# Patient Record
Sex: MALE | Race: BLACK OR AFRICAN AMERICAN | NOT HISPANIC OR LATINO | Employment: OTHER | URBAN - METROPOLITAN AREA
[De-identification: names, ages, dates, MRNs, and addresses within clinical notes are randomized per-mention and may not be internally consistent; named-entity substitution may affect disease eponyms.]

---

## 2017-01-09 RX ORDER — CLOPIDOGREL BISULFATE 75 MG/1
TABLET ORAL
Qty: 30 TABLET | Refills: 0 | Status: SHIPPED | OUTPATIENT
Start: 2017-01-09 | End: 2017-01-10 | Stop reason: SDUPTHER

## 2017-01-10 RX ORDER — BENAZEPRIL HYDROCHLORIDE AND HYDROCHLOROTHIAZIDE 10; 12.5 MG/1; MG/1
1 TABLET ORAL DAILY
Qty: 90 TABLET | Refills: 3 | Status: SHIPPED | OUTPATIENT
Start: 2017-01-10 | End: 2018-02-16 | Stop reason: SDUPTHER

## 2017-01-10 RX ORDER — CLOPIDOGREL BISULFATE 75 MG/1
75 TABLET ORAL DAILY
Qty: 30 TABLET | Refills: 3 | Status: SHIPPED | OUTPATIENT
Start: 2017-01-10 | End: 2017-02-07 | Stop reason: SDUPTHER

## 2017-01-11 NOTE — TELEPHONE ENCOUNTER
Approved Medications  clopidogrel (PLAVIX) 75 mg tablet  Take 1 tablet (75 mg total) by mouth once daily.       Disp: 30 tablet Refills: 3    Class: Normal Start: 1/10/2017   Approved by: MD janet Hwangazepril-hydrochlorthiazide (LOTENSIN HCT) 10-12.5 mg Tab  Take 1 tablet by mouth once daily.       Disp: 90 tablet Refills: 3    Class: Normal Start: 1/10/2017 -

## 2017-01-11 NOTE — TELEPHONE ENCOUNTER
----- Message from Lin Costa sent at 1/10/2017  3:07 PM CST -----  blood thinners & b/p refill needed..[phone] (home)   ..  [facility] [address]  [address] [facility] [address]  Phone: [phone] Fax: [phone]

## 2017-02-07 RX ORDER — CLOPIDOGREL BISULFATE 75 MG/1
75 TABLET ORAL DAILY
Qty: 30 TABLET | Refills: 6 | Status: SHIPPED | OUTPATIENT
Start: 2017-02-07 | End: 2017-07-18 | Stop reason: SDUPTHER

## 2017-02-07 NOTE — TELEPHONE ENCOUNTER
----- Message from Radha Marx sent at 2/7/2017 11:44 AM CST -----  Contact: Pt  Pt is requesting a refill on his clopidogrel 75 mg. Pt can be reached at [phone] (home)     [facility] [address]  [address] [facility] [address]  Phone: [phone] Fax: [phone]

## 2017-02-07 NOTE — TELEPHONE ENCOUNTER
Approved Medications  clopidogrel (PLAVIX) 75 mg tablet  Take 1 tablet (75 mg total) by mouth once daily.       Disp: 30 tablet Refills: 6    Class: Normal Start: 2/7/2017   Approved by: MAGI Vázquez  Patient notified

## 2017-03-15 ENCOUNTER — OFFICE VISIT (OUTPATIENT)
Dept: INTERNAL MEDICINE | Facility: CLINIC | Age: 82
End: 2017-03-15
Payer: MEDICARE

## 2017-03-15 ENCOUNTER — HOSPITAL ENCOUNTER (OUTPATIENT)
Dept: RADIOLOGY | Facility: HOSPITAL | Age: 82
Discharge: HOME | End: 2017-03-15
Attending: FAMILY MEDICINE
Payer: MEDICARE

## 2017-03-15 VITALS
DIASTOLIC BLOOD PRESSURE: 80 MMHG | WEIGHT: 217.81 LBS | HEIGHT: 72 IN | TEMPERATURE: 97 F | HEART RATE: 111 BPM | BODY MASS INDEX: 29.5 KG/M2 | SYSTOLIC BLOOD PRESSURE: 114 MMHG

## 2017-03-15 DIAGNOSIS — M51.36 DDD (DEGENERATIVE DISC DISEASE), LUMBAR: ICD-10-CM

## 2017-03-15 DIAGNOSIS — I10 ESSENTIAL HYPERTENSION: Chronic | ICD-10-CM

## 2017-03-15 DIAGNOSIS — D50.9 MICROCYTIC ANEMIA: ICD-10-CM

## 2017-03-15 DIAGNOSIS — K59.01 SLOW TRANSIT CONSTIPATION: ICD-10-CM

## 2017-03-15 DIAGNOSIS — K59.01 SLOW TRANSIT CONSTIPATION: Primary | ICD-10-CM

## 2017-03-15 PROBLEM — M51.369 DDD (DEGENERATIVE DISC DISEASE), LUMBAR: Status: ACTIVE | Noted: 2017-03-15

## 2017-03-15 PROCEDURE — 74020 XR ABDOMEN FLAT AND ERECT: CPT | Mod: 26,,, | Performed by: RADIOLOGY

## 2017-03-15 PROCEDURE — 72114 X-RAY EXAM L-S SPINE BENDING: CPT | Mod: TC,PO

## 2017-03-15 PROCEDURE — 72114 X-RAY EXAM L-S SPINE BENDING: CPT | Mod: 26,,, | Performed by: RADIOLOGY

## 2017-03-15 PROCEDURE — 74020 XR ABDOMEN FLAT AND ERECT: CPT | Mod: TC,PO

## 2017-03-15 PROCEDURE — 99214 OFFICE O/P EST MOD 30 MIN: CPT | Mod: S$PBB,,, | Performed by: FAMILY MEDICINE

## 2017-03-15 PROCEDURE — 99999 PR PBB SHADOW E&M-EST. PATIENT-LVL III: CPT | Mod: PBBFAC,,, | Performed by: FAMILY MEDICINE

## 2017-03-15 RX ORDER — METHOCARBAMOL 750 MG/1
750 TABLET, FILM COATED ORAL 3 TIMES DAILY
Qty: 90 TABLET | Refills: 0 | Status: SHIPPED | OUTPATIENT
Start: 2017-03-15 | End: 2017-03-25

## 2017-03-15 NOTE — MR AVS SNAPSHOT
[facility] - Internal Medicine  [address] [facility] [place] [address]  Phone: [phone]  Fax: [phone]                  Jovanny Olmedo   3/15/2017 11:40 AM   Office Visit    Description:  Male : 1935   Provider:  Sam Blake MD   Department[facility]  [facility] - Internal Medicine           Reason for Visit     Constipation     Medication Refill           Diagnoses this Visit        Comments    Slow transit constipation    -  Primary 1) Miralax 1 a day\  2) Increase your fiber  3) will get CMP to see if his potassium is low.     Essential hypertension     BP is well controlled.     DDD (degenerative disc disease), lumbar     Xray in  showed narrowing    Microcytic anemia     Will check his CBC, Iron and TIBC, ferritin.           To Do List           Future Appointments        Provider Department Dept Phone    3/15/2017 12:30 PM [facility] XR2 [facility] [phone]    3/15/2017 1:40 PM LABORATORY, [facility] [phone]    3/20/2017 9:00 AM FIELDS, VISUAL-ONE [facility] [phone]    3/20/2017 9:30 AM Alfred Urrutia MD [facility] [phone]    3/29/2017 11:00 AM Sam Blake MD [facility] [phone]      Goals (5 Years of Data)     None      Follow-Up and Disposition     Return in about 2 weeks (around 3/29/2017).    Follow-up and Disposition History       These Medications        Disp Refills Start End    methocarbamol (ROBAXIN) 750 MG Tab 90 tablet 0 3/15/2017 3/25/2017    Take 1 tablet (750 mg total) by mouth 3 (three) times daily. - Oral    Pharmacy: [facility] [address] [place] [address] Ph #: [phone]         [facility] On Call     [facility] Nurse Care Line -  Assistance  Registered nurses in the [facility] Center provide clinical advisement, health education, appointment booking, and other advisory services.  Call for this free service at [phone].              Medications           Message regarding Medications     Verify the changes and/or additions to your medication regime listed below are the same as discussed with your clinician today.  If any of these changes or additions are incorrect, please notify your healthcare provider.        START taking these NEW medications        Refills    methocarbamol (ROBAXIN) 750 MG Tab 0    Sig: Take 1 tablet (750 mg total) by mouth 3 (three) times daily.    Class: Normal    Route: Oral           Verify that the below list of medications is an accurate representation of the medications you are currently taking.  If none reported, the list may be blank. If incorrect, please contact your healthcare provider. Carry this list with you in case of emergency.           Current Medications     aspirin (ECOTRIN) 81 MG EC tablet Take 81 mg by mouth once daily.    atorvastatin (LIPITOR) 40 MG tablet Take 1 tablet (40 mg total) by mouth once daily.    azelastine (ASTELIN) 137 mcg (0.1 %) nasal spray 2 sprays (274 mcg total) by Nasal route 2 (two) times daily.    benazepril-hydrochlorthiazide (LOTENSIN HCT) 10-12.5 mg Tab Take 1 tablet by mouth once daily.    clopidogrel (PLAVIX) 75 mg tablet Take 1 tablet (75 mg total) by mouth once daily.    fluticasone (FLONASE) 50 mcg/actuation nasal spray 2 sprays by Each Nare route once daily.    levocetirizine (XYZAL) 5 MG tablet Take 1 tablet (5 mg total) by mouth every evening.    methocarbamol (ROBAXIN) 750 MG Tab Take 1 tablet (750 mg total) by mouth 3 (three) times daily.    nitroGLYCERIN (NITROSTAT) 0.4 MG SL tablet Place 1 tablet (0.4 mg total) under the tongue every 5 (five) minutes as needed for Chest pain.    ranitidine (ZANTAC) 300 MG tablet Take 1 tablet (300 mg total) by mouth once daily.    solifenacin (VESICARE) 10 MG tablet Take 1 tablet (10 mg total) by mouth once daily.    tadalafil (CIALIS) 10 MG tablet Take 1 tablet (10 mg total) by mouth daily as needed for Erectile  Dysfunction.           Clinical Reference Information           Your Vitals Were     BP Pulse Temp    114/80 (BP Location: Right arm, Patient Position: Sitting, BP Method: Manual) 111 97.2 °F (36.2 °C) (Tympanic)    Height Weight BMI    6' (1.829 m) 98.8 kg (217 lb 13 oz) 29.54 kg/m2      Blood Pressure          Most Recent Value    BP  114/80      Allergies as of 3/15/2017     Codeine      Immunizations Administered on Date of Encounter - 3/15/2017     None      Orders Placed During Today's Visit     Future Labs/Procedures Expected by Expires    CBC auto differential  3/15/2017 5/14/2018    Comprehensive metabolic panel  3/15/2017 5/14/2018    Ferritin  3/15/2017 5/14/2018    Iron and TIBC  3/15/2017 5/14/2018    X-Ray Abdomen Flat And Erect  3/15/2017 3/15/2018    X-Ray Lumbar Complete With Flex And Ext  3/15/2017 3/15/2018      [facility] Sign-Up     Activating your [facility] account is as easy as 1-2-3!     1) Visit [email or web address], select Sign Up Now, enter this activation code and your date of birth, then select Next.  [email or web address]  Expires: 4/29/2017 11:41 AM      2) Create a username and password to use when you visit [facility] in the future and select a security question in case you lose your password and select Next.    3) Enter your e-mail address and click Sign Up!    Additional Information  If you have questions, please e-mail [email or web address] or call [phone] to talk to our [facility] staff. Remember, [facility] is NOT to be used for urgent needs. For medical emergencies, dial 911.         Language Assistance Services     ATTENTION: Language assistance services are available, free of charge. Please call [phone].      ATENCIÓN: Si habla español, tiene a edwards disposición servicios gratuitos de asistencia lingüística. Llame al [phone].     CHÚ Ý: N?u b?n nói Ti?ng Vi?t, có các d?ch v? h? tr? ilir ng? mi?n phí dành cho b?n. G?i s? [phone].         [facility]  complies with applicable Federal civil rights laws and does not discriminate on the basis of race, color, national origin, age, disability, or sex.

## 2017-03-15 NOTE — PROGRESS NOTES
Subjective:       Patient ID: Jovanny Olmedo is a 81 y.o. male.    Chief Complaint: Constipation and Medication Refill     Constipation   This is a new problem. The current episode started in the past 7 days. The problem is unchanged. The stool is described as loose. The patient is not on a high fiber diet. He does not exercise regularly. There has not been adequate water intake. Associated symptoms include diarrhea and flatus. Pertinent negatives include no abdominal pain, bloating, fecal incontinence, hematochezia, hemorrhoids, melena, nausea or vomiting. He has tried nothing for the symptoms. There is no history of inflammatory bowel disease, irritable bowel syndrome, neuromuscular disease, psychiatric history or radiation treatment.   Medication Refill   Pertinent negatives include no abdominal pain, nausea or vomiting.     Review of Systems   Constitutional: Negative.    Respiratory: Negative.    Gastrointestinal: Positive for constipation, diarrhea and flatus. Negative for abdominal pain, bloating, hematochezia, hemorrhoids, melena, nausea and vomiting.   Genitourinary: Negative.    Neurological: Negative.    Hematological: Negative.    Psychiatric/Behavioral: Negative.        Objective:      Physical Exam   Constitutional: He appears well-developed and well-nourished.   Cardiovascular: Normal rate and regular rhythm.    Pulmonary/Chest: Effort normal and breath sounds normal.   Abdominal: Soft. Bowel sounds are normal.   Skin: Skin is warm and dry.       Assessment:       1. Slow transit constipation    2. Essential hypertension    3. DDD (degenerative disc disease), lumbar    4. Microcytic anemia        Plan:       Slow transit constipation  Comments:  1) Miralax 1 a day\par 2) Increase your fiber  3) will get CMP to see if his potassium is low.   Orders:  -     X-Ray Abdomen Flat And Erect; Future; Expected date: 3/15/17    Essential hypertension  Comments:  BP is well controlled.   Orders:  -      Comprehensive metabolic panel; Future; Expected date: 3/15/17  -     CBC auto differential; Future; Expected date: 3/15/17    DDD (degenerative disc disease), lumbar  Comments:  Xray in 2011 showed narrowing  Orders:  -     X-Ray Lumbar Complete With Flex And Ext; Future; Expected date: 3/15/17    Microcytic anemia  Comments:  Will check his CBC, Iron and TIBC, ferritin.  Orders:  -     Iron and TIBC; Future; Expected date: 3/15/17  -     Ferritin; Future; Expected date: 3/15/17    Other orders  -     methocarbamol (ROBAXIN) 750 MG Tab; Take 1 tablet (750 mg total) by mouth 3 (three) times daily.  Dispense: 90 tablet; Refill: 0

## 2017-03-16 RX ORDER — FERROUS SULFATE 325(65) MG
325 TABLET ORAL 2 TIMES DAILY
Qty: 60 TABLET | Refills: 6 | COMMUNITY
Start: 2017-03-16 | End: 2017-07-31 | Stop reason: ALTCHOICE

## 2017-03-17 DIAGNOSIS — M51.36 DDD (DEGENERATIVE DISC DISEASE), LUMBAR: Primary | ICD-10-CM

## 2017-03-20 ENCOUNTER — APPOINTMENT (OUTPATIENT)
Dept: OPHTHALMOLOGY | Facility: CLINIC | Age: 82
End: 2017-03-20
Payer: MEDICARE

## 2017-03-20 ENCOUNTER — OFFICE VISIT (OUTPATIENT)
Dept: OPHTHALMOLOGY | Facility: CLINIC | Age: 82
End: 2017-03-20
Payer: MEDICARE

## 2017-03-20 ENCOUNTER — TELEPHONE (OUTPATIENT)
Dept: INTERNAL MEDICINE | Facility: CLINIC | Age: 82
End: 2017-03-20

## 2017-03-20 DIAGNOSIS — H25.013 CORTICAL SENILE CATARACT OF BOTH EYES: ICD-10-CM

## 2017-03-20 DIAGNOSIS — H35.3130 AGE-RELATED MACULAR DEGENERATION, DRY, BOTH EYES: ICD-10-CM

## 2017-03-20 DIAGNOSIS — H25.13 NUCLEAR SCLEROSIS, BILATERAL: Primary | ICD-10-CM

## 2017-03-20 DIAGNOSIS — H40.013 OPEN ANGLE WITH BORDERLINE FINDINGS, LOW RISK, BILATERAL: ICD-10-CM

## 2017-03-20 PROCEDURE — 92134 CPTRZ OPH DX IMG PST SGM RTA: CPT | Mod: PBBFAC,PO | Performed by: OPHTHALMOLOGY

## 2017-03-20 PROCEDURE — 92014 COMPRE OPH EXAM EST PT 1/>: CPT | Mod: S$PBB,,, | Performed by: OPHTHALMOLOGY

## 2017-03-20 PROCEDURE — 92083 EXTENDED VISUAL FIELD XM: CPT | Mod: PBBFAC,PO | Performed by: OPHTHALMOLOGY

## 2017-03-20 PROCEDURE — 99212 OFFICE O/P EST SF 10 MIN: CPT | Mod: PBBFAC,PO | Performed by: OPHTHALMOLOGY

## 2017-03-20 PROCEDURE — 92133 CPTRZD OPH DX IMG PST SGM ON: CPT | Mod: PBBFAC,PO | Performed by: OPHTHALMOLOGY

## 2017-03-20 PROCEDURE — 92136 OPHTHALMIC BIOMETRY: CPT | Mod: PBBFAC,PO,RT | Performed by: OPHTHALMOLOGY

## 2017-03-20 PROCEDURE — 99999 PR PBB SHADOW E&M-EST. PATIENT-LVL II: CPT | Mod: PBBFAC,,, | Performed by: OPHTHALMOLOGY

## 2017-03-20 RX ORDER — KETOROLAC TROMETHAMINE 5 MG/ML
1 SOLUTION OPHTHALMIC 4 TIMES DAILY
Qty: 1 BOTTLE | Refills: 2 | Status: SHIPPED | OUTPATIENT
Start: 2017-03-20 | End: 2017-05-22

## 2017-03-20 RX ORDER — GATIFLOXACIN 5 MG/ML
1 SOLUTION/ DROPS OPHTHALMIC 2 TIMES DAILY
Qty: 1 BOTTLE | Refills: 2 | Status: SHIPPED | OUTPATIENT
Start: 2017-03-20 | End: 2017-05-22

## 2017-03-20 RX ORDER — PREDNISOLONE ACETATE 10 MG/ML
1 SUSPENSION/ DROPS OPHTHALMIC 4 TIMES DAILY
Qty: 1 BOTTLE | Refills: 2 | Status: SHIPPED | OUTPATIENT
Start: 2017-03-20 | End: 2017-05-22

## 2017-03-20 NOTE — MR AVS SNAPSHOT
[facility] - Ophthalmology  [address] [facility] [place] [address]  Phone: [phone]  Fax: [phone]                  Jovanny Olmedo   3/20/2017 9:30 AM   Office Visit    Description:  Male : 1935   Provider:  Alfred Urrutia MD   Department:  [facility] - Ophthalmology           Reason for Visit     Blurred Vision     Glaucoma Suspect           Diagnoses this Visit        Comments    Nuclear sclerosis, bilateral    -  Primary     Cortical senile cataract of both eyes         Age-related macular degeneration, dry, both eyes         Open angle with borderline findings, low risk, bilateral                To Do List           Future Appointments        Provider Department Dept Phone    3/29/2017 11:00 AM Sam Blake MD [facility] Internal Medicine [phone]    4/3/2017 11:00 AM Nura Zacarias MD [facility] [phone]    2017 9:50 AM LABORATORY, [address] [facility] [phone]    2017 8:00 AM Elizabeth Lejeune, NP [facility] [phone]    2017 9:30 AM LABORATORY, [address] [facility] [phone]      Goals (5 Years of Data)     None       These Medications        Disp Refills Start End    ketorolac 0.5% (ACULAR) 0.5 % Drop 1 Bottle 2 3/20/2017     Place 1 drop into the right eye 4 (four) times daily. Eyedrops to start one day before surgery - Right Eye    Pharmacy: [facility] [address] [place] [address] RD Ph #: [phone]       gatifloxacin (ZYMAR) 0.5 % Drop drops 1 Bottle 2 3/20/2017     Apply 1 drop to eye 2 (two) times daily. Start one day before surgery (right eye) - Ophthalmic    Pharmacy: [facility] [address] [place] [address] RD Ph #: [phone]       prednisoLONE acetate (PRED FORTE) 1 % DrpS 1 Bottle 2 3/20/2017     Place 1 drop into the right eye 4 (four) times daily. Start one day before surgery - Right Eye    Pharmacy: [facility] [address] - NEW  [address] #: [phone]         [facility] On Call     [facility] On Call Nurse Care Line - 24/7 Assistance  Registered nurses in the [facility] On Call Center provide clinical advisement, health education, appointment booking, and other advisory services.  Call for this free service at [phone].             Medications           Message regarding Medications     Verify the changes and/or additions to your medication regime listed below are the same as discussed with your clinician today.  If any of these changes or additions are incorrect, please notify your healthcare provider.        START taking these NEW medications        Refills    ketorolac 0.5% (ACULAR) 0.5 % Drop 2    Sig: Place 1 drop into the right eye 4 (four) times daily. Eyedrops to start one day before surgery    Class: Normal    Route: Right Eye    gatifloxacin (ZYMAR) 0.5 % Drop drops 2    Sig: Apply 1 drop to eye 2 (two) times daily. Start one day before surgery (right eye)    Class: Normal    Route: Ophthalmic    prednisoLONE acetate (PRED FORTE) 1 % DrpS 2    Sig: Place 1 drop into the right eye 4 (four) times daily. Start one day before surgery    Class: Normal    Route: Right Eye           Verify that the below list of medications is an accurate representation of the medications you are currently taking.  If none reported, the list may be blank. If incorrect, please contact your healthcare provider. Carry this list with you in case of emergency.           Current Medications     aspirin (ECOTRIN) 81 MG EC tablet Take 81 mg by mouth once daily.    atorvastatin (LIPITOR) 40 MG tablet Take 1 tablet (40 mg total) by mouth once daily.    azelastine (ASTELIN) 137 mcg (0.1 %) nasal spray 2 sprays (274 mcg total) by Nasal route 2 (two) times daily.    benazepril-hydrochlorthiazide (LOTENSIN HCT) 10-12.5 mg Tab Take 1 tablet by mouth once daily.    clopidogrel (PLAVIX) 75 mg tablet Take 1 tablet (75 mg total) by mouth once daily.     ferrous sulfate 325 mg (65 mg iron) Tab tablet Take 1 tablet (325 mg total) by mouth 2 (two) times daily.    fluticasone (FLONASE) 50 mcg/actuation nasal spray 2 sprays by Each Nare route once daily.    gatifloxacin (ZYMAR) 0.5 % Drop drops Apply 1 drop to eye 2 (two) times daily. Start one day before surgery (right eye)    ketorolac 0.5% (ACULAR) 0.5 % Drop Place 1 drop into the right eye 4 (four) times daily. Eyedrops to start one day before surgery    levocetirizine (XYZAL) 5 MG tablet Take 1 tablet (5 mg total) by mouth every evening.    methocarbamol (ROBAXIN) 750 MG Tab Take 1 tablet (750 mg total) by mouth 3 (three) times daily.    nitroGLYCERIN (NITROSTAT) 0.4 MG SL tablet Place 1 tablet (0.4 mg total) under the tongue every 5 (five) minutes as needed for Chest pain.    prednisoLONE acetate (PRED FORTE) 1 % DrpS Place 1 drop into the right eye 4 (four) times daily. Start one day before surgery    ranitidine (ZANTAC) 300 MG tablet Take 1 tablet (300 mg total) by mouth once daily.    solifenacin (VESICARE) 10 MG tablet Take 1 tablet (10 mg total) by mouth once daily.    tadalafil (CIALIS) 10 MG tablet Take 1 tablet (10 mg total) by mouth daily as needed for Erectile Dysfunction.           Clinical Reference Information           Allergies as of 3/20/2017     Codeine      Immunizations Administered on Date of Encounter - 3/20/2017     None      Orders Placed During Today's Visit      Normal Orders This Visit    Ambulatory Referral to External Surgery     [place] Visual Field - OU - Extended - Both Eyes     IOL Master - OD - Right Eye     Posterior Segment OCT Optic Nerve- Both eyes       [email or web address] Sign-Up     Activating your [facility] account is as easy as 1-2-3!     1) Visit [email or web address], select Sign Up Now, enter this activation code and your date of birth, then select Next.  [email or web address]  Expires: 4/29/2017 11:41 AM      2) Create a username and password to use when you visit [facility] in the future  and select a security question in case you lose your password and select Next.    3) Enter your e-mail address and click Sign Up!    Additional Information  If you have questions, please e-mail [email or web address] or call [phone] to talk to our [facility] staff. Remember, [facility] is NOT to be used for urgent needs. For medical emergencies, dial 911.         Language Assistance Services     ATTENTION: Language assistance services are available, free of charge. Please call [phone].      ATENCIÓN: Si habla esprenata, tiene a edwards disposición servicios gratuitos de asistencia lingüística. Llame al [phone].     CHÚ Ý: N?u b?n nói Ti?ng Vi?t, có các d?ch v? h? tr? ngôn ng? mi?n phí dành cho b?n. G?i s? [phone].         [facility] - [facility] complies with applicable Federal civil rights laws and does not discriminate on the basis of race, color, national origin, age, disability, or sex.

## 2017-03-20 NOTE — PROGRESS NOTES
SUBJECTIVE:   Jovanny Olmedo is a 81 y.o. male   Corrected distance visual acuity was 20/50 in the right eye and 20/50 in the left eye.   Chief Complaint   Patient presents with    Blurred Vision    Glaucoma Suspect        HPI:  HPI     Eye exam  Blurred [facility] distance and near, problem driving day and night with glare  No pain or discomfort    PCP: Dr. Mcdonough    1. COAG Suspect +Fhx (use HRT>GOCT)  2. ARMD   3. NSC OU    No drops       Last edited by Jeannette Blood on 3/20/2017  9:01 AM.     Assessment /Plan :  1. Nuclear sclerosis, bilateral  Visually Significant Cataract OU:   Patient reports decreased vision consistent with the clinical amount of lenticular opacity,  which reaches the level of visual significance and affects activities of daily living such as  Glare  . Risks, benefits, and alternatives to cataract surgery were  discussed.  IOL options were discussed, including Premium IOL'S and the associated  side effects and additional patient cost associated with them as well as patient's visual  goals. The pt expressed a desire to proceed with surgery with the potential for some  reasonable degree of visual improvement and was consented.  Risks of loss of vision and eye reviewed as well as possibility of need for spectacle correction after surgery even with premium implants. Verbal and written preop  instructions were provided to the pt.            Pt is interested in traditional monofocal IOL aiming for:       Distance OU and understands that  glasses will be generally needed at all times for near vision and often for finer distance correction especially for higher degrees of astigmatism.            Final visual acuity may be limited by AMD    Pt wishes to have OD eye done.    Requests a Monofocal IOL.    Will aim for distance    Other considerations: NONE       2. Cortical senile cataract of both eyes    3. Age-related macular degeneration, dry, both eyes stable   4. Open angle with borderline findings, low  risk, bilateral No evidence of glaucoma at this time but based on risk factors recommend to continue monitoring.

## 2017-03-27 ENCOUNTER — OFFICE VISIT (OUTPATIENT)
Dept: INTERNAL MEDICINE | Facility: CLINIC | Age: 82
End: 2017-03-27
Payer: MEDICARE

## 2017-03-27 VITALS
BODY MASS INDEX: 30.46 KG/M2 | HEIGHT: 72 IN | OXYGEN SATURATION: 96 % | DIASTOLIC BLOOD PRESSURE: 66 MMHG | WEIGHT: 224.88 LBS | SYSTOLIC BLOOD PRESSURE: 116 MMHG | HEART RATE: 90 BPM | TEMPERATURE: 97 F

## 2017-03-27 DIAGNOSIS — D50.9 MICROCYTIC ANEMIA: ICD-10-CM

## 2017-03-27 DIAGNOSIS — M51.36 DDD (DEGENERATIVE DISC DISEASE), LUMBAR: Primary | ICD-10-CM

## 2017-03-27 DIAGNOSIS — K59.01 SLOW TRANSIT CONSTIPATION: ICD-10-CM

## 2017-03-27 PROCEDURE — 99999 PR PBB SHADOW E&M-EST. PATIENT-LVL III: CPT | Mod: PBBFAC,,, | Performed by: FAMILY MEDICINE

## 2017-03-27 PROCEDURE — 99213 OFFICE O/P EST LOW 20 MIN: CPT | Mod: S$PBB,,, | Performed by: FAMILY MEDICINE

## 2017-03-27 PROCEDURE — 99213 OFFICE O/P EST LOW 20 MIN: CPT | Mod: PBBFAC,PO | Performed by: FAMILY MEDICINE

## 2017-03-27 NOTE — MR AVS SNAPSHOT
[facility] Internal Medicine  [address] [place] [place] [address]  Phone: [phone]  Fax: [phone]                  Jovanny Olmedo   3/27/2017 10:40 AM   Office Visit    Description:  Male : 1935   Provider:  Sam Blake MD   Department:  [facility] - Internal Medicine           Reason for Visit     Follow-up           Diagnoses this Visit        Comments    DDD (degenerative disc disease), lumbar    -  Primary Will start pt on arthritic cream and has appt with Pain Management    Slow transit constipation     Will continue with Miralax    Microcytic anemia     Will have pt continue on the ferrous sulfate 325 mg bid           To Do List           Future Appointments        Provider Department Dept Phone    3/30/2017 1:00 PM Alfred Urrutia MD [facility] Ophthalmology [phone]    4/3/2017 11:00 AM Nura Zacarias MD [facility] [phone]    2017 9:50 AM LABORATORY, [address] [facility] [phone]    2017 8:00 AM Elizabeth Lejeune, NP [facility] [phone]    2017 11:00 AM Sam Blake MD [facility] Internal [facility] [phone]      Goals (5 Years of Data)     None      Follow-Up and Disposition     Return in about 4 months (around 2017).    Follow-up and Disposition History      [facility]     [facility] Nurse Care Line -  Assistance  Registered nurses in the [facility] provide clinical advisement, health education, appointment booking, and other advisory services.  Call for this free service at [phone].             Medications           Message regarding Medications     Verify the changes and/or additions to your medication regime listed below are the same as discussed with your clinician today.  If any of these changes or additions are incorrect, please notify your healthcare provider.             Verify that the below list of medications is an accurate representation of the  medications you are currently taking.  If none reported, the list may be blank. If incorrect, please contact your healthcare provider. Carry this list with you in case of emergency.           Current Medications     aspirin (ECOTRIN) 81 MG EC tablet Take 81 mg by mouth once daily.    atorvastatin (LIPITOR) 40 MG tablet Take 1 tablet (40 mg total) by mouth once daily.    azelastine (ASTELIN) 137 mcg (0.1 %) nasal spray 2 sprays (274 mcg total) by Nasal route 2 (two) times daily.    benazepril-hydrochlorthiazide (LOTENSIN HCT) 10-12.5 mg Tab Take 1 tablet by mouth once daily.    clopidogrel (PLAVIX) 75 mg tablet Take 1 tablet (75 mg total) by mouth once daily.    ferrous sulfate 325 mg (65 mg iron) Tab tablet Take 1 tablet (325 mg total) by mouth 2 (two) times daily.    fluticasone (FLONASE) 50 mcg/actuation nasal spray 2 sprays by Each Nare route once daily.    gatifloxacin (ZYMAR) 0.5 % Drop drops Apply 1 drop to eye 2 (two) times daily. Start one day before surgery (right eye)    ketorolac 0.5% (ACULAR) 0.5 % Drop Place 1 drop into the right eye 4 (four) times daily. Eyedrops to start one day before surgery    levocetirizine (XYZAL) 5 MG tablet Take 1 tablet (5 mg total) by mouth every evening.    nitroGLYCERIN (NITROSTAT) 0.4 MG SL tablet Place 1 tablet (0.4 mg total) under the tongue every 5 (five) minutes as needed for Chest pain.    prednisoLONE acetate (PRED FORTE) 1 % DrpS Place 1 drop into the right eye 4 (four) times daily. Start one day before surgery    ranitidine (ZANTAC) 300 MG tablet Take 1 tablet (300 mg total) by mouth once daily.    solifenacin (VESICARE) 10 MG tablet Take 1 tablet (10 mg total) by mouth once daily.    tadalafil (CIALIS) 10 MG tablet Take 1 tablet (10 mg total) by mouth daily as needed for Erectile Dysfunction.           Clinical Reference Information           Your Vitals Were     BP Pulse Temp Height    116/66 (BP Location: Right arm, Patient Position: Sitting, BP Method: Manual)  90 97.2 °F (36.2 °C) (Tympanic) 6' (1.829 m)    Weight SpO2 BMI    102 kg (224 lb 13.9 oz) 96% 30.5 kg/m2      Blood Pressure          Most Recent Value    BP  116/66      Allergies as of 3/27/2017     Codeine      Immunizations Administered on Date of Encounter - 3/27/2017     None      [facility] Sign-Up     Activating your [facility] account is as easy as 1-2-3!     1) Visit [email or web address], select Sign Up Now, enter this activation code and your date of birth, then select Next.  [email or web address]  Expires: 4/29/2017 11:41 AM      2) Create a username and password to use when you visit [facility] in the future and select a security question in case you lose your password and select Next.    3) Enter your e-mail address and click Sign Up!    Additional Information  If you have questions, please e-mail [email or web address] or call [phone] to talk to our [facility] staff. Remember, [facility] is NOT to be used for urgent needs. For medical emergencies, dial 911.         Instructions    Iron tabs (ferrous sulfate) 325 mg by mouth twice a day       Language Assistance Services     ATTENTION: Language assistance services are available, free of charge. Please call [phone].      ATENCIÓN: Si habla español, tiene a edwards disposición servicios gratuitos de asistencia lingüística. Llame al [phone].     CHÚ Ý: N?u b?n nói Ti?ng Vi?t, có các d?ch v? h? tr? ngôn ng? mi?n phí dành cho b?n. G?i s? [phone].         [facility] - Internal Medicine complies with applicable Federal civil rights laws and does not discriminate on the basis of race, color, national origin, age, disability, or sex.

## 2017-03-27 NOTE — PROGRESS NOTES
Subjective:       Patient ID: Jovanny Olmedo is a 81 y.o. male.    Chief Complaint: Follow-up    HPI Comments: F/U:       Pt ia an 81 year old who has had some constipation and lower back pain and xray showed some DDD of the L3-L4 and L4-L5.     Review of Systems   Constitutional: Negative.    Respiratory: Negative.    Cardiovascular: Negative.    Gastrointestinal: Negative.    Genitourinary: Negative.    Musculoskeletal: Positive for back pain.   Psychiatric/Behavioral: Negative.        Objective:      Physical Exam   Constitutional: He is oriented to person, place, and time. He appears well-developed and well-nourished.   Cardiovascular: Normal rate and regular rhythm.    Pulmonary/Chest: Effort normal and breath sounds normal.   Abdominal: Soft. Bowel sounds are normal.   Neurological: He is alert and oriented to person, place, and time.   Skin: Skin is warm and dry.       Assessment:       1. DDD (degenerative disc disease), lumbar    2. Slow transit constipation    3. Microcytic anemia        Plan:       DDD (degenerative disc disease), lumbar  Comments:  Will start pt on arthritic cream and has appt with Pain Management    Slow transit constipation  Comments:  Will continue with Miralax    Microcytic anemia  Comments:  Will have pt continue on the ferrous sulfate 325 mg bid

## 2017-03-30 ENCOUNTER — OFFICE VISIT (OUTPATIENT)
Dept: OPHTHALMOLOGY | Facility: CLINIC | Age: 82
End: 2017-03-30
Payer: MEDICARE

## 2017-03-30 DIAGNOSIS — Z98.890 POST-OPERATIVE STATE: Primary | ICD-10-CM

## 2017-03-30 PROCEDURE — 99212 OFFICE O/P EST SF 10 MIN: CPT | Mod: PBBFAC,PO | Performed by: OPHTHALMOLOGY

## 2017-03-30 PROCEDURE — 99999 PR PBB SHADOW E&M-EST. PATIENT-LVL II: CPT | Mod: PBBFAC,,, | Performed by: OPHTHALMOLOGY

## 2017-03-30 PROCEDURE — 99024 POSTOP FOLLOW-UP VISIT: CPT | Mod: ,,, | Performed by: OPHTHALMOLOGY

## 2017-03-30 NOTE — PROGRESS NOTES
SUBJECTIVE:   Jovanny Olmedo is a 81 y.o. male   Uncorrected distance visual acuity was 20/50 in the right eye and not recorded in the left eye.   Chief Complaint   Patient presents with    Post-op Evaluation        HPI:  HPI     Patient states o pain on pain scale.        PCP: Dr. Mcdonough    1. COAG Suspect +Fhx (use HRT>GOCT)  2. ARMD   3. PCIOL OD 03/29/17      NS OS    NO GLAUCOMA DROPS    OD PRED QID, KET QID, GAT BID       Last edited by NASRIN Ramirez on 3/30/2017  8:04 AM.     Assessment /Plan :  1. Post-operative state Exam:  SLE:  S/C: normal  K : trace k fold  AC: trace cell and flare  Iris: normal  Lens: PCIOL    IMP:  PO Day 1 S/P Phaco/IOL OD : Doing well.    Plan:  Continue gtts to operative eye:  Pred 1% QID  Ketorolac QID  Zymaxid BID  Reinstructed in importance of absolute compliance with Post-OP instructions including medications, shield at bedtime, and limitation of activities. Follow up appointments in approximately one and six weeks or call immediately for increased pain, redness or vision loss.

## 2017-03-30 NOTE — MR AVS SNAPSHOT
[facility] - Ophthalmology  [address] [facility] [address] [place] [address]  Phone: [phone]  Fax: [phone]                  Jovanny Olmedo   3/30/2017 1:00 PM   Office Visit    Description:  Male : 1935   Provider:  Alfred Urrutia MD   Department:  [facility] - Ophthalmology           Reason for Visit     Post-op Evaluation           Diagnoses this Visit        Comments    Post-operative state    -  Primary            To Do List           Future Appointments        Provider Department Dept Phone    3/30/2017 1:00 PM Alfred Urrutia MD [facility] Ophthalmology [phone]    4/3/2017 11:00 AM Nura Zacarias MD [facility] [phone]    2017 9:50 AM [address], [address] [facility] [phone]    2017 8:00 AM Elizabeth Lejeune, NP [facility] [phone]    2017 11:00 AM Sam Blake MD [facility] Internal Medicine [phone]      Goals (5 Years of Data)     None      [facility]     [facility] Care [facility] -  Assistance  Unless otherwise directed by your provider, please contact [facility], our nurse care line that is available for  assistance.     Registered nurses in the [facility] provide: appointment scheduling, clinical advisement, health education, and other advisory services.  Call: [phone] (toll free)               Medications           Message regarding Medications     Verify the changes and/or additions to your medication regime listed below are the same as discussed with your clinician today.  If any of these changes or additions are incorrect, please notify your healthcare provider.             Verify that the below list of medications is an accurate representation of the medications you are currently taking.  If none reported, the list may be blank. If incorrect, please contact your healthcare provider. Carry this list with you in case of emergency.           Current  Medications     aspirin (ECOTRIN) 81 MG EC tablet Take 81 mg by mouth once daily.    atorvastatin (LIPITOR) 40 MG tablet Take 1 tablet (40 mg total) by mouth once daily.    azelastine (ASTELIN) 137 mcg (0.1 %) nasal spray 2 sprays (274 mcg total) by Nasal route 2 (two) times daily.    benazepril-hydrochlorthiazide (LOTENSIN HCT) 10-12.5 mg Tab Take 1 tablet by mouth once daily.    clopidogrel (PLAVIX) 75 mg tablet Take 1 tablet (75 mg total) by mouth once daily.    ferrous sulfate 325 mg (65 mg iron) Tab tablet Take 1 tablet (325 mg total) by mouth 2 (two) times daily.    gatifloxacin (ZYMAR) 0.5 % Drop drops Apply 1 drop to eye 2 (two) times daily. Start one day before surgery (right eye)    ketorolac 0.5% (ACULAR) 0.5 % Drop Place 1 drop into the right eye 4 (four) times daily. Eyedrops to start one day before surgery    prednisoLONE acetate (PRED FORTE) 1 % DrpS Place 1 drop into the right eye 4 (four) times daily. Start one day before surgery    ranitidine (ZANTAC) 300 MG tablet Take 1 tablet (300 mg total) by mouth once daily.    solifenacin (VESICARE) 10 MG tablet Take 1 tablet (10 mg total) by mouth once daily.    fluticasone (FLONASE) 50 mcg/actuation nasal spray 2 sprays by Each Nare route once daily.    levocetirizine (XYZAL) 5 MG tablet Take 1 tablet (5 mg total) by mouth every evening.    nitroGLYCERIN (NITROSTAT) 0.4 MG SL tablet Place 1 tablet (0.4 mg total) under the tongue every 5 (five) minutes as needed for Chest pain.    tadalafil (CIALIS) 10 MG tablet Take 1 tablet (10 mg total) by mouth daily as needed for Erectile Dysfunction.           Clinical Reference Information           Allergies as of 3/30/2017     Codeine      Immunizations Administered on Date of Encounter - 3/30/2017     None      [facility] Sign-Up     Activating your [facility] account is as easy as 1-2-3!     1) Visit [email or web address], select Sign Up Now, enter this activation code and your date of birth, then select  Next.  [email or web address]  Expires: 4/29/2017 11:41 AM      2) Create a username and password to use when you visit [facility] in the future and select a security question in case you lose your password and select Next.    3) Enter your e-mail address and click Sign Up!    Additional Information  If you have questions, please e-mail [email or web address] or call [phone] to talk to our [facility] staff. Remember, [facility] is NOT to be used for urgent needs. For medical emergencies, dial 911.         Language Assistance Services     ATTENTION: Language assistance services are available, free of charge. Please call [phone].      ATENCIÓN: Si elena mills, tiene a edwards disposición servicios gratuitos de asistencia lingüística. Llame al [phone].     CHÚ Ý: N?u b?n nói Ti?ng Vi?t, có các d?ch v? h? tr? ngôn ng? mi?n phí dành cho b?n. G?i s? [phone].         [facility] complies with applicable Federal civil rights laws and does not discriminate on the basis of race, color, national origin, age, disability, or sex.

## 2017-04-03 ENCOUNTER — OFFICE VISIT (OUTPATIENT)
Dept: PAIN MEDICINE | Facility: CLINIC | Age: 82
End: 2017-04-03
Payer: MEDICARE

## 2017-04-03 VITALS
SYSTOLIC BLOOD PRESSURE: 125 MMHG | RESPIRATION RATE: 16 BRPM | WEIGHT: 225 LBS | BODY MASS INDEX: 30.48 KG/M2 | HEIGHT: 72 IN | HEART RATE: 82 BPM | DIASTOLIC BLOOD PRESSURE: 76 MMHG

## 2017-04-03 DIAGNOSIS — M47.817 SPONDYLOSIS OF LUMBOSACRAL REGION WITHOUT MYELOPATHY OR RADICULOPATHY: Primary | ICD-10-CM

## 2017-04-03 DIAGNOSIS — M46.1 SACROILIITIS, NOT ELSEWHERE CLASSIFIED: Primary | ICD-10-CM

## 2017-04-03 DIAGNOSIS — M46.1 SACROILIITIS: ICD-10-CM

## 2017-04-03 DIAGNOSIS — M51.36 DDD (DEGENERATIVE DISC DISEASE), LUMBAR: ICD-10-CM

## 2017-04-03 DIAGNOSIS — M54.16 LUMBAR RADICULOPATHY: ICD-10-CM

## 2017-04-03 PROCEDURE — 99213 OFFICE O/P EST LOW 20 MIN: CPT | Mod: PBBFAC,PO | Performed by: ANESTHESIOLOGY

## 2017-04-03 PROCEDURE — 99999 PR PBB SHADOW E&M-EST. PATIENT-LVL III: CPT | Mod: PBBFAC,,, | Performed by: ANESTHESIOLOGY

## 2017-04-03 PROCEDURE — 99204 OFFICE O/P NEW MOD 45 MIN: CPT | Mod: S$PBB,,, | Performed by: ANESTHESIOLOGY

## 2017-04-03 NOTE — PROGRESS NOTES
Chief Pain Complaint:  Low Back Pain, Left Leg Pain    History of Present Illness:   This patient is a 81 y.o. male who presents today complaining of the above noted pain/s. The patient describes the pain as follows.    - duration of pain: > 20 years   - timing: intermittent   - character: aching, sharp  - radiating, dermatomal: extends into left leg  - antecedent trauma, prior spinal surgery: no prior trauma, no prior spinal surgery   - pertinent negatives: No fever, No chills, No weight loss, No bladder dysfunction, No bowel dysfunction, No extremity weakness, No saddle anesthesia  - pertinent positives: none    - medications, other therapies tried (physical therapy, injections):     >> Tylenol    >> Has previously undergone Physical Therapy    >> Has NOT previously undergone spinal injection/s      Imaging / Labs / Studies (reviewed on 4/3/2017):      Results for orders placed during the hospital encounter of 03/15/17   X-Ray Lumbar Complete With Flex And Ext    Narrative Lumbar spine five views plus flexion and extension.  Findings: There is multilevel degenerative vertebral endplate spurring and facet arthropathy.  Moderate narrowing of the L3-L4 and L4-L5 disc spaces.  Pedicles appear intact.  No translational instability with flexion or extension.  No compression fracture.         Results for orders placed in visit on 07/26/11   X-Ray Lumbar Spine Ap And Lateral    Narrative DATE OF EXAM: Jul 26 2011   RESULTS: VERTEBRAL ALIGNMENT IS NORMAL .  THERE IS MARGINAL ANTERIOR   OSTEOPHYTE FORMATION THROUGHOUT.  THERE IS AN AORTIC STENT GRAFT.  THERE   IS LOSS OF DISC HEIGHT AT L3-4, L4-5, AND L5-S1 BUT NO COMPRESSION FRACTURES OR ACUTE OSSEOUS ABNORMALITIES ARE IDENTIFIED.         Review of Systems:  CONSTITUTIONAL: patient denies any fever, chills, or weight loss  SKIN: patient denies any rash or itching  RESPIRATORY: patient denies having any shortness of breath  GASTROINTESTINAL: patient reports  constipation  GENITOURINARY: patient denies having any abnormal bladder function    MUSCULOSKELETAL:  - patient complains of the above noted pain/s (see chief pain complaint)    NEUROLOGICAL:   - pain as above  - strength in Lower extremities is intact, BILATERALLY  - sensation in Lower extremities is intact, BILATERALLY  - patient denies any loss of bowel or bladder control      PSYCHIATRIC: patient denies any change in mood    Other:  All other systems reviewed and are negative      Physical Exam:  /76 (BP Location: Right arm, Patient Position: Sitting, BP Method: Automatic)  Pulse 82  Resp 16  Ht 6' (1.829 m)  Wt 102.1 kg (225 lb)  BMI 30.52 kg/m2 (reviewed on 4/3/2017)  General: alert and oriented, in no apparent distress  Gait: normal gait  Skin: No rashes, No discoloration, No obvious lesions  HEENT: EOMI  Cardiovascular: no significant peripheral edema present  Respiratory: respirations nonlabored    Musculoskeletal:  - Any pain on flexion, extension, rotation:    >> pain on extension and rotation  - Straight Leg Raise:     >> LEFT :: negative    >> RIGHT :: negative    - Any tenderness to palpation across paraspinal muscles, joints, bursae:     >> across lumbar paraspinals and si joints    Neuro:  - Extremity Strength:     >> LEFT :: 5/5    >> RIGHT :: 5/5  - Extremity Reflexes:    >> LEFT  :: 2+    >> RIGHT :: 2+  - Sensory (sensation to light touch):    >> LEFT :: intact    >> RIGHT :: intact    Psych:  Mood and affect is appropriate      Assessment:  Lumbar Spondylosis  Sacroiliitis  Lumbar ddd    Plan:  Patient has joint pain, I will order facet + si block.  If pain persists then check lumbar mri.  Imaging / studies reviewed, detailed above.  I discussed in detail the risks, benefits, and alternatives to any and all potential treatment options.  All questions and concerns were fully addressed today in clinic.      >>Pain Disability Index:  4/3/2017 :: 57    >>Opioid Risk Tool:  4/3/2017 ::  0

## 2017-04-03 NOTE — LETTER
April 3, 2017      Sam Blake MD  [address] [place] [address]           [facility]  [address] [place] [address]  Phone: [phone]  Fax: [phone]          Patient: Jovanny Olmedo   MR Number: [id number]   YOB: 1935   Date of Visit: 4/3/2017       Dear Dr. Sam Blake:    Thank you for referring Jovanny Olmedo to me for evaluation. Attached you will find relevant portions of my assessment and plan of care.    If you have questions, please do not hesitate to call me. I look forward to following Jovanny Olmedo along with you.    Sincerely,    Nura Zacarias MD    Enclosure  CC:  No Recipients    If you would like to receive this communication electronically, please contact [email or web address] or [phone] to request more information on [employer] Link access.    For providers and/or their staff who would like to refer a patient to [facility], please contact us through our one-stop-shop provider referral line, [facility] [place], at [phone].    If you feel you have received this communication in error or would no longer like to receive these types of communications, please e-mail [email or web address]

## 2017-04-06 ENCOUNTER — OFFICE VISIT (OUTPATIENT)
Dept: OPHTHALMOLOGY | Facility: CLINIC | Age: 82
End: 2017-04-06
Payer: MEDICARE

## 2017-04-06 DIAGNOSIS — Z98.890 POST-OPERATIVE STATE: Primary | ICD-10-CM

## 2017-04-06 DIAGNOSIS — H25.012 CORTICAL SENILE CATARACT OF LEFT EYE: ICD-10-CM

## 2017-04-06 DIAGNOSIS — H25.12 NUCLEAR SCLEROSIS, LEFT: ICD-10-CM

## 2017-04-06 PROCEDURE — 99024 POSTOP FOLLOW-UP VISIT: CPT | Mod: ,,, | Performed by: OPHTHALMOLOGY

## 2017-04-06 PROCEDURE — 99212 OFFICE O/P EST SF 10 MIN: CPT | Mod: PBBFAC,PO | Performed by: OPHTHALMOLOGY

## 2017-04-06 PROCEDURE — 99999 PR PBB SHADOW E&M-EST. PATIENT-LVL II: CPT | Mod: PBBFAC,,, | Performed by: OPHTHALMOLOGY

## 2017-04-06 PROCEDURE — 92136 OPHTHALMIC BIOMETRY: CPT | Mod: PBBFAC,PO,RT | Performed by: OPHTHALMOLOGY

## 2017-04-06 RX ORDER — GATIFLOXACIN 5 MG/ML
1 SOLUTION/ DROPS OPHTHALMIC 2 TIMES DAILY
Qty: 1 BOTTLE | Refills: 2 | Status: SHIPPED | OUTPATIENT
Start: 2017-04-06 | End: 2017-05-22

## 2017-04-06 RX ORDER — KETOROLAC TROMETHAMINE 5 MG/ML
1 SOLUTION OPHTHALMIC 4 TIMES DAILY
Qty: 1 BOTTLE | Refills: 2 | Status: SHIPPED | OUTPATIENT
Start: 2017-04-06 | End: 2017-05-22

## 2017-04-06 RX ORDER — PREDNISOLONE ACETATE 10 MG/ML
1 SUSPENSION/ DROPS OPHTHALMIC 4 TIMES DAILY
Qty: 1 BOTTLE | Refills: 2 | Status: SHIPPED | OUTPATIENT
Start: 2017-04-06 | End: 2017-05-22

## 2017-04-06 NOTE — PROGRESS NOTES
SUBJECTIVE:   Jovanny Olmedo is a 81 y.o. male   Uncorrected distance visual acuity was 20/20 -1 in the right eye and 20/80 -2 in the left eye.   Chief Complaint   Patient presents with    Post-op Evaluation     1 wk PO PCIOL OD        HPI:  HPI     Post-op Evaluation    Additional comments: 1 wk PO PCIOL OD           Comments   1 wk PO PCIOL OD  Patient request surgery OS  OS vision blurred, OD doing well  No pain or discomfort  PCP: Dr. Mcdonough    1. COAG Suspect +Fhx (use HRT>GOCT)  2. ARMD   3. PCIOL OD +17.5 SN60WF (DISTANCE) 03/29/17  NS OS    NO GLAUCOMA DROPS    OD PRED QID, KET QID, GAT BID       Last edited by Jeannette Blood on 4/6/2017  1:54 PM. (History)        Assessment /Plan :  1. Post-operative state Slit lamp exam:  L/L: nl  K: clear, wound sealed  AC: 0 cell and flare  Iris/Lens: IOL centered and stable      IMP:  PO Week 1 S/P Phaco/ IOL OD : Doing well with no evidence of infection or abnormal inflammation.     Plan:  Pt given and instructed in one week PO instructions. D/C zymaxid and start to taper off Ketorlac and Pred Forte 1% weekly. Can resume normal activitites and d/c eye shield. OTC reading glasses can be used until evaluated for final MR. Follow up in one month or PRN pain, redness, vision loss, or other concerns.     2. Nuclear sclerosis, left   Patient reports decreased vision in the fellow eye consistent with the clinical amount of lenticular opacity, which reaches the level of visual significance and affects activities of daily living including reading and glare. Risks, benefits, and alternatives to cataract surgery were discussed and pt desired to schedule cataract surgery. Pt was consented and the biometry and lens options were reviewed.     Schedule Cataract Surgery OS        Final visual acuity may be limited by AMD     Requests a Monofocal  IOL.     Will aim for distance     Other considerations: NONE         3. Cortical senile cataract of left eye

## 2017-04-06 NOTE — MR AVS SNAPSHOT
[facility]  [address] [facility] [address]  Phone: [phone]  Fax: [phone]                  Jovanny Olmedo   2017 2:00 PM   Office Visit    Description:  Male : 1935   Provider:  Alfred Urrutia MD   Department:  [facility]           Reason for Visit     Post-op Evaluation           Diagnoses this Visit        Comments    Post-operative state    -  Primary     Nuclear sclerosis, left         Cortical senile cataract of left eye                To Do List           Future Appointments        Provider Department Dept Phone    2017 11:00 AM [facility] PAIN1 [facility] -  [phone]    5/15/2017 11:20 AM Jaylene Izquierdo PA-C [facility] [phone]    2017 9:50 AM LABORATORY, [address] [facility] [phone]    2017 8:00 AM Elizabeth Lejeune, NP [facility] [phone]    2017 11:00 AM Sam Blake MD [facility] [phone]      Your Future Surgeries/Procedures     2017   Surgery with Nura Zacarias MD   [facility] -  ([facility])    [address] [address] [facility] [address] [phone]              Goals (5 Years of Data)     None       These Medications        Disp Refills Start End    prednisoLONE acetate (PRED FORTE) 1 % DrpS 1 Bottle 2 2017     Place 1 drop into the left eye 4 (four) times daily. Eyedrops to start one day before surgery - Left Eye    Pharmacy: [facility] [address] [place] [address] RD Ph #: [phone]       Notes to Pharmacy: Branded Pred Forte 1% only. NO generic Substitution.    ketorolac 0.5% (ACULAR) 0.5 % Drop 1 Bottle 2 2017     Place 1 drop into the left eye 4 (four) times daily. Eyedrops to start one day before surgery - Left Eye    Pharmacy: [facility] [address] [place] [address] RD Ph #: [phone]       gatifloxacin  (ZYMAR) 0.5 % Drop drops 1 Bottle 2 4/6/2017     Apply 1 drop to eye 2 (two) times daily. Eyedrops to start one day before surgery (left eye) - Ophthalmic    Pharmacy: [facility] [address] [place] [address] #: [phone]         [facility]     [facility] On Call Nurse Care Line - 24/7 Assistance  Unless otherwise directed by your provider, please contact [facility], our nurse care line that is available for 24/7 assistance.     Registered nurses in the [facility] provide: appointment scheduling, clinical advisement, health education, and other advisory services.  Call: [phone] (toll free)               Medications           Message regarding Medications     Verify the changes and/or additions to your medication regime listed below are the same as discussed with your clinician today.  If any of these changes or additions are incorrect, please notify your healthcare provider.        START taking these NEW medications        Refills    prednisoLONE acetate (PRED FORTE) 1 % DrpS 2    Sig: Place 1 drop into the left eye 4 (four) times daily. Eyedrops to start one day before surgery    Class: Normal    Route: Left Eye    ketorolac 0.5% (ACULAR) 0.5 % Drop 2    Sig: Place 1 drop into the left eye 4 (four) times daily. Eyedrops to start one day before surgery    Class: Normal    Route: Left Eye    gatifloxacin (ZYMAR) 0.5 % Drop drops 2    Sig: Apply 1 drop to eye 2 (two) times daily. Eyedrops to start one day before surgery (left eye)    Class: Normal    Route: Ophthalmic           Verify that the below list of medications is an accurate representation of the medications you are currently taking.  If none reported, the list may be blank. If incorrect, please contact your healthcare provider. Carry this list with you in case of emergency.           Current Medications     aspirin (ECOTRIN) 81 MG EC tablet Take 81 mg by mouth once daily.    atorvastatin (LIPITOR) 40 MG tablet  Take 1 tablet (40 mg total) by mouth once daily.    benazepril-hydrochlorthiazide (LOTENSIN HCT) 10-12.5 mg Tab Take 1 tablet by mouth once daily.    clopidogrel (PLAVIX) 75 mg tablet Take 1 tablet (75 mg total) by mouth once daily.    ferrous sulfate 325 mg (65 mg iron) Tab tablet Take 1 tablet (325 mg total) by mouth 2 (two) times daily.    fluticasone (FLONASE) 50 mcg/actuation nasal spray 2 sprays by Each Nare route once daily.    gatifloxacin (ZYMAR) 0.5 % Drop drops Apply 1 drop to eye 2 (two) times daily. Start one day before surgery (right eye)    ketorolac 0.5% (ACULAR) 0.5 % Drop Place 1 drop into the right eye 4 (four) times daily. Eyedrops to start one day before surgery    prednisoLONE acetate (PRED FORTE) 1 % DrpS Place 1 drop into the right eye 4 (four) times daily. Start one day before surgery    ranitidine (ZANTAC) 300 MG tablet Take 1 tablet (300 mg total) by mouth once daily.    solifenacin (VESICARE) 10 MG tablet Take 1 tablet (10 mg total) by mouth once daily.    tadalafil (CIALIS) 10 MG tablet Take 1 tablet (10 mg total) by mouth daily as needed for Erectile Dysfunction.    azelastine (ASTELIN) 137 mcg (0.1 %) nasal spray 2 sprays (274 mcg total) by Nasal route 2 (two) times daily.    gatifloxacin (ZYMAR) 0.5 % Drop drops Apply 1 drop to eye 2 (two) times daily. Eyedrops to start one day before surgery (left eye)    ketorolac 0.5% (ACULAR) 0.5 % Drop Place 1 drop into the left eye 4 (four) times daily. Eyedrops to start one day before surgery    levocetirizine (XYZAL) 5 MG tablet Take 1 tablet (5 mg total) by mouth every evening.    nitroGLYCERIN (NITROSTAT) 0.4 MG SL tablet Place 1 tablet (0.4 mg total) under the tongue every 5 (five) minutes as needed for Chest pain.    prednisoLONE acetate (PRED FORTE) 1 % DrpS Place 1 drop into the left eye 4 (four) times daily. Eyedrops to start one day before surgery           Clinical Reference Information           Allergies as of 4/6/2017     Codeine       Immunizations Administered on Date of Encounter - 4/6/2017     None      Orders Placed During Today's Visit      Normal Orders This Visit    Ambulatory Referral to External Surgery     [facility] -[facility]- Second Eye       [facility] Sign-Up     Activating your [facility] account is as easy as 1-2-3!     1) Visit [email or web address], select Sign Up Now, enter this activation code and your date of birth, then select Next.  [email or web address]  Expires: 4/29/2017 11:41 AM      2) Create a username and password to use when you visit [facility] in the future and select a security question in case you lose your password and select Next.    3) Enter your e-mail address and click Sign Up!    Additional Information  If you have questions, please e-mail [email or web address] or call [phone] to talk to our [facility] staff. Remember, [facility] is NOT to be used for urgent needs. For medical emergencies, dial 911.         Language Assistance Services     ATTENTION: Language assistance services are available, free of charge. Please call [phone].      ATENCIÓN: Si habla español, tiene a edwards disposición servicios gratuitos de asistencia lingüística. Llame al [phone].     KEVEN Ý: N?u b?n nói Ti?ng Vi?t, có các d?ch v? h? tr? ngôn ng? mi?n phí dành cho b?n. G?i s? [phone].         [facility] complies with applicable Federal civil rights laws and does not discriminate on the basis of race, color, national origin, age, disability, or sex.

## 2017-04-11 ENCOUNTER — TELEPHONE (OUTPATIENT)
Dept: PAIN MEDICINE | Facility: CLINIC | Age: 82
End: 2017-04-11

## 2017-04-11 RX ORDER — ATORVASTATIN CALCIUM 40 MG/1
40 TABLET, FILM COATED ORAL DAILY
Qty: 30 TABLET | Refills: 11 | Status: SHIPPED | OUTPATIENT
Start: 2017-04-11 | End: 2018-10-26 | Stop reason: SDUPTHER

## 2017-04-11 RX ORDER — ATORVASTATIN CALCIUM 40 MG/1
40 TABLET, FILM COATED ORAL DAILY
Qty: 30 TABLET | Refills: 6 | Status: SHIPPED | OUTPATIENT
Start: 2017-04-11 | End: 2017-05-22 | Stop reason: SDUPTHER

## 2017-04-11 NOTE — TELEPHONE ENCOUNTER
Spoke with patient who states his pain has resolved and wanted to cancel his procedure.  I assured him I would cancel his procedure for him.

## 2017-04-11 NOTE — TELEPHONE ENCOUNTER
----- Message from Riya Long sent at 4/11/2017  7:23 AM CDT -----  Patient has an appointment on April 19 that he would like to cancel.   Call him at [phone].                                             [email or web address]

## 2017-04-11 NOTE — TELEPHONE ENCOUNTER
----- Message from Riya Long sent at 4/11/2017  7:34 AM CDT -----  Patient needs a refill on Lipitor called in to [facility] in [place].  Call him at [phone].                                                matamoros

## 2017-04-11 NOTE — TELEPHONE ENCOUNTER
Returned call to patient who states that he would like all his labs scheduled for the same day and he states that he need a refill. Informed patient that the refill will be sent over to the provider for approval. He expressed understanding.

## 2017-04-11 NOTE — TELEPHONE ENCOUNTER
----- Message from Riya Long sent at 4/11/2017  7:29 AM CDT -----  Patient has his appointment with Dr Siegel on May 22 and his labs on May 19.  He wants to know if you would add creatian and a PSA to it.    Call him at [phone].                                   matamoros

## 2017-05-19 ENCOUNTER — LAB VISIT (OUTPATIENT)
Dept: LAB | Facility: HOSPITAL | Age: 82
End: 2017-05-19
Attending: UROLOGY
Payer: MEDICARE

## 2017-05-19 DIAGNOSIS — N52.9 ERECTILE DYSFUNCTION, UNSPECIFIED ERECTILE DYSFUNCTION TYPE: ICD-10-CM

## 2017-05-19 DIAGNOSIS — N18.30 CHRONIC KIDNEY DISEASE, STAGE III (MODERATE): ICD-10-CM

## 2017-05-19 DIAGNOSIS — C61 PROSTATE CANCER: ICD-10-CM

## 2017-05-19 LAB
ANION GAP SERPL CALC-SCNC: 11 MMOL/L
ANISOCYTOSIS BLD QL SMEAR: SLIGHT
BASOPHILS # BLD AUTO: 0.01 K/UL
BASOPHILS NFR BLD: 0.2 %
BUN SERPL-MCNC: 22 MG/DL
CALCIUM SERPL-MCNC: 9.3 MG/DL
CHLORIDE SERPL-SCNC: 105 MMOL/L
CO2 SERPL-SCNC: 23 MMOL/L
COMPLEXED PSA SERPL-MCNC: 0.38 NG/ML
CREAT SERPL-MCNC: 1.6 MG/DL
DIFFERENTIAL METHOD: ABNORMAL
EOSINOPHIL # BLD AUTO: 0.2 K/UL
EOSINOPHIL NFR BLD: 3.9 %
ERYTHROCYTE [DISTWIDTH] IN BLOOD BY AUTOMATED COUNT: 19.8 %
EST. GFR  (AFRICAN AMERICAN): 46.3 ML/MIN/1.73 M^2
EST. GFR  (NON AFRICAN AMERICAN): 40.1 ML/MIN/1.73 M^2
GLUCOSE SERPL-MCNC: 127 MG/DL
HCT VFR BLD AUTO: 33.2 %
HGB BLD-MCNC: 9.8 G/DL
LYMPHOCYTES # BLD AUTO: 1.5 K/UL
LYMPHOCYTES NFR BLD: 35.2 %
MCH RBC QN AUTO: 22.1 PG
MCHC RBC AUTO-ENTMCNC: 29.5 %
MCV RBC AUTO: 75 FL
MONOCYTES # BLD AUTO: 0.4 K/UL
MONOCYTES NFR BLD: 8.9 %
NEUTROPHILS # BLD AUTO: 2.2 K/UL
NEUTROPHILS NFR BLD: 51.8 %
OVALOCYTES BLD QL SMEAR: ABNORMAL
PLATELET # BLD AUTO: 199 K/UL
PLATELET BLD QL SMEAR: ABNORMAL
PMV BLD AUTO: 10.8 FL
POIKILOCYTOSIS BLD QL SMEAR: SLIGHT
POTASSIUM SERPL-SCNC: 3.4 MMOL/L
RBC # BLD AUTO: 4.44 M/UL
SODIUM SERPL-SCNC: 139 MMOL/L
WBC # BLD AUTO: 4.15 K/UL

## 2017-05-19 PROCEDURE — 84153 ASSAY OF PSA TOTAL: CPT

## 2017-05-19 PROCEDURE — 85025 COMPLETE CBC W/AUTO DIFF WBC: CPT

## 2017-05-19 PROCEDURE — 80048 BASIC METABOLIC PNL TOTAL CA: CPT

## 2017-05-19 PROCEDURE — 36415 COLL VENOUS BLD VENIPUNCTURE: CPT | Mod: PO

## 2017-05-22 ENCOUNTER — OFFICE VISIT (OUTPATIENT)
Dept: NEPHROLOGY | Facility: CLINIC | Age: 82
End: 2017-05-22
Payer: MEDICARE

## 2017-05-22 VITALS
BODY MASS INDEX: 31.08 KG/M2 | SYSTOLIC BLOOD PRESSURE: 120 MMHG | HEART RATE: 80 BPM | DIASTOLIC BLOOD PRESSURE: 84 MMHG | WEIGHT: 229.5 LBS | HEIGHT: 72 IN

## 2017-05-22 DIAGNOSIS — N18.30 CHRONIC KIDNEY DISEASE, STAGE III (MODERATE): ICD-10-CM

## 2017-05-22 DIAGNOSIS — N25.81 SECONDARY HYPERPARATHYROIDISM: ICD-10-CM

## 2017-05-22 DIAGNOSIS — Q61.3 POLYCYSTIC KIDNEY DISEASE: Primary | ICD-10-CM

## 2017-05-22 DIAGNOSIS — I10 ESSENTIAL HYPERTENSION: ICD-10-CM

## 2017-05-22 PROCEDURE — 99999 PR PBB SHADOW E&M-EST. PATIENT-LVL III: CPT | Mod: PBBFAC,,, | Performed by: INTERNAL MEDICINE

## 2017-05-22 PROCEDURE — 99214 OFFICE O/P EST MOD 30 MIN: CPT | Mod: S$PBB,,, | Performed by: INTERNAL MEDICINE

## 2017-05-22 PROCEDURE — 99213 OFFICE O/P EST LOW 20 MIN: CPT | Mod: PBBFAC,PO | Performed by: INTERNAL MEDICINE

## 2017-05-22 NOTE — PROGRESS NOTES
NEPHROLOGY Clinic f/u note:     Date of clinic visit: 17     Reason for f/u and chief c/o: CKD stage 3     PRIMARY CARE PHYSICIAN: Gabby Mcdonough M.D., [facility].     HISTORY OF PRESENT ILLNESS: Mr. Olmedo is a 81-year-old  male with a h/o of PCKD who presents for f/u. Pt was last seen by me about 6 months ago. He says he has been doing well with no new issues to report today. He has no questions today, no new problems, no abd or back pain, no kidney stone,s no pain, no infections. No new events since last visit. Pt inherited PCKD from the mothers side of the family.     PAST MEDICAL HISTORY:  1. Chronic kidney disease stage 3  2. Adenocarcinoma of the prostate diagnosed in , status post radiation treatment.  3. Highly suspected polycystic kidney disease based on the information that were reviewed today/autosomal dominant.  4. Coronary artery disease with history of stents.  5. History of abdominal aortic aneurysm with history of stents.  6. Renal artery stents.  PAST SURGICAL HISTORY: Reviewed, as above for stents. Please see above.  FAMILY HISTORY: reviewed again today: Quite remarkable for the patient's mother who had one kidney    removed. She  in her 70s. The patient's older son started dialysis at the    age of 18, and he now has a kidney transplant and lives in [place]. The patient's    other son, a younger son also, has chronic kidney disease and lives in [place].    He is not on hemodialysis. The patient's father's side of the family did not    have any kidney problems to the patient's best knowledge. His father  at    the age of 82 from colon cancer.     ALLERGIES: Reviewed, to codeine.     SOCIAL HISTORY: Negative for smoking. No alcohol use.     MEDICATIONS: Reviewed. The patient is on benazepril HCTZ 20/12.5 one p.o. daily, Lipitor 40 mg daily, aspirin 81 mg daily, Zantac as needed, Flonase as Needed.     REVIEW OF SYSTEMS: No recent  hospitalizations.  GENERAL: Negative.  HEAD, EYES, EARS, NOSE AND THROAT: Negative.  CARDIAC: Negative.  PULMONARY: Negative.  GASTROINTESTINAL: Negative.  GENITOURINARY: As above, otherwise negative.  PSYCHOLOGICAL: Negative.  NEUROLOGICAL: Negative.  ENDOCRINE: Negative.  HEMATOLOGIC AND ONCOLOGIC: Negative, other than the above for cancer of the  prostate.  The rest of the review of systems negative.     PHYSICAL EXAMINATION:  VITAL SIGNS: Blood pressure is 120/84, pulse 80, weight is 229 lbs, last visit 222 lbs  GENERAL: Ambulatory, in no acute distress.  Speech normal  Neck no JVD  MM moist, normal hearing  HEART: Regular rate and rhythm. s1 and s2 audible  CHEST: Clear to auscultation. No rales no wheezes, symmetric  EXTREMITIES: No edema.     LABS: Reviewed  BMP  Lab Results   Component Value Date     05/19/2017    K 3.4 (L) 05/19/2017     05/19/2017    CO2 23 05/19/2017    BUN 22 05/19/2017    CREATININE 1.6 (H) 05/19/2017    CALCIUM 9.3 05/19/2017    ANIONGAP 11 05/19/2017    ESTGFRAFRICA 46.3 (A) 05/19/2017    EGFRNONAA 40.1 (A) 05/19/2017     Lab Results   Component Value Date    WBC 4.15 05/19/2017    HGB 9.8 (L) 05/19/2017    HCT 33.2 (L) 05/19/2017    MCV 75 (L) 05/19/2017     05/19/2017     Lab Results   Component Value Date    .0 (H) 01/06/2015    CALCIUM 9.3 05/19/2017    PHOS 3.5 03/21/2012           ASSESSMENT AND PLAN: This is a 81-year-old man with chronic kidney disease likely caused by polycystic kidney disease    presents for f/u:     1. Renal: No change in Cr, stable renal function. CKD stage 3  Explained to pt above in layman's language  K normal  Acid-base normal  Secondary hyperparathyroidism: mild. No need for pharmacologic therapy at this point, will monitor  PCKD h/o. Inherited from mother's side  Do not overhydrate and avoid caffeine. Pt was advised again today, reviewed h/o with him.  Both of these will speed up enlargement of the cysts and further cause  progression of CKD.  HTN: well controlled     2. Prostate: h/o of prostate CA.  S/p XRT treatment  PSA not high  Following with urology     PLANS AND RECOMMENDATIONS: As fully discussed for each individual problem above.  Return to see us in about 6 months. Nov 2017  Avoid caffeinated drinks.    Avoid excess fluid intake.   Continue the ACE inhibitors and Lipitor.  Has refills.  Total time spent 25 min. More than 50% spent on counseling and coordination of care.     Alvaro Siegel MD

## 2017-06-28 DIAGNOSIS — Z01.810 PREOP CARDIOVASCULAR EXAM: Primary | ICD-10-CM

## 2017-06-28 DIAGNOSIS — I25.10 CORONARY ARTERY DISEASE DUE TO LIPID RICH PLAQUE: ICD-10-CM

## 2017-06-28 DIAGNOSIS — I25.83 CORONARY ARTERY DISEASE DUE TO LIPID RICH PLAQUE: ICD-10-CM

## 2017-06-29 ENCOUNTER — CLINICAL SUPPORT (OUTPATIENT)
Dept: CARDIOLOGY | Facility: CLINIC | Age: 82
End: 2017-06-29
Payer: MEDICARE

## 2017-06-29 ENCOUNTER — OFFICE VISIT (OUTPATIENT)
Dept: CARDIOLOGY | Facility: CLINIC | Age: 82
End: 2017-06-29
Payer: MEDICARE

## 2017-06-29 VITALS
HEART RATE: 75 BPM | SYSTOLIC BLOOD PRESSURE: 120 MMHG | HEIGHT: 72 IN | WEIGHT: 231 LBS | BODY MASS INDEX: 31.29 KG/M2 | DIASTOLIC BLOOD PRESSURE: 60 MMHG

## 2017-06-29 DIAGNOSIS — I25.83 CORONARY ARTERY DISEASE DUE TO LIPID RICH PLAQUE: ICD-10-CM

## 2017-06-29 DIAGNOSIS — Z98.61 HISTORY OF CORONARY ANGIOPLASTY: Chronic | ICD-10-CM

## 2017-06-29 DIAGNOSIS — I25.10 CORONARY ARTERY DISEASE DUE TO LIPID RICH PLAQUE: ICD-10-CM

## 2017-06-29 DIAGNOSIS — I10 ESSENTIAL HYPERTENSION: Chronic | ICD-10-CM

## 2017-06-29 DIAGNOSIS — Z01.810 PREOP CARDIOVASCULAR EXAM: ICD-10-CM

## 2017-06-29 DIAGNOSIS — I25.10 CORONARY ARTERY DISEASE, OCCLUSIVE: Primary | Chronic | ICD-10-CM

## 2017-06-29 PROCEDURE — 1159F MED LIST DOCD IN RCRD: CPT | Mod: ,,, | Performed by: NUCLEAR MEDICINE

## 2017-06-29 PROCEDURE — 93005 ELECTROCARDIOGRAM TRACING: CPT | Mod: PBBFAC,PO | Performed by: INTERNAL MEDICINE

## 2017-06-29 PROCEDURE — 93010 ELECTROCARDIOGRAM REPORT: CPT | Mod: S$PBB,,, | Performed by: INTERNAL MEDICINE

## 2017-06-29 PROCEDURE — 99999 PR PBB SHADOW E&M-EST. PATIENT-LVL III: CPT | Mod: PBBFAC,,, | Performed by: NUCLEAR MEDICINE

## 2017-06-29 PROCEDURE — 99213 OFFICE O/P EST LOW 20 MIN: CPT | Mod: PBBFAC,PO,25 | Performed by: NUCLEAR MEDICINE

## 2017-06-29 PROCEDURE — 1126F AMNT PAIN NOTED NONE PRSNT: CPT | Mod: ,,, | Performed by: NUCLEAR MEDICINE

## 2017-06-29 PROCEDURE — 99214 OFFICE O/P EST MOD 30 MIN: CPT | Mod: S$PBB,,, | Performed by: NUCLEAR MEDICINE

## 2017-06-29 NOTE — PROGRESS NOTES
Subjective:   Patient ID:  Jovanny Olmedo is a 81 y.o. male who presents for follow-up of Coronary Artery Disease (6 month followup); Pre-op Exam (Dental Procedure); and Hypertension      HPI 1-  CHRONIC CAD,  ANGINA FC 2- POST PCI  2- ESSENTIAL HTN  NO HX OF RECURRENT ANGINA. OR EQUIVALENT  NO UNUSUAL WELLS WITH ORDINARY DAILY ACTIVITIES. NO ORTHOPNEA  OR PND  NO PALPITATIONS. NO NEAR SYNCOPE OR SYNCOPE  NO FOCAL CNS SYMPTOMS OR SIGNS TO SUGGEST TIA OR STROKE  NO EDEMA. NO INTERMITTENT CLAUDICATION  NO ABNORMAL BLEEDING- ON DAPT.    Review of Systems   Constitution: Negative for chills, fever, weakness, night sweats, weight gain and weight loss.   HENT: Negative for headaches and nosebleeds.    Eyes: Negative for blurred vision, double vision and visual disturbance.   Cardiovascular: Negative for chest pain, dyspnea on exertion, irregular heartbeat, leg swelling, orthopnea, palpitations, paroxysmal nocturnal dyspnea and syncope.   Respiratory: Negative for cough, hemoptysis and wheezing.    Endocrine: Negative for polydipsia and polyuria.   Hematologic/Lymphatic: Does not bruise/bleed easily.   Skin: Negative for rash.   Musculoskeletal: Negative for joint pain, joint swelling, muscle weakness and myalgias.   Gastrointestinal: Negative for abdominal pain, hematemesis, jaundice and melena.   Genitourinary: Negative for dysuria, hematuria and nocturia.   Neurological: Negative for dizziness, focal weakness and sensory change.   Psychiatric/Behavioral: Negative for depression. The patient does not have insomnia and is not nervous/anxious.      Family History   Problem Relation Age of Onset    Glaucoma Mother     Colon cancer Father     Diabetes Sister     Blindness Neg Hx      Past Medical History:   Diagnosis Date    Arthritis     CAD (coronary artery disease)     Cataract     CKD (chronic kidney disease)     GERD (gastroesophageal reflux disease)     Glaucoma     suspect    HTN (hypertension)     Prostate  cancer     tx'd with radiation    PVD (peripheral vascular disease)     stent in right renal artery and aorta     Current Outpatient Prescriptions on File Prior to Visit   Medication Sig Dispense Refill    aspirin (ECOTRIN) 81 MG EC tablet Take 81 mg by mouth once daily.      atorvastatin (LIPITOR) 40 MG tablet Take 1 tablet (40 mg total) by mouth once daily. 30 tablet 11    benazepril-hydrochlorthiazide (LOTENSIN HCT) 10-12.5 mg Tab Take 1 tablet by mouth once daily. 90 tablet 3    clopidogrel (PLAVIX) 75 mg tablet Take 1 tablet (75 mg total) by mouth once daily. 30 tablet 6    ferrous sulfate 325 mg (65 mg iron) Tab tablet Take 1 tablet (325 mg total) by mouth 2 (two) times daily. 60 tablet 6    fluticasone (FLONASE) 50 mcg/actuation nasal spray 2 sprays by Each Nare route once daily. 1 Bottle 11    levocetirizine (XYZAL) 5 MG tablet Take 1 tablet (5 mg total) by mouth every evening. 30 tablet 11    ranitidine (ZANTAC) 300 MG tablet Take 1 tablet (300 mg total) by mouth once daily. 90 tablet 3    solifenacin (VESICARE) 10 MG tablet Take 1 tablet (10 mg total) by mouth once daily. 30 tablet 11    azelastine (ASTELIN) 137 mcg (0.1 %) nasal spray 2 sprays (274 mcg total) by Nasal route 2 (two) times daily. 30 mL 6    nitroGLYCERIN (NITROSTAT) 0.4 MG SL tablet Place 1 tablet (0.4 mg total) under the tongue every 5 (five) minutes as needed for Chest pain. 25 tablet 3    tadalafil (CIALIS) 10 MG tablet Take 1 tablet (10 mg total) by mouth daily as needed for Erectile Dysfunction. 5 tablet 11     No current facility-administered medications on file prior to visit.      Review of patient's allergies indicates:   Allergen Reactions    Codeine      When taking codeine, pt becomes very anxious       Objective:     Physical Exam   Constitutional: He is oriented to person, place, and time. He appears well-developed. No distress.   HENT:   Head: Normocephalic.   Eyes: Conjunctivae are normal. Pupils are equal,  round, and reactive to light.   Neck: Neck supple. No JVD present. No thyromegaly present.   Cardiovascular: Normal rate, regular rhythm, normal heart sounds and intact distal pulses.  Exam reveals no gallop and no friction rub.    No murmur heard.  Pulses:       Carotid pulses are 2+ on the right side, and 2+ on the left side.       Radial pulses are 2+ on the right side, and 2+ on the left side.        Femoral pulses are 2+ on the right side, and 2+ on the left side.       Popliteal pulses are 2+ on the right side, and 2+ on the left side.        Dorsalis pedis pulses are 2+ on the right side, and 2+ on the left side.        Posterior tibial pulses are 2+ on the right side, and 2+ on the left side.   Pulmonary/Chest: Breath sounds normal. He has no wheezes. He has no rales. He exhibits no tenderness.   Abdominal: Soft. Bowel sounds are normal. He exhibits no mass. There is no hepatosplenomegaly. There is no tenderness.   Musculoskeletal: He exhibits no edema or tenderness.        Cervical back: Normal.        Thoracic back: Normal.        Lumbar back: Normal.   Lymphadenopathy:     He has no cervical adenopathy.     He has no axillary adenopathy.        Right: No supraclavicular adenopathy present.        Left: No supraclavicular adenopathy present.   Neurological: He is alert and oriented to person, place, and time. He has normal strength. No sensory deficit. Gait normal.   Skin: Skin is warm. No cyanosis. No pallor. Nails show no clubbing.   Psychiatric: He has a normal mood and affect. His speech is normal and behavior is normal. Cognition and memory are normal.       Assessment:     1. Coronary artery disease, occlusive    2. History of coronary angioplasty    3. Essential hypertension      STABLE CAD- ANGINA PATTERN STABLE  NO CLINICAL EVIDENCE  OF ADHF. . NO ARRHYTHMIAS  BP WELL CONTROLLED  CNS STATUS STABLE  CARD MED WELL TOLERATED  ECG TODAY- SR, LAD, NO ACUTE ST T CHANGES    Plan:     Coronary artery  disease, occlusive    History of coronary angioplasty    Essential hypertension      1- NO CV CONTRAINDICATIONS FOR DENTAL PROCEDURE. AND ANESTHESIA    2- CAN HOLD PLAVIX AND ASA 5 DAYS PRIOR TO PROCEDURE AND RESTART AS SOON AS HEMOSTASIS PERMIT    3- CONTINUE PRESENT CARD MANAGEMENT    4- RETURN IN ONE YEAR

## 2017-07-05 ENCOUNTER — OFFICE VISIT (OUTPATIENT)
Dept: SLEEP MEDICINE | Facility: CLINIC | Age: 82
End: 2017-07-05
Payer: MEDICARE

## 2017-07-05 VITALS
WEIGHT: 234.38 LBS | RESPIRATION RATE: 18 BRPM | HEIGHT: 72 IN | OXYGEN SATURATION: 96 % | HEART RATE: 85 BPM | SYSTOLIC BLOOD PRESSURE: 124 MMHG | DIASTOLIC BLOOD PRESSURE: 70 MMHG | BODY MASS INDEX: 31.74 KG/M2

## 2017-07-05 DIAGNOSIS — J30.9 ALLERGIC RHINITIS, UNSPECIFIED ALLERGIC RHINITIS TRIGGER, UNSPECIFIED RHINITIS SEASONALITY: ICD-10-CM

## 2017-07-05 DIAGNOSIS — G47.33 OSA (OBSTRUCTIVE SLEEP APNEA): Primary | ICD-10-CM

## 2017-07-05 PROCEDURE — 99214 OFFICE O/P EST MOD 30 MIN: CPT | Mod: S$PBB,,, | Performed by: NURSE PRACTITIONER

## 2017-07-05 PROCEDURE — 1159F MED LIST DOCD IN RCRD: CPT | Mod: ,,, | Performed by: NURSE PRACTITIONER

## 2017-07-05 PROCEDURE — 99999 PR PBB SHADOW E&M-EST. PATIENT-LVL IV: CPT | Mod: PBBFAC,,, | Performed by: NURSE PRACTITIONER

## 2017-07-05 PROCEDURE — 99214 OFFICE O/P EST MOD 30 MIN: CPT | Mod: PBBFAC,PO | Performed by: NURSE PRACTITIONER

## 2017-07-05 RX ORDER — AZELASTINE 1 MG/ML
2 SPRAY, METERED NASAL 2 TIMES DAILY
Qty: 30 ML | Refills: 11 | Status: SHIPPED | OUTPATIENT
Start: 2017-07-05 | End: 2018-05-28 | Stop reason: SDUPTHER

## 2017-07-05 RX ORDER — FLUTICASONE PROPIONATE 50 MCG
2 SPRAY, SUSPENSION (ML) NASAL DAILY
Qty: 1 BOTTLE | Refills: 11 | Status: SHIPPED | OUTPATIENT
Start: 2017-07-05 | End: 2018-05-28 | Stop reason: ALTCHOICE
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
## 2017-07-18 RX ORDER — CLOPIDOGREL BISULFATE 75 MG/1
TABLET ORAL
Qty: 30 TABLET | Refills: 0 | Status: SHIPPED | OUTPATIENT
Start: 2017-07-18 | End: 2017-07-31 | Stop reason: SDUPTHER

## 2017-07-31 ENCOUNTER — LAB VISIT (OUTPATIENT)
Dept: LAB | Facility: HOSPITAL | Age: 82
End: 2017-07-31
Attending: FAMILY MEDICINE
Payer: MEDICARE

## 2017-07-31 ENCOUNTER — OFFICE VISIT (OUTPATIENT)
Dept: INTERNAL MEDICINE | Facility: CLINIC | Age: 82
End: 2017-07-31
Payer: MEDICARE

## 2017-07-31 VITALS
WEIGHT: 233.69 LBS | SYSTOLIC BLOOD PRESSURE: 136 MMHG | TEMPERATURE: 97 F | BODY MASS INDEX: 31.65 KG/M2 | DIASTOLIC BLOOD PRESSURE: 78 MMHG | HEART RATE: 90 BPM | HEIGHT: 72 IN

## 2017-07-31 DIAGNOSIS — N17.9 ACUTE RENAL FAILURE, UNSPECIFIED ACUTE RENAL FAILURE TYPE: Primary | ICD-10-CM

## 2017-07-31 DIAGNOSIS — I10 ESSENTIAL HYPERTENSION: Chronic | ICD-10-CM

## 2017-07-31 DIAGNOSIS — K59.01 SLOW TRANSIT CONSTIPATION: ICD-10-CM

## 2017-07-31 LAB — POTASSIUM SERPL-SCNC: 3.8 MMOL/L

## 2017-07-31 PROCEDURE — 1126F AMNT PAIN NOTED NONE PRSNT: CPT | Mod: ,,, | Performed by: FAMILY MEDICINE

## 2017-07-31 PROCEDURE — 36415 COLL VENOUS BLD VENIPUNCTURE: CPT | Mod: PO

## 2017-07-31 PROCEDURE — 1159F MED LIST DOCD IN RCRD: CPT | Mod: ,,, | Performed by: FAMILY MEDICINE

## 2017-07-31 PROCEDURE — 84132 ASSAY OF SERUM POTASSIUM: CPT

## 2017-07-31 PROCEDURE — 99999 PR PBB SHADOW E&M-EST. PATIENT-LVL III: CPT | Mod: PBBFAC,,, | Performed by: FAMILY MEDICINE

## 2017-07-31 PROCEDURE — 99213 OFFICE O/P EST LOW 20 MIN: CPT | Mod: S$PBB,,, | Performed by: FAMILY MEDICINE

## 2017-07-31 RX ORDER — CLOPIDOGREL BISULFATE 75 MG/1
75 TABLET ORAL DAILY
Qty: 90 TABLET | Refills: 3 | Status: SHIPPED | OUTPATIENT
Start: 2017-07-31 | End: 2018-08-15 | Stop reason: SDUPTHER

## 2017-07-31 NOTE — PROGRESS NOTES
Subjective:       Patient ID: Jovanny Olmedo is a 81 y.o. male.    Chief Complaint: Follow-up    F/U:       Pt is an 81 year old who is having chronic constipation. Pt has been on mirlax and reportedly increase fiber and      Review of Systems   Constitutional: Negative.    Respiratory: Negative.    Cardiovascular: Negative.    Gastrointestinal: Positive for constipation. Negative for abdominal pain, anal bleeding and nausea.   Genitourinary: Negative.    Hematological: Negative.    Psychiatric/Behavioral: Negative.        Objective:      Physical Exam   Constitutional: He is oriented to person, place, and time. He appears well-developed and well-nourished.   Cardiovascular: Normal rate and regular rhythm.    Pulmonary/Chest: Effort normal and breath sounds normal.   Abdominal: Soft. Bowel sounds are normal.   Neurological: He is alert and oriented to person, place, and time.   Skin: Skin is warm and dry.       Assessment:       1. Acute renal failure, unspecified acute renal failure type    2. Slow transit constipation    3. Essential hypertension        Plan:       Acute renal failure, unspecified acute renal failure type  Comments:  Pt labs are stable at this point    Slow transit constipation  Comments:  Will send pt to GI but think this is more diet issues.   Orders:  -     Ambulatory referral to Gastroenterology    Essential hypertension  Comments:  Will recheck her potassium.   Orders:  -     Potassium; Future; Expected date: 07/31/2017    Other orders  -     clopidogrel (PLAVIX) 75 mg tablet; Take 1 tablet (75 mg total) by mouth once daily.  Dispense: 90 tablet; Refill: 3

## 2017-08-04 ENCOUNTER — INITIAL CONSULT (OUTPATIENT)
Dept: GASTROENTEROLOGY | Facility: CLINIC | Age: 82
End: 2017-08-04
Payer: MEDICARE

## 2017-08-04 VITALS
HEIGHT: 72 IN | SYSTOLIC BLOOD PRESSURE: 140 MMHG | WEIGHT: 229.25 LBS | BODY MASS INDEX: 31.05 KG/M2 | HEART RATE: 80 BPM | DIASTOLIC BLOOD PRESSURE: 78 MMHG

## 2017-08-04 DIAGNOSIS — K59.00 CONSTIPATION, UNSPECIFIED CONSTIPATION TYPE: Primary | ICD-10-CM

## 2017-08-04 DIAGNOSIS — R14.3 EXCESSIVE GAS: ICD-10-CM

## 2017-08-04 PROCEDURE — 1159F MED LIST DOCD IN RCRD: CPT | Mod: ,,, | Performed by: NURSE PRACTITIONER

## 2017-08-04 PROCEDURE — 3008F BODY MASS INDEX DOCD: CPT | Mod: ,,, | Performed by: NURSE PRACTITIONER

## 2017-08-04 PROCEDURE — 1126F AMNT PAIN NOTED NONE PRSNT: CPT | Mod: ,,, | Performed by: NURSE PRACTITIONER

## 2017-08-04 PROCEDURE — 99213 OFFICE O/P EST LOW 20 MIN: CPT | Mod: PBBFAC,PO | Performed by: NURSE PRACTITIONER

## 2017-08-04 PROCEDURE — 99214 OFFICE O/P EST MOD 30 MIN: CPT | Mod: S$PBB,,, | Performed by: NURSE PRACTITIONER

## 2017-08-04 PROCEDURE — 99999 PR PBB SHADOW E&M-EST. PATIENT-LVL III: CPT | Mod: PBBFAC,,, | Performed by: NURSE PRACTITIONER

## 2017-08-04 NOTE — PATIENT INSTRUCTIONS
Hold Metamucil for now  Decrease Miralax to half a capful of day   Start Phazyme twice daily with breakfast and dinner.

## 2017-08-04 NOTE — LETTER
August 4, 2017      Sam Blake MD  [address] [place] [address]           [facility]  [address] [place] [address]  Phone: [phone]  Fax: [phone]          Patient: Jovanny Olmedo   MR Number: [id number]   YOB: 1935   Date of Visit: 8/4/2017       Dear Dr. Sam Blake:    Thank you for referring Jovanny Olmedo to me for evaluation. Attached you will find relevant portions of my assessment and plan of care.    If you have questions, please do not hesitate to call me. I look forward to following Jovanny Olmedo along with you.    Sincerely,    Madeleine Watson, [facility]    Enclosure  CC:  No Recipients    If you would like to receive this communication electronically, please contact [email or web address] or [phone] to request more information on [employer] access.    For providers and/or their staff who would like to refer a patient to [facility], please contact us through our one-stop-shop provider referral line, Fredi Swann, at [phone].    If you feel you have received this communication in error or would no longer like to receive these types of communications, please e-mail [email or web address]

## 2017-08-04 NOTE — PROGRESS NOTES
Clinic Consult:  [facility] Consultation Note    Reason for Consult:  The primary encounter diagnosis was Constipation, unspecified constipation type. A diagnosis of Excessive gas was also pertinent to this visit.    PCP: Sam Blake   [address] / [place] [address]    HPI:  This is a 81 y.o. male here for evaluation of the above  He reports that over the last few weeks, he has had an increase in constipation with excess gas.  He states that he has improved the constipation with Miralax BID and Metamucil BID and is now having 1-2 soft stools per day.  However, he continues with excess gas and bloating.  He denies any significant abdominal pain.  No melena or hematochezia.     Review of Systems   Constitutional: Negative for chills, fever, malaise/fatigue and weight loss.   Respiratory: Negative for cough.    Cardiovascular: Negative for chest pain.   Gastrointestinal:        Per HPI   Musculoskeletal: Negative for myalgias.   Skin: Negative for itching and rash.   Neurological: Negative for headaches.   Psychiatric/Behavioral: The patient is not nervous/anxious.        Medical History:   Past Medical History:   Diagnosis Date    Arthritis     CAD (coronary artery disease)     Cataract     CKD (chronic kidney disease)     GERD (gastroesophageal reflux disease)     Glaucoma     suspect    HTN (hypertension)     Prostate cancer     tx'd with radiation    PVD (peripheral vascular disease)     stent in right renal artery and aorta       Surgical History:  Past Surgical History:   Procedure Laterality Date    ABDOMINAL AORTA STENT      CATARACT EXTRACTION W/  INTRAOCULAR LENS IMPLANT Right 04/29/2017    CORONARY STENT PLACEMENT      PCIOL Right 03/29/2017    DR. LEDEZMA    PROSTATE BIOPSY      RENAL ARTERY STENT         Family History:   Family History   Problem Relation Age of Onset    Glaucoma Mother     Colon cancer Father     Diabetes Sister     Blindness Neg Hx        Social  History:   Social History   Substance Use Topics    Smoking status: Former Smoker     Packs/day: 0.50     Years: 30.00     Quit date: 9/17/1995    Smokeless tobacco: Former User    Alcohol use No       Allergies: Reviewed    Home Medications:   Current Outpatient Prescriptions on File Prior to Visit   Medication Sig Dispense Refill    aspirin (ECOTRIN) 81 MG EC tablet Take 81 mg by mouth once daily.      atorvastatin (LIPITOR) 40 MG tablet Take 1 tablet (40 mg total) by mouth once daily. 30 tablet 11    azelastine (ASTELIN) 137 mcg (0.1 %) nasal spray 2 sprays (274 mcg total) by Nasal route 2 (two) times daily. 30 mL 11    benazepril-hydrochlorthiazide (LOTENSIN HCT) 10-12.5 mg Tab Take 1 tablet by mouth once daily. 90 tablet 3    clopidogrel (PLAVIX) 75 mg tablet Take 1 tablet (75 mg total) by mouth once daily. 90 tablet 3    fluticasone (FLONASE) 50 mcg/actuation nasal spray 2 sprays by Each Nare route once daily. 1 Bottle 11    nitroGLYCERIN (NITROSTAT) 0.4 MG SL tablet Place 1 tablet (0.4 mg total) under the tongue every 5 (five) minutes as needed for Chest pain. 25 tablet 3    ranitidine (ZANTAC) 300 MG tablet Take 1 tablet (300 mg total) by mouth once daily. 90 tablet 3    tadalafil (CIALIS) 10 MG tablet Take 1 tablet (10 mg total) by mouth daily as needed for Erectile Dysfunction. 5 tablet 11     No current facility-administered medications on file prior to visit.        Physical Exam:  Vital Signs:  BP (!) 140/78   Pulse 80   Ht 6' (1.829 m)   Wt 104 kg (229 lb 4.5 oz)   BMI 31.10 kg/m²   Body mass index is 31.1 kg/m².  Physical Exam   Constitutional: He is oriented to person, place, and time. He appears well-developed and well-nourished.   HENT:   Head: Normocephalic.   Eyes: No scleral icterus.   Neck: Normal range of motion.   Cardiovascular: Normal rate and regular rhythm.    Pulmonary/Chest: Effort normal and breath sounds normal.   Abdominal: Soft. Bowel sounds are normal. He exhibits  no distension. There is no tenderness.   Musculoskeletal: Normal range of motion.   Neurological: He is alert and oriented to person, place, and time.   Skin: Skin is warm and dry.   Psychiatric: He has a normal mood and affect.   Vitals reviewed.      Labs: Pertinent labs reviewed.    Assessment:  1. Constipation, unspecified constipation type    2. Excessive gas         Recommendations:  Constipation, unspecified constipation type  Excessive gas  - Hold Metamucil for now.  - Decrease Miralax to once daily, can separate into half-cap dose twice daily as well  - Add Gas-X or Phazyme up to BID for the gas      Follow up to be determined by results of above.        Thank you so much for allowing me to participate in the care of DONNA Patton

## 2017-11-14 ENCOUNTER — LAB VISIT (OUTPATIENT)
Dept: LAB | Facility: HOSPITAL | Age: 82
End: 2017-11-14
Attending: INTERNAL MEDICINE
Payer: MEDICARE

## 2017-11-14 DIAGNOSIS — Q61.3 POLYCYSTIC KIDNEY DISEASE: ICD-10-CM

## 2017-11-14 LAB
ANION GAP SERPL CALC-SCNC: 10 MMOL/L
BUN SERPL-MCNC: 27 MG/DL
CALCIUM SERPL-MCNC: 9.5 MG/DL
CHLORIDE SERPL-SCNC: 102 MMOL/L
CO2 SERPL-SCNC: 26 MMOL/L
CREAT SERPL-MCNC: 1.6 MG/DL
EST. GFR  (AFRICAN AMERICAN): 46 ML/MIN/1.73 M^2
EST. GFR  (NON AFRICAN AMERICAN): 39.8 ML/MIN/1.73 M^2
GLUCOSE SERPL-MCNC: 89 MG/DL
POTASSIUM SERPL-SCNC: 3.9 MMOL/L
SODIUM SERPL-SCNC: 138 MMOL/L

## 2017-11-14 PROCEDURE — 80048 BASIC METABOLIC PNL TOTAL CA: CPT

## 2017-11-14 PROCEDURE — 36415 COLL VENOUS BLD VENIPUNCTURE: CPT | Mod: PO

## 2017-11-20 ENCOUNTER — OFFICE VISIT (OUTPATIENT)
Dept: NEPHROLOGY | Facility: CLINIC | Age: 82
End: 2017-11-20
Payer: MEDICARE

## 2017-11-20 VITALS
HEIGHT: 72 IN | HEART RATE: 100 BPM | SYSTOLIC BLOOD PRESSURE: 100 MMHG | DIASTOLIC BLOOD PRESSURE: 62 MMHG | BODY MASS INDEX: 31.14 KG/M2 | WEIGHT: 229.94 LBS

## 2017-11-20 DIAGNOSIS — Q61.3 POLYCYSTIC KIDNEY DISEASE: ICD-10-CM

## 2017-11-20 DIAGNOSIS — I10 ESSENTIAL HYPERTENSION: ICD-10-CM

## 2017-11-20 DIAGNOSIS — N25.81 SECONDARY HYPERPARATHYROIDISM: ICD-10-CM

## 2017-11-20 DIAGNOSIS — N18.30 CHRONIC KIDNEY DISEASE, STAGE III (MODERATE): Primary | ICD-10-CM

## 2017-11-20 PROCEDURE — 99213 OFFICE O/P EST LOW 20 MIN: CPT | Mod: PBBFAC,PO | Performed by: INTERNAL MEDICINE

## 2017-11-20 PROCEDURE — 99214 OFFICE O/P EST MOD 30 MIN: CPT | Mod: S$PBB,,, | Performed by: INTERNAL MEDICINE

## 2017-11-20 PROCEDURE — 99999 PR PBB SHADOW E&M-EST. PATIENT-LVL III: CPT | Mod: PBBFAC,,, | Performed by: INTERNAL MEDICINE

## 2017-11-20 NOTE — PROGRESS NOTES
"NEPHROLOGY Clinic f/u note:     Date of clinic visit: 17     Reason for f/u and chief c/o: CKD stage 3     PRIMARY CARE PHYSICIAN: Gabby Mcdonough M.D., [facility].     HISTORY OF PRESENT ILLNESS: Mr. Olmedo is a 82-year-old  male with a h/o of PCKD who presents for f/u. Pt was last seen by me about 6 months ago. He says he has been doing well overall. Pt however reports that he is "urinating a lot", no other sx's, no urinary discomfort, no pain, no burning on urination, no abdominal pain. Pt does alos report occasional "presure" or feeling of fullness where his kidneys are. He reports eating a lot of ice and drinking a lot of water. Meds reviewed with him.     PAST MEDICAL HISTORY:  1. Chronic kidney disease stage 3  2. Adenocarcinoma of the prostate diagnosed in , status post radiation treatment.  3. Highly suspected polycystic kidney disease based on the information that were reviewed today/autosomal dominant.  4. Coronary artery disease with history of stents.  5. History of abdominal aortic aneurysm with history of stents.  6. Renal artery stents.    PAST SURGICAL HISTORY: Reviewed, as above for stents. Please see above.    FAMILY HISTORY: reviewed again today: Patient's mother had one kidney    removed. She  in her 70s. The patient's older son started dialysis at the    age of 18, and he now has a kidney transplant and lives in [place]. The patient's    other son, a younger son also, has chronic kidney disease and lives in [place].    He is not on hemodialysis. The patient's father's side of the family did not    have any kidney problems to the patient's best knowledge. His father  at    the age of 82 from colon cancer.     ALLERGIES: Reviewed, to codeine.     SOCIAL HISTORY: Negative for smoking. No alcohol use.     MEDICATIONS: Reviewed. The patient is on benazepril HCTZ 10/12.5 one p.o. daily, Lipitor 40 mg daily, aspirin 81 mg daily, Zantac as needed, Flonase as " Needed.     REVIEW OF SYSTEMS: No recent hospitalizations.  GENERAL: Negative.  HEAD, EYES, EARS, NOSE AND THROAT: Negative.  CARDIAC: Negative.  PULMONARY: Negative.  GASTROINTESTINAL: Negative.  GENITOURINARY: As above, otherwise negative.  PSYCHOLOGICAL: Negative.  NEUROLOGICAL: Negative.  ENDOCRINE: Negative.  HEMATOLOGIC AND ONCOLOGIC: Negative, other than the above for cancer of the  prostate.  The rest of the review of systems negative.     PHYSICAL EXAMINATION:  VITAL SIGNS: Blood pressure is 100/62, repeat 115/70, pulse 80, weight is 229 lbs, no change  GENERAL: Ambulatory, in no acute distress.  Speech normal  Neck no JVD  MM moist, normal hearing  HEART: Regular rate and rhythm. s1 and s2 audible  CHEST: Clear to auscultation. No rales no wheezes, symmetric  EXTREMITIES: No edema.     LABS: Reviewed  BMP  Lab Results   Component Value Date     11/14/2017    K 3.9 11/14/2017     11/14/2017    CO2 26 11/14/2017    BUN 27 (H) 11/14/2017    CREATININE 1.6 (H) 11/14/2017    CALCIUM 9.5 11/14/2017    ANIONGAP 10 11/14/2017    ESTGFRAFRICA 46.0 (A) 11/14/2017    EGFRNONAA 39.8 (A) 11/14/2017     Lab Results   Component Value Date    WBC 4.15 05/19/2017    HGB 9.8 (L) 05/19/2017    HCT 33.2 (L) 05/19/2017    MCV 75 (L) 05/19/2017     05/19/2017     Lab Results   Component Value Date    .0 (H) 01/06/2015    CALCIUM 9.5 11/14/2017    PHOS 3.5 03/21/2012            ASSESSMENT AND PLAN: This is a 82-year-old man with chronic kidney disease likely caused by polycystic kidney disease    presents for f/u:     1. Renal: No change in Cr, stable renal function. CKD stage 3  Explained to pt above in layman's language, pt was reasured  K normal  Acid-base normal  Secondary hyperparathyroidism: mild. No need for pharmacologic therapy at this point, will monitor  PCKD h/o. Inherited from mother's side  Do not overhydrate and avoid caffeine. Pt was again advised today  Pt was advised not to drink water  if he is not thirsty.  Both of these will speed up enlargement of the cysts and further cause progression of CKD.  Advised pt that the reason he urinates a lot is because he is drinking a lot of water and eating  A lot of ice.    2. HTN: well controlled to low.  Meds reviewed.  Will d/c HCTZ,  Will continue ACE-I     3. Prostate: h/o of prostate CA.  S/p XRT treatment  PSA not high  Following with urology     PLANS AND RECOMMENDATIONS:   As fully discussed for each individual problem above.  D/c HCTZ  Continue benazepril 10 mg po qd  RTC 6 months.   Avoid caffeinated drinks.    Avoid excess fluid intake.   Has refills.  Total time spent 25 min. More than 50% spent on counseling and coordination of care.     Alvaro Siegel MD

## 2017-11-27 ENCOUNTER — LAB VISIT (OUTPATIENT)
Dept: LAB | Facility: HOSPITAL | Age: 82
End: 2017-11-27
Attending: UROLOGY
Payer: MEDICARE

## 2017-11-27 DIAGNOSIS — N52.9 ERECTILE DYSFUNCTION, UNSPECIFIED ERECTILE DYSFUNCTION TYPE: ICD-10-CM

## 2017-11-27 DIAGNOSIS — C61 PROSTATE CANCER: ICD-10-CM

## 2017-11-27 LAB — COMPLEXED PSA SERPL-MCNC: 0.31 NG/ML

## 2017-11-27 PROCEDURE — 84153 ASSAY OF PSA TOTAL: CPT

## 2017-11-27 PROCEDURE — 36415 COLL VENOUS BLD VENIPUNCTURE: CPT

## 2017-11-29 ENCOUNTER — OFFICE VISIT (OUTPATIENT)
Dept: UROLOGY | Facility: CLINIC | Age: 82
End: 2017-11-29
Payer: MEDICARE

## 2017-11-29 VITALS
DIASTOLIC BLOOD PRESSURE: 80 MMHG | WEIGHT: 229.06 LBS | SYSTOLIC BLOOD PRESSURE: 132 MMHG | HEART RATE: 72 BPM | BODY MASS INDEX: 31.07 KG/M2

## 2017-11-29 DIAGNOSIS — N32.81 OAB (OVERACTIVE BLADDER): ICD-10-CM

## 2017-11-29 DIAGNOSIS — C61 PROSTATE CANCER: Primary | ICD-10-CM

## 2017-11-29 PROCEDURE — 99212 OFFICE O/P EST SF 10 MIN: CPT | Mod: PBBFAC | Performed by: UROLOGY

## 2017-11-29 PROCEDURE — 99999 PR PBB SHADOW E&M-EST. PATIENT-LVL II: CPT | Mod: PBBFAC,,, | Performed by: UROLOGY

## 2017-11-29 PROCEDURE — 99214 OFFICE O/P EST MOD 30 MIN: CPT | Mod: S$PBB,,, | Performed by: UROLOGY

## 2017-11-29 RX ORDER — TADALAFIL 10 MG/1
10 TABLET ORAL DAILY PRN
Qty: 5 TABLET | Refills: 11 | Status: SHIPPED | OUTPATIENT
Start: 2017-11-29 | End: 2019-12-04

## 2017-11-29 NOTE — PROGRESS NOTES
Chief Complaint: Prostate cancer     HPI:   11/29/17: PSA dropped further.  Doesn't use the vesicare much. Good erections without ED. Reviewed history in detail.  11/23/16: Not taking vesicare not too bothered.  Would like a refill.  Discussed natural course of prostate cancer/XRT.  PSA down last check just went to the lab this morning.  Has half-mast erections.  11/2/15: PSA dropping.  Having problems with sudden urgency.  Nocturia x2.  Occas drip on the way to the toilet.  Has cut back on caffeine some.  4/20/15: No problems no sig changes, PSA dropping  10/6/14: Has not had nephrology appt yet; doing well.  Urgency got better with time and won't stop the coffee.   5/12/14: XRT is done, PSA now 7.0.  Had some worsening stream, but improved with some urgency.  2/3/14: Back from Dr. Deng.  Started XRT already, doing well.  1/13/14: Doing well.  Bx shows Gl 4+3=7 in right base/mid/apex.   12/30/13: TRUS/Bx completed.  30 gm prostate.  Had post-procedural infection with short admission on IV Abx and then home on po Abx.    11/18/13: 79 yo man referred for elevated PSA (11.3) recently.  It was 6.3 two years ago.  He is on plavix secondary to having CAD and multiple stents, last one a few years ago.  No urinary bother except some intermittent stream.  Nocturia x1-2.  No pelvic pain and no exac/rel factors.  Had some burning with ejaculation deep a week ago.  No urolithiasis.  No hematuria.    Allergies:  Codeine    Medications:  has a current medication list which includes the following prescription(s): aspirin, atorvastatin, azelastine, benazepril-hydrochlorthiazide, clopidogrel, fluticasone, nitroglycerin, ranitidine, and tadalafil.    Review of Systems:  General: No fever, chills, fatigability, or weight loss.  Skin: No rashes, itching, or changes in color or texture of skin.  Chest: Denies WELLS, cyanosis, wheezing, cough, and sputum production.  Abdomen: Appetite fine. No weight loss. Denies diarrhea, abdominal  pain, hematemesis, or blood in stool.  Musculoskeletal: Some joint stiffness or swelling. Some back pain.  : As above.  All other review of systems negative.    PMH:   has a past medical history of Arthritis; CAD (coronary artery disease); Cataract; CKD (chronic kidney disease); GERD (gastroesophageal reflux disease); Glaucoma; HTN (hypertension); Prostate cancer; and PVD (peripheral vascular disease).    PSH:   has a past surgical history that includes Coronary stent placement; Abdominal aorta stent; Renal artery stent; Prostate biopsy; PCIOL (Right, 03/29/2017); and Cataract extraction w/  intraocular lens implant (Right, 04/29/2017).    FamHx: family history includes Colon cancer in his father; Diabetes in his sister; Glaucoma in his mother.    SocHx:  reports that he quit smoking about 22 years ago. He has a 15.00 pack-year smoking history. He has quit using smokeless tobacco. He reports that he does not drink alcohol or use drugs.      Physical Exam:  There were no vitals filed for this visit.  General: A&Ox3, no apparent distress, no deformities  Neck: No masses, normal thyroid  Abdomen: Soft, NT, ND  Skin: The skin is warm and dry. No jaundice.  Ext: No c/c/e.  :   11/16: VINH normal    Labs/Studies: none  PSA    2/14: 11    5/14: 7    7/14: 5.3    10/14: 2.4    1/15: 2.0    4/15: 1.2    10/15: 0.8    4/16: 0.56    11/17: 0.31    Impression/Plan:   1. PSA 6/12 with RTC 12 mo.   2. Vesicare for OAB but he isn't using it, no rx again

## 2018-02-15 ENCOUNTER — HOSPITAL ENCOUNTER (OUTPATIENT)
Dept: RADIOLOGY | Facility: HOSPITAL | Age: 83
Discharge: HOME | End: 2018-02-15
Attending: FAMILY MEDICINE
Payer: MEDICARE

## 2018-02-15 ENCOUNTER — OFFICE VISIT (OUTPATIENT)
Dept: INTERNAL MEDICINE | Facility: CLINIC | Age: 83
End: 2018-02-15
Payer: MEDICARE

## 2018-02-15 VITALS
HEART RATE: 74 BPM | WEIGHT: 229.25 LBS | SYSTOLIC BLOOD PRESSURE: 124 MMHG | HEIGHT: 72 IN | OXYGEN SATURATION: 97 % | DIASTOLIC BLOOD PRESSURE: 86 MMHG | TEMPERATURE: 98 F | BODY MASS INDEX: 31.05 KG/M2

## 2018-02-15 DIAGNOSIS — N64.4 BREAST PAIN, RIGHT: ICD-10-CM

## 2018-02-15 DIAGNOSIS — I25.10 CORONARY ARTERY DISEASE, OCCLUSIVE: Chronic | ICD-10-CM

## 2018-02-15 DIAGNOSIS — I10 ESSENTIAL HYPERTENSION: Chronic | ICD-10-CM

## 2018-02-15 DIAGNOSIS — C61 PROSTATE CANCER: ICD-10-CM

## 2018-02-15 DIAGNOSIS — N64.4 BREAST PAIN, RIGHT: Primary | ICD-10-CM

## 2018-02-15 DIAGNOSIS — Z98.61 HISTORY OF CORONARY ANGIOPLASTY: Chronic | ICD-10-CM

## 2018-02-15 DIAGNOSIS — E66.9 OBESITY (BMI 30.0-34.9): ICD-10-CM

## 2018-02-15 DIAGNOSIS — N18.30 CHRONIC KIDNEY DISEASE, STAGE III (MODERATE): ICD-10-CM

## 2018-02-15 DIAGNOSIS — K21.9 GASTROESOPHAGEAL REFLUX DISEASE, ESOPHAGITIS PRESENCE NOT SPECIFIED: ICD-10-CM

## 2018-02-15 PROCEDURE — 99999 PR PBB SHADOW E&M-EST. PATIENT-LVL IV: CPT | Mod: PBBFAC,,, | Performed by: FAMILY MEDICINE

## 2018-02-15 PROCEDURE — 1159F MED LIST DOCD IN RCRD: CPT | Mod: ,,, | Performed by: FAMILY MEDICINE

## 2018-02-15 PROCEDURE — 1125F AMNT PAIN NOTED PAIN PRSNT: CPT | Mod: ,,, | Performed by: FAMILY MEDICINE

## 2018-02-15 PROCEDURE — 76642 ULTRASOUND BREAST LIMITED: CPT | Mod: TC,50,PO

## 2018-02-15 PROCEDURE — 77062 BREAST TOMOSYNTHESIS BI: CPT | Mod: TC,PO

## 2018-02-15 PROCEDURE — 76642 ULTRASOUND BREAST LIMITED: CPT | Mod: 26,50,, | Performed by: RADIOLOGY

## 2018-02-15 PROCEDURE — 99214 OFFICE O/P EST MOD 30 MIN: CPT | Mod: PBBFAC,PO,25 | Performed by: FAMILY MEDICINE

## 2018-02-15 PROCEDURE — 99214 OFFICE O/P EST MOD 30 MIN: CPT | Mod: S$PBB,,, | Performed by: FAMILY MEDICINE

## 2018-02-15 PROCEDURE — 77066 DX MAMMO INCL CAD BI: CPT | Mod: 26,,, | Performed by: RADIOLOGY

## 2018-02-15 PROCEDURE — 77062 BREAST TOMOSYNTHESIS BI: CPT | Mod: 26,,, | Performed by: RADIOLOGY

## 2018-02-15 RX ORDER — IBUPROFEN 400 MG/1
400 TABLET ORAL 3 TIMES DAILY PRN
Qty: 30 TABLET | Refills: 0 | Status: SHIPPED | OUTPATIENT
Start: 2018-02-15 | End: 2018-05-28

## 2018-02-15 NOTE — PROGRESS NOTES
Subjective:       Patient ID: Jovanny Olmedo is a 82 y.o. male.    Chief Complaint: Fall (pain in right breast )    82-year-old Afro-American male patient of it took with Patient Active Problem List:     Essential hypertension     GERD (gastroesophageal reflux disease)     Polycystic kidney disease     Glaucoma suspect of both eyes     Nuclear sclerosis     Macular degeneration     Coronary artery disease, occlusive     Chronic kidney disease, stage III (moderate)     Constipation     Refractive error     Microcytic anemia     DDD (degenerative disc disease), lumbar     Cortical senile cataract of both eyes     Age-related macular degeneration, dry, both eyes     Open angle with borderline findings, low risk, bilateral     History of coronary angioplasty     Prostate cancer  Here with complaint of right breast pain off and on for the past few months, patient reports that he does not have any breast pain at rest, but with manipulation to his right nipple, patient's pain can escalate up to 6-7/10.  Denies of any swelling or discharge from the nipple.  Patient would like to make sure there is no underlying cancer.   Has been taking his medications regularly, denies of any other complaints today  With prostate cancer patient has been followed by urologist Dr. Gurrola  Denies of any chest pain or shortness of breath or worsening acid reflex, due to underlying medical conditions        Review of Systems   Constitutional: Negative for appetite change and fatigue.   Eyes: Negative for visual disturbance.   Respiratory: Negative for shortness of breath.    Cardiovascular: Negative for chest pain, palpitations and leg swelling.        Positive for right breast pain   Gastrointestinal: Negative for abdominal pain, nausea and vomiting.   Musculoskeletal: Negative for myalgias.   Skin: Negative for rash.   Neurological: Negative for headaches.   Psychiatric/Behavioral: Negative for sleep disturbance.         /86 (BP  Location: Right arm, Patient Position: Sitting)   Pulse 74   Temp 97.9 °F (36.6 °C) (Tympanic)   Ht 6' (1.829 m)   Wt 104 kg (229 lb 4.5 oz)   SpO2 97%   BMI 31.10 kg/m²   Objective:      Physical Exam   Constitutional: He is oriented to person, place, and time. He appears well-developed and well-nourished.   HENT:   Head: Normocephalic and atraumatic.   Mouth/Throat: Oropharynx is clear and moist.   Cardiovascular: Normal rate, regular rhythm and normal heart sounds.    No murmur heard.  Pulmonary/Chest: Effort normal and breath sounds normal. He has no wheezes. He exhibits no tenderness.   Noted breast tenderness close to the right nipple at 4:00 position, but no lumps noted on exam today.  No breast swelling noted   Abdominal: Soft. Bowel sounds are normal. There is no tenderness.   Musculoskeletal: He exhibits no edema.   Neurological: He is alert and oriented to person, place, and time.   Skin: Skin is warm and dry. No rash noted. No erythema.   Psychiatric: He has a normal mood and affect.         Assessment:       1. Breast pain, right    2. Essential hypertension    3. Coronary artery disease, occlusive    4. History of coronary angioplasty    5. Gastroesophageal reflux disease, esophagitis presence not specified    6. Chronic kidney disease, stage III (moderate)    7. Obesity (BMI 30.0-34.9)    8. Prostate cancer        Plan:   Breast pain, right  -     Cancel: US Breast Right Complete; Future; Expected date: 02/15/2018  -     ibuprofen (ADVIL,MOTRIN) 400 MG tablet; Take 1 tablet (400 mg total) by mouth 3 (three) times daily as needed for Other.  Dispense: 30 tablet; Refill: 0  -     US Breast Right Limited; Future; Expected date: 02/15/2018  -     Mammo Digital Diagnostic Right With CAD; Future; Expected date: 02/15/2018  Ibuprofen prescribed today for symptomatic relief.  Patient was advised to avoid manipulation of the breast which can increase the breast pain  Will schedule right breast Limited  ultrasound and diagnostic mammogram to look into further etiology    Essential hypertension-blood pressure stable today currently on benazepril hydrochlorothiazide 10/12.5 mg daily    Coronary artery disease, occlusive  History of coronary angioplasty  Stable and asymptomatic    Gastroesophageal reflux disease, esophagitis presence not specified-taking Zantac 300 mg daily    Chronic kidney disease, stage III (moderate)-drink adequate fluids and avoid over-the-counter NSAIDs daily    Obesity (BMI 30.0-34.9)    Prostate cancer-followed by urology Dr. Gurrola

## 2018-02-16 RX ORDER — BENAZEPRIL HYDROCHLORIDE AND HYDROCHLOROTHIAZIDE 10; 12.5 MG/1; MG/1
TABLET ORAL
Qty: 90 TABLET | Refills: 3 | Status: SHIPPED | OUTPATIENT
Start: 2018-02-16 | End: 2019-02-16 | Stop reason: SDUPTHER

## 2018-02-19 ENCOUNTER — TELEPHONE (OUTPATIENT)
Dept: INTERNAL MEDICINE | Facility: CLINIC | Age: 83
End: 2018-02-19

## 2018-05-23 ENCOUNTER — LAB VISIT (OUTPATIENT)
Dept: LAB | Facility: HOSPITAL | Age: 83
End: 2018-05-23
Attending: INTERNAL MEDICINE
Payer: MEDICARE

## 2018-05-23 DIAGNOSIS — N18.30 CHRONIC KIDNEY DISEASE, STAGE III (MODERATE): ICD-10-CM

## 2018-05-23 LAB
ANION GAP SERPL CALC-SCNC: 12 MMOL/L
BUN SERPL-MCNC: 25 MG/DL
CALCIUM SERPL-MCNC: 10 MG/DL
CHLORIDE SERPL-SCNC: 104 MMOL/L
CO2 SERPL-SCNC: 23 MMOL/L
CREAT SERPL-MCNC: 1.7 MG/DL
EST. GFR  (AFRICAN AMERICAN): 42.8 ML/MIN/1.73 M^2
EST. GFR  (NON AFRICAN AMERICAN): 37 ML/MIN/1.73 M^2
GLUCOSE SERPL-MCNC: 94 MG/DL
POTASSIUM SERPL-SCNC: 4.2 MMOL/L
SODIUM SERPL-SCNC: 139 MMOL/L

## 2018-05-23 PROCEDURE — 80048 BASIC METABOLIC PNL TOTAL CA: CPT

## 2018-05-23 PROCEDURE — 36415 COLL VENOUS BLD VENIPUNCTURE: CPT | Mod: PO

## 2018-05-28 ENCOUNTER — OFFICE VISIT (OUTPATIENT)
Dept: NEPHROLOGY | Facility: CLINIC | Age: 83
End: 2018-05-28
Payer: MEDICARE

## 2018-05-28 VITALS
DIASTOLIC BLOOD PRESSURE: 74 MMHG | SYSTOLIC BLOOD PRESSURE: 122 MMHG | BODY MASS INDEX: 31.3 KG/M2 | HEART RATE: 76 BPM | WEIGHT: 231.06 LBS | HEIGHT: 72 IN

## 2018-05-28 DIAGNOSIS — N18.30 CHRONIC KIDNEY DISEASE, STAGE III (MODERATE): Primary | ICD-10-CM

## 2018-05-28 DIAGNOSIS — I10 ESSENTIAL HYPERTENSION: ICD-10-CM

## 2018-05-28 DIAGNOSIS — Q61.3 POLYCYSTIC KIDNEY DISEASE: ICD-10-CM

## 2018-05-28 DIAGNOSIS — Z71.89 ENCOUNTER FOR MEDICATION REVIEW AND COUNSELING: ICD-10-CM

## 2018-05-28 PROCEDURE — 99999 PR PBB SHADOW E&M-EST. PATIENT-LVL III: CPT | Mod: PBBFAC,,, | Performed by: INTERNAL MEDICINE

## 2018-05-28 PROCEDURE — 99214 OFFICE O/P EST MOD 30 MIN: CPT | Mod: S$PBB,,, | Performed by: INTERNAL MEDICINE

## 2018-05-28 PROCEDURE — 99213 OFFICE O/P EST LOW 20 MIN: CPT | Mod: PBBFAC,PO | Performed by: INTERNAL MEDICINE

## 2018-05-28 RX ORDER — AZELASTINE 1 MG/ML
2 SPRAY, METERED NASAL 2 TIMES DAILY
Qty: 30 ML | Refills: 11 | Status: SHIPPED | OUTPATIENT
Start: 2018-05-28 | End: 2019-02-04 | Stop reason: SDUPTHER

## 2018-05-28 NOTE — PROGRESS NOTES
"NEPHROLOGY Clinic f/u note:  Date of clinic visit: 18     Reason for f/u and chief c/o: CKD stage 3, PCKD, and HTN     PRIMARY CARE PHYSICIAN: Gabby Mcdonough M.D., [facility].     HISTORY OF PRESENT ILLNESS: Mr. Olmedo is a 82-year-old  male with a h/o of PCKD who presents for f/u. Pt was last seen by me about 6 months ago. He says he has been doing well overall. Pt reports no new c/o's, no new issues since last visit, no c/o's today, no urinary discomfort, no pain, no burning on urination, no abdominal pain. Pt does alos report occasional "presure" or feeling of fullness where his kidneys are. He still eats a lot of ice and drinks a lot of water, despite prior advice to him to keep fluid intake moderate, given h/o of PCKD. He does avoid caffeine. Meds and labs reviewed with him.     PAST MEDICAL HISTORY:  1. Chronic kidney disease stage 3  2. Adenocarcinoma of the prostate diagnosed in , status post radiation treatment.  3. Highly suspected polycystic kidney disease based on the information that were reviewed today/autosomal dominant.  4. Coronary artery disease with history of stents.  5. History of abdominal aortic aneurysm with history of stents.  6. Renal artery stents.     PAST SURGICAL HISTORY: Reviewed, as above for stents. Please see above.     FAMILY HISTORY: reviewed again today: Patient's mother had one kidney    removed. She  in her 70s. The patient's older son started dialysis at the    age of 18, and he now has a kidney transplant and lives in [place]. The patient's    other son, a younger son also, has chronic kidney disease and lives in [place].    He is not on hemodialysis. The patient's father's side of the family did not    have any kidney problems to the patient's best knowledge. His father  at    the age of 82 from colon cancer.     ALLERGIES: Reviewed, to codeine.     SOCIAL HISTORY: Negative for smoking. No alcohol use.     MEDICATIONS: Reviewed. " The patient is on benazepril HCTZ 10/12.5 one p.o. daily, Lipitor 40 mg daily, aspirin 81 mg daily, Zantac as needed, Flonase as Needed.     REVIEW OF SYSTEMS: No recent hospitalizations.  GENERAL: Negative.  HEAD, EYES, EARS, NOSE AND THROAT: Negative.  CARDIAC: Negative.  PULMONARY: Negative.  GASTROINTESTINAL: Negative.  GENITOURINARY: As above, otherwise negative.  PSYCHOLOGICAL: Negative.  NEUROLOGICAL: Negative.  ENDOCRINE: Negative.  HEMATOLOGIC AND ONCOLOGIC: Negative, other than the above for cancer of the  prostate.  The rest of the review of systems negative.     PHYSICAL EXAMINATION:  VITAL SIGNS: Blood pressure is 122/74, pulse 76, weight is 104.8 Kg, no change  GENERAL: Ambulatory, in no acute distress.  Speech normal  Neck no JVD  MM moist, normal hearing  HEART: Regular rate and rhythm. s1 and s2 audible  CHEST: Clear to auscultation. No rales no wheezes, symmetric  EXTREMITIES: No edema.     LABS: Reviewed     BMP  Lab Results   Component Value Date     05/23/2018    K 4.2 05/23/2018     05/23/2018    CO2 23 05/23/2018    BUN 25 (H) 05/23/2018    CREATININE 1.7 (H) 05/23/2018    CALCIUM 10.0 05/23/2018    ANIONGAP 12 05/23/2018    ESTGFRAFRICA 42.8 (A) 05/23/2018    EGFRNONAA 37.0 (A) 05/23/2018     Lab Results   Component Value Date    WBC 4.15 05/19/2017    HGB 9.8 (L) 05/19/2017    HCT 33.2 (L) 05/19/2017    MCV 75 (L) 05/19/2017     05/19/2017     Lab Results   Component Value Date    .0 (H) 01/06/2015    CALCIUM 10.0 05/23/2018    PHOS 3.5 03/21/2012       U/a specific gravity 1.005, n o protein, trace blood, microscopy neg      ASSESSMENT AND PLAN: This is a 82-year-old man with chronic kidney disease likely caused by polycystic kidney disease    Presents for f/u:     1. Renal: No change in Cr, stable renal function overall. CKD stage 3  Explained to pt above in layman's language, pt was reassured  Drinks XS water, eats ice  Low urinary specific gravity c/w with XS  water intake, polydipsia  Again pt was advised to keep fluid intake moderate (avoid both dehydration and overhydration)  XS water intake and caffeine intake will lead to more rapid expansion of the cysts in PCKD  Increased cyst volume is associated with a faster decline in CKD in PCKD pts  Explained to pt in layman's language  K normal  Acid-base normal  Secondary hyperparathyroidism: mild. No need for pharmacologic therapy at this point, will monitor  PCKD h/o. Inherited from mother's side     2. HTN: well controlled   Meds reviewed.  Will continue ACE-I     3. Prostate: h/o of prostate CA.  S/p XRT treatment  PSA not high  Following with urology     PLANS AND RECOMMENDATIONS:   As fully discussed for each individual problem above.  RTC 5-6 months.   Avoid caffeinated drinks.    Avoid excess fluid intake.   Has refills.  Total time spent 25 min. More than 50% spent on counseling and coordination of care.     Alvaro Siegel MD

## 2018-07-05 DIAGNOSIS — I25.10 CORONARY ARTERY DISEASE DUE TO LIPID RICH PLAQUE: ICD-10-CM

## 2018-07-05 DIAGNOSIS — I10 BENIGN ESSENTIAL HTN: Primary | ICD-10-CM

## 2018-07-05 DIAGNOSIS — I25.83 CORONARY ARTERY DISEASE DUE TO LIPID RICH PLAQUE: ICD-10-CM

## 2018-07-09 ENCOUNTER — CLINICAL SUPPORT (OUTPATIENT)
Dept: CARDIOLOGY | Facility: CLINIC | Age: 83
End: 2018-07-09
Payer: MEDICARE

## 2018-07-09 ENCOUNTER — OFFICE VISIT (OUTPATIENT)
Dept: CARDIOLOGY | Facility: CLINIC | Age: 83
End: 2018-07-09
Payer: MEDICARE

## 2018-07-09 VITALS
BODY MASS INDEX: 30.34 KG/M2 | DIASTOLIC BLOOD PRESSURE: 80 MMHG | WEIGHT: 224 LBS | HEART RATE: 79 BPM | SYSTOLIC BLOOD PRESSURE: 120 MMHG | HEIGHT: 72 IN

## 2018-07-09 DIAGNOSIS — I10 BENIGN ESSENTIAL HTN: ICD-10-CM

## 2018-07-09 DIAGNOSIS — I25.10 CORONARY ARTERY DISEASE, OCCLUSIVE: Primary | Chronic | ICD-10-CM

## 2018-07-09 DIAGNOSIS — I10 ESSENTIAL HYPERTENSION: Chronic | ICD-10-CM

## 2018-07-09 DIAGNOSIS — I25.10 CORONARY ARTERY DISEASE DUE TO LIPID RICH PLAQUE: ICD-10-CM

## 2018-07-09 DIAGNOSIS — I25.83 CORONARY ARTERY DISEASE DUE TO LIPID RICH PLAQUE: ICD-10-CM

## 2018-07-09 DIAGNOSIS — Z98.61 HISTORY OF CORONARY ANGIOPLASTY: Chronic | ICD-10-CM

## 2018-07-09 PROCEDURE — 99213 OFFICE O/P EST LOW 20 MIN: CPT | Mod: PBBFAC,PO,25 | Performed by: NUCLEAR MEDICINE

## 2018-07-09 PROCEDURE — 99214 OFFICE O/P EST MOD 30 MIN: CPT | Mod: S$PBB,,, | Performed by: NUCLEAR MEDICINE

## 2018-07-09 PROCEDURE — 93010 ELECTROCARDIOGRAM REPORT: CPT | Mod: S$PBB,,, | Performed by: NUCLEAR MEDICINE

## 2018-07-09 PROCEDURE — 99999 PR PBB SHADOW E&M-EST. PATIENT-LVL III: CPT | Mod: PBBFAC,,, | Performed by: NUCLEAR MEDICINE

## 2018-07-09 PROCEDURE — 93005 ELECTROCARDIOGRAM TRACING: CPT | Mod: PBBFAC,PO | Performed by: NUCLEAR MEDICINE

## 2018-07-09 NOTE — PROGRESS NOTES
Subjective:   Patient ID:  Jovanny Olmedo is a 82 y.o. male who presents for follow-up of Coronary Artery Disease and Hypertension      HPI 1- CHRONIC CAD- ANGINA FC 2,  PAD OF AAA- AND RENAL- POST PCI OF CAD AND STENT ON AA AND RENAL ARTERY   2- ESSENTIAL HTN  3- DYSLIPIDEMIA  NO RECURRENT ANGINA. OR EQUIVALENT  NO UNUSUAL WELLS WITH ORDINARY OR MODERATE PHYSICAL ACTIVITIES  NO ORTHOPNEA OR PND  NO EDEMA. NO INTERMITTENT CLAUDICATION  NO ABDOMINAL PAIN  NO PALPITATIONS  NO SYNCOPE  NO FOCAL CNS SYMPTOMS OR SIGNS TO SUGGEST TIA OR STROKE  CARD MED GOOD COMPLIANCE    Review of Systems   Constitution: Negative for chills, fever, weakness, night sweats, weight gain and weight loss.   HENT: Negative for nosebleeds.    Eyes: Negative for blurred vision, double vision and visual disturbance.   Cardiovascular: Negative for chest pain, dyspnea on exertion, irregular heartbeat, leg swelling, orthopnea, palpitations, paroxysmal nocturnal dyspnea and syncope.   Respiratory: Negative for cough, hemoptysis and wheezing.    Endocrine: Negative for polydipsia and polyuria.   Hematologic/Lymphatic: Does not bruise/bleed easily.   Skin: Negative for rash.   Musculoskeletal: Negative for joint pain, joint swelling, muscle weakness and myalgias.   Gastrointestinal: Negative for abdominal pain, hematemesis, jaundice and melena.   Genitourinary: Negative for dysuria, hematuria and nocturia.   Neurological: Negative for dizziness, focal weakness, headaches and sensory change.   Psychiatric/Behavioral: Negative for depression. The patient does not have insomnia and is not nervous/anxious.      Family History   Problem Relation Age of Onset    Glaucoma Mother     Colon cancer Father     Diabetes Sister     Blindness Neg Hx      Past Medical History:   Diagnosis Date    Arthritis     CAD (coronary artery disease)     Cataract     CKD (chronic kidney disease)     GERD (gastroesophageal reflux disease)     Glaucoma     suspect    HTN  (hypertension)     Prostate cancer     tx'd with radiation    PVD (peripheral vascular disease)     stent in right renal artery and aorta     Current Outpatient Prescriptions on File Prior to Visit   Medication Sig Dispense Refill    aspirin (ECOTRIN) 81 MG EC tablet Take 81 mg by mouth once daily.      atorvastatin (LIPITOR) 40 MG tablet Take 1 tablet (40 mg total) by mouth once daily. 30 tablet 11    azelastine (ASTELIN) 137 mcg (0.1 %) nasal spray 2 sprays (274 mcg total) by Nasal route 2 (two) times daily. 30 mL 11    benazepril-hydrochlorthiazide (LOTENSIN HCT) 10-12.5 mg Tab TAKE ONE TABLET BY MOUTH ONCE DAILY 90 tablet 3    clopidogrel (PLAVIX) 75 mg tablet Take 1 tablet (75 mg total) by mouth once daily. 90 tablet 3    nitroGLYCERIN (NITROSTAT) 0.4 MG SL tablet Place 1 tablet (0.4 mg total) under the tongue every 5 (five) minutes as needed for Chest pain. 25 tablet 3    ranitidine (ZANTAC) 300 MG tablet TAKE ONE TABLET BY MOUTH ONCE DAILY 90 tablet 3    tadalafil (CIALIS) 10 MG tablet Take 1 tablet (10 mg total) by mouth daily as needed for Erectile Dysfunction. 5 tablet 11     No current facility-administered medications on file prior to visit.      Review of patient's allergies indicates:   Allergen Reactions    Codeine      When taking codeine, pt becomes very anxious       Objective:     Physical Exam   Constitutional: He is oriented to person, place, and time. He appears well-developed. No distress.   HENT:   Head: Normocephalic.   Eyes: Conjunctivae are normal. Pupils are equal, round, and reactive to light.   Neck: Neck supple. No JVD present. No thyromegaly present.   Cardiovascular: Normal rate, regular rhythm, normal heart sounds and intact distal pulses.  Exam reveals no gallop and no friction rub.    No murmur heard.  Pulses:       Carotid pulses are 2+ on the right side, and 2+ on the left side.       Radial pulses are 2+ on the right side, and 2+ on the left side.        Femoral  pulses are 2+ on the right side, and 2+ on the left side.       Popliteal pulses are 2+ on the right side, and 2+ on the left side.        Dorsalis pedis pulses are 2+ on the right side, and 2+ on the left side.        Posterior tibial pulses are 2+ on the right side, and 2+ on the left side.   Pulmonary/Chest: Breath sounds normal. He has no wheezes. He has no rales. He exhibits no tenderness.   Abdominal: Soft. Bowel sounds are normal. He exhibits no mass. There is no hepatosplenomegaly. There is no tenderness.   Musculoskeletal: He exhibits no edema or tenderness.        Cervical back: Normal.        Thoracic back: Normal.        Lumbar back: Normal.   Lymphadenopathy:     He has no cervical adenopathy.     He has no axillary adenopathy.        Right: No supraclavicular adenopathy present.        Left: No supraclavicular adenopathy present.   Neurological: He is alert and oriented to person, place, and time. He has normal strength. No sensory deficit. Gait normal.   Skin: Skin is warm. No cyanosis. No pallor. Nails show no clubbing.   Psychiatric: He has a normal mood and affect. His speech is normal and behavior is normal. Cognition and memory are normal.       Assessment:     1. Coronary artery disease, occlusive    2. History of coronary angioplasty    3. Essential hypertension      STABLE CAD- NO ACTIVE MYOCARDIAL ISCHEMIA  NO ADHF .   ECG - SR., LAD ,LAFB. NO SIGNIFICANT ARRHYTHMIAS  BP WELL CONTROLLED  CNS STATUS STABLE  CARD MED WELL TOLERATED  Plan:     Coronary artery disease, occlusive    History of coronary angioplasty    Essential hypertension    1- CONTINUE PRESENT CARD MED    2- SCHEDULE LIPID PROFILE- PHONE CALL    3- RETURN IN ONE YEAR

## 2018-07-24 ENCOUNTER — TELEPHONE (OUTPATIENT)
Dept: PULMONOLOGY | Facility: CLINIC | Age: 83
End: 2018-07-24

## 2018-07-24 NOTE — TELEPHONE ENCOUNTER
Return call to [employer] requested form be re-faxed.    Received paperwork from Dante, patient contacted and instructed to schedule F/u. Offered to assist patient with scheduling appointment, patient refused stating he would need to discuss with wife and will call back to schedule appointment

## 2018-07-25 ENCOUNTER — OFFICE VISIT (OUTPATIENT)
Dept: SLEEP MEDICINE | Facility: CLINIC | Age: 83
End: 2018-07-25
Payer: MEDICARE

## 2018-07-25 VITALS
HEIGHT: 72 IN | RESPIRATION RATE: 17 BRPM | OXYGEN SATURATION: 95 % | WEIGHT: 218.25 LBS | BODY MASS INDEX: 29.56 KG/M2 | SYSTOLIC BLOOD PRESSURE: 110 MMHG | HEART RATE: 74 BPM | DIASTOLIC BLOOD PRESSURE: 76 MMHG

## 2018-07-25 DIAGNOSIS — R05.9 COUGH: ICD-10-CM

## 2018-07-25 DIAGNOSIS — G47.33 OSA (OBSTRUCTIVE SLEEP APNEA): Primary | ICD-10-CM

## 2018-07-25 PROCEDURE — 99214 OFFICE O/P EST MOD 30 MIN: CPT | Mod: S$PBB,,, | Performed by: NURSE PRACTITIONER

## 2018-07-25 PROCEDURE — 99999 PR PBB SHADOW E&M-EST. PATIENT-LVL III: CPT | Mod: PBBFAC,,, | Performed by: NURSE PRACTITIONER

## 2018-07-25 PROCEDURE — 99213 OFFICE O/P EST LOW 20 MIN: CPT | Mod: PBBFAC,PO | Performed by: NURSE PRACTITIONER

## 2018-07-25 RX ORDER — LEVOCETIRIZINE DIHYDROCHLORIDE 5 MG/1
5 TABLET, FILM COATED ORAL NIGHTLY
Qty: 30 TABLET | Refills: 11 | Status: SHIPPED | OUTPATIENT
Start: 2018-07-25 | End: 2020-01-29

## 2018-07-25 RX ORDER — FLUTICASONE PROPIONATE 50 MCG
2 SPRAY, SUSPENSION (ML) NASAL DAILY
Qty: 1 BOTTLE | Refills: 11 | Status: SHIPPED | OUTPATIENT
Start: 2018-07-25 | End: 2019-02-04 | Stop reason: SDUPTHER

## 2018-07-25 NOTE — PROGRESS NOTES
Subjective:      Patient ID: Jovanny Olmedo is a 82 y.o. male.    Chief Complaint: Sleep Apnea    Presents to office for review of CPAP therapy. Patient states improved symptoms with use of CPAP. Sleeping more soundly. Waking up feeling more refreshed. Improved daytime sleepiness. Patient states he is benefiting from use of the CPAP. 7cm H20    Continues to have cough from post nasal drip.     Patient Active Problem List:     Essential hypertension     GERD (gastroesophageal reflux disease)     Polycystic kidney disease     Glaucoma suspect of both eyes     Nuclear sclerosis     Macular degeneration     Coronary artery disease, occlusive     Chronic kidney disease, stage III (moderate)     Constipation     Refractive error     Microcytic anemia     DDD (degenerative disc disease), lumbar     Cortical senile cataract of both eyes     Age-related macular degeneration, dry, both eyes     Open angle with borderline findings, low risk, bilateral     History of coronary angioplasty     Prostate cancer            /76   Pulse 74   Resp 17   Ht 6' (1.829 m)   Wt 99 kg (218 lb 4.1 oz)   SpO2 95%   BMI 29.60 kg/m²   Body mass index is 29.6 kg/m².    Review of Systems   Constitutional: Negative.    HENT: Positive for postnasal drip.    Respiratory: Positive for cough.    Cardiovascular: Negative.    Musculoskeletal: Negative.    Gastrointestinal: Negative.    Neurological: Negative.    Psychiatric/Behavioral: Negative.      Objective:      Physical Exam   Constitutional: He is oriented to person, place, and time. He appears well-developed and well-nourished.   HENT:   Head: Normocephalic and atraumatic.   Neck: Normal range of motion. Neck supple.   Cardiovascular: Normal rate and regular rhythm.    Pulmonary/Chest: Effort normal and breath sounds normal.   Abdominal: Soft.   Musculoskeletal: Normal range of motion. He exhibits no edema.   Neurological: He is alert and oriented to person, place, and time.   Skin:  Skin is warm and dry.   Psychiatric: He has a normal mood and affect.     Personal Diagnostic Review      Assessment:       1. CLARISSA (obstructive sleep apnea)    2. Cough        Outpatient Encounter Prescriptions as of 7/25/2018   Medication Sig Dispense Refill    aspirin (ECOTRIN) 81 MG EC tablet Take 81 mg by mouth once daily.      atorvastatin (LIPITOR) 40 MG tablet Take 1 tablet (40 mg total) by mouth once daily. 30 tablet 11    azelastine (ASTELIN) 137 mcg (0.1 %) nasal spray 2 sprays (274 mcg total) by Nasal route 2 (two) times daily. 30 mL 11    benazepril-hydrochlorthiazide (LOTENSIN HCT) 10-12.5 mg Tab TAKE ONE TABLET BY MOUTH ONCE DAILY 90 tablet 3    clopidogrel (PLAVIX) 75 mg tablet Take 1 tablet (75 mg total) by mouth once daily. 90 tablet 3    ranitidine (ZANTAC) 300 MG tablet TAKE ONE TABLET BY MOUTH ONCE DAILY 90 tablet 3    fluticasone (FLONASE) 50 mcg/actuation nasal spray 2 sprays (100 mcg total) by Each Nare route once daily. 1 Bottle 11    levocetirizine (XYZAL) 5 MG tablet Take 1 tablet (5 mg total) by mouth every evening. 30 tablet 11    nitroGLYCERIN (NITROSTAT) 0.4 MG SL tablet Place 1 tablet (0.4 mg total) under the tongue every 5 (five) minutes as needed for Chest pain. 25 tablet 3    tadalafil (CIALIS) 10 MG tablet Take 1 tablet (10 mg total) by mouth daily as needed for Erectile Dysfunction. 5 tablet 11     No facility-administered encounter medications on file as of 7/25/2018.      Orders Placed This Encounter   Procedures    CPAP/BIPAP SUPPLIES     [facility]     Order Specific Question:   Type of mask:     Answer:   Nasal     Order Specific Question:   Headgear?     Answer:   Yes     Order Specific Question:   Tubing?     Answer:   Yes     Order Specific Question:   Humidifier chamber?     Answer:   Yes     Order Specific Question:   Chin strap?     Answer:   Yes     Order Specific Question:   Filters?     Answer:   Yes     Order Specific Question:   Length of need (1-99 months):      Answer:   99     Plan:      Doing well on PAP settings. Patient is compliant. Follow up in 12 months with PAP data download or call earlier if any problems. Request download

## 2018-08-15 RX ORDER — CLOPIDOGREL BISULFATE 75 MG/1
TABLET ORAL
Qty: 90 TABLET | Refills: 0 | Status: SHIPPED | OUTPATIENT
Start: 2018-08-15 | End: 2018-11-27 | Stop reason: SDUPTHER

## 2018-10-26 RX ORDER — ATORVASTATIN CALCIUM 40 MG/1
TABLET, FILM COATED ORAL
Qty: 90 TABLET | Refills: 3 | Status: SHIPPED | OUTPATIENT
Start: 2018-10-26 | End: 2019-11-06 | Stop reason: SDUPTHER

## 2018-11-28 RX ORDER — CLOPIDOGREL BISULFATE 75 MG/1
TABLET ORAL
Qty: 90 TABLET | Refills: 0 | Status: SHIPPED | OUTPATIENT
Start: 2018-11-28 | End: 2019-02-24 | Stop reason: SDUPTHER

## 2018-12-05 ENCOUNTER — LAB VISIT (OUTPATIENT)
Dept: LAB | Facility: HOSPITAL | Age: 83
End: 2018-12-05
Attending: UROLOGY
Payer: MEDICARE

## 2018-12-05 ENCOUNTER — OFFICE VISIT (OUTPATIENT)
Dept: UROLOGY | Facility: CLINIC | Age: 83
End: 2018-12-05
Payer: MEDICARE

## 2018-12-05 VITALS — WEIGHT: 218 LBS | BODY MASS INDEX: 29.57 KG/M2

## 2018-12-05 DIAGNOSIS — N32.81 OAB (OVERACTIVE BLADDER): ICD-10-CM

## 2018-12-05 DIAGNOSIS — C61 PROSTATE CANCER: Primary | ICD-10-CM

## 2018-12-05 DIAGNOSIS — C61 PROSTATE CANCER: ICD-10-CM

## 2018-12-05 LAB
BILIRUB SERPL-MCNC: NORMAL MG/DL
BLOOD URINE, POC: NORMAL
COLOR, POC UA: YELLOW
COMPLEXED PSA SERPL-MCNC: 0.53 NG/ML
GLUCOSE UR QL STRIP: NORMAL
KETONES UR QL STRIP: NORMAL
LEUKOCYTE ESTERASE URINE, POC: NORMAL
NITRITE, POC UA: NORMAL
PH, POC UA: 6
PROTEIN, POC: NORMAL
SPECIFIC GRAVITY, POC UA: 1.01
UROBILINOGEN, POC UA: NORMAL

## 2018-12-05 PROCEDURE — 99214 OFFICE O/P EST MOD 30 MIN: CPT | Mod: S$PBB,,, | Performed by: UROLOGY

## 2018-12-05 PROCEDURE — 84153 ASSAY OF PSA TOTAL: CPT

## 2018-12-05 PROCEDURE — 81002 URINALYSIS NONAUTO W/O SCOPE: CPT | Mod: PBBFAC | Performed by: UROLOGY

## 2018-12-05 PROCEDURE — 99999 PR PBB SHADOW E&M-EST. PATIENT-LVL II: CPT | Mod: PBBFAC,,, | Performed by: UROLOGY

## 2018-12-05 PROCEDURE — 36415 COLL VENOUS BLD VENIPUNCTURE: CPT

## 2018-12-05 PROCEDURE — 99212 OFFICE O/P EST SF 10 MIN: CPT | Mod: PBBFAC | Performed by: UROLOGY

## 2018-12-05 NOTE — PROGRESS NOTES
Chief Complaint: Prostate cancer     HPI:   12/5/18: Hadn't gotten any PSA done.  Voiding fine.  Reviewed history in detail.  11/29/17: PSA dropped further.  Doesn't use the vesicare much. Good erections without ED. Reviewed history in detail.  11/23/16: Not taking vesicare not too bothered.  Would like a refill.  Discussed natural course of prostate cancer/XRT.  PSA down last check just went to the lab this morning.  Has half-mast erections.  11/2/15: PSA dropping.  Having problems with sudden urgency.  Nocturia x2.  Occas drip on the way to the toilet.  Has cut back on caffeine some.  4/20/15: No problems no sig changes, PSA dropping  10/6/14: Has not had nephrology appt yet; doing well.  Urgency got better with time and won't stop the coffee.   5/12/14: XRT is done, PSA now 7.0.  Had some worsening stream, but improved with some urgency.  2/3/14: Back from Dr. Deng.  Started XRT already, doing well.  1/13/14: Doing well.  Bx shows Gl 4+3=7 in right base/mid/apex.   12/30/13: TRUS/Bx completed.  30 gm prostate.  Had post-procedural infection with short admission on IV Abx and then home on po Abx.    11/18/13: 79 yo man referred for elevated PSA (11.3) recently.  It was 6.3 two years ago.  He is on plavix secondary to having CAD and multiple stents, last one a few years ago.  No urinary bother except some intermittent stream.  Nocturia x1-2.  No pelvic pain and no exac/rel factors.  Had some burning with ejaculation deep a week ago.  No urolithiasis.  No hematuria.    Allergies:  Codeine    Medications:  has a current medication list which includes the following prescription(s): aspirin, atorvastatin, azelastine, benazepril-hydrochlorthiazide, clopidogrel, fluticasone, levocetirizine, nitroglycerin, ranitidine, and tadalafil.    Review of Systems:  General: No fever, chills, fatigability, or weight loss.  Skin: No rashes, itching, or changes in color or texture of skin.  Chest: Denies WELLS, cyanosis, wheezing,  cough, and sputum production.  Abdomen: Appetite fine. No weight loss. Denies diarrhea, abdominal pain, hematemesis, or blood in stool.  Musculoskeletal: Some joint stiffness or swelling. Some back pain.  : As above.  All other review of systems negative.    PMH:   has a past medical history of Arthritis, CAD (coronary artery disease), Cataract, CKD (chronic kidney disease), GERD (gastroesophageal reflux disease), Glaucoma, HTN (hypertension), Prostate cancer, and PVD (peripheral vascular disease).    PSH:   has a past surgical history that includes Coronary stent placement; Abdominal aorta stent; Renal artery stent; Prostate biopsy; PCIOL (Right, 03/29/2017); Cataract extraction w/  intraocular lens implant (Right, 04/29/2017); Cardiac catheterization; Coronary angioplasty; COLONOSCOPY (N/A, 12/3/2014); and ESOPHAGOGASTRODUODENOSCOPY (EGD) (N/A, 12/3/2014).    FamHx: family history includes Colon cancer in his father; Diabetes in his sister; Glaucoma in his mother.    SocHx:  reports that he quit smoking about 23 years ago. He has a 15.00 pack-year smoking history. He has quit using smokeless tobacco. He reports that he does not drink alcohol or use drugs.      Physical Exam:  There were no vitals filed for this visit.  General: A&Ox3, no apparent distress, no deformities  Neck: No masses, normal thyroid  Abdomen: Soft, NT, ND  Skin: The skin is warm and dry. No jaundice.  Ext: No c/c/e.  :   11/16: VINH normal    Labs/Studies: none  PSA    2/14: 11    5/14: 7    7/14: 5.3    10/14: 2.4    1/15: 2.0    4/15: 1.2    10/15: 0.8    4/16: 0.56    11/17: 0.31    Impression/Plan:   1. PSA now and with RTC 12 mo.   2. Vesicare for OAB but he wasn't using it, no rx again

## 2019-02-04 ENCOUNTER — OFFICE VISIT (OUTPATIENT)
Dept: INTERNAL MEDICINE | Facility: CLINIC | Age: 84
End: 2019-02-04
Payer: MEDICARE

## 2019-02-04 VITALS
DIASTOLIC BLOOD PRESSURE: 70 MMHG | HEART RATE: 77 BPM | HEIGHT: 72 IN | BODY MASS INDEX: 31.03 KG/M2 | TEMPERATURE: 98 F | WEIGHT: 229.06 LBS | SYSTOLIC BLOOD PRESSURE: 106 MMHG

## 2019-02-04 DIAGNOSIS — C61 PROSTATE CANCER: ICD-10-CM

## 2019-02-04 DIAGNOSIS — R05.9 COUGH: Primary | ICD-10-CM

## 2019-02-04 DIAGNOSIS — N18.30 CHRONIC KIDNEY DISEASE, STAGE III (MODERATE): ICD-10-CM

## 2019-02-04 DIAGNOSIS — I10 ESSENTIAL HYPERTENSION: Chronic | ICD-10-CM

## 2019-02-04 PROCEDURE — 99999 PR PBB SHADOW E&M-EST. PATIENT-LVL III: CPT | Mod: PBBFAC,,, | Performed by: FAMILY MEDICINE

## 2019-02-04 PROCEDURE — 99999 PR PBB SHADOW E&M-EST. PATIENT-LVL III: ICD-10-PCS | Mod: PBBFAC,,, | Performed by: FAMILY MEDICINE

## 2019-02-04 PROCEDURE — 99214 OFFICE O/P EST MOD 30 MIN: CPT | Mod: S$PBB,,, | Performed by: FAMILY MEDICINE

## 2019-02-04 PROCEDURE — 99213 OFFICE O/P EST LOW 20 MIN: CPT | Mod: PBBFAC,PN | Performed by: FAMILY MEDICINE

## 2019-02-04 PROCEDURE — 99214 PR OFFICE/OUTPT VISIT, EST, LEVL IV, 30-39 MIN: ICD-10-PCS | Mod: S$PBB,,, | Performed by: FAMILY MEDICINE

## 2019-02-04 RX ORDER — CLOTRIMAZOLE AND BETAMETHASONE DIPROPIONATE 10; .64 MG/G; MG/G
CREAM TOPICAL 2 TIMES DAILY
Qty: 45 G | Refills: 0 | Status: SHIPPED | OUTPATIENT
Start: 2019-02-04 | End: 2021-09-13 | Stop reason: ALTCHOICE

## 2019-02-04 RX ORDER — FLUTICASONE PROPIONATE 50 MCG
2 SPRAY, SUSPENSION (ML) NASAL DAILY
Qty: 1 BOTTLE | Refills: 11 | Status: SHIPPED | OUTPATIENT
Start: 2019-02-04 | End: 2019-08-13 | Stop reason: SDUPTHER

## 2019-02-04 RX ORDER — AZELASTINE 1 MG/ML
2 SPRAY, METERED NASAL 2 TIMES DAILY
Qty: 30 ML | Refills: 11 | Status: SHIPPED | OUTPATIENT
Start: 2019-02-04 | End: 2019-08-13 | Stop reason: SDUPTHER

## 2019-02-04 RX ORDER — PANTOPRAZOLE SODIUM 20 MG/1
20 TABLET, DELAYED RELEASE ORAL
Qty: 60 TABLET | Refills: 3 | Status: SHIPPED | OUTPATIENT
Start: 2019-02-04 | End: 2019-07-03

## 2019-02-04 NOTE — PROGRESS NOTES
Subjective:       Patient ID: Jovanny Olmedo is a 83 y.o. male.    Chief Complaint: Cough and Rash    F/U:      Pt is a 83 year old been coughing for years. Pt does have chronic sinus issues and may not have been taking his antihistmine or steroid spray regularly. Pt is however on benazepril and has GERD as well. No fevers and cough has really not gotten worse.    Rash:      Small round patch on the medial left ankle. 2 weeks and slightly itching      Review of Systems   Constitutional: Negative.    HENT: Positive for postnasal drip. Negative for rhinorrhea, sinus pressure and sinus pain.    Respiratory: Positive for cough. Negative for wheezing.    Cardiovascular: Negative.    Gastrointestinal: Negative.    Genitourinary: Negative.    Skin: Positive for rash.       Objective:      Physical Exam   Constitutional: He is oriented to person, place, and time. He appears well-developed and well-nourished.   Cardiovascular: Normal rate and regular rhythm. Exam reveals no friction rub.   No murmur heard.  Pulmonary/Chest: Effort normal and breath sounds normal. No stridor. He has no wheezes.   Abdominal: Soft. Bowel sounds are normal.   Neurological: He is alert and oriented to person, place, and time.   Skin: Rash noted.   Psychiatric: He has a normal mood and affect. His behavior is normal.       Assessment:       1. Cough    2. Essential hypertension    3. Chronic kidney disease, stage III (moderate)    4. Prostate cancer        Plan:       Cough  Comments:  Unclear at this point. Attack from postnasal drip and GERD. Start on protonix and astelin with flonase    Essential hypertension  Comments:  BP is well control but cough may be due to benazepril.     Chronic kidney disease, stage III (moderate)    Prostate cancer    Other orders  -     azelastine (ASTELIN) 137 mcg (0.1 %) nasal spray; 2 sprays (274 mcg total) by Nasal route 2 (two) times daily.  Dispense: 30 mL; Refill: 11  -     fluticasone (FLONASE) 50  mcg/actuation nasal spray; 2 sprays (100 mcg total) by Each Nare route once daily.  Dispense: 1 Bottle; Refill: 11  -     pantoprazole (PROTONIX) 20 MG tablet; Take 1 tablet (20 mg total) by mouth 2 (two) times daily before meals.  Dispense: 60 tablet; Refill: 3  -     clotrimazole-betamethasone 1-0.05% (LOTRISONE) cream; Apply topically 2 (two) times daily.  Dispense: 45 g; Refill: 0

## 2019-02-16 RX ORDER — BENAZEPRIL HYDROCHLORIDE AND HYDROCHLOROTHIAZIDE 10; 12.5 MG/1; MG/1
TABLET ORAL
Qty: 90 TABLET | Refills: 3 | Status: SHIPPED | OUTPATIENT
Start: 2019-02-16 | End: 2019-04-18

## 2019-02-25 RX ORDER — CLOPIDOGREL BISULFATE 75 MG/1
TABLET ORAL
Qty: 90 TABLET | Refills: 1 | Status: SHIPPED | OUTPATIENT
Start: 2019-02-25 | End: 2019-08-13 | Stop reason: SDUPTHER

## 2019-04-17 ENCOUNTER — TELEPHONE (OUTPATIENT)
Dept: CARDIOLOGY | Facility: CLINIC | Age: 84
End: 2019-04-17

## 2019-04-17 NOTE — TELEPHONE ENCOUNTER
----- Message from Shaila Alicea sent at 4/17/2019 12:14 PM CDT -----  Contact: pt   Call pt regarding his ER visit last night.    ..[phone] (wtln)

## 2019-04-18 ENCOUNTER — DOCUMENTATION ONLY (OUTPATIENT)
Dept: CARDIOLOGY | Facility: CLINIC | Age: 84
End: 2019-04-18

## 2019-04-18 RX ORDER — BENAZEPRIL HYDROCHLORIDE AND HYDROCHLOROTHIAZIDE 10; 12.5 MG/1; MG/1
1 TABLET ORAL 2 TIMES DAILY
Qty: 270 TABLET | Refills: 3 | Status: SHIPPED | OUTPATIENT
Start: 2019-04-18 | End: 2020-05-15

## 2019-04-18 NOTE — PROGRESS NOTES
Patient arrived today for blood pressure check, due elevated home blood pressure and ED visit to [facility] for SOB and elevated BR    Left arm 140/76, HR 72    Orders per Dr. Wayne  Increase Lotensin 10/12.5mg 1 tab to 2 tabs.  Continue to record blood pressure  RTC in 4 weeks, 05/23/2019.

## 2019-04-22 ENCOUNTER — TELEPHONE (OUTPATIENT)
Dept: CARDIOLOGY | Facility: CLINIC | Age: 84
End: 2019-04-22

## 2019-04-22 NOTE — TELEPHONE ENCOUNTER
Returned call. Patient stated spouse would like to be seen same day as spouse.    Patient appt 05/23/2019 at5 1030, spouse to follow at 1100, [facility].

## 2019-04-22 NOTE — TELEPHONE ENCOUNTER
----- Message from Mason Frye sent at 4/22/2019 10:12 AM CDT -----  Contact: self  Type:  Needs Medical Advice    Who Called: pt  Symptoms (please be specific):n/a   How long has patient had these symptoms:n/a  Pharmacy name and phone #:n/a  Would the patient rather a call back or a response via [facility]? Call back  Best Call Back Number: [phone]  Additional Information: states doctor told him to call regarding wife coming to appt with pt to be at the same time w/o appt.      Thanks,  Mason Frye

## 2019-05-23 ENCOUNTER — OFFICE VISIT (OUTPATIENT)
Dept: INTERNAL MEDICINE | Facility: CLINIC | Age: 84
End: 2019-05-23
Payer: MEDICARE

## 2019-05-23 ENCOUNTER — HOSPITAL ENCOUNTER (OUTPATIENT)
Dept: RADIOLOGY | Facility: HOSPITAL | Age: 84
Discharge: HOME | End: 2019-05-23
Attending: FAMILY MEDICINE
Payer: MEDICARE

## 2019-05-23 ENCOUNTER — OFFICE VISIT (OUTPATIENT)
Dept: CARDIOLOGY | Facility: CLINIC | Age: 84
End: 2019-05-23
Payer: MEDICARE

## 2019-05-23 VITALS
OXYGEN SATURATION: 94 % | SYSTOLIC BLOOD PRESSURE: 116 MMHG | WEIGHT: 231.69 LBS | HEART RATE: 84 BPM | TEMPERATURE: 98 F | DIASTOLIC BLOOD PRESSURE: 76 MMHG | BODY MASS INDEX: 31.42 KG/M2

## 2019-05-23 VITALS
SYSTOLIC BLOOD PRESSURE: 120 MMHG | HEIGHT: 72 IN | WEIGHT: 230.81 LBS | HEART RATE: 81 BPM | BODY MASS INDEX: 31.26 KG/M2 | DIASTOLIC BLOOD PRESSURE: 70 MMHG

## 2019-05-23 DIAGNOSIS — M25.521 ELBOW PAIN, RIGHT: ICD-10-CM

## 2019-05-23 DIAGNOSIS — Z98.61 HISTORY OF CORONARY ANGIOPLASTY: Chronic | ICD-10-CM

## 2019-05-23 DIAGNOSIS — I25.10 CORONARY ARTERY DISEASE, OCCLUSIVE: Primary | Chronic | ICD-10-CM

## 2019-05-23 DIAGNOSIS — I10 ESSENTIAL HYPERTENSION: Chronic | ICD-10-CM

## 2019-05-23 DIAGNOSIS — N25.81 SECONDARY HYPERPARATHYROIDISM: ICD-10-CM

## 2019-05-23 DIAGNOSIS — M46.1 SACROILIITIS: ICD-10-CM

## 2019-05-23 PROCEDURE — 99213 OFFICE O/P EST LOW 20 MIN: CPT | Mod: PBBFAC,25,27,PN | Performed by: NUCLEAR MEDICINE

## 2019-05-23 PROCEDURE — 99213 OFFICE O/P EST LOW 20 MIN: CPT | Mod: PBBFAC,25 | Performed by: FAMILY MEDICINE

## 2019-05-23 PROCEDURE — 99214 OFFICE O/P EST MOD 30 MIN: CPT | Mod: S$PBB,,, | Performed by: FAMILY MEDICINE

## 2019-05-23 PROCEDURE — 99999 PR PBB SHADOW E&M-EST. PATIENT-LVL III: CPT | Mod: PBBFAC,,, | Performed by: FAMILY MEDICINE

## 2019-05-23 PROCEDURE — 99214 OFFICE O/P EST MOD 30 MIN: CPT | Mod: S$PBB,,, | Performed by: NUCLEAR MEDICINE

## 2019-05-23 PROCEDURE — 73080 X-RAY EXAM OF ELBOW: CPT | Mod: 26,RT,, | Performed by: RADIOLOGY

## 2019-05-23 PROCEDURE — 73080 X-RAY EXAM OF ELBOW: CPT | Mod: TC,RT

## 2019-05-23 PROCEDURE — 99999 PR PBB SHADOW E&M-EST. PATIENT-LVL III: ICD-10-PCS | Mod: PBBFAC,,, | Performed by: NUCLEAR MEDICINE

## 2019-05-23 PROCEDURE — 99999 PR PBB SHADOW E&M-EST. PATIENT-LVL III: ICD-10-PCS | Mod: PBBFAC,,, | Performed by: FAMILY MEDICINE

## 2019-05-23 PROCEDURE — 99999 PR PBB SHADOW E&M-EST. PATIENT-LVL III: CPT | Mod: PBBFAC,,, | Performed by: NUCLEAR MEDICINE

## 2019-05-23 PROCEDURE — 73080 XR ELBOW COMPLETE 3 VIEW RIGHT: ICD-10-PCS | Mod: 26,RT,, | Performed by: RADIOLOGY

## 2019-05-23 PROCEDURE — 99214 PR OFFICE/OUTPT VISIT, EST, LEVL IV, 30-39 MIN: ICD-10-PCS | Mod: S$PBB,,, | Performed by: FAMILY MEDICINE

## 2019-05-23 PROCEDURE — 99214 PR OFFICE/OUTPT VISIT, EST, LEVL IV, 30-39 MIN: ICD-10-PCS | Mod: S$PBB,,, | Performed by: NUCLEAR MEDICINE

## 2019-05-23 NOTE — PROGRESS NOTES
Subjective:   Patient ID:  Jovanny Olmedo is a 83 y.o. male who presents for follow-up of Hypertension and Coronary Artery Disease (PCI/STENT-JAYCEE)      HPI HOME BP MONITORING /70-80  CARD MED GOOD COMPLIANCE, NO SIDE EFFECTS  NO ABNORMAL BLEEDING- ON DAPT-   NO FOCAL CNS SYMPTOMS OR SIGNS TO SUGGEST TIA OR STROKE  NO RECURRENT ANGINA.OR EQUIVALENT  NO PALPITATIONS  NO EDEMA. NO CALVE TENDERNESS  NO ABDOMINAL DISCOMFORT  NO NEAR SYNCOPE OR SYNCOPE    Review of Systems   Constitution: Negative for chills, fever, night sweats, weight gain and weight loss.   HENT: Negative for nosebleeds.    Eyes: Negative for blurred vision, double vision and visual disturbance.   Cardiovascular: Negative for chest pain, dyspnea on exertion, irregular heartbeat, leg swelling, orthopnea, palpitations, paroxysmal nocturnal dyspnea and syncope.   Respiratory: Negative for cough, hemoptysis and wheezing.    Endocrine: Negative for polydipsia and polyuria.   Hematologic/Lymphatic: Does not bruise/bleed easily.   Skin: Negative for rash.   Musculoskeletal: Negative for joint pain, joint swelling, muscle weakness and myalgias.   Gastrointestinal: Negative for abdominal pain, hematemesis, jaundice and melena.   Genitourinary: Negative for dysuria, hematuria and nocturia.   Neurological: Negative for dizziness, focal weakness, headaches, sensory change and weakness.   Psychiatric/Behavioral: Negative for depression. The patient does not have insomnia and is not nervous/anxious.      Family History   Problem Relation Age of Onset    Glaucoma Mother     Colon cancer Father     Diabetes Sister     Blindness Neg Hx      Past Medical History:   Diagnosis Date    Arthritis     CAD (coronary artery disease)     Cataract     CKD (chronic kidney disease)     GERD (gastroesophageal reflux disease)     Glaucoma     suspect    HTN (hypertension)     Prostate cancer     tx'd with radiation    PVD (peripheral vascular disease)     stent in  right renal artery and aorta     Social History     Socioeconomic History    Marital status:      Spouse name: Not on file    Number of children: Not on file    Years of education: Not on file    Highest education level: Not on file   Occupational History    Not on file   Social Needs    Financial resource strain: Not on file    Food insecurity:     Worry: Not on file     Inability: Not on file    Transportation needs:     Medical: Not on file     Non-medical: Not on file   Tobacco Use    Smoking status: Former Smoker     Packs/day: 0.50     Years: 30.00     Pack years: 15.00     Last attempt to quit: 1995     Years since quittin.6    Smokeless tobacco: Former User   Substance and Sexual Activity    Alcohol use: No     Alcohol/week: 0.0 oz    Drug use: No    Sexual activity: Not Currently   Lifestyle    Physical activity:     Days per week: Not on file     Minutes per session: Not on file    Stress: Not on file   Relationships    Social connections:     Talks on phone: Not on file     Gets together: Not on file     Attends Orthodox service: Not on file     Active member of club or organization: Not on file     Attends meetings of clubs or organizations: Not on file     Relationship status: Not on file   Other Topics Concern    Not on file   Social History Narrative         Current Outpatient Medications on File Prior to Visit   Medication Sig Dispense Refill    aspirin (ECOTRIN) 81 MG EC tablet Take 81 mg by mouth once daily.      atorvastatin (LIPITOR) 40 MG tablet TAKE ONE TABLET BY MOUTH ONCE DAILY 90 tablet 3    azelastine (ASTELIN) 137 mcg (0.1 %) nasal spray 2 sprays (274 mcg total) by Nasal route 2 (two) times daily. 30 mL 11    benazepril-hydrochlorthiazide (LOTENSIN HCT) 10-12.5 mg Tab Take 1 tablet by mouth 2 (two) times daily. (Patient taking differently: Take 2 tablets by mouth 2 (two) times daily. ) 270 tablet 3    clopidogrel (PLAVIX) 75 mg tablet TAKE 1  TABLET BY MOUTH ONCE DAILY 90 tablet 1    clotrimazole-betamethasone 1-0.05% (LOTRISONE) cream Apply topically 2 (two) times daily. 45 g 0    fluticasone (FLONASE) 50 mcg/actuation nasal spray 2 sprays (100 mcg total) by Each Nare route once daily. 1 Bottle 11    ranitidine (ZANTAC) 300 MG tablet TAKE ONE TABLET BY MOUTH ONCE DAILY 90 tablet 3    levocetirizine (XYZAL) 5 MG tablet Take 1 tablet (5 mg total) by mouth every evening. 30 tablet 11    nitroGLYCERIN (NITROSTAT) 0.4 MG SL tablet Place 1 tablet (0.4 mg total) under the tongue every 5 (five) minutes as needed for Chest pain. 25 tablet 3    pantoprazole (PROTONIX) 20 MG tablet Take 1 tablet (20 mg total) by mouth 2 (two) times daily before meals. 60 tablet 3    tadalafil (CIALIS) 10 MG tablet Take 1 tablet (10 mg total) by mouth daily as needed for Erectile Dysfunction. 5 tablet 11     No current facility-administered medications on file prior to visit.      Review of patient's allergies indicates:   Allergen Reactions    Codeine      When taking codeine, pt becomes very anxious       Objective:     Physical Exam   Constitutional: He is oriented to person, place, and time. He appears well-developed. No distress.   HENT:   Head: Normocephalic.   Eyes: Pupils are equal, round, and reactive to light. Conjunctivae are normal.   Neck: Neck supple. No JVD present. No thyromegaly present.   Cardiovascular: Normal rate, regular rhythm, normal heart sounds and intact distal pulses. Exam reveals no gallop and no friction rub.   No murmur heard.  Pulses:       Carotid pulses are 2+ on the right side, and 2+ on the left side.       Radial pulses are 2+ on the right side, and 2+ on the left side.        Femoral pulses are 2+ on the right side, and 2+ on the left side.       Popliteal pulses are 2+ on the right side, and 2+ on the left side.        Dorsalis pedis pulses are 2+ on the right side, and 2+ on the left side.        Posterior tibial pulses are 2+ on the  right side, and 2+ on the left side.   Pulmonary/Chest: Breath sounds normal. He has no wheezes. He has no rales. He exhibits no tenderness.   Abdominal: Soft. Bowel sounds are normal. He exhibits no mass. There is no hepatosplenomegaly. There is no tenderness.   Musculoskeletal: He exhibits no edema or tenderness.        Cervical back: Normal.        Thoracic back: Normal.        Lumbar back: Normal.   Lymphadenopathy:     He has no cervical adenopathy.     He has no axillary adenopathy.        Right: No supraclavicular adenopathy present.        Left: No supraclavicular adenopathy present.   Neurological: He is alert and oriented to person, place, and time. He has normal strength. No sensory deficit. Gait normal.   Skin: Skin is warm. No cyanosis. No pallor. Nails show no clubbing.   Psychiatric: He has a normal mood and affect. His speech is normal and behavior is normal. Cognition and memory are normal.       Assessment:     1. Coronary artery disease, occlusive    2. History of coronary angioplasty    3. Essential hypertension        Plan:     Coronary artery disease, occlusive  NO ACTIVE MYOCARDIAL ISCHEMIA  STABLE PATTERN OF ANGINA  NO ARRHYTHMIAS    History of coronary angioplasty  ON DAPT-ASA AND PLAVIX- NO ABNORMAL BLEEDING    Essential hypertension  WELL CONTROLLED BP  CNS STATUS STABLE    1- CONTINUE PRESENT CARD MANAGEMENT    RETURN IN 6 MONTHS.

## 2019-06-03 ENCOUNTER — OFFICE VISIT (OUTPATIENT)
Dept: OPHTHALMOLOGY | Facility: CLINIC | Age: 84
End: 2019-06-03
Payer: MEDICARE

## 2019-06-03 DIAGNOSIS — H35.3132 INTERMEDIATE STAGE NONEXUDATIVE AGE-RELATED MACULAR DEGENERATION OF BOTH EYES: ICD-10-CM

## 2019-06-03 DIAGNOSIS — H40.013 OPEN ANGLE WITH BORDERLINE FINDINGS, LOW RISK, BILATERAL: ICD-10-CM

## 2019-06-03 DIAGNOSIS — Z96.1 PSEUDOPHAKIA: ICD-10-CM

## 2019-06-03 DIAGNOSIS — H25.12 NUCLEAR SCLEROSIS OF LEFT EYE: Primary | ICD-10-CM

## 2019-06-03 PROCEDURE — 99999 PR PBB SHADOW E&M-EST. PATIENT-LVL II: ICD-10-PCS | Mod: PBBFAC,,, | Performed by: OPHTHALMOLOGY

## 2019-06-03 PROCEDURE — 92014 COMPRE OPH EXAM EST PT 1/>: CPT | Mod: S$PBB,,, | Performed by: OPHTHALMOLOGY

## 2019-06-03 PROCEDURE — 99999 PR PBB SHADOW E&M-EST. PATIENT-LVL II: CPT | Mod: PBBFAC,,, | Performed by: OPHTHALMOLOGY

## 2019-06-03 PROCEDURE — 92014 PR EYE EXAM, EST PATIENT,COMPREHESV: ICD-10-PCS | Mod: S$PBB,,, | Performed by: OPHTHALMOLOGY

## 2019-06-03 PROCEDURE — 99212 OFFICE O/P EST SF 10 MIN: CPT | Mod: PBBFAC | Performed by: OPHTHALMOLOGY

## 2019-06-03 RX ORDER — GATIFLOXACIN 5 MG/ML
1 SOLUTION/ DROPS OPHTHALMIC 2 TIMES DAILY
Qty: 1 BOTTLE | Refills: 2 | Status: SHIPPED | OUTPATIENT
Start: 2019-06-03 | End: 2019-08-13

## 2019-06-03 RX ORDER — PREDNISOLONE ACETATE 10 MG/ML
1 SUSPENSION/ DROPS OPHTHALMIC 4 TIMES DAILY
Qty: 1 BOTTLE | Refills: 2 | Status: SHIPPED | OUTPATIENT
Start: 2019-06-03 | End: 2019-08-13

## 2019-06-03 RX ORDER — KETOROLAC TROMETHAMINE 5 MG/ML
1 SOLUTION OPHTHALMIC 4 TIMES DAILY
Qty: 1 BOTTLE | Refills: 2 | Status: SHIPPED | OUTPATIENT
Start: 2019-06-03 | End: 2019-08-13

## 2019-06-03 NOTE — PROGRESS NOTES
SUBJECTIVE:   Jovanny Olmedo is a 83 y.o. male   Uncorrected distance visual acuity was 20/20 in the right eye and 20/70 in the left eye.   No chief complaint on file.       HPI:  HPI     The patient states he is here today to have his left eye evaluated. The   patient states he was supposed to have cataract surgery a while back but   was unable to. The patient states he noticed a decrease in his vision   because of his left eye.    PCP: Dr. Mcdonough    1. COAG Suspect +Fhx (use HRT>GOCT)  2. ARMD   3. PCIOL OD +17.5 SN60WF (DISTANCE) 03/29/17  NS OS    NO GLAUCOMA DROPS    Last edited by Malini Licona on 6/3/2019  8:34 AM. (History)        Assessment /Plan :  1. Nuclear sclerosis of left eye  Visually Significant Cataract OS:   Patient reports decreased vision consistent with the clinical amount of lenticular opacity,  which reaches the level of visual significance and affects activities of daily living such as  read. Risks, benefits, and alternatives to cataract surgery were  discussed.  IOL options were discussed, including Premium IOL'S and the associated  side effects and additional patient cost associated with them as well as patient's visual  goals. The pt expressed a desire to proceed with surgery with the potential for some  reasonable degree of visual improvement and was consented.  Risks of loss of vision and eye reviewed as well as possibility of need for spectacle correction after surgery even with premium implants. Verbal and written preop  instructions were provided to the patient.       Pt is interested in traditional monofocal IOL aiming for:    Distance OU and understands that glasses will be generally needed at all times for near vision and often for finer distance correction especially for higher degrees of astigmatism.       Final visual acuity may be limited by astigmatism and AMD    Pt wishes to have Phaco/IOL  OS     Requests a Monofocal IOL.    Will aim for distance    Other considerations:  None       2. Pseudophakia right eye stable   3. Intermediate stage nonexudative age-related macular degeneration of both eyes stable   4. Open angle with borderline findings, low risk, bilateral No evidence of glaucoma at this time but based on risk factors recommend to continue monitoring.

## 2019-06-17 ENCOUNTER — TELEPHONE (OUTPATIENT)
Dept: OPHTHALMOLOGY | Facility: CLINIC | Age: 84
End: 2019-06-17

## 2019-06-17 NOTE — TELEPHONE ENCOUNTER
----- Message from Patricia Kramer sent at 6/17/2019  9:26 AM CDT -----  Pt is requesting a call from nurse to discuss an apt.      Please call pt back at [phone]

## 2019-06-25 ENCOUNTER — PES CALL (OUTPATIENT)
Dept: ADMINISTRATIVE | Facility: CLINIC | Age: 84
End: 2019-06-25

## 2019-07-03 ENCOUNTER — OFFICE VISIT (OUTPATIENT)
Dept: OPHTHALMOLOGY | Facility: CLINIC | Age: 84
End: 2019-07-03
Payer: MEDICARE

## 2019-07-03 ENCOUNTER — OUTSIDE PLACE OF SERVICE (OUTPATIENT)
Dept: ADMINISTRATIVE | Facility: OTHER | Age: 84
End: 2019-07-03
Payer: MEDICARE

## 2019-07-03 DIAGNOSIS — Z98.890 POST-OPERATIVE STATE: Primary | ICD-10-CM

## 2019-07-03 PROCEDURE — 99999 PR PBB SHADOW E&M-EST. PATIENT-LVL I: ICD-10-PCS | Mod: PBBFAC,,, | Performed by: OPHTHALMOLOGY

## 2019-07-03 PROCEDURE — 66984 PR REMOVAL, CATARACT, W/INSRT INTRAOC LENS, W/O ENDO CYCLO: ICD-10-PCS | Mod: LT,,, | Performed by: OPHTHALMOLOGY

## 2019-07-03 PROCEDURE — 66984 XCAPSL CTRC RMVL W/O ECP: CPT | Mod: LT,,, | Performed by: OPHTHALMOLOGY

## 2019-07-03 PROCEDURE — 99211 OFF/OP EST MAY X REQ PHY/QHP: CPT | Mod: PBBFAC | Performed by: OPHTHALMOLOGY

## 2019-07-03 PROCEDURE — 99024 POSTOP FOLLOW-UP VISIT: CPT | Mod: POP,,, | Performed by: OPHTHALMOLOGY

## 2019-07-03 PROCEDURE — 99999 PR PBB SHADOW E&M-EST. PATIENT-LVL I: CPT | Mod: PBBFAC,,, | Performed by: OPHTHALMOLOGY

## 2019-07-03 PROCEDURE — 99024 PR POST-OP FOLLOW-UP VISIT: ICD-10-PCS | Mod: POP,,, | Performed by: OPHTHALMOLOGY

## 2019-07-03 NOTE — PROGRESS NOTES
SUBJECTIVE:   Jovanny Olmedo is a 83 y.o. male   Uncorrected distance visual acuity was not recorded in the right eye and 20/200 in the left eye.   Chief Complaint   Patient presents with    Post-op Evaluation        HPI:  HPI     Patient states 0/10 on pain scale.        PCP: Dr. Mcdonough    1. COAG Suspect +Fhx (use HRT>GOCT)  2. ARMD   3. PCIOL OD +17.5 SN60WF (DISTANCE) 03/29/17  PCIOL OS 7/3/19 (+17.5 SN60WF)    NO GLAUCOMA DROPS    OS Pred Qid, Ket Qid, Moxifloxacin Qid    Last edited by NASRIN Ramirez on 7/3/2019 10:45 AM. (History)        Assessment /Plan :  1. Post-operative state Exam:  SLE:  S/C: normal  K : trace k fold  AC: 1+ cell and flare  Iris: normal  Lens: PCIOL    IMP:  PO same Day  S/P Phaco/IOL OS : Doing well.    Plan:  Continue gtts to operative eye:  Pred 1% QID  Ketorolac QID  Zymaxid BID  Reinstructed in importance of absolute compliance with Post-OP instructions including medications, shield at bedtime, and limitation of activities. Follow up appointments in approximately one and six weeks or call immediately for increased pain, redness or vision loss.

## 2019-07-12 ENCOUNTER — OFFICE VISIT (OUTPATIENT)
Dept: OPHTHALMOLOGY | Facility: CLINIC | Age: 84
End: 2019-07-12
Payer: MEDICARE

## 2019-07-12 DIAGNOSIS — Z98.890 POST-OPERATIVE STATE: Primary | ICD-10-CM

## 2019-07-12 PROCEDURE — 99999 PR PBB SHADOW E&M-EST. PATIENT-LVL II: ICD-10-PCS | Mod: PBBFAC,,, | Performed by: OPHTHALMOLOGY

## 2019-07-12 PROCEDURE — 99999 PR PBB SHADOW E&M-EST. PATIENT-LVL II: CPT | Mod: PBBFAC,,, | Performed by: OPHTHALMOLOGY

## 2019-07-12 PROCEDURE — 99212 OFFICE O/P EST SF 10 MIN: CPT | Mod: PBBFAC | Performed by: OPHTHALMOLOGY

## 2019-07-12 PROCEDURE — 99024 POSTOP FOLLOW-UP VISIT: CPT | Mod: POP,,, | Performed by: OPHTHALMOLOGY

## 2019-07-12 PROCEDURE — 99024 PR POST-OP FOLLOW-UP VISIT: ICD-10-PCS | Mod: POP,,, | Performed by: OPHTHALMOLOGY

## 2019-07-12 NOTE — PROGRESS NOTES
SUBJECTIVE:   Jovanny Olmedo is a 83 y.o. male   Uncorrected distance visual acuity was not recorded in the right eye and 20/25 -2 in the left eye.   Chief Complaint   Patient presents with    Post-op Evaluation        HPI:  HPI     1 week phaco OS per pt doing well no complaints doing well with drops     Last edited by Felicita Styles MA on 7/12/2019 10:13 AM. (History)        Assessment /Plan :  1. Post-operative state Slit lamp exam:  L/L: nl  K: clear, wound sealed  AC: 0 cell and flare  Iris/Lens: IOL centered and stable      IMP:  PO Week 1 S/P Phaco/ IOL OS : Doing well with no evidence of infection or abnormal inflammation.     Plan:  Pt given and instructed in one week PO instructions. D/C zymaxid and start to taper off Ketorlac and Pred Forte 1% weekly. Can resume normal activitites and d/c eye shield. OTC reading glasses can be used until evaluated for final MR. Follow up in one month or PRN pain, redness, vision loss, or other concerns.

## 2019-08-13 ENCOUNTER — LAB VISIT (OUTPATIENT)
Dept: LAB | Facility: HOSPITAL | Age: 84
End: 2019-08-13
Attending: FAMILY MEDICINE
Payer: MEDICARE

## 2019-08-13 ENCOUNTER — OFFICE VISIT (OUTPATIENT)
Dept: OPHTHALMOLOGY | Facility: CLINIC | Age: 84
End: 2019-08-13
Payer: MEDICARE

## 2019-08-13 ENCOUNTER — OFFICE VISIT (OUTPATIENT)
Dept: INTERNAL MEDICINE | Facility: CLINIC | Age: 84
End: 2019-08-13
Payer: MEDICARE

## 2019-08-13 VITALS
HEART RATE: 67 BPM | TEMPERATURE: 98 F | SYSTOLIC BLOOD PRESSURE: 122 MMHG | OXYGEN SATURATION: 98 % | WEIGHT: 235.25 LBS | BODY MASS INDEX: 31.86 KG/M2 | DIASTOLIC BLOOD PRESSURE: 84 MMHG | HEIGHT: 72 IN

## 2019-08-13 DIAGNOSIS — N18.30 CHRONIC KIDNEY DISEASE, STAGE III (MODERATE): ICD-10-CM

## 2019-08-13 DIAGNOSIS — R05.9 COUGH: ICD-10-CM

## 2019-08-13 DIAGNOSIS — Z23 IMMUNIZATION DUE: ICD-10-CM

## 2019-08-13 DIAGNOSIS — D50.9 MICROCYTIC ANEMIA: ICD-10-CM

## 2019-08-13 DIAGNOSIS — H25.11 SENILE NUCLEAR CATARACT, RIGHT: ICD-10-CM

## 2019-08-13 DIAGNOSIS — H40.013 OPEN ANGLE WITH BORDERLINE FINDINGS, LOW RISK, BILATERAL: ICD-10-CM

## 2019-08-13 DIAGNOSIS — C61 PROSTATE CANCER: ICD-10-CM

## 2019-08-13 DIAGNOSIS — I10 ESSENTIAL HYPERTENSION: Chronic | ICD-10-CM

## 2019-08-13 DIAGNOSIS — Z98.41 CATARACT EXTRACTION STATUS, RIGHT: ICD-10-CM

## 2019-08-13 DIAGNOSIS — Z98.890 POST-OPERATIVE STATE: Primary | ICD-10-CM

## 2019-08-13 DIAGNOSIS — I10 ESSENTIAL HYPERTENSION: Primary | Chronic | ICD-10-CM

## 2019-08-13 LAB
BASOPHILS # BLD AUTO: 0.03 K/UL (ref 0–0.2)
BASOPHILS NFR BLD: 0.5 % (ref 0–1.9)
CHOLEST SERPL-MCNC: 161 MG/DL (ref 120–199)
CHOLEST/HDLC SERPL: 4.2 {RATIO} (ref 2–5)
COMPLEXED PSA SERPL-MCNC: 1.1 NG/ML (ref 0–4)
DIFFERENTIAL METHOD: ABNORMAL
EOSINOPHIL # BLD AUTO: 0.3 K/UL (ref 0–0.5)
EOSINOPHIL NFR BLD: 4.7 % (ref 0–8)
ERYTHROCYTE [DISTWIDTH] IN BLOOD BY AUTOMATED COUNT: 18.9 % (ref 11.5–14.5)
FERRITIN SERPL-MCNC: 36 NG/ML (ref 20–300)
HCT VFR BLD AUTO: 39.5 % (ref 40–54)
HDLC SERPL-MCNC: 38 MG/DL (ref 40–75)
HDLC SERPL: 23.6 % (ref 20–50)
HGB BLD-MCNC: 11.4 G/DL (ref 14–18)
IMM GRANULOCYTES # BLD AUTO: 0.01 K/UL (ref 0–0.04)
IMM GRANULOCYTES NFR BLD AUTO: 0.2 % (ref 0–0.5)
IRON SERPL-MCNC: 30 UG/DL (ref 45–160)
LDLC SERPL CALC-MCNC: 97.4 MG/DL (ref 63–159)
LYMPHOCYTES # BLD AUTO: 2.1 K/UL (ref 1–4.8)
LYMPHOCYTES NFR BLD: 36.3 % (ref 18–48)
MCH RBC QN AUTO: 23.4 PG (ref 27–31)
MCHC RBC AUTO-ENTMCNC: 28.9 G/DL (ref 32–36)
MCV RBC AUTO: 81 FL (ref 82–98)
MONOCYTES # BLD AUTO: 0.7 K/UL (ref 0.3–1)
MONOCYTES NFR BLD: 11.9 % (ref 4–15)
NEUTROPHILS # BLD AUTO: 2.7 K/UL (ref 1.8–7.7)
NEUTROPHILS NFR BLD: 46.4 % (ref 38–73)
NONHDLC SERPL-MCNC: 123 MG/DL
NRBC BLD-RTO: 0 /100 WBC
PLATELET # BLD AUTO: 157 K/UL (ref 150–350)
PMV BLD AUTO: 11.5 FL (ref 9.2–12.9)
RBC # BLD AUTO: 4.87 M/UL (ref 4.6–6.2)
SATURATED IRON: 7 % (ref 20–50)
TOTAL IRON BINDING CAPACITY: 438 UG/DL (ref 250–450)
TRANSFERRIN SERPL-MCNC: 296 MG/DL (ref 200–375)
TRIGL SERPL-MCNC: 128 MG/DL (ref 30–150)
WBC # BLD AUTO: 5.78 K/UL (ref 3.9–12.7)

## 2019-08-13 PROCEDURE — 99024 POSTOP FOLLOW-UP VISIT: CPT | Mod: POP,,, | Performed by: OPHTHALMOLOGY

## 2019-08-13 PROCEDURE — 99999 PR PBB SHADOW E&M-EST. PATIENT-LVL II: ICD-10-PCS | Mod: PBBFAC,,, | Performed by: OPHTHALMOLOGY

## 2019-08-13 PROCEDURE — 99213 OFFICE O/P EST LOW 20 MIN: CPT | Mod: PBBFAC,25 | Performed by: FAMILY MEDICINE

## 2019-08-13 PROCEDURE — 36415 COLL VENOUS BLD VENIPUNCTURE: CPT

## 2019-08-13 PROCEDURE — G0009 ADMIN PNEUMOCOCCAL VACCINE: HCPCS | Mod: PBBFAC

## 2019-08-13 PROCEDURE — 99999 PR PBB SHADOW E&M-EST. PATIENT-LVL III: CPT | Mod: PBBFAC,,, | Performed by: FAMILY MEDICINE

## 2019-08-13 PROCEDURE — 99999 PR PBB SHADOW E&M-EST. PATIENT-LVL III: ICD-10-PCS | Mod: PBBFAC,,, | Performed by: FAMILY MEDICINE

## 2019-08-13 PROCEDURE — 85025 COMPLETE CBC W/AUTO DIFF WBC: CPT

## 2019-08-13 PROCEDURE — 82728 ASSAY OF FERRITIN: CPT

## 2019-08-13 PROCEDURE — 99999 PR PBB SHADOW E&M-EST. PATIENT-LVL II: CPT | Mod: PBBFAC,,, | Performed by: OPHTHALMOLOGY

## 2019-08-13 PROCEDURE — 99212 OFFICE O/P EST SF 10 MIN: CPT | Mod: PBBFAC,27 | Performed by: OPHTHALMOLOGY

## 2019-08-13 PROCEDURE — 99214 OFFICE O/P EST MOD 30 MIN: CPT | Mod: S$PBB,,, | Performed by: FAMILY MEDICINE

## 2019-08-13 PROCEDURE — 83540 ASSAY OF IRON: CPT

## 2019-08-13 PROCEDURE — 84153 ASSAY OF PSA TOTAL: CPT

## 2019-08-13 PROCEDURE — 99214 PR OFFICE/OUTPT VISIT, EST, LEVL IV, 30-39 MIN: ICD-10-PCS | Mod: S$PBB,,, | Performed by: FAMILY MEDICINE

## 2019-08-13 PROCEDURE — 80061 LIPID PANEL: CPT

## 2019-08-13 PROCEDURE — 99024 PR POST-OP FOLLOW-UP VISIT: ICD-10-PCS | Mod: POP,,, | Performed by: OPHTHALMOLOGY

## 2019-08-13 RX ORDER — AZELASTINE 1 MG/ML
2 SPRAY, METERED NASAL 2 TIMES DAILY
Qty: 30 ML | Refills: 11 | Status: SHIPPED | OUTPATIENT
Start: 2019-08-13 | End: 2020-03-09 | Stop reason: SDUPTHER

## 2019-08-13 RX ORDER — CLOPIDOGREL BISULFATE 75 MG/1
75 TABLET ORAL DAILY
Qty: 90 TABLET | Refills: 1 | Status: SHIPPED | OUTPATIENT
Start: 2019-08-13 | End: 2020-03-06

## 2019-08-13 RX ORDER — FLUTICASONE PROPIONATE 50 MCG
2 SPRAY, SUSPENSION (ML) NASAL DAILY
Qty: 1 BOTTLE | Refills: 11 | Status: SHIPPED | OUTPATIENT
Start: 2019-08-13 | End: 2020-03-09 | Stop reason: SDUPTHER

## 2019-08-13 NOTE — PROGRESS NOTES
Subjective:       Patient ID: Jovanny Olmedo is a 83 y.o. male.    Chief Complaint: Follow-up    Pt is 83 year old who is here for follow and doing well. Pt is having some sinus drip. Pt BP is well controlled.     Review of Systems   Constitutional: Negative.    HENT: Positive for postnasal drip, rhinorrhea, sinus pain, sneezing and sore throat.    Respiratory: Negative.    Cardiovascular: Negative.    Genitourinary: Negative.    Musculoskeletal: Negative.    Neurological: Negative.    Hematological: Negative.        Objective:      Physical Exam   Constitutional: He is oriented to person, place, and time. He appears well-developed and well-nourished.   Cardiovascular: Normal rate and regular rhythm.   Pulmonary/Chest: Effort normal and breath sounds normal. No stridor. He has no wheezes.   Abdominal: Soft. Bowel sounds are normal.   Neurological: He is alert and oriented to person, place, and time.       Assessment:       1. Essential hypertension    2. Chronic kidney disease, stage III (moderate)    3. Prostate cancer    4. Microcytic anemia    5. Cough        Plan:       Essential hypertension  Comments:  BP is well controlled  Orders:  -     Lipid panel; Future; Expected date: 08/13/2019    Chronic kidney disease, stage III (moderate)  Comments:  Pt is stable.    Prostate cancer  Comments:  Will get a PSA  Orders:  -     PSA, Screening; Future; Expected date: 08/13/2019    Microcytic anemia  Comments:  Will get iron and ferritin  Orders:  -     CBC auto differential; Future; Expected date: 08/13/2019  -     Ferritin; Future; Expected date: 08/13/2019  -     Iron and TIBC; Future; Expected date: 08/13/2019    Cough  Comments:  post nasal drip. Will restrat atelin and flonase    Other orders  -     azelastine (ASTELIN) 137 mcg (0.1 %) nasal spray; 2 sprays (274 mcg total) by Nasal route 2 (two) times daily.  Dispense: 30 mL; Refill: 11  -     fluticasone propionate (FLONASE) 50 mcg/actuation nasal spray; 2 sprays  (100 mcg total) by Each Nostril route once daily.  Dispense: 1 Bottle; Refill: 11

## 2019-08-13 NOTE — PROGRESS NOTES
HPI     Patient returns for his one month p.o. Visit, patient has finished all   his surgery drops and is ready for a new glass rx.    Pt booked with [facility] in error     PCP: Dr. Blake    1. COAG Suspect +Fhx (use HRT>GOCT)  2. ARMD   3. PCIOL OD +17.5 SN60WF (DISTANCE) 03/29/17  PCIOL OS 7/3/19 (+17.5 SN60WF)    NO GLAUCOMA DROPS    Last edited by NASRIN Ramirez on 8/13/2019 10:47 AM. (History)            Assessment /Plan     For exam results, see Encounter Report.      ICD-10-CM ICD-9-CM    1. Post-operative state Z98.890 V45.89    2. Cataract extraction status, right Z98.41 V45.61    3. Senile nuclear cataract, right H25.11 366.16    4. Open angle with borderline findings, low risk, bilateral H40.013 365.01        Followed by Dr [facility] - return to clinic with him in 6 months

## 2019-09-05 ENCOUNTER — TELEPHONE (OUTPATIENT)
Dept: INTERNAL MEDICINE | Facility: CLINIC | Age: 84
End: 2019-09-05

## 2019-09-05 NOTE — TELEPHONE ENCOUNTER
I s/w pt in regards to lab results, pt states he has not been taking his iron pills but he states he will start back taking them. I informed pt that I would let Dr. Blake know this information. Pt verbalized understanding of results and ended call. //BJ

## 2019-09-05 NOTE — TELEPHONE ENCOUNTER
----- Message from Sam Blake MD sent at 8/20/2019  6:34 PM CDT -----  Can we see if pt is still taking iron

## 2019-09-11 ENCOUNTER — TELEPHONE (OUTPATIENT)
Dept: INTERNAL MEDICINE | Facility: CLINIC | Age: 84
End: 2019-09-11

## 2019-09-11 NOTE — TELEPHONE ENCOUNTER
I returned pt's call because his wife has an appointment scheduled for Thursday morning at 10:20 and he would like to know if there is any way he could be squeezed to see Dr. Blake at the same time due to them normally getting seen on the same day. I informed pt I would get with Dr. Blake and see what he says and give him a call back. //BJ

## 2019-09-12 ENCOUNTER — OFFICE VISIT (OUTPATIENT)
Dept: INTERNAL MEDICINE | Facility: CLINIC | Age: 84
End: 2019-09-12
Payer: MEDICARE

## 2019-09-12 VITALS
HEIGHT: 72 IN | HEART RATE: 79 BPM | BODY MASS INDEX: 31.92 KG/M2 | WEIGHT: 235.69 LBS | TEMPERATURE: 98 F | DIASTOLIC BLOOD PRESSURE: 78 MMHG | OXYGEN SATURATION: 97 % | SYSTOLIC BLOOD PRESSURE: 124 MMHG

## 2019-09-12 DIAGNOSIS — K21.9 GASTROESOPHAGEAL REFLUX DISEASE, ESOPHAGITIS PRESENCE NOT SPECIFIED: ICD-10-CM

## 2019-09-12 DIAGNOSIS — I25.10 CORONARY ARTERY DISEASE, OCCLUSIVE: Primary | Chronic | ICD-10-CM

## 2019-09-12 DIAGNOSIS — E61.1 IRON DEFICIENCY: ICD-10-CM

## 2019-09-12 PROCEDURE — 99213 OFFICE O/P EST LOW 20 MIN: CPT | Mod: PBBFAC | Performed by: FAMILY MEDICINE

## 2019-09-12 PROCEDURE — 99999 PR PBB SHADOW E&M-EST. PATIENT-LVL III: CPT | Mod: PBBFAC,,, | Performed by: FAMILY MEDICINE

## 2019-09-12 PROCEDURE — 99213 PR OFFICE/OUTPT VISIT, EST, LEVL III, 20-29 MIN: ICD-10-PCS | Mod: S$PBB,,, | Performed by: FAMILY MEDICINE

## 2019-09-12 PROCEDURE — 99213 OFFICE O/P EST LOW 20 MIN: CPT | Mod: S$PBB,,, | Performed by: FAMILY MEDICINE

## 2019-09-12 PROCEDURE — 99999 PR PBB SHADOW E&M-EST. PATIENT-LVL III: ICD-10-PCS | Mod: PBBFAC,,, | Performed by: FAMILY MEDICINE

## 2019-11-07 RX ORDER — ATORVASTATIN CALCIUM 40 MG/1
TABLET, FILM COATED ORAL
Qty: 90 TABLET | Refills: 3 | Status: SHIPPED | OUTPATIENT
Start: 2019-11-07 | End: 2020-12-11 | Stop reason: SDUPTHER

## 2019-11-20 ENCOUNTER — OFFICE VISIT (OUTPATIENT)
Dept: GASTROENTEROLOGY | Facility: CLINIC | Age: 84
End: 2019-11-20
Payer: MEDICARE

## 2019-11-20 VITALS
HEIGHT: 72 IN | BODY MASS INDEX: 31.62 KG/M2 | SYSTOLIC BLOOD PRESSURE: 120 MMHG | HEART RATE: 81 BPM | WEIGHT: 233.44 LBS | DIASTOLIC BLOOD PRESSURE: 84 MMHG

## 2019-11-20 DIAGNOSIS — K59.00 CONSTIPATION, UNSPECIFIED CONSTIPATION TYPE: ICD-10-CM

## 2019-11-20 DIAGNOSIS — Z86.010 HISTORY OF COLON POLYPS: ICD-10-CM

## 2019-11-20 DIAGNOSIS — Z80.0 FAMILY HISTORY OF COLON CANCER IN FATHER: ICD-10-CM

## 2019-11-20 DIAGNOSIS — Z12.11 COLON CANCER SCREENING: Primary | ICD-10-CM

## 2019-11-20 PROCEDURE — 99999 PR PBB SHADOW E&M-EST. PATIENT-LVL III: CPT | Mod: PBBFAC,,, | Performed by: PHYSICIAN ASSISTANT

## 2019-11-20 PROCEDURE — 99214 OFFICE O/P EST MOD 30 MIN: CPT | Mod: S$PBB,,, | Performed by: PHYSICIAN ASSISTANT

## 2019-11-20 PROCEDURE — 1159F PR MEDICATION LIST DOCUMENTED IN MEDICAL RECORD: ICD-10-PCS | Mod: ,,, | Performed by: PHYSICIAN ASSISTANT

## 2019-11-20 PROCEDURE — 1159F MED LIST DOCD IN RCRD: CPT | Mod: ,,, | Performed by: PHYSICIAN ASSISTANT

## 2019-11-20 PROCEDURE — 99213 OFFICE O/P EST LOW 20 MIN: CPT | Mod: PBBFAC | Performed by: PHYSICIAN ASSISTANT

## 2019-11-20 PROCEDURE — 99214 PR OFFICE/OUTPT VISIT, EST, LEVL IV, 30-39 MIN: ICD-10-PCS | Mod: S$PBB,,, | Performed by: PHYSICIAN ASSISTANT

## 2019-11-20 PROCEDURE — 99999 PR PBB SHADOW E&M-EST. PATIENT-LVL III: ICD-10-PCS | Mod: PBBFAC,,, | Performed by: PHYSICIAN ASSISTANT

## 2019-11-20 PROCEDURE — 1126F AMNT PAIN NOTED NONE PRSNT: CPT | Mod: ,,, | Performed by: PHYSICIAN ASSISTANT

## 2019-11-20 PROCEDURE — 1126F PR PAIN SEVERITY QUANTIFIED, NO PAIN PRESENT: ICD-10-PCS | Mod: ,,, | Performed by: PHYSICIAN ASSISTANT

## 2019-11-20 RX ORDER — SODIUM, POTASSIUM,MAG SULFATES 17.5-3.13G
1 SOLUTION, RECONSTITUTED, ORAL ORAL DAILY
Qty: 1 KIT | Refills: 0 | Status: SHIPPED | OUTPATIENT
Start: 2019-11-20 | End: 2019-11-22

## 2019-11-20 RX ORDER — FERROUS SULFATE 325(65) MG
325 TABLET, DELAYED RELEASE (ENTERIC COATED) ORAL DAILY
COMMUNITY
End: 2021-11-29

## 2019-11-20 NOTE — PROGRESS NOTES
Subjective:      Patient ID: Jovanny Olmedo is a 84 y.o. male.    Chief Complaint: Constipation and Diarrhea    HPI:  Patient here for follow up of constipation. Saw Madeleine Watson 2 years ago. Patient reports he has constipation, but has been using prune juice and raisins which he believes is causing his stools to be too loose. If he stops the prunes, he gets constipated again. He denies abdominal pain, hematochezia, melena, changes in appetite. He does have dark stools that began when he started on his iron pill a couple months ago by his PCP. He confirms excess gas. Approximately 1 stool per day.  Had radiation for prostate cancer - 6 yrs ago. Thinks his muscles may be weak from the radiation causing it to be difficult to push to have a bowel movement.    Family history of colon cancer (father). Last colon 5 years ago. Due now for repeat.      Review of Systems   Constitutional: Negative for chills, fatigue and fever.   Respiratory: Negative for chest tightness and shortness of breath.    Cardiovascular: Negative for chest pain.   Gastrointestinal: Positive for constipation (as per HPI). Negative for abdominal pain, blood in stool, diarrhea, nausea and vomiting.   Genitourinary: Negative for dysuria and hematuria.   Neurological: Negative for dizziness, weakness and light-headedness.   Psychiatric/Behavioral: Negative for agitation. The patient is not nervous/anxious.        Medical History: Reviewed    Social History: Reviewed    Allergies: Reviewed    Objective:     Physical Exam   Constitutional: He is oriented to person, place, and time. He appears well-developed and well-nourished. No distress.   HENT:   Head: Normocephalic and atraumatic.   Eyes: EOM are normal. No scleral icterus.   Neck: Normal range of motion.   Cardiovascular: Normal rate, regular rhythm and normal heart sounds.   Pulmonary/Chest: Effort normal and breath sounds normal. No stridor. No respiratory distress. He has no wheezes.   Abdominal:  Soft. Bowel sounds are normal. He exhibits no distension. There is no tenderness. There is no guarding.   Musculoskeletal: Normal range of motion. He exhibits no deformity.   Neurological: He is alert and oriented to person, place, and time.   Skin: Skin is warm and dry. He is not diaphoretic.   Psychiatric: He has a normal mood and affect. His behavior is normal. Judgment and thought content normal.       Assessment:     1. Colon cancer screening    2. History of colon polyps    3. Family history of colon cancer in father    4. Constipation, unspecified constipation type        Plan:     -Colonoscopy ordered. Patient will call GI department to schedule.  -Patient would like to follow up after colonoscopy unless he begins with problems and will contact the department.  -Suprep for colonoscopy  -Miralax for constipation - once daily  -Gas X as needed for gas and bloating.  -constipation education included in paperwork    Jovanny was seen today for constipation and diarrhea.    Diagnoses and all orders for this visit:    Colon cancer screening  -     Case request GI: COLONOSCOPY  -     sodium,potassium,mag sulfates (SUPREP BOWEL PREP KIT) 17.5-3.13-1.6 gram SolR; Take 177 mLs by mouth once daily. for 2 days    History of colon polyps  -     Case request GI: COLONOSCOPY  -     sodium,potassium,mag sulfates (SUPREP BOWEL PREP KIT) 17.5-3.13-1.6 gram SolR; Take 177 mLs by mouth once daily. for 2 days    Family history of colon cancer in father  -     Case request GI: COLONOSCOPY  -     sodium,potassium,mag sulfates (SUPREP BOWEL PREP KIT) 17.5-3.13-1.6 gram SolR; Take 177 mLs by mouth once daily. for 2 days    Constipation, unspecified constipation type        Follow up for discussion after colonscopy.    Thank you for the opportunity to participate in the care of this patient.   Tania Dailey PA-C.

## 2019-11-20 NOTE — PATIENT INSTRUCTIONS
-Colonoscopy ordered. Patient will call GI department to schedule.  -Patient would like to follow up after colonoscopy unless he begins with problems and will contact the department.  -Suprep for colonoscopy  -Miralax for constipation - once daily  -Gas X as needed for gas and bloating.  -constipation education included in paperwork      Constipation (Adult)  Constipation means that you have bowel movements that are less frequent than usual. Stools often become very hard and difficult to pass.  Constipation is very common. At some point in life it affects almost everyone. Since everyone's bowel habits are different, what is constipation to one person may not be to another. Your healthcare provider may do tests to diagnose constipation. It depends on what he or she finds when evaluating you.    Symptoms of constipation include:  · Abdominal pain  · Bloating  · Vomiting  · Painful bowel movements  · Itching, swelling, bleeding, or pain around the anus  Causes  Constipation can have many causes. These include:  · Diet low in fiber  · Too much dairy  · Not drinking enough liquids  · Lack of exercise or physical activity. This is especially true for older adults.  · Changes in lifestyle or daily routine, including pregnancy, aging, work, and travel  · Frequent use or misuse of laxatives  · Ignoring the urge to have a bowel movement or delaying it until later  · Medicines, such as certain prescription pain medicines, iron supplements, antacids, certain antidepressants, and calcium supplements  · Diseases like irritable bowel syndrome, bowel obstructions, stroke, diabetes, thyroid disease, Parkinson disease, hemorrhoids, and colon cancer  Complications  Potential complications of constipation can include:  · Hemorrhoids  · Rectal bleeding from hemorrhoids or anal fissures (skin tears)  · Hernias  · Dependency on laxatives  · Chronic constipation  · Fecal impaction  · Bowel obstruction or perforation  Home care  All  treatment should be done after talking with your healthcare provider. This is especially true if you have another medical problems, are taking prescription medicines, or are an older adult. Treatment most often involves lifestyle changes. You may also need medicines. Your healthcare provider will tell you which will work best for you. Follow the advice below to help avoid this problem in the future.  Lifestyle changes  These lifestyle changes can help prevent constipation:  · Diet. Eat a high-fiber diet, with fresh fruit and vegetables, and reduce dairy intake, meats, and processed foods  · Fluids. It's important to get enough fluids each day. Drink plenty of water when you eat more fiber. If you are on diet that limits the amount of fluid you can have, talk about this with your healthcare provider.  · Regular exercise. Check with your healthcare provider first.  Medications  Take any medicines as directed. Some laxatives are safe to use only every now and then. Others can be taken on a regular basis. Talk with your doctor or pharmacist if you have questions.  Prescription pain medicines can cause constipation. If you are taking this kind of medicine, ask your healthcare provider if you should also take a stool softener.  Medicines you may take to treat constipation include:  · Fiber supplements  · Stool softeners  · Laxatives  · Enemas  · Rectal suppositories  Follow-up care  Follow up with your healthcare provider if symptoms don't get better in the next few days. You may need to have more tests or see a specialist.  Call 911  Call 911 if any of these occur:  · Trouble breathing  · Stiff, rigid abdomen that is severely painful to touch  · Confusion  · Fainting or loss of consciousness  · Rapid heart rate  · Chest pain  When to seek medical advice  Call your healthcare provider right away if any of these occur:  · Fever over 100.4°F (38°C)  · Failure to resume normal bowel movements  · Pain in your abdomen or back  gets worse  · Nausea or vomiting  · Swelling in your abdomen  · Blood in the stool  · Black, tarry stool  · Involuntary weight loss  · Weakness  Date Last Reviewed: 12/30/2015  © 1476-7835 [employer]. [address] [place] [address], [place] [address]. All rights reserved. This information is not intended as a substitute for professional medical care. Always follow your healthcare professional's instructions.

## 2019-11-21 ENCOUNTER — TELEPHONE (OUTPATIENT)
Dept: ENDOSCOPY | Facility: HOSPITAL | Age: 84
End: 2019-11-21

## 2019-11-21 RX ORDER — SODIUM, POTASSIUM,MAG SULFATES 17.5-3.13G
1 SOLUTION, RECONSTITUTED, ORAL ORAL DAILY
Qty: 1 KIT | Refills: 0 | Status: CANCELLED | OUTPATIENT
Start: 2019-11-21 | End: 2019-11-23

## 2019-11-21 NOTE — TELEPHONE ENCOUNTER
Endoscopy Scheduling Questionnaire:    1. Have you been admitted overnight to the hospital in the past 3 months? no  2. Have you had a cardiac stent placed in the past 12 months? no  3. Have you had a stroke or heart attack in the past 6 months? no  4. Have you had any chest pain in the past 3 months? no      If so, have you been evaluated by your PCP or Cardiologist? no  5. Do you take prescription weight loss medication? no  6. Have you been diagnosed with Diverticulitis within the past 3 months? no  7. Are you on dialysis? no  8. Are you diabetic? No   Do you have an insulin pump? no  9. Do you have any other health issues that you feel might limit your ability to safely have the procedure and/or sedation? no  10. Is the patient over 81 yo? yes        If so, has the patient been seen by their PCP or GI in the last 6 months? Yes. A message was sent to Dr. Alexander for chart review.        -I have reviewed the last colonoscopy for recommendations regarding surveillance and bowel prep  Yes  -I have reviewed the patient's medications and allergies. He is on high risk medication, A clearance request was sent to Dr. Noble Parisi  -I have verified the pharmacy information. The prep being used is Suprep. The patient's prep instructions were sent via mail..    Date Endoscopy Scheduled: Date: -2020 Ramirez

## 2019-11-21 NOTE — TELEPHONE ENCOUNTER
Pt saw Tania Dailey PA-C 11-20-19 for diarrhea and constipation. Pt was then ordered a colonoscopy. Note was sent to Dr. Alexander's staff for review and blood thinner clearance will be sent to Dr. Noble Parisi. Procedure on 01-28-20. willy

## 2019-11-21 NOTE — TELEPHONE ENCOUNTER
Dr. Alexander/ Staff.  Jovanny Olmedo MRN# [id number] is scheduled on Tuesday 01- @ 8/9 am. Please review pt's chart for procedure. Pt is on Plavix. A clearance will be sent to ( Cardiologist) Thanks. Irma

## 2019-12-04 ENCOUNTER — OFFICE VISIT (OUTPATIENT)
Dept: UROLOGY | Facility: CLINIC | Age: 84
End: 2019-12-04
Payer: MEDICARE

## 2019-12-04 VITALS
DIASTOLIC BLOOD PRESSURE: 88 MMHG | WEIGHT: 239.06 LBS | SYSTOLIC BLOOD PRESSURE: 128 MMHG | BODY MASS INDEX: 32.43 KG/M2

## 2019-12-04 DIAGNOSIS — C61 PROSTATE CANCER: Primary | ICD-10-CM

## 2019-12-04 DIAGNOSIS — N64.4 BREAST TENDERNESS: ICD-10-CM

## 2019-12-04 DIAGNOSIS — N52.9 ERECTILE DYSFUNCTION, UNSPECIFIED ERECTILE DYSFUNCTION TYPE: ICD-10-CM

## 2019-12-04 PROBLEM — H25.013 CORTICAL SENILE CATARACT OF BOTH EYES: Status: RESOLVED | Noted: 2017-03-20 | Resolved: 2019-12-04

## 2019-12-04 PROBLEM — H35.3130 AGE-RELATED MACULAR DEGENERATION, DRY, BOTH EYES: Status: RESOLVED | Noted: 2017-03-20 | Resolved: 2019-12-04

## 2019-12-04 LAB
BILIRUB SERPL-MCNC: NORMAL MG/DL
BLOOD URINE, POC: NORMAL
COLOR, POC UA: YELLOW
GLUCOSE UR QL STRIP: NORMAL
KETONES UR QL STRIP: NORMAL
LEUKOCYTE ESTERASE URINE, POC: NORMAL
NITRITE, POC UA: NORMAL
PH, POC UA: 5
PROTEIN, POC: NORMAL
SPECIFIC GRAVITY, POC UA: 1.01
UROBILINOGEN, POC UA: NORMAL

## 2019-12-04 PROCEDURE — 1159F MED LIST DOCD IN RCRD: CPT | Mod: ,,, | Performed by: UROLOGY

## 2019-12-04 PROCEDURE — 99999 PR PBB SHADOW E&M-EST. PATIENT-LVL II: ICD-10-PCS | Mod: PBBFAC,,, | Performed by: UROLOGY

## 2019-12-04 PROCEDURE — 1159F PR MEDICATION LIST DOCUMENTED IN MEDICAL RECORD: ICD-10-PCS | Mod: ,,, | Performed by: UROLOGY

## 2019-12-04 PROCEDURE — 99214 PR OFFICE/OUTPT VISIT, EST, LEVL IV, 30-39 MIN: ICD-10-PCS | Mod: S$PBB,,, | Performed by: UROLOGY

## 2019-12-04 PROCEDURE — 99212 OFFICE O/P EST SF 10 MIN: CPT | Mod: PBBFAC | Performed by: UROLOGY

## 2019-12-04 PROCEDURE — 1126F AMNT PAIN NOTED NONE PRSNT: CPT | Mod: ,,, | Performed by: UROLOGY

## 2019-12-04 PROCEDURE — 99214 OFFICE O/P EST MOD 30 MIN: CPT | Mod: S$PBB,,, | Performed by: UROLOGY

## 2019-12-04 PROCEDURE — 81002 URINALYSIS NONAUTO W/O SCOPE: CPT | Mod: PBBFAC | Performed by: UROLOGY

## 2019-12-04 PROCEDURE — 1126F PR PAIN SEVERITY QUANTIFIED, NO PAIN PRESENT: ICD-10-PCS | Mod: ,,, | Performed by: UROLOGY

## 2019-12-04 PROCEDURE — 99999 PR PBB SHADOW E&M-EST. PATIENT-LVL II: CPT | Mod: PBBFAC,,, | Performed by: UROLOGY

## 2019-12-04 RX ORDER — SILDENAFIL 50 MG/1
50 TABLET, FILM COATED ORAL DAILY PRN
Qty: 30 TABLET | Refills: 11 | Status: SHIPPED | OUTPATIENT
Start: 2019-12-04 | End: 2019-12-04 | Stop reason: SDUPTHER

## 2019-12-04 RX ORDER — SILDENAFIL 50 MG/1
50 TABLET, FILM COATED ORAL DAILY PRN
Qty: 30 TABLET | Refills: 11 | Status: SHIPPED | OUTPATIENT
Start: 2019-12-04 | End: 2021-11-22 | Stop reason: ALTCHOICE

## 2019-12-04 NOTE — PROGRESS NOTES
Chief Complaint: Prostate cancer     HPI:   12/4/19: PSA slightly higher at 1.1; still acceptable.  ED at half mast.  Never used the cialis he had written for him.  Getting some right breast tenderness.  Reviewed history in detail.   12/5/18: Hadn't gotten any PSA done.  Voiding fine.  Reviewed history in detail.  11/29/17: PSA dropped further.  Doesn't use the vesicare much. Good erections without ED. Reviewed history in detail.  11/23/16: Not taking vesicare not too bothered.  Would like a refill.  Discussed natural course of prostate cancer/XRT.  PSA down last check just went to the lab this morning.  Has half-mast erections.  11/2/15: PSA dropping.  Having problems with sudden urgency.  Nocturia x2.  Occas drip on the way to the toilet.  Has cut back on caffeine some.  4/20/15: No problems no sig changes, PSA dropping  10/6/14: Has not had nephrology appt yet; doing well.  Urgency got better with time and won't stop the coffee.   5/12/14: XRT is done, PSA now 7.0.  Had some worsening stream, but improved with some urgency.  2/3/14: Back from Dr. Deng.  Started XRT already, doing well.  1/13/14: Doing well.  Bx shows Gl 4+3=7 in right base/mid/apex.   12/30/13: TRUS/Bx completed.  30 gm prostate.  Had post-procedural infection with short admission on IV Abx and then home on po Abx.    11/18/13: 79 yo man referred for elevated PSA (11.3) recently.  It was 6.3 two years ago.  He is on plavix secondary to having CAD and multiple stents, last one a few years ago.  No urinary bother except some intermittent stream.  Nocturia x1-2.  No pelvic pain and no exac/rel factors.  Had some burning with ejaculation deep a week ago.  No urolithiasis.  No hematuria.    Allergies:  Codeine    Medications:  has a current medication list which includes the following prescription(s): aspirin, atorvastatin, azelastine, clopidogrel, ferrous sulfate, fluticasone propionate, ranitidine, benazepril-hydrochlorthiazide,  clotrimazole-betamethasone 1-0.05%, levocetirizine, and tadalafil.    Review of Systems:  General: No fever, chills, fatigability, or weight loss.  Skin: No rashes, itching, or changes in color or texture of skin.  Chest: Denies WELLS, cyanosis, wheezing, cough, and sputum production.  Breasts normal.  Abdomen: Appetite fine. No weight loss. Denies diarrhea, abdominal pain, hematemesis, or blood in stool.  Musculoskeletal: Some joint stiffness or swelling. Some back pain.  : As above.  All other review of systems negative.    PMH:   has a past medical history of Arthritis, CAD (coronary artery disease), Cataract, CKD (chronic kidney disease), GERD (gastroesophageal reflux disease), Glaucoma, HTN (hypertension), Prostate cancer, and PVD (peripheral vascular disease).    PSH:   has a past surgical history that includes Coronary stent placement; Abdominal aorta stent; Renal artery stent; Prostate biopsy; PCIOL (Right, 03/29/2017); Cataract extraction w/  intraocular lens implant (Right, 04/29/2017); Cardiac catheterization; Coronary angioplasty; and PCIOL (Left, 07/03/2019).    FamHx: family history includes Colon cancer in his father; Diabetes in his sister; Glaucoma in his mother.    SocHx:  reports that he quit smoking about 24 years ago. He has a 15.00 pack-year smoking history. He has quit using smokeless tobacco. He reports that he does not drink alcohol or use drugs.      Physical Exam:  Vitals:    12/04/19 0931   BP: 128/88     General: A&Ox3, no apparent distress, no deformities  Neck: No masses, normal thyroid  Abdomen: Soft, NT, ND  Skin: The skin is warm and dry. No jaundice.  Ext: No c/c/e.  :   11/16: VINH normal    Labs/Studies: none  PSA    2/14: 11    5/14: 7    7/14: 5.3    10/14: 2.4    1/15: 2.0    4/15: 1.2    10/15: 0.8    4/16: 0.56    11/17: 0.31    8/19: 1.1    Impression/Plan:   1. PSA with RTC 12 mo.   2. Discussed possible hypogonadism and deferred labs he was offered.  3. Sildenafil 50mg  new rx

## 2019-12-04 NOTE — PROGRESS NOTES
Jovanny Olmedo presented for a  Medicare AWV and comprehensive Health Risk Assessment today. The following components were reviewed and updated:    · Medical history  · Family History  · Social history  · Allergies and Current Medications  · Health Risk Assessment  · Health Maintenance  · Care Team     ** See Completed Assessments for Annual Wellness Visit within the encounter summary.**       The following assessments were completed:  · Living Situation  · CAGE  · Depression Screening  · Timed Get Up and Go  · Whisper Test  · Cognitive Function Screening  · Nutrition Screening  · ADL Screening  · PAQ Screening    Patient Care Team:  Sam Blake MD as PCP - General (Family Medicine)  Miryam Jimenez MD as PCP - MSSP Attributed  Tania Dailey PA-C as Physician Assistant (Gastroenterology)  Noble Wayne MD as Consulting Physician (Cardiology)  Alvaro Siegel MD as Consulting Physician (Nephrology)  Alfred Urrutia MD as Consulting Physician (Ophthalmology)  Elizabeth Lejeune, NP as Nurse Practitioner (Pulmonary Disease)  Williams Gurrola IV, MD as Consulting Physician (Urology)    Vitals:    12/05/19 1046   BP: 100/60   BP Location: Left arm   Temp: 97.4 °F (36.3 °C)   TempSrc: Oral   Weight: 109.1 kg (240 lb 8.4 oz)   Height: 6' (1.829 m)     Body mass index is 32.62 kg/m².     Physical Exam   Constitutional: He is oriented to person, place, and time. He appears well-developed and well-nourished. No distress.   HENT:   Head: Normocephalic and atraumatic.   Eyes: EOM are normal. No scleral icterus.   Neck: Neck supple.   Cardiovascular: Normal rate and regular rhythm.   Pulmonary/Chest: Effort normal and breath sounds normal. No respiratory distress. He has no decreased breath sounds. He has no wheezes. He has no rhonchi. He has no rales.   Musculoskeletal: Normal range of motion.   Neurological: He is alert and oriented to person, place, and time.   Skin: Skin is dry. He is not diaphoretic.    Psychiatric: He has a normal mood and affect. His speech is normal and behavior is normal. Thought content normal.         Diagnoses and health risks identified today and associated recommendations/orders:    1. Encounter for preventive health examination  Pt is scheduled for screening colonoscopy 1/28/20. Pt refuses flu shot.    2. Chronic kidney disease, stage III (moderate)  Stable. Monitored by nephrology. Continue current treatment plan as prescribed by your PCP and nephrologist and f/u with them for further management.  Component      Latest Ref Rng & Units 5/23/2019 5/23/2018 11/14/2017   BUN, Bld      8 - 23 mg/dL 28 (H) 25 (H) 27 (H)   Creatinine      0.5 - 1.4 mg/dL 1.7 (H) 1.7 (H) 1.6 (H)     Component      Latest Ref Rng & Units 5/23/2019 5/23/2018 11/14/2017   eGFR if African American      >60 mL/min/1.73 m:2 42.5 (A) 42.8 (A) 46.0 (A)     3. Secondary hyperparathyroidism  Stable. Monitored by nephrology. Continue current treatment plan as prescribed by your PCP and nephrologist and f/u with them for further management.  Component      Latest Ref Rng & Units 1/6/2015   PTH      9.0 - 77.0 pg/mL 113.0 (H)     4. Prostate cancer  Stable. Pt diagnosed in 2014, s/p radiation treatment. Continue current treatment plan as prescribed by your PCP and urologist and f/u with them for further management.    5. Aortic atherosclerosis  Stable. CXR 3/29/12. Pt taking Lipitor and aspirin. Continue current treatment plan as prescribed by your PCP and cardiologist and f/u with them for further management.    6. Polycystic kidney disease  Stable. Retroperitoneal U/S 1/24/11. Monitored by nephrology. Continue current treatment plan as prescribed by your PCP and nephrologist and f/u with them for further management.    7. Essential hypertension  Stable. Pt taking benazepril-HCT. BP somewhat low today - pt reports he is asymptomatic and BP at home has been normal. Monitor BP as discussed and f/u with PCP / cardiologist  for further management.    8. Coronary artery disease, occlusive; 9. History of coronary angioplasty  Stable. Pt taking aspirin, Plavix, and Lipitor. Continue current treatment plan as prescribed by your PCP and cardiologist and f/u with them for further management.    10. CLARISSA (obstructive sleep apnea)  Stable. PSG 12/15/04. Pt using CPAP. Continue current treatment plan as prescribed by your PCP and pulm and f/u with your them for further management.    11. DDD (degenerative disc disease), lumbar  Stable. L-spine xrays 3/15/17. Pt reports his lower back hurts when he walks and improves with sitting down - chronic and stable per pt. He says this limits his daily activities and therefore sits most of the day. He declines PT, medication, or pain management / physiatry evaluation at this time. Denies any known injuries. F/u with your PCP for further management.    12. Intermediate stage nonexudative age-related macular degeneration of both eyes  Stable. Continue current treatment plan as prescribed by your PCP and ophthalmologist and f/u with them for further management.    13. Open angle with borderline findings, low risk, bilateral  Stable. Continue current treatment plan as prescribed by your PCP and ophthalmologist and f/u with them for further management.    14. Microcytic anemia  Stable. Pt taking iron suppl. Continue current treatment plan as prescribed by your PCP and f/u with your PCP for further management.  Component      Latest Ref Rng & Units 8/13/2019 5/23/2019   Hemoglobin      14.0 - 18.0 g/dL 11.4 (L) 11.0 (L)   Hematocrit      40.0 - 54.0 % 39.5 (L) 38.4 (L)     15. Gastroesophageal reflux disease, esophagitis presence not specified  Stable. Pt taking Zantac. Pt reports he received a letter stating his Rx of Zantac was recalled - will forward note to PCP. F/u with PCP / GI for further management.    16. Duodenal ulcer; 17. Gastritis, presence of bleeding unspecified, unspecified chronicity,  unspecified gastritis type  Stable. EGD 12/3/14. Continue current treatment plan as prescribed by your PCP and GI and f/u with them for further management.    18. History of colon polyps  Stable. Colonoscopy 12/3/14. Continue current treatment plan as prescribed by your PCP and GI and f/u with them for further management.    19. Cough  Stable. Pt reports ongoing chronic cough - says Flonase, Astelin, and oral antihistamines have not improved cough. F/u with PCP for further evaluation and management.    20. Potential for cognitive impairment  This a new problem identified during clinic visit today. Pt with abnormal score of 4 on Cognitive Function Screening today. Denies getting lost or leaving stove on. Follow-up with PCP for evaluation and treatment plan.    Please obtain any medical records from past / present outside medical providers and give to PCP for review and further medical recommendations.    Provided Jovanny with a 5-10 year written screening schedule and personal prevention plan. Recommendations were developed using the USPSTF age appropriate recommendations. Education, counseling, and referrals were provided as needed. After Visit Summary printed and given to patient which includes a list of additional screenings\tests needed.    Follow up in about 1 year (around 12/5/2020) for HRA. F/u with PCP Dr. Blake as scheduled 3/12/20 and specialists as recommended for health management.    ELIJAH Espinosa     I offered to discuss end of life issues, including information on how to make advance directives that the patient could use to name someone who would make medical decisions on their behalf if they became too ill to make themselves.  ___Patient declined  _X_Patient is interested, I provided paper work and offered to discuss.

## 2019-12-05 ENCOUNTER — LAB VISIT (OUTPATIENT)
Dept: LAB | Facility: HOSPITAL | Age: 84
End: 2019-12-05
Attending: FAMILY MEDICINE
Payer: MEDICARE

## 2019-12-05 ENCOUNTER — OFFICE VISIT (OUTPATIENT)
Dept: INTERNAL MEDICINE | Facility: CLINIC | Age: 84
End: 2019-12-05
Payer: MEDICARE

## 2019-12-05 VITALS
DIASTOLIC BLOOD PRESSURE: 60 MMHG | HEIGHT: 72 IN | BODY MASS INDEX: 32.57 KG/M2 | SYSTOLIC BLOOD PRESSURE: 100 MMHG | TEMPERATURE: 97 F | WEIGHT: 240.5 LBS

## 2019-12-05 DIAGNOSIS — M51.36 DDD (DEGENERATIVE DISC DISEASE), LUMBAR: ICD-10-CM

## 2019-12-05 DIAGNOSIS — E61.1 IRON DEFICIENCY: ICD-10-CM

## 2019-12-05 DIAGNOSIS — H35.3132 INTERMEDIATE STAGE NONEXUDATIVE AGE-RELATED MACULAR DEGENERATION OF BOTH EYES: ICD-10-CM

## 2019-12-05 DIAGNOSIS — N25.81 SECONDARY HYPERPARATHYROIDISM: ICD-10-CM

## 2019-12-05 DIAGNOSIS — G47.33 OSA (OBSTRUCTIVE SLEEP APNEA): ICD-10-CM

## 2019-12-05 DIAGNOSIS — Z91.89 POTENTIAL FOR COGNITIVE IMPAIRMENT: ICD-10-CM

## 2019-12-05 DIAGNOSIS — H40.013 OPEN ANGLE WITH BORDERLINE FINDINGS, LOW RISK, BILATERAL: ICD-10-CM

## 2019-12-05 DIAGNOSIS — Q61.3 POLYCYSTIC KIDNEY DISEASE: ICD-10-CM

## 2019-12-05 DIAGNOSIS — Z86.010 HISTORY OF COLON POLYPS: ICD-10-CM

## 2019-12-05 DIAGNOSIS — K26.9 DUODENAL ULCER: ICD-10-CM

## 2019-12-05 DIAGNOSIS — K21.9 GASTROESOPHAGEAL REFLUX DISEASE, ESOPHAGITIS PRESENCE NOT SPECIFIED: ICD-10-CM

## 2019-12-05 DIAGNOSIS — Z00.00 ENCOUNTER FOR PREVENTIVE HEALTH EXAMINATION: Primary | ICD-10-CM

## 2019-12-05 DIAGNOSIS — N18.30 CHRONIC KIDNEY DISEASE, STAGE III (MODERATE): ICD-10-CM

## 2019-12-05 DIAGNOSIS — Z98.61 HISTORY OF CORONARY ANGIOPLASTY: Chronic | ICD-10-CM

## 2019-12-05 DIAGNOSIS — I25.10 CORONARY ARTERY DISEASE, OCCLUSIVE: Chronic | ICD-10-CM

## 2019-12-05 DIAGNOSIS — I10 ESSENTIAL HYPERTENSION: Chronic | ICD-10-CM

## 2019-12-05 DIAGNOSIS — C61 PROSTATE CANCER: ICD-10-CM

## 2019-12-05 DIAGNOSIS — I70.0 AORTIC ATHEROSCLEROSIS: ICD-10-CM

## 2019-12-05 DIAGNOSIS — K29.70 GASTRITIS, PRESENCE OF BLEEDING UNSPECIFIED, UNSPECIFIED CHRONICITY, UNSPECIFIED GASTRITIS TYPE: ICD-10-CM

## 2019-12-05 DIAGNOSIS — D50.9 MICROCYTIC ANEMIA: ICD-10-CM

## 2019-12-05 DIAGNOSIS — R05.9 COUGH: ICD-10-CM

## 2019-12-05 PROBLEM — Z86.0100 HISTORY OF COLON POLYPS: Status: ACTIVE | Noted: 2019-12-05

## 2019-12-05 PROBLEM — M46.1 SACROILIITIS: Status: RESOLVED | Noted: 2019-05-23 | Resolved: 2019-12-05

## 2019-12-05 LAB
BASOPHILS # BLD AUTO: 0.02 K/UL (ref 0–0.2)
BASOPHILS NFR BLD: 0.3 % (ref 0–1.9)
DIFFERENTIAL METHOD: ABNORMAL
EOSINOPHIL # BLD AUTO: 0.2 K/UL (ref 0–0.5)
EOSINOPHIL NFR BLD: 2.6 % (ref 0–8)
ERYTHROCYTE [DISTWIDTH] IN BLOOD BY AUTOMATED COUNT: 16.9 % (ref 11.5–14.5)
FERRITIN SERPL-MCNC: 63 NG/ML (ref 20–300)
HCT VFR BLD AUTO: 45 % (ref 40–54)
HGB BLD-MCNC: 14.6 G/DL (ref 14–18)
IMM GRANULOCYTES # BLD AUTO: 0.01 K/UL (ref 0–0.04)
IMM GRANULOCYTES NFR BLD AUTO: 0.2 % (ref 0–0.5)
IRON SERPL-MCNC: 239 UG/DL (ref 45–160)
LYMPHOCYTES # BLD AUTO: 2 K/UL (ref 1–4.8)
LYMPHOCYTES NFR BLD: 34.7 % (ref 18–48)
MCH RBC QN AUTO: 29.7 PG (ref 27–31)
MCHC RBC AUTO-ENTMCNC: 32.4 G/DL (ref 32–36)
MCV RBC AUTO: 92 FL (ref 82–98)
MONOCYTES # BLD AUTO: 0.4 K/UL (ref 0.3–1)
MONOCYTES NFR BLD: 7.5 % (ref 4–15)
NEUTROPHILS # BLD AUTO: 3.2 K/UL (ref 1.8–7.7)
NEUTROPHILS NFR BLD: 54.7 % (ref 38–73)
NRBC BLD-RTO: 0 /100 WBC
PLATELET # BLD AUTO: 135 K/UL (ref 150–350)
PMV BLD AUTO: 12 FL (ref 9.2–12.9)
RBC # BLD AUTO: 4.92 M/UL (ref 4.6–6.2)
SATURATED IRON: 72 % (ref 20–50)
TOTAL IRON BINDING CAPACITY: 333 UG/DL (ref 250–450)
TRANSFERRIN SERPL-MCNC: 225 MG/DL (ref 200–375)
WBC # BLD AUTO: 5.85 K/UL (ref 3.9–12.7)

## 2019-12-05 PROCEDURE — 36415 COLL VENOUS BLD VENIPUNCTURE: CPT

## 2019-12-05 PROCEDURE — G0439 PR MEDICARE ANNUAL WELLNESS SUBSEQUENT VISIT: ICD-10-PCS | Mod: S$GLB,,, | Performed by: PHYSICIAN ASSISTANT

## 2019-12-05 PROCEDURE — 82728 ASSAY OF FERRITIN: CPT

## 2019-12-05 PROCEDURE — 99213 OFFICE O/P EST LOW 20 MIN: CPT | Mod: PBBFAC | Performed by: PHYSICIAN ASSISTANT

## 2019-12-05 PROCEDURE — 99999 PR PBB SHADOW E&M-EST. PATIENT-LVL III: ICD-10-PCS | Mod: PBBFAC,,, | Performed by: PHYSICIAN ASSISTANT

## 2019-12-05 PROCEDURE — 85025 COMPLETE CBC W/AUTO DIFF WBC: CPT

## 2019-12-05 PROCEDURE — G0439 PPPS, SUBSEQ VISIT: HCPCS | Mod: S$GLB,,, | Performed by: PHYSICIAN ASSISTANT

## 2019-12-05 PROCEDURE — 83540 ASSAY OF IRON: CPT

## 2019-12-05 PROCEDURE — 99999 PR PBB SHADOW E&M-EST. PATIENT-LVL III: CPT | Mod: PBBFAC,,, | Performed by: PHYSICIAN ASSISTANT

## 2019-12-05 NOTE — PATIENT INSTRUCTIONS
Counseling and Referral of Other Preventative  (Italic type indicates deductible and co-insurance are waived)    Patient Name: Jovanny Olmedo  Today's Date: 12/5/2019    Health Maintenance       Date Due Completion Date    TETANUS VACCINE 09/11/1953 ---    Shingles Vaccine (1 of 2) 09/11/1986 ---    Influenza Vaccine (1) 09/01/2019 ---    Pneumococcal Vaccine (65+ High/Highest Risk) (2 of 2 - PPSV23) 10/08/2019 8/13/2019    Colonoscopy 12/03/2019 12/3/2014    Aspirin/Antiplatelet Therapy 12/05/2020 12/5/2019    Lipid Panel 08/13/2024 8/13/2019        No orders of the defined types were placed in this encounter.    The following information is provided to all patients.  This information is to help you find resources for any of the problems found today that may be affecting your health:                Living healthy guide: www.[facility].[place].gov      Understanding Diabetes: www.diabetes.org      Eating healthy: www.cdc.gov/healthyweight      [facility] home safety checklist: www.cdc.gov/steadi/patient.html      Agency on Aging: www.goea.[place].[facility]      Alcoholics anonymous (AA): www.aa.org      Physical Activity: www.angelito.nih.gov/ze9buzd      Tobacco use: www.quitwithusla.org

## 2019-12-06 ENCOUNTER — TELEPHONE (OUTPATIENT)
Dept: INTERNAL MEDICINE | Facility: CLINIC | Age: 84
End: 2019-12-06

## 2019-12-06 RX ORDER — FAMOTIDINE 20 MG/1
20 TABLET, FILM COATED ORAL 2 TIMES DAILY PRN
Qty: 180 TABLET | Refills: 1 | Status: SHIPPED | OUTPATIENT
Start: 2019-12-06 | End: 2020-12-11

## 2019-12-06 NOTE — TELEPHONE ENCOUNTER
----- Message from Sam Blake MD sent at 12/6/2019  4:28 AM CST -----  Regarding: RE: Ranitidine  I sent in pepcid for patient  ----- Message -----  From: ELIJAH Soriano  Sent: 12/5/2019  11:24 AM CST  To: Sam Blake MD  Subject: Ranitidine                                       Saw pt for HRA visit today - says he received a letter stating his ranitidine has been recalled. He is requesting a new prescription due to this. Thanks, Zuleyma

## 2019-12-27 ENCOUNTER — TELEPHONE (OUTPATIENT)
Dept: ENDOSCOPY | Facility: HOSPITAL | Age: 84
End: 2019-12-27

## 2019-12-27 NOTE — TELEPHONE ENCOUNTER
Dr. Wayne,   Pt is scheduled on 01- with Dr. Alexander (colonoscopy). Please review pt's chart if ok to stop Plavix x5 days before. Thank You. Esdrass

## 2019-12-27 NOTE — TELEPHONE ENCOUNTER
Spoke with pt regarding cardiac clearance. Advised pt to stop Plavix x5 days prior to colonoscopy on 01-28-20  per dr. Wayne.and resume after.dw

## 2020-01-06 DIAGNOSIS — I10 ESSENTIAL HYPERTENSION: Primary | ICD-10-CM

## 2020-01-13 ENCOUNTER — OFFICE VISIT (OUTPATIENT)
Dept: CARDIOLOGY | Facility: CLINIC | Age: 85
End: 2020-01-13
Payer: MEDICARE

## 2020-01-13 ENCOUNTER — CLINICAL SUPPORT (OUTPATIENT)
Dept: CARDIOLOGY | Facility: CLINIC | Age: 85
End: 2020-01-13
Payer: MEDICARE

## 2020-01-13 VITALS
DIASTOLIC BLOOD PRESSURE: 78 MMHG | HEART RATE: 77 BPM | OXYGEN SATURATION: 97 % | HEIGHT: 72 IN | SYSTOLIC BLOOD PRESSURE: 106 MMHG | WEIGHT: 231.69 LBS | BODY MASS INDEX: 31.38 KG/M2

## 2020-01-13 DIAGNOSIS — Z98.61 HISTORY OF CORONARY ANGIOPLASTY: Chronic | ICD-10-CM

## 2020-01-13 DIAGNOSIS — I25.10 CORONARY ARTERY DISEASE, OCCLUSIVE: Primary | Chronic | ICD-10-CM

## 2020-01-13 DIAGNOSIS — I10 ESSENTIAL HYPERTENSION: Chronic | ICD-10-CM

## 2020-01-13 DIAGNOSIS — E78.5 DYSLIPIDEMIA: Chronic | ICD-10-CM

## 2020-01-13 DIAGNOSIS — I10 ESSENTIAL HYPERTENSION: ICD-10-CM

## 2020-01-13 PROCEDURE — 93010 EKG 12-LEAD: ICD-10-PCS | Mod: S$PBB,,, | Performed by: INTERNAL MEDICINE

## 2020-01-13 PROCEDURE — 93005 ELECTROCARDIOGRAM TRACING: CPT | Mod: PBBFAC | Performed by: INTERNAL MEDICINE

## 2020-01-13 PROCEDURE — 93010 ELECTROCARDIOGRAM REPORT: CPT | Mod: S$PBB,,, | Performed by: INTERNAL MEDICINE

## 2020-01-13 PROCEDURE — 99999 PR PBB SHADOW E&M-EST. PATIENT-LVL III: ICD-10-PCS | Mod: PBBFAC,,, | Performed by: NUCLEAR MEDICINE

## 2020-01-13 PROCEDURE — 1159F MED LIST DOCD IN RCRD: CPT | Mod: ,,, | Performed by: NUCLEAR MEDICINE

## 2020-01-13 PROCEDURE — 99213 OFFICE O/P EST LOW 20 MIN: CPT | Mod: PBBFAC | Performed by: NUCLEAR MEDICINE

## 2020-01-13 PROCEDURE — 99999 PR PBB SHADOW E&M-EST. PATIENT-LVL III: CPT | Mod: PBBFAC,,, | Performed by: NUCLEAR MEDICINE

## 2020-01-13 PROCEDURE — 1159F PR MEDICATION LIST DOCUMENTED IN MEDICAL RECORD: ICD-10-PCS | Mod: ,,, | Performed by: NUCLEAR MEDICINE

## 2020-01-13 PROCEDURE — 99214 PR OFFICE/OUTPT VISIT, EST, LEVL IV, 30-39 MIN: ICD-10-PCS | Mod: S$PBB,,, | Performed by: NUCLEAR MEDICINE

## 2020-01-13 PROCEDURE — 99214 OFFICE O/P EST MOD 30 MIN: CPT | Mod: S$PBB,,, | Performed by: NUCLEAR MEDICINE

## 2020-01-13 NOTE — PROGRESS NOTES
Subjective:   Patient ID:  Jovanny Olmedo is a 84 y.o. male who presents for follow-up of Coronary Artery Disease (PCI); Hypertension (ESSENTIAL); and Hyperlipidemia      HPI NO RECURRENT ANGINA OR EQUIVALENT  NO RECENT HOSPITALIZATIONS FOR ACS, ADHF OR ARRHYTHMIAS  NO ABNORMAL BLEEDING - ON DAPT  NO UNUSUAL WELLS. NO ORTHOPNEA OR PND  NO PALPITATIONS  NO NEAR SYNCOPE OR SYNCOPE  NO FOCAL CNS SYMPTOMS OR SIGNS TO SUGGEST TIA OR STROKE  NO EDEMA. NO CALVE TENDERNESS  NO INTERMITTENT CLAUDICATION  CARD MED- GOOD COMPLIANCE  ECG - TODAY- SR 75 BMP, LAFB, POST SCAR    Review of Systems   Constitution: Negative for chills, fever, night sweats, weight gain and weight loss.   HENT: Negative for nosebleeds.    Eyes: Negative for blurred vision, double vision and visual disturbance.   Cardiovascular: Negative for chest pain, dyspnea on exertion, irregular heartbeat, leg swelling, orthopnea, palpitations, paroxysmal nocturnal dyspnea and syncope.   Respiratory: Negative for cough, hemoptysis and wheezing.    Endocrine: Negative for polydipsia and polyuria.   Hematologic/Lymphatic: Does not bruise/bleed easily.   Skin: Negative for rash.   Musculoskeletal: Negative for joint pain, joint swelling, muscle weakness and myalgias.   Gastrointestinal: Negative for abdominal pain, hematemesis, jaundice and melena.   Genitourinary: Negative for dysuria, hematuria and nocturia.   Neurological: Negative for dizziness, focal weakness, headaches, sensory change and weakness.   Psychiatric/Behavioral: Negative for depression. The patient does not have insomnia and is not nervous/anxious.      Family History   Problem Relation Age of Onset    Glaucoma Mother     Colon cancer Father     Diabetes Sister     Blindness Neg Hx      Past Medical History:   Diagnosis Date    Arthritis     Back pain     CAD (coronary artery disease)     Cataract     CKD (chronic kidney disease)     GERD (gastroesophageal reflux disease)     Glaucoma      suspect    HTN (hypertension)     Prostate cancer     tx'd with radiation    PVD (peripheral vascular disease)     stent in right renal artery and aorta    Sleep apnea      Social History     Socioeconomic History    Marital status:      Spouse name: Not on file    Number of children: Not on file    Years of education: Not on file    Highest education level: Not on file   Occupational History    Not on file   Social Needs    Financial resource strain: Not on file    Food insecurity:     Worry: Not on file     Inability: Not on file    Transportation needs:     Medical: Not on file     Non-medical: Not on file   Tobacco Use    Smoking status: Former Smoker     Packs/day: 0.50     Years: 30.00     Pack years: 15.00     Last attempt to quit: 1995     Years since quittin.3    Smokeless tobacco: Former User   Substance and Sexual Activity    Alcohol use: No     Alcohol/week: 0.0 standard drinks    Drug use: No    Sexual activity: Not Currently   Lifestyle    Physical activity:     Days per week: Not on file     Minutes per session: Not on file    Stress: Not on file   Relationships    Social connections:     Talks on phone: Not on file     Gets together: Not on file     Attends Rastafari service: Not on file     Active member of club or organization: Not on file     Attends meetings of clubs or organizations: Not on file     Relationship status: Not on file   Other Topics Concern    Not on file   Social History Narrative         Current Outpatient Medications on File Prior to Visit   Medication Sig Dispense Refill    aspirin (ECOTRIN) 81 MG EC tablet Take 81 mg by mouth once daily.      atorvastatin (LIPITOR) 40 MG tablet TAKE 1 TABLET BY MOUTH ONCE DAILY 90 tablet 3    azelastine (ASTELIN) 137 mcg (0.1 %) nasal spray 2 sprays (274 mcg total) by Nasal route 2 (two) times daily. 30 mL 11    benazepril-hydrochlorthiazide (LOTENSIN HCT) 10-12.5 mg Tab Take 1 tablet by mouth 2  (two) times daily. (Patient taking differently: Take 2 tablets by mouth 2 (two) times daily. Patient taking 1 tablet) 270 tablet 3    clopidogrel (PLAVIX) 75 mg tablet Take 1 tablet (75 mg total) by mouth once daily. 90 tablet 1    clotrimazole-betamethasone 1-0.05% (LOTRISONE) cream Apply topically 2 (two) times daily. 45 g 0    famotidine (PEPCID) 20 MG tablet Take 1 tablet (20 mg total) by mouth 2 (two) times daily as needed for Heartburn. 180 tablet 1    ferrous sulfate 325 (65 FE) MG EC tablet Take 325 mg by mouth once daily.       fluticasone propionate (FLONASE) 50 mcg/actuation nasal spray 2 sprays (100 mcg total) by Each Nostril route once daily. 1 Bottle 11    levocetirizine (XYZAL) 5 MG tablet Take 1 tablet (5 mg total) by mouth every evening. 30 tablet 11    sildenafil (VIAGRA) 50 MG tablet Take 1 tablet (50 mg total) by mouth daily as needed for Erectile Dysfunction. 30 tablet 11     No current facility-administered medications on file prior to visit.      Review of patient's allergies indicates:   Allergen Reactions    Codeine      When taking codeine, pt becomes very anxious       Objective:     Physical Exam   Constitutional: He is oriented to person, place, and time. He appears well-developed. No distress.   HENT:   Head: Normocephalic.   Eyes: Pupils are equal, round, and reactive to light. Conjunctivae are normal.   Neck: Neck supple. No JVD present. No thyromegaly present.   Cardiovascular: Normal rate, regular rhythm, normal heart sounds and intact distal pulses. Exam reveals no gallop and no friction rub.   No murmur heard.  Pulses:       Carotid pulses are 2+ on the right side, and 2+ on the left side.       Radial pulses are 2+ on the right side, and 2+ on the left side.        Femoral pulses are 2+ on the right side, and 2+ on the left side.       Popliteal pulses are 2+ on the right side, and 2+ on the left side.        Dorsalis pedis pulses are 2+ on the right side, and 2+ on the  left side.        Posterior tibial pulses are 2+ on the right side, and 2+ on the left side.   Pulmonary/Chest: Breath sounds normal. He has no wheezes. He has no rales. He exhibits no tenderness.   Abdominal: Soft. Bowel sounds are normal. He exhibits no mass. There is no hepatosplenomegaly. There is no tenderness.   Musculoskeletal: He exhibits no edema or tenderness.        Cervical back: Normal.        Thoracic back: Normal.        Lumbar back: Normal.   Lymphadenopathy:     He has no cervical adenopathy.     He has no axillary adenopathy.        Right: No supraclavicular adenopathy present.        Left: No supraclavicular adenopathy present.   Neurological: He is alert and oriented to person, place, and time. He has normal strength. No sensory deficit. Gait normal.   Skin: Skin is warm. No cyanosis. No pallor. Nails show no clubbing.   Psychiatric: He has a normal mood and affect. His speech is normal and behavior is normal. Cognition and memory are normal.       Assessment:     1. Coronary artery disease, occlusive    2. History of coronary angioplasty    3. Essential hypertension    4. Dyslipidemia        Plan:     Coronary artery disease, occlusive  NO CLINICAL EVIDENCE OF ACTIVE MYOCARDIAL ISCHEMIA  NO ARRHYTHMIAS  NO ADHF    History of coronary angioplasty  NO ABNORMAL BLEEDING ON DAPT  STABLE CAD    Essential hypertension  WELL CONTROLLED BP  CNS STATUS STABLE    Dyslipidemia  LIPIDS AT GOAL- RECENT LAB RESULTS WERE REVIEWED  STATIN WELL TOLERATED    CONTINUE PRESENT CARD MANAGEMENT    RETURN IN ONE YEAR

## 2020-01-27 ENCOUNTER — TELEPHONE (OUTPATIENT)
Dept: GASTROENTEROLOGY | Facility: CLINIC | Age: 85
End: 2020-01-27

## 2020-01-27 NOTE — TELEPHONE ENCOUNTER
----- Message from Pati Lama MA sent at 1/27/2020  1:19 PM CST -----  Contact: [phone]/self  Patient need to be reschedule he is a patient with stacey  ----- Message -----  From: Isabella Nick  Sent: 1/25/2020   8:18 AM CST  To: [facility] Staff    Patient is requesting a call back regarding rescheduling his colonoscopy. He is having flu like symptoms. Thanks

## 2020-01-28 ENCOUNTER — TELEPHONE (OUTPATIENT)
Dept: ENDOSCOPY | Facility: HOSPITAL | Age: 85
End: 2020-01-28

## 2020-01-28 NOTE — TELEPHONE ENCOUNTER
Attempted to reschedule procedure with patient, he declined at this time.  Per patient he has to take care of other medical concerns before having a colonoscopy. Stated he would call office when ready to schedule.

## 2020-01-29 ENCOUNTER — OFFICE VISIT (OUTPATIENT)
Dept: INTERNAL MEDICINE | Facility: CLINIC | Age: 85
End: 2020-01-29
Payer: MEDICARE

## 2020-01-29 VITALS
HEIGHT: 72 IN | BODY MASS INDEX: 31.08 KG/M2 | WEIGHT: 229.5 LBS | TEMPERATURE: 98 F | DIASTOLIC BLOOD PRESSURE: 66 MMHG | HEART RATE: 97 BPM | SYSTOLIC BLOOD PRESSURE: 124 MMHG

## 2020-01-29 DIAGNOSIS — J01.00 ACUTE NON-RECURRENT MAXILLARY SINUSITIS: ICD-10-CM

## 2020-01-29 DIAGNOSIS — I70.0 AORTIC ATHEROSCLEROSIS: ICD-10-CM

## 2020-01-29 DIAGNOSIS — R68.89 FLU-LIKE SYMPTOMS: Primary | ICD-10-CM

## 2020-01-29 DIAGNOSIS — N18.30 CHRONIC KIDNEY DISEASE, STAGE III (MODERATE): ICD-10-CM

## 2020-01-29 DIAGNOSIS — C61 PROSTATE CANCER: ICD-10-CM

## 2020-01-29 DIAGNOSIS — N25.81 SECONDARY HYPERPARATHYROIDISM: ICD-10-CM

## 2020-01-29 LAB
INFLUENZA A, MOLECULAR: NEGATIVE
INFLUENZA B, MOLECULAR: NEGATIVE
SPECIMEN SOURCE: NORMAL

## 2020-01-29 PROCEDURE — 99999 PR PBB SHADOW E&M-EST. PATIENT-LVL III: ICD-10-PCS | Mod: PBBFAC,,, | Performed by: FAMILY MEDICINE

## 2020-01-29 PROCEDURE — 1159F MED LIST DOCD IN RCRD: CPT | Mod: ,,, | Performed by: FAMILY MEDICINE

## 2020-01-29 PROCEDURE — 99999 PR PBB SHADOW E&M-EST. PATIENT-LVL III: CPT | Mod: PBBFAC,,, | Performed by: FAMILY MEDICINE

## 2020-01-29 PROCEDURE — 1159F PR MEDICATION LIST DOCUMENTED IN MEDICAL RECORD: ICD-10-PCS | Mod: ,,, | Performed by: FAMILY MEDICINE

## 2020-01-29 PROCEDURE — 1126F PR PAIN SEVERITY QUANTIFIED, NO PAIN PRESENT: ICD-10-PCS | Mod: ,,, | Performed by: FAMILY MEDICINE

## 2020-01-29 PROCEDURE — 1126F AMNT PAIN NOTED NONE PRSNT: CPT | Mod: ,,, | Performed by: FAMILY MEDICINE

## 2020-01-29 PROCEDURE — 87502 INFLUENZA DNA AMP PROBE: CPT

## 2020-01-29 PROCEDURE — 99214 PR OFFICE/OUTPT VISIT, EST, LEVL IV, 30-39 MIN: ICD-10-PCS | Mod: S$PBB,,, | Performed by: FAMILY MEDICINE

## 2020-01-29 PROCEDURE — 99214 OFFICE O/P EST MOD 30 MIN: CPT | Mod: S$PBB,,, | Performed by: FAMILY MEDICINE

## 2020-01-29 PROCEDURE — 99213 OFFICE O/P EST LOW 20 MIN: CPT | Mod: PBBFAC | Performed by: FAMILY MEDICINE

## 2020-01-29 RX ORDER — METHYLPREDNISOLONE 4 MG/1
TABLET ORAL
Qty: 1 PACKAGE | Refills: 0 | Status: SHIPPED | OUTPATIENT
Start: 2020-01-29 | End: 2020-06-19 | Stop reason: ALTCHOICE

## 2020-01-29 RX ORDER — PROMETHAZINE HYDROCHLORIDE AND DEXTROMETHORPHAN HYDROBROMIDE 6.25; 15 MG/5ML; MG/5ML
5 SYRUP ORAL NIGHTLY PRN
Qty: 50 ML | Refills: 0 | Status: SHIPPED | OUTPATIENT
Start: 2020-01-29 | End: 2020-02-08

## 2020-01-29 RX ORDER — AMOXICILLIN AND CLAVULANATE POTASSIUM 500; 125 MG/1; MG/1
1 TABLET, FILM COATED ORAL 2 TIMES DAILY
Qty: 20 TABLET | Refills: 0 | Status: SHIPPED | OUTPATIENT
Start: 2020-01-29 | End: 2020-04-06 | Stop reason: SDUPTHER

## 2020-01-29 NOTE — PROGRESS NOTES
Subjective:       Patient ID: Jovanny Olmedo is a 84 y.o. male.    Chief Complaint: flu like symptoms    Pt is a 84 year old who is here with 1 week increase congestion and sinus pressure. Pt has been cough throughout with a lot of mucus production. Pt has no fever on presentation today. Pt reports cough is worse at night. He is taking astelin and flonase.     Review of Systems   Constitutional: Negative.    Respiratory: Negative.    Cardiovascular: Negative.    Genitourinary: Negative.    Neurological: Negative.        Objective:      Physical Exam   Constitutional: He is oriented to person, place, and time. He appears well-developed and well-nourished.   HENT:   Right Ear: Tympanic membrane is erythematous.   Left Ear: Tympanic membrane is erythematous.   Nose: Mucosal edema and rhinorrhea present. Right sinus exhibits no maxillary sinus tenderness and no frontal sinus tenderness. Left sinus exhibits no maxillary sinus tenderness and no frontal sinus tenderness.   Mouth/Throat: Posterior oropharyngeal erythema present.   Cardiovascular: Normal rate and regular rhythm. Exam reveals no friction rub.   No murmur heard.  Pulmonary/Chest: Effort normal and breath sounds normal. No stridor. He has no wheezes.   Abdominal: Soft. Bowel sounds are normal. He exhibits no distension. There is no tenderness.   Neurological: He is alert and oriented to person, place, and time.       Assessment:       1. Flu-like symptoms    2. Prostate cancer    3. Secondary hyperparathyroidism    4. Aortic atherosclerosis    5. Chronic kidney disease, stage III (moderate)    6. Acute non-recurrent maxillary sinusitis        Plan:       Flu-like symptoms  Comments:  Flu is negative  Orders:  -     Influenza A & B by Molecular    Prostate cancer  Comments:  Continue to monitored by Urology    Secondary hyperparathyroidism  Comments:  stable    Aortic atherosclerosis  Comments:  stable    Chronic kidney disease, stage III  (moderate)  Comments:  stable    Acute non-recurrent maxillary sinusitis  Comments:  Augmentin with phenergan dm for cough and medrol dose pack    Other orders  -     promethazine-dextromethorphan (PROMETHAZINE-DM) 6.25-15 mg/5 mL Syrp; Take 5 mLs by mouth nightly as needed.  Dispense: 50 mL; Refill: 0  -     amoxicillin-clavulanate 500-125mg (AUGMENTIN) 500-125 mg Tab; Take 1 tablet (500 mg total) by mouth 2 (two) times daily.  Dispense: 20 tablet; Refill: 0  -     methylPREDNISolone (MEDROL DOSEPACK) 4 mg tablet; use as directed  Dispense: 1 Package; Refill: 0

## 2020-02-21 ENCOUNTER — OFFICE VISIT (OUTPATIENT)
Dept: OPHTHALMOLOGY | Facility: CLINIC | Age: 85
End: 2020-02-21
Payer: MEDICARE

## 2020-02-21 DIAGNOSIS — H35.3132 INTERMEDIATE STAGE NONEXUDATIVE AGE-RELATED MACULAR DEGENERATION OF BOTH EYES: ICD-10-CM

## 2020-02-21 DIAGNOSIS — H04.129 DRY EYE: ICD-10-CM

## 2020-02-21 DIAGNOSIS — H40.013 OPEN ANGLE WITH BORDERLINE FINDINGS, LOW RISK, BILATERAL: Primary | ICD-10-CM

## 2020-02-21 DIAGNOSIS — Z96.1 PSEUDOPHAKIA: ICD-10-CM

## 2020-02-21 PROCEDURE — 99212 OFFICE O/P EST SF 10 MIN: CPT | Mod: PBBFAC | Performed by: OPHTHALMOLOGY

## 2020-02-21 PROCEDURE — 99999 PR PBB SHADOW E&M-EST. PATIENT-LVL II: CPT | Mod: PBBFAC,,, | Performed by: OPHTHALMOLOGY

## 2020-02-21 PROCEDURE — 92014 PR EYE EXAM, EST PATIENT,COMPREHESV: ICD-10-PCS | Mod: S$PBB,,, | Performed by: OPHTHALMOLOGY

## 2020-02-21 PROCEDURE — 92014 COMPRE OPH EXAM EST PT 1/>: CPT | Mod: S$PBB,,, | Performed by: OPHTHALMOLOGY

## 2020-02-21 PROCEDURE — 99999 PR PBB SHADOW E&M-EST. PATIENT-LVL II: ICD-10-PCS | Mod: PBBFAC,,, | Performed by: OPHTHALMOLOGY

## 2020-02-21 NOTE — PROGRESS NOTES
SUBJECTIVE  Jovanny Olmedo is 84 y.o. male  Uncorrected distance visual acuity was 20/20 -1 in the right eye and 20/20 -1 in the left eye.   Chief Complaint   Patient presents with    Glaucoma Suspect    Hypertensive Eye Exam          HPI     6 month IOP /Pseudophakic check  OU slight irritation  VA stable / wears readers  Pt defers dilation today, no     PCP: Dr. Blake    1. COAG Suspect +Fhx (use HRT>GOCT)  2. ARMD   3. PCIOL OD +17.5 SN60WF (DISTANCE) 03/29/17  PCIOL OS 7/3/19 (+17.5 SN60WF)    NO GLAUCOMA DROPS    Last edited by Jeannette Blood on 2/21/2020  9:06 AM. (History)         Assessment /Plan :  1. Open angle with borderline findings, low risk, bilateral No evidence of glaucoma at this time but based on risk factors recommend to continue monitoring.     2. Pseudophakia  -- Condition stable, no therapeutic change required. Monitoring routinely.     3. Intermediate stage nonexudative age-related macular degeneration of both eyes stable   4.      Dry Eyes recommend artificial tears prn OU    Return to clinic in 1 year  or as needed.  With GOCT and Dilation

## 2020-03-06 RX ORDER — CLOPIDOGREL BISULFATE 75 MG/1
TABLET ORAL
Qty: 90 TABLET | Refills: 2 | Status: SHIPPED | OUTPATIENT
Start: 2020-03-06 | End: 2020-12-08

## 2020-03-09 NOTE — TELEPHONE ENCOUNTER
----- Message from Keturah Khan sent at 3/9/2020  9:12 AM CDT -----  Contact: pt   Pt requesting a call back to check on status of nasal spray rx. Please call back at [phone]    Pt uses  [facility] [address]  [address] [facility] [address]  Phone: [phone] Fax: [phone]        Thanks.  Keturah Khan

## 2020-03-10 RX ORDER — AZELASTINE 1 MG/ML
2 SPRAY, METERED NASAL 2 TIMES DAILY
Qty: 30 ML | Refills: 11 | Status: SHIPPED | OUTPATIENT
Start: 2020-03-10 | End: 2021-03-31 | Stop reason: SDUPTHER

## 2020-03-10 RX ORDER — FLUTICASONE PROPIONATE 50 MCG
2 SPRAY, SUSPENSION (ML) NASAL DAILY
Qty: 16 G | Refills: 0 | Status: SHIPPED | OUTPATIENT
Start: 2020-03-10 | End: 2020-08-31

## 2020-03-23 ENCOUNTER — TELEPHONE (OUTPATIENT)
Dept: INTERNAL MEDICINE | Facility: CLINIC | Age: 85
End: 2020-03-23

## 2020-03-23 NOTE — TELEPHONE ENCOUNTER
Patient complains of productive cough with clear sputum since last visit 01/29/2020.  Please advise

## 2020-03-23 NOTE — TELEPHONE ENCOUNTER
----- Message from Speedy Scott sent at 3/23/2020  9:26 AM CDT -----  Contact: pt   .Type:  Needs Medical Advice    Who Called: pt   Symptoms (please be specific):  Coughing, clear mucus,   How long has patient had these symptoms:   Couple weeks   Pharmacy name and phone #:   [email or web address]   Would the patient rather a call back or a response via [facility]? Callback   Best Call Back Number:  [phone]     Additional Information:        .  [facility] [address]  [address] [facility] [place] [address]  Phone: [phone] Fax: [phone]

## 2020-04-06 RX ORDER — METHYLPREDNISOLONE 4 MG/1
TABLET ORAL
Qty: 1 PACKAGE | Refills: 0 | Status: SHIPPED | OUTPATIENT
Start: 2020-04-06 | End: 2020-06-19 | Stop reason: ALTCHOICE

## 2020-04-06 RX ORDER — MONTELUKAST SODIUM 10 MG/1
10 TABLET ORAL NIGHTLY
Qty: 30 TABLET | Refills: 0 | Status: SHIPPED | OUTPATIENT
Start: 2020-04-06 | End: 2020-05-06

## 2020-04-06 RX ORDER — AMOXICILLIN AND CLAVULANATE POTASSIUM 500; 125 MG/1; MG/1
1 TABLET, FILM COATED ORAL 2 TIMES DAILY
Qty: 20 TABLET | Refills: 0 | Status: SHIPPED | OUTPATIENT
Start: 2020-04-06 | End: 2020-06-19 | Stop reason: ALTCHOICE

## 2020-05-15 RX ORDER — BENAZEPRIL HYDROCHLORIDE AND HYDROCHLOROTHIAZIDE 10; 12.5 MG/1; MG/1
TABLET ORAL
Qty: 180 TABLET | Refills: 0 | Status: SHIPPED | OUTPATIENT
Start: 2020-05-15 | End: 2020-05-19

## 2020-05-19 ENCOUNTER — TELEPHONE (OUTPATIENT)
Dept: CARDIOLOGY | Facility: CLINIC | Age: 85
End: 2020-05-19

## 2020-05-19 RX ORDER — BENAZEPRIL HYDROCHLORIDE AND HYDROCHLOROTHIAZIDE 10; 12.5 MG/1; MG/1
TABLET ORAL
Qty: 180 TABLET | Refills: 3 | Status: SHIPPED | OUTPATIENT
Start: 2020-05-19 | End: 2021-05-14

## 2020-05-19 NOTE — TELEPHONE ENCOUNTER
Returned call to pt and informed lotensin rx refilled at [facility] in [place].  The patient verbalized understanding.

## 2020-05-19 NOTE — TELEPHONE ENCOUNTER
----- Message from Nichelle Boles sent at 5/19/2020  9:07 AM CDT -----  Contact: Pt  Pt is requesting call back in regards to pharmacy stating that they haven't received prescription      Medication:  benazepril-hydrochlorthiazide (LOTENSIN HCT) 10-12.5 mg Tab      Pls call back at [phone]

## 2020-06-12 NOTE — PROGRESS NOTES
Alberto Rivera  2020  Hiwot Eisenberg    Post-Operative Office phone visit    Consent:  The patient is aware that she may receive a bill for this service, depending on insurance coverage, and has provided verbal consent to proceed: Yes    Pt at home, I'm in the office, no one helped. I affirm this is a Patient Initiated Episode with an Established Patient who has not had a related appointment within my department in the past 7 days or scheduled within the next 24 hours. Patient identification was verified at the start of the visit: Yes    Total Time: minutes: 11 minutes        Yessenia Gutierrez is a 47 y.o. female post colonoscopy 20, Extensive diverticulosis throughout the colon,  5 mm ascending colon polyp at 80 cm, 3 mm transverse colon polyp at 70 cm. Pathology=all tubular adenomas.     Weight:  .    Past Medical History:   Diagnosis Date    Anticoagulant long-term use     Arthritis     back, both hips and both knees    Carpal tunnel syndrome on both sides     both wrists    Cellulitis of right foot 2016    Cervical cancer ([facility] [address] 2012    S/P LUCIUS, BSO    Chronic back pain     Depressed bipolar II disorder (HCC)     Diabetes mellitus ([facility] [address]     Diverticulitis     DVT (deep vein thrombosis) in pregnancy     Epilepsy ([facility] [address]     Hx of blood clots     Hyperlipidemia     Hypertension     Leukocytosis 2013    Marijuana use     Neuropathy     Obesity     PONV (postoperative nausea and vomiting)     Rotator cuff tear arthropathy     Thyroid disease     Type 2 diabetes mellitus without complication [facility])      Past Surgical History:   Procedure Laterality Date     SECTION      CHOLECYSTECTOMY      COLONOSCOPY  10/10/2018    COLONOSCOPY WITH BIOPSY performed by Saloni Douglass MD at [address] [facility] N/A 2020    COLONOSCOPY WITH BIOPSY performed by Saloni Douglass MD at [address] [facility] ECHO COMPLETE  2013         FOOT Subjective:       Patient ID: Jovanny Olmedo is a 84 y.o. male.    Chief Complaint: Follow-up    Pt is an 84 year old who last time he was having postnasal drip. Pt placed on astelin and flonase and doing better with little to no cough. Pt BP is well controlled    Review of Systems   Constitutional: Negative.    Respiratory: Negative.    Gastrointestinal: Negative.    Genitourinary: Negative.    Musculoskeletal: Negative.    Neurological: Negative.    Psychiatric/Behavioral: Negative.        Objective:      Physical Exam   Constitutional: He is oriented to person, place, and time. He appears well-developed and well-nourished.   Cardiovascular: Normal rate and regular rhythm. Exam reveals no friction rub.   No murmur heard.  Pulmonary/Chest: Effort normal and breath sounds normal.   Abdominal: Soft. Bowel sounds are normal.   Neurological: He is alert and oriented to person, place, and time.   Skin: Skin is warm and dry.       Assessment:       1. Coronary artery disease, occlusive    2. Gastroesophageal reflux disease, esophagitis presence not specified    3. Iron deficiency        Plan:       Coronary artery disease, occlusive  Comments:  Pt BP is well controlled    Gastroesophageal reflux disease, esophagitis presence not specified    Iron deficiency  -     Ferritin; Future; Expected date: 09/12/2019  -     Iron and TIBC; Future; Expected date: 09/12/2019  -     CBC auto differential; Future; Expected date: 09/12/2019          Repeat colonoscopy in 1 year. Keep the bowels soft with fiber.     Physician Signature: Electronically signed by Dr. Aislinn Berry MD

## 2020-06-16 ENCOUNTER — TELEPHONE (OUTPATIENT)
Dept: INTERNAL MEDICINE | Facility: CLINIC | Age: 85
End: 2020-06-16

## 2020-06-16 DIAGNOSIS — R05.9 COUGH: ICD-10-CM

## 2020-06-16 NOTE — TELEPHONE ENCOUNTER
----- Message from Imer Wheatley sent at 6/16/2020 11:18 AM CDT -----  Contact: Pt  Pt called and would like to have a nurse call him back    Pt would like to be tested for COVID-19    Pt can be reached at [phone]

## 2020-06-16 NOTE — TELEPHONE ENCOUNTER
Patient has had exposure to his  brother by COVID.  Patient has developed a cough and would like an order to be tested.

## 2020-06-19 ENCOUNTER — LAB VISIT (OUTPATIENT)
Dept: LAB | Facility: HOSPITAL | Age: 85
End: 2020-06-19
Attending: PHYSICIAN ASSISTANT
Payer: MEDICARE

## 2020-06-19 ENCOUNTER — OFFICE VISIT (OUTPATIENT)
Dept: INTERNAL MEDICINE | Facility: CLINIC | Age: 85
End: 2020-06-19
Payer: MEDICARE

## 2020-06-19 VITALS
BODY MASS INDEX: 32.25 KG/M2 | DIASTOLIC BLOOD PRESSURE: 74 MMHG | SYSTOLIC BLOOD PRESSURE: 112 MMHG | TEMPERATURE: 98 F | HEIGHT: 72 IN | HEART RATE: 82 BPM | WEIGHT: 238.13 LBS | OXYGEN SATURATION: 97 %

## 2020-06-19 DIAGNOSIS — I10 ESSENTIAL HYPERTENSION: Chronic | ICD-10-CM

## 2020-06-19 DIAGNOSIS — Z20.822 EXPOSURE TO COVID-19 VIRUS: ICD-10-CM

## 2020-06-19 DIAGNOSIS — J01.90 ACUTE SINUSITIS, RECURRENCE NOT SPECIFIED, UNSPECIFIED LOCATION: Primary | ICD-10-CM

## 2020-06-19 LAB — SARS-COV-2 IGG SERPLBLD QL IA.RAPID: POSITIVE

## 2020-06-19 PROCEDURE — 99999 PR PBB SHADOW E&M-EST. PATIENT-LVL IV: CPT | Mod: PBBFAC,,, | Performed by: PHYSICIAN ASSISTANT

## 2020-06-19 PROCEDURE — 86769 SARS-COV-2 COVID-19 ANTIBODY: CPT

## 2020-06-19 PROCEDURE — 99214 PR OFFICE/OUTPT VISIT, EST, LEVL IV, 30-39 MIN: ICD-10-PCS | Mod: S$PBB,,, | Performed by: PHYSICIAN ASSISTANT

## 2020-06-19 PROCEDURE — 99999 PR PBB SHADOW E&M-EST. PATIENT-LVL IV: ICD-10-PCS | Mod: PBBFAC,,, | Performed by: PHYSICIAN ASSISTANT

## 2020-06-19 PROCEDURE — 99214 OFFICE O/P EST MOD 30 MIN: CPT | Mod: S$PBB,,, | Performed by: PHYSICIAN ASSISTANT

## 2020-06-19 PROCEDURE — 99214 OFFICE O/P EST MOD 30 MIN: CPT | Mod: PBBFAC | Performed by: PHYSICIAN ASSISTANT

## 2020-06-19 PROCEDURE — 36415 COLL VENOUS BLD VENIPUNCTURE: CPT

## 2020-06-19 RX ORDER — LEVOFLOXACIN 500 MG/1
500 TABLET, FILM COATED ORAL DAILY
Qty: 10 TABLET | Refills: 0 | Status: SHIPPED | OUTPATIENT
Start: 2020-06-19 | End: 2020-06-29

## 2020-06-19 RX ORDER — PROMETHAZINE HYDROCHLORIDE AND DEXTROMETHORPHAN HYDROBROMIDE 6.25; 15 MG/5ML; MG/5ML
5 SYRUP ORAL EVERY 8 HOURS PRN
Qty: 118 ML | Refills: 0 | Status: SHIPPED | OUTPATIENT
Start: 2020-06-19 | End: 2020-06-26

## 2020-06-19 NOTE — PATIENT INSTRUCTIONS
What Is Acute Bronchitis?  Acute bronchitis is when the airways in your lungs (bronchial tubes) become red and swollen (inflamed). It is usually caused by a viral infection. But it can also occur because of a bacteria or allergen. Symptoms include a cough that produces yellow or greenish mucus and can last for days or sometimes weeks.  Inside healthy lungs    Air travels in and out of the lungs through the airways. The linings of these airways produce sticky mucus. This mucus traps particles that enter the lungs. Tiny structures called cilia then sweep the particles out of the airways.     Healthy airway: Airways are normally open. Air moves in and out easily.      Healthy cilia: Tiny, hairlike cilia sweep mucus and particles up and out of the airways.   Lungs with bronchitis  Bronchitis often occurs with a cold or the flu virus. The airways become inflamed (red and swollen). There is a deep hacking cough from the extra mucus. Other symptoms may include:  · Wheezing  · Chest discomfort  · Shortness of breath  · Mild fever  A second infection, this time due to bacteria, may then occur. And airways irritated by allergens or smoke are more likely to get infected.        Inflamed airway: Inflammation and extra mucus narrow the airway, causing shortness of breath.      Impaired cilia: Extra mucus impairs cilia, causing congestion and wheezing. Smoking makes the problem worse.   Making a diagnosis  A physical exam, health history, and certain tests help your healthcare provider make the diagnosis.  Health history  Your healthcare provider will ask you about your symptoms.  The exam  Your provider listens to your chest for signs of congestion. He or she may also check your ears, nose, and throat.  Possible tests  · A sputum test for bacteria. This requires a sample of mucus from your lungs.  · A nasal or throat swab. This tests to see if you have a bacterial infection.  · A chest X-ray. This is done if your healthcare  provider thinks you have pneumonia.  · Tests to check for an underlying condition. Other tests may be done to check for things such as allergies, asthma, or COPD (chronic obstructive pulmonary disease). You may need to see a specialist for more lung function testing.  Treating a cough  The main treatment for bronchitis is easing symptoms. Avoiding smoke, allergens, and other things that trigger coughing can often help. If the infection is bacterial, you may be given antibiotics. During the illness, it's important to get plenty of sleep. To ease symptoms:  · Dont smoke. Also avoid secondhand smoke.  · Use a humidifier. Or try breathing in steam from a hot shower. This may help loosen mucus.  · Drink a lot of water and juice. They can soothe the throat and may help thin mucus.  · Sit up or use extra pillows when in bed. This helps to lessen coughing and congestion.  · Ask your provider about using medicine. Ask about using cough medicine, pain and fever medicine, or a decongestant.  Antibiotics  Most cases of bronchitis are caused by cold or flu viruses. They dont need antibiotics to treat them, even if your mucus is thick and green or yellow. Antibiotics dont treat viral illness and antibiotics have not been shown to have any benefit in cases of acute bronchitis. Taking antibiotics when they are not needed increases your risk of getting an infection later that is antibiotic-resistant. Antibiotics can also cause severe cases of diarrhea that require other antibiotics to treat.  It is important that you accept your healthcare provider's opinion to not use antibiotics. Your provider will prescribe antibiotics if the infection is caused by bacteria. If they are prescribed:  · Take all of the medicine. Take the medicine until it is used up, even if symptoms have improved. If you dont, the bronchitis may come back.  · Take the medicines as directed. For instance, some medicines should be taken with food.  · Ask about  side effects. Ask your provider or pharmacist what side effects are common, and what to do about them.  Follow-up care  You should see your provider again in 2 to 3 weeks. By this time, symptoms should have improved. An infection that lasts longer may mean you have a more serious problem.  Prevention  · Avoid tobacco smoke. If you smoke, quit. Stay away from smoky places. Ask friends and family not to smoke around you, or in your home or car.  · Get checked for allergies.  · Ask your provider about getting a yearly flu shot. Also ask about pneumococcal or pneumonia shots.  · Wash your hands often. This helps reduce the chance of picking up viruses that cause colds and flu.  Call your healthcare provider if:  · Symptoms worsen, or you have new symptoms  · Breathing problems worsen or  become severe  · Symptoms dont get better within a week, or within 3 days of taking antibiotics   Date Last Reviewed: 2/1/2017  © 8176-8272 [employer]. [address] [place] [address]. All rights reserved. This information is not intended as a substitute for professional medical care. Always follow your healthcare professional's instructions.        Cough, Chronic, Uncertain Cause (Adult)    Everyone has had a cough as part of the common cold, flu, or bronchitis. This kind of cough occurs along with an achy feeling, low-grade fever, nasal and sinus congestion, and a scratchy or sore throat. This usually gets better in 2 to 3 weeks. A cough that lasts longer than 3 weeks may be due to other causes.  If your cough does not improve over the next 2 weeks, further testing may be needed. Follow up with your healthcare provider as advised. Cough suppressants may be recommended. Based on your exam today, the exact cause of your cough is not certain. Below are some common causes for persistent cough.  Smokers cough  Smokers cough doesnt go away. If you continue to smoke, it only gets worse. The cough is from  irritation in the air passages. Talk to your healthcare provider about quitting. Medicines or nicotine-replacement products, like gum or the patch, may make quitting easier.  Postnasal drip  A cough that is worse at night may be due to postnasal drip. Excess mucus in the nose drains from the back of your nose to your throat. This triggers the cough reflex. Postnasal drip may be due to a sinus infection or allergy. Common allergens include dust, tobacco smoke (both inhaled and secondhand smoke), environmental pollutants, pollen, mold, pets, cleaning agents, room deodorizers, and chemical fumes. Over-the-counter antihistamines or decongestants may be helpful for allergies. A sinus infection may requires antibiotic treatment. See your healthcare provider if symptoms continue.  Medicines  Certain prescribed medicines can cause a chronic cough in some people:  · ACE inhibitors for high blood pressure. These include benazepril, captopril, enalapril, fosinopril, lisinopril, quinapril, ramipril, and others.  · Beta-blockers for high blood pressure and other conditions. These include propranolol, atenolol, metoprolol, nadolol, and others.  Let your healthcare provider know if you are taking any of these.  Asthma  Cough may be the only sign of mild asthma. You may have tests to find out if asthma is causing your cough. You may also take asthma medicine on a trial basis.  Acid reflux (heartburn, GERD)  The esophagus is the tube that carries food from the mouth to the stomach. A valve at its lower end prevents stomach acids from flowing upward. If this valve does not work properly, acid from the stomach enters the esophagus. This may cause a burning pain in the upper abdomen or lower chest, belching, or cough. Symptoms are often worse when lying flat. Avoid eating or drinking before bedtime. Try using extra pillows to raise your upper body, or place 4-inch blocks under the head of your bed. You may try an over-the-counter  antacid or an acid-blocking medicine such as famotidine, cimetidine, ranitidine, esomeprazole, lansoprazole, or omeprazole. Stronger medicines for this condition can be prescribed by your healthcare provider.  Follow-up care  Follow up with your healthcare provider, or as advised, if your cough does not improve. Further testing may be needed.  Note: If an X-ray was taken, a specialist will review it. You will be notified of any new findings that may affect your care.  When to seek medical advice  Call your healthcare provider right away if any of these occur:  · Mild wheezing or difficulty breathing  · Fever of 100.4ºF (38ºC) or higher, or as directed by your healthcare provider  · Unexpected weight loss  · Coughing up large amounts of colored sputum  · Night sweats (sheets and pajamas get soaking wet)  Call 911, or get immediate medical care  Contact emergency services right away if any of these occur:  · Coughing up blood  · Moderate to severe trouble breathing or wheezing  Date Last Reviewed: 9/13/2015 © 2000-2017 [employer]. [address] [place] [address]. All rights reserved. This information is not intended as a substitute for professional medical care. Always follow your healthcare professional's instructions.        Coughing Techniques    Airway clearance techniques help to remove mucus from your airways. Clearing the airways helps you breathe better and lowers the chance for infection. One method is controlled coughing. Be sure to also use any medicines before or after clearing your airways, as instructed by your healthcare provider. For example, some people use inhaled bronchodilators before clearing their airways.      Note: Be sure to keep a box of tissues beside you. Wash your hands when you are done.   Controlled coughing  Here is how to do it:   · Sit on a chair with both feet on the floor.  · Take a slow, deep breath through your nose. Hold for 2 counts.  · Lean forward  slightly.  · Cough twice--2 short coughs.  · Relax for a few seconds.  Repeat the steps as needed.   The villagran technique  Here is how to do it:   · Sit on a chair with both feet on the floor.  · Take a slow, deep breath through your nose. Hold for 2 counts.  · To breathe out, open your mouth and make a villagran sound in your throat. (This is the same way you might breathe to clean a pair of eyeglasses.)  · Villagran 2 to 3 times as you breathe out.  · Relax for a few seconds.  Repeat the steps as needed.  Follow-up care  Make a follow-up appointment as directed by our staff.     When to call the healthcare provider  Call your healthcare provider right away if you have any of the following:  · Shortness of breath, wheezing, or coughing  · Increased mucus  · Yellow, green, bloody, or smelly mucus  · Fever or chills  · Tightness in your chest that does not go away with rest or medicine  · An irregular heartbeat         Date Last Reviewed: 5/1/2016  © 0590-2298 [employer]. [address] [place] [address], [place] [address]. All rights reserved. This information is not intended as a substitute for professional medical care. Always follow your healthcare professional's instructions.

## 2020-06-19 NOTE — PROGRESS NOTES
Subjective:      Patient ID: Jovanny Olmedo is a 84 y.o. male.    Chief Complaint: Follow-up    Cough  This is a chronic problem. The current episode started more than 1 month ago. The problem has been unchanged. The cough is productive of sputum. Associated symptoms include postnasal drip, rhinorrhea and wheezing. Pertinent negatives include no chest pain, chills, ear congestion, ear pain, fever, headaches, heartburn, hemoptysis, myalgias, nasal congestion, rash, sore throat, shortness of breath, sweats or weight loss. Nothing aggravates the symptoms. Treatments tried: amoxicillin, prednisone, astelin, flonase, mucinex, promethazine DM. The treatment provided mild relief. There is no history of asthma, bronchiectasis, bronchitis, COPD, emphysema, environmental allergies or pneumonia.    Pt has had a cough since January. Occasionally productive of clear sputum. Has been on augmentin which did help while he was on the antibiotic but it came right back once he completed it.   Additionally, pt states that he was exposed to the covid virus months ago. Requesting an antibody test.     Patient Active Problem List   Diagnosis    Essential hypertension    GERD (gastroesophageal reflux disease)    Polycystic kidney disease    Coronary artery disease, occlusive    Chronic kidney disease, stage III (moderate)    Constipation    Refractive error    Microcytic anemia    DDD (degenerative disc disease), lumbar    Open angle with borderline findings, low risk, bilateral    History of coronary angioplasty    Prostate cancer    Cough    Secondary hyperparathyroidism    Elbow pain, right    Pseudophakia    Intermediate stage nonexudative age-related macular degeneration of both eyes    Aortic atherosclerosis    CLARISSA (obstructive sleep apnea)    Duodenal ulcer    Gastritis    History of colon polyps    Dyslipidemia       Review of Systems   Constitutional: Negative for activity change, appetite change, chills,  diaphoresis, fatigue, fever, unexpected weight change and weight loss.   HENT: Positive for postnasal drip and rhinorrhea. Negative for congestion, ear pain, hearing loss, sinus pressure, sinus pain, sneezing, sore throat, tinnitus, trouble swallowing and voice change.    Eyes: Negative.  Negative for visual disturbance.   Respiratory: Positive for cough and wheezing. Negative for apnea, hemoptysis, choking, chest tightness, shortness of breath and stridor.    Cardiovascular: Negative for chest pain, palpitations and leg swelling.   Gastrointestinal: Negative for abdominal distention, abdominal pain, blood in stool, constipation, diarrhea, heartburn, nausea and vomiting.   Endocrine: Negative for cold intolerance, heat intolerance, polydipsia and polyuria.   Genitourinary: Negative.  Negative for difficulty urinating and frequency.   Musculoskeletal: Negative for arthralgias, back pain, gait problem, joint swelling and myalgias.   Skin: Negative for color change, pallor, rash and wound.   Allergic/Immunologic: Negative for environmental allergies.   Neurological: Negative for dizziness, tremors, weakness, light-headedness, numbness and headaches.   Hematological: Negative for adenopathy.   Psychiatric/Behavioral: Negative for behavioral problems, confusion, self-injury, sleep disturbance and suicidal ideas. The patient is not nervous/anxious.      Objective:   /74 (BP Location: Right arm, Patient Position: Sitting, BP Method: Large (Manual))   Pulse 82   Temp 97.7 °F (36.5 °C) (Tympanic)   Ht 6' (1.829 m)   Wt 108 kg (238 lb 1.6 oz)   SpO2 97%   BMI 32.29 kg/m²     Physical Exam  Vitals signs and nursing note reviewed.   Constitutional:       Appearance: Normal appearance. He is well-developed.   HENT:      Head: Normocephalic and atraumatic.      Right Ear: Tympanic membrane, ear canal and external ear normal. There is no impacted cerumen.      Left Ear: Tympanic membrane, ear canal and external ear  normal. There is no impacted cerumen.      Nose: Congestion and rhinorrhea present.      Mouth/Throat:      Mouth: Mucous membranes are moist.      Pharynx: Oropharyngeal exudate and posterior oropharyngeal erythema present.   Eyes:      Conjunctiva/sclera: Conjunctivae normal.      Pupils: Pupils are equal, round, and reactive to light.   Neck:      Musculoskeletal: Normal range of motion and neck supple. No neck rigidity.   Cardiovascular:      Rate and Rhythm: Normal rate and regular rhythm.      Heart sounds: Normal heart sounds. No murmur. No friction rub. No gallop.    Pulmonary:      Effort: Pulmonary effort is normal. No respiratory distress.      Breath sounds: Normal breath sounds. No stridor. No wheezing, rhonchi or rales.   Chest:      Chest wall: No tenderness.   Musculoskeletal: Normal range of motion.   Lymphadenopathy:      Cervical: No cervical adenopathy.   Skin:     General: Skin is warm and dry.      Findings: No rash.   Neurological:      Mental Status: He is alert and oriented to person, place, and time.   Psychiatric:         Behavior: Behavior normal.         Thought Content: Thought content normal.         Judgment: Judgment normal.         Assessment:     1. Acute sinusitis, recurrence not specified, unspecified location    2. Exposure to Covid-19 Virus    3. Essential hypertension      Plan:   Acute sinusitis, recurrence not specified, unspecified location  -     levoFLOXacin (LEVAQUIN) 500 MG tablet; Take 1 tablet (500 mg total) by mouth once daily. for 10 days  Dispense: 10 tablet; Refill: 0  -     promethazine-dextromethorphan (PROMETHAZINE-DM) 6.25-15 mg/5 mL Syrp; Take 5 mLs by mouth every 8 (eight) hours as needed (cough).  Dispense: 118 mL; Refill: 0  -if no improvement with treatment, will check cxr and/or refer to pulm  Educational handout on over-the-counter medications and at-home conservative care, pertinent to the patients diagnosis today, was handed to the patient and  discussed in detail.    Exposure to Covid-19 Virus  -     COVID-19 (SARS CoV-2) IgG Antibody; Future; Expected date: 06/19/2020    Essential hypertension    -Stable and controlled. Continue current treatment plan as previously prescribed with your PCP.       Follow up if symptoms worsen or fail to improve.

## 2020-06-25 ENCOUNTER — TELEPHONE (OUTPATIENT)
Dept: INTERNAL MEDICINE | Facility: CLINIC | Age: 85
End: 2020-06-25

## 2020-06-25 NOTE — TELEPHONE ENCOUNTER
----- Message from Vickie Bray sent at 6/25/2020  3:32 PM CDT -----  Contact: SELF [phone]  Patient would like to speak with you about a personal matter. Please advise

## 2020-07-14 ENCOUNTER — OFFICE VISIT (OUTPATIENT)
Dept: HOME HEALTH SERVICES | Facility: CLINIC | Age: 85
End: 2020-07-14
Payer: MEDICARE

## 2020-07-14 VITALS
DIASTOLIC BLOOD PRESSURE: 83 MMHG | TEMPERATURE: 98 F | HEART RATE: 73 BPM | HEIGHT: 72 IN | BODY MASS INDEX: 32.23 KG/M2 | OXYGEN SATURATION: 97 % | WEIGHT: 238 LBS | SYSTOLIC BLOOD PRESSURE: 120 MMHG

## 2020-07-14 DIAGNOSIS — R05.9 COUGH: ICD-10-CM

## 2020-07-14 DIAGNOSIS — H40.013 OPEN ANGLE WITH BORDERLINE FINDINGS, LOW RISK, BILATERAL: ICD-10-CM

## 2020-07-14 DIAGNOSIS — Z96.1 PSEUDOPHAKIA: ICD-10-CM

## 2020-07-14 DIAGNOSIS — H35.3132 INTERMEDIATE STAGE NONEXUDATIVE AGE-RELATED MACULAR DEGENERATION OF BOTH EYES: ICD-10-CM

## 2020-07-14 DIAGNOSIS — C61 PROSTATE CANCER: ICD-10-CM

## 2020-07-14 DIAGNOSIS — D50.9 MICROCYTIC ANEMIA: ICD-10-CM

## 2020-07-14 DIAGNOSIS — I70.0 AORTIC ATHEROSCLEROSIS: ICD-10-CM

## 2020-07-14 DIAGNOSIS — I25.10 CORONARY ARTERY DISEASE, OCCLUSIVE: Chronic | ICD-10-CM

## 2020-07-14 DIAGNOSIS — Z74.09 OTHER REDUCED MOBILITY: ICD-10-CM

## 2020-07-14 DIAGNOSIS — Z98.61 HISTORY OF CORONARY ANGIOPLASTY: Chronic | ICD-10-CM

## 2020-07-14 DIAGNOSIS — N18.30 CHRONIC KIDNEY DISEASE, STAGE III (MODERATE): ICD-10-CM

## 2020-07-14 DIAGNOSIS — M51.36 DDD (DEGENERATIVE DISC DISEASE), LUMBAR: ICD-10-CM

## 2020-07-14 DIAGNOSIS — I10 ESSENTIAL HYPERTENSION: Chronic | ICD-10-CM

## 2020-07-14 DIAGNOSIS — N25.81 SECONDARY HYPERPARATHYROIDISM: ICD-10-CM

## 2020-07-14 DIAGNOSIS — K21.9 GASTROESOPHAGEAL REFLUX DISEASE, ESOPHAGITIS PRESENCE NOT SPECIFIED: ICD-10-CM

## 2020-07-14 DIAGNOSIS — Z00.00 ENCOUNTER FOR PREVENTIVE HEALTH EXAMINATION: Primary | ICD-10-CM

## 2020-07-14 DIAGNOSIS — G47.33 OSA (OBSTRUCTIVE SLEEP APNEA): ICD-10-CM

## 2020-07-14 DIAGNOSIS — E78.5 DYSLIPIDEMIA: Chronic | ICD-10-CM

## 2020-07-14 PROCEDURE — G0439 PR MEDICARE ANNUAL WELLNESS SUBSEQUENT VISIT: ICD-10-PCS | Mod: S$GLB,,, | Performed by: NURSE PRACTITIONER

## 2020-07-14 PROCEDURE — G0439 PPPS, SUBSEQ VISIT: HCPCS | Mod: S$GLB,,, | Performed by: NURSE PRACTITIONER

## 2020-07-14 RX ORDER — PANTOPRAZOLE SODIUM 40 MG/1
40 TABLET, DELAYED RELEASE ORAL DAILY
Qty: 30 TABLET | Refills: 1 | Status: SHIPPED | OUTPATIENT
Start: 2020-07-14 | End: 2020-12-11 | Stop reason: SDUPTHER

## 2020-07-14 NOTE — Clinical Note
Completed. Health maintenance: tetanus, pneumonia and shingles vaccine due. Patient to get these at pharmacy or PCP's office. Colonoscopy due. Patient to follow up with PCP or GI to schedule.

## 2020-07-15 NOTE — PROGRESS NOTES
Jovanny Thompsonney presented for Medicare AW today. The following components were reviewed and updated:    · Medical history  · Family History  · Social history  · Allergies and Current Medications  · Health Risk Assessment  · Health Maintenance  · Care Team    **See Completed Assessments for Annual Wellness visit with in the encounter summary    The following assessments were completed:  · Depression Screening  · Cognitive function Screening      · Timed Get Up Test  · Whisper Test    Vitals:    07/14/20 0850   BP: 120/83   Pulse: 73   Temp: 97.7 °F (36.5 °C)   TempSrc: Temporal   SpO2: 97%   Weight: 108 kg (238 lb)   Height: 6' (1.829 m)     Body mass index is 32.28 kg/m².   ]    Physical Exam  Vitals signs reviewed.   Constitutional:       Appearance: Normal appearance. He is obese.   HENT:      Head: Normocephalic and atraumatic.   Neck:      Musculoskeletal: Normal range of motion and neck supple.   Cardiovascular:      Rate and Rhythm: Normal rate and regular rhythm.      Pulses: Normal pulses.      Heart sounds: Normal heart sounds.   Pulmonary:      Effort: Pulmonary effort is normal.      Breath sounds: Normal breath sounds.   Abdominal:      Comments: Rounded     Musculoskeletal: Normal range of motion.      Comments: Limited rom to back     Skin:     General: Skin is warm and dry.   Neurological:      Mental Status: He is alert and oriented to person, place, and time.   Psychiatric:         Mood and Affect: Mood normal.         Behavior: Behavior normal.          Outpatient Medications Marked as Taking for the 7/14/20 encounter (Office Visit) with MAGI Shepherd   Medication Sig Dispense Refill    aspirin (ECOTRIN) 81 MG EC tablet Take 81 mg by mouth once daily.      atorvastatin (LIPITOR) 40 MG tablet TAKE 1 TABLET BY MOUTH ONCE DAILY 90 tablet 3    azelastine (ASTELIN) 137 mcg (0.1 %) nasal spray 2 sprays (274 mcg total) by Nasal route 2 (two) times daily. 30 mL 11     benazepril-hydrochlorthiazide (LOTENSIN HCT) 10-12.5 mg Tab Take 1 tablet by mouth twice daily 180 tablet 3    clopidogreL (PLAVIX) 75 mg tablet Take 1 tablet by mouth once daily 90 tablet 2    clotrimazole-betamethasone 1-0.05% (LOTRISONE) cream Apply topically 2 (two) times daily. (Patient taking differently: Apply topically 2 (two) times daily as needed. ) 45 g 0    famotidine (PEPCID) 20 MG tablet Take 1 tablet (20 mg total) by mouth 2 (two) times daily as needed for Heartburn. 180 tablet 1    ferrous sulfate 325 (65 FE) MG EC tablet Take 325 mg by mouth once daily.       fluticasone propionate (FLONASE) 50 mcg/actuation nasal spray 2 sprays (100 mcg total) by Each Nostril route once daily. 16 g 0        Diagnoses and health risks identified today and associated recommendations/orders:  1. Encounter for preventive health examination  Completed. Health maintenance: tetanus, pneumonia and shingles vaccine due. Patient to get these at pharmacy or PCP's office. Colonoscopy due. Patient to follow up with PCP or GI to schedule.     2. Essential hypertension  Chronic and stable. No acute issues. Continue current management. See med list above. Low sodium diet. Follow up with PCP.    3. Dyslipidemia  Chronic and stable. No acute issues.   Lab Results   Component Value Date    CHOL 161 08/13/2019    CHOL 155 11/12/2015    CHOL 161 06/17/2015     Lab Results   Component Value Date    HDL 38 (L) 08/13/2019    HDL 38 (L) 11/12/2015    HDL 40 06/17/2015     Lab Results   Component Value Date    LDLCALC 97.4 08/13/2019    LDLCALC 93.6 11/12/2015    LDLCALC 100.6 06/17/2015     Lab Results   Component Value Date    TRIG 128 08/13/2019    TRIG 117 11/12/2015    TRIG 102 06/17/2015     Lab Results   Component Value Date    CHOLHDL 23.6 08/13/2019    CHOLHDL 24.5 11/12/2015    CHOLHDL 24.8 06/17/2015   Continue current management on lipitor. Follow up with PCP.    4. Aortic atherosclerosis  Chronic and stable. No acute  issues. Continue current management on aspirin, plavix, lipitor and bp control. Follow up with PCP.    5. Coronary artery disease, occlusive; 6. History of coronary angioplasty  Chronic and stable. No acute issues. Continue current management aspirin, plavix, lipitor and bp control. . Follow up with PCP.    7. Chronic kidney disease, stage III (moderate)  Chronic and stable. No acute issues. Continue current management. Avoid NSAIDS. Follow up with PCP.    8. Microcytic anemia  Chronic and stable. No acute issues. Continue current management on iron supplement. Follow up with PCP.    9. Prostate cancer s/p radiation  Chronic and stable. No acute issues. Continue current management. Follow up with urology.     10. Secondary hyperparathyroidism  Chronic and stable. No acute issues.   Lab Results   Component Value Date    CALCIUM 10.5 05/23/2019    PHOS 3.5 03/21/2012    Continue current management. Follow up with PCP.    11. CLARISSA (obstructive sleep apnea)  Chronic and stable. No acute issues. Continue current management. Follow up with PCP.    12. Gastroesophageal reflux disease, esophagitis presence not specified  Chronic and stable. No acute issues. Continue current management. Follow up with PCP.  - pantoprazole (PROTONIX) 40 MG tablet; Take 1 tablet (40 mg total) by mouth once daily.  Dispense: 30 tablet; Refill: 1    13. Pseudophakia  Chronic and stable. No acute issues. Continue current management. Follow up with ophthalmology.    14. DDD (degenerative disc disease), lumbar  Chronic and stable. No acute issues. Continue current management. Pain controlled. Follow up with PCP.    15. Intermediate stage nonexudative age-related macular degeneration of both eyes  Chronic and stable. No acute issues. Continue current management. Follow up with ophthalmology.     16. Open angle with borderline findings, low risk, bilateral  Chronic and stable. No acute issues. Continue current management. Follow up with ophthalmology.      17. Cough  Chronic and stable. No acute issues. Continue current management. Allergies vs. GERD. Continue azelastine and flonase. Consider zyrtec if no improvement. Rx sent to pharmacy for GERD. Follow up with PCP.    18. Other reduced mobility  Chronic and stable. No acute issues. Continue current management. Fall precautions. Follow up with PCP.        Provided Jovanny with a 5-10 year written screening schedule and personal prevention plan. Recommendations were developed using the USPSTF age appropriate recommendations. Education, counseling, and referrals were provided as needed.  After Visit Summary printed and given to patient which includes a list of additional screenings\tests needed.    Follow up in about 1 year (around 7/14/2021) for annual wellness visit.      MAGI Shepherd    I offered to discuss end of life issues, including information on how to make advance directives that the patient could use to name someone who would make medical decisions on their behalf if they became too ill to make themselves.    ___Patient declined  _X_Patient is interested,  will get paperwork at PCP's office.

## 2020-07-15 NOTE — PATIENT INSTRUCTIONS
Counseling and Referral of Other Preventative  (Italic type indicates deductible and co-insurance are waived)    Patient Name: Jovanny Olmedo  Today's Date: 7/14/2020    Health Maintenance       Date Due Completion Date    TETANUS VACCINE 09/11/1953 ---    Shingles Vaccine (1 of 2) 09/11/1986 ---    Pneumococcal Vaccine (65+ High/Highest Risk) (2 of 2 - PPSV23) 10/08/2019 8/13/2019    Colonoscopy 12/03/2019 12/3/2014    Influenza Vaccine (1) 09/01/2020 ---    Aspirin/Antiplatelet Therapy 07/14/2021 7/14/2020    Lipid Panel 08/13/2024 8/13/2019        No orders of the defined types were placed in this encounter.    The following information is provided to all patients.  This information is to help you find resources for any of the problems found today that may be affecting your health:                Living healthy guide: www.[facility].[place].gov      Understanding Diabetes: www.diabetes.org      Eating healthy: www.cdc.gov/healthyweight      [facility] home safety checklist: www.cdc.gov/steadi/patient.html      Agency on Aging: www.goea.[place].[facility]      Alcoholics anonymous (AA): www.aa.org      Physical Activity: www.angelito.nih.gov/ab4zvde      Tobacco use: www.quitwithusla.org

## 2020-08-20 ENCOUNTER — TELEPHONE (OUTPATIENT)
Dept: GASTROENTEROLOGY | Facility: CLINIC | Age: 85
End: 2020-08-20

## 2020-08-20 NOTE — TELEPHONE ENCOUNTER
Patient unable to do virtual visit. I offered to schedule an in clinic appointment for colon consult but patient declined stating that he will call back after he arranges transportation.

## 2020-08-20 NOTE — TELEPHONE ENCOUNTER
----- Message from Tania Dailey PA-C sent at 8/20/2020  1:11 PM CDT -----  Regarding: RE: Schedule a Colonoscopy  I would like at least a video visit.   ----- Message -----  From: Vanessa Cowan MA  Sent: 8/19/2020   9:49 AM CDT  To: Tania Dailey PA-C  Subject: FW: Schedule a Colonoscopy                       Spoke with patient in regards to scope. Patient seen in clinic 6 months ago for colon screen, scope scheduled but patient canceled due to covid concerns. Patient wanting order to be placed to get rescheduled for scope. Patient states that he is still having issues with constipation but does not want to come in for a clinic visit due to covid.   ----- Message -----  From: Dwaine Pizarro  Sent: 8/19/2020   8:56 AM CDT  To: [facility] Staff  Subject: Schedule a Colonoscopy                           Type:  Sooner Apoointment Request    Caller is requesting a sooner appointment.  Caller declined first available appointment listed below.  Caller will not accept being placed on the waitlist and is requesting a message be sent to doctor.  Name of Caller: Pt   When is the first available appointment? N/a  Symptoms:schedule a colonoscopy   Would the patient rather a call back or a response via [facility]? Call back   Best Call Back Number: [phone] (home)   Additional Information: n/a

## 2020-08-25 ENCOUNTER — LAB VISIT (OUTPATIENT)
Dept: LAB | Facility: HOSPITAL | Age: 85
End: 2020-08-25
Attending: PHYSICIAN ASSISTANT
Payer: MEDICARE

## 2020-08-25 ENCOUNTER — TELEPHONE (OUTPATIENT)
Dept: GASTROENTEROLOGY | Facility: CLINIC | Age: 85
End: 2020-08-25

## 2020-08-25 ENCOUNTER — PATIENT OUTREACH (OUTPATIENT)
Dept: ADMINISTRATIVE | Facility: OTHER | Age: 85
End: 2020-08-25

## 2020-08-25 ENCOUNTER — OFFICE VISIT (OUTPATIENT)
Dept: GASTROENTEROLOGY | Facility: CLINIC | Age: 85
End: 2020-08-25
Payer: MEDICARE

## 2020-08-25 VITALS
BODY MASS INDEX: 32.25 KG/M2 | HEART RATE: 72 BPM | WEIGHT: 238.13 LBS | SYSTOLIC BLOOD PRESSURE: 128 MMHG | DIASTOLIC BLOOD PRESSURE: 80 MMHG | HEIGHT: 72 IN

## 2020-08-25 DIAGNOSIS — E61.1 IRON DEFICIENCY: ICD-10-CM

## 2020-08-25 DIAGNOSIS — Z80.0 FAMILY HISTORY OF COLON CANCER IN FATHER: Primary | ICD-10-CM

## 2020-08-25 DIAGNOSIS — Z12.11 COLON CANCER SCREENING: ICD-10-CM

## 2020-08-25 LAB
BASOPHILS # BLD AUTO: 0.03 K/UL (ref 0–0.2)
BASOPHILS NFR BLD: 0.5 % (ref 0–1.9)
DIFFERENTIAL METHOD: ABNORMAL
EOSINOPHIL # BLD AUTO: 0.2 K/UL (ref 0–0.5)
EOSINOPHIL NFR BLD: 3.5 % (ref 0–8)
ERYTHROCYTE [DISTWIDTH] IN BLOOD BY AUTOMATED COUNT: 13.4 % (ref 11.5–14.5)
FERRITIN SERPL-MCNC: 120 NG/ML (ref 20–300)
HCT VFR BLD AUTO: 45.3 % (ref 40–54)
HGB BLD-MCNC: 14.1 G/DL (ref 14–18)
IMM GRANULOCYTES # BLD AUTO: 0.02 K/UL (ref 0–0.04)
IMM GRANULOCYTES NFR BLD AUTO: 0.3 % (ref 0–0.5)
IRON SERPL-MCNC: 103 UG/DL (ref 45–160)
LYMPHOCYTES # BLD AUTO: 1.8 K/UL (ref 1–4.8)
LYMPHOCYTES NFR BLD: 30.6 % (ref 18–48)
MCH RBC QN AUTO: 30.5 PG (ref 27–31)
MCHC RBC AUTO-ENTMCNC: 31.1 G/DL (ref 32–36)
MCV RBC AUTO: 98 FL (ref 82–98)
MONOCYTES # BLD AUTO: 0.6 K/UL (ref 0.3–1)
MONOCYTES NFR BLD: 9.6 % (ref 4–15)
NEUTROPHILS # BLD AUTO: 3.3 K/UL (ref 1.8–7.7)
NEUTROPHILS NFR BLD: 55.5 % (ref 38–73)
NRBC BLD-RTO: 0 /100 WBC
PLATELET # BLD AUTO: 140 K/UL (ref 150–350)
PMV BLD AUTO: 12.1 FL (ref 9.2–12.9)
RBC # BLD AUTO: 4.63 M/UL (ref 4.6–6.2)
SATURATED IRON: 32 % (ref 20–50)
TOTAL IRON BINDING CAPACITY: 321 UG/DL (ref 250–450)
TRANSFERRIN SERPL-MCNC: 217 MG/DL (ref 200–375)
WBC # BLD AUTO: 5.94 K/UL (ref 3.9–12.7)

## 2020-08-25 PROCEDURE — 82728 ASSAY OF FERRITIN: CPT

## 2020-08-25 PROCEDURE — 99213 OFFICE O/P EST LOW 20 MIN: CPT | Mod: PBBFAC | Performed by: PHYSICIAN ASSISTANT

## 2020-08-25 PROCEDURE — 36415 COLL VENOUS BLD VENIPUNCTURE: CPT

## 2020-08-25 PROCEDURE — 83540 ASSAY OF IRON: CPT

## 2020-08-25 PROCEDURE — 85025 COMPLETE CBC W/AUTO DIFF WBC: CPT

## 2020-08-25 PROCEDURE — 99214 PR OFFICE/OUTPT VISIT, EST, LEVL IV, 30-39 MIN: ICD-10-PCS | Mod: S$PBB,,, | Performed by: PHYSICIAN ASSISTANT

## 2020-08-25 PROCEDURE — 99999 PR PBB SHADOW E&M-EST. PATIENT-LVL III: CPT | Mod: PBBFAC,,, | Performed by: PHYSICIAN ASSISTANT

## 2020-08-25 PROCEDURE — 99214 OFFICE O/P EST MOD 30 MIN: CPT | Mod: S$PBB,,, | Performed by: PHYSICIAN ASSISTANT

## 2020-08-25 PROCEDURE — 99999 PR PBB SHADOW E&M-EST. PATIENT-LVL III: ICD-10-PCS | Mod: PBBFAC,,, | Performed by: PHYSICIAN ASSISTANT

## 2020-08-25 NOTE — PROGRESS NOTES
Subjective:      Patient ID: Jovanny Olmedo is a 84 y.o. male.    Chief Complaint: Constipation    HPI:  Patient reports today for follow up of constipation in addition to rescheduling his colonoscopy. Last saw patient in  November 2019. He was struggling with constipation at the time which was improved with prunes and raisins. I instructed him to take Miralax daily which he has been doing. This seems to have his bowels well controlled, as he has a BM once daily on average. He notes some continues gas. He is not taking any medication such as Gas X. Denies any blood in the stool. Some black stools but he is on iron supplement. Last labs in December 2019 show iron elevated and hemoglobin normal. Will repeat today. Father with CRC - patient was due for colonoscopy in November 2019 - was scheduled but canceled the procedure himself due to COVID. Inquiring about rescheduling.    Review of Systems   Constitutional: Negative for activity change, appetite change, chills, diaphoresis, fatigue, fever and unexpected weight change.   HENT: Negative for trouble swallowing.    Respiratory: Positive for shortness of breath (very occasional). Negative for cough.    Cardiovascular: Negative for chest pain.   Gastrointestinal: Positive for constipation (well controlled). Negative for abdominal distention, abdominal pain, blood in stool, diarrhea, nausea and vomiting.   Genitourinary: Negative for dysuria and hematuria.   Neurological: Negative for dizziness, weakness and light-headedness.   Psychiatric/Behavioral: Negative for dysphoric mood. The patient is not nervous/anxious.        Medical History: Reviewed    Social History: Reviewed    Allergies: Reviewed    Objective:     Physical Exam  Constitutional:       General: He is not in acute distress.     Appearance: Normal appearance. He is not ill-appearing, toxic-appearing or diaphoretic.   HENT:      Head: Normocephalic and atraumatic.   Eyes:      Extraocular Movements:  Extraocular movements intact.   Neck:      Musculoskeletal: Normal range of motion.   Cardiovascular:      Rate and Rhythm: Normal rate and regular rhythm.      Heart sounds: Normal heart sounds.   Pulmonary:      Effort: Pulmonary effort is normal. No respiratory distress.      Breath sounds: Normal breath sounds. No wheezing.   Abdominal:      General: Bowel sounds are normal. There is no distension.      Palpations: Abdomen is soft. There is no mass.      Tenderness: There is no abdominal tenderness. There is no guarding or rebound.   Musculoskeletal: Normal range of motion.   Skin:     General: Skin is warm and dry.      Coloration: Skin is not pale.      Findings: No erythema.   Neurological:      General: No focal deficit present.      Mental Status: He is alert and oriented to person, place, and time.   Psychiatric:         Mood and Affect: Mood normal.         Behavior: Behavior normal.         Thought Content: Thought content normal.         Judgment: Judgment normal.         Assessment:     1. Family history of colon cancer in father    2. Colon cancer screening    3. Iron deficiency        Plan:     -Schedule colonoscopy. Will obtain clearance for patient to come off of Plavix prior to procedure.  -Still has prep from previously scheduled scope.  -Continue Miralax daily. Patient states this is working well for him.  -Gas X for bloating/gas.  -Repeat CBC and iron studies today. If iron remains elevated, will reach out to PCP. If low, may add EGD.    Jovanny was seen today for constipation.    Diagnoses and all orders for this visit:    Family history of colon cancer in father  -     Case request GI: COLONOSCOPY  -     COVID-19 Routine Screening; Future    Colon cancer screening  -     Case request GI: COLONOSCOPY  -     COVID-19 Routine Screening; Future    Iron deficiency  -     CBC auto differential; Future  -     IRON AND TIBC; Future  -     Ferritin; Future  -     COVID-19 Routine Screening;  Future        No follow-ups on file.    Thank you for the opportunity to participate in the care of this patient.   Tania Dailey PA-C.

## 2020-08-25 NOTE — PROGRESS NOTES
Health Maintenance Due   Topic Date Due    TETANUS VACCINE  09/11/1953    Shingles Vaccine (1 of 2) 09/11/1986    Pneumococcal Vaccine (65+ High/Highest Risk) (2 of 2 - PPSV23) 10/08/2019    Colonoscopy  12/03/2019     Updates were requested from care everywhere.  Chart was reviewed for overdue Proactive [facility] Encounters ([facility]) topics (CRS, Breast Cancer Screening, Eye exam)  Health Maintenance has been updated.  LINKS immunization registry triggered.  Immunizations were reconciled.  GI appt 8/25/20.

## 2020-08-25 NOTE — TELEPHONE ENCOUNTER
Requesting CARDs Clearance for colonoscopy scheduled 9/24/2020.    Patient currently taking Plavix 75mg daily and ASA 81mg daily. When can patient safely stop their medication prior to procedure?

## 2020-09-21 ENCOUNTER — LAB VISIT (OUTPATIENT)
Dept: OTOLARYNGOLOGY | Facility: CLINIC | Age: 85
End: 2020-09-21
Payer: MEDICARE

## 2020-09-21 DIAGNOSIS — Z80.0 FAMILY HISTORY OF COLON CANCER IN FATHER: ICD-10-CM

## 2020-09-21 DIAGNOSIS — E61.1 IRON DEFICIENCY: ICD-10-CM

## 2020-09-21 DIAGNOSIS — Z12.11 COLON CANCER SCREENING: ICD-10-CM

## 2020-09-21 PROCEDURE — U0003 INFECTIOUS AGENT DETECTION BY NUCLEIC ACID (DNA OR RNA); SEVERE ACUTE RESPIRATORY SYNDROME CORONAVIRUS 2 (SARS-COV-2) (CORONAVIRUS DISEASE [COVID-19]), AMPLIFIED PROBE TECHNIQUE, MAKING USE OF HIGH THROUGHPUT TECHNOLOGIES AS DESCRIBED BY CMS-2020-01-R: HCPCS

## 2020-09-22 LAB — SARS-COV-2 RNA RESP QL NAA+PROBE: NOT DETECTED

## 2020-09-24 ENCOUNTER — ANESTHESIA EVENT (OUTPATIENT)
Dept: ENDOSCOPY | Facility: HOSPITAL | Age: 85
End: 2020-09-24
Payer: MEDICARE

## 2020-09-24 ENCOUNTER — HOSPITAL ENCOUNTER (OUTPATIENT)
Facility: HOSPITAL | Age: 85
Discharge: HOME | End: 2020-09-24
Attending: INTERNAL MEDICINE | Admitting: INTERNAL MEDICINE
Payer: MEDICARE

## 2020-09-24 ENCOUNTER — ANESTHESIA (OUTPATIENT)
Dept: ENDOSCOPY | Facility: HOSPITAL | Age: 85
End: 2020-09-24
Payer: MEDICARE

## 2020-09-24 VITALS
OXYGEN SATURATION: 95 % | RESPIRATION RATE: 18 BRPM | BODY MASS INDEX: 31.57 KG/M2 | HEART RATE: 79 BPM | SYSTOLIC BLOOD PRESSURE: 133 MMHG | WEIGHT: 232.81 LBS | TEMPERATURE: 98 F | DIASTOLIC BLOOD PRESSURE: 83 MMHG

## 2020-09-24 DIAGNOSIS — Z12.11 COLON CANCER SCREENING: Primary | ICD-10-CM

## 2020-09-24 DIAGNOSIS — Z86.010 HISTORY OF COLON POLYPS: ICD-10-CM

## 2020-09-24 PROCEDURE — 37000009 HC ANESTHESIA EA ADD 15 MINS: Performed by: INTERNAL MEDICINE

## 2020-09-24 PROCEDURE — 25000003 PHARM REV CODE 250: Performed by: NURSE ANESTHETIST, CERTIFIED REGISTERED

## 2020-09-24 PROCEDURE — 45380 COLONOSCOPY AND BIOPSY: CPT | Performed by: INTERNAL MEDICINE

## 2020-09-24 PROCEDURE — 45380 PR COLONOSCOPY,BIOPSY: ICD-10-PCS | Mod: 59,,, | Performed by: INTERNAL MEDICINE

## 2020-09-24 PROCEDURE — 37000008 HC ANESTHESIA 1ST 15 MINUTES: Performed by: INTERNAL MEDICINE

## 2020-09-24 PROCEDURE — 88305 TISSUE EXAM BY PATHOLOGIST: ICD-10-PCS | Mod: 26,,, | Performed by: PATHOLOGY

## 2020-09-24 PROCEDURE — 27201089 HC SNARE, DISP (ANY): Performed by: INTERNAL MEDICINE

## 2020-09-24 PROCEDURE — 45380 COLONOSCOPY AND BIOPSY: CPT | Mod: 59,,, | Performed by: INTERNAL MEDICINE

## 2020-09-24 PROCEDURE — 63600175 PHARM REV CODE 636 W HCPCS: Performed by: NURSE ANESTHETIST, CERTIFIED REGISTERED

## 2020-09-24 PROCEDURE — 45385 COLONOSCOPY W/LESION REMOVAL: CPT | Mod: PT,,, | Performed by: INTERNAL MEDICINE

## 2020-09-24 PROCEDURE — 45385 PR COLONOSCOPY,REMV LESN,SNARE: ICD-10-PCS | Mod: PT,,, | Performed by: INTERNAL MEDICINE

## 2020-09-24 PROCEDURE — 88305 TISSUE EXAM BY PATHOLOGIST: CPT | Mod: 59 | Performed by: PATHOLOGY

## 2020-09-24 PROCEDURE — 88305 TISSUE EXAM BY PATHOLOGIST: CPT | Mod: 26,,, | Performed by: PATHOLOGY

## 2020-09-24 PROCEDURE — 45385 COLONOSCOPY W/LESION REMOVAL: CPT | Performed by: INTERNAL MEDICINE

## 2020-09-24 PROCEDURE — 27201012 HC FORCEPS, HOT/COLD, DISP: Performed by: INTERNAL MEDICINE

## 2020-09-24 RX ORDER — SODIUM CHLORIDE, SODIUM LACTATE, POTASSIUM CHLORIDE, CALCIUM CHLORIDE 600; 310; 30; 20 MG/100ML; MG/100ML; MG/100ML; MG/100ML
INJECTION, SOLUTION INTRAVENOUS CONTINUOUS
Status: DISCONTINUED | OUTPATIENT
Start: 2020-09-24 | End: 2020-09-24 | Stop reason: HOSPADM

## 2020-09-24 RX ORDER — PROPOFOL 10 MG/ML
VIAL (ML) INTRAVENOUS
Status: DISCONTINUED | OUTPATIENT
Start: 2020-09-24 | End: 2020-09-24

## 2020-09-24 RX ORDER — LIDOCAINE HYDROCHLORIDE 10 MG/ML
INJECTION, SOLUTION EPIDURAL; INFILTRATION; INTRACAUDAL; PERINEURAL
Status: DISCONTINUED | OUTPATIENT
Start: 2020-09-24 | End: 2020-09-24

## 2020-09-24 RX ORDER — SODIUM CHLORIDE, SODIUM LACTATE, POTASSIUM CHLORIDE, CALCIUM CHLORIDE 600; 310; 30; 20 MG/100ML; MG/100ML; MG/100ML; MG/100ML
INJECTION, SOLUTION INTRAVENOUS CONTINUOUS PRN
Status: DISCONTINUED | OUTPATIENT
Start: 2020-09-24 | End: 2020-09-24

## 2020-09-24 RX ADMIN — PROPOFOL 40 MG: 10 INJECTION, EMULSION INTRAVENOUS at 10:09

## 2020-09-24 RX ADMIN — PROPOFOL 50 MG: 10 INJECTION, EMULSION INTRAVENOUS at 09:09

## 2020-09-24 RX ADMIN — SODIUM CHLORIDE, SODIUM LACTATE, POTASSIUM CHLORIDE, AND CALCIUM CHLORIDE: 600; 310; 30; 20 INJECTION, SOLUTION INTRAVENOUS at 09:09

## 2020-09-24 RX ADMIN — LIDOCAINE HYDROCHLORIDE 50 MG: 10 INJECTION, SOLUTION EPIDURAL; INFILTRATION; INTRACAUDAL; PERINEURAL at 09:09

## 2020-09-24 NOTE — ANESTHESIA RELEASE NOTE
Anesthesia Release from PACU Note    Patient: Jovanny Olmedo    Procedure(s) Performed: Procedure(s) (LRB):  COLONOSCOPY (N/A)    Anesthesia type: MAC    Post pain: Adequate analgesia    Post assessment: no apparent anesthetic complications and tolerated procedure well    Last Vitals:   Visit Vitals  /83 (BP Location: Left arm, Patient Position: Lying)   Pulse 79   Temp 36.7 °C (98.1 °F) (Temporal)   Resp 18   Wt 105.6 kg (232 lb 12.9 oz)   SpO2 95%   BMI 31.57 kg/m²       Post vital signs: stable    Level of consciousness: awake, alert  and oriented    Nausea/Vomiting: no nausea/no vomiting    Complications: none    Airway Patency: patent    Respiratory: unassisted, spontaneous ventilation, room air    Cardiovascular: stable and blood pressure at baseline    Hydration: euvolemic

## 2020-09-24 NOTE — BRIEF OP NOTE
Endoscopy Discharge Summary      Admit Date: 9/24/2020    Discharge Date and Time:  9/24/2020 10:18 AM    Attending Physician: Shayy Melvin MD     Discharge Physician: Shayy Melvin MD     Principal Admitting Diagnoses: Colon cancer screening         Discharge Diagnosis: The primary encounter diagnosis was Colon cancer screening. A diagnosis of History of colon polyps was also pertinent to this visit.     Discharged Condition: Good    Indication for Admission: Colon cancer screening     Hospital Course: Patient was admitted for an inpatient procedure and tolerated the procedure well with no complications.    Significant Diagnostic Studies: Colonoscopy with polypectomy    Pathology (if any):  Specimen (12h ago, onward)    None          Estimated Blood Loss: 1 ml.    Discussed with: patient.    Disposition: Home.    Follow Up/Patient Instructions:   Current Discharge Medication List      CONTINUE these medications which have NOT CHANGED    Details   aspirin (ECOTRIN) 81 MG EC tablet Take 81 mg by mouth once daily.      atorvastatin (LIPITOR) 40 MG tablet TAKE 1 TABLET BY MOUTH ONCE DAILY  Qty: 90 tablet, Refills: 3      azelastine (ASTELIN) 137 mcg (0.1 %) nasal spray 2 sprays (274 mcg total) by Nasal route 2 (two) times daily.  Qty: 30 mL, Refills: 11      benazepril-hydrochlorthiazide (LOTENSIN HCT) 10-12.5 mg Tab Take 1 tablet by mouth twice daily  Qty: 180 tablet, Refills: 3      clotrimazole-betamethasone 1-0.05% (LOTRISONE) cream Apply topically 2 (two) times daily.  Qty: 45 g, Refills: 0      famotidine (PEPCID) 20 MG tablet Take 1 tablet (20 mg total) by mouth 2 (two) times daily as needed for Heartburn.  Qty: 180 tablet, Refills: 1      ferrous sulfate 325 (65 FE) MG EC tablet Take 325 mg by mouth once daily.       fluticasone propionate (FLONASE) 50 mcg/actuation nasal spray Use 2 spray(s) in each nostril once daily  Qty: 16 g, Refills: 3      clopidogreL (PLAVIX) 75 mg tablet Take 1 tablet by mouth  once daily  Qty: 90 tablet, Refills: 2      pantoprazole (PROTONIX) 40 MG tablet Take 1 tablet (40 mg total) by mouth once daily.  Qty: 30 tablet, Refills: 1    Associated Diagnoses: Gastroesophageal reflux disease, esophagitis presence not specified      sildenafil (VIAGRA) 50 MG tablet Take 1 tablet (50 mg total) by mouth daily as needed for Erectile Dysfunction.  Qty: 30 tablet, Refills: 11             Discharge Procedure Orders   Diet general     Call MD for:  temperature >100.4     Call MD for:  persistent nausea and vomiting     Call MD for:  severe uncontrolled pain     Call MD for:  difficulty breathing, headache or visual disturbances     Activity as tolerated       Follow-up Information     Shayy Melvin MD. Call in 1 week.    Specialty: Gastroenterology  Why: For pathology results  Contact information:  [address] [facility] [place] [address] [phone]

## 2020-09-24 NOTE — DISCHARGE INSTRUCTIONS

## 2020-09-24 NOTE — TRANSFER OF CARE
Anesthesia Transfer of Care Note    Patient: Jovanny Olmedo    Procedure(s) Performed: Procedure(s) (LRB):  COLONOSCOPY (N/A)    Patient location: GI    Anesthesia Type: MAC    Transport from OR: Transported from OR on room air with adequate spontaneous ventilation    Post pain: adequate analgesia    Post assessment: no apparent anesthetic complications and tolerated procedure well    Post vital signs: stable    Level of consciousness: awake, alert and oriented    Nausea/Vomiting: no nausea/vomiting    Complications: none    Transfer of care protocol was followed      Last vitals:   Visit Vitals  BP (!) 144/80 (BP Location: Left arm, Patient Position: Lying)   Pulse 79   Temp 36.5 °C (97.7 °F) (Temporal)   Resp 18   Wt 105.6 kg (232 lb 12.9 oz)   BMI 31.57 kg/m²

## 2020-09-24 NOTE — [employer] PATIENT INSTRUCTIONS
Discharge Summary/Instructions after an Endoscopic Procedure  Patient Name: Jovanny Olmedo  Patient MRN: [id number]  Patient YOB: 1935 Thursday, September 24, 2020 Shayy Melvin MD  RESTRICTIONS:  During your procedure today, you received medications for sedation.  These   medications may affect your judgment, balance and coordination.  Therefore,   for 24 hours, you have the following restrictions:   - DO NOT drive a car, operate machinery, make legal/financial decisions,   sign important papers or drink alcohol.    ACTIVITY:  Today: no heavy lifting, straining or running due to procedural   sedation/anesthesia.  The following day: return to full activity including work.  DIET:  Eat and drink normally unless instructed otherwise.     TREATMENT FOR COMMON SIDE EFFECTS:  - Mild abdominal pain, nausea, belching, bloating or excessive gas:  rest,   eat lightly and use a heating pad.  - Sore Throat: treat with throat lozenges and/or gargle with warm salt   water.  - Because air was used during the procedure, expelling large amounts of air   from your rectum or belching is normal.  - If a bowel prep was taken, you may not have a bowel movement for 1-3 days.    This is normal.  SYMPTOMS TO WATCH FOR AND REPORT TO YOUR PHYSICIAN:  1. Abdominal pain or bloating, other than gas cramps.  2. Chest pain.  3. Back pain.  4. Signs of infection such as: chills or fever occurring within 24 hours   after the procedure.  5. Rectal bleeding, which would show as bright red, maroon, or black stools.   (A tablespoon of blood from the rectum is not serious, especially if   hemorrhoids are present.)  6. Vomiting.  7. Weakness or dizziness.  GO DIRECTLY TO THE NEAREST EMERGENCY ROOM IF YOU HAVE ANY OF THE FOLLOWING:      Difficulty breathing              Chills and/or fever over 101 F   Persistent vomiting and/or vomiting blood   Severe abdominal pain   Severe chest pain   Black, tarry stools   Bleeding- more than one  tablespoon   Any other symptom or condition that you feel may need urgent attention  Your doctor recommends these additional instructions:  If any biopsies were taken, your doctors clinic will contact you in 1 to 2   weeks with any results.  - Patient has a contact number available for emergencies.  The signs and   symptoms of potential delayed complications were discussed with the   patient.  Return to normal activities tomorrow.  Written discharge   instructions were provided to the patient.   - Discharge patient to home (via wheelchair).   - Resume previous diet today.   - Continue present medications.   - No aspirin, ibuprofen, naproxen, or other non-steroidal anti-inflammatory   drugs for 7 days after polyp removal.   - Resume Plavix (clopidogrel) at prior dose today.   - Await pathology results.   - Repeat colonoscopy in 5 years for surveillance - depending on overall   health, should discuss with primary care and weigh the risks and benefits.     For questions, problems or results please call your physician Shayy Melvin MD at Work:  [phone]  If you have any questions about the above instructions, call the GI   department at [phone] or call the endoscopy unit at [phone]   from 7am until 3 pm.  [facility], EMERGENCY ROOM PHONE NUMBER:   [phone]  IF A COMPLICATION OR EMERGENCY SITUATION ARISES AND YOU ARE UNABLE TO REACH   YOUR PHYSICIAN - GO DIRECTLY TO THE EMERGENCY ROOM.  I have read or have had read to me these discharge instructions for my   procedure and have received a written copy.  I understand these   instructions and will follow-up with my physician if I have any questions.     __________________________________       _____________________________________  Nurse Signature                                          Patient/Designated   Responsible Party Signature  MD Shayy Lewis MD  9/24/2020 10:17:25 AM  This report has been  verified and signed electronically.  [employer]

## 2020-09-24 NOTE — ANESTHESIA PREPROCEDURE EVALUATION
09/24/2020  Jovanny Olmedo is a 85 y.o., male.    Anesthesia Evaluation    I have reviewed the Patient Summary Reports.    I have reviewed the Nursing Notes. I have reviewed the NPO Status.   I have reviewed the Medications.     Review of Systems  Anesthesia Hx:  No problems with previous Anesthesia    Social:  Non-Smoker, No Alcohol Use    Hematology/Oncology:  Hematology Normal       -- Cancer in past history:  Other (see Oncology comments) radiation  Oncology Comments: Prostate      EENT/Dental:EENT/Dental Normal   Cardiovascular:   Hypertension, well controlled CAD  CABG/stent  PVD hyperlipidemia    Pulmonary:   Sleep Apnea, CPAP    Education provided regarding risk of obstructive sleep apnea     Renal/:   Chronic Renal Disease, CRI    Hepatic/GI:   Bowel Prep. PUD, GERD    Musculoskeletal:   Arthritis   Spine Disorders: lumbar Degenerative disease    Neurological:   Peripheral Neuropathy    Endocrine:  Endocrine Normal    Dermatological:  Skin Normal    Psych:  Psychiatric Normal           Physical Exam  General:  Well nourished    Airway/Jaw/Neck:  Airway Findings: Mouth Opening: Normal Tongue: Normal  General Airway Assessment: Adult  Mallampati: II  TM Distance: Normal, at least 6 cm  Jaw/Neck Findings:  Neck ROM: Normal ROM      Dental:  Dental Findings: In tact   Chest/Lungs:  Chest/Lungs Findings: Normal Respiratory Rate     Heart/Vascular:  Heart Findings: Rate: Normal        Mental Status:  Mental Status Findings:  Cooperative, Alert and Oriented         Anesthesia Plan  Type of Anesthesia, risks & benefits discussed:  Anesthesia Type:  MAC  Patient's Preference:   Intra-op Monitoring Plan: standard ASA monitors  Intra-op Monitoring Plan Comments:   Post Op Pain Control Plan:   Post Op Pain Control Plan Comments:   Induction:   IV  Beta Blocker:  Patient is not currently on a Beta-Blocker (No  further documentation required).       Informed Consent: Patient understands risks and agrees with Anesthesia plan.  Questions answered. Anesthesia consent signed with patient.  ASA Score: 3     Day of Surgery Review of History & Physical: I have interviewed and examined the patient. I have reviewed the patient's H&P dated:  There are no significant changes.          Ready For Surgery From Anesthesia Perspective.

## 2020-09-24 NOTE — H&P
PRE PROCEDURE H&P    Patient Name: Jovanny Olmedo  MRN: [id number]  : 1935  Date of Procedure:  2020  Referring Physician: Tania Dailey PA-C  Primary Physician: Sam Blake MD  Procedure Physician: Shayy Melvin MD       Planned Procedure: Colonoscopy  Diagnosis: family history of colon cancer  Chief Complaint: Same as above    HPI: Patient is an 85 y.o. male is here for the above.     Last colonoscopy:   Family history: father   Anticoagulation: plavix, last dose 5 days ago    Past Medical History:   Past Medical History:   Diagnosis Date    Arthritis     Back pain     CAD (coronary artery disease)     Cataract     CKD (chronic kidney disease)     GERD (gastroesophageal reflux disease)     Glaucoma     suspect    HTN (hypertension)     Prostate cancer     tx'd with radiation    PVD (peripheral vascular disease)     stent in right renal artery and aorta    Sleep apnea         Past Surgical History:  Past Surgical History:   Procedure Laterality Date    ABDOMINAL AORTA STENT      CARDIAC CATHETERIZATION      CATARACT EXTRACTION W/  INTRAOCULAR LENS IMPLANT Right 2017    CORONARY ANGIOPLASTY      CORONARY STENT PLACEMENT      PCIOL Right 2017    DR. LEDEZMA    PCIOL Left 2019    DR. LEDEZMA    PROSTATE BIOPSY      RENAL ARTERY STENT          Home Medications:  Prior to Admission medications    Medication Sig Start Date End Date Taking? Authorizing Provider   aspirin (ECOTRIN) 81 MG EC tablet Take 81 mg by mouth once daily.   Yes Historical Provider   atorvastatin (LIPITOR) 40 MG tablet TAKE 1 TABLET BY MOUTH ONCE DAILY 19  Yes Ynes Calle MD   azelastine (ASTELIN) 137 mcg (0.1 %) nasal spray 2 sprays (274 mcg total) by Nasal route 2 (two) times daily. 3/10/20 3/10/21 Yes Sam Blake MD   benazepril-hydrochlorthiazide (LOTENSIN HCT) 10-12.5 mg Tab Take 1 tablet by mouth twice daily 20  Yes Noble Wayne MD    clotrimazole-betamethasone 1-0.05% (LOTRISONE) cream Apply topically 2 (two) times daily.  Patient taking differently: Apply topically 2 (two) times daily as needed.  19  Yes Sam Blake MD   famotidine (PEPCID) 20 MG tablet Take 1 tablet (20 mg total) by mouth 2 (two) times daily as needed for Heartburn. 19 Yes Sam Blake MD   ferrous sulfate 325 (65 FE) MG EC tablet Take 325 mg by mouth once daily.    Yes Historical Provider   fluticasone propionate (FLONASE) 50 mcg/actuation nasal spray Use 2 spray(s) in each nostril once daily 20  Yes Sam Blake MD   clopidogreL (PLAVIX) 75 mg tablet Take 1 tablet by mouth once daily 3/6/20   Sam Blake MD   pantoprazole (PROTONIX) 40 MG tablet Take 1 tablet (40 mg total) by mouth once daily. 20  MAGI Shepherd   sildenafil (VIAGRA) 50 MG tablet Take 1 tablet (50 mg total) by mouth daily as needed for Erectile Dysfunction.  Patient not taking: Reported on 2020 12/4/19 12/3/20  Williams Gurrola IV, MD        Allergies:  Review of patient's allergies indicates:   Allergen Reactions    Codeine      When taking codeine, pt becomes very anxious        Social History:   Social History     Socioeconomic History    Marital status:      Spouse name: Not on file    Number of children: Not on file    Years of education: Not on file    Highest education level: Not on file   Occupational History    Not on file   Social Needs    Financial resource strain: Not on file    Food insecurity     Worry: Not on file     Inability: Not on file    Transportation needs     Medical: Not on file     Non-medical: Not on file   Tobacco Use    Smoking status: Former Smoker     Packs/day: 0.50     Years: 30.00     Pack years: 15.00     Quit date: 1995     Years since quittin.0    Smokeless tobacco: Former User   Substance and Sexual Activity    Alcohol use: Not Currently     Alcohol/week: 0.0 standard  drinks     Comment: 1 glass of wine 3 times per year    Drug use: No    Sexual activity: Yes     Partners: Female   Lifestyle    Physical activity     Days per week: Not on file     Minutes per session: Not on file    Stress: Not at all   Relationships    Social connections     Talks on phone: Not on file     Gets together: Not on file     Attends Sikh service: Not on file     Active member of club or organization: Not on file     Attends meetings of clubs or organizations: Not on file     Relationship status: Not on file   Other Topics Concern    Not on file   Social History Narrative           Family History:  Family History   Problem Relation Age of Onset    Glaucoma Mother     Diabetes Mother     Kidney disease Mother     Colon cancer Father     Cancer Father         colon cancer    Diabetes Sister     Diabetes Brother     Hypertension Brother     Blindness Neg Hx        ROS: No acute cardiac events, no acute respiratory complaints.     Physical Exam (all patients):    BP (!) 144/80 (BP Location: Left arm, Patient Position: Lying)   Pulse 79   Temp 97.7 °F (36.5 °C) (Temporal)   Resp 18   Wt 105.6 kg (232 lb 12.9 oz)   BMI 31.57 kg/m²   Lungs: Clear to auscultation bilaterally, respirations unlabored  Heart: Regular rate and rhythm, S1 and S2 normal, no obvious murmurs  Abdomen:         Soft, non-tender, bowel sounds normal, no masses, no organomegaly    Lab Results   Component Value Date    WBC 5.94 08/25/2020    MCV 98 08/25/2020    RDW 13.4 08/25/2020     (L) 08/25/2020    INR 1.1 01/01/2014    GLU 79 05/23/2019    BUN 28 (H) 05/23/2019     05/23/2019    K 4.0 05/23/2019     05/23/2019        SEDATION PLAN: per anesthesia      History reviewed, vital signs satisfactory, cardiopulmonary status satisfactory, sedation options, risks and plans have been discussed with the patient  All their questions were answered and the patient agrees to the sedation procedures  as planned and the patient is deemed an appropriate candidate for the sedation as planned.    Procedure explained to patient, informed consent obtained and placed in chart.    Shayy Melvin  9/24/2020  9:47 AM

## 2020-09-25 NOTE — TELEPHONE ENCOUNTER
Called and spoke with pt regarding mammogram & ultrasound results. Stated how it was shown that there was an enlargement of the right breast more than the left breast as well as a nodule on the right but nothing shown on ultrasound or mammogram to raise a concern. Informed to repeat test in one year, pt verbalized understanding. TNJ              Noted gynecomastia , which is nothing but mld enlargement of the right more than the left breast . Noted lymph node in the right breast .   Otherwise no acute findings noted on the ultrasound or mammogram , recommended repeat testing again in 1 year    present

## 2020-09-28 LAB
FINAL PATHOLOGIC DIAGNOSIS: NORMAL
GROSS: NORMAL

## 2020-10-05 ENCOUNTER — TELEPHONE (OUTPATIENT)
Dept: GASTROENTEROLOGY | Facility: CLINIC | Age: 85
End: 2020-10-05

## 2020-10-05 NOTE — TELEPHONE ENCOUNTER
NOTIFIED PATIENT OF RESULTS, VERBALIZES UNDERSTANDING.  JOSE GONSALES    ----- Message from Shayy Melvin MD sent at 9/30/2020  7:38 AM CDT -----  Polyp(s) removed are tubular adenoma(s).

## 2020-12-02 ENCOUNTER — LAB VISIT (OUTPATIENT)
Dept: LAB | Facility: HOSPITAL | Age: 85
End: 2020-12-02
Attending: UROLOGY
Payer: MEDICARE

## 2020-12-02 DIAGNOSIS — N52.9 ERECTILE DYSFUNCTION, UNSPECIFIED ERECTILE DYSFUNCTION TYPE: ICD-10-CM

## 2020-12-02 DIAGNOSIS — C61 PROSTATE CANCER: ICD-10-CM

## 2020-12-02 DIAGNOSIS — N64.4 BREAST TENDERNESS: ICD-10-CM

## 2020-12-02 LAB — COMPLEXED PSA SERPL-MCNC: 3.5 NG/ML (ref 0–4)

## 2020-12-02 PROCEDURE — 36415 COLL VENOUS BLD VENIPUNCTURE: CPT

## 2020-12-02 PROCEDURE — 84153 ASSAY OF PSA TOTAL: CPT

## 2020-12-08 RX ORDER — CLOPIDOGREL BISULFATE 75 MG/1
TABLET ORAL
Qty: 90 TABLET | Refills: 0 | Status: SHIPPED | OUTPATIENT
Start: 2020-12-08 | End: 2021-03-19 | Stop reason: SDUPTHER

## 2020-12-11 ENCOUNTER — OFFICE VISIT (OUTPATIENT)
Dept: INTERNAL MEDICINE | Facility: CLINIC | Age: 85
End: 2020-12-11
Payer: MEDICARE

## 2020-12-11 VITALS
HEART RATE: 84 BPM | OXYGEN SATURATION: 96 % | TEMPERATURE: 98 F | BODY MASS INDEX: 32.2 KG/M2 | SYSTOLIC BLOOD PRESSURE: 104 MMHG | WEIGHT: 237.44 LBS | DIASTOLIC BLOOD PRESSURE: 80 MMHG

## 2020-12-11 DIAGNOSIS — I10 ESSENTIAL HYPERTENSION: Primary | Chronic | ICD-10-CM

## 2020-12-11 DIAGNOSIS — I70.0 AORTIC ATHEROSCLEROSIS: ICD-10-CM

## 2020-12-11 DIAGNOSIS — Z01.84 ANTIBODY RESPONSE EXAM: ICD-10-CM

## 2020-12-11 DIAGNOSIS — E78.5 DYSLIPIDEMIA: Chronic | ICD-10-CM

## 2020-12-11 DIAGNOSIS — G47.33 OSA ON CPAP: ICD-10-CM

## 2020-12-11 DIAGNOSIS — N18.32 STAGE 3B CHRONIC KIDNEY DISEASE: ICD-10-CM

## 2020-12-11 DIAGNOSIS — Z86.010 HISTORY OF COLON POLYPS: ICD-10-CM

## 2020-12-11 DIAGNOSIS — M51.36 DDD (DEGENERATIVE DISC DISEASE), LUMBAR: ICD-10-CM

## 2020-12-11 DIAGNOSIS — N25.81 SECONDARY HYPERPARATHYROIDISM: ICD-10-CM

## 2020-12-11 DIAGNOSIS — I25.10 CORONARY ARTERY DISEASE, OCCLUSIVE: Chronic | ICD-10-CM

## 2020-12-11 DIAGNOSIS — C61 PROSTATE CANCER: ICD-10-CM

## 2020-12-11 DIAGNOSIS — Z98.61 HISTORY OF CORONARY ANGIOPLASTY: Chronic | ICD-10-CM

## 2020-12-11 DIAGNOSIS — R05.3 PERSISTENT COUGH: ICD-10-CM

## 2020-12-11 DIAGNOSIS — K21.9 GASTROESOPHAGEAL REFLUX DISEASE, UNSPECIFIED WHETHER ESOPHAGITIS PRESENT: ICD-10-CM

## 2020-12-11 PROBLEM — K29.70 GASTRITIS: Status: RESOLVED | Noted: 2019-12-05 | Resolved: 2020-12-11

## 2020-12-11 PROBLEM — M25.521 ELBOW PAIN, RIGHT: Status: RESOLVED | Noted: 2019-05-23 | Resolved: 2020-12-11

## 2020-12-11 PROBLEM — Z12.11 COLON CANCER SCREENING: Status: RESOLVED | Noted: 2020-09-24 | Resolved: 2020-12-11

## 2020-12-11 PROBLEM — R05.9 COUGH: Status: RESOLVED | Noted: 2019-02-04 | Resolved: 2020-12-11

## 2020-12-11 PROCEDURE — 99214 OFFICE O/P EST MOD 30 MIN: CPT | Mod: PBBFAC | Performed by: FAMILY MEDICINE

## 2020-12-11 PROCEDURE — 99999 PR PBB SHADOW E&M-EST. PATIENT-LVL IV: ICD-10-PCS | Mod: PBBFAC,,, | Performed by: FAMILY MEDICINE

## 2020-12-11 PROCEDURE — 99214 PR OFFICE/OUTPT VISIT, EST, LEVL IV, 30-39 MIN: ICD-10-PCS | Mod: S$PBB,,, | Performed by: FAMILY MEDICINE

## 2020-12-11 PROCEDURE — 99999 PR PBB SHADOW E&M-EST. PATIENT-LVL IV: CPT | Mod: PBBFAC,,, | Performed by: FAMILY MEDICINE

## 2020-12-11 PROCEDURE — 99214 OFFICE O/P EST MOD 30 MIN: CPT | Mod: S$PBB,,, | Performed by: FAMILY MEDICINE

## 2020-12-11 RX ORDER — ATORVASTATIN CALCIUM 40 MG/1
40 TABLET, FILM COATED ORAL DAILY
Qty: 90 TABLET | Refills: 1 | Status: SHIPPED | OUTPATIENT
Start: 2020-12-11 | End: 2021-11-15 | Stop reason: SDUPTHER

## 2020-12-11 RX ORDER — PANTOPRAZOLE SODIUM 40 MG/1
40 TABLET, DELAYED RELEASE ORAL DAILY
Qty: 90 TABLET | Refills: 1 | Status: SHIPPED | OUTPATIENT
Start: 2020-12-11 | End: 2021-06-27

## 2020-12-13 NOTE — PROGRESS NOTES
Subjective:       Patient ID: Jovanny Olmedo is a 85 y.o. male.    Chief Complaint: Establish Care    85-year-old male patient previously seen by Dr. Blake here to establish care.  Patient with Patient Active Problem List:     Essential hypertension     GERD (gastroesophageal reflux disease)     Polycystic kidney disease     Coronary artery disease, occlusive     Chronic kidney disease, stage III (moderate)     Refractive error     Microcytic anemia     DDD (degenerative disc disease), lumbar     Open angle with borderline findings, low risk, bilateral     History of coronary angioplasty     Prostate cancer     Secondary hyperparathyroidism     Pseudophakia     Intermediate stage nonexudative age-related macular degeneration of both eyes     Aortic atherosclerosis     CLARISSA on CPAP     Duodenal ulcer     History of colon polyps     Dyslipidemia  Here for follow-up on chronic medical conditions.  Patient does have CPAP machine but would like to get new supplies as he has lost his CPAP supplies.   Denies any chest pain or difficulty breathing or palpitations, nausea vomiting.  Patient reports chronic cough which has been persistent for a long time, denies of feeling sick in the last 2-3 months but with persistent cough would like to get checked for COVID-19.       Review of Systems   Constitutional: Negative for appetite change, chills, fatigue and fever.   Eyes: Negative for visual disturbance.   Respiratory: Negative for shortness of breath.    Cardiovascular: Negative for chest pain, palpitations and leg swelling.   Gastrointestinal: Negative for abdominal pain, nausea and vomiting.   Musculoskeletal: Negative for myalgias.   Skin: Negative for rash.   Neurological: Negative for headaches.   Psychiatric/Behavioral: Negative for sleep disturbance.         /80 (BP Location: Left arm, Patient Position: Sitting, BP Method: Large (Manual))   Pulse 84   Temp 98.3 °F (36.8 °C) (Temporal)   Wt 107.7 kg (237 lb 7 oz)    SpO2 96%   BMI 32.20 kg/m²   Objective:      Physical Exam  Constitutional:       Appearance: He is well-developed.   HENT:      Head: Normocephalic and atraumatic.   Cardiovascular:      Rate and Rhythm: Normal rate and regular rhythm.      Heart sounds: Normal heart sounds. No murmur.   Pulmonary:      Effort: Pulmonary effort is normal. No respiratory distress.      Breath sounds: Normal breath sounds. No wheezing.   Abdominal:      General: Bowel sounds are normal.      Palpations: Abdomen is soft.      Tenderness: There is no abdominal tenderness.   Skin:     General: Skin is warm and dry.      Findings: No rash.   Neurological:      Mental Status: He is alert and oriented to person, place, and time.   Psychiatric:         Mood and Affect: Mood normal.           Assessment/Plan:   1. Essential hypertension  - Comprehensive Metabolic Panel; Future  - Lipid Panel; Future  - TSH; Future  - Urinalysis; Future  Currently taking benazepril hydrochlorothiazide 10/12.5 mg daily, will check further labs but if cough continues to persist patient may benefit from changing the blood pressure medication  Encouraged to drink adequate fluids    2. Dyslipidemia  - atorvastatin (LIPITOR) 40 MG tablet; Take 1 tablet (40 mg total) by mouth once daily.  Dispense: 90 tablet; Refill: 1  - Lipid Panel; Future  Stable on Lipitor 40 mg daily    3. Coronary artery disease, occlusive  4. History of coronary angioplasty  Currently on aspirin and Plavix    5. Stage 3b chronic kidney disease  - CBC Auto Differential; Future  - Comprehensive Metabolic Panel; Future  Encouraged to drink adequate fluids and avoid over-the-counter NSAIDs    6. Gastroesophageal reflux disease, unspecified whether esophagitis present  - pantoprazole (PROTONIX) 40 MG tablet; Take 1 tablet (40 mg total) by mouth once daily.  Dispense: 90 tablet; Refill: 1  Stable on pantoprazole 40 mg daily    7. DDD (degenerative disc disease), lumbar  Clinically doing  well    8. Secondary hyperparathyroidism  - PTH, Intact; Future  As per nephrology    9. Aortic atherosclerosis    10. History of colon polyps    11. Prostate cancer  - CBC Auto Differential; Future  [facility]    12. CLARISSA on CPAP  - Ambulatory referral/consult to Pulmonology; Future  13. Persistent cough  - X-Ray Chest PA And Lateral; Future  Secondary to persistent cough Will get chest x-ray to rule out any acute infection    Patient will be refer to pulmonology for CPAP supplies with sleep apnea    14. Antibody response exam  - COVID-19 (SARS CoV-2) IgG Antibody; Future  Secondary to underlying persistent cough Will check COVID antibodies to see whether patient was positive for COVID in the past

## 2020-12-14 ENCOUNTER — LAB VISIT (OUTPATIENT)
Dept: LAB | Facility: HOSPITAL | Age: 85
End: 2020-12-14
Attending: FAMILY MEDICINE
Payer: MEDICARE

## 2020-12-14 DIAGNOSIS — N25.81 SECONDARY HYPERPARATHYROIDISM: ICD-10-CM

## 2020-12-14 DIAGNOSIS — I10 ESSENTIAL HYPERTENSION: Chronic | ICD-10-CM

## 2020-12-14 DIAGNOSIS — C61 PROSTATE CANCER: ICD-10-CM

## 2020-12-14 DIAGNOSIS — N18.32 STAGE 3B CHRONIC KIDNEY DISEASE: ICD-10-CM

## 2020-12-14 DIAGNOSIS — E78.5 DYSLIPIDEMIA: Chronic | ICD-10-CM

## 2020-12-14 LAB
ALBUMIN SERPL BCP-MCNC: 3.9 G/DL (ref 3.5–5.2)
ALP SERPL-CCNC: 60 U/L (ref 55–135)
ALT SERPL W/O P-5'-P-CCNC: 31 U/L (ref 10–44)
ANION GAP SERPL CALC-SCNC: 8 MMOL/L (ref 8–16)
AST SERPL-CCNC: 37 U/L (ref 10–40)
BASOPHILS # BLD AUTO: 0.03 K/UL (ref 0–0.2)
BASOPHILS NFR BLD: 0.5 % (ref 0–1.9)
BILIRUB SERPL-MCNC: 0.5 MG/DL (ref 0.1–1)
BUN SERPL-MCNC: 26 MG/DL (ref 8–23)
CALCIUM SERPL-MCNC: 10 MG/DL (ref 8.7–10.5)
CHLORIDE SERPL-SCNC: 103 MMOL/L (ref 95–110)
CHOLEST SERPL-MCNC: 192 MG/DL (ref 120–199)
CHOLEST/HDLC SERPL: 5.2 {RATIO} (ref 2–5)
CO2 SERPL-SCNC: 29 MMOL/L (ref 23–29)
CREAT SERPL-MCNC: 1.7 MG/DL (ref 0.5–1.4)
DIFFERENTIAL METHOD: ABNORMAL
EOSINOPHIL # BLD AUTO: 0.2 K/UL (ref 0–0.5)
EOSINOPHIL NFR BLD: 3.6 % (ref 0–8)
ERYTHROCYTE [DISTWIDTH] IN BLOOD BY AUTOMATED COUNT: 13.7 % (ref 11.5–14.5)
EST. GFR  (AFRICAN AMERICAN): 41.9 ML/MIN/1.73 M^2
EST. GFR  (NON AFRICAN AMERICAN): 36.2 ML/MIN/1.73 M^2
GLUCOSE SERPL-MCNC: 92 MG/DL (ref 70–110)
HCT VFR BLD AUTO: 45 % (ref 40–54)
HDLC SERPL-MCNC: 37 MG/DL (ref 40–75)
HDLC SERPL: 19.3 % (ref 20–50)
HGB BLD-MCNC: 14.1 G/DL (ref 14–18)
IMM GRANULOCYTES # BLD AUTO: 0.02 K/UL (ref 0–0.04)
IMM GRANULOCYTES NFR BLD AUTO: 0.3 % (ref 0–0.5)
LDLC SERPL CALC-MCNC: 124.4 MG/DL (ref 63–159)
LYMPHOCYTES # BLD AUTO: 2.3 K/UL (ref 1–4.8)
LYMPHOCYTES NFR BLD: 36.5 % (ref 18–48)
MCH RBC QN AUTO: 30.6 PG (ref 27–31)
MCHC RBC AUTO-ENTMCNC: 31.3 G/DL (ref 32–36)
MCV RBC AUTO: 98 FL (ref 82–98)
MONOCYTES # BLD AUTO: 0.6 K/UL (ref 0.3–1)
MONOCYTES NFR BLD: 9.6 % (ref 4–15)
NEUTROPHILS # BLD AUTO: 3.1 K/UL (ref 1.8–7.7)
NEUTROPHILS NFR BLD: 49.5 % (ref 38–73)
NONHDLC SERPL-MCNC: 155 MG/DL
NRBC BLD-RTO: 0 /100 WBC
PLATELET # BLD AUTO: 155 K/UL (ref 150–350)
PMV BLD AUTO: 11.6 FL (ref 9.2–12.9)
POTASSIUM SERPL-SCNC: 4.4 MMOL/L (ref 3.5–5.1)
PROT SERPL-MCNC: 7.8 G/DL (ref 6–8.4)
PTH-INTACT SERPL-MCNC: 113 PG/ML (ref 9–77)
RBC # BLD AUTO: 4.61 M/UL (ref 4.6–6.2)
SODIUM SERPL-SCNC: 140 MMOL/L (ref 136–145)
T4 FREE SERPL-MCNC: 1.12 NG/DL (ref 0.71–1.51)
TRIGL SERPL-MCNC: 153 MG/DL (ref 30–150)
TSH SERPL DL<=0.005 MIU/L-ACNC: 5.12 UIU/ML (ref 0.4–4)
WBC # BLD AUTO: 6.35 K/UL (ref 3.9–12.7)

## 2020-12-14 PROCEDURE — 84439 ASSAY OF FREE THYROXINE: CPT

## 2020-12-14 PROCEDURE — 83970 ASSAY OF PARATHORMONE: CPT

## 2020-12-14 PROCEDURE — 80053 COMPREHEN METABOLIC PANEL: CPT

## 2020-12-14 PROCEDURE — 80061 LIPID PANEL: CPT

## 2020-12-14 PROCEDURE — 84443 ASSAY THYROID STIM HORMONE: CPT

## 2020-12-14 PROCEDURE — 85025 COMPLETE CBC W/AUTO DIFF WBC: CPT

## 2020-12-16 ENCOUNTER — TELEPHONE (OUTPATIENT)
Dept: SLEEP MEDICINE | Facility: CLINIC | Age: 85
End: 2020-12-16

## 2020-12-16 NOTE — TELEPHONE ENCOUNTER
Spoke to patient and scheduled appt. He confirmed time and date   ----- Message from Meaghan Julian sent at 12/16/2020 10:13 AM CST -----  Contact: KIM FUENTES [[id number]]  .Type:  Patient Returning Call    Who Called:KIM FUENTES [[id number]]  Who Left Message for Patient:  nurse   Does the patient know what this is regarding?:  Would the patient rather a call back or a response via [facility]?   Call    Best Call Back Number:  [phone] (home)    Additional Information:

## 2020-12-23 ENCOUNTER — TELEPHONE (OUTPATIENT)
Dept: INTERNAL MEDICINE | Facility: CLINIC | Age: 85
End: 2020-12-23

## 2020-12-23 NOTE — TELEPHONE ENCOUNTER
----- Message from Miryam Jimenez MD sent at 12/15/2020  4:39 PM CST -----  Minimal changes noted in thyroid, will continue to monitor.  If having any persistent weakness or tiredness let us know will consider repeating thyroid labs in two months.   Stable kidney functions showing stage 3 kidney disease and mildly elevated parathyroid levels, follow-up with nephrology

## 2020-12-23 NOTE — TELEPHONE ENCOUNTER
----- Message from Miryam Jimenez MD sent at 12/15/2020  8:23 AM CST -----  COVID antibodies positive, showing you have been exposed it in the past and you were positive 5 months ago too.

## 2021-01-08 ENCOUNTER — IMMUNIZATION (OUTPATIENT)
Dept: INTERNAL MEDICINE | Facility: CLINIC | Age: 86
End: 2021-01-08
Payer: MEDICARE

## 2021-01-08 DIAGNOSIS — Z23 NEED FOR VACCINATION: ICD-10-CM

## 2021-01-08 PROCEDURE — 91300 COVID-19, MRNA, LNP-S, PF, 30 MCG/0.3 ML DOSE VACCINE: CPT | Mod: PBBFAC | Performed by: FAMILY MEDICINE

## 2021-01-11 ENCOUNTER — LAB VISIT (OUTPATIENT)
Dept: LAB | Facility: HOSPITAL | Age: 86
End: 2021-01-11
Attending: UROLOGY
Payer: MEDICARE

## 2021-01-11 ENCOUNTER — OFFICE VISIT (OUTPATIENT)
Dept: UROLOGY | Facility: CLINIC | Age: 86
End: 2021-01-11
Payer: MEDICARE

## 2021-01-11 VITALS
BODY MASS INDEX: 32.06 KG/M2 | SYSTOLIC BLOOD PRESSURE: 110 MMHG | WEIGHT: 236.69 LBS | DIASTOLIC BLOOD PRESSURE: 70 MMHG | HEIGHT: 72 IN | TEMPERATURE: 97 F

## 2021-01-11 DIAGNOSIS — C61 PROSTATE CANCER: ICD-10-CM

## 2021-01-11 DIAGNOSIS — N40.0 BENIGN PROSTATIC HYPERPLASIA, UNSPECIFIED WHETHER LOWER URINARY TRACT SYMPTOMS PRESENT: Primary | ICD-10-CM

## 2021-01-11 DIAGNOSIS — E66.01 MORBID OBESITY: ICD-10-CM

## 2021-01-11 DIAGNOSIS — N52.9 ERECTILE DYSFUNCTION, UNSPECIFIED ERECTILE DYSFUNCTION TYPE: ICD-10-CM

## 2021-01-11 DIAGNOSIS — N40.0 BENIGN PROSTATIC HYPERPLASIA, UNSPECIFIED WHETHER LOWER URINARY TRACT SYMPTOMS PRESENT: ICD-10-CM

## 2021-01-11 LAB — COMPLEXED PSA SERPL-MCNC: 3.6 NG/ML (ref 0–4)

## 2021-01-11 PROCEDURE — 99214 PR OFFICE/OUTPT VISIT, EST, LEVL IV, 30-39 MIN: ICD-10-PCS | Mod: S$PBB,,, | Performed by: UROLOGY

## 2021-01-11 PROCEDURE — 99213 OFFICE O/P EST LOW 20 MIN: CPT | Mod: PBBFAC | Performed by: UROLOGY

## 2021-01-11 PROCEDURE — 99214 OFFICE O/P EST MOD 30 MIN: CPT | Mod: S$PBB,,, | Performed by: UROLOGY

## 2021-01-11 PROCEDURE — 36415 COLL VENOUS BLD VENIPUNCTURE: CPT

## 2021-01-11 PROCEDURE — 99999 PR PBB SHADOW E&M-EST. PATIENT-LVL III: CPT | Mod: PBBFAC,,, | Performed by: UROLOGY

## 2021-01-11 PROCEDURE — 99999 PR PBB SHADOW E&M-EST. PATIENT-LVL III: ICD-10-PCS | Mod: PBBFAC,,, | Performed by: UROLOGY

## 2021-01-11 PROCEDURE — 84153 ASSAY OF PSA TOTAL: CPT

## 2021-01-11 RX ORDER — TAMSULOSIN HYDROCHLORIDE 0.4 MG/1
0.4 CAPSULE ORAL DAILY
Qty: 30 CAPSULE | Refills: 11 | Status: SHIPPED | OUTPATIENT
Start: 2021-01-11 | End: 2021-09-13 | Stop reason: ALTCHOICE

## 2021-01-20 ENCOUNTER — OFFICE VISIT (OUTPATIENT)
Dept: SLEEP MEDICINE | Facility: CLINIC | Age: 86
End: 2021-01-20
Payer: MEDICARE

## 2021-01-20 ENCOUNTER — DOCUMENTATION ONLY (OUTPATIENT)
Dept: PULMONOLOGY | Facility: CLINIC | Age: 86
End: 2021-01-20

## 2021-01-20 VITALS
HEART RATE: 72 BPM | HEIGHT: 72 IN | DIASTOLIC BLOOD PRESSURE: 76 MMHG | RESPIRATION RATE: 18 BRPM | WEIGHT: 236.56 LBS | BODY MASS INDEX: 32.04 KG/M2 | SYSTOLIC BLOOD PRESSURE: 118 MMHG

## 2021-01-20 DIAGNOSIS — E66.9 OBESITY, CLASS I, BMI 30-34.9: ICD-10-CM

## 2021-01-20 DIAGNOSIS — I10 ESSENTIAL HYPERTENSION: Primary | Chronic | ICD-10-CM

## 2021-01-20 DIAGNOSIS — G47.33 OSA ON CPAP: ICD-10-CM

## 2021-01-20 PROCEDURE — 99214 OFFICE O/P EST MOD 30 MIN: CPT | Mod: S$PBB,,, | Performed by: NURSE PRACTITIONER

## 2021-01-20 PROCEDURE — 99999 PR PBB SHADOW E&M-EST. PATIENT-LVL IV: CPT | Mod: PBBFAC,,, | Performed by: NURSE PRACTITIONER

## 2021-01-20 PROCEDURE — 99214 OFFICE O/P EST MOD 30 MIN: CPT | Mod: PBBFAC | Performed by: NURSE PRACTITIONER

## 2021-01-20 PROCEDURE — 99999 PR PBB SHADOW E&M-EST. PATIENT-LVL IV: ICD-10-PCS | Mod: PBBFAC,,, | Performed by: NURSE PRACTITIONER

## 2021-01-20 PROCEDURE — 99214 PR OFFICE/OUTPT VISIT, EST, LEVL IV, 30-39 MIN: ICD-10-PCS | Mod: S$PBB,,, | Performed by: NURSE PRACTITIONER

## 2021-01-24 ENCOUNTER — PATIENT OUTREACH (OUTPATIENT)
Dept: ADMINISTRATIVE | Facility: OTHER | Age: 86
End: 2021-01-24

## 2021-01-25 ENCOUNTER — HOSPITAL ENCOUNTER (OUTPATIENT)
Dept: CARDIOLOGY | Facility: HOSPITAL | Age: 86
Discharge: HOME | End: 2021-01-25
Attending: INTERNAL MEDICINE
Payer: MEDICARE

## 2021-01-25 ENCOUNTER — OFFICE VISIT (OUTPATIENT)
Dept: CARDIOLOGY | Facility: CLINIC | Age: 86
End: 2021-01-25
Payer: MEDICARE

## 2021-01-25 VITALS
BODY MASS INDEX: 32.17 KG/M2 | WEIGHT: 237.19 LBS | HEART RATE: 71 BPM | OXYGEN SATURATION: 95 % | DIASTOLIC BLOOD PRESSURE: 68 MMHG | SYSTOLIC BLOOD PRESSURE: 118 MMHG

## 2021-01-25 DIAGNOSIS — Z98.61 HISTORY OF CORONARY ANGIOPLASTY: ICD-10-CM

## 2021-01-25 DIAGNOSIS — E78.5 DYSLIPIDEMIA: ICD-10-CM

## 2021-01-25 DIAGNOSIS — I10 ESSENTIAL HYPERTENSION: Primary | ICD-10-CM

## 2021-01-25 DIAGNOSIS — I25.10 CORONARY ARTERY DISEASE, OCCLUSIVE: Primary | ICD-10-CM

## 2021-01-25 DIAGNOSIS — I25.10 CORONARY ARTERY DISEASE, OCCLUSIVE: ICD-10-CM

## 2021-01-25 PROCEDURE — 99215 OFFICE O/P EST HI 40 MIN: CPT | Mod: S$PBB,,, | Performed by: INTERNAL MEDICINE

## 2021-01-25 PROCEDURE — 99999 PR PBB SHADOW E&M-EST. PATIENT-LVL III: CPT | Mod: PBBFAC,,, | Performed by: INTERNAL MEDICINE

## 2021-01-25 PROCEDURE — 99213 OFFICE O/P EST LOW 20 MIN: CPT | Mod: PBBFAC | Performed by: INTERNAL MEDICINE

## 2021-01-25 PROCEDURE — 99999 PR PBB SHADOW E&M-EST. PATIENT-LVL III: ICD-10-PCS | Mod: PBBFAC,,, | Performed by: INTERNAL MEDICINE

## 2021-01-25 PROCEDURE — 99215 PR OFFICE/OUTPT VISIT, EST, LEVL V, 40-54 MIN: ICD-10-PCS | Mod: S$PBB,,, | Performed by: INTERNAL MEDICINE

## 2021-01-29 ENCOUNTER — IMMUNIZATION (OUTPATIENT)
Dept: INTERNAL MEDICINE | Facility: CLINIC | Age: 86
End: 2021-01-29
Payer: COMMERCIAL

## 2021-01-29 DIAGNOSIS — Z23 NEED FOR VACCINATION: Primary | ICD-10-CM

## 2021-01-29 PROCEDURE — 0002A COVID-19, MRNA, LNP-S, PF, 30 MCG/0.3 ML DOSE VACCINE: CPT | Mod: PBBFAC | Performed by: FAMILY MEDICINE

## 2021-01-29 PROCEDURE — 91300 COVID-19, MRNA, LNP-S, PF, 30 MCG/0.3 ML DOSE VACCINE: CPT | Mod: PBBFAC | Performed by: FAMILY MEDICINE

## 2021-02-01 ENCOUNTER — TELEPHONE (OUTPATIENT)
Dept: RADIOLOGY | Facility: HOSPITAL | Age: 86
End: 2021-02-01

## 2021-02-02 ENCOUNTER — HOSPITAL ENCOUNTER (OUTPATIENT)
Dept: CARDIOLOGY | Facility: HOSPITAL | Age: 86
Discharge: HOME | End: 2021-02-02
Attending: INTERNAL MEDICINE
Payer: MEDICARE

## 2021-02-02 ENCOUNTER — DOCUMENTATION ONLY (OUTPATIENT)
Dept: CARDIOLOGY | Facility: HOSPITAL | Age: 86
End: 2021-02-02

## 2021-02-02 VITALS
DIASTOLIC BLOOD PRESSURE: 68 MMHG | BODY MASS INDEX: 32.1 KG/M2 | HEIGHT: 72 IN | HEART RATE: 61 BPM | WEIGHT: 237 LBS | SYSTOLIC BLOOD PRESSURE: 118 MMHG

## 2021-02-02 DIAGNOSIS — E78.5 DYSLIPIDEMIA: ICD-10-CM

## 2021-02-02 DIAGNOSIS — I25.10 CORONARY ARTERY DISEASE, OCCLUSIVE: ICD-10-CM

## 2021-02-02 DIAGNOSIS — I10 ESSENTIAL HYPERTENSION: ICD-10-CM

## 2021-02-02 DIAGNOSIS — Z98.61 HISTORY OF CORONARY ANGIOPLASTY: ICD-10-CM

## 2021-02-02 LAB
AORTIC ROOT ANNULUS: 4.11 CM
ASCENDING AORTA: 3.25 CM
AV INDEX (PROSTH): 0.78
AV MEAN GRADIENT: 4 MMHG
AV PEAK GRADIENT: 6 MMHG
AV VALVE AREA: 4.58 CM2
AV VELOCITY RATIO: 0.82
BSA FOR ECHO PROCEDURE: 2.34 M2
CV ECHO LV RWT: 0.44 CM
DOP CALC AO PEAK VEL: 1.19 M/S
DOP CALC AO VTI: 23.51 CM
DOP CALC LVOT AREA: 5.9 CM2
DOP CALC LVOT DIAMETER: 2.74 CM
DOP CALC LVOT PEAK VEL: 0.97 M/S
DOP CALC LVOT STROKE VOLUME: 107.73 CM3
DOP CALC RVOT PEAK VEL: 0.65 M/S
DOP CALC RVOT VTI: 13.79 CM
DOP CALCLVOT PEAK VEL VTI: 18.28 CM
E WAVE DECELERATION TIME: 253.01 MSEC
E/A RATIO: 0.46
E/E' RATIO: 7.33 M/S
ECHO LV POSTERIOR WALL: 0.93 CM (ref 0.6–1.1)
FRACTIONAL SHORTENING: 34 % (ref 28–44)
INTERVENTRICULAR SEPTUM: 1.14 CM (ref 0.6–1.1)
IVRT: 125.59 MSEC
LA MAJOR: 4.56 CM
LA MINOR: 4.23 CM
LA WIDTH: 2.85 CM
LEFT ATRIUM SIZE: 2.77 CM
LEFT ATRIUM VOLUME INDEX: 12.9 ML/M2
LEFT ATRIUM VOLUME: 29.45 CM3
LEFT INTERNAL DIMENSION IN SYSTOLE: 2.81 CM (ref 2.1–4)
LEFT VENTRICLE DIASTOLIC VOLUME INDEX: 35.68 ML/M2
LEFT VENTRICLE DIASTOLIC VOLUME: 81.7 ML
LEFT VENTRICLE MASS INDEX: 65 G/M2
LEFT VENTRICLE SYSTOLIC VOLUME INDEX: 13 ML/M2
LEFT VENTRICLE SYSTOLIC VOLUME: 29.86 ML
LEFT VENTRICULAR INTERNAL DIMENSION IN DIASTOLE: 4.27 CM (ref 3.5–6)
LEFT VENTRICULAR MASS: 147.85 G
LV LATERAL E/E' RATIO: 7.33 M/S
LV SEPTAL E/E' RATIO: 7.33 M/S
MV PEAK A VEL: 0.96 M/S
MV PEAK E VEL: 0.44 M/S
PV MEAN GRADIENT: 1 MMHG
RA MAJOR: 4.19 CM
RA PRESSURE: 3 MMHG
RA WIDTH: 2.45 CM
SINUS: 3.51 CM
STJ: 3.39 CM
TDI LATERAL: 0.06 M/S
TDI SEPTAL: 0.06 M/S
TDI: 0.06 M/S

## 2021-02-02 PROCEDURE — 93306 ECHO (CUPID ONLY): ICD-10-PCS | Mod: 26,,, | Performed by: INTERNAL MEDICINE

## 2021-02-02 PROCEDURE — C8929 TTE W OR WO FOL WCON,DOPPLER: HCPCS

## 2021-02-02 PROCEDURE — 93306 TTE W/DOPPLER COMPLETE: CPT | Mod: 26,,, | Performed by: INTERNAL MEDICINE

## 2021-02-03 ENCOUNTER — HOSPITAL ENCOUNTER (OUTPATIENT)
Dept: RADIOLOGY | Facility: HOSPITAL | Age: 86
Discharge: HOME | End: 2021-02-03
Attending: UROLOGY
Payer: MEDICARE

## 2021-02-03 DIAGNOSIS — N40.0 BENIGN PROSTATIC HYPERPLASIA, UNSPECIFIED WHETHER LOWER URINARY TRACT SYMPTOMS PRESENT: ICD-10-CM

## 2021-02-03 DIAGNOSIS — N52.9 ERECTILE DYSFUNCTION, UNSPECIFIED ERECTILE DYSFUNCTION TYPE: ICD-10-CM

## 2021-02-03 DIAGNOSIS — E66.01 MORBID OBESITY: ICD-10-CM

## 2021-02-03 DIAGNOSIS — C61 PROSTATE CANCER: ICD-10-CM

## 2021-02-03 PROCEDURE — 78815 PET IMAGE W/CT SKULL-THIGH: CPT | Mod: TC

## 2021-02-03 PROCEDURE — 78815 NM PET CT FLUCICLOVINE F18(PROSTATE CANCER RECURRENCE): ICD-10-PCS | Mod: 26,PI,, | Performed by: RADIOLOGY

## 2021-02-03 PROCEDURE — A9552 F18 FDG: HCPCS

## 2021-02-03 PROCEDURE — 78815 PET IMAGE W/CT SKULL-THIGH: CPT | Mod: 26,PI,, | Performed by: RADIOLOGY

## 2021-02-08 ENCOUNTER — OFFICE VISIT (OUTPATIENT)
Dept: UROLOGY | Facility: CLINIC | Age: 86
End: 2021-02-08
Payer: MEDICARE

## 2021-02-08 ENCOUNTER — TELEPHONE (OUTPATIENT)
Dept: UROLOGY | Facility: CLINIC | Age: 86
End: 2021-02-08

## 2021-02-08 VITALS
BODY MASS INDEX: 32.14 KG/M2 | DIASTOLIC BLOOD PRESSURE: 80 MMHG | SYSTOLIC BLOOD PRESSURE: 130 MMHG | WEIGHT: 237 LBS | TEMPERATURE: 98 F

## 2021-02-08 DIAGNOSIS — C61 PROSTATE CANCER: Primary | ICD-10-CM

## 2021-02-08 DIAGNOSIS — N52.9 ERECTILE DYSFUNCTION, UNSPECIFIED ERECTILE DYSFUNCTION TYPE: ICD-10-CM

## 2021-02-08 DIAGNOSIS — I71.10 THORACIC ANEURYSM, RUPTURED: ICD-10-CM

## 2021-02-08 DIAGNOSIS — N40.0 BENIGN PROSTATIC HYPERPLASIA, UNSPECIFIED WHETHER LOWER URINARY TRACT SYMPTOMS PRESENT: ICD-10-CM

## 2021-02-08 PROCEDURE — 99213 OFFICE O/P EST LOW 20 MIN: CPT | Mod: PBBFAC | Performed by: UROLOGY

## 2021-02-08 PROCEDURE — 99214 PR OFFICE/OUTPT VISIT, EST, LEVL IV, 30-39 MIN: ICD-10-PCS | Mod: S$PBB,,, | Performed by: UROLOGY

## 2021-02-08 PROCEDURE — 99214 OFFICE O/P EST MOD 30 MIN: CPT | Mod: S$PBB,,, | Performed by: UROLOGY

## 2021-02-08 PROCEDURE — 99999 PR PBB SHADOW E&M-EST. PATIENT-LVL III: CPT | Mod: PBBFAC,,, | Performed by: UROLOGY

## 2021-02-08 PROCEDURE — 99999 PR PBB SHADOW E&M-EST. PATIENT-LVL III: ICD-10-PCS | Mod: PBBFAC,,, | Performed by: UROLOGY

## 2021-02-15 ENCOUNTER — TELEPHONE (OUTPATIENT)
Dept: SURGERY | Facility: CLINIC | Age: 86
End: 2021-02-15

## 2021-02-19 ENCOUNTER — OFFICE VISIT (OUTPATIENT)
Dept: RADIATION ONCOLOGY | Facility: CLINIC | Age: 86
End: 2021-02-19
Payer: MEDICARE

## 2021-02-19 VITALS
WEIGHT: 234.19 LBS | HEART RATE: 71 BPM | BODY MASS INDEX: 31.72 KG/M2 | OXYGEN SATURATION: 97 % | DIASTOLIC BLOOD PRESSURE: 81 MMHG | RESPIRATION RATE: 18 BRPM | HEIGHT: 72 IN | TEMPERATURE: 98 F | SYSTOLIC BLOOD PRESSURE: 136 MMHG

## 2021-02-19 DIAGNOSIS — C61 PROSTATE CANCER: Primary | ICD-10-CM

## 2021-02-19 PROCEDURE — 99999 PR PBB SHADOW E&M-EST. PATIENT-LVL IV: ICD-10-PCS | Mod: PBBFAC,,, | Performed by: RADIOLOGY

## 2021-02-19 PROCEDURE — 99205 PR OFFICE/OUTPT VISIT, NEW, LEVL V, 60-74 MIN: ICD-10-PCS | Mod: S$PBB,,, | Performed by: RADIOLOGY

## 2021-02-19 PROCEDURE — 99999 PR PBB SHADOW E&M-EST. PATIENT-LVL IV: CPT | Mod: PBBFAC,,, | Performed by: RADIOLOGY

## 2021-02-19 PROCEDURE — 99214 OFFICE O/P EST MOD 30 MIN: CPT | Mod: PBBFAC | Performed by: RADIOLOGY

## 2021-02-19 PROCEDURE — 99205 OFFICE O/P NEW HI 60 MIN: CPT | Mod: S$PBB,,, | Performed by: RADIOLOGY

## 2021-02-22 ENCOUNTER — OFFICE VISIT (OUTPATIENT)
Dept: CARDIOLOGY | Facility: CLINIC | Age: 86
End: 2021-02-22
Payer: MEDICARE

## 2021-02-22 VITALS
OXYGEN SATURATION: 97 % | WEIGHT: 235.25 LBS | HEART RATE: 72 BPM | DIASTOLIC BLOOD PRESSURE: 68 MMHG | BODY MASS INDEX: 31.9 KG/M2 | SYSTOLIC BLOOD PRESSURE: 118 MMHG

## 2021-02-22 DIAGNOSIS — E78.5 DYSLIPIDEMIA: ICD-10-CM

## 2021-02-22 DIAGNOSIS — I10 ESSENTIAL HYPERTENSION: Primary | ICD-10-CM

## 2021-02-22 DIAGNOSIS — I25.10 CORONARY ARTERY DISEASE, OCCLUSIVE: ICD-10-CM

## 2021-02-22 DIAGNOSIS — Z98.61 HISTORY OF CORONARY ANGIOPLASTY: ICD-10-CM

## 2021-02-22 PROCEDURE — 99999 PR PBB SHADOW E&M-EST. PATIENT-LVL IV: ICD-10-PCS | Mod: PBBFAC,,, | Performed by: INTERNAL MEDICINE

## 2021-02-22 PROCEDURE — 99999 PR PBB SHADOW E&M-EST. PATIENT-LVL IV: CPT | Mod: PBBFAC,,, | Performed by: INTERNAL MEDICINE

## 2021-02-22 PROCEDURE — 99215 PR OFFICE/OUTPT VISIT, EST, LEVL V, 40-54 MIN: ICD-10-PCS | Mod: S$PBB,,, | Performed by: INTERNAL MEDICINE

## 2021-02-22 PROCEDURE — 99215 OFFICE O/P EST HI 40 MIN: CPT | Mod: S$PBB,,, | Performed by: INTERNAL MEDICINE

## 2021-02-22 PROCEDURE — 99214 OFFICE O/P EST MOD 30 MIN: CPT | Mod: PBBFAC | Performed by: INTERNAL MEDICINE

## 2021-03-11 ENCOUNTER — OFFICE VISIT (OUTPATIENT)
Dept: UROLOGY | Facility: CLINIC | Age: 86
End: 2021-03-11
Payer: MEDICARE

## 2021-03-11 VITALS
HEART RATE: 76 BPM | DIASTOLIC BLOOD PRESSURE: 86 MMHG | WEIGHT: 235.69 LBS | HEIGHT: 72 IN | SYSTOLIC BLOOD PRESSURE: 129 MMHG | BODY MASS INDEX: 31.92 KG/M2

## 2021-03-11 DIAGNOSIS — C61 PROSTATE CANCER: Primary | ICD-10-CM

## 2021-03-11 DIAGNOSIS — Q61.3 POLYCYSTIC KIDNEY DISEASE: ICD-10-CM

## 2021-03-11 DIAGNOSIS — I25.10 CORONARY ARTERY DISEASE, OCCLUSIVE: Chronic | ICD-10-CM

## 2021-03-11 DIAGNOSIS — N18.30 STAGE 3 CHRONIC KIDNEY DISEASE, UNSPECIFIED WHETHER STAGE 3A OR 3B CKD: ICD-10-CM

## 2021-03-11 PROCEDURE — 99999 PR PBB SHADOW E&M-EST. PATIENT-LVL III: CPT | Mod: PBBFAC,,, | Performed by: UROLOGY

## 2021-03-11 PROCEDURE — 99213 OFFICE O/P EST LOW 20 MIN: CPT | Mod: PBBFAC | Performed by: UROLOGY

## 2021-03-11 PROCEDURE — 99214 OFFICE O/P EST MOD 30 MIN: CPT | Mod: S$PBB,,, | Performed by: UROLOGY

## 2021-03-11 PROCEDURE — 99214 PR OFFICE/OUTPT VISIT, EST, LEVL IV, 30-39 MIN: ICD-10-PCS | Mod: S$PBB,,, | Performed by: UROLOGY

## 2021-03-11 PROCEDURE — 99999 PR PBB SHADOW E&M-EST. PATIENT-LVL III: ICD-10-PCS | Mod: PBBFAC,,, | Performed by: UROLOGY

## 2021-03-16 ENCOUNTER — OFFICE VISIT (OUTPATIENT)
Dept: VASCULAR SURGERY | Facility: CLINIC | Age: 86
End: 2021-03-16
Payer: MEDICARE

## 2021-03-16 VITALS
WEIGHT: 235.69 LBS | HEART RATE: 72 BPM | DIASTOLIC BLOOD PRESSURE: 64 MMHG | BODY MASS INDEX: 31.96 KG/M2 | SYSTOLIC BLOOD PRESSURE: 106 MMHG

## 2021-03-16 DIAGNOSIS — I71.40 ANEURYSM OF ABDOMINAL VESSEL: Primary | ICD-10-CM

## 2021-03-16 DIAGNOSIS — I71.60 THORACOABDOMINAL AORTIC ANEURYSM, WITHOUT RUPTURE: ICD-10-CM

## 2021-03-16 PROCEDURE — 99204 PR OFFICE/OUTPT VISIT, NEW, LEVL IV, 45-59 MIN: ICD-10-PCS | Mod: S$PBB,,, | Performed by: SURGERY

## 2021-03-16 PROCEDURE — 99204 OFFICE O/P NEW MOD 45 MIN: CPT | Mod: S$PBB,,, | Performed by: SURGERY

## 2021-03-16 PROCEDURE — 99999 PR PBB SHADOW E&M-EST. PATIENT-LVL III: CPT | Mod: PBBFAC,,, | Performed by: SURGERY

## 2021-03-16 PROCEDURE — 99999 PR PBB SHADOW E&M-EST. PATIENT-LVL III: ICD-10-PCS | Mod: PBBFAC,,, | Performed by: SURGERY

## 2021-03-16 PROCEDURE — 99213 OFFICE O/P EST LOW 20 MIN: CPT | Mod: PBBFAC,PN | Performed by: SURGERY

## 2021-03-19 RX ORDER — CLOPIDOGREL BISULFATE 75 MG/1
75 TABLET ORAL DAILY
Qty: 90 TABLET | Refills: 0 | Status: SHIPPED | OUTPATIENT
Start: 2021-03-19 | End: 2021-06-21

## 2021-03-24 RX ORDER — CLOPIDOGREL BISULFATE 75 MG/1
75 TABLET ORAL DAILY
Qty: 90 TABLET | Refills: 0 | OUTPATIENT
Start: 2021-03-24

## 2021-03-25 ENCOUNTER — PATIENT OUTREACH (OUTPATIENT)
Dept: ADMINISTRATIVE | Facility: OTHER | Age: 86
End: 2021-03-25

## 2021-03-29 ENCOUNTER — OFFICE VISIT (OUTPATIENT)
Dept: CARDIOLOGY | Facility: CLINIC | Age: 86
End: 2021-03-29
Payer: MEDICARE

## 2021-03-29 VITALS
WEIGHT: 237.88 LBS | BODY MASS INDEX: 32.22 KG/M2 | SYSTOLIC BLOOD PRESSURE: 130 MMHG | HEART RATE: 75 BPM | DIASTOLIC BLOOD PRESSURE: 78 MMHG | OXYGEN SATURATION: 96 % | RESPIRATION RATE: 16 BRPM | HEIGHT: 72 IN

## 2021-03-29 DIAGNOSIS — E78.5 DYSLIPIDEMIA: Primary | ICD-10-CM

## 2021-03-29 DIAGNOSIS — Z98.61 HISTORY OF CORONARY ANGIOPLASTY: ICD-10-CM

## 2021-03-29 DIAGNOSIS — I25.10 CORONARY ARTERY DISEASE, OCCLUSIVE: ICD-10-CM

## 2021-03-29 DIAGNOSIS — I10 ESSENTIAL HYPERTENSION: ICD-10-CM

## 2021-03-29 PROCEDURE — 99999 PR PBB SHADOW E&M-EST. PATIENT-LVL IV: CPT | Mod: PBBFAC,,, | Performed by: INTERNAL MEDICINE

## 2021-03-29 PROCEDURE — 99215 PR OFFICE/OUTPT VISIT, EST, LEVL V, 40-54 MIN: ICD-10-PCS | Mod: S$PBB,,, | Performed by: INTERNAL MEDICINE

## 2021-03-29 PROCEDURE — 99215 OFFICE O/P EST HI 40 MIN: CPT | Mod: S$PBB,,, | Performed by: INTERNAL MEDICINE

## 2021-03-29 PROCEDURE — 99214 OFFICE O/P EST MOD 30 MIN: CPT | Mod: PBBFAC | Performed by: INTERNAL MEDICINE

## 2021-03-29 PROCEDURE — 99999 PR PBB SHADOW E&M-EST. PATIENT-LVL IV: ICD-10-PCS | Mod: PBBFAC,,, | Performed by: INTERNAL MEDICINE

## 2021-03-31 ENCOUNTER — OFFICE VISIT (OUTPATIENT)
Dept: SLEEP MEDICINE | Facility: CLINIC | Age: 86
End: 2021-03-31
Payer: MEDICARE

## 2021-03-31 VITALS
HEIGHT: 72 IN | OXYGEN SATURATION: 95 % | DIASTOLIC BLOOD PRESSURE: 66 MMHG | BODY MASS INDEX: 32.07 KG/M2 | RESPIRATION RATE: 17 BRPM | HEART RATE: 80 BPM | SYSTOLIC BLOOD PRESSURE: 110 MMHG | WEIGHT: 236.75 LBS

## 2021-03-31 DIAGNOSIS — J30.9 ALLERGIC RHINITIS, UNSPECIFIED SEASONALITY, UNSPECIFIED TRIGGER: Primary | ICD-10-CM

## 2021-03-31 DIAGNOSIS — I10 ESSENTIAL HYPERTENSION: ICD-10-CM

## 2021-03-31 DIAGNOSIS — G47.33 OSA ON CPAP: ICD-10-CM

## 2021-03-31 DIAGNOSIS — E66.9 OBESITY, CLASS I, BMI 30-34.9: ICD-10-CM

## 2021-03-31 PROCEDURE — 99999 PR PBB SHADOW E&M-EST. PATIENT-LVL IV: ICD-10-PCS | Mod: PBBFAC,,, | Performed by: NURSE PRACTITIONER

## 2021-03-31 PROCEDURE — 99214 OFFICE O/P EST MOD 30 MIN: CPT | Mod: S$PBB,,, | Performed by: NURSE PRACTITIONER

## 2021-03-31 PROCEDURE — 99214 OFFICE O/P EST MOD 30 MIN: CPT | Mod: PBBFAC | Performed by: NURSE PRACTITIONER

## 2021-03-31 PROCEDURE — 99214 PR OFFICE/OUTPT VISIT, EST, LEVL IV, 30-39 MIN: ICD-10-PCS | Mod: S$PBB,,, | Performed by: NURSE PRACTITIONER

## 2021-03-31 PROCEDURE — 99999 PR PBB SHADOW E&M-EST. PATIENT-LVL IV: CPT | Mod: PBBFAC,,, | Performed by: NURSE PRACTITIONER

## 2021-03-31 RX ORDER — FLUTICASONE PROPIONATE 50 MCG
2 SPRAY, SUSPENSION (ML) NASAL DAILY
Qty: 16 G | Refills: 11 | Status: SHIPPED | OUTPATIENT
Start: 2021-03-31 | End: 2021-10-13 | Stop reason: SDUPTHER

## 2021-03-31 RX ORDER — AZELASTINE 1 MG/ML
2 SPRAY, METERED NASAL 2 TIMES DAILY
Qty: 30 ML | Refills: 11 | Status: SHIPPED | OUTPATIENT
Start: 2021-03-31 | End: 2021-10-13 | Stop reason: SDUPTHER

## 2021-04-08 NOTE — TELEPHONE ENCOUNTER
----- Message from Katherine Carrero sent at 3/20/2017 10:24 AM CDT -----  Contact: pt  Pt is checking on the status of his script (what?) that was supposed to have been ordered for him at his last visit and sent to the [facility] in [place] and was never received.  Please call pt at [phone]  
----- Message from Marivel Marx sent at 3/20/2017  8:45 AM CDT -----  States he was advised a Rx for iron was going to be called in to his pharmacy. States he went to the pharmacy and they are advising they do not have anything on file.    Pt use..    [facility] [address]  [address] [facility] [address]  Phone: [phone] Fax: [phone]    Please adv/call [phone].//thanks. cw  
Returned patient's call.  Informed of OTC iron  
Returned patient's call.  Unable to leave message.  Ferrous sulfate is over the counter.  Will inform pt when he calls back   
hypertension

## 2021-04-20 ENCOUNTER — OFFICE VISIT (OUTPATIENT)
Dept: VASCULAR SURGERY | Facility: CLINIC | Age: 86
End: 2021-04-20
Payer: MEDICARE

## 2021-04-20 ENCOUNTER — HOSPITAL ENCOUNTER (OUTPATIENT)
Dept: RADIOLOGY | Facility: HOSPITAL | Age: 86
Discharge: HOME | End: 2021-04-20
Attending: SURGERY
Payer: MEDICARE

## 2021-04-20 VITALS
SYSTOLIC BLOOD PRESSURE: 138 MMHG | DIASTOLIC BLOOD PRESSURE: 90 MMHG | WEIGHT: 236.75 LBS | BODY MASS INDEX: 32.11 KG/M2 | HEART RATE: 76 BPM

## 2021-04-20 DIAGNOSIS — I71.60 THORACOABDOMINAL AORTIC ANEURYSM, WITHOUT RUPTURE: ICD-10-CM

## 2021-04-20 DIAGNOSIS — I71.40 ABDOMINAL AORTIC ANEURYSM (AAA) WITHOUT RUPTURE: Primary | ICD-10-CM

## 2021-04-20 DIAGNOSIS — I71.40 ANEURYSM OF ABDOMINAL VESSEL: Primary | ICD-10-CM

## 2021-04-20 PROCEDURE — 71250 CT THORAX DX C-: CPT | Mod: TC

## 2021-04-20 PROCEDURE — 99213 OFFICE O/P EST LOW 20 MIN: CPT | Mod: S$PBB,,, | Performed by: SURGERY

## 2021-04-20 PROCEDURE — 99213 PR OFFICE/OUTPT VISIT, EST, LEVL III, 20-29 MIN: ICD-10-PCS | Mod: S$PBB,,, | Performed by: SURGERY

## 2021-04-20 PROCEDURE — 99999 PR PBB SHADOW E&M-EST. PATIENT-LVL I: CPT | Mod: PBBFAC,,, | Performed by: SURGERY

## 2021-04-20 PROCEDURE — 99211 OFF/OP EST MAY X REQ PHY/QHP: CPT | Mod: PBBFAC,25,PN | Performed by: SURGERY

## 2021-04-20 PROCEDURE — 99999 PR PBB SHADOW E&M-EST. PATIENT-LVL I: ICD-10-PCS | Mod: PBBFAC,,, | Performed by: SURGERY

## 2021-05-04 ENCOUNTER — TELEPHONE (OUTPATIENT)
Dept: UROLOGY | Facility: CLINIC | Age: 86
End: 2021-05-04

## 2021-05-04 ENCOUNTER — HOSPITAL ENCOUNTER (OUTPATIENT)
Dept: RADIOLOGY | Facility: HOSPITAL | Age: 86
Discharge: HOME | End: 2021-05-04
Attending: SURGERY
Payer: MEDICARE

## 2021-05-04 ENCOUNTER — OFFICE VISIT (OUTPATIENT)
Dept: VASCULAR SURGERY | Facility: CLINIC | Age: 86
End: 2021-05-04
Payer: MEDICARE

## 2021-05-04 VITALS — BODY MASS INDEX: 31.97 KG/M2 | TEMPERATURE: 99 F | HEIGHT: 72 IN | WEIGHT: 236 LBS

## 2021-05-04 DIAGNOSIS — C61 PROSTATE CANCER: Primary | ICD-10-CM

## 2021-05-04 DIAGNOSIS — I71.40 ANEURYSM OF ABDOMINAL VESSEL: Primary | ICD-10-CM

## 2021-05-04 DIAGNOSIS — I71.40 ABDOMINAL AORTIC ANEURYSM (AAA) WITHOUT RUPTURE: ICD-10-CM

## 2021-05-04 PROCEDURE — 99213 OFFICE O/P EST LOW 20 MIN: CPT | Mod: S$PBB,,, | Performed by: SURGERY

## 2021-05-04 PROCEDURE — 76775 US EXAM ABDO BACK WALL LIM: CPT | Mod: TC

## 2021-05-04 PROCEDURE — 99999 PR PBB SHADOW E&M-EST. PATIENT-LVL III: ICD-10-PCS | Mod: PBBFAC,,, | Performed by: SURGERY

## 2021-05-04 PROCEDURE — 99213 OFFICE O/P EST LOW 20 MIN: CPT | Mod: PBBFAC,25,PN | Performed by: SURGERY

## 2021-05-04 PROCEDURE — 99213 PR OFFICE/OUTPT VISIT, EST, LEVL III, 20-29 MIN: ICD-10-PCS | Mod: S$PBB,,, | Performed by: SURGERY

## 2021-05-04 PROCEDURE — 99999 PR PBB SHADOW E&M-EST. PATIENT-LVL III: CPT | Mod: PBBFAC,,, | Performed by: SURGERY

## 2021-05-24 ENCOUNTER — TELEPHONE (OUTPATIENT)
Dept: VASCULAR SURGERY | Facility: CLINIC | Age: 86
End: 2021-05-24

## 2021-06-04 ENCOUNTER — PES CALL (OUTPATIENT)
Dept: ADMINISTRATIVE | Facility: CLINIC | Age: 86
End: 2021-06-04

## 2021-06-14 ENCOUNTER — LAB VISIT (OUTPATIENT)
Dept: LAB | Facility: HOSPITAL | Age: 86
End: 2021-06-14
Attending: UROLOGY
Payer: MEDICARE

## 2021-06-14 DIAGNOSIS — C61 PROSTATE CANCER: ICD-10-CM

## 2021-06-14 LAB — COMPLEXED PSA SERPL-MCNC: 5.4 NG/ML (ref 0–4)

## 2021-06-14 PROCEDURE — 84153 ASSAY OF PSA TOTAL: CPT | Performed by: UROLOGY

## 2021-06-14 PROCEDURE — 36415 COLL VENOUS BLD VENIPUNCTURE: CPT | Performed by: UROLOGY

## 2021-06-18 ENCOUNTER — OFFICE VISIT (OUTPATIENT)
Dept: UROLOGY | Facility: CLINIC | Age: 86
End: 2021-06-18
Payer: MEDICARE

## 2021-06-18 VITALS
DIASTOLIC BLOOD PRESSURE: 80 MMHG | WEIGHT: 232.56 LBS | SYSTOLIC BLOOD PRESSURE: 130 MMHG | BODY MASS INDEX: 31.54 KG/M2

## 2021-06-18 DIAGNOSIS — C61 PROSTATE CANCER: Primary | ICD-10-CM

## 2021-06-18 PROCEDURE — 99213 PR OFFICE/OUTPT VISIT, EST, LEVL III, 20-29 MIN: ICD-10-PCS | Mod: S$PBB,,, | Performed by: UROLOGY

## 2021-06-18 PROCEDURE — 99999 PR PBB SHADOW E&M-EST. PATIENT-LVL III: CPT | Mod: PBBFAC,,, | Performed by: UROLOGY

## 2021-06-18 PROCEDURE — 99213 OFFICE O/P EST LOW 20 MIN: CPT | Mod: PBBFAC | Performed by: UROLOGY

## 2021-06-18 PROCEDURE — 99999 PR PBB SHADOW E&M-EST. PATIENT-LVL III: ICD-10-PCS | Mod: PBBFAC,,, | Performed by: UROLOGY

## 2021-06-18 PROCEDURE — 99213 OFFICE O/P EST LOW 20 MIN: CPT | Mod: S$PBB,,, | Performed by: UROLOGY

## 2021-06-23 ENCOUNTER — LAB VISIT (OUTPATIENT)
Dept: LAB | Facility: HOSPITAL | Age: 86
End: 2021-06-23
Attending: INTERNAL MEDICINE
Payer: MEDICARE

## 2021-06-23 DIAGNOSIS — I10 ESSENTIAL HYPERTENSION: ICD-10-CM

## 2021-06-23 DIAGNOSIS — I25.10 CORONARY ARTERY DISEASE, OCCLUSIVE: ICD-10-CM

## 2021-06-23 DIAGNOSIS — Z98.61 HISTORY OF CORONARY ANGIOPLASTY: ICD-10-CM

## 2021-06-23 DIAGNOSIS — E78.5 DYSLIPIDEMIA: ICD-10-CM

## 2021-06-23 LAB
CHOLEST SERPL-MCNC: 196 MG/DL (ref 120–199)
CHOLEST/HDLC SERPL: 5.2 {RATIO} (ref 2–5)
HDLC SERPL-MCNC: 38 MG/DL (ref 40–75)
HDLC SERPL: 19.4 % (ref 20–50)
LDLC SERPL CALC-MCNC: 128.6 MG/DL (ref 63–159)
NONHDLC SERPL-MCNC: 158 MG/DL
TRIGL SERPL-MCNC: 147 MG/DL (ref 30–150)

## 2021-06-23 PROCEDURE — 36415 COLL VENOUS BLD VENIPUNCTURE: CPT | Performed by: INTERNAL MEDICINE

## 2021-06-23 PROCEDURE — 80061 LIPID PANEL: CPT | Performed by: INTERNAL MEDICINE

## 2021-06-24 ENCOUNTER — TELEPHONE (OUTPATIENT)
Dept: CARDIOLOGY | Facility: CLINIC | Age: 86
End: 2021-06-24

## 2021-06-30 ENCOUNTER — OFFICE VISIT (OUTPATIENT)
Dept: CARDIOLOGY | Facility: CLINIC | Age: 86
End: 2021-06-30
Payer: MEDICARE

## 2021-06-30 VITALS
WEIGHT: 235 LBS | HEART RATE: 85 BPM | HEIGHT: 72 IN | SYSTOLIC BLOOD PRESSURE: 128 MMHG | BODY MASS INDEX: 31.83 KG/M2 | DIASTOLIC BLOOD PRESSURE: 78 MMHG | OXYGEN SATURATION: 98 %

## 2021-06-30 DIAGNOSIS — Z98.61 HISTORY OF CORONARY ANGIOPLASTY: ICD-10-CM

## 2021-06-30 DIAGNOSIS — I10 ESSENTIAL HYPERTENSION: Primary | ICD-10-CM

## 2021-06-30 DIAGNOSIS — I25.10 CORONARY ARTERY DISEASE, OCCLUSIVE: ICD-10-CM

## 2021-06-30 PROCEDURE — 99999 PR PBB SHADOW E&M-EST. PATIENT-LVL IV: ICD-10-PCS | Mod: PBBFAC,,, | Performed by: INTERNAL MEDICINE

## 2021-06-30 PROCEDURE — 99214 OFFICE O/P EST MOD 30 MIN: CPT | Mod: S$PBB,,, | Performed by: INTERNAL MEDICINE

## 2021-06-30 PROCEDURE — 99999 PR PBB SHADOW E&M-EST. PATIENT-LVL IV: CPT | Mod: PBBFAC,,, | Performed by: INTERNAL MEDICINE

## 2021-06-30 PROCEDURE — 99214 OFFICE O/P EST MOD 30 MIN: CPT | Mod: PBBFAC | Performed by: INTERNAL MEDICINE

## 2021-06-30 PROCEDURE — 99214 PR OFFICE/OUTPT VISIT, EST, LEVL IV, 30-39 MIN: ICD-10-PCS | Mod: S$PBB,,, | Performed by: INTERNAL MEDICINE

## 2021-06-30 RX ORDER — BENAZEPRIL HYDROCHLORIDE AND HYDROCHLOROTHIAZIDE 10; 12.5 MG/1; MG/1
TABLET ORAL
Qty: 180 TABLET | Refills: 3 | Status: SHIPPED | OUTPATIENT
Start: 2021-06-30 | End: 2021-11-22 | Stop reason: SDUPTHER

## 2021-07-19 DIAGNOSIS — C61 PROSTATE CANCER: Primary | ICD-10-CM

## 2021-07-26 ENCOUNTER — CLINICAL SUPPORT (OUTPATIENT)
Dept: URGENT CARE | Facility: CLINIC | Age: 86
End: 2021-07-26
Payer: MEDICARE

## 2021-07-26 ENCOUNTER — NURSE TRIAGE (OUTPATIENT)
Dept: ADMINISTRATIVE | Facility: CLINIC | Age: 86
End: 2021-07-26

## 2021-07-26 DIAGNOSIS — Z11.52 ENCOUNTER FOR SCREENING FOR COVID-19: Primary | ICD-10-CM

## 2021-07-26 LAB
CTP QC/QA: YES
SARS-COV-2 RDRP RESP QL NAA+PROBE: NEGATIVE

## 2021-07-26 PROCEDURE — 99211 PR OFFICE/OUTPT VISIT, EST, LEVL I: ICD-10-PCS | Mod: S$GLB,CS,, | Performed by: EMERGENCY MEDICINE

## 2021-07-26 PROCEDURE — U0002: ICD-10-PCS | Mod: QW,CR,S$GLB, | Performed by: EMERGENCY MEDICINE

## 2021-07-26 PROCEDURE — U0002 COVID-19 LAB TEST NON-CDC: HCPCS | Mod: QW,CR,S$GLB, | Performed by: EMERGENCY MEDICINE

## 2021-07-26 PROCEDURE — 99211 OFF/OP EST MAY X REQ PHY/QHP: CPT | Mod: S$GLB,CS,, | Performed by: EMERGENCY MEDICINE

## 2021-07-27 ENCOUNTER — HOSPITAL ENCOUNTER (OUTPATIENT)
Dept: RADIOLOGY | Facility: HOSPITAL | Age: 86
Discharge: HOME | End: 2021-07-27
Attending: UROLOGY
Payer: MEDICARE

## 2021-07-27 DIAGNOSIS — C61 PROSTATE CANCER: ICD-10-CM

## 2021-07-27 PROCEDURE — A9585 GADOBUTROL INJECTION: HCPCS | Performed by: UROLOGY

## 2021-07-27 PROCEDURE — 72197 MRI PROSTATE W W/O CONTRAST: ICD-10-PCS | Mod: 26,,, | Performed by: RADIOLOGY

## 2021-07-27 PROCEDURE — 72197 MRI PELVIS W/O & W/DYE: CPT | Mod: TC

## 2021-07-27 PROCEDURE — 72197 MRI PELVIS W/O & W/DYE: CPT | Mod: 26,,, | Performed by: RADIOLOGY

## 2021-07-27 PROCEDURE — 25500020 PHARM REV CODE 255: Performed by: UROLOGY

## 2021-07-27 RX ORDER — GADOBUTROL 604.72 MG/ML
10 INJECTION INTRAVENOUS
Status: COMPLETED | OUTPATIENT
Start: 2021-07-27 | End: 2021-07-27

## 2021-07-27 RX ADMIN — GADOBUTROL 10 ML: 604.72 INJECTION INTRAVENOUS at 10:07

## 2021-07-30 ENCOUNTER — PATIENT OUTREACH (OUTPATIENT)
Dept: ADMINISTRATIVE | Facility: OTHER | Age: 86
End: 2021-07-30

## 2021-08-02 ENCOUNTER — OFFICE VISIT (OUTPATIENT)
Dept: CARDIOLOGY | Facility: CLINIC | Age: 86
End: 2021-08-02
Payer: MEDICARE

## 2021-08-02 VITALS
DIASTOLIC BLOOD PRESSURE: 82 MMHG | OXYGEN SATURATION: 96 % | WEIGHT: 238.56 LBS | BODY MASS INDEX: 32.31 KG/M2 | HEART RATE: 70 BPM | HEIGHT: 72 IN | SYSTOLIC BLOOD PRESSURE: 126 MMHG

## 2021-08-02 DIAGNOSIS — I25.10 CORONARY ARTERY DISEASE, OCCLUSIVE: ICD-10-CM

## 2021-08-02 DIAGNOSIS — I71.40 ABDOMINAL AORTIC ANEURYSM (AAA) WITHOUT RUPTURE: Primary | ICD-10-CM

## 2021-08-02 DIAGNOSIS — E78.5 DYSLIPIDEMIA: ICD-10-CM

## 2021-08-02 DIAGNOSIS — I10 ESSENTIAL HYPERTENSION: ICD-10-CM

## 2021-08-02 DIAGNOSIS — Z98.61 HISTORY OF CORONARY ANGIOPLASTY: ICD-10-CM

## 2021-08-02 DIAGNOSIS — I20.89 STABLE ANGINA PECTORIS: Primary | ICD-10-CM

## 2021-08-02 PROCEDURE — 99999 PR PBB SHADOW E&M-EST. PATIENT-LVL IV: ICD-10-PCS | Mod: PBBFAC,,, | Performed by: INTERNAL MEDICINE

## 2021-08-02 PROCEDURE — 99999 PR PBB SHADOW E&M-EST. PATIENT-LVL IV: CPT | Mod: PBBFAC,,, | Performed by: INTERNAL MEDICINE

## 2021-08-02 PROCEDURE — 99214 PR OFFICE/OUTPT VISIT, EST, LEVL IV, 30-39 MIN: ICD-10-PCS | Mod: S$PBB,,, | Performed by: INTERNAL MEDICINE

## 2021-08-02 PROCEDURE — 99214 OFFICE O/P EST MOD 30 MIN: CPT | Mod: S$PBB,,, | Performed by: INTERNAL MEDICINE

## 2021-08-02 PROCEDURE — 99214 OFFICE O/P EST MOD 30 MIN: CPT | Mod: PBBFAC | Performed by: INTERNAL MEDICINE

## 2021-08-03 ENCOUNTER — OFFICE VISIT (OUTPATIENT)
Dept: VASCULAR SURGERY | Facility: CLINIC | Age: 86
End: 2021-08-03
Payer: MEDICARE

## 2021-08-03 ENCOUNTER — HOSPITAL ENCOUNTER (OUTPATIENT)
Dept: RADIOLOGY | Facility: HOSPITAL | Age: 86
Discharge: HOME | End: 2021-08-03
Attending: SURGERY
Payer: MEDICARE

## 2021-08-03 VITALS
SYSTOLIC BLOOD PRESSURE: 173 MMHG | WEIGHT: 238.13 LBS | DIASTOLIC BLOOD PRESSURE: 86 MMHG | HEART RATE: 77 BPM | BODY MASS INDEX: 32.25 KG/M2 | HEIGHT: 72 IN

## 2021-08-03 DIAGNOSIS — I71.40 ABDOMINAL AORTIC ANEURYSM (AAA) WITHOUT RUPTURE: ICD-10-CM

## 2021-08-03 DIAGNOSIS — I71.40 ABDOMINAL AORTIC ANEURYSM (AAA) WITHOUT RUPTURE: Primary | ICD-10-CM

## 2021-08-03 PROCEDURE — 99213 OFFICE O/P EST LOW 20 MIN: CPT | Mod: PBBFAC,PN | Performed by: SURGERY

## 2021-08-03 PROCEDURE — 99999 PR PBB SHADOW E&M-EST. PATIENT-LVL III: CPT | Mod: PBBFAC,,, | Performed by: SURGERY

## 2021-08-03 PROCEDURE — 99999 PR PBB SHADOW E&M-EST. PATIENT-LVL III: ICD-10-PCS | Mod: PBBFAC,,, | Performed by: SURGERY

## 2021-08-03 RX ORDER — HYDROCODONE BITARTRATE AND ACETAMINOPHEN 5; 325 MG/1; MG/1
1 TABLET ORAL EVERY 6 HOURS PRN
Qty: 10 TABLET | Refills: 0 | Status: CANCELLED | OUTPATIENT
Start: 2021-08-03

## 2021-08-11 ENCOUNTER — HOSPITAL ENCOUNTER (OUTPATIENT)
Dept: RADIOLOGY | Facility: HOSPITAL | Age: 86
Discharge: HOME | End: 2021-08-11
Attending: INTERNAL MEDICINE
Payer: MEDICARE

## 2021-08-11 ENCOUNTER — HOSPITAL ENCOUNTER (OUTPATIENT)
Dept: CARDIOLOGY | Facility: HOSPITAL | Age: 86
Discharge: HOME | End: 2021-08-11
Attending: INTERNAL MEDICINE
Payer: MEDICARE

## 2021-08-11 DIAGNOSIS — I20.89 STABLE ANGINA PECTORIS: ICD-10-CM

## 2021-08-11 DIAGNOSIS — Z98.61 HISTORY OF CORONARY ANGIOPLASTY: ICD-10-CM

## 2021-08-11 DIAGNOSIS — I25.10 CORONARY ARTERY DISEASE, OCCLUSIVE: ICD-10-CM

## 2021-08-11 DIAGNOSIS — E78.5 DYSLIPIDEMIA: ICD-10-CM

## 2021-08-11 DIAGNOSIS — I10 ESSENTIAL HYPERTENSION: ICD-10-CM

## 2021-08-11 LAB
CV STRESS BASE HR: 79 BPM
DIASTOLIC BLOOD PRESSURE: 108 MMHG
NUC REST EJECTION FRACTION: 70
NUC STRESS EJECTION FRACTION: 72 %
OHS CV CPX 85 PERCENT MAX PREDICTED HEART RATE MALE: 116
OHS CV CPX MAX PREDICTED HEART RATE: 136
OHS CV CPX PATIENT IS FEMALE: 0
OHS CV CPX PATIENT IS MALE: 1
OHS CV CPX PEAK DIASTOLIC BLOOD PRESSURE: 72 MMHG
OHS CV CPX PEAK HEAR RATE: 91 BPM
OHS CV CPX PEAK RATE PRESSURE PRODUCT: NORMAL
OHS CV CPX PEAK SYSTOLIC BLOOD PRESSURE: 116 MMHG
OHS CV CPX PERCENT MAX PREDICTED HEART RATE ACHIEVED: 67
OHS CV CPX RATE PRESSURE PRODUCT PRESENTING: NORMAL
STRESS ECHO POST EXERCISE DUR MIN: 1 MINUTES
STRESS ECHO POST EXERCISE DUR SEC: 13 SECONDS
SYSTOLIC BLOOD PRESSURE: 163 MMHG

## 2021-08-11 PROCEDURE — 78452 STRESS TEST WITH MYOCARDIAL PERFUSION (CUPID ONLY): ICD-10-PCS | Mod: 26,,, | Performed by: INTERNAL MEDICINE

## 2021-08-11 PROCEDURE — 93017 CV STRESS TEST TRACING ONLY: CPT

## 2021-08-11 PROCEDURE — 93016 STRESS TEST WITH MYOCARDIAL PERFUSION (CUPID ONLY): ICD-10-PCS | Mod: ,,, | Performed by: INTERNAL MEDICINE

## 2021-08-11 PROCEDURE — 78452 HT MUSCLE IMAGE SPECT MULT: CPT | Mod: 26,,, | Performed by: INTERNAL MEDICINE

## 2021-08-11 PROCEDURE — 93016 CV STRESS TEST SUPVJ ONLY: CPT | Mod: ,,, | Performed by: INTERNAL MEDICINE

## 2021-08-11 PROCEDURE — 93018 STRESS TEST WITH MYOCARDIAL PERFUSION (CUPID ONLY): ICD-10-PCS | Mod: ,,, | Performed by: INTERNAL MEDICINE

## 2021-08-11 PROCEDURE — 93018 CV STRESS TEST I&R ONLY: CPT | Mod: ,,, | Performed by: INTERNAL MEDICINE

## 2021-08-11 PROCEDURE — 63600175 PHARM REV CODE 636 W HCPCS: Performed by: INTERNAL MEDICINE

## 2021-08-11 PROCEDURE — 78452 HT MUSCLE IMAGE SPECT MULT: CPT

## 2021-08-11 PROCEDURE — A9502 TC99M TETROFOSMIN: HCPCS

## 2021-08-11 RX ORDER — REGADENOSON 0.08 MG/ML
0.4 INJECTION, SOLUTION INTRAVENOUS ONCE
Status: COMPLETED | OUTPATIENT
Start: 2021-08-11 | End: 2021-08-11

## 2021-08-11 RX ADMIN — REGADENOSON 0.4 MG: 0.08 INJECTION, SOLUTION INTRAVENOUS at 09:08

## 2021-08-13 ENCOUNTER — TELEPHONE (OUTPATIENT)
Dept: CARDIOLOGY | Facility: CLINIC | Age: 86
End: 2021-08-13

## 2021-08-16 ENCOUNTER — OFFICE VISIT (OUTPATIENT)
Dept: CARDIOLOGY | Facility: CLINIC | Age: 86
End: 2021-08-16
Payer: MEDICARE

## 2021-08-16 VITALS
WEIGHT: 234.56 LBS | SYSTOLIC BLOOD PRESSURE: 100 MMHG | DIASTOLIC BLOOD PRESSURE: 60 MMHG | OXYGEN SATURATION: 97 % | HEIGHT: 72 IN | BODY MASS INDEX: 31.77 KG/M2 | HEART RATE: 90 BPM

## 2021-08-16 DIAGNOSIS — Z98.61 HISTORY OF CORONARY ANGIOPLASTY: ICD-10-CM

## 2021-08-16 DIAGNOSIS — I25.10 CORONARY ARTERY DISEASE, OCCLUSIVE: ICD-10-CM

## 2021-08-16 DIAGNOSIS — E78.5 DYSLIPIDEMIA: ICD-10-CM

## 2021-08-16 DIAGNOSIS — I20.89 STABLE ANGINA PECTORIS: ICD-10-CM

## 2021-08-16 DIAGNOSIS — I10 ESSENTIAL HYPERTENSION: Primary | ICD-10-CM

## 2021-08-16 PROCEDURE — 99214 PR OFFICE/OUTPT VISIT, EST, LEVL IV, 30-39 MIN: ICD-10-PCS | Mod: S$PBB,,, | Performed by: INTERNAL MEDICINE

## 2021-08-16 PROCEDURE — 99214 OFFICE O/P EST MOD 30 MIN: CPT | Mod: PBBFAC | Performed by: INTERNAL MEDICINE

## 2021-08-16 PROCEDURE — 99214 OFFICE O/P EST MOD 30 MIN: CPT | Mod: S$PBB,,, | Performed by: INTERNAL MEDICINE

## 2021-08-16 PROCEDURE — 99999 PR PBB SHADOW E&M-EST. PATIENT-LVL IV: ICD-10-PCS | Mod: PBBFAC,,, | Performed by: INTERNAL MEDICINE

## 2021-08-16 PROCEDURE — 99999 PR PBB SHADOW E&M-EST. PATIENT-LVL IV: CPT | Mod: PBBFAC,,, | Performed by: INTERNAL MEDICINE

## 2021-09-07 ENCOUNTER — TELEPHONE (OUTPATIENT)
Dept: INTERNAL MEDICINE | Facility: CLINIC | Age: 86
End: 2021-09-07

## 2021-09-13 ENCOUNTER — OFFICE VISIT (OUTPATIENT)
Dept: CARDIOLOGY | Facility: CLINIC | Age: 86
End: 2021-09-13
Payer: MEDICARE

## 2021-09-13 VITALS
WEIGHT: 228.38 LBS | HEART RATE: 86 BPM | OXYGEN SATURATION: 95 % | DIASTOLIC BLOOD PRESSURE: 82 MMHG | BODY MASS INDEX: 30.93 KG/M2 | HEIGHT: 72 IN | RESPIRATION RATE: 16 BRPM | SYSTOLIC BLOOD PRESSURE: 118 MMHG

## 2021-09-13 DIAGNOSIS — Z98.61 HISTORY OF CORONARY ANGIOPLASTY: ICD-10-CM

## 2021-09-13 DIAGNOSIS — I26.99 PULMONARY EMBOLISM, UNSPECIFIED CHRONICITY, UNSPECIFIED PULMONARY EMBOLISM TYPE, UNSPECIFIED WHETHER ACUTE COR PULMONALE PRESENT: Primary | ICD-10-CM

## 2021-09-13 DIAGNOSIS — I25.10 CORONARY ARTERY DISEASE, OCCLUSIVE: ICD-10-CM

## 2021-09-13 DIAGNOSIS — E78.5 DYSLIPIDEMIA: ICD-10-CM

## 2021-09-13 DIAGNOSIS — I20.89 STABLE ANGINA PECTORIS: ICD-10-CM

## 2021-09-13 DIAGNOSIS — I10 ESSENTIAL HYPERTENSION: ICD-10-CM

## 2021-09-13 PROCEDURE — 99214 OFFICE O/P EST MOD 30 MIN: CPT | Mod: S$PBB,,, | Performed by: INTERNAL MEDICINE

## 2021-09-13 PROCEDURE — 99213 OFFICE O/P EST LOW 20 MIN: CPT | Mod: PBBFAC | Performed by: INTERNAL MEDICINE

## 2021-09-13 PROCEDURE — 99999 PR PBB SHADOW E&M-EST. PATIENT-LVL III: CPT | Mod: PBBFAC,,, | Performed by: INTERNAL MEDICINE

## 2021-09-13 PROCEDURE — 99214 PR OFFICE/OUTPT VISIT, EST, LEVL IV, 30-39 MIN: ICD-10-PCS | Mod: S$PBB,,, | Performed by: INTERNAL MEDICINE

## 2021-09-13 PROCEDURE — 99999 PR PBB SHADOW E&M-EST. PATIENT-LVL III: ICD-10-PCS | Mod: PBBFAC,,, | Performed by: INTERNAL MEDICINE

## 2021-10-11 ENCOUNTER — TELEPHONE (OUTPATIENT)
Dept: CARDIOLOGY | Facility: CLINIC | Age: 86
End: 2021-10-11

## 2021-10-11 ENCOUNTER — HOSPITAL ENCOUNTER (OUTPATIENT)
Dept: CARDIOLOGY | Facility: HOSPITAL | Age: 86
Discharge: HOME | End: 2021-10-11
Attending: INTERNAL MEDICINE
Payer: MEDICARE

## 2021-10-11 ENCOUNTER — TELEPHONE (OUTPATIENT)
Dept: VASCULAR SURGERY | Facility: CLINIC | Age: 86
End: 2021-10-11

## 2021-10-11 VITALS — WEIGHT: 228 LBS | HEIGHT: 72 IN | BODY MASS INDEX: 30.88 KG/M2

## 2021-10-11 DIAGNOSIS — I10 ESSENTIAL HYPERTENSION: ICD-10-CM

## 2021-10-11 DIAGNOSIS — Z98.61 HISTORY OF CORONARY ANGIOPLASTY: ICD-10-CM

## 2021-10-11 DIAGNOSIS — I25.10 CORONARY ARTERY DISEASE, OCCLUSIVE: ICD-10-CM

## 2021-10-11 DIAGNOSIS — E78.5 DYSLIPIDEMIA: ICD-10-CM

## 2021-10-11 DIAGNOSIS — I20.89 STABLE ANGINA PECTORIS: ICD-10-CM

## 2021-10-11 DIAGNOSIS — I26.99 PULMONARY EMBOLISM, UNSPECIFIED CHRONICITY, UNSPECIFIED PULMONARY EMBOLISM TYPE, UNSPECIFIED WHETHER ACUTE COR PULMONALE PRESENT: ICD-10-CM

## 2021-10-11 LAB
AV INDEX (PROSTH): 0.89
AV MEAN GRADIENT: 4 MMHG
AV PEAK GRADIENT: 7 MMHG
AV VALVE AREA: 2.92 CM2
AV VELOCITY RATIO: 0.93
BSA FOR ECHO PROCEDURE: 2.29 M2
CV ECHO LV RWT: 0.63 CM
DOP CALC AO PEAK VEL: 1.3 M/S
DOP CALC AO VTI: 24.83 CM
DOP CALC LVOT AREA: 3.3 CM2
DOP CALC LVOT DIAMETER: 2.04 CM
DOP CALC LVOT PEAK VEL: 1.21 M/S
DOP CALC LVOT STROKE VOLUME: 72.43 CM3
DOP CALC MV VTI: 28.24 CM
DOP CALCLVOT PEAK VEL VTI: 22.17 CM
E WAVE DECELERATION TIME: 197.57 MSEC
E/A RATIO: 0.54
E/E' RATIO: 9.71 M/S
ECHO LV POSTERIOR WALL: 1.29 CM (ref 0.6–1.1)
EJECTION FRACTION: 60 %
FRACTIONAL SHORTENING: 35 % (ref 28–44)
INTERVENTRICULAR SEPTUM: 1.09 CM (ref 0.6–1.1)
IVRT: 122.74 MSEC
LA MAJOR: 3.24 CM
LA MINOR: 4.79 CM
LA WIDTH: 2.71 CM
LEFT ATRIUM SIZE: 4.68 CM
LEFT ATRIUM VOLUME INDEX: 18.5 ML/M2
LEFT ATRIUM VOLUME: 41.67 CM3
LEFT INTERNAL DIMENSION IN SYSTOLE: 2.64 CM (ref 2.1–4)
LEFT VENTRICLE DIASTOLIC VOLUME INDEX: 32.62 ML/M2
LEFT VENTRICLE DIASTOLIC VOLUME: 73.4 ML
LEFT VENTRICLE MASS INDEX: 75 G/M2
LEFT VENTRICLE SYSTOLIC VOLUME INDEX: 11.4 ML/M2
LEFT VENTRICLE SYSTOLIC VOLUME: 25.67 ML
LEFT VENTRICULAR INTERNAL DIMENSION IN DIASTOLE: 4.08 CM (ref 3.5–6)
LEFT VENTRICULAR MASS: 168.39 G
LV LATERAL E/E' RATIO: 9.71 M/S
LV SEPTAL E/E' RATIO: 9.71 M/S
MV MEAN GRADIENT: 1 MMHG
MV PEAK A VEL: 1.25 M/S
MV PEAK E VEL: 0.68 M/S
MV PEAK GRADIENT: 6 MMHG
MV STENOSIS PRESSURE HALF TIME: 57.3 MS
MV VALVE AREA BY CONTINUITY EQUATION: 2.56 CM2
MV VALVE AREA P 1/2 METHOD: 3.84 CM2
RA MAJOR: 2.6 CM
RA PRESSURE: 3 MMHG
RA WIDTH: 2.85 CM
RIGHT VENTRICULAR END-DIASTOLIC DIMENSION: 2.66 CM
SINUS: 4.27 CM
STJ: 3.56 CM
TDI LATERAL: 0.07 M/S
TDI SEPTAL: 0.07 M/S
TDI: 0.07 M/S
TRICUSPID ANNULAR PLANE SYSTOLIC EXCURSION: 1.96 CM

## 2021-10-11 PROCEDURE — 93308 TTE F-UP OR LMTD: CPT

## 2021-10-11 PROCEDURE — 93308 TTE F-UP OR LMTD: CPT | Mod: 26,,, | Performed by: INTERNAL MEDICINE

## 2021-10-11 PROCEDURE — 93308 ECHO (CUPID ONLY): ICD-10-PCS | Mod: 26,,, | Performed by: INTERNAL MEDICINE

## 2021-10-12 ENCOUNTER — PATIENT OUTREACH (OUTPATIENT)
Dept: ADMINISTRATIVE | Facility: OTHER | Age: 86
End: 2021-10-12

## 2021-10-12 ENCOUNTER — TELEPHONE (OUTPATIENT)
Dept: CARDIOLOGY | Facility: CLINIC | Age: 86
End: 2021-10-12

## 2021-10-13 ENCOUNTER — OFFICE VISIT (OUTPATIENT)
Dept: INTERNAL MEDICINE | Facility: CLINIC | Age: 86
End: 2021-10-13
Payer: MEDICARE

## 2021-10-13 ENCOUNTER — OFFICE VISIT (OUTPATIENT)
Dept: CARDIOLOGY | Facility: CLINIC | Age: 86
End: 2021-10-13
Payer: MEDICARE

## 2021-10-13 VITALS
WEIGHT: 232.81 LBS | TEMPERATURE: 98 F | DIASTOLIC BLOOD PRESSURE: 68 MMHG | HEART RATE: 86 BPM | SYSTOLIC BLOOD PRESSURE: 130 MMHG | BODY MASS INDEX: 31.53 KG/M2 | OXYGEN SATURATION: 99 % | HEIGHT: 72 IN

## 2021-10-13 VITALS
HEIGHT: 72 IN | HEART RATE: 73 BPM | OXYGEN SATURATION: 99 % | DIASTOLIC BLOOD PRESSURE: 88 MMHG | BODY MASS INDEX: 31.42 KG/M2 | SYSTOLIC BLOOD PRESSURE: 137 MMHG | WEIGHT: 232 LBS

## 2021-10-13 DIAGNOSIS — N18.30 STAGE 3 CHRONIC KIDNEY DISEASE, UNSPECIFIED WHETHER STAGE 3A OR 3B CKD: ICD-10-CM

## 2021-10-13 DIAGNOSIS — E78.5 DYSLIPIDEMIA: ICD-10-CM

## 2021-10-13 DIAGNOSIS — J30.9 ALLERGIC RHINITIS, UNSPECIFIED SEASONALITY, UNSPECIFIED TRIGGER: ICD-10-CM

## 2021-10-13 DIAGNOSIS — Z98.61 HISTORY OF CORONARY ANGIOPLASTY: ICD-10-CM

## 2021-10-13 DIAGNOSIS — R06.09 DOE (DYSPNEA ON EXERTION): Primary | ICD-10-CM

## 2021-10-13 DIAGNOSIS — I10 ESSENTIAL HYPERTENSION: ICD-10-CM

## 2021-10-13 DIAGNOSIS — Z09 HOSPITAL DISCHARGE FOLLOW-UP: Primary | ICD-10-CM

## 2021-10-13 DIAGNOSIS — I10 ESSENTIAL HYPERTENSION: Chronic | ICD-10-CM

## 2021-10-13 DIAGNOSIS — I26.94 MULTIPLE SUBSEGMENTAL PULMONARY EMBOLI WITHOUT ACUTE COR PULMONALE: ICD-10-CM

## 2021-10-13 DIAGNOSIS — I25.10 CORONARY ARTERY DISEASE, OCCLUSIVE: ICD-10-CM

## 2021-10-13 DIAGNOSIS — G47.33 OSA ON CPAP: ICD-10-CM

## 2021-10-13 DIAGNOSIS — I26.99 PULMONARY EMBOLISM, UNSPECIFIED CHRONICITY, UNSPECIFIED PULMONARY EMBOLISM TYPE, UNSPECIFIED WHETHER ACUTE COR PULMONALE PRESENT: ICD-10-CM

## 2021-10-13 DIAGNOSIS — I71.40 ABDOMINAL AORTIC ANEURYSM (AAA) WITHOUT RUPTURE: ICD-10-CM

## 2021-10-13 DIAGNOSIS — C61 PROSTATE CANCER: ICD-10-CM

## 2021-10-13 PROCEDURE — 99999 PR PBB SHADOW E&M-EST. PATIENT-LVL III: CPT | Mod: PBBFAC,,, | Performed by: INTERNAL MEDICINE

## 2021-10-13 PROCEDURE — 99999 PR PBB SHADOW E&M-EST. PATIENT-LVL IV: ICD-10-PCS | Mod: PBBFAC,,, | Performed by: PHYSICIAN ASSISTANT

## 2021-10-13 PROCEDURE — 99214 PR OFFICE/OUTPT VISIT, EST, LEVL IV, 30-39 MIN: ICD-10-PCS | Mod: S$PBB,,, | Performed by: INTERNAL MEDICINE

## 2021-10-13 PROCEDURE — 99214 OFFICE O/P EST MOD 30 MIN: CPT | Mod: S$PBB,,, | Performed by: INTERNAL MEDICINE

## 2021-10-13 PROCEDURE — 99213 OFFICE O/P EST LOW 20 MIN: CPT | Mod: PBBFAC,27 | Performed by: INTERNAL MEDICINE

## 2021-10-13 PROCEDURE — 99214 OFFICE O/P EST MOD 30 MIN: CPT | Mod: PBBFAC,25 | Performed by: PHYSICIAN ASSISTANT

## 2021-10-13 PROCEDURE — 99999 PR PBB SHADOW E&M-EST. PATIENT-LVL III: ICD-10-PCS | Mod: PBBFAC,,, | Performed by: INTERNAL MEDICINE

## 2021-10-13 PROCEDURE — 99999 PR PBB SHADOW E&M-EST. PATIENT-LVL IV: CPT | Mod: PBBFAC,,, | Performed by: PHYSICIAN ASSISTANT

## 2021-10-13 PROCEDURE — 99213 OFFICE O/P EST LOW 20 MIN: CPT | Mod: S$PBB,,, | Performed by: PHYSICIAN ASSISTANT

## 2021-10-13 PROCEDURE — 99213 PR OFFICE/OUTPT VISIT, EST, LEVL III, 20-29 MIN: ICD-10-PCS | Mod: S$PBB,,, | Performed by: PHYSICIAN ASSISTANT

## 2021-10-13 RX ORDER — FLUTICASONE PROPIONATE 50 MCG
2 SPRAY, SUSPENSION (ML) NASAL DAILY
Qty: 16 G | Refills: 2 | Status: SHIPPED | OUTPATIENT
Start: 2021-10-13 | End: 2022-03-28 | Stop reason: SDUPTHER

## 2021-10-13 RX ORDER — AZELASTINE 1 MG/ML
2 SPRAY, METERED NASAL 2 TIMES DAILY
Qty: 30 ML | Refills: 2 | Status: SHIPPED | OUTPATIENT
Start: 2021-10-13 | End: 2022-03-28 | Stop reason: SDUPTHER

## 2021-10-15 ENCOUNTER — NURSE TRIAGE (OUTPATIENT)
Dept: ADMINISTRATIVE | Facility: CLINIC | Age: 86
End: 2021-10-15

## 2021-10-15 ENCOUNTER — OFFICE VISIT (OUTPATIENT)
Dept: UROLOGY | Facility: CLINIC | Age: 86
End: 2021-10-15
Payer: MEDICARE

## 2021-10-15 VITALS
DIASTOLIC BLOOD PRESSURE: 86 MMHG | HEART RATE: 82 BPM | WEIGHT: 232.38 LBS | BODY MASS INDEX: 31.51 KG/M2 | SYSTOLIC BLOOD PRESSURE: 147 MMHG

## 2021-10-15 DIAGNOSIS — I26.94 MULTIPLE SUBSEGMENTAL PULMONARY EMBOLI WITHOUT ACUTE COR PULMONALE: ICD-10-CM

## 2021-10-15 DIAGNOSIS — C61 PROSTATE CANCER: Primary | ICD-10-CM

## 2021-10-15 PROCEDURE — 99999 PR PBB SHADOW E&M-EST. PATIENT-LVL III: CPT | Mod: PBBFAC,,, | Performed by: UROLOGY

## 2021-10-15 PROCEDURE — 99999 PR PBB SHADOW E&M-EST. PATIENT-LVL III: ICD-10-PCS | Mod: PBBFAC,,, | Performed by: UROLOGY

## 2021-10-15 PROCEDURE — 99213 OFFICE O/P EST LOW 20 MIN: CPT | Mod: PBBFAC | Performed by: UROLOGY

## 2021-10-15 PROCEDURE — 99214 PR OFFICE/OUTPT VISIT, EST, LEVL IV, 30-39 MIN: ICD-10-PCS | Mod: S$PBB,,, | Performed by: UROLOGY

## 2021-10-15 PROCEDURE — 99214 OFFICE O/P EST MOD 30 MIN: CPT | Mod: S$PBB,,, | Performed by: UROLOGY

## 2021-10-21 ENCOUNTER — HOSPITAL ENCOUNTER (OUTPATIENT)
Dept: RADIOLOGY | Facility: HOSPITAL | Age: 86
Discharge: HOME | End: 2021-10-21
Attending: INTERNAL MEDICINE
Payer: MEDICARE

## 2021-10-21 DIAGNOSIS — E78.5 DYSLIPIDEMIA: ICD-10-CM

## 2021-10-21 DIAGNOSIS — I26.99 PULMONARY EMBOLISM, UNSPECIFIED CHRONICITY, UNSPECIFIED PULMONARY EMBOLISM TYPE, UNSPECIFIED WHETHER ACUTE COR PULMONALE PRESENT: ICD-10-CM

## 2021-10-21 DIAGNOSIS — I10 ESSENTIAL HYPERTENSION: ICD-10-CM

## 2021-10-21 DIAGNOSIS — Z98.61 HISTORY OF CORONARY ANGIOPLASTY: ICD-10-CM

## 2021-10-21 DIAGNOSIS — I25.10 CORONARY ARTERY DISEASE, OCCLUSIVE: ICD-10-CM

## 2021-10-21 DIAGNOSIS — R06.09 DOE (DYSPNEA ON EXERTION): ICD-10-CM

## 2021-10-21 PROCEDURE — 71046 XR CHEST PA AND LATERAL: ICD-10-PCS | Mod: 26,,, | Performed by: RADIOLOGY

## 2021-10-21 PROCEDURE — 71046 X-RAY EXAM CHEST 2 VIEWS: CPT | Mod: TC

## 2021-10-21 PROCEDURE — 71046 X-RAY EXAM CHEST 2 VIEWS: CPT | Mod: 26,,, | Performed by: RADIOLOGY

## 2021-10-25 ENCOUNTER — OFFICE VISIT (OUTPATIENT)
Dept: CARDIOLOGY | Facility: CLINIC | Age: 86
End: 2021-10-25
Payer: MEDICARE

## 2021-10-25 VITALS
WEIGHT: 229.94 LBS | OXYGEN SATURATION: 95 % | HEART RATE: 85 BPM | BODY MASS INDEX: 31.19 KG/M2 | DIASTOLIC BLOOD PRESSURE: 60 MMHG | SYSTOLIC BLOOD PRESSURE: 100 MMHG

## 2021-10-25 DIAGNOSIS — I26.99 PULMONARY EMBOLISM, UNSPECIFIED CHRONICITY, UNSPECIFIED PULMONARY EMBOLISM TYPE, UNSPECIFIED WHETHER ACUTE COR PULMONALE PRESENT: ICD-10-CM

## 2021-10-25 DIAGNOSIS — Z98.61 HISTORY OF CORONARY ANGIOPLASTY: ICD-10-CM

## 2021-10-25 DIAGNOSIS — R06.09 DOE (DYSPNEA ON EXERTION): ICD-10-CM

## 2021-10-25 DIAGNOSIS — J94.1 PLEURAL FIBROSIS: Primary | ICD-10-CM

## 2021-10-25 DIAGNOSIS — I10 ESSENTIAL HYPERTENSION: ICD-10-CM

## 2021-10-25 DIAGNOSIS — I20.89 STABLE ANGINA PECTORIS: ICD-10-CM

## 2021-10-25 DIAGNOSIS — I25.10 CORONARY ARTERY DISEASE, OCCLUSIVE: ICD-10-CM

## 2021-10-25 PROCEDURE — 99999 PR PBB SHADOW E&M-EST. PATIENT-LVL III: ICD-10-PCS | Mod: PBBFAC,,, | Performed by: INTERNAL MEDICINE

## 2021-10-25 PROCEDURE — 99213 OFFICE O/P EST LOW 20 MIN: CPT | Mod: PBBFAC | Performed by: INTERNAL MEDICINE

## 2021-10-25 PROCEDURE — 99214 PR OFFICE/OUTPT VISIT, EST, LEVL IV, 30-39 MIN: ICD-10-PCS | Mod: S$PBB,,, | Performed by: INTERNAL MEDICINE

## 2021-10-25 PROCEDURE — 99214 OFFICE O/P EST MOD 30 MIN: CPT | Mod: S$PBB,,, | Performed by: INTERNAL MEDICINE

## 2021-10-25 PROCEDURE — 99999 PR PBB SHADOW E&M-EST. PATIENT-LVL III: CPT | Mod: PBBFAC,,, | Performed by: INTERNAL MEDICINE

## 2021-11-03 ENCOUNTER — OFFICE VISIT (OUTPATIENT)
Dept: UROLOGY | Facility: CLINIC | Age: 86
End: 2021-11-03
Payer: MEDICARE

## 2021-11-03 VITALS
BODY MASS INDEX: 31.08 KG/M2 | SYSTOLIC BLOOD PRESSURE: 137 MMHG | DIASTOLIC BLOOD PRESSURE: 81 MMHG | HEIGHT: 72 IN | WEIGHT: 229.5 LBS | HEART RATE: 85 BPM

## 2021-11-03 DIAGNOSIS — N49.2 SCROTAL ABSCESS: Primary | ICD-10-CM

## 2021-11-03 PROCEDURE — 99214 PR OFFICE/OUTPT VISIT, EST, LEVL IV, 30-39 MIN: ICD-10-PCS | Mod: S$PBB,,, | Performed by: NURSE PRACTITIONER

## 2021-11-03 PROCEDURE — 99999 PR PBB SHADOW E&M-EST. PATIENT-LVL III: ICD-10-PCS | Mod: PBBFAC,,, | Performed by: NURSE PRACTITIONER

## 2021-11-03 PROCEDURE — 99213 OFFICE O/P EST LOW 20 MIN: CPT | Mod: PBBFAC | Performed by: NURSE PRACTITIONER

## 2021-11-03 PROCEDURE — 99999 PR PBB SHADOW E&M-EST. PATIENT-LVL III: CPT | Mod: PBBFAC,,, | Performed by: NURSE PRACTITIONER

## 2021-11-03 PROCEDURE — 99214 OFFICE O/P EST MOD 30 MIN: CPT | Mod: S$PBB,,, | Performed by: NURSE PRACTITIONER

## 2021-11-03 RX ORDER — SULFAMETHOXAZOLE AND TRIMETHOPRIM 800; 160 MG/1; MG/1
1 TABLET ORAL 2 TIMES DAILY
Qty: 20 TABLET | Refills: 0 | Status: SHIPPED | OUTPATIENT
Start: 2021-11-03 | End: 2021-11-13

## 2021-11-10 ENCOUNTER — TELEPHONE (OUTPATIENT)
Dept: INTERNAL MEDICINE | Facility: CLINIC | Age: 86
End: 2021-11-10
Payer: MEDICARE

## 2021-11-15 ENCOUNTER — OFFICE VISIT (OUTPATIENT)
Dept: INTERNAL MEDICINE | Facility: CLINIC | Age: 86
End: 2021-11-15
Payer: MEDICARE

## 2021-11-15 VITALS
DIASTOLIC BLOOD PRESSURE: 70 MMHG | HEART RATE: 84 BPM | WEIGHT: 231.25 LBS | SYSTOLIC BLOOD PRESSURE: 102 MMHG | HEIGHT: 72 IN | BODY MASS INDEX: 31.32 KG/M2 | RESPIRATION RATE: 18 BRPM | OXYGEN SATURATION: 97 %

## 2021-11-15 DIAGNOSIS — Z98.61 HISTORY OF CORONARY ANGIOPLASTY: Chronic | ICD-10-CM

## 2021-11-15 DIAGNOSIS — I25.10 CORONARY ARTERY DISEASE, OCCLUSIVE: Chronic | ICD-10-CM

## 2021-11-15 DIAGNOSIS — N25.81 SECONDARY HYPERPARATHYROIDISM: ICD-10-CM

## 2021-11-15 DIAGNOSIS — C61 PROSTATE CANCER: ICD-10-CM

## 2021-11-15 DIAGNOSIS — I10 ESSENTIAL HYPERTENSION: Primary | Chronic | ICD-10-CM

## 2021-11-15 DIAGNOSIS — Q61.3 POLYCYSTIC KIDNEY DISEASE: ICD-10-CM

## 2021-11-15 DIAGNOSIS — I26.94 MULTIPLE SUBSEGMENTAL PULMONARY EMBOLI WITHOUT ACUTE COR PULMONALE: ICD-10-CM

## 2021-11-15 DIAGNOSIS — K21.9 GASTROESOPHAGEAL REFLUX DISEASE, UNSPECIFIED WHETHER ESOPHAGITIS PRESENT: ICD-10-CM

## 2021-11-15 DIAGNOSIS — N18.32 STAGE 3B CHRONIC KIDNEY DISEASE: ICD-10-CM

## 2021-11-15 DIAGNOSIS — I71.40 ABDOMINAL AORTIC ANEURYSM (AAA) WITHOUT RUPTURE: ICD-10-CM

## 2021-11-15 DIAGNOSIS — E78.5 DYSLIPIDEMIA: Chronic | ICD-10-CM

## 2021-11-15 PROCEDURE — 99214 PR OFFICE/OUTPT VISIT, EST, LEVL IV, 30-39 MIN: ICD-10-PCS | Mod: S$PBB,,, | Performed by: FAMILY MEDICINE

## 2021-11-15 PROCEDURE — 99999 PR PBB SHADOW E&M-EST. PATIENT-LVL IV: ICD-10-PCS | Mod: PBBFAC,,, | Performed by: FAMILY MEDICINE

## 2021-11-15 PROCEDURE — 99999 PR PBB SHADOW E&M-EST. PATIENT-LVL IV: CPT | Mod: PBBFAC,,, | Performed by: FAMILY MEDICINE

## 2021-11-15 PROCEDURE — 99214 OFFICE O/P EST MOD 30 MIN: CPT | Mod: S$PBB,,, | Performed by: FAMILY MEDICINE

## 2021-11-15 PROCEDURE — 99214 OFFICE O/P EST MOD 30 MIN: CPT | Mod: PBBFAC | Performed by: FAMILY MEDICINE

## 2021-11-15 RX ORDER — ATORVASTATIN CALCIUM 40 MG/1
40 TABLET, FILM COATED ORAL DAILY
Qty: 90 TABLET | Refills: 1 | Status: SHIPPED | OUTPATIENT
Start: 2021-11-15 | End: 2022-05-16 | Stop reason: SDUPTHER

## 2021-11-18 ENCOUNTER — LAB VISIT (OUTPATIENT)
Dept: LAB | Facility: HOSPITAL | Age: 86
End: 2021-11-18
Attending: FAMILY MEDICINE
Payer: MEDICARE

## 2021-11-18 DIAGNOSIS — E78.5 DYSLIPIDEMIA: Chronic | ICD-10-CM

## 2021-11-18 DIAGNOSIS — N18.32 STAGE 3B CHRONIC KIDNEY DISEASE: ICD-10-CM

## 2021-11-18 DIAGNOSIS — I10 ESSENTIAL HYPERTENSION: Chronic | ICD-10-CM

## 2021-11-18 LAB
ALBUMIN SERPL BCP-MCNC: 4 G/DL (ref 3.5–5.2)
ALP SERPL-CCNC: 55 U/L (ref 55–135)
ALT SERPL W/O P-5'-P-CCNC: 26 U/L (ref 10–44)
ANION GAP SERPL CALC-SCNC: 7 MMOL/L (ref 8–16)
AST SERPL-CCNC: 30 U/L (ref 10–40)
BASOPHILS # BLD AUTO: 0.01 K/UL (ref 0–0.2)
BASOPHILS NFR BLD: 0.2 % (ref 0–1.9)
BILIRUB SERPL-MCNC: 0.3 MG/DL (ref 0.1–1)
BUN SERPL-MCNC: 30 MG/DL (ref 8–23)
CALCIUM SERPL-MCNC: 9.7 MG/DL (ref 8.7–10.5)
CHLORIDE SERPL-SCNC: 105 MMOL/L (ref 95–110)
CHOLEST SERPL-MCNC: 178 MG/DL (ref 120–199)
CHOLEST/HDLC SERPL: 3.7 {RATIO} (ref 2–5)
CO2 SERPL-SCNC: 24 MMOL/L (ref 23–29)
CREAT SERPL-MCNC: 1.9 MG/DL (ref 0.5–1.4)
DIFFERENTIAL METHOD: ABNORMAL
EOSINOPHIL # BLD AUTO: 0 K/UL (ref 0–0.5)
EOSINOPHIL NFR BLD: 0 % (ref 0–8)
ERYTHROCYTE [DISTWIDTH] IN BLOOD BY AUTOMATED COUNT: 17.2 % (ref 11.5–14.5)
EST. GFR  (AFRICAN AMERICAN): 36.4 ML/MIN/1.73 M^2
EST. GFR  (NON AFRICAN AMERICAN): 31.4 ML/MIN/1.73 M^2
GLUCOSE SERPL-MCNC: 137 MG/DL (ref 70–110)
HCT VFR BLD AUTO: 26.6 % (ref 40–54)
HDLC SERPL-MCNC: 48 MG/DL (ref 40–75)
HDLC SERPL: 27 % (ref 20–50)
HGB BLD-MCNC: 7.7 G/DL (ref 14–18)
IMM GRANULOCYTES # BLD AUTO: 0.03 K/UL (ref 0–0.04)
IMM GRANULOCYTES NFR BLD AUTO: 0.7 % (ref 0–0.5)
LDLC SERPL CALC-MCNC: 119 MG/DL (ref 63–159)
LYMPHOCYTES # BLD AUTO: 0.6 K/UL (ref 1–4.8)
LYMPHOCYTES NFR BLD: 13.6 % (ref 18–48)
MCH RBC QN AUTO: 22.4 PG (ref 27–31)
MCHC RBC AUTO-ENTMCNC: 28.9 G/DL (ref 32–36)
MCV RBC AUTO: 77 FL (ref 82–98)
MONOCYTES # BLD AUTO: 0.1 K/UL (ref 0.3–1)
MONOCYTES NFR BLD: 1.2 % (ref 4–15)
NEUTROPHILS # BLD AUTO: 3.6 K/UL (ref 1.8–7.7)
NEUTROPHILS NFR BLD: 84.3 % (ref 38–73)
NONHDLC SERPL-MCNC: 130 MG/DL
NRBC BLD-RTO: 0 /100 WBC
PLATELET # BLD AUTO: 249 K/UL (ref 150–450)
PMV BLD AUTO: 11.1 FL (ref 9.2–12.9)
POTASSIUM SERPL-SCNC: 4.6 MMOL/L (ref 3.5–5.1)
PROT SERPL-MCNC: 7.5 G/DL (ref 6–8.4)
RBC # BLD AUTO: 3.44 M/UL (ref 4.6–6.2)
SODIUM SERPL-SCNC: 136 MMOL/L (ref 136–145)
TRIGL SERPL-MCNC: 55 MG/DL (ref 30–150)
TSH SERPL DL<=0.005 MIU/L-ACNC: 2.69 UIU/ML (ref 0.4–4)
WBC # BLD AUTO: 4.27 K/UL (ref 3.9–12.7)

## 2021-11-18 PROCEDURE — 84443 ASSAY THYROID STIM HORMONE: CPT | Performed by: FAMILY MEDICINE

## 2021-11-18 PROCEDURE — 36415 COLL VENOUS BLD VENIPUNCTURE: CPT | Performed by: FAMILY MEDICINE

## 2021-11-18 PROCEDURE — 80053 COMPREHEN METABOLIC PANEL: CPT | Performed by: FAMILY MEDICINE

## 2021-11-18 PROCEDURE — 80061 LIPID PANEL: CPT | Performed by: FAMILY MEDICINE

## 2021-11-18 PROCEDURE — 85025 COMPLETE CBC W/AUTO DIFF WBC: CPT | Performed by: FAMILY MEDICINE

## 2021-11-19 ENCOUNTER — TELEPHONE (OUTPATIENT)
Dept: INTERNAL MEDICINE | Facility: CLINIC | Age: 86
End: 2021-11-19
Payer: MEDICARE

## 2021-11-22 ENCOUNTER — OFFICE VISIT (OUTPATIENT)
Dept: CARDIOLOGY | Facility: CLINIC | Age: 86
End: 2021-11-22
Payer: MEDICARE

## 2021-11-22 ENCOUNTER — HOSPITAL ENCOUNTER (OUTPATIENT)
Dept: RADIOLOGY | Facility: HOSPITAL | Age: 86
Discharge: HOME | End: 2021-11-22
Attending: FAMILY MEDICINE
Payer: MEDICARE

## 2021-11-22 ENCOUNTER — TELEPHONE (OUTPATIENT)
Dept: CARDIOLOGY | Facility: CLINIC | Age: 86
End: 2021-11-22

## 2021-11-22 VITALS
BODY MASS INDEX: 30.99 KG/M2 | RESPIRATION RATE: 16 BRPM | WEIGHT: 228.81 LBS | DIASTOLIC BLOOD PRESSURE: 78 MMHG | SYSTOLIC BLOOD PRESSURE: 142 MMHG | HEART RATE: 90 BPM | OXYGEN SATURATION: 97 % | HEIGHT: 72 IN

## 2021-11-22 DIAGNOSIS — Z98.61 HISTORY OF CORONARY ANGIOPLASTY: ICD-10-CM

## 2021-11-22 DIAGNOSIS — I26.99 PULMONARY EMBOLISM, UNSPECIFIED CHRONICITY, UNSPECIFIED PULMONARY EMBOLISM TYPE, UNSPECIFIED WHETHER ACUTE COR PULMONALE PRESENT: ICD-10-CM

## 2021-11-22 DIAGNOSIS — J94.1 PLEURAL FIBROSIS: ICD-10-CM

## 2021-11-22 DIAGNOSIS — R05.3 PERSISTENT COUGH: ICD-10-CM

## 2021-11-22 DIAGNOSIS — I20.89 STABLE ANGINA PECTORIS: ICD-10-CM

## 2021-11-22 DIAGNOSIS — I25.10 CORONARY ARTERY DISEASE, OCCLUSIVE: ICD-10-CM

## 2021-11-22 DIAGNOSIS — E78.5 DYSLIPIDEMIA: ICD-10-CM

## 2021-11-22 DIAGNOSIS — I10 ESSENTIAL HYPERTENSION: Primary | ICD-10-CM

## 2021-11-22 DIAGNOSIS — R06.09 DOE (DYSPNEA ON EXERTION): ICD-10-CM

## 2021-11-22 PROCEDURE — 71046 XR CHEST PA AND LATERAL: ICD-10-PCS | Mod: 26,,, | Performed by: RADIOLOGY

## 2021-11-22 PROCEDURE — 71046 X-RAY EXAM CHEST 2 VIEWS: CPT | Mod: TC

## 2021-11-22 PROCEDURE — 99213 OFFICE O/P EST LOW 20 MIN: CPT | Mod: PBBFAC,25 | Performed by: INTERNAL MEDICINE

## 2021-11-22 PROCEDURE — 71046 X-RAY EXAM CHEST 2 VIEWS: CPT | Mod: 26,,, | Performed by: RADIOLOGY

## 2021-11-22 PROCEDURE — 99214 OFFICE O/P EST MOD 30 MIN: CPT | Mod: S$PBB,,, | Performed by: INTERNAL MEDICINE

## 2021-11-22 PROCEDURE — 99999 PR PBB SHADOW E&M-EST. PATIENT-LVL III: CPT | Mod: PBBFAC,,, | Performed by: INTERNAL MEDICINE

## 2021-11-22 PROCEDURE — 99214 PR OFFICE/OUTPT VISIT, EST, LEVL IV, 30-39 MIN: ICD-10-PCS | Mod: S$PBB,,, | Performed by: INTERNAL MEDICINE

## 2021-11-22 PROCEDURE — 99999 PR PBB SHADOW E&M-EST. PATIENT-LVL III: ICD-10-PCS | Mod: PBBFAC,,, | Performed by: INTERNAL MEDICINE

## 2021-11-22 RX ORDER — BENAZEPRIL HYDROCHLORIDE AND HYDROCHLOROTHIAZIDE 10; 12.5 MG/1; MG/1
1 TABLET ORAL DAILY
Qty: 90 TABLET | Refills: 3 | Status: SHIPPED | OUTPATIENT
Start: 2021-11-22 | End: 2022-12-27

## 2021-11-22 RX ORDER — BENAZEPRIL HYDROCHLORIDE AND HYDROCHLOROTHIAZIDE 10; 12.5 MG/1; MG/1
TABLET ORAL
Qty: 180 TABLET | Refills: 3 | Status: SHIPPED | OUTPATIENT
Start: 2021-11-22 | End: 2021-11-22 | Stop reason: SDUPTHER

## 2021-11-22 RX ORDER — BENAZEPRIL HYDROCHLORIDE AND HYDROCHLOROTHIAZIDE 10; 12.5 MG/1; MG/1
1 TABLET ORAL DAILY
Qty: 90 TABLET | Refills: 3 | Status: SHIPPED | OUTPATIENT
Start: 2021-11-22 | End: 2021-11-22 | Stop reason: ALTCHOICE

## 2021-11-23 ENCOUNTER — TELEPHONE (OUTPATIENT)
Dept: CARDIOLOGY | Facility: CLINIC | Age: 86
End: 2021-11-23
Payer: MEDICARE

## 2021-11-29 ENCOUNTER — OFFICE VISIT (OUTPATIENT)
Dept: INTERNAL MEDICINE | Facility: CLINIC | Age: 86
End: 2021-11-29
Payer: MEDICARE

## 2021-11-29 DIAGNOSIS — N18.32 ANEMIA DUE TO STAGE 3B CHRONIC KIDNEY DISEASE: ICD-10-CM

## 2021-11-29 DIAGNOSIS — D50.9 MICROCYTIC ANEMIA: Primary | ICD-10-CM

## 2021-11-29 DIAGNOSIS — Z09 HOSPITAL DISCHARGE FOLLOW-UP: ICD-10-CM

## 2021-11-29 DIAGNOSIS — I10 ESSENTIAL HYPERTENSION: Chronic | ICD-10-CM

## 2021-11-29 DIAGNOSIS — C61 PROSTATE CANCER: ICD-10-CM

## 2021-11-29 DIAGNOSIS — D63.1 ANEMIA DUE TO STAGE 3B CHRONIC KIDNEY DISEASE: ICD-10-CM

## 2021-11-29 DIAGNOSIS — I71.40 ABDOMINAL AORTIC ANEURYSM (AAA) WITHOUT RUPTURE: ICD-10-CM

## 2021-11-29 PROBLEM — Z86.711 HISTORY OF PULMONARY EMBOLUS (PE): Status: ACTIVE | Noted: 2021-09-09

## 2021-11-29 PROCEDURE — 99214 OFFICE O/P EST MOD 30 MIN: CPT | Mod: 95,,, | Performed by: FAMILY MEDICINE

## 2021-11-29 PROCEDURE — 99999 PR PBB SHADOW E&M-EST. PATIENT-LVL II: CPT | Mod: PBBFAC,,, | Performed by: FAMILY MEDICINE

## 2021-11-29 PROCEDURE — 99999 PR PBB SHADOW E&M-EST. PATIENT-LVL II: ICD-10-PCS | Mod: PBBFAC,,, | Performed by: FAMILY MEDICINE

## 2021-11-29 PROCEDURE — 99212 OFFICE O/P EST SF 10 MIN: CPT | Mod: PBBFAC | Performed by: FAMILY MEDICINE

## 2021-11-29 PROCEDURE — 99214 PR OFFICE/OUTPT VISIT, EST, LEVL IV, 30-39 MIN: ICD-10-PCS | Mod: 95,,, | Performed by: FAMILY MEDICINE

## 2021-12-02 ENCOUNTER — TELEPHONE (OUTPATIENT)
Dept: HEMATOLOGY/ONCOLOGY | Facility: CLINIC | Age: 86
End: 2021-12-02
Payer: MEDICARE

## 2021-12-02 ENCOUNTER — IMMUNIZATION (OUTPATIENT)
Dept: PRIMARY CARE CLINIC | Facility: CLINIC | Age: 86
End: 2021-12-02
Payer: MEDICARE

## 2021-12-02 ENCOUNTER — LAB VISIT (OUTPATIENT)
Dept: LAB | Facility: HOSPITAL | Age: 86
End: 2021-12-02
Attending: INTERNAL MEDICINE
Payer: MEDICARE

## 2021-12-02 ENCOUNTER — OFFICE VISIT (OUTPATIENT)
Dept: HEMATOLOGY/ONCOLOGY | Facility: CLINIC | Age: 86
End: 2021-12-02
Payer: MEDICARE

## 2021-12-02 VITALS
HEART RATE: 74 BPM | SYSTOLIC BLOOD PRESSURE: 130 MMHG | BODY MASS INDEX: 31.18 KG/M2 | TEMPERATURE: 98 F | HEIGHT: 72 IN | WEIGHT: 230.19 LBS | OXYGEN SATURATION: 98 % | DIASTOLIC BLOOD PRESSURE: 82 MMHG

## 2021-12-02 DIAGNOSIS — Z86.711 HISTORY OF PULMONARY EMBOLISM: ICD-10-CM

## 2021-12-02 DIAGNOSIS — D63.1 ANEMIA DUE TO STAGE 3B CHRONIC KIDNEY DISEASE: ICD-10-CM

## 2021-12-02 DIAGNOSIS — N18.32 ANEMIA DUE TO STAGE 3B CHRONIC KIDNEY DISEASE: ICD-10-CM

## 2021-12-02 DIAGNOSIS — D50.0 IRON DEFICIENCY ANEMIA DUE TO CHRONIC BLOOD LOSS: ICD-10-CM

## 2021-12-02 DIAGNOSIS — D50.9 MICROCYTIC ANEMIA: ICD-10-CM

## 2021-12-02 DIAGNOSIS — D50.0 IRON DEFICIENCY ANEMIA DUE TO CHRONIC BLOOD LOSS: Primary | ICD-10-CM

## 2021-12-02 DIAGNOSIS — Z23 NEED FOR VACCINATION: Primary | ICD-10-CM

## 2021-12-02 LAB
BASOPHILS # BLD AUTO: 0.03 K/UL (ref 0–0.2)
BASOPHILS NFR BLD: 0.5 % (ref 0–1.9)
DIFFERENTIAL METHOD: ABNORMAL
EOSINOPHIL # BLD AUTO: 0.2 K/UL (ref 0–0.5)
EOSINOPHIL NFR BLD: 2.4 % (ref 0–8)
ERYTHROCYTE [DISTWIDTH] IN BLOOD BY AUTOMATED COUNT: 21.9 % (ref 11.5–14.5)
FERRITIN SERPL-MCNC: 140 NG/ML (ref 20–300)
HCT VFR BLD AUTO: 32.4 % (ref 40–54)
HGB BLD-MCNC: 9.5 G/DL (ref 14–18)
IMM GRANULOCYTES # BLD AUTO: 0.02 K/UL (ref 0–0.04)
IMM GRANULOCYTES NFR BLD AUTO: 0.3 % (ref 0–0.5)
IRON SERPL-MCNC: 222 UG/DL (ref 45–160)
LYMPHOCYTES # BLD AUTO: 1.7 K/UL (ref 1–4.8)
LYMPHOCYTES NFR BLD: 25.8 % (ref 18–48)
MCH RBC QN AUTO: 23.8 PG (ref 27–31)
MCHC RBC AUTO-ENTMCNC: 29.3 G/DL (ref 32–36)
MCV RBC AUTO: 81 FL (ref 82–98)
MONOCYTES # BLD AUTO: 0.6 K/UL (ref 0.3–1)
MONOCYTES NFR BLD: 8.8 % (ref 4–15)
NEUTROPHILS # BLD AUTO: 4.1 K/UL (ref 1.8–7.7)
NEUTROPHILS NFR BLD: 62.2 % (ref 38–73)
NRBC BLD-RTO: 0 /100 WBC
PLATELET # BLD AUTO: 161 K/UL (ref 150–450)
PMV BLD AUTO: 10.3 FL (ref 9.2–12.9)
RBC # BLD AUTO: 3.99 M/UL (ref 4.6–6.2)
SATURATED IRON: 62 % (ref 20–50)
TOTAL IRON BINDING CAPACITY: 358 UG/DL (ref 250–450)
TRANSFERRIN SERPL-MCNC: 242 MG/DL (ref 200–375)
WBC # BLD AUTO: 6.6 K/UL (ref 3.9–12.7)

## 2021-12-02 PROCEDURE — 82728 ASSAY OF FERRITIN: CPT | Performed by: INTERNAL MEDICINE

## 2021-12-02 PROCEDURE — 99204 OFFICE O/P NEW MOD 45 MIN: CPT | Mod: S$PBB,,, | Performed by: INTERNAL MEDICINE

## 2021-12-02 PROCEDURE — 99999 PR PBB SHADOW E&M-EST. PATIENT-LVL IV: ICD-10-PCS | Mod: PBBFAC,,, | Performed by: INTERNAL MEDICINE

## 2021-12-02 PROCEDURE — 0004A COVID-19, MRNA, LNP-S, PF, 30 MCG/0.3 ML DOSE VACCINE: CPT | Mod: CV19,PBBFAC | Performed by: FAMILY MEDICINE

## 2021-12-02 PROCEDURE — 84466 ASSAY OF TRANSFERRIN: CPT | Performed by: INTERNAL MEDICINE

## 2021-12-02 PROCEDURE — 85025 COMPLETE CBC W/AUTO DIFF WBC: CPT | Performed by: INTERNAL MEDICINE

## 2021-12-02 PROCEDURE — 99999 PR PBB SHADOW E&M-EST. PATIENT-LVL IV: CPT | Mod: PBBFAC,,, | Performed by: INTERNAL MEDICINE

## 2021-12-02 PROCEDURE — 99214 OFFICE O/P EST MOD 30 MIN: CPT | Mod: PBBFAC | Performed by: INTERNAL MEDICINE

## 2021-12-02 PROCEDURE — 36415 COLL VENOUS BLD VENIPUNCTURE: CPT | Performed by: INTERNAL MEDICINE

## 2021-12-02 PROCEDURE — 99204 PR OFFICE/OUTPT VISIT, NEW, LEVL IV, 45-59 MIN: ICD-10-PCS | Mod: S$PBB,,, | Performed by: INTERNAL MEDICINE

## 2021-12-03 ENCOUNTER — TELEPHONE (OUTPATIENT)
Dept: HEMATOLOGY/ONCOLOGY | Facility: CLINIC | Age: 86
End: 2021-12-03
Payer: MEDICARE

## 2021-12-06 ENCOUNTER — OFFICE VISIT (OUTPATIENT)
Dept: CARDIOLOGY | Facility: CLINIC | Age: 86
End: 2021-12-06
Payer: MEDICARE

## 2021-12-06 VITALS
SYSTOLIC BLOOD PRESSURE: 114 MMHG | BODY MASS INDEX: 31.01 KG/M2 | WEIGHT: 228.63 LBS | HEART RATE: 82 BPM | DIASTOLIC BLOOD PRESSURE: 82 MMHG | OXYGEN SATURATION: 98 %

## 2021-12-06 DIAGNOSIS — J94.1 PLEURAL FIBROSIS: ICD-10-CM

## 2021-12-06 DIAGNOSIS — Z98.61 HISTORY OF CORONARY ANGIOPLASTY: ICD-10-CM

## 2021-12-06 DIAGNOSIS — R06.09 DOE (DYSPNEA ON EXERTION): ICD-10-CM

## 2021-12-06 DIAGNOSIS — E78.5 DYSLIPIDEMIA: ICD-10-CM

## 2021-12-06 DIAGNOSIS — I25.10 CORONARY ARTERY DISEASE, OCCLUSIVE: ICD-10-CM

## 2021-12-06 DIAGNOSIS — I20.89 STABLE ANGINA PECTORIS: ICD-10-CM

## 2021-12-06 DIAGNOSIS — I10 ESSENTIAL HYPERTENSION: Primary | ICD-10-CM

## 2021-12-06 PROCEDURE — 99999 PR PBB SHADOW E&M-EST. PATIENT-LVL III: ICD-10-PCS | Mod: PBBFAC,,, | Performed by: INTERNAL MEDICINE

## 2021-12-06 PROCEDURE — 99999 PR PBB SHADOW E&M-EST. PATIENT-LVL III: CPT | Mod: PBBFAC,,, | Performed by: INTERNAL MEDICINE

## 2021-12-06 PROCEDURE — 99214 PR OFFICE/OUTPT VISIT, EST, LEVL IV, 30-39 MIN: ICD-10-PCS | Mod: S$PBB,,, | Performed by: INTERNAL MEDICINE

## 2021-12-06 PROCEDURE — 99214 OFFICE O/P EST MOD 30 MIN: CPT | Mod: S$PBB,,, | Performed by: INTERNAL MEDICINE

## 2021-12-06 PROCEDURE — 99213 OFFICE O/P EST LOW 20 MIN: CPT | Mod: PBBFAC | Performed by: INTERNAL MEDICINE

## 2021-12-06 RX ORDER — FERROUS SULFATE 325(65) MG
325 TABLET, DELAYED RELEASE (ENTERIC COATED) ORAL DAILY
COMMUNITY

## 2021-12-17 ENCOUNTER — OFFICE VISIT (OUTPATIENT)
Dept: GASTROENTEROLOGY | Facility: CLINIC | Age: 86
End: 2021-12-17
Payer: MEDICARE

## 2021-12-17 VITALS
HEART RATE: 90 BPM | BODY MASS INDEX: 31.48 KG/M2 | OXYGEN SATURATION: 96 % | WEIGHT: 232.38 LBS | DIASTOLIC BLOOD PRESSURE: 80 MMHG | SYSTOLIC BLOOD PRESSURE: 128 MMHG | HEIGHT: 72 IN

## 2021-12-17 DIAGNOSIS — Z92.89 HISTORY OF BLOOD TRANSFUSION: Primary | ICD-10-CM

## 2021-12-17 DIAGNOSIS — D50.0 IRON DEFICIENCY ANEMIA DUE TO CHRONIC BLOOD LOSS: ICD-10-CM

## 2021-12-17 PROCEDURE — 99214 OFFICE O/P EST MOD 30 MIN: CPT | Mod: PBBFAC | Performed by: PHYSICIAN ASSISTANT

## 2021-12-17 PROCEDURE — 99999 PR PBB SHADOW E&M-EST. PATIENT-LVL IV: CPT | Mod: PBBFAC,,, | Performed by: PHYSICIAN ASSISTANT

## 2021-12-17 PROCEDURE — 99214 OFFICE O/P EST MOD 30 MIN: CPT | Mod: S$PBB,,, | Performed by: PHYSICIAN ASSISTANT

## 2021-12-17 PROCEDURE — 99214 PR OFFICE/OUTPT VISIT, EST, LEVL IV, 30-39 MIN: ICD-10-PCS | Mod: S$PBB,,, | Performed by: PHYSICIAN ASSISTANT

## 2021-12-17 PROCEDURE — 99999 PR PBB SHADOW E&M-EST. PATIENT-LVL IV: ICD-10-PCS | Mod: PBBFAC,,, | Performed by: PHYSICIAN ASSISTANT

## 2021-12-17 RX ORDER — SOD SULF/POT CHLORIDE/MAG SULF 1.479 G
12 TABLET ORAL DAILY
Qty: 24 TABLET | Refills: 0 | Status: ON HOLD | OUTPATIENT
Start: 2021-12-17 | End: 2022-01-10 | Stop reason: HOSPADM

## 2022-01-07 ENCOUNTER — LAB VISIT (OUTPATIENT)
Dept: PRIMARY CARE CLINIC | Facility: CLINIC | Age: 87
End: 2022-01-07
Payer: MEDICARE

## 2022-01-07 ENCOUNTER — TELEPHONE (OUTPATIENT)
Dept: CARDIOLOGY | Facility: CLINIC | Age: 87
End: 2022-01-07
Payer: MEDICARE

## 2022-01-07 DIAGNOSIS — Z01.818 PRE-OP TESTING: ICD-10-CM

## 2022-01-07 DIAGNOSIS — I25.10 CORONARY ARTERY DISEASE, OCCLUSIVE: Primary | ICD-10-CM

## 2022-01-07 PROCEDURE — U0003 INFECTIOUS AGENT DETECTION BY NUCLEIC ACID (DNA OR RNA); SEVERE ACUTE RESPIRATORY SYNDROME CORONAVIRUS 2 (SARS-COV-2) (CORONAVIRUS DISEASE [COVID-19]), AMPLIFIED PROBE TECHNIQUE, MAKING USE OF HIGH THROUGHPUT TECHNOLOGIES AS DESCRIBED BY CMS-2020-01-R: HCPCS | Performed by: INTERNAL MEDICINE

## 2022-01-07 PROCEDURE — U0005 INFEC AGEN DETEC AMPLI PROBE: HCPCS | Performed by: INTERNAL MEDICINE

## 2022-01-08 LAB — SARS-COV-2 RNA RESP QL NAA+PROBE: NOT DETECTED

## 2022-01-10 ENCOUNTER — HOSPITAL ENCOUNTER (OUTPATIENT)
Facility: HOSPITAL | Age: 87
Discharge: HOME | End: 2022-01-10
Attending: INTERNAL MEDICINE | Admitting: INTERNAL MEDICINE
Payer: MEDICARE

## 2022-01-10 ENCOUNTER — ANESTHESIA (OUTPATIENT)
Dept: ENDOSCOPY | Facility: HOSPITAL | Age: 87
End: 2022-01-10
Payer: MEDICARE

## 2022-01-10 ENCOUNTER — ANESTHESIA EVENT (OUTPATIENT)
Dept: ENDOSCOPY | Facility: HOSPITAL | Age: 87
End: 2022-01-10
Payer: MEDICARE

## 2022-01-10 DIAGNOSIS — D50.9 MICROCYTIC ANEMIA: Primary | ICD-10-CM

## 2022-01-10 PROCEDURE — 63600175 PHARM REV CODE 636 W HCPCS: Performed by: NURSE ANESTHETIST, CERTIFIED REGISTERED

## 2022-01-10 PROCEDURE — 37000009 HC ANESTHESIA EA ADD 15 MINS: Performed by: INTERNAL MEDICINE

## 2022-01-10 PROCEDURE — 45378 DIAGNOSTIC COLONOSCOPY: CPT | Mod: ,,, | Performed by: INTERNAL MEDICINE

## 2022-01-10 PROCEDURE — 37000008 HC ANESTHESIA 1ST 15 MINUTES: Performed by: INTERNAL MEDICINE

## 2022-01-10 PROCEDURE — 45378 PR COLONOSCOPY,DIAGNOSTIC: ICD-10-PCS | Mod: ,,, | Performed by: INTERNAL MEDICINE

## 2022-01-10 PROCEDURE — 45378 DIAGNOSTIC COLONOSCOPY: CPT | Performed by: INTERNAL MEDICINE

## 2022-01-10 PROCEDURE — 63600175 PHARM REV CODE 636 W HCPCS: Performed by: INTERNAL MEDICINE

## 2022-01-10 PROCEDURE — 25000003 PHARM REV CODE 250: Performed by: NURSE ANESTHETIST, CERTIFIED REGISTERED

## 2022-01-10 RX ORDER — PROPOFOL 10 MG/ML
VIAL (ML) INTRAVENOUS
Status: DISCONTINUED | OUTPATIENT
Start: 2022-01-10 | End: 2022-01-10

## 2022-01-10 RX ORDER — LIDOCAINE HCL/PF 100 MG/5ML
SYRINGE (ML) INTRAVENOUS
Status: DISCONTINUED | OUTPATIENT
Start: 2022-01-10 | End: 2022-01-10

## 2022-01-10 RX ORDER — SODIUM CHLORIDE 0.9 % (FLUSH) 0.9 %
10 SYRINGE (ML) INJECTION
Status: DISCONTINUED | OUTPATIENT
Start: 2022-01-10 | End: 2022-01-10 | Stop reason: HOSPADM

## 2022-01-10 RX ORDER — SODIUM CHLORIDE, SODIUM LACTATE, POTASSIUM CHLORIDE, CALCIUM CHLORIDE 600; 310; 30; 20 MG/100ML; MG/100ML; MG/100ML; MG/100ML
INJECTION, SOLUTION INTRAVENOUS CONTINUOUS
Status: DISCONTINUED | OUTPATIENT
Start: 2022-01-10 | End: 2022-01-10 | Stop reason: HOSPADM

## 2022-01-10 RX ADMIN — SODIUM CHLORIDE, SODIUM LACTATE, POTASSIUM CHLORIDE, AND CALCIUM CHLORIDE: 600; 310; 30; 20 INJECTION, SOLUTION INTRAVENOUS at 12:01

## 2022-01-10 RX ADMIN — PROPOFOL 50 MG: 10 INJECTION, EMULSION INTRAVENOUS at 12:01

## 2022-01-10 RX ADMIN — LIDOCAINE HYDROCHLORIDE 20 MG: 20 INJECTION, SOLUTION INTRAVENOUS at 12:01

## 2022-01-10 NOTE — [employer]ATION PATIENT INSTRUCTIONS
Discharge Summary/Instructions after an Endoscopic Procedure  Patient Name: Jovanny Olmedo  Patient MRN: [id number]  Patient YOB: 1935  Monday, January 10, 2022 Vi Lara MD  Dear patient,  As a result of recent federal legislation (The Federal Cures Act), you may   receive lab or pathology results from your procedure in your [facility]   account before your physician is able to contact you. Your physician or   their representative will relay the results to you with their   recommendations at their soonest availability.  Thank you,  RESTRICTIONS:  During your procedure today, you received medications for sedation.  These   medications may affect your judgment, balance and coordination.  Therefore,   for 24 hours, you have the following restrictions:   - DO NOT drive a car, operate machinery, make legal/financial decisions,   sign important papers or drink alcohol.    ACTIVITY:  Today: no heavy lifting, straining or running due to procedural   sedation/anesthesia.  The following day: return to full activity including work.  DIET:  Eat and drink normally unless instructed otherwise.     TREATMENT FOR COMMON SIDE EFFECTS:  - Mild abdominal pain, nausea, belching, bloating or excessive gas:  rest,   eat lightly and use a heating pad.  - Sore Throat: treat with throat lozenges and/or gargle with warm salt   water.  - Because air was used during the procedure, expelling large amounts of air   from your rectum or belching is normal.  - If a bowel prep was taken, you may not have a bowel movement for 1-3 days.    This is normal.  SYMPTOMS TO WATCH FOR AND REPORT TO YOUR PHYSICIAN:  1. Abdominal pain or bloating, other than gas cramps.  2. Chest pain.  3. Back pain.  4. Signs of infection such as: chills or fever occurring within 24 hours   after the procedure.  5. Rectal bleeding, which would show as bright red, maroon, or black stools.   (A tablespoon of blood from the rectum is not serious, especially if    hemorrhoids are present.)  6. Vomiting.  7. Weakness or dizziness.  GO DIRECTLY TO THE NEAREST EMERGENCY ROOM IF YOU HAVE ANY OF THE FOLLOWING:      Difficulty breathing              Chills and/or fever over 101 F   Persistent vomiting and/or vomiting blood   Severe abdominal pain   Severe chest pain   Black, tarry stools   Bleeding- more than one tablespoon   Any other symptom or condition that you feel may need urgent attention  Your doctor recommends these additional instructions:  If any biopsies were taken, your doctors clinic will contact you in 1 to 2   weeks with any results.  - Discharge patient to home (via wheelchair).   - Resume previous diet.   - Continue present medications.   - Repeat colonoscopy not recommened due to age.   - Patient has a contact number available for emergencies.  The signs and   symptoms of potential delayed complications were discussed with the   patient.  Return to normal activities tomorrow.  Written discharge   instructions were provided to the patient.  For questions, problems or results please call your physician Vi Lara MD at Work:  [phone]  If you have any questions about the above instructions, call the GI   department at [phone] or call the endoscopy unit at [phone]   from 7am until 3 pm.  [facility], EMERGENCY ROOM PHONE NUMBER:   [phone]  IF A COMPLICATION OR EMERGENCY SITUATION ARISES AND YOU ARE UNABLE TO REACH   YOUR PHYSICIAN - GO DIRECTLY TO THE EMERGENCY ROOM.  I have read or have had read to me these discharge instructions for my   procedure and have received a written copy.  I understand these   instructions and will follow-up with my physician if I have any questions.     __________________________________       _____________________________________  Nurse Signature                                          Patient/Designated   Responsible Party Signature  MD Vi Will,  MD  1/10/2022 1:01:50 PM  [employer]

## 2022-01-10 NOTE — DISCHARGE SUMMARY
[facility] - Endoscopy (Hospital)  Discharge Note  Short Stay    Procedure(s) (LRB):  COLONOSCOPY (N/A)    OUTCOME: Patient tolerated treatment/procedure well without complication and is now ready for discharge.    DISPOSITION: Home or Self Care    FINAL DIAGNOSIS:  Microcytic anemia    FOLLOWUP: With primary care provider    DISCHARGE INSTRUCTIONS:  No discharge procedures on file.

## 2022-01-10 NOTE — ANESTHESIA PREPROCEDURE EVALUATION
01/10/2022  Jovanny Olmedo is a 86 y.o., male.  Past Medical History:   Diagnosis Date    Arthritis     Back pain     CAD (coronary artery disease)     Cataract     CKD (chronic kidney disease)     GERD (gastroesophageal reflux disease)     Glaucoma     suspect    HTN (hypertension)     Multiple subsegmental pulmonary emboli without acute cor pulmonale 9/9/2021    Prostate cancer     tx'd with radiation    PVD (peripheral vascular disease)     stent in right renal artery and aorta    Sleep apnea      Past Surgical History:   Procedure Laterality Date    ABDOMINAL AORTA STENT      CARDIAC CATHETERIZATION      CATARACT EXTRACTION W/  INTRAOCULAR LENS IMPLANT Right 04/29/2017    COLONOSCOPY N/A 9/24/2020    Procedure: COLONOSCOPY;  Surgeon: Shayy Melvin MD;  Location: [facility];  Service: Endoscopy;  Laterality: N/A;    CORONARY ANGIOPLASTY      CORONARY STENT PLACEMENT      PCIOL Right 03/29/2017    DR. LEDEZMA    PCIOL Left 07/03/2019    DR. LEDEZMA    PROSTATE BIOPSY      RENAL ARTERY STENT           Anesthesia Evaluation    I have reviewed the Patient Summary Reports.    I have reviewed the Nursing Notes. I have reviewed the NPO Status.   I have reviewed the Medications.     Review of Systems  Anesthesia Hx:  No problems with previous Anesthesia  Neg history of prior surgery. Denies Family Hx of Anesthesia complications.   Denies Personal Hx of Anesthesia complications.   Hematology/Oncology:  Hematology Normal   Oncology Normal     EENT/Dental:EENT/Dental Normal   Cardiovascular:   Hypertension, well controlled CAD      Pulmonary:   Sleep Apnea  Obstructive Sleep Apnea (CLARISSA), CPAP used.   Renal/:   Chronic Renal Disease  Kidney Function/Disease    Hepatic/GI:   PUD, GERD    Musculoskeletal:   Arthritis     Neurological:  Neurology Normal    Endocrine:  Endocrine Normal     Dermatological:  Skin Normal    Psych:  Psychiatric Normal           Physical Exam  General:  Obesity    Airway/Jaw/Neck:  Airway Findings: Mouth Opening: Normal Tongue: Normal  General Airway Assessment: Adult  Mallampati: II  TM Distance: Normal, at least 6 cm      Dental:  Dental Findings: In tact   Chest/Lungs:  Chest/Lungs Clear    Heart/Vascular:  Heart Findings: Normal Heart murmur: negative Vascular Findings: Normal    Abdomen:  Abdomen Findings: Normal    Musculoskeletal:  Musculoskeletal Findings: Normal   Skin:  Skin Findings: Normal         Anesthesia Plan  Type of Anesthesia, risks & benefits discussed:  Anesthesia Type:  general    Patient's Preference:   Plan Factors:          Intra-op Monitoring Plan: standard ASA monitors  Intra-op Monitoring Plan Comments:   Post Op Pain Control Plan: multimodal analgesia  Post Op Pain Control Plan Comments:     Induction:   IV  Beta Blocker:  Patient is not currently on a Beta-Blocker (No further documentation required).       Informed Consent: Patient understands risks and agrees with Anesthesia plan.  Questions answered.   ASA Score: 3     Day of Surgery Review of History & Physical: I have interviewed and examined the patient. I have reviewed the patient's H&P dated:            Ready For Surgery From Anesthesia Perspective.

## 2022-01-10 NOTE — DISCHARGE INSTRUCTIONS
Patient Education       Hemorrhoids Discharge Instructions   About this topic   Hemorrhoids are swollen veins in the rectum. Your rectum is where stool leaves your body. You may be able to see or feel your hemorrhoids outside of your body, but some hemorrhoids are inside of your rectum and cannot be seen. Hemorrhoids can cause itching, pain, and bleeding. Being constipated or having hard stools can make your hemorrhoids worse.     What care is needed at home?   · Ask your doctor what you need to do when you go home. Make sure you ask questions if you do not understand what the doctor says. This way you will know what you need to do.  · Soak your bottom in a few inches of warm water for 10 to 15 minutes at a time. You can do this 2 to 3 times each day. Do not add soap, bubble bath, or anything to the water.  · Use over-the-counter medicines to treat your hemorrhoids. These include ointments and creams to help with pain and swelling. You can also use a product like witch hazel to help dry out the skin in the area.  · To help with constipation:  ? Use stool softeners when needed.  ? Eat high-fiber foods. These include whole grains, fruits, and vegetables.  ? Drink plenty of water and other fluids each day. This helps to keep your stools soft.  ? Set a regular schedule to try and have a bowel movement. Do not ignore the urge to go to the bathroom. Dont hold it in.  ? Give yourself plenty of time to have a bowel movement, but do not linger on the toilet either, by sitting and reading for a long time.  ? Do mild exercise each day like taking a walk.  · Avoid heavy lifting or straining while the hemorrhoid is healing.  What follow-up care is needed?   If your problem does not get better, other care may be needed. Your doctor may ask you to make visits to the office to check on your progress. Be sure to keep these visits.  What drugs may be needed?   The doctor may order drugs to:  · Help with pain and swelling  · Ease  itching  · Soften stools  Will physical activity be limited?   · Working out can help with digestion. It might help keep you from having hard stools. Ask your doctor about the best kind of exercise for you.  What problems could happen?   · You may have very bad bleeding.  · Sometimes, treatments do not work. Some hemorrhoids are very large. You might need surgery for either of these.  When do I need to call the doctor?   · You have a lot of bleeding from your rectum.  · Your bowel movement looks like tar.  · You are not able to pass stool because of pain from your hemorrhoids.  · Your pain gets worse and is not helped by over-the-counter medicines, warm water, or your home care.  · You have a fever of 100.4°F (38°C) or higher.  Teach Back: Helping You Understand   The Teach Back Method helps you understand the information we are giving you. After you talk with the staff, tell them in your own words what you learned. This helps to make sure the staff has described each thing clearly. It also helps to explain things that may have been confusing. Before going home, make sure you can do these:  · I can tell you about my condition.  · I can tell you what may help ease my pain.  · I can tell you what I will do if I have blood in my rectum.  Where can I learn more?   American Academy of Family Physicians  https://familydoctor.org/condition/hemorrhoids/   National Digestive Disease Information Clearinghouse  https://www.niddk.nih.gov/health-information/digestive-diseases/hemorrhoids/definition-facts   Last Reviewed Date   2021-09-28  Consumer Information Use and Disclaimer   This information is not specific medical advice and does not replace information you receive from your health care provider. This is only a brief summary of general information. It does NOT include all information about conditions, illnesses, injuries, tests, procedures, treatments, therapies, discharge instructions or life-style choices that may apply  to you. You must talk with your health care provider for complete information about your health and treatment options. This information should not be used to decide whether or not to accept your health care providers advice, instructions or recommendations. Only your health care provider has the knowledge and training to provide advice that is right for you.  Copyright   Copyright © 2021 [employer], Inc. and its affiliates and/or licensors. All rights reserved.

## 2022-01-10 NOTE — ANESTHESIA RELEASE NOTE
Anesthesia Release from PACU Note    Patient: Jovanny Olmedo    Procedure(s) Performed: Procedure(s) (LRB):  COLONOSCOPY (N/A)    Anesthesia type: general    Post pain: Adequate analgesia    Post assessment: no apparent anesthetic complications and tolerated procedure well    Last Vitals:   Visit Vitals  BP (!) 139/92 (BP Location: Left arm, Patient Position: Lying)   Pulse 78   Temp 36.7 °C (98.1 °F) (Temporal)   Resp 16   Ht 6' (1.829 m)   Wt 104.3 kg (230 lb)   SpO2 95%   BMI 31.19 kg/m²       Post vital signs: stable    Level of consciousness: awake, alert  and oriented    Nausea/Vomiting: no nausea/no vomiting    Complications: none    Airway Patency: patent    Respiratory: unassisted, spontaneous ventilation, room air    Cardiovascular: stable and blood pressure at baseline    Hydration: euvolemic

## 2022-01-10 NOTE — ANESTHESIA POSTPROCEDURE EVALUATION
Anesthesia Post Evaluation    Patient: Jovanny Olmedo    Procedure(s) Performed: Procedure(s) (LRB):  COLONOSCOPY (N/A)    Final Anesthesia Type: general      Patient location during evaluation: PACU  Patient participation: Yes- Able to Participate  Level of consciousness: awake and alert and oriented  Post-procedure vital signs: reviewed and stable  Pain management: adequate  Airway patency: patent  CLARISSA mitigation strategies: Multimodal analgesia  PONV status at discharge: No PONV  Anesthetic complications: no      Cardiovascular status: stable and blood pressure returned to baseline  Respiratory status: unassisted, room air and spontaneous ventilation  Hydration status: euvolemic  Follow-up not needed.          Vitals Value Taken Time   /80 01/10/22 1307   Temp 98 01/10/22 1307   Pulse 88 01/10/22 1307   Resp 18 01/10/22 1307   SpO2 97 01/10/22 1307         No case tracking events are documented in the log.      Pain/Dana Score: No data recorded

## 2022-01-10 NOTE — PLAN OF CARE
Dr Lara came to bedside and discussed findings. NO N/V,  no abdominal pain, no GI bleeding, and vitals stable.  Pt discharged from unit.

## 2022-01-10 NOTE — H&P
Short Stay Endoscopy History and Physical    PCP - Miryam Jimenez MD    Procedure - Colonoscopy  ASA - 2  Mallampati - per anesthesia  History of Anesthesia problems - no  Family history Anesthesia problems -  no     HPI:  This is a 86 y.o. male here for evaluation of :   Active Hospital Problems    Diagnosis  POA    *Microcytic anemia [D50.9]  Yes    History of colon polyps [Z86.010]  Not Applicable      Resolved Hospital Problems   No resolved problems to display.         Health Maintenance       Date Due Completion Date    TETANUS VACCINE Never done ---    Shingles Vaccine (1 of 2) Never done ---    Pneumococcal Vaccines (Age 65+) (2 of 4 - PPSV23) 10/08/2019 8/13/2019    Influenza Vaccine (1) 09/01/2021 1/25/2021 (Declined)    Override on 1/25/2021: Declined    Colonoscopy 09/24/2025 9/24/2020    Lipid Panel 11/18/2026 11/18/2021          ROS:  CONSTITUTIONAL: Denies weight change,  fatigue, fevers, chills, night sweats.  CARDIOVASCULAR: Denies chest pain, shortness of breath, orthopnea and edema.  RESPIRATORY: Denies cough, hemoptysis, dyspnea, and wheezing.  GI: See HPI.    Medical History:   Past Medical History:   Diagnosis Date    Arthritis     Back pain     CAD (coronary artery disease)     Cataract     CKD (chronic kidney disease)     GERD (gastroesophageal reflux disease)     Glaucoma     suspect    HTN (hypertension)     Multiple subsegmental pulmonary emboli without acute cor pulmonale 9/9/2021    Prostate cancer     tx'd with radiation    PVD (peripheral vascular disease)     stent in right renal artery and aorta    Sleep apnea        Surgical History:   Past Surgical History:   Procedure Laterality Date    ABDOMINAL AORTA STENT      CARDIAC CATHETERIZATION      CATARACT EXTRACTION W/  INTRAOCULAR LENS IMPLANT Right 04/29/2017    COLONOSCOPY N/A 9/24/2020    Procedure: COLONOSCOPY;  Surgeon: Shayy Melvin MD;  Location: [facility];  Service: Endoscopy;  Laterality: N/A;    CORONARY  ANGIOPLASTY      CORONARY STENT PLACEMENT      PCIOL Right 2017    DR. LEDEZMA    PCIOL Left 2019    DR. LEDEZMA    PROSTATE BIOPSY      RENAL ARTERY STENT         Family History:   Family History   Problem Relation Age of Onset    Glaucoma Mother     Diabetes Mother     Kidney disease Mother     Colon cancer Father     Cancer Father         colon cancer    Diabetes Sister     Diabetes Brother     Hypertension Brother     Blindness Neg Hx        Social History:   Social History     Tobacco Use    Smoking status: Former Smoker     Packs/day: 0.50     Years: 30.00     Pack years: 15.00     Quit date: 1995     Years since quittin.3    Smokeless tobacco: Former User   Substance Use Topics    Alcohol use: Not Currently     Alcohol/week: 0.0 standard drinks    Drug use: Yes     Frequency: 4.0 times per week     Types: Marijuana       Allergies:   Review of patient's allergies indicates:   Allergen Reactions    Codeine      When taking codeine, pt becomes very anxious       Medications:   No current facility-administered medications on file prior to encounter.     Current Outpatient Medications on File Prior to Encounter   Medication Sig Dispense Refill    aspirin (ECOTRIN) 81 MG EC tablet Take 81 mg by mouth once daily.      atorvastatin (LIPITOR) 40 MG tablet Take 1 tablet (40 mg total) by mouth once daily. 90 tablet 1    azelastine (ASTELIN) 137 mcg (0.1 %) nasal spray 2 sprays (274 mcg total) by Nasal route 2 (two) times daily. 30 mL 2    benazepril-hydrochlorthiazide (LOTENSIN HCT) 10-12.5 mg Tab Take 1 tablet by mouth once daily. 90 tablet 3    ferrous sulfate 325 (65 FE) MG EC tablet Take 325 mg by mouth once daily.      fluticasone propionate (FLONASE) 50 mcg/actuation nasal spray 2 sprays (100 mcg total) by Each Nostril route once daily. 16 g 2    pantoprazole (PROTONIX) 40 MG tablet Take 1 tablet by mouth once daily 90 tablet 0    sod sulf-pot chloride-mag sulf  (SUTAB) 1.479-0.188- 0.225 gram tablet Take 12 tablets by mouth once daily. Take according to package instructions with indicated amount of water. 24 tablet 0       Physical Exam:  Vital Signs:   Vitals:    01/10/22 1216   BP: (!) 139/92   Pulse: 78   Resp: 16   Temp: 98.1 °F (36.7 °C)     General Appearance: Well appearing in no acute distress  ENT: OP clear  Chest: CTA B  CV: RRR, no m/r/g  Abd: s/nt/nd/nabs  Ext: no edema    Labs:Reviewed    IMP:  Active Hospital Problems    Diagnosis  POA    *Microcytic anemia [D50.9]  Yes    History of colon polyps [Z86.010]  Not Applicable      Resolved Hospital Problems   No resolved problems to display.         Plan:   I have explained the risks and benefits of colonoscopy to the patient including but not limited to bleeding, perforation, infection, and death. The patient wishes to proceed.

## 2022-01-11 ENCOUNTER — PATIENT OUTREACH (OUTPATIENT)
Dept: ADMINISTRATIVE | Facility: OTHER | Age: 87
End: 2022-01-11
Payer: MEDICARE

## 2022-01-12 ENCOUNTER — TELEPHONE (OUTPATIENT)
Dept: CARDIOLOGY | Facility: CLINIC | Age: 87
End: 2022-01-12
Payer: MEDICARE

## 2022-01-12 ENCOUNTER — HOSPITAL ENCOUNTER (OUTPATIENT)
Dept: CARDIOLOGY | Facility: HOSPITAL | Age: 87
Discharge: HOME | End: 2022-01-12
Attending: INTERNAL MEDICINE
Payer: MEDICARE

## 2022-01-12 ENCOUNTER — OFFICE VISIT (OUTPATIENT)
Dept: CARDIOLOGY | Facility: CLINIC | Age: 87
End: 2022-01-12
Payer: MEDICARE

## 2022-01-12 VITALS
TEMPERATURE: 98 F | WEIGHT: 230 LBS | RESPIRATION RATE: 18 BRPM | OXYGEN SATURATION: 95 % | SYSTOLIC BLOOD PRESSURE: 128 MMHG | HEIGHT: 72 IN | HEART RATE: 72 BPM | BODY MASS INDEX: 31.15 KG/M2 | DIASTOLIC BLOOD PRESSURE: 67 MMHG

## 2022-01-12 VITALS
DIASTOLIC BLOOD PRESSURE: 78 MMHG | SYSTOLIC BLOOD PRESSURE: 136 MMHG | HEART RATE: 73 BPM | WEIGHT: 227.06 LBS | HEIGHT: 72 IN | BODY MASS INDEX: 30.75 KG/M2 | OXYGEN SATURATION: 96 %

## 2022-01-12 DIAGNOSIS — E78.5 DYSLIPIDEMIA: ICD-10-CM

## 2022-01-12 DIAGNOSIS — I25.10 CORONARY ARTERY DISEASE, OCCLUSIVE: ICD-10-CM

## 2022-01-12 DIAGNOSIS — I10 ESSENTIAL HYPERTENSION: ICD-10-CM

## 2022-01-12 DIAGNOSIS — I26.99 PULMONARY EMBOLISM, UNSPECIFIED CHRONICITY, UNSPECIFIED PULMONARY EMBOLISM TYPE, UNSPECIFIED WHETHER ACUTE COR PULMONALE PRESENT: ICD-10-CM

## 2022-01-12 DIAGNOSIS — R06.09 DOE (DYSPNEA ON EXERTION): ICD-10-CM

## 2022-01-12 DIAGNOSIS — I25.10 CORONARY ARTERY DISEASE, OCCLUSIVE: Primary | ICD-10-CM

## 2022-01-12 DIAGNOSIS — J94.1 PLEURAL FIBROSIS: ICD-10-CM

## 2022-01-12 DIAGNOSIS — Z98.61 HISTORY OF CORONARY ANGIOPLASTY: ICD-10-CM

## 2022-01-12 PROCEDURE — 99213 OFFICE O/P EST LOW 20 MIN: CPT | Mod: PBBFAC | Performed by: INTERNAL MEDICINE

## 2022-01-12 PROCEDURE — 99999 PR PBB SHADOW E&M-EST. PATIENT-LVL III: CPT | Mod: PBBFAC,,, | Performed by: INTERNAL MEDICINE

## 2022-01-12 PROCEDURE — 99214 OFFICE O/P EST MOD 30 MIN: CPT | Mod: S$PBB,25,, | Performed by: INTERNAL MEDICINE

## 2022-01-12 PROCEDURE — 93010 EKG 12-LEAD: ICD-10-PCS | Mod: ,,, | Performed by: INTERNAL MEDICINE

## 2022-01-12 PROCEDURE — 99999 PR PBB SHADOW E&M-EST. PATIENT-LVL III: ICD-10-PCS | Mod: PBBFAC,,, | Performed by: INTERNAL MEDICINE

## 2022-01-12 PROCEDURE — 99214 PR OFFICE/OUTPT VISIT, EST, LEVL IV, 30-39 MIN: ICD-10-PCS | Mod: S$PBB,25,, | Performed by: INTERNAL MEDICINE

## 2022-01-12 PROCEDURE — 93010 ELECTROCARDIOGRAM REPORT: CPT | Mod: ,,, | Performed by: INTERNAL MEDICINE

## 2022-01-12 PROCEDURE — 93005 ELECTROCARDIOGRAM TRACING: CPT

## 2022-01-12 NOTE — PROGRESS NOTES
Subjective:   Patient ID:  Jovanny Olmedo is a 86 y.o. male who presents for follow-up of No chief complaint on file.     Recently at [facility] with evidence of GI bleed upper GI showed no evidence gastritis or ulcers.  He was transfused and I will repeat and hematocrit today.  He is off now Eliquis as well as Plavix.  All.  Clinically stable.  No chest discomfort noted.  Patient is far enough out from his pulmonary embolism that he should be okay in view of CT scan apparently showing no evidence of pulmonary thrombus.        This visit finds the patient feeling well.  Hematocrit and hemoglobin have improved.  Iron studies are improving.  The patient is now on aspirin only.  Treated for pulmonary embolism in stable.  Heart rhythm stable.  Otherwise doing well this time no chest pain.  NO FOCAL CNS SYMPTOMS OR SIGNS TO SUGGEST TIA OR STROKE  NO ANGINA OR EQUIVALENT  NO UNUSUAL WELLS. NO ORTHOPNEA OR PND  NO PALPITATIONS  NO NEAR SYNCOPE OR SYNCOPE  NO EDEMA. NO CALVE TENDERNESS    Visit finds patient feeling well no recurrence symptoms of chest pain shortness breath will check CBC again today otherwise doing well last was improved he is on iron supplements.  He is off all anticoagulants except aspirin blood pressure is stabilized to quite well this time normal sinus rhythm.      Review of Systems   Constitutional: Negative for chills, diaphoresis, night sweats, weight gain and weight loss.   HENT: Negative for congestion, hoarse voice, sore throat and stridor.    Eyes: Negative for double vision and pain.   Cardiovascular: Negative for chest pain, claudication, cyanosis, dyspnea on exertion, irregular heartbeat, leg swelling, near-syncope, orthopnea, palpitations, paroxysmal nocturnal dyspnea and syncope.   Respiratory: Negative for cough, hemoptysis, shortness of breath, sleep disturbances due to breathing, snoring, sputum production and wheezing.    Endocrine: Negative for cold intolerance, heat  intolerance and polydipsia.   Hematologic/Lymphatic: Negative for bleeding problem. Does not bruise/bleed easily.   Skin: Negative for color change, dry skin and rash.   Musculoskeletal: Negative for joint swelling and muscle cramps.   Gastrointestinal: Negative for bloating, abdominal pain, constipation, diarrhea, dysphagia, melena, nausea and vomiting.   Genitourinary: Negative for flank pain and urgency.   Neurological: Negative for dizziness, focal weakness, headaches, light-headedness, loss of balance, seizures and weakness.   Psychiatric/Behavioral: Negative for altered mental status and memory loss. The patient is not nervous/anxious.      Family History   Problem Relation Age of Onset    Glaucoma Mother     Diabetes Mother     Kidney disease Mother     Colon cancer Father     Cancer Father         colon cancer    Diabetes Sister     Diabetes Brother     Hypertension Brother     Blindness Neg Hx      Past Medical History:   Diagnosis Date    Arthritis     Back pain     CAD (coronary artery disease)     Cataract     CKD (chronic kidney disease)     GERD (gastroesophageal reflux disease)     Glaucoma     suspect    HTN (hypertension)     Multiple subsegmental pulmonary emboli without acute cor pulmonale 2021    Prostate cancer     tx'd with radiation    PVD (peripheral vascular disease)     stent in right renal artery and aorta    Sleep apnea      Social History     Socioeconomic History    Marital status:    Tobacco Use    Smoking status: Former Smoker     Packs/day: 0.50     Years: 30.00     Pack years: 15.00     Quit date: 1995     Years since quittin.3    Smokeless tobacco: Former User   Substance and Sexual Activity    Alcohol use: Not Currently     Alcohol/week: 0.0 standard drinks    Drug use: Yes     Frequency: 4.0 times per week     Types: Marijuana    Sexual activity: Yes     Partners: Female   Social History Narrative         Current Outpatient  Medications on File Prior to Visit   Medication Sig Dispense Refill    aspirin (ECOTRIN) 81 MG EC tablet Take 81 mg by mouth once daily.      atorvastatin (LIPITOR) 40 MG tablet Take 1 tablet (40 mg total) by mouth once daily. 90 tablet 1    azelastine (ASTELIN) 137 mcg (0.1 %) nasal spray 2 sprays (274 mcg total) by Nasal route 2 (two) times daily. 30 mL 2    benazepril-hydrochlorthiazide (LOTENSIN HCT) 10-12.5 mg Tab Take 1 tablet by mouth once daily. 90 tablet 3    ferrous sulfate 325 (65 FE) MG EC tablet Take 325 mg by mouth once daily.      fluticasone propionate (FLONASE) 50 mcg/actuation nasal spray 2 sprays (100 mcg total) by Each Nostril route once daily. 16 g 2    pantoprazole (PROTONIX) 40 MG tablet Take 1 tablet by mouth once daily 90 tablet 0     No current facility-administered medications on file prior to visit.     Review of patient's allergies indicates:   Allergen Reactions    Codeine      When taking codeine, pt becomes very anxious       Objective:     Physical Exam  Eyes:      Pupils: Pupils are equal, round, and reactive to light.   Neck:      Trachea: No tracheal deviation.   Cardiovascular:      Rate and Rhythm: Normal rate and regular rhythm.      Pulses: Intact distal pulses.           Carotid pulses are 2+ on the right side and 2+ on the left side.       Radial pulses are 2+ on the right side and 2+ on the left side.        Femoral pulses are 2+ on the right side and 2+ on the left side.       Popliteal pulses are 2+ on the right side and 2+ on the left side.        Dorsalis pedis pulses are 2+ on the right side and 2+ on the left side.        Posterior tibial pulses are 2+ on the right side and 2+ on the left side.      Heart sounds: Normal heart sounds. No murmur heard.  No friction rub. No gallop.    Pulmonary:      Effort: Pulmonary effort is normal. No respiratory distress.      Breath sounds: Normal breath sounds. No stridor. No wheezing or rales.   Chest:      Chest wall: No  tenderness.   Abdominal:      General: There is no distension.      Tenderness: There is no abdominal tenderness. There is no rebound.   Musculoskeletal:         General: No tenderness or edema.      Cervical back: Normal range of motion.   Skin:     General: Skin is warm and dry.   Neurological:      Mental Status: He is alert and oriented to person, place, and time.     EKG shows normal sinus rhythm within normal limits nose ischemic changes.This EKG was personally reviewed by myself, I agree with the interpretation.  Assessment:     1. Coronary artery disease, occlusive    2. WELLS (dyspnea on exertion)    3. Essential hypertension    4. Pleural fibrosis    5. Dyslipidemia    6. Pulmonary embolism, unspecified chronicity, unspecified pulmonary embolism type, unspecified whether acute cor pulmonale present    7. History of coronary angioplasty        Plan:     Coronary artery disease, occlusive    WELLS (dyspnea on exertion)    Essential hypertension    Pleural fibrosis    Dyslipidemia    Pulmonary embolism, unspecified chronicity, unspecified pulmonary embolism type, unspecified whether acute cor pulmonale present    History of coronary angioplasty    Impression 1 hypertension under good control current medications including Lotensin  2. Pulmonary embolism stabilized and now off anticoagulation  3. GI bleed stabilized and will recheck blood count today on aspirin therapy.  4. CAD stable no recurrence symptoms chest pain shortness of breath and continues on current medications.  All questions answered patient doing well follow-up evaluation 3 months I will call results of lab tests today.

## 2022-01-12 NOTE — TELEPHONE ENCOUNTER
----- Message from Cyrus Moura MD sent at 1/12/2022  2:46 PM CST -----  Blood count has returned to normal

## 2022-01-13 ENCOUNTER — TELEPHONE (OUTPATIENT)
Dept: CARDIOLOGY | Facility: CLINIC | Age: 87
End: 2022-01-13
Payer: MEDICARE

## 2022-01-13 NOTE — TELEPHONE ENCOUNTER
Spoke with pt to let him know that his labs were stable and to continue current medications per Dr. Moura.  ----- Message from Cyrus Moura MD sent at 1/13/2022  8:45 AM CST -----  Chemistries are stable continue all current medication

## 2022-01-19 DIAGNOSIS — K21.9 GASTROESOPHAGEAL REFLUX DISEASE, UNSPECIFIED WHETHER ESOPHAGITIS PRESENT: ICD-10-CM

## 2022-01-19 NOTE — TELEPHONE ENCOUNTER
No new care gaps identified.  Powered by [employer] by [address]. Reference number: [id number].   1/19/2022 11:56:14 AM CST

## 2022-01-24 RX ORDER — PANTOPRAZOLE SODIUM 40 MG/1
TABLET, DELAYED RELEASE ORAL
Qty: 90 TABLET | Refills: 3 | Status: SHIPPED | OUTPATIENT
Start: 2022-01-24 | End: 2023-01-23

## 2022-01-25 NOTE — TELEPHONE ENCOUNTER
Refill Authorization Note   Jovanny Olmedo  is requesting a refill authorization.  Brief Assessment and Rationale for Refill:  Approve     Medication Therapy Plan:       Medication Reconciliation Completed: No   Comments:   --->Care Gap information included below if applicable.   Orders Placed This Encounter    pantoprazole (PROTONIX) 40 MG tablet      Requested Prescriptions   Signed Prescriptions Disp Refills    pantoprazole (PROTONIX) 40 MG tablet 90 tablet 3     Sig: Take 1 tablet by mouth once daily       Gastroenterology: Proton Pump Inhibitors 2 Passed - 1/24/2022  9:32 PM        Passed - Patient is at least 18 years old        Passed - Osteoporosis is not on problem list        Passed - An appropriate indication is on the problem list        Passed - Valid encounter within last 15 months     Recent Visits  Date Type Provider Dept   11/29/21 Office Visit Miryam Jimenez MD [facility] Internal Medicine   11/15/21 Office Visit Miryam Jimenez MD [facility] Internal Medicine   12/11/20 Office Visit Miryam Jimenez MD [facility] Internal Medicine   Showing recent visits within past 720 days and meeting all other requirements  Future Appointments  No visits were found meeting these conditions.  Showing future appointments within next 150 days and meeting all other requirements      Future Appointments              In 2 months Elizabeth Lejeune, NP [facility] Pulmonary 3rd Fl, [facility]    In 2 months LABORATORY, [facility] Lab 1st Fl, [facility]    In 2 months [facility] Nuclear [facility], [facility]    In 2 months [facility] Nuclear Medicine 3rdfl, [facility]    In 2 months Cyrus Moura MD [facility] Cardiology 3rd Fl, [facility]    In 3 months Miryam Jimenez MD [facility] Internal Med 2nd Fl, [facility] [place]                    Appointments  past 12m or future 3m with PCP    Date Provider   Last Visit   11/29/2021 Miryam Jimenez MD   Next Visit   5/16/2022 Miryam Jimenez MD   ED visits in past 90 days: 0      Note composed:9:33 PM 01/24/2022

## 2022-01-31 ENCOUNTER — PES CALL (OUTPATIENT)
Dept: ADMINISTRATIVE | Facility: CLINIC | Age: 87
End: 2022-01-31
Payer: MEDICARE

## 2022-03-26 ENCOUNTER — PATIENT OUTREACH (OUTPATIENT)
Dept: ADMINISTRATIVE | Facility: OTHER | Age: 87
End: 2022-03-26
Payer: MEDICARE

## 2022-03-26 NOTE — PROGRESS NOTES
Health Maintenance Due   Topic Date Due    TETANUS VACCINE  Never done    Shingles Vaccine (1 of 2) Never done    Pneumococcal Vaccines (Age 65+) (2 of 4 - PPSV23) 10/08/2019    Influenza Vaccine (1) 09/01/2021     Updates were requested from care everywhere.  Chart was reviewed for overdue Proactive [facility] Encounters ([facility]) topics (CRS, Breast Cancer Screening, Eye exam)  Health Maintenance has been updated.  LINKS immunization registry triggered.  Immunizations were reconciled.

## 2022-03-28 ENCOUNTER — OFFICE VISIT (OUTPATIENT)
Dept: PULMONOLOGY | Facility: CLINIC | Age: 87
End: 2022-03-28
Payer: MEDICARE

## 2022-03-28 VITALS
HEIGHT: 72 IN | DIASTOLIC BLOOD PRESSURE: 72 MMHG | OXYGEN SATURATION: 98 % | RESPIRATION RATE: 18 BRPM | BODY MASS INDEX: 31.36 KG/M2 | WEIGHT: 231.5 LBS | HEART RATE: 79 BPM | SYSTOLIC BLOOD PRESSURE: 132 MMHG

## 2022-03-28 DIAGNOSIS — G47.33 OSA (OBSTRUCTIVE SLEEP APNEA): ICD-10-CM

## 2022-03-28 DIAGNOSIS — J30.9 ALLERGIC RHINITIS, UNSPECIFIED SEASONALITY, UNSPECIFIED TRIGGER: ICD-10-CM

## 2022-03-28 DIAGNOSIS — Z86.711 HISTORY OF PULMONARY EMBOLUS (PE): ICD-10-CM

## 2022-03-28 DIAGNOSIS — E66.9 OBESITY, CLASS I, BMI 30-34.9: Primary | ICD-10-CM

## 2022-03-28 PROCEDURE — 99214 OFFICE O/P EST MOD 30 MIN: CPT | Mod: S$PBB,,, | Performed by: NURSE PRACTITIONER

## 2022-03-28 PROCEDURE — 99999 PR PBB SHADOW E&M-EST. PATIENT-LVL III: CPT | Mod: PBBFAC,,, | Performed by: NURSE PRACTITIONER

## 2022-03-28 PROCEDURE — 99214 PR OFFICE/OUTPT VISIT, EST, LEVL IV, 30-39 MIN: ICD-10-PCS | Mod: S$PBB,,, | Performed by: NURSE PRACTITIONER

## 2022-03-28 PROCEDURE — 99213 OFFICE O/P EST LOW 20 MIN: CPT | Mod: PBBFAC | Performed by: NURSE PRACTITIONER

## 2022-03-28 PROCEDURE — 99999 PR PBB SHADOW E&M-EST. PATIENT-LVL III: ICD-10-PCS | Mod: PBBFAC,,, | Performed by: NURSE PRACTITIONER

## 2022-03-28 RX ORDER — AZELASTINE 1 MG/ML
2 SPRAY, METERED NASAL 2 TIMES DAILY
Qty: 30 ML | Refills: 11 | Status: SHIPPED | OUTPATIENT
Start: 2022-03-28 | End: 2023-05-17

## 2022-03-28 RX ORDER — FLUTICASONE PROPIONATE 50 MCG
2 SPRAY, SUSPENSION (ML) NASAL DAILY
Qty: 16 G | Refills: 11 | Status: SHIPPED | OUTPATIENT
Start: 2022-03-28 | End: 2023-05-17

## 2022-03-28 NOTE — PROGRESS NOTES
Subjective:      Patient ID: Jovanny Olmedo is a 86 y.o. male.    Chief Complaint: Sleep Apnea    HPI  Presents to office for review of AutoPAP therapy. Patient states improved symptoms with use of AutoPAP. Sleeping more soundly. Waking up feeling more refreshed. Improved daytime sleepiness. Patient states he is benefiting from use of the AutoPAP.    No fever, chills, or hemoptysis. No pleuritic type chest pain. Breathing is stable as compared to 6 months ago.  No weight loss. BMI 31.       Patient Active Problem List   Diagnosis    Essential hypertension    GERD (gastroesophageal reflux disease)    Polycystic kidney disease    Coronary artery disease, occlusive    Refractive error    Microcytic anemia    DDD (degenerative disc disease), lumbar    Open angle with borderline findings, low risk, bilateral    History of coronary angioplasty    Prostate cancer    Secondary hyperparathyroidism    Pseudophakia    Intermediate stage nonexudative age-related macular degeneration of both eyes    Aortic atherosclerosis    CLARISSA on CPAP    Duodenal ulcer    History of colon polyps    Dyslipidemia    Abdominal aortic aneurysm (AAA) without rupture    History of pulmonary embolus (PE)           /72   Pulse 79   Resp 18   Ht 6' (1.829 m)   Wt 105 kg (231 lb 7.7 oz)   SpO2 98%   BMI 31.39 kg/m²   Body mass index is 31.39 kg/m².    Review of Systems   Constitutional: Negative.    HENT: Negative.    Respiratory: Negative.    Cardiovascular: Negative.    Musculoskeletal: Negative.    Gastrointestinal: Negative.    Neurological: Negative.    Psychiatric/Behavioral: Negative.      Objective:      Physical Exam  Constitutional:       Appearance: He is well-developed. He is obese.   HENT:      Head: Normocephalic and atraumatic.      Mouth/Throat:      Comments: Wearing mask due to COVID-19 protocol  Neck:      Thyroid: No thyroid mass or thyromegaly.      Trachea: Trachea normal.   Cardiovascular:      Rate  and Rhythm: Normal rate and regular rhythm.      Heart sounds: Normal heart sounds.   Pulmonary:      Effort: Pulmonary effort is normal.      Breath sounds: Normal breath sounds. No wheezing, rhonchi or rales.   Chest:      Chest wall: There is no dullness to percussion.   Abdominal:      Palpations: Abdomen is soft. There is no splenomegaly or mass.      Tenderness: There is no abdominal tenderness.   Musculoskeletal:         General: Normal range of motion.      Cervical back: Normal range of motion and neck supple.   Skin:     General: Skin is warm and dry.   Neurological:      Mental Status: He is alert and oriented to person, place, and time.   Psychiatric:         Mood and Affect: Mood normal.         Behavior: Behavior normal.       Personal Diagnostic Review  Manual download on machine. Over 30 days compliant and AHI 1.6      Assessment:       1. Obesity, Class I, BMI 30-34.9    2. Allergic rhinitis, unspecified seasonality, unspecified trigger    3. History of pulmonary embolus (PE)    4. CLARISSA (obstructive sleep apnea)        Outpatient Encounter Medications as of 3/28/2022   Medication Sig Dispense Refill    aspirin (ECOTRIN) 81 MG EC tablet Take 81 mg by mouth once daily.      atorvastatin (LIPITOR) 40 MG tablet Take 1 tablet (40 mg total) by mouth once daily. 90 tablet 1    benazepril-hydrochlorthiazide (LOTENSIN HCT) 10-12.5 mg Tab Take 1 tablet by mouth once daily. 90 tablet 3    ferrous sulfate 325 (65 FE) MG EC tablet Take 325 mg by mouth once daily.      pantoprazole (PROTONIX) 40 MG tablet Take 1 tablet by mouth once daily 90 tablet 3    [DISCONTINUED] azelastine (ASTELIN) 137 mcg (0.1 %) nasal spray 2 sprays (274 mcg total) by Nasal route 2 (two) times daily. 30 mL 2    [DISCONTINUED] fluticasone propionate (FLONASE) 50 mcg/actuation nasal spray 2 sprays (100 mcg total) by Each Nostril route once daily. 16 g 2    azelastine (ASTELIN) 137 mcg (0.1 %) nasal spray 2 sprays (274 mcg total) by  Nasal route 2 (two) times daily. 30 mL 11    fluticasone propionate (FLONASE) 50 mcg/actuation nasal spray 2 sprays (100 mcg total) by Each Nostril route once daily. 16 g 11     No facility-administered encounter medications on file as of 3/28/2022.     No orders of the defined types were placed in this encounter.    Plan:          Problem List Items Addressed This Visit        Hematology    History of pulmonary embolus (PE)    Overview     Last Assessment & Plan:   Formatting of this note might be different from the original.  History & Physical   Patient was on heparin but after discussions with vascular surgery transitioned patient to [facility]  Discharge Summary  86 y/o M with PMH CAD, CKD (baseline Cr 1.7-1.8), CLARISSA on CPAP, htn, and known AAA who presented to [facility] with dyspnea and chest pain (described as dull in the left upper chest with radiation down the arm) that started the night prior and admits to progressive dyspnea on exertion that had been ongoing for the month prior. Work up revealed segmental PEs and descending thoracic aortic dissection, prompting transfer to [facility] for further care.  Patient was admitted to the ICU on a heparin drip with CCMS and vascular consult.  He otherwise remained hemodynamically stable.  It was found that his aneurysm/dissection was actually chronic and that he is followed outpatient by Dr. Berry.  Vascular surgery did not recommend intervention and echocardiogram read: EF 55 to 65% with mildly dilated right ventricle. Patient was considered stable for transfer out of ICU on 9/10 and was able to be weaned off supplemental O2 and transitioned to eliquis for discharge.  Follow-up             Other Visit Diagnoses     Obesity, Class I, BMI 30-34.9    -  Primary    Allergic rhinitis, unspecified seasonality, unspecified trigger        Relevant Medications    azelastine (ASTELIN) 137 mcg (0.1 %) nasal spray    fluticasone propionate (FLONASE) 50 mcg/actuation nasal spray     CLARISSA (obstructive sleep apnea)          Compliant with PAP and benefits from use. Follow up annually in the sleep clinic.

## 2022-04-11 ENCOUNTER — HOSPITAL ENCOUNTER (OUTPATIENT)
Dept: RADIOLOGY | Facility: HOSPITAL | Age: 87
Discharge: HOME | End: 2022-04-11
Attending: UROLOGY
Payer: MEDICARE

## 2022-04-11 DIAGNOSIS — C61 PROSTATE CANCER: ICD-10-CM

## 2022-04-11 PROCEDURE — 78306 NM BONE SCAN WHOLE BODY: ICD-10-PCS | Mod: 26,,, | Performed by: RADIOLOGY

## 2022-04-11 PROCEDURE — 78306 BONE IMAGING WHOLE BODY: CPT | Mod: 26,,, | Performed by: RADIOLOGY

## 2022-04-11 PROCEDURE — 78306 BONE IMAGING WHOLE BODY: CPT | Mod: TC

## 2022-04-11 PROCEDURE — A9503 TC99M MEDRONATE: HCPCS

## 2022-04-20 ENCOUNTER — OFFICE VISIT (OUTPATIENT)
Dept: CARDIOLOGY | Facility: CLINIC | Age: 87
End: 2022-04-20
Payer: MEDICARE

## 2022-04-20 VITALS
BODY MASS INDEX: 31.93 KG/M2 | HEART RATE: 76 BPM | SYSTOLIC BLOOD PRESSURE: 134 MMHG | OXYGEN SATURATION: 97 % | DIASTOLIC BLOOD PRESSURE: 82 MMHG | WEIGHT: 235.44 LBS

## 2022-04-20 DIAGNOSIS — E78.5 DYSLIPIDEMIA: Chronic | ICD-10-CM

## 2022-04-20 DIAGNOSIS — Z98.61 HISTORY OF CORONARY ANGIOPLASTY: Chronic | ICD-10-CM

## 2022-04-20 DIAGNOSIS — I71.40 ABDOMINAL AORTIC ANEURYSM (AAA) WITHOUT RUPTURE: ICD-10-CM

## 2022-04-20 DIAGNOSIS — I70.0 AORTIC ATHEROSCLEROSIS: Primary | ICD-10-CM

## 2022-04-20 DIAGNOSIS — I10 ESSENTIAL HYPERTENSION: Chronic | ICD-10-CM

## 2022-04-20 DIAGNOSIS — I25.10 CORONARY ARTERY DISEASE, OCCLUSIVE: Chronic | ICD-10-CM

## 2022-04-20 PROCEDURE — 99213 OFFICE O/P EST LOW 20 MIN: CPT | Mod: PBBFAC | Performed by: INTERNAL MEDICINE

## 2022-04-20 PROCEDURE — 99999 PR PBB SHADOW E&M-EST. PATIENT-LVL III: CPT | Mod: PBBFAC,,, | Performed by: INTERNAL MEDICINE

## 2022-04-20 PROCEDURE — 99999 PR PBB SHADOW E&M-EST. PATIENT-LVL III: ICD-10-PCS | Mod: PBBFAC,,, | Performed by: INTERNAL MEDICINE

## 2022-04-20 PROCEDURE — 99214 OFFICE O/P EST MOD 30 MIN: CPT | Mod: S$PBB,,, | Performed by: INTERNAL MEDICINE

## 2022-04-20 PROCEDURE — 99214 PR OFFICE/OUTPT VISIT, EST, LEVL IV, 30-39 MIN: ICD-10-PCS | Mod: S$PBB,,, | Performed by: INTERNAL MEDICINE

## 2022-04-20 NOTE — PROGRESS NOTES
Subjective:   Patient ID:  Jovanny Olmedo is a 86 y.o. male who presents for follow-up of No chief complaint on file.       This visit finds the patient feeling well.  Hematocrit and hemoglobin have improved.  Iron studies are improving.  The patient is now on aspirin only.  Treated for pulmonary embolism in stable.  Heart rhythm stable.  Otherwise doing well this time no chest pain.  NO FOCAL CNS SYMPTOMS OR SIGNS TO SUGGEST TIA OR STROKE  NO ANGINA OR EQUIVALENT  NO UNUSUAL WELLS. NO ORTHOPNEA OR PND  NO PALPITATIONS  NO NEAR SYNCOPE OR SYNCOPE  NO EDEMA. NO CALVE TENDERNESS     Visit finds patient feeling well no recurrence symptoms of chest pain shortness breath will check CBC again today otherwise doing well last was improved he is on iron supplements.  He is off all anticoagulants except aspirin blood pressure is stabilized to quite well this time normal sinus rhythm.        This visit finds patient feeling well.  No recurrence symptoms chest pain shortness of breath.  History of coronary stents stable history of pulmonary embolism stable.  Now off of at Plavix and Eliquis only on aspirin daily.  We would questions regarding this and answered all questions.  The patient has abdominal and thoracic aneurysm.  I will check the ascending aorta with a cardiac echo.  Follow-up the vascular surgery for the rest of the aneurysms.  Otherwise clinically doing well this time.  No changes and no chest discomfort.      Review of Systems   Constitutional: Negative for chills, diaphoresis, night sweats, weight gain and weight loss.   HENT: Negative for congestion, hoarse voice, sore throat and stridor.    Eyes: Negative for double vision and pain.   Cardiovascular: Negative for chest pain, claudication, cyanosis, dyspnea on exertion, irregular heartbeat, leg swelling, near-syncope, orthopnea, palpitations, paroxysmal nocturnal dyspnea and syncope.   Respiratory: Negative for cough, hemoptysis, shortness of breath, sleep  disturbances due to breathing, snoring, sputum production and wheezing.    Endocrine: Negative for cold intolerance, heat intolerance and polydipsia.   Hematologic/Lymphatic: Negative for bleeding problem. Does not bruise/bleed easily.   Skin: Negative for color change, dry skin and rash.   Musculoskeletal: Negative for joint swelling and muscle cramps.   Gastrointestinal: Negative for bloating, abdominal pain, constipation, diarrhea, dysphagia, melena, nausea and vomiting.   Genitourinary: Negative for flank pain and urgency.   Neurological: Negative for dizziness, focal weakness, headaches, light-headedness, loss of balance, seizures and weakness.   Psychiatric/Behavioral: Negative for altered mental status and memory loss. The patient is not nervous/anxious.      Family History   Problem Relation Age of Onset    Glaucoma Mother     Diabetes Mother     Kidney disease Mother     Colon cancer Father     Cancer Father         colon cancer    Diabetes Sister     Diabetes Brother     Hypertension Brother     Blindness Neg Hx      Past Medical History:   Diagnosis Date    Arthritis     Back pain     CAD (coronary artery disease)     Cataract     CKD (chronic kidney disease)     GERD (gastroesophageal reflux disease)     Glaucoma     suspect    HTN (hypertension)     Multiple subsegmental pulmonary emboli without acute cor pulmonale 2021    Prostate cancer     tx'd with radiation    PVD (peripheral vascular disease)     stent in right renal artery and aorta    Sleep apnea      Social History     Socioeconomic History    Marital status:    Tobacco Use    Smoking status: Former Smoker     Packs/day: 0.50     Years: 30.00     Pack years: 15.00     Quit date: 1995     Years since quittin.6    Smokeless tobacco: Former User   Substance and Sexual Activity    Alcohol use: Not Currently     Alcohol/week: 0.0 standard drinks    Drug use: Yes     Frequency: 4.0 times per week      Types: Marijuana    Sexual activity: Yes     Partners: Female   Social History Narrative         Current Outpatient Medications on File Prior to Visit   Medication Sig Dispense Refill    aspirin (ECOTRIN) 81 MG EC tablet Take 81 mg by mouth once daily.      atorvastatin (LIPITOR) 40 MG tablet Take 1 tablet (40 mg total) by mouth once daily. 90 tablet 1    azelastine (ASTELIN) 137 mcg (0.1 %) nasal spray 2 sprays (274 mcg total) by Nasal route 2 (two) times daily. 30 mL 11    benazepril-hydrochlorthiazide (LOTENSIN HCT) 10-12.5 mg Tab Take 1 tablet by mouth once daily. 90 tablet 3    ferrous sulfate 325 (65 FE) MG EC tablet Take 325 mg by mouth once daily.      fluticasone propionate (FLONASE) 50 mcg/actuation nasal spray 2 sprays (100 mcg total) by Each Nostril route once daily. 16 g 11    pantoprazole (PROTONIX) 40 MG tablet Take 1 tablet by mouth once daily 90 tablet 3     No current facility-administered medications on file prior to visit.     Review of patient's allergies indicates:   Allergen Reactions    Codeine      When taking codeine, pt becomes very anxious       Objective:     Physical Exam  Eyes:      Pupils: Pupils are equal, round, and reactive to light.   Neck:      Trachea: No tracheal deviation.   Cardiovascular:      Rate and Rhythm: Normal rate and regular rhythm.      Pulses: Intact distal pulses.           Carotid pulses are 2+ on the right side and 2+ on the left side.       Radial pulses are 2+ on the right side and 2+ on the left side.        Femoral pulses are 2+ on the right side and 2+ on the left side.       Popliteal pulses are 2+ on the right side and 2+ on the left side.        Dorsalis pedis pulses are 2+ on the right side and 2+ on the left side.        Posterior tibial pulses are 2+ on the right side and 2+ on the left side.      Heart sounds: Normal heart sounds. No murmur heard.    No friction rub. No gallop.   Pulmonary:      Effort: Pulmonary effort is normal. No  respiratory distress.      Breath sounds: Normal breath sounds. No stridor. No wheezing or rales.   Chest:      Chest wall: No tenderness.   Abdominal:      General: There is no distension.      Tenderness: There is no abdominal tenderness. There is no rebound.   Musculoskeletal:         General: No tenderness.      Cervical back: Normal range of motion.   Skin:     General: Skin is warm and dry.   Neurological:      Mental Status: He is alert and oriented to person, place, and time.         Assessment:     1. Essential hypertension    2. History of coronary angioplasty    3. Coronary artery disease, occlusive    4. Dyslipidemia    5. Aortic atherosclerosis    6. Abdominal aortic aneurysm (AAA) without rupture        Plan:     Essential hypertension    History of coronary angioplasty    Coronary artery disease, occlusive    Dyslipidemia    Aortic atherosclerosis    Abdominal aortic aneurysm (AAA) without rupture     Impression 1 coronary disease stable on aspirin and continues on other medications including Lotensin.  2. Hyperlipidemia stable on Lipitor.  Follow-up lipid levels.  Follow-up cardiac echo to assess the ascending aorta.  Otherwise clinically stable this time follow-up evaluation in 1 month review sooner if there are acute recurrence symptoms patient will has been encouraged to walk more.  Weight is stable slightly elevated.

## 2022-05-16 ENCOUNTER — OFFICE VISIT (OUTPATIENT)
Dept: INTERNAL MEDICINE | Facility: CLINIC | Age: 87
End: 2022-05-16
Payer: MEDICARE

## 2022-05-16 ENCOUNTER — HOSPITAL ENCOUNTER (OUTPATIENT)
Dept: CARDIOLOGY | Facility: HOSPITAL | Age: 87
Discharge: HOME | End: 2022-05-16
Attending: INTERNAL MEDICINE
Payer: MEDICARE

## 2022-05-16 VITALS
DIASTOLIC BLOOD PRESSURE: 82 MMHG | WEIGHT: 235 LBS | BODY MASS INDEX: 31.83 KG/M2 | SYSTOLIC BLOOD PRESSURE: 134 MMHG | HEIGHT: 72 IN

## 2022-05-16 VITALS
BODY MASS INDEX: 31.31 KG/M2 | SYSTOLIC BLOOD PRESSURE: 128 MMHG | WEIGHT: 230.81 LBS | HEART RATE: 75 BPM | DIASTOLIC BLOOD PRESSURE: 82 MMHG | OXYGEN SATURATION: 98 %

## 2022-05-16 DIAGNOSIS — M51.36 DDD (DEGENERATIVE DISC DISEASE), LUMBAR: ICD-10-CM

## 2022-05-16 DIAGNOSIS — I71.40 ABDOMINAL AORTIC ANEURYSM (AAA) WITHOUT RUPTURE: ICD-10-CM

## 2022-05-16 DIAGNOSIS — N18.32 STAGE 3B CHRONIC KIDNEY DISEASE: ICD-10-CM

## 2022-05-16 DIAGNOSIS — I10 ESSENTIAL HYPERTENSION: Primary | Chronic | ICD-10-CM

## 2022-05-16 DIAGNOSIS — E66.9 OBESITY (BMI 30.0-34.9): ICD-10-CM

## 2022-05-16 DIAGNOSIS — Z86.711 HISTORY OF PULMONARY EMBOLUS (PE): ICD-10-CM

## 2022-05-16 DIAGNOSIS — N25.81 SECONDARY HYPERPARATHYROIDISM: ICD-10-CM

## 2022-05-16 DIAGNOSIS — D63.1 ANEMIA DUE TO STAGE 3B CHRONIC KIDNEY DISEASE: ICD-10-CM

## 2022-05-16 DIAGNOSIS — I10 ESSENTIAL HYPERTENSION: ICD-10-CM

## 2022-05-16 DIAGNOSIS — Q61.3 POLYCYSTIC KIDNEY DISEASE: ICD-10-CM

## 2022-05-16 DIAGNOSIS — I25.10 CORONARY ARTERY DISEASE, OCCLUSIVE: Chronic | ICD-10-CM

## 2022-05-16 DIAGNOSIS — Z79.899 OTHER LONG TERM (CURRENT) DRUG THERAPY: ICD-10-CM

## 2022-05-16 DIAGNOSIS — I25.10 CORONARY ARTERY DISEASE, OCCLUSIVE: ICD-10-CM

## 2022-05-16 DIAGNOSIS — G47.33 OSA ON CPAP: ICD-10-CM

## 2022-05-16 DIAGNOSIS — I70.0 AORTIC ATHEROSCLEROSIS: ICD-10-CM

## 2022-05-16 DIAGNOSIS — E78.5 DYSLIPIDEMIA: ICD-10-CM

## 2022-05-16 DIAGNOSIS — Z23 NEED FOR PROPHYLACTIC VACCINATION WITH STREPTOCOCCUS PNEUMONIAE (PNEUMOCOCCUS) AND INFLUENZA VACCINES: ICD-10-CM

## 2022-05-16 DIAGNOSIS — C61 PROSTATE CANCER: ICD-10-CM

## 2022-05-16 DIAGNOSIS — D69.6 THROMBOCYTOPENIA: ICD-10-CM

## 2022-05-16 DIAGNOSIS — E78.5 DYSLIPIDEMIA: Chronic | ICD-10-CM

## 2022-05-16 DIAGNOSIS — Z98.61 HISTORY OF CORONARY ANGIOPLASTY: Chronic | ICD-10-CM

## 2022-05-16 DIAGNOSIS — K21.9 GASTROESOPHAGEAL REFLUX DISEASE, UNSPECIFIED WHETHER ESOPHAGITIS PRESENT: ICD-10-CM

## 2022-05-16 DIAGNOSIS — N18.32 ANEMIA DUE TO STAGE 3B CHRONIC KIDNEY DISEASE: ICD-10-CM

## 2022-05-16 DIAGNOSIS — Z86.010 HISTORY OF COLON POLYPS: ICD-10-CM

## 2022-05-16 DIAGNOSIS — Z98.61 HISTORY OF CORONARY ANGIOPLASTY: ICD-10-CM

## 2022-05-16 PROBLEM — E66.811 OBESITY (BMI 30.0-34.9): Status: ACTIVE | Noted: 2022-05-16

## 2022-05-16 PROBLEM — D50.9 MICROCYTIC ANEMIA: Status: RESOLVED | Noted: 2017-03-15 | Resolved: 2022-05-16

## 2022-05-16 LAB
ALBUMIN SERPL BCP-MCNC: 3.6 G/DL (ref 3.5–5.2)
ALP SERPL-CCNC: 74 U/L (ref 55–135)
ALT SERPL W/O P-5'-P-CCNC: 29 U/L (ref 10–44)
ANION GAP SERPL CALC-SCNC: 13 MMOL/L (ref 8–16)
AORTIC ROOT ANNULUS: 4.11 CM
AST SERPL-CCNC: 34 U/L (ref 10–40)
AV INDEX (PROSTH): 0.79
AV MEAN GRADIENT: 3 MMHG
AV PEAK GRADIENT: 5 MMHG
AV VALVE AREA: 2.44 CM2
AV VELOCITY RATIO: 0.87
BILIRUB SERPL-MCNC: 0.6 MG/DL (ref 0.1–1)
BSA FOR ECHO PROCEDURE: 2.33 M2
BUN SERPL-MCNC: 23 MG/DL (ref 8–23)
CALCIUM SERPL-MCNC: 9.5 MG/DL (ref 8.7–10.5)
CHLORIDE SERPL-SCNC: 104 MMOL/L (ref 95–110)
CO2 SERPL-SCNC: 23 MMOL/L (ref 23–29)
CREAT SERPL-MCNC: 1.6 MG/DL (ref 0.5–1.4)
CV ECHO LV RWT: 0.5 CM
DOP CALC AO PEAK VEL: 1.08 M/S
DOP CALC AO VTI: 22.8 CM
DOP CALC LVOT AREA: 3.1 CM2
DOP CALC LVOT DIAMETER: 1.99 CM
DOP CALC LVOT PEAK VEL: 0.94 M/S
DOP CALC LVOT STROKE VOLUME: 55.65 CM3
DOP CALCLVOT PEAK VEL VTI: 17.9 CM
E WAVE DECELERATION TIME: 232.72 MSEC
E/A RATIO: 0.58
E/E' RATIO: 7.47 M/S
ECHO EF ESTIMATED: 51 %
ECHO LV POSTERIOR WALL: 1.07 CM (ref 0.6–1.1)
EJECTION FRACTION: 60 %
EST. GFR  (AFRICAN AMERICAN): 44.7 ML/MIN/1.73 M^2
EST. GFR  (NON AFRICAN AMERICAN): 38.7 ML/MIN/1.73 M^2
FERRITIN SERPL-MCNC: 151 NG/ML (ref 20–300)
FOLATE SERPL-MCNC: 8.8 NG/ML (ref 4–24)
FRACTIONAL SHORTENING: 26 % (ref 28–44)
GLUCOSE SERPL-MCNC: 102 MG/DL (ref 70–110)
INTERVENTRICULAR SEPTUM: 1.16 CM (ref 0.6–1.1)
IRON SERPL-MCNC: 77 UG/DL (ref 45–160)
IVRT: 102.76 MSEC
LA MAJOR: 4.65 CM
LA MINOR: 4.58 CM
LA WIDTH: 2.9 CM
LEFT ATRIUM SIZE: 3.55 CM
LEFT ATRIUM VOLUME INDEX MOD: 18.4 ML/M2
LEFT ATRIUM VOLUME INDEX: 17.7 ML/M2
LEFT ATRIUM VOLUME MOD: 42 CM3
LEFT ATRIUM VOLUME: 40.38 CM3
LEFT INTERNAL DIMENSION IN SYSTOLE: 3.2 CM (ref 2.1–4)
LEFT VENTRICLE DIASTOLIC VOLUME INDEX: 36.61 ML/M2
LEFT VENTRICLE DIASTOLIC VOLUME: 83.47 ML
LEFT VENTRICLE MASS INDEX: 73 G/M2
LEFT VENTRICLE SYSTOLIC VOLUME INDEX: 18 ML/M2
LEFT VENTRICLE SYSTOLIC VOLUME: 41 ML
LEFT VENTRICULAR INTERNAL DIMENSION IN DIASTOLE: 4.31 CM (ref 3.5–6)
LEFT VENTRICULAR MASS: 166.72 G
LV LATERAL E/E' RATIO: 8 M/S
LV SEPTAL E/E' RATIO: 7 M/S
LVOT MG: 1.89 MMHG
LVOT MV: 0.64 CM/S
MV PEAK A VEL: 0.97 M/S
MV PEAK E VEL: 0.56 M/S
MV STENOSIS PRESSURE HALF TIME: 67.49 MS
MV VALVE AREA P 1/2 METHOD: 3.26 CM2
PISA TR MAX VEL: 2.28 M/S
POTASSIUM SERPL-SCNC: 3.8 MMOL/L (ref 3.5–5.1)
PROT SERPL-MCNC: 7.7 G/DL (ref 6–8.4)
PTH-INTACT SERPL-MCNC: 154.8 PG/ML (ref 9–77)
PULM VEIN S/D RATIO: 1.67
PV MV: 0.61 M/S
PV PEAK D VEL: 0.34 M/S
PV PEAK S VEL: 0.57 M/S
PV PEAK VELOCITY: 0.82 CM/S
RA PRESSURE: 3 MMHG
SATURATED IRON: 25 % (ref 20–50)
SINUS: 3.29 CM
SODIUM SERPL-SCNC: 140 MMOL/L (ref 136–145)
STJ: 3.16 CM
TDI LATERAL: 0.07 M/S
TDI SEPTAL: 0.08 M/S
TDI: 0.08 M/S
TOTAL IRON BINDING CAPACITY: 302 UG/DL (ref 250–450)
TR MAX PG: 21 MMHG
TRANSFERRIN SERPL-MCNC: 204 MG/DL (ref 200–375)
TV REST PULMONARY ARTERY PRESSURE: 24 MMHG
VIT B12 SERPL-MCNC: 434 PG/ML (ref 210–950)

## 2022-05-16 PROCEDURE — 82728 ASSAY OF FERRITIN: CPT | Performed by: FAMILY MEDICINE

## 2022-05-16 PROCEDURE — 99214 PR OFFICE/OUTPT VISIT, EST, LEVL IV, 30-39 MIN: ICD-10-PCS | Mod: 25,S$PBB,, | Performed by: FAMILY MEDICINE

## 2022-05-16 PROCEDURE — 84466 ASSAY OF TRANSFERRIN: CPT | Performed by: FAMILY MEDICINE

## 2022-05-16 PROCEDURE — 82746 ASSAY OF FOLIC ACID SERUM: CPT | Performed by: FAMILY MEDICINE

## 2022-05-16 PROCEDURE — 99999 PR PBB SHADOW E&M-EST. PATIENT-LVL IV: CPT | Mod: PBBFAC,,, | Performed by: FAMILY MEDICINE

## 2022-05-16 PROCEDURE — 93306 TTE W/DOPPLER COMPLETE: CPT

## 2022-05-16 PROCEDURE — 93306 TTE W/DOPPLER COMPLETE: CPT | Mod: 26,,, | Performed by: INTERNAL MEDICINE

## 2022-05-16 PROCEDURE — 80053 COMPREHEN METABOLIC PANEL: CPT | Performed by: FAMILY MEDICINE

## 2022-05-16 PROCEDURE — 99214 OFFICE O/P EST MOD 30 MIN: CPT | Mod: PBBFAC,25 | Performed by: FAMILY MEDICINE

## 2022-05-16 PROCEDURE — G0009 ADMIN PNEUMOCOCCAL VACCINE: HCPCS | Mod: PBBFAC

## 2022-05-16 PROCEDURE — 82607 VITAMIN B-12: CPT | Performed by: FAMILY MEDICINE

## 2022-05-16 PROCEDURE — 83970 ASSAY OF PARATHORMONE: CPT | Performed by: FAMILY MEDICINE

## 2022-05-16 PROCEDURE — 99999 PR PBB SHADOW E&M-EST. PATIENT-LVL IV: ICD-10-PCS | Mod: PBBFAC,,, | Performed by: FAMILY MEDICINE

## 2022-05-16 PROCEDURE — 99214 OFFICE O/P EST MOD 30 MIN: CPT | Mod: 25,S$PBB,, | Performed by: FAMILY MEDICINE

## 2022-05-16 PROCEDURE — 93306 ECHO (CUPID ONLY): ICD-10-PCS | Mod: 26,,, | Performed by: INTERNAL MEDICINE

## 2022-05-16 PROCEDURE — 85025 COMPLETE CBC W/AUTO DIFF WBC: CPT | Performed by: FAMILY MEDICINE

## 2022-05-16 RX ORDER — ATORVASTATIN CALCIUM 40 MG/1
40 TABLET, FILM COATED ORAL DAILY
Qty: 90 TABLET | Refills: 1 | Status: SHIPPED | OUTPATIENT
Start: 2022-05-16 | End: 2023-01-30

## 2022-05-16 NOTE — PROGRESS NOTES
Subjective:       Patient ID: Jovanny Olmedo is a 86 y.o. male.    Chief Complaint: No chief complaint on file.    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 Nav Mirza, would like to establish care with urologist here in [place], denies any hematuria but has been having increased nocturia.   Denies any abdominal discomfort nausea vomiting  Denies any trouble with bleeding    Review of Systems   Constitutional: Negative for appetite change and fatigue.   Eyes: Negative for visual disturbance.   Respiratory: Negative for shortness of breath.    Cardiovascular: Negative for chest pain, palpitations and leg swelling.   Gastrointestinal: Negative for abdominal pain, nausea and vomiting.   Genitourinary: Negative for hematuria.        Positive for nocturia   Musculoskeletal: Negative for myalgias.   Skin: Negative for rash.   Neurological: Negative for headaches.   Hematological: Does not bruise/bleed easily.   Psychiatric/Behavioral: Negative for sleep disturbance.         /82 (BP Location: Right arm, Patient Position: Sitting, BP Method:  Large (Manual))   Pulse 75   Wt 104.7 kg (230 lb 13.2 oz)   SpO2 98%   BMI 31.31 kg/m²   Objective:      Physical Exam  Constitutional:       Appearance: He is well-developed.   HENT:      Head: Normocephalic and atraumatic.   Cardiovascular:      Rate and Rhythm: Normal rate and regular rhythm.      Heart sounds: Normal heart sounds. No murmur heard.  Pulmonary:      Effort: Pulmonary effort is normal.      Breath sounds: Normal breath sounds. No wheezing.   Abdominal:      General: Bowel sounds are normal.      Palpations: Abdomen is soft.      Tenderness: There is no abdominal tenderness.   Skin:     General: Skin is warm and dry.      Findings: No rash.   Neurological:      Mental Status: He is alert and oriented to person, place, and time.   Psychiatric:         Mood and Affect: Mood normal.           Assessment/Plan:   1. Essential hypertension  - Comprehensive Metabolic Panel  Blood pressure is stable today currently on benazepril hydrochlorothiazide 10/12.5 mg daily    2. Dyslipidemia  - atorvastatin (LIPITOR) 40 MG tablet; Take 1 tablet (40 mg total) by mouth once daily.  Dispense: 90 tablet; Refill: 1  Currently taking Lipitor 40 mg daily    3. Coronary artery disease, occlusive  4. History of coronary angioplasty  Continue aspirin 81 mg daily    5. DDD (degenerative disc disease), lumbar  Graded exercise regimen recommended    6. Prostate cancer  - CBC Auto Differential  - Ambulatory referral/consult to Urology; Future  Will refer to Urology noted bone scan negative for metastasis    7. Secondary hyperparathyroidism  - PTH, Intact  Currently not followed by nephrologist    8. Abdominal aortic aneurysm (AAA) without rupture  Will order AAA ultrasound for follow-up    9. History of pulmonary embolus (PE)  Stable on aspirin    10. History of colon polyps    11. CLARISSA on CPAP  Stable    12. Aortic atherosclerosis  Continue statins and aspirin    13. Polycystic kidney disease    14. Gastroesophageal reflux  disease, unspecified whether esophagitis present  Clinically doing well on pantoprazole 40 mg daily    15. Stage 3b chronic kidney disease  16. 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  - CBC Auto Differential  - Iron and TIBC  - Ferritin  - Vitamin B12  - Folate  17. Thrombocytopenia  - CBC Auto Differential  Stable    18. Need for prophylactic vaccination with Streptococcus pneumoniae (Pneumococcus) and Influenza vaccines  - (In Office Administered) Pneumococcal Polysaccharide Vaccine (23 Valent) (SQ/IM)  Pneumovax given today    19. Other long term (current) drug therapy   - Vitamin B12  - Folate    20. Obesity (BMI 30.0-34.9)  Lifestyle modifications discussed to lose weight with BMI 31

## 2022-05-17 ENCOUNTER — TELEPHONE (OUTPATIENT)
Dept: CARDIOLOGY | Facility: CLINIC | Age: 87
End: 2022-05-17
Payer: MEDICARE

## 2022-05-17 LAB
BASOPHILS # BLD AUTO: 0.03 K/UL (ref 0–0.2)
BASOPHILS NFR BLD: 0.4 % (ref 0–1.9)
DIFFERENTIAL METHOD: ABNORMAL
EOSINOPHIL # BLD AUTO: 0.2 K/UL (ref 0–0.5)
EOSINOPHIL NFR BLD: 2.1 % (ref 0–8)
ERYTHROCYTE [DISTWIDTH] IN BLOOD BY AUTOMATED COUNT: 14.4 % (ref 11.5–14.5)
HCT VFR BLD AUTO: 46.5 % (ref 40–54)
HGB BLD-MCNC: 14.5 G/DL (ref 14–18)
IMM GRANULOCYTES # BLD AUTO: 0.02 K/UL (ref 0–0.04)
IMM GRANULOCYTES NFR BLD AUTO: 0.3 % (ref 0–0.5)
LYMPHOCYTES # BLD AUTO: 2.2 K/UL (ref 1–4.8)
LYMPHOCYTES NFR BLD: 30.1 % (ref 18–48)
MCH RBC QN AUTO: 29.3 PG (ref 27–31)
MCHC RBC AUTO-ENTMCNC: 31.2 G/DL (ref 32–36)
MCV RBC AUTO: 94 FL (ref 82–98)
MONOCYTES # BLD AUTO: 0.5 K/UL (ref 0.3–1)
MONOCYTES NFR BLD: 7.5 % (ref 4–15)
NEUTROPHILS # BLD AUTO: 4.3 K/UL (ref 1.8–7.7)
NEUTROPHILS NFR BLD: 59.6 % (ref 38–73)
NRBC BLD-RTO: 0 /100 WBC
PLATELET # BLD AUTO: 144 K/UL (ref 150–450)
PMV BLD AUTO: 12.6 FL (ref 9.2–12.9)
RBC # BLD AUTO: 4.95 M/UL (ref 4.6–6.2)
WBC # BLD AUTO: 7.2 K/UL (ref 3.9–12.7)

## 2022-05-17 NOTE — TELEPHONE ENCOUNTER
Patient contacted and was advised that his echo showed  Normal heart function, stable. The patient stated understanding with no questions or concerns.

## 2022-05-30 ENCOUNTER — OFFICE VISIT (OUTPATIENT)
Dept: CARDIOLOGY | Facility: CLINIC | Age: 87
End: 2022-05-30
Payer: MEDICARE

## 2022-05-30 VITALS
BODY MASS INDEX: 31.31 KG/M2 | DIASTOLIC BLOOD PRESSURE: 84 MMHG | OXYGEN SATURATION: 96 % | HEART RATE: 74 BPM | SYSTOLIC BLOOD PRESSURE: 136 MMHG | WEIGHT: 230.81 LBS

## 2022-05-30 DIAGNOSIS — E78.5 DYSLIPIDEMIA: Chronic | ICD-10-CM

## 2022-05-30 DIAGNOSIS — I10 ESSENTIAL HYPERTENSION: Primary | Chronic | ICD-10-CM

## 2022-05-30 DIAGNOSIS — I70.0 AORTIC ATHEROSCLEROSIS: ICD-10-CM

## 2022-05-30 DIAGNOSIS — I25.10 CORONARY ARTERY DISEASE, OCCLUSIVE: Chronic | ICD-10-CM

## 2022-05-30 DIAGNOSIS — Z98.61 HISTORY OF CORONARY ANGIOPLASTY: Chronic | ICD-10-CM

## 2022-05-30 DIAGNOSIS — I71.40 ABDOMINAL AORTIC ANEURYSM (AAA) WITHOUT RUPTURE: ICD-10-CM

## 2022-05-30 PROCEDURE — 99214 PR OFFICE/OUTPT VISIT, EST, LEVL IV, 30-39 MIN: ICD-10-PCS | Mod: S$PBB,,, | Performed by: INTERNAL MEDICINE

## 2022-05-30 PROCEDURE — 99999 PR PBB SHADOW E&M-EST. PATIENT-LVL III: CPT | Mod: PBBFAC,,, | Performed by: INTERNAL MEDICINE

## 2022-05-30 PROCEDURE — 99214 OFFICE O/P EST MOD 30 MIN: CPT | Mod: S$PBB,,, | Performed by: INTERNAL MEDICINE

## 2022-05-30 PROCEDURE — 99213 OFFICE O/P EST LOW 20 MIN: CPT | Mod: PBBFAC | Performed by: INTERNAL MEDICINE

## 2022-05-30 PROCEDURE — 99999 PR PBB SHADOW E&M-EST. PATIENT-LVL III: ICD-10-PCS | Mod: PBBFAC,,, | Performed by: INTERNAL MEDICINE

## 2022-05-30 NOTE — PROGRESS NOTES
Subjective:   Patient ID:  Jovanny Olmedo is a 86 y.o. male who presents for follow-up of No chief complaint on file.  This visit finds the patient feeling well.  Hematocrit and hemoglobin have improved.  Iron studies are improving.  The patient is now on aspirin only.  Treated for pulmonary embolism in stable.  Heart rhythm stable.  Otherwise doing well this time no chest pain.  NO FOCAL CNS SYMPTOMS OR SIGNS TO SUGGEST TIA OR STROKE  NO ANGINA OR EQUIVALENT  NO UNUSUAL WELLS. NO ORTHOPNEA OR PND  NO PALPITATIONS  NO NEAR SYNCOPE OR SYNCOPE  NO EDEMA. NO CALVE TENDERNESS     Visit finds patient feeling well no recurrence symptoms of chest pain shortness breath will check CBC again today otherwise doing well last was improved he is on iron supplements.  He is off all anticoagulants except aspirin blood pressure is stabilized to quite well this time normal sinus rhythm.    Overall patient doing well this time no recurrence symptoms chest pain shortness breath otherwise stable feeling well with the cardiac echo showed normal LV function.      Review of Systems   Constitutional: Negative for chills, diaphoresis, night sweats, weight gain and weight loss.   HENT: Negative for congestion, hoarse voice, sore throat and stridor.    Eyes: Negative for double vision and pain.   Cardiovascular: Negative for chest pain, claudication, cyanosis, dyspnea on exertion, irregular heartbeat, leg swelling, near-syncope, orthopnea, palpitations, paroxysmal nocturnal dyspnea and syncope.   Respiratory: Negative for cough, hemoptysis, shortness of breath, sleep disturbances due to breathing, snoring, sputum production and wheezing.    Endocrine: Negative for cold intolerance, heat intolerance and polydipsia.   Hematologic/Lymphatic: Negative for bleeding problem. Does not bruise/bleed easily.   Skin: Negative for color change, dry skin and rash.   Musculoskeletal: Negative for joint swelling and muscle cramps.   Gastrointestinal: Negative  for bloating, abdominal pain, constipation, diarrhea, dysphagia, melena, nausea and vomiting.   Genitourinary: Negative for flank pain and urgency.   Neurological: Negative for dizziness, focal weakness, headaches, light-headedness, loss of balance, seizures and weakness.   Psychiatric/Behavioral: Negative for altered mental status and memory loss. The patient is not nervous/anxious.      Family History   Problem Relation Age of Onset    Glaucoma Mother     Diabetes Mother     Kidney disease Mother     Colon cancer Father     Cancer Father         colon cancer    Diabetes Sister     Diabetes Brother     Hypertension Brother     Blindness Neg Hx      Past Medical History:   Diagnosis Date    Arthritis     Back pain     CAD (coronary artery disease)     Cataract     CKD (chronic kidney disease)     GERD (gastroesophageal reflux disease)     Glaucoma     suspect    HTN (hypertension)     Multiple subsegmental pulmonary emboli without acute cor pulmonale 2021    Prostate cancer     tx'd with radiation    PVD (peripheral vascular disease)     stent in right renal artery and aorta    Sleep apnea      Social History     Socioeconomic History    Marital status:    Tobacco Use    Smoking status: Former Smoker     Packs/day: 0.50     Years: 30.00     Pack years: 15.00     Quit date: 1995     Years since quittin.7    Smokeless tobacco: Former User   Substance and Sexual Activity    Alcohol use: Not Currently     Alcohol/week: 0.0 standard drinks    Drug use: Yes     Frequency: 4.0 times per week     Types: Marijuana    Sexual activity: Yes     Partners: Female   Social History Narrative         Current Outpatient Medications on File Prior to Visit   Medication Sig Dispense Refill    aspirin (ECOTRIN) 81 MG EC tablet Take 81 mg by mouth once daily.      atorvastatin (LIPITOR) 40 MG tablet Take 1 tablet (40 mg total) by mouth once daily. 90 tablet 1    azelastine  (ASTELIN) 137 mcg (0.1 %) nasal spray 2 sprays (274 mcg total) by Nasal route 2 (two) times daily. 30 mL 11    benazepril-hydrochlorthiazide (LOTENSIN HCT) 10-12.5 mg Tab Take 1 tablet by mouth once daily. 90 tablet 3    ferrous sulfate 325 (65 FE) MG EC tablet Take 325 mg by mouth once daily.      fluticasone propionate (FLONASE) 50 mcg/actuation nasal spray 2 sprays (100 mcg total) by Each Nostril route once daily. 16 g 11    pantoprazole (PROTONIX) 40 MG tablet Take 1 tablet by mouth once daily 90 tablet 3     No current facility-administered medications on file prior to visit.     Review of patient's allergies indicates:   Allergen Reactions    Codeine      When taking codeine, pt becomes very anxious       Objective:     Physical Exam  Eyes:      Pupils: Pupils are equal, round, and reactive to light.   Neck:      Trachea: No tracheal deviation.   Cardiovascular:      Rate and Rhythm: Normal rate and regular rhythm.      Pulses: Intact distal pulses.           Carotid pulses are 2+ on the right side and 2+ on the left side.       Radial pulses are 2+ on the right side and 2+ on the left side.        Femoral pulses are 2+ on the right side and 2+ on the left side.       Popliteal pulses are 2+ on the right side and 2+ on the left side.        Dorsalis pedis pulses are 2+ on the right side and 2+ on the left side.        Posterior tibial pulses are 2+ on the right side and 2+ on the left side.      Heart sounds: Normal heart sounds. No murmur heard.    No friction rub. No gallop.   Pulmonary:      Effort: Pulmonary effort is normal. No respiratory distress.      Breath sounds: Normal breath sounds. No stridor. No wheezing or rales.   Chest:      Chest wall: No tenderness.   Abdominal:      General: There is no distension.      Tenderness: There is no abdominal tenderness. There is no rebound.   Musculoskeletal:         General: No tenderness.      Cervical back: Normal range of motion.   Skin:     General:  Skin is warm and dry.   Neurological:      Mental Status: He is alert and oriented to person, place, and time.         Assessment:     1. Essential hypertension    2. Coronary artery disease, occlusive    3. History of coronary angioplasty    4. Dyslipidemia    5. Aortic atherosclerosis    6. Abdominal aortic aneurysm (AAA) without rupture        Plan:     Essential hypertension    Coronary artery disease, occlusive    History of coronary angioplasty    Dyslipidemia    Aortic atherosclerosis    Abdominal aortic aneurysm (AAA) without rupture      Impression 1. CAD stable no recurrence symptoms  2. Shortness breath stable heart function is normal.  3. Hyperlipidemia stable on atorvastatin  All questions answered follow-up evaluation 4 months

## 2022-08-15 ENCOUNTER — LAB VISIT (OUTPATIENT)
Dept: LAB | Facility: HOSPITAL | Age: 87
End: 2022-08-15
Attending: FAMILY MEDICINE
Payer: MEDICARE

## 2022-08-15 DIAGNOSIS — D50.0 IRON DEFICIENCY ANEMIA DUE TO CHRONIC BLOOD LOSS: ICD-10-CM

## 2022-08-15 LAB
BASOPHILS # BLD AUTO: 0.02 K/UL (ref 0–0.2)
BASOPHILS NFR BLD: 0.3 % (ref 0–1.9)
DIFFERENTIAL METHOD: ABNORMAL
EOSINOPHIL # BLD AUTO: 0.2 K/UL (ref 0–0.5)
EOSINOPHIL NFR BLD: 2.2 % (ref 0–8)
ERYTHROCYTE [DISTWIDTH] IN BLOOD BY AUTOMATED COUNT: 13 % (ref 11.5–14.5)
FERRITIN SERPL-MCNC: 118 NG/ML (ref 20–300)
HCT VFR BLD AUTO: 45.6 % (ref 40–54)
HGB BLD-MCNC: 14.8 G/DL (ref 14–18)
IMM GRANULOCYTES # BLD AUTO: 0.02 K/UL (ref 0–0.04)
IMM GRANULOCYTES NFR BLD AUTO: 0.3 % (ref 0–0.5)
IRON SERPL-MCNC: 97 UG/DL (ref 45–160)
LYMPHOCYTES # BLD AUTO: 2 K/UL (ref 1–4.8)
LYMPHOCYTES NFR BLD: 26.9 % (ref 18–48)
MCH RBC QN AUTO: 30.3 PG (ref 27–31)
MCHC RBC AUTO-ENTMCNC: 32.5 G/DL (ref 32–36)
MCV RBC AUTO: 93 FL (ref 82–98)
MONOCYTES # BLD AUTO: 0.6 K/UL (ref 0.3–1)
MONOCYTES NFR BLD: 8 % (ref 4–15)
NEUTROPHILS # BLD AUTO: 4.5 K/UL (ref 1.8–7.7)
NEUTROPHILS NFR BLD: 62.3 % (ref 38–73)
NRBC BLD-RTO: 0 /100 WBC
PLATELET # BLD AUTO: 114 K/UL (ref 150–450)
PMV BLD AUTO: 11.8 FL (ref 9.2–12.9)
RBC # BLD AUTO: 4.89 M/UL (ref 4.6–6.2)
SATURATED IRON: 31 % (ref 20–50)
TOTAL IRON BINDING CAPACITY: 317 UG/DL (ref 250–450)
TRANSFERRIN SERPL-MCNC: 214 MG/DL (ref 200–375)
WBC # BLD AUTO: 7.24 K/UL (ref 3.9–12.7)

## 2022-08-15 PROCEDURE — 36415 COLL VENOUS BLD VENIPUNCTURE: CPT | Performed by: INTERNAL MEDICINE

## 2022-08-15 PROCEDURE — 82728 ASSAY OF FERRITIN: CPT | Performed by: INTERNAL MEDICINE

## 2022-08-15 PROCEDURE — 85025 COMPLETE CBC W/AUTO DIFF WBC: CPT | Performed by: INTERNAL MEDICINE

## 2022-08-15 PROCEDURE — 84466 ASSAY OF TRANSFERRIN: CPT | Performed by: INTERNAL MEDICINE

## 2022-08-18 ENCOUNTER — OFFICE VISIT (OUTPATIENT)
Dept: HEMATOLOGY/ONCOLOGY | Facility: CLINIC | Age: 87
End: 2022-08-18
Payer: MEDICARE

## 2022-08-18 VITALS
HEART RATE: 86 BPM | DIASTOLIC BLOOD PRESSURE: 70 MMHG | WEIGHT: 230.19 LBS | BODY MASS INDEX: 31.18 KG/M2 | SYSTOLIC BLOOD PRESSURE: 116 MMHG | HEIGHT: 72 IN | TEMPERATURE: 99 F | OXYGEN SATURATION: 95 %

## 2022-08-18 DIAGNOSIS — C61 PROSTATE CANCER: ICD-10-CM

## 2022-08-18 DIAGNOSIS — D69.6 THROMBOCYTOPENIA: Primary | ICD-10-CM

## 2022-08-18 DIAGNOSIS — D63.1 ANEMIA DUE TO STAGE 3B CHRONIC KIDNEY DISEASE: ICD-10-CM

## 2022-08-18 DIAGNOSIS — N18.32 ANEMIA DUE TO STAGE 3B CHRONIC KIDNEY DISEASE: ICD-10-CM

## 2022-08-18 DIAGNOSIS — D50.8 OTHER IRON DEFICIENCY ANEMIA: ICD-10-CM

## 2022-08-18 DIAGNOSIS — R45.89 ANXIETY ABOUT HEALTH: ICD-10-CM

## 2022-08-18 PROCEDURE — 99214 OFFICE O/P EST MOD 30 MIN: CPT | Mod: PBBFAC

## 2022-08-18 PROCEDURE — 99999 PR PBB SHADOW E&M-EST. PATIENT-LVL IV: CPT | Mod: PBBFAC,,,

## 2022-08-18 PROCEDURE — 99999 PR PBB SHADOW E&M-EST. PATIENT-LVL IV: ICD-10-PCS | Mod: PBBFAC,,,

## 2022-08-18 PROCEDURE — 99214 OFFICE O/P EST MOD 30 MIN: CPT | Mod: S$PBB,,,

## 2022-08-18 PROCEDURE — 99214 PR OFFICE/OUTPT VISIT, EST, LEVL IV, 30-39 MIN: ICD-10-PCS | Mod: S$PBB,,,

## 2022-08-18 RX ORDER — ALPRAZOLAM 0.25 MG/1
0.25 TABLET ORAL 3 TIMES DAILY
Qty: 30 TABLET | Refills: 0 | Status: SHIPPED | OUTPATIENT
Start: 2022-08-18 | End: 2023-05-16

## 2022-08-19 ENCOUNTER — TELEPHONE (OUTPATIENT)
Dept: UROLOGY | Facility: CLINIC | Age: 87
End: 2022-08-19
Payer: MEDICARE

## 2022-08-19 NOTE — TELEPHONE ENCOUNTER
----- Message from Fabienne Koehler MA sent at 8/18/2022  4:39 PM CDT -----  Regarding: Appointment  Hi, Can someone schedule patient with Dr. Mirza in October.

## 2022-08-19 NOTE — TELEPHONE ENCOUNTER
Called pt, no answer, left message with pt next appointment date and time with Dr Mirza on pt voicemail. Informed pt to contact us if date and time does not work for him.

## 2022-08-23 NOTE — PROGRESS NOTES
Subjective:      Patient ID: Jovanny Olmedo is a 86 y.o. male.    Chief Complaint: Follow-up (Iron deficiency anemia)      HPI: Mr. Olmedo is a pleasant 86-year-old gentleman, previously seen by Dr. Mahmood, who presents today for follow-up of iron deficiency anemia. His comorbidiites include CAD, AAA, hypertension, CKD, CLARISSA. He also has a hx of segmental pulmonary embolism and thoracic aortic dissection subsequently initiated on heparin and discharged on additional anticoagulation apixaban 5 mg bid. in addition to his prior baby aspirin and clopidogrel.  In 2021 he presented to [facility] with recurrent chest pain and was found to have severe anemia and iron deficiency. Per pt he received blood transfusion and iv iron which resolved cp and sob. He notes having had repeat CT chest without evidence of blood clots.  He also notes having EGD without evidence of bleeding. He notes discontinuation of all anticoagulation aside from baby aspirin.  Pt states overall he feels well. He does note increasing anxiety as it relates to his health and his wife's decline in health. Last colonoscopy 2022, found internal hemorrhoids, otherwise unremarkable. denies bleeding.      Social History     Socioeconomic History    Marital status:    Tobacco Use    Smoking status: Former Smoker     Packs/day: 0.50     Years: 30.00     Pack years: 15.00     Quit date: 1995     Years since quittin.9    Smokeless tobacco: Former User   Substance and Sexual Activity    Alcohol use: Not Currently     Alcohol/week: 0.0 standard drinks    Drug use: Yes     Frequency: 4.0 times per week     Types: Marijuana    Sexual activity: Yes     Partners: Female   Social History Narrative           Family History   Problem Relation Age of Onset    Glaucoma Mother     Diabetes Mother     Kidney disease Mother     Colon cancer Father     Cancer Father         colon cancer    Diabetes Sister     Diabetes Brother      Hypertension Brother     Blindness Neg Hx        Past Surgical History:   Procedure Laterality Date    ABDOMINAL AORTA STENT      CARDIAC CATHETERIZATION      CATARACT EXTRACTION W/  INTRAOCULAR LENS IMPLANT Right 04/29/2017    COLONOSCOPY N/A 9/24/2020    Procedure: COLONOSCOPY;  Surgeon: Shayy Melvin MD;  Location: [facility] ENDO;  Service: Endoscopy;  Laterality: N/A;    COLONOSCOPY N/A 1/10/2022    Procedure: COLONOSCOPY;  Surgeon: Vi Lara MD;  Location: [facility] ENDO;  Service: Endoscopy;  Laterality: N/A;    CORONARY ANGIOPLASTY      CORONARY STENT PLACEMENT      PCIOL Right 03/29/2017    DR. LEDEZMA    PCIOL Left 07/03/2019    DR. LEDEZMA    PROSTATE BIOPSY      RENAL ARTERY STENT         Past Medical History:   Diagnosis Date    Arthritis     Back pain     CAD (coronary artery disease)     Cataract     CKD (chronic kidney disease)     GERD (gastroesophageal reflux disease)     Glaucoma     suspect    HTN (hypertension)     Multiple subsegmental pulmonary emboli without acute cor pulmonale 9/9/2021    Prostate cancer     tx'd with radiation    PVD (peripheral vascular disease)     stent in right renal artery and aorta    Sleep apnea        Review of Systems   Constitutional: Negative for activity change, appetite change, chills, fatigue, fever and unexpected weight change.   HENT: Negative for congestion, dental problem, mouth sores and nosebleeds.    Eyes: Negative for visual disturbance.   Respiratory: Negative for cough, choking and chest tightness.    Cardiovascular: Negative for chest pain, palpitations and leg swelling.   Gastrointestinal: Negative for abdominal distention, abdominal pain, anal bleeding, blood in stool, constipation, diarrhea, nausea and vomiting.   Endocrine: Negative.    Genitourinary: Negative for dysuria, frequency, hematuria and urgency.   Musculoskeletal: Negative for arthralgias, back pain, gait problem, joint swelling and myalgias.   Skin: Negative  for wound.   Allergic/Immunologic: Negative for immunocompromised state.   Neurological: Negative for dizziness, light-headedness, numbness and headaches.   Hematological: Negative for adenopathy. Does not bruise/bleed easily.   Psychiatric/Behavioral: The patient is nervous/anxious.        Medication List with Changes/Refills   New Medications    ALPRAZOLAM (XANAX) 0.25 MG TABLET    Take 1 tablet (0.25 mg total) by mouth 3 (three) times daily. for 10 days   Current Medications    ASPIRIN (ECOTRIN) 81 MG EC TABLET    Take 81 mg by mouth once daily.    ATORVASTATIN (LIPITOR) 40 MG TABLET    Take 1 tablet (40 mg total) by mouth once daily.    AZELASTINE (ASTELIN) 137 MCG (0.1 %) NASAL SPRAY    2 sprays (274 mcg total) by Nasal route 2 (two) times daily.    BENAZEPRIL-HYDROCHLORTHIAZIDE (LOTENSIN HCT) 10-12.5 MG TAB    Take 1 tablet by mouth once daily.    FERROUS SULFATE 325 (65 FE) MG EC TABLET    Take 325 mg by mouth once daily.    FLUTICASONE PROPIONATE (FLONASE) 50 MCG/ACTUATION NASAL SPRAY    2 sprays (100 mcg total) by Each Nostril route once daily.    PANTOPRAZOLE (PROTONIX) 40 MG TABLET    Take 1 tablet by mouth once daily        Objective:     Vitals:    08/18/22 1448   BP: 116/70   Pulse: 86   Temp: 98.5 °F (36.9 °C)       Physical Exam  Vitals and nursing note reviewed.   Constitutional:       Appearance: Normal appearance. He is not ill-appearing.   HENT:      Head: Normocephalic and atraumatic.      Right Ear: External ear normal.      Left Ear: External ear normal.      Nose: Nose normal.      Mouth/Throat:      Mouth: Mucous membranes are moist.      Pharynx: Oropharynx is clear.   Eyes:      Conjunctiva/sclera: Conjunctivae normal.   Cardiovascular:      Rate and Rhythm: Normal rate and regular rhythm.      Pulses: Normal pulses.      Heart sounds: Normal heart sounds.   Pulmonary:      Effort: Pulmonary effort is normal.      Breath sounds: Normal breath sounds.   Abdominal:      General: Abdomen is  flat.      Palpations: Abdomen is soft.   Musculoskeletal:         General: Normal range of motion.      Cervical back: Normal range of motion.      Right lower leg: No edema.      Left lower leg: No edema.   Skin:     General: Skin is warm and dry.      Capillary Refill: Capillary refill takes less than 2 seconds.   Neurological:      Mental Status: He is alert and oriented to person, place, and time.      Motor: No weakness.      Gait: Gait normal.   Psychiatric:         Mood and Affect: Mood normal.         Behavior: Behavior normal.         Thought Content: Thought content normal.         Judgment: Judgment normal.         Lab Results   Component Value Date    WBC 7.24 08/15/2022    HGB 14.8 08/15/2022    HCT 45.6 08/15/2022    MCV 93 08/15/2022     (L) 08/15/2022       Lab Results   Component Value Date     05/16/2022    K 3.8 05/16/2022     05/16/2022    CO2 23 05/16/2022    BUN 23 05/16/2022    CREATININE 1.6 (H) 05/16/2022    CALCIUM 9.5 05/16/2022    ANIONGAP 13 05/16/2022    ESTGFRAFRICA 44.7 (A) 05/16/2022    EGFRNONAA 38.7 (A) 05/16/2022     Lab Results   Component Value Date    ALT 29 05/16/2022    AST 34 05/16/2022    ALKPHOS 74 05/16/2022    BILITOT 0.6 05/16/2022       Assessment/Plan:     Problem List Items Addressed This Visit        Hematology    Thrombocytopenia - Primary     Lab Results   Component Value Date     (L) 08/15/2022   mild thrombocytopenia  Edu pt on signs/symptoms of bleeding and when to seek emergent tx   --will continue to monitor               Relevant Orders    CBC Auto Differential    Comprehensive Metabolic Panel    Homocysteine, Serum    Pathologist Interpretation Differential    METHYLMALONIC ACID, SERUM       Oncology    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     Lab Results   Component Value Date    HGB 14.8 08/15/2022   -wnl  -Recommend f/u with nephrology           Iron deficiency anemia     Lab Results   Component Value Date    IRON 97  08/15/2022    TIBC 317 08/15/2022    FERRITIN 118 08/15/2022   Saturated iron 31%    Continue on daily oral iron supplementation  Edu pt on how to take for maximum absorption           Prostate cancer     04/2022 PSA 9.7  Recommend f/u with urologist   Last visit 10/2021 with recommended f/u in 6 months  -will reach out to office for scheduling             Other Visit Diagnoses     Anxiety about health        Relevant Medications    ALPRAZolam (XANAX) 0.25 MG tablet                  DONNA Wilson  Hematology/Oncology

## 2022-08-25 NOTE — ASSESSMENT & PLAN NOTE
04/2022 PSA 9.7  Recommend f/u with urologist   Last visit 10/2021 with recommended f/u in 6 months  -will reach out to office for scheduling

## 2022-08-25 NOTE — ASSESSMENT & PLAN NOTE
Lab Results   Component Value Date    IRON 97 08/15/2022    TIBC 317 08/15/2022    FERRITIN 118 08/15/2022   Saturated iron 31%    Continue on daily oral iron supplementation  Edu pt on how to take for maximum absorption

## 2022-08-25 NOTE — ASSESSMENT & PLAN NOTE
Lab Results   Component Value Date     (L) 08/15/2022   mild thrombocytopenia  Edu pt on signs/symptoms of bleeding and when to seek emergent tx   --will continue to monitor

## 2022-09-21 ENCOUNTER — TELEPHONE (OUTPATIENT)
Dept: INTERNAL MEDICINE | Facility: CLINIC | Age: 87
End: 2022-09-21
Payer: MEDICARE

## 2022-09-21 NOTE — TELEPHONE ENCOUNTER
S/w pt daughter and informed that oral medication for Covid was sent to the pharmacy and informed on provider suggestion for medication pt can take as well. Pt daughter voiced understanding./Apw

## 2022-09-21 NOTE — TELEPHONE ENCOUNTER
----- Message from Dena Whitfield sent at 9/21/2022  8:32 AM CDT -----  Contact: Mimi  Type:  Needs Medical Advice    Who Called: Mimi  Symptoms (please be specific): Heavy cough, fatigue, had a fever, congestion, Tested Positive on 9/19/22 at ER   How long has patient had these symptoms:  Since 9/16/22  Pharmacy name and phone #:    [facility] [address] [facility] [address]  [address] [facility] [address]  Phone: [phone] Fax: [phone]  Would the patient rather a call back or a response via [facility]? Call  Best Call Back Number: [phone]  Additional Information: Went to ER in [place] and they didn't advise on what he needs to do

## 2022-09-22 ENCOUNTER — TELEPHONE (OUTPATIENT)
Dept: INTERNAL MEDICINE | Facility: CLINIC | Age: 87
End: 2022-09-22
Payer: MEDICARE

## 2022-09-22 DIAGNOSIS — U07.1 COVID-19: Primary | ICD-10-CM

## 2022-09-22 NOTE — TELEPHONE ENCOUNTER
Paxlovid has been sent to the pharmacy  Please advise patient to hold off on taking Lipitor 40 mg for next 5 days while taking COVID-19 medication    Will also enroll in home symptom monitoring program  If having any worsening symptoms, patient to let us know    Continue isolation and mask  protection

## 2022-09-22 NOTE — TELEPHONE ENCOUNTER
----- Message from Rito Sanford MA sent at 9/21/2022  4:52 PM CDT -----  Contact: Mimi  Pt is interested. Please Advise.  ----- Message -----  From: Miryam Jimenez MD  Sent: 9/21/2022  12:30 PM CDT  To: Rito Sanford MA     If patient has been having ongoing symptoms,  he can be a candidate for paxlovid ,  if interested let me know   Continue taking over-the-counter Zyrtec and Mucinex as needed for upper respiratory symptoms and Tylenol as needed for fever.      Dr. Jimenez  ----- Message -----  From: Rito Sanford MA  Sent: 9/21/2022  10:43 AM CDT  To: Miryam Jimenez MD    Pt and wife tested positive for Covid in the ER on 9/20/2022. Pt is requesting medication and to be informed on what he can use to treat symptoms as well. Please Advise.  ----- Message -----  From: Dena Whitfield  Sent: 9/21/2022   8:35 AM CDT  To: [facility] Staff    Type:  Needs Medical Advice    Who Called: Mimi  Symptoms (please be specific): Heavy cough, fatigue, had a fever, congestion, Tested Positive on 9/19/22 at ER   How long has patient had these symptoms:  Since 9/16/22  Pharmacy name and phone #:    [facility] [address] [place], [place] [address]  [address] [address]  Phone: [phone] Fax: [phone]  Would the patient rather a call back or a response via [facility]? Call  Best Call Back Number: [phone]  Additional Information: Went to ER in [place] and they didn't advise on what he needs to do

## 2022-09-22 NOTE — TELEPHONE ENCOUNTER
S/w pt daughter and informed that medication was sent to the pharmacy as requested again. Pt daughter voiced understanding./Maribel

## 2022-09-22 NOTE — TELEPHONE ENCOUNTER
----- Message from Lora Rojas sent at 9/22/2022  8:32 AM CDT -----  Patient daughter(Mimi)is calling in regards to medication for patient [facility] pharmacy did receive the prescription please resend..Please call daughter back [phone]. Thanks/melyssa

## 2022-10-03 DIAGNOSIS — Z71.89 COMPLEX CARE COORDINATION: ICD-10-CM

## 2022-10-13 ENCOUNTER — TELEPHONE (OUTPATIENT)
Dept: UROLOGY | Facility: CLINIC | Age: 87
End: 2022-10-13
Payer: MEDICARE

## 2022-10-13 NOTE — TELEPHONE ENCOUNTER
Spoke to pt and informed him that his pending appt with Dr. Mirza needed to be rescheduled as Dr. Mirza will not be in [place] until Jan 23.

## 2022-10-26 ENCOUNTER — TELEPHONE (OUTPATIENT)
Dept: INTERNAL MEDICINE | Facility: CLINIC | Age: 87
End: 2022-10-26
Payer: MEDICARE

## 2022-10-26 NOTE — TELEPHONE ENCOUNTER
Unable to reach pt by phone but spoke to pt's daughter who tried to reach father by phone and was unsuccessful but she stated she would inform him that we were trying to reach out to him.

## 2022-10-26 NOTE — TELEPHONE ENCOUNTER
----- Message from Jazlyn Maldonado sent at 10/26/2022 11:39 AM CDT -----  Regarding: [facility]  States he needs to schedule a hosp f/u in a week ([facility]) for covid and the flu. Nothing available until 11/10. Please call pt [phone] or cell [phone]. Thank you

## 2022-11-01 ENCOUNTER — HOSPITAL ENCOUNTER (OUTPATIENT)
Dept: RADIOLOGY | Facility: HOSPITAL | Age: 87
Discharge: HOME | End: 2022-11-01
Attending: PHYSICIAN ASSISTANT
Payer: MEDICARE

## 2022-11-01 ENCOUNTER — OFFICE VISIT (OUTPATIENT)
Dept: INTERNAL MEDICINE | Facility: CLINIC | Age: 87
End: 2022-11-01
Payer: MEDICARE

## 2022-11-01 ENCOUNTER — LAB VISIT (OUTPATIENT)
Dept: LAB | Facility: HOSPITAL | Age: 87
End: 2022-11-01
Attending: PHYSICIAN ASSISTANT
Payer: MEDICARE

## 2022-11-01 VITALS
BODY MASS INDEX: 31.22 KG/M2 | TEMPERATURE: 98 F | HEART RATE: 90 BPM | DIASTOLIC BLOOD PRESSURE: 78 MMHG | OXYGEN SATURATION: 97 % | SYSTOLIC BLOOD PRESSURE: 120 MMHG | HEIGHT: 72 IN

## 2022-11-01 DIAGNOSIS — R06.2 WHEEZING: ICD-10-CM

## 2022-11-01 DIAGNOSIS — I10 ESSENTIAL HYPERTENSION: Chronic | ICD-10-CM

## 2022-11-01 DIAGNOSIS — N18.32 ANEMIA DUE TO STAGE 3B CHRONIC KIDNEY DISEASE: ICD-10-CM

## 2022-11-01 DIAGNOSIS — D63.1 ANEMIA DUE TO STAGE 3B CHRONIC KIDNEY DISEASE: ICD-10-CM

## 2022-11-01 DIAGNOSIS — R07.9 CHEST PAIN, UNSPECIFIED TYPE: ICD-10-CM

## 2022-11-01 DIAGNOSIS — R79.89 POSITIVE D DIMER: ICD-10-CM

## 2022-11-01 DIAGNOSIS — R07.89 OTHER CHEST PAIN: Primary | ICD-10-CM

## 2022-11-01 DIAGNOSIS — J11.1 INFLUENZA: ICD-10-CM

## 2022-11-01 DIAGNOSIS — Z98.61 HISTORY OF CORONARY ANGIOPLASTY: Chronic | ICD-10-CM

## 2022-11-01 DIAGNOSIS — I25.10 CORONARY ARTERY DISEASE, OCCLUSIVE: Chronic | ICD-10-CM

## 2022-11-01 DIAGNOSIS — U07.1 COVID-19: Primary | ICD-10-CM

## 2022-11-01 DIAGNOSIS — Z86.711 HISTORY OF PULMONARY EMBOLUS (PE): ICD-10-CM

## 2022-11-01 LAB
CREAT SERPL-MCNC: 2.2 MG/DL (ref 0.5–1.4)
D DIMER PPP IA.FEU-MCNC: 24.42 MG/L FEU
EST. GFR  (NO RACE VARIABLE): 28 ML/MIN/1.73 M^2

## 2022-11-01 PROCEDURE — 99214 OFFICE O/P EST MOD 30 MIN: CPT | Mod: S$PBB,CR,, | Performed by: PHYSICIAN ASSISTANT

## 2022-11-01 PROCEDURE — 71046 XR CHEST PA AND LATERAL: ICD-10-PCS | Mod: 26,,, | Performed by: RADIOLOGY

## 2022-11-01 PROCEDURE — 71046 X-RAY EXAM CHEST 2 VIEWS: CPT | Mod: 26,,, | Performed by: RADIOLOGY

## 2022-11-01 PROCEDURE — 99999 PR PBB SHADOW E&M-EST. PATIENT-LVL IV: ICD-10-PCS | Mod: PBBFAC,CR,, | Performed by: PHYSICIAN ASSISTANT

## 2022-11-01 PROCEDURE — 71046 X-RAY EXAM CHEST 2 VIEWS: CPT | Mod: TC

## 2022-11-01 PROCEDURE — 99214 PR OFFICE/OUTPT VISIT, EST, LEVL IV, 30-39 MIN: ICD-10-PCS | Mod: S$PBB,CR,, | Performed by: PHYSICIAN ASSISTANT

## 2022-11-01 PROCEDURE — 85379 FIBRIN DEGRADATION QUANT: CPT | Performed by: PHYSICIAN ASSISTANT

## 2022-11-01 PROCEDURE — 99999 PR PBB SHADOW E&M-EST. PATIENT-LVL IV: CPT | Mod: PBBFAC,CR,, | Performed by: PHYSICIAN ASSISTANT

## 2022-11-01 PROCEDURE — 36415 COLL VENOUS BLD VENIPUNCTURE: CPT | Performed by: PHYSICIAN ASSISTANT

## 2022-11-01 PROCEDURE — 94640 AIRWAY INHALATION TREATMENT: CPT | Mod: PBBFAC

## 2022-11-01 PROCEDURE — 99214 OFFICE O/P EST MOD 30 MIN: CPT | Mod: PBBFAC,25 | Performed by: PHYSICIAN ASSISTANT

## 2022-11-01 PROCEDURE — 82565 ASSAY OF CREATININE: CPT | Performed by: PHYSICIAN ASSISTANT

## 2022-11-01 RX ORDER — IPRATROPIUM BROMIDE AND ALBUTEROL SULFATE 2.5; .5 MG/3ML; MG/3ML
3 SOLUTION RESPIRATORY (INHALATION)
Status: COMPLETED | OUTPATIENT
Start: 2022-11-01 | End: 2022-11-01

## 2022-11-01 RX ADMIN — IPRATROPIUM BROMIDE AND ALBUTEROL SULFATE 3 ML: 2.5; .5 SOLUTION RESPIRATORY (INHALATION) at 10:11

## 2022-11-01 NOTE — PROGRESS NOTES
Subjective:      Patient ID: Jovanny Olmedo is a 87 y.o. male.    Chief Complaint: Hospital Follow Up    HPI  Here today for a hospital follow up for flu/covid. [facility] 10/24 and stayed for 3 days. Prescribed tamiflu, advair, and prednisone. No fever.  For the past few days he has been getting intermittent chest pain in the center of his chest. Not triggered by anything. Not worse with exertion. Feels like a muscle strain from coughing. No heart palpitations. Also has shortness of breath on and off. Chest pain is better today. Still coughing a lot. Straining to cough to get the mucous up.   Taking asa 81mg daily.     Patient Active Problem List   Diagnosis    Essential hypertension    GERD (gastroesophageal reflux disease)    Polycystic kidney disease    Coronary artery disease, occlusive    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    Refractive error    Iron deficiency anemia    DDD (degenerative disc disease), lumbar    Open angle with borderline findings, low risk, bilateral    History of coronary angioplasty    Prostate cancer    Secondary hyperparathyroidism    Pseudophakia    Intermediate stage nonexudative age-related macular degeneration of both eyes    Aortic atherosclerosis    CLARISSA on CPAP    Duodenal ulcer    History of colon polyps    Dyslipidemia    Abdominal aortic aneurysm (AAA) without rupture    History of pulmonary embolus (PE)    Thrombocytopenia    Obesity (BMI 30.0-34.9)         Current Outpatient Medications:     aspirin (ECOTRIN) 81 MG EC tablet, Take 81 mg by mouth once daily., Disp: , Rfl:     atorvastatin (LIPITOR) 40 MG tablet, Take 1 tablet (40 mg total) by mouth once daily., Disp: 90 tablet, Rfl: 1    azelastine (ASTELIN) 137 mcg (0.1 %) nasal spray, 2 sprays (274 mcg total) by Nasal route 2 (two) times daily., Disp: 30 mL, Rfl: 11    benazepril-hydrochlorthiazide (LOTENSIN HCT) 10-12.5 mg Tab, Take 1 tablet by mouth once daily., Disp: 90 tablet, Rfl: 3    ferrous  sulfate 325 (65 FE) MG EC tablet, Take 325 mg by mouth once daily., Disp: , Rfl:     fluticasone propionate (FLONASE) 50 mcg/actuation nasal spray, 2 sprays (100 mcg total) by Each Nostril route once daily., Disp: 16 g, Rfl: 11    pantoprazole (PROTONIX) 40 MG tablet, Take 1 tablet by mouth once daily, Disp: 90 tablet, Rfl: 3    ALPRAZolam (XANAX) 0.25 MG tablet, Take 1 tablet (0.25 mg total) by mouth 3 (three) times daily. for 10 days, Disp: 30 tablet, Rfl: 0  No current facility-administered medications for this visit.    Review of Systems   Constitutional:  Positive for fatigue. Negative for activity change, appetite change, chills, diaphoresis, fever and unexpected weight change.   HENT: Negative.  Negative for congestion, hearing loss, postnasal drip, rhinorrhea, sore throat, trouble swallowing and voice change.    Eyes: Negative.  Negative for visual disturbance.   Respiratory:  Positive for chest tightness, shortness of breath and wheezing. Negative for cough, choking and stridor.    Cardiovascular:  Positive for chest pain. Negative for palpitations and leg swelling.   Gastrointestinal:  Negative for abdominal distention, abdominal pain, blood in stool, constipation, diarrhea, nausea and vomiting.   Endocrine: Negative for cold intolerance, heat intolerance, polydipsia and polyuria.   Genitourinary: Negative.  Negative for difficulty urinating and frequency.   Musculoskeletal:  Negative for arthralgias, back pain, gait problem, joint swelling and myalgias.   Skin:  Negative for color change, pallor, rash and wound.   Neurological:  Negative for dizziness, tremors, weakness, light-headedness, numbness and headaches.   Hematological:  Negative for adenopathy.   Psychiatric/Behavioral:  Negative for behavioral problems, confusion, self-injury, sleep disturbance and suicidal ideas. The patient is not nervous/anxious.    Objective:   /78 (BP Location: Left arm, Patient Position: Sitting, BP Method: Medium  (Manual))   Pulse 90   Temp 98 °F (36.7 °C) (Tympanic)   Ht 6' (1.829 m)   SpO2 97%   BMI 31.22 kg/m²     Physical Exam  Vitals reviewed.   Constitutional:       General: He is not in acute distress.     Appearance: Normal appearance. He is well-developed. He is not ill-appearing, toxic-appearing or diaphoretic.   HENT:      Head: Normocephalic and atraumatic.      Right Ear: External ear normal.      Left Ear: External ear normal.      Nose: Nose normal.   Eyes:      Conjunctiva/sclera: Conjunctivae normal.      Pupils: Pupils are equal, round, and reactive to light.   Cardiovascular:      Rate and Rhythm: Normal rate and regular rhythm.      Heart sounds: Normal heart sounds. No murmur heard.    No friction rub. No gallop.   Pulmonary:      Effort: Pulmonary effort is normal. No respiratory distress.      Breath sounds: Wheezing and rhonchi present. No rales.   Chest:      Chest wall: No tenderness.   Abdominal:      General: There is no distension.      Palpations: Abdomen is soft.      Tenderness: There is no abdominal tenderness.   Musculoskeletal:         General: Normal range of motion.      Cervical back: Normal range of motion and neck supple.   Lymphadenopathy:      Cervical: No cervical adenopathy.   Skin:     General: Skin is warm and dry.      Capillary Refill: Capillary refill takes less than 2 seconds.      Findings: No rash.   Neurological:      Mental Status: He is alert and oriented to person, place, and time.      Motor: No weakness.      Coordination: Coordination normal.      Gait: Gait normal.   Psychiatric:         Mood and Affect: Mood normal.         Behavior: Behavior normal.         Thought Content: Thought content normal.         Judgment: Judgment normal.     Lab Visit on 11/01/2022   Component Date Value Ref Range Status    D-Dimer 11/01/2022 24.42 (H)  <0.50 mg/L FEU Final    Comment: The quantitative D-dimer assay should be used as an aid in   the diagnosis of deep vein  thrombosis and pulmonary embolism  in patients with the appropriate presentation and clinical  history. The upper limit of the reference interval and the clinical   cut off   point are identical. Causes of a positive (>0.50 mg/L FEU) D-Dimer   test  include, but are not limited to: DVT, PE, DIC, thrombolytic   therapy, anticoagulant therapy, recent surgery, trauma, or   pregnancy, disseminated malignancy, aortic aneurysm, cirrhosis,  and severe infection. False negative results may occur in   patients with distal DVT.      Creatinine 11/01/2022 2.2 (H)  0.5 - 1.4 mg/dL Final    eGFR 11/01/2022 28 (A)  >60 mL/min/1.73 m^2 Final     X-Ray Chest PA And Lateral  Narrative: EXAMINATION:  XR CHEST PA AND LATERAL    CLINICAL HISTORY:  Chest pain, unspecified    TECHNIQUE:  PA and lateral views of the chest were performed.    COMPARISON:  CT chest of April 20, 2021 and chest x-ray of November 22, 2021.    FINDINGS:  There is again seen an aneurysm of the descending thoracic aorta with ectasia and atherosclerosis.  The cardiac size and contours within normal limits.  No intrapulmonary mass or infiltrates are seen.  There is no pneumothorax or pleural effusion.  Impression: Descending thoracic aortic aneurysm and ectasias.  Atherosclerosis.    Electronically signed by: Carmelo Bergeron MD  Date:    11/01/2022  Time:    11:15   Assessment:     1. COVID-19    2. Influenza    3. Essential hypertension    4. Chest pain, unspecified type    5. Wheezing    6. Coronary artery disease, occlusive    7. History of coronary angioplasty    8. History of pulmonary embolus (PE)    9. 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      Plan:   COVID-19    Influenza    Essential hypertension    Chest pain, unspecified type  -     X-Ray Chest PA And Lateral; Future; Expected date: 11/01/2022  -     D-Dimer, Quantitative; Future; Expected date: 11/01/2022  -     CREATININE, SERUM; Future; Expected date: 11/01/2022  -     X-Ray Chest PA And  Lateral; Future; Expected date: 11/01/2022    Wheezing  -     albuterol-ipratropium 2.5 mg-0.5 mg/3 mL nebulizer solution 3 mL    Coronary artery disease, occlusive    History of coronary angioplasty    History of pulmonary embolus (PE)    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  -     CREATININE, SERUM; Future; Expected date: 11/01/2022  -red flags discussed in detail. If worsening shortness of breath go to the Er.   -VQ SCAN since elevated creatinine.     Follow up if symptoms worsen or fail to improve.

## 2022-11-02 ENCOUNTER — HOSPITAL ENCOUNTER (OUTPATIENT)
Dept: RADIOLOGY | Facility: HOSPITAL | Age: 87
Discharge: HOME | End: 2022-11-02
Attending: PHYSICIAN ASSISTANT
Payer: MEDICARE

## 2022-11-02 DIAGNOSIS — R79.89 POSITIVE D DIMER: ICD-10-CM

## 2022-11-02 DIAGNOSIS — R07.9 CHEST PAIN, UNSPECIFIED TYPE: ICD-10-CM

## 2022-11-02 PROCEDURE — A9567 TECHNETIUM TC-99M AEROSOL: HCPCS

## 2022-12-08 ENCOUNTER — TELEPHONE (OUTPATIENT)
Dept: ADMINISTRATIVE | Facility: HOSPITAL | Age: 87
End: 2022-12-08
Payer: MEDICARE

## 2023-01-05 NOTE — PROGRESS NOTES
Clinic Note  1/5/2023      Subjective:         Chief Complaint:   HPI  Jovanny Olmedo is a 87 y.o. male with history of prostate cancer.. Here with his son.  He established with Dr. Gurrola in 2013 for prostate cancer. Initially had PSA 11.3, Breanne 4+3(GG3). He then completed EBRT with Dr. Deng in 2014 at [facility]. Had PE 1 month ago, now on blood thinners. Still SOB with walking.  PSA had dropped to 0. 7 shortly after completion.  PSA then began to rise in 2019.   PSA  3.6 on 1/11/21.  Axumin 2/3/21 showed 2+ cm focus of uptake in right lobe of prostate.Negative SVI, bladder, nodes.  MRI 7/27/2021- 2.3 cm PI-RADS 4 lesion right apex , negative extra prostatic extension, NVBI (neurovascular bundle involvement), SVI (seminal vesicle involvement), nodes.     Path 12/30/2013- Breanne 4+3 right base 2/2 70%, right middle 2/2 80%, right apex 2/2 20%.  Bone scan 4//2022- no metastasis    1/6/2023- last visit 10/15/2021!  Had Covid 2 months ago, still has cough. Stable LUTS         Lab Results   Component Value Date    PSA 1.1 08/13/2019    PSA 16.5 (H) 12/16/2013    PSA 6.3 (H) 01/19/2011    PSA 3.0 09/15/2008    PSADIAG 9.7 (H) 04/11/2022    PSADIAG 7.0 (H) 10/11/2021    PSADIAG 5.4 (H) 06/14/2021    PSADIAG 3.6 01/11/2021    PSADIAG 3.5 12/02/2020    PSADIAG 0.53 12/05/2018    PSADIAG 0.31 11/27/2017    PSADIAG 0.38 05/19/2017    PSADIAG 0.38 11/23/2016    PSADIAG 0.56 04/18/2016      Past Medical History:   Diagnosis Date    Arthritis     Back pain     CAD (coronary artery disease)     Cataract     CKD (chronic kidney disease)     GERD (gastroesophageal reflux disease)     Glaucoma     suspect    HTN (hypertension)     Multiple subsegmental pulmonary emboli without acute cor pulmonale 9/9/2021    Prostate cancer     tx'd with radiation    PVD (peripheral vascular disease)     stent in right renal artery and aorta    Sleep apnea      Family History   Problem Relation Age of Onset    Glaucoma Mother      Diabetes Mother     Kidney disease Mother     Colon cancer Father     Cancer Father         colon cancer    Diabetes Sister     Diabetes Brother     Hypertension Brother     Blindness Neg Hx      Social History     Socioeconomic History    Marital status:    Tobacco Use    Smoking status: Former     Packs/day: 0.50     Years: 30.00     Pack years: 15.00     Types: Cigarettes     Quit date: 1995     Years since quittin.3    Smokeless tobacco: Former   Substance and Sexual Activity    Alcohol use: Not Currently     Alcohol/week: 0.0 standard drinks    Drug use: Yes     Frequency: 4.0 times per week     Types: Marijuana    Sexual activity: Yes     Partners: Female   Social History Narrative         Past Surgical History:   Procedure Laterality Date    ABDOMINAL AORTA STENT      CARDIAC CATHETERIZATION      CATARACT EXTRACTION W/  INTRAOCULAR LENS IMPLANT Right 2017    COLONOSCOPY N/A 2020    Procedure: COLONOSCOPY;  Surgeon: Shayy Melvin MD;  Location: [facility] ENDO;  Service: Endoscopy;  Laterality: N/A;    COLONOSCOPY N/A 1/10/2022    Procedure: COLONOSCOPY;  Surgeon: Vi Lara MD;  Location: [facility] ENDO;  Service: Endoscopy;  Laterality: N/A;    CORONARY ANGIOPLASTY      CORONARY STENT PLACEMENT      PCIOL Right 2017    DR. LEDEZMA    PCIOL Left 2019    DR. LEDEZMA    PROSTATE BIOPSY      RENAL ARTERY STENT       Patient Active Problem List   Diagnosis    Essential hypertension    GERD (gastroesophageal reflux disease)    Polycystic kidney disease    Coronary artery disease, occlusive    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    Refractive error    Iron deficiency anemia    DDD (degenerative disc disease), lumbar    Open angle with borderline findings, low risk, bilateral    History of coronary angioplasty    Prostate cancer    Secondary hyperparathyroidism    Pseudophakia    Intermediate stage nonexudative  age-related macular degeneration of both eyes    Aortic atherosclerosis    CLARISSA on CPAP    Duodenal ulcer    History of colon polyps    Dyslipidemia    Abdominal aortic aneurysm (AAA) without rupture    History of pulmonary embolus (PE)    Thrombocytopenia    Obesity (BMI 30.0-34.9)     Review of Systems   Constitutional:  Negative for appetite change, chills, fatigue, fever and unexpected weight change.   HENT:  Negative for nosebleeds.    Respiratory:  Positive for choking. Negative for shortness of breath and wheezing.    Cardiovascular:  Negative for chest pain, palpitations and leg swelling.   Gastrointestinal:  Negative for abdominal distention, abdominal pain, constipation, diarrhea, nausea and vomiting.   Genitourinary:  Positive for frequency and nocturia. Negative for dysuria and hematuria.   Musculoskeletal:  Negative for arthralgias and back pain.   Skin:  Negative for pallor.   Neurological:  Negative for dizziness, seizures and syncope.   Hematological:  Negative for adenopathy.   Psychiatric/Behavioral:  Negative for dysphoric mood.        Objective:      There were no vitals taken for this visit.  Estimated body mass index is 31.22 kg/m² as calculated from the following:    Height as of 11/1/22: 6' (1.829 m).    Weight as of 8/18/22: 104.4 kg (230 lb 2.6 oz).  Physical Exam  Constitutional:       General: He is not in acute distress.     Appearance: He is well-developed. He is not diaphoretic.   HENT:      Head: Atraumatic.   Neck:      Trachea: No tracheal deviation.   Cardiovascular:      Rate and Rhythm: Normal rate.   Pulmonary:      Effort: Pulmonary effort is normal. No respiratory distress.      Breath sounds: No wheezing.   Abdominal:      General: Bowel sounds are normal. There is no distension.      Palpations: Abdomen is soft. There is no mass.      Tenderness: There is no abdominal tenderness. There is no guarding or rebound.   Skin:     General: Skin is warm and dry.    Neurological:      Mental Status: He is alert and oriented to person, place, and time.   Psychiatric:         Behavior: Behavior normal.         Thought Content: Thought content normal.         Judgment: Judgment normal.       Assessment and Plan:           Problem List Items Addressed This Visit       Prostate cancer - Primary    Overview     Dr mirza            Follow up:   PSA today. Discussed ADT. Patient refuses therapy prefers to pursue watchful waiting.  6 months bone scan and PSA    Nav Mirza

## 2023-01-06 ENCOUNTER — LAB VISIT (OUTPATIENT)
Dept: LAB | Facility: HOSPITAL | Age: 88
End: 2023-01-06
Attending: UROLOGY
Payer: MEDICARE

## 2023-01-06 ENCOUNTER — OFFICE VISIT (OUTPATIENT)
Dept: UROLOGY | Facility: CLINIC | Age: 88
End: 2023-01-06
Payer: MEDICARE

## 2023-01-06 VITALS
WEIGHT: 232.38 LBS | BODY MASS INDEX: 31.48 KG/M2 | SYSTOLIC BLOOD PRESSURE: 119 MMHG | TEMPERATURE: 97 F | OXYGEN SATURATION: 96 % | DIASTOLIC BLOOD PRESSURE: 82 MMHG | HEART RATE: 86 BPM | HEIGHT: 72 IN

## 2023-01-06 DIAGNOSIS — C61 PROSTATE CANCER: Primary | ICD-10-CM

## 2023-01-06 DIAGNOSIS — C61 PROSTATE CANCER: ICD-10-CM

## 2023-01-06 PROCEDURE — 36415 COLL VENOUS BLD VENIPUNCTURE: CPT | Performed by: UROLOGY

## 2023-01-06 PROCEDURE — 99999 PR PBB SHADOW E&M-EST. PATIENT-LVL III: CPT | Mod: PBBFAC,,, | Performed by: UROLOGY

## 2023-01-06 PROCEDURE — 99214 OFFICE O/P EST MOD 30 MIN: CPT | Mod: S$PBB,,, | Performed by: UROLOGY

## 2023-01-06 PROCEDURE — 99999 PR PBB SHADOW E&M-EST. PATIENT-LVL III: ICD-10-PCS | Mod: PBBFAC,,, | Performed by: UROLOGY

## 2023-01-06 PROCEDURE — 99213 OFFICE O/P EST LOW 20 MIN: CPT | Mod: PBBFAC | Performed by: UROLOGY

## 2023-01-06 PROCEDURE — 84153 ASSAY OF PSA TOTAL: CPT | Performed by: UROLOGY

## 2023-01-06 PROCEDURE — 99214 PR OFFICE/OUTPT VISIT, EST, LEVL IV, 30-39 MIN: ICD-10-PCS | Mod: S$PBB,,, | Performed by: UROLOGY

## 2023-01-06 RX ORDER — TADALAFIL 20 MG/1
20 TABLET ORAL DAILY PRN
Qty: 10 TABLET | Refills: 11 | Status: SHIPPED | OUTPATIENT
Start: 2023-01-06 | End: 2024-02-05

## 2023-01-07 LAB — COMPLEXED PSA SERPL-MCNC: 12.2 NG/ML (ref 0–4)

## 2023-01-23 DIAGNOSIS — K21.9 GASTROESOPHAGEAL REFLUX DISEASE, UNSPECIFIED WHETHER ESOPHAGITIS PRESENT: ICD-10-CM

## 2023-01-23 RX ORDER — PANTOPRAZOLE SODIUM 40 MG/1
TABLET, DELAYED RELEASE ORAL
Qty: 90 TABLET | Refills: 1 | Status: SHIPPED | OUTPATIENT
Start: 2023-01-23 | End: 2023-07-22

## 2023-01-23 NOTE — TELEPHONE ENCOUNTER
Care Due:                  Date            Visit Type   Department     Provider  --------------------------------------------------------------------------------                                EP -                              PRIMARY      [facility] INTERNAL  Miryam Mainampati  Last Visit: 05-      CARE ([facility])   MEDICINE       Tony  Next Visit: None Scheduled  None         None Found                                                            Last  Test          Frequency    Reason                     Performed    Due Date  --------------------------------------------------------------------------------    Lipid Panel.  12 months..  atorvastatin.............  11- 11-    [facility] Embedded Care Gaps. Reference number: [id number]. 1/23/2023   9:10:43 AM CST

## 2023-01-23 NOTE — TELEPHONE ENCOUNTER
Refill Decision Note   Jovanny Olmedo  is requesting a refill authorization.  Brief Assessment and Rationale for Refill:  Approve    -Medication-Related Problems Identified: Requires labs  Medication Therapy Plan:       Medication Reconciliation Completed: No   Comments:     Provider Staff:     Action is required for this patient.   Please see care gap opportunities below in Care Due Message.     Thanks!  [facility]     Appointments      Date Provider   Last Visit   5/16/2022 Miryam Jimenez MD   Next Visit   Visit date not found Miryam Jimenez MD     Note composed:1:42 PM 01/23/2023           Note composed:1:42 PM 01/23/2023

## 2023-02-01 ENCOUNTER — OFFICE VISIT (OUTPATIENT)
Dept: INTERNAL MEDICINE | Facility: CLINIC | Age: 88
End: 2023-02-01
Payer: MEDICARE

## 2023-02-01 VITALS
HEART RATE: 89 BPM | SYSTOLIC BLOOD PRESSURE: 126 MMHG | RESPIRATION RATE: 18 BRPM | OXYGEN SATURATION: 96 % | BODY MASS INDEX: 31.36 KG/M2 | WEIGHT: 231.5 LBS | DIASTOLIC BLOOD PRESSURE: 74 MMHG | HEIGHT: 72 IN

## 2023-02-01 DIAGNOSIS — K21.9 GASTROESOPHAGEAL REFLUX DISEASE, UNSPECIFIED WHETHER ESOPHAGITIS PRESENT: ICD-10-CM

## 2023-02-01 DIAGNOSIS — Z09 FOLLOW-UP EXAM: ICD-10-CM

## 2023-02-01 DIAGNOSIS — E66.9 OBESITY (BMI 30.0-34.9): ICD-10-CM

## 2023-02-01 DIAGNOSIS — D69.6 THROMBOCYTOPENIA: ICD-10-CM

## 2023-02-01 DIAGNOSIS — N30.01 ACUTE CYSTITIS WITH HEMATURIA: Primary | ICD-10-CM

## 2023-02-01 DIAGNOSIS — Z86.711 HISTORY OF PULMONARY EMBOLUS (PE): ICD-10-CM

## 2023-02-01 DIAGNOSIS — C61 PROSTATE CANCER: ICD-10-CM

## 2023-02-01 DIAGNOSIS — I10 ESSENTIAL HYPERTENSION: Chronic | ICD-10-CM

## 2023-02-01 PROCEDURE — 99999 PR PBB SHADOW E&M-EST. PATIENT-LVL IV: CPT | Mod: PBBFAC,,, | Performed by: FAMILY MEDICINE

## 2023-02-01 PROCEDURE — 99999 PR PBB SHADOW E&M-EST. PATIENT-LVL IV: ICD-10-PCS | Mod: PBBFAC,,, | Performed by: FAMILY MEDICINE

## 2023-02-01 PROCEDURE — 99214 PR OFFICE/OUTPT VISIT, EST, LEVL IV, 30-39 MIN: ICD-10-PCS | Mod: S$PBB,,, | Performed by: FAMILY MEDICINE

## 2023-02-01 PROCEDURE — 99214 OFFICE O/P EST MOD 30 MIN: CPT | Mod: S$PBB,,, | Performed by: FAMILY MEDICINE

## 2023-02-01 PROCEDURE — 99214 OFFICE O/P EST MOD 30 MIN: CPT | Mod: PBBFAC | Performed by: FAMILY MEDICINE

## 2023-02-01 RX ORDER — IPRATROPIUM BROMIDE AND ALBUTEROL 20; 100 UG/1; UG/1
SPRAY, METERED RESPIRATORY (INHALATION)
COMMUNITY
Start: 2022-10-28 | End: 2023-02-01

## 2023-02-01 RX ORDER — DIPHENOXYLATE HYDROCHLORIDE AND ATROPINE SULFATE 2.5; .025 MG/1; MG/1
TABLET ORAL
COMMUNITY
Start: 2022-08-27 | End: 2023-02-01

## 2023-02-01 RX ORDER — PREDNISONE 20 MG/1
40 TABLET ORAL
COMMUNITY
Start: 2022-10-26 | End: 2023-02-01

## 2023-02-01 RX ORDER — POTASSIUM CHLORIDE 20 MEQ/1
20 TABLET, EXTENDED RELEASE ORAL
COMMUNITY
Start: 2022-08-27 | End: 2024-01-24

## 2023-02-01 RX ORDER — FLUTICASONE PROPIONATE AND SALMETEROL 250; 50 UG/1; UG/1
1 POWDER RESPIRATORY (INHALATION) EVERY 12 HOURS
COMMUNITY
Start: 2022-10-26 | End: 2023-03-28

## 2023-02-01 RX ORDER — NITROFURANTOIN MACROCRYSTALS 50 MG/1
50 CAPSULE ORAL
COMMUNITY
Start: 2023-01-27 | End: 2023-05-16

## 2023-02-01 NOTE — PROGRESS NOTES
Subjective:       Patient ID: Jovanny Olmedo is a 87 y.o. male.    Chief Complaint: Urinary Frequency and Follow-up (ER Visit)    87-year-old  male patient with Patient Active Problem List:     Essential hypertension     GERD (gastroesophageal reflux disease)     Polycystic kidney disease     Coronary artery disease, occlusive     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     Refractive error     Iron deficiency anemia     DDD (degenerative disc disease), lumbar     Open angle with borderline findings, low risk, bilateral     History of coronary angioplasty     Prostate cancer     Secondary hyperparathyroidism     Pseudophakia     Intermediate stage nonexudative age-related macular degeneration of both eyes     Aortic atherosclerosis     CLARISSA on CPAP     Duodenal ulcer     History of colon polyps     Dyslipidemia     Abdominal aortic aneurysm (AAA) without rupture     History of pulmonary embolus (PE)     Thrombocytopenia     Obesity (BMI 30.0-34.9)  Reports that patient had been to [facility] on 01/27/2023 and was diagnosed with cystitis-was sent home on Macrobid for the past 3 days for a total of 7 days  Was having bright red blood in the urine and frequent urination but denies any gross hematuria since he came out of the ER.  Has been drinking adequate fluids  Denies any fever with chills abdominal pain but with prostate cancer patient has been followed by urologist      Review of Systems   Constitutional:  Negative for appetite change, chills, fatigue and fever.   Eyes:  Negative for visual disturbance.   Respiratory:  Negative for shortness of breath.    Cardiovascular:  Negative for chest pain, palpitations and leg swelling.   Gastrointestinal:  Negative for abdominal pain, nausea and vomiting.   Genitourinary:  Positive for frequency. Negative for dysuria, hematuria and urgency.   Musculoskeletal:  Negative for myalgias.   Skin:  Negative for rash.   Neurological:   Negative for headaches.   Psychiatric/Behavioral:  Negative for sleep disturbance.        /74 (BP Location: Right arm, Patient Position: Sitting, BP Method: Large (Manual))   Pulse 89   Resp 18   Ht 6' (1.829 m)   Wt 105 kg (231 lb 7.7 oz)   SpO2 96%   BMI 31.39 kg/m²   Objective:      Physical Exam  Constitutional:       Appearance: He is well-developed.   HENT:      Head: Normocephalic and atraumatic.   Cardiovascular:      Rate and Rhythm: Normal rate and regular rhythm.      Heart sounds: Normal heart sounds. No murmur heard.  Pulmonary:      Effort: Pulmonary effort is normal.      Breath sounds: Normal breath sounds. No wheezing.   Abdominal:      General: Bowel sounds are normal.      Palpations: Abdomen is soft.      Tenderness: There is no abdominal tenderness.   Skin:     General: Skin is warm and dry.      Findings: No rash.   Neurological:      Mental Status: He is alert and oriented to person, place, and time.   Psychiatric:         Mood and Affect: Mood normal.         Assessment/Plan:   1. Acute cystitis with hematuria  -     CBC Auto Differential; Future; Expected date: 02/06/2023  -     Comprehensive Metabolic Panel; Future; Expected date: 02/06/2023  -     Urinalysis, Reflex to Urine Culture Urine, Clean Catch; Future; Expected date: 02/06/2023  2. Follow-up exam  -     CBC Auto Differential; Future; Expected date: 02/06/2023  -     Comprehensive Metabolic Panel; Future; Expected date: 02/06/2023  -     Urinalysis, Reflex to Urine Culture Urine, Clean Catch; Future; Expected date: 02/06/2023  Patient currently on Macrobid  Will plan to repeat labs to make sure the patient is cleared urinary tract infection in 4 days  Drink adequate fluids    3. Essential hypertension  Blood pressure is stable currently on benazepril hydrochlorothiazide 10/12.5 mg daily    4. Gastroesophageal reflux disease, unspecified whether esophagitis present  Stable on pantoprazole 40 mg daily    5. Prostate  cancer  Overview:  Dr stokes  Followed by urology refuse treatment and would like to do watchful waiting for next 6 months    6. Obesity (BMI 30.0-34.9)  Lifestyle modifications recommended to lose weight with BMI 31    7. Thrombocytopenia  -     CBC Auto Differential; Future; Expected date: 02/06/2023  Stable and asymptomatic    8. History of pulmonary embolus (PE)

## 2023-02-06 ENCOUNTER — LAB VISIT (OUTPATIENT)
Dept: LAB | Facility: HOSPITAL | Age: 88
End: 2023-02-06
Payer: MEDICARE

## 2023-02-06 DIAGNOSIS — N30.01 ACUTE CYSTITIS WITH HEMATURIA: ICD-10-CM

## 2023-02-06 DIAGNOSIS — Z09 FOLLOW-UP EXAM: ICD-10-CM

## 2023-02-06 LAB
BACTERIA #/AREA URNS HPF: NORMAL /HPF
BILIRUB UR QL STRIP: NEGATIVE
CLARITY UR: CLEAR
COLOR UR: YELLOW
GLUCOSE UR QL STRIP: NEGATIVE
HGB UR QL STRIP: ABNORMAL
KETONES UR QL STRIP: NEGATIVE
LEUKOCYTE ESTERASE UR QL STRIP: NEGATIVE
MICROSCOPIC COMMENT: NORMAL
NITRITE UR QL STRIP: NEGATIVE
PH UR STRIP: 6 [PH] (ref 5–8)
PROT UR QL STRIP: NEGATIVE
RBC #/AREA URNS HPF: 3 /HPF (ref 0–4)
SP GR UR STRIP: 1.01 (ref 1–1.03)
URN SPEC COLLECT METH UR: ABNORMAL
WBC #/AREA URNS HPF: 2 /HPF (ref 0–5)

## 2023-02-06 PROCEDURE — 81000 URINALYSIS NONAUTO W/SCOPE: CPT | Performed by: FAMILY MEDICINE

## 2023-03-28 ENCOUNTER — OFFICE VISIT (OUTPATIENT)
Dept: PULMONOLOGY | Facility: CLINIC | Age: 88
End: 2023-03-28
Payer: MEDICARE

## 2023-03-28 VITALS
DIASTOLIC BLOOD PRESSURE: 82 MMHG | WEIGHT: 227.5 LBS | HEART RATE: 85 BPM | BODY MASS INDEX: 30.81 KG/M2 | SYSTOLIC BLOOD PRESSURE: 146 MMHG | OXYGEN SATURATION: 97 % | RESPIRATION RATE: 17 BRPM | HEIGHT: 72 IN

## 2023-03-28 DIAGNOSIS — I10 ESSENTIAL HYPERTENSION: Chronic | ICD-10-CM

## 2023-03-28 DIAGNOSIS — E66.9 CLASS 1 OBESITY WITH SERIOUS COMORBIDITY AND BODY MASS INDEX (BMI) OF 30.0 TO 30.9 IN ADULT, UNSPECIFIED OBESITY TYPE: ICD-10-CM

## 2023-03-28 DIAGNOSIS — I70.0 AORTIC ATHEROSCLEROSIS: ICD-10-CM

## 2023-03-28 DIAGNOSIS — K21.9 GASTROESOPHAGEAL REFLUX DISEASE, UNSPECIFIED WHETHER ESOPHAGITIS PRESENT: ICD-10-CM

## 2023-03-28 DIAGNOSIS — E66.9 OBESITY (BMI 30.0-34.9): ICD-10-CM

## 2023-03-28 DIAGNOSIS — G47.33 OSA ON CPAP: Primary | ICD-10-CM

## 2023-03-28 PROCEDURE — 99214 OFFICE O/P EST MOD 30 MIN: CPT | Mod: S$PBB,,, | Performed by: NURSE PRACTITIONER

## 2023-03-28 PROCEDURE — 99213 OFFICE O/P EST LOW 20 MIN: CPT | Mod: PBBFAC | Performed by: NURSE PRACTITIONER

## 2023-03-28 PROCEDURE — 99999 PR PBB SHADOW E&M-EST. PATIENT-LVL III: ICD-10-PCS | Mod: PBBFAC,,, | Performed by: NURSE PRACTITIONER

## 2023-03-28 PROCEDURE — 99214 PR OFFICE/OUTPT VISIT, EST, LEVL IV, 30-39 MIN: ICD-10-PCS | Mod: S$PBB,,, | Performed by: NURSE PRACTITIONER

## 2023-03-28 PROCEDURE — 99999 PR PBB SHADOW E&M-EST. PATIENT-LVL III: CPT | Mod: PBBFAC,,, | Performed by: NURSE PRACTITIONER

## 2023-03-28 NOTE — ASSESSMENT & PLAN NOTE
Benefits and compliant with Auto CPAP 5-20 cm  AHI: 6.9  Nasal wisp mask  HME: APRIA   Updated supply order  Continue auto CPAP 5-20 cm   Follow up annually

## 2023-03-28 NOTE — ASSESSMENT & PLAN NOTE
Encouraged calorie reduction and 30 minutes of exercise daily. Discussed impact of obesity on general health.  Wt Readings from Last 9 Encounters:   03/28/23 103.2 kg (227 lb 8.2 oz)   02/01/23 105 kg (231 lb 7.7 oz)   01/06/23 105.4 kg (232 lb 5.8 oz)   08/18/22 104.4 kg (230 lb 2.6 oz)   05/30/22 104.7 kg (230 lb 13.2 oz)   05/16/22 106.6 kg (235 lb)   05/16/22 104.7 kg (230 lb 13.2 oz)   04/20/22 106.8 kg (235 lb 7.2 oz)   03/28/22 105 kg (231 lb 7.7 oz)   Body mass index is 30.86 kg/m².

## 2023-03-28 NOTE — ASSESSMENT & PLAN NOTE
The current medical regimen is effective;  continue present plan and medications, Protonix 40 mg

## 2023-03-28 NOTE — PROGRESS NOTES
Subjective:      Patient ID: Jovanny Olmedo is a 87 y.o. male.    Chief Complaint: Sleep Apnea    HPI: Jovanny Olmedo presents to clinic for follow up for CLARISSA with annual CPAP complaince assessment.  He is on Auto CPAP of 5-20 cmH2O pressure with apneic index (AHI) 6.9 events an hour.   He is compliant with CPAP use. Complaince download today reveals 97% of days with greater than 4 hours of device use.   Patient reports benefit from CPAP use and denies snoring or excessive daytime sleepiness.  Patient reports no complaints. Nasal wisp mask is used.  HME: APRIA     APRIA set up 5/7/2015  [employer] (bessy account )       Compliance  Payor Standard  Usage 02/21/2023 - 03/22/2023  Usage days 29/30 days (97%)  >= 4 hours 29 days (97%)  < 4 hours 0 days (0%)  Usage hours 185 hours 1 minutes  Average usage (total days) 6 hours 10 minutes  Average usage (days used) 6 hours 23 minutes  Median usage (days used) 6 hours 10 minutes  Total used hours (value since last reset - 03/22/2023) 4,279 hours  AirSense 10 AutoSet  Serial number [id number]  Mode AutoSet  Min Pressure 5 cmH2O  Max Pressure 20 cmH2O  EPR Fulltime  EPR level 3  Response Standard  Therapy  Pressure - cmH2O Median: 15.6 95th percentile: 18.8 Maximum: 19.7  Leaks - L/min Median: 7.4 95th percentile: 23.3 Maximum: 32.8  Events per hour AI: 2.7 HI: 4.2 AHI: 6.9  Apnea Index Central: 0.4 Obstructive: 1.8 Unknown: 0.5    [place] Sleepiness Scale   EPWORTH SLEEPINESS SCALE 3/28/2023 3/28/2022 3/31/2021 1/20/2021 7/25/2018 7/5/2017 7/6/2016   Sitting and reading 2 1 2 2 3 1 0   Watching TV 2 3 2 2 3 2 2   Sitting, inactive in a public place (e.g. a theatre or a meeting) 0 0 0 0 0 0 0   As a passenger in a car for an hour without a break 0 0 0 0 0 1 0   Lying down to rest in the afternoon when circumstances permit 3 3 2 3 3 2 2   Sitting and talking to someone 0 0 0 0 0 0 0   Sitting quietly after a lunch without alcohol 2 0 1 2 1 2 1   In a car, while stopped for a few  minutes in traffic 0 0 0 0 0 0 0   Total score 9 7 7 9 10 8 5         Previous Report Reviewed: lab reports and office notes     Past Medical History: The following portions of the patient's history were reviewed and updated as appropriate:   He  has a past surgical history that includes Coronary stent placement; Abdominal aorta stent; Renal artery stent; Prostate biopsy; PCIOL (Right, 03/29/2017); Cataract extraction w/  intraocular lens implant (Right, 04/29/2017); Cardiac catheterization; Coronary angioplasty; PCIOL (Left, 07/03/2019); Colonoscopy (N/A, 9/24/2020); and Colonoscopy (N/A, 1/10/2022).  His family history includes Cancer in his father; Colon cancer in his father; Diabetes in his brother, mother, and sister; Glaucoma in his mother; Hypertension in his brother; Kidney disease in his mother.  He  reports that he quit smoking about 27 years ago. His smoking use included cigarettes. He has a 15.00 pack-year smoking history. He has quit using smokeless tobacco. He reports that he does not currently use alcohol. He reports current drug use. Frequency: 4.00 times per week. Drug: Marijuana.  He has a current medication list which includes the following prescription(s): aspirin, atorvastatin, azelastine, benazepril-hydrochlorthiazide, ferrous sulfate, fluticasone propionate, nitrofurantoin, pantoprazole, potassium chloride sa, alprazolam, and tadalafil.  He is allergic to codeine..    The following portions of the patient's history were reviewed and updated as appropriate: allergies, current medications, past family history, past medical history, past social history, past surgical history and problem list.    Review of Systems   Constitutional:  Negative for fever, chills, weight loss, weight gain, activity change, appetite change, fatigue and night sweats.   HENT:  Negative for postnasal drip, rhinorrhea, sinus pressure, voice change and congestion.    Eyes:  Negative for redness and itching.   Respiratory:   Negative for snoring, cough, sputum production, chest tightness, shortness of breath, wheezing, orthopnea, asthma nighttime symptoms, dyspnea on extertion, use of rescue inhaler and somnolence.    Cardiovascular: Negative.  Negative for chest pain, palpitations and leg swelling.   Genitourinary:  Negative for difficulty urinating and hematuria.   Endocrine:  Negative for cold intolerance and heat intolerance.    Musculoskeletal:  Negative for arthralgias, gait problem, joint swelling and myalgias.   Skin: Negative.    Gastrointestinal:  Negative for nausea, vomiting, abdominal pain and acid reflux.   Neurological:  Negative for dizziness, weakness, light-headedness and headaches.   Hematological:  Negative for adenopathy. No excessive bruising.   All other systems reviewed and are negative.   Objective:   BP (!) 146/82   Pulse 85   Resp 17   Ht 6' (1.829 m)   Wt 103.2 kg (227 lb 8.2 oz)   SpO2 97%   BMI 30.86 kg/m²   Physical Exam  Vitals and nursing note reviewed.   Constitutional:       General: He is not in acute distress.     Appearance: Normal appearance. He is well-developed. He is not ill-appearing or toxic-appearing.   HENT:      Head: Normocephalic and atraumatic.      Right Ear: External ear normal.      Left Ear: External ear normal.      Nose: Nose normal.      Mouth/Throat:      Mouth: Mucous membranes are moist.      Pharynx: Oropharynx is clear. No oropharyngeal exudate.   Eyes:      Conjunctiva/sclera: Conjunctivae normal.   Cardiovascular:      Rate and Rhythm: Normal rate and regular rhythm.      Heart sounds: Normal heart sounds.   Pulmonary:      Effort: Pulmonary effort is normal.      Breath sounds: Normal breath sounds.   Abdominal:      Palpations: Abdomen is soft.   Musculoskeletal:      Cervical back: Normal range of motion and neck supple.   Skin:     General: Skin is warm and dry.   Neurological:      Mental Status: He is alert and oriented to person, place, and time.   Psychiatric:          Behavior: Behavior normal. Behavior is cooperative.         Thought Content: Thought content normal.         Judgment: Judgment normal.       Personal Diagnostic Review  CPAP download    Assessment:     1. CLARISSA on CPAP    2. Obesity (BMI 30.0-34.9)    3. Gastroesophageal reflux disease, unspecified whether esophagitis present    4. Essential hypertension    5. Aortic atherosclerosis    6. Class 1 obesity with serious comorbidity and body mass index (BMI) of 30.0 to 30.9 in adult, unspecified obesity type        No orders of the defined types were placed in this encounter.    Plan:     Problem List Items Addressed This Visit       Essential hypertension (Chronic)     Continued management with primary care provider, slight elevation.          CLARISSA on CPAP - Primary     Benefits and compliant with Auto CPAP 5-20 cm  AHI: 6.9  Nasal wisp mask  HME: APRIA   Updated supply order  Continue auto CPAP 5-20 cm   Follow up annually            GERD (gastroesophageal reflux disease)     The current medical regimen is effective;  continue present plan and medications, Protonix 40 mg           Class 1 obesity with serious comorbidity and body mass index (BMI) of 30.0 to 30.9 in adult     Encouraged calorie reduction and 30 minutes of exercise daily. Discussed impact of obesity on general health.  Wt Readings from Last 9 Encounters:   03/28/23 103.2 kg (227 lb 8.2 oz)   02/01/23 105 kg (231 lb 7.7 oz)   01/06/23 105.4 kg (232 lb 5.8 oz)   08/18/22 104.4 kg (230 lb 2.6 oz)   05/30/22 104.7 kg (230 lb 13.2 oz)   05/16/22 106.6 kg (235 lb)   05/16/22 104.7 kg (230 lb 13.2 oz)   04/20/22 106.8 kg (235 lb 7.2 oz)   03/28/22 105 kg (231 lb 7.7 oz)   Body mass index is 30.86 kg/m².               Aortic atherosclerosis     Follow heart healthy diet. regular exercise.            (DME) - [employer]  Reviewed therapeutic goals for positive airway pressure therapy Auto CPAP  Ideal is usage 100% of nights for 6 - 8 hours per night. Minimum  usage is 70% of night for at least 4 hours per night used.     Follow up in about 1 year (around 3/28/2024) for CPAP 1 year compliance download .

## 2023-05-03 DIAGNOSIS — Z71.89 COMPLEX CARE COORDINATION: ICD-10-CM

## 2023-05-05 DIAGNOSIS — E78.5 DYSLIPIDEMIA: Chronic | ICD-10-CM

## 2023-05-05 RX ORDER — ATORVASTATIN CALCIUM 40 MG/1
TABLET, FILM COATED ORAL
Qty: 90 TABLET | Refills: 1 | Status: SHIPPED | OUTPATIENT
Start: 2023-05-05 | End: 2023-11-13

## 2023-05-05 NOTE — TELEPHONE ENCOUNTER
Refill Routing Note   Medication(s) are not appropriate for processing by [facility] for the following reason(s):      Required labs outdated    [facility] action(s):  Defer Labs due            Appointments  past 12m or future 3m with PCP    Date Provider   Last Visit   2/1/2023 Miryam Jimenez MD   Next Visit   8/1/2023 Miryam Jimenez MD   ED visits in past 90 days: 0        Note composed:1:11 PM 05/05/2023

## 2023-05-05 NOTE — TELEPHONE ENCOUNTER
Care Due:                  Date            Visit Type   Department     Provider  --------------------------------------------------------------------------------                                EP -                              PRIMARY      [facility]  Miryam Mainampati  Last Visit: 02-      CARE ([facility])   MEDICINE       Jimenez                              EP -                              PRIMARY      [facility]  Miryam Mainampati  Next Visit: 08-      CARE ([facility])   MEDICINE       Jimenez                                                            Last  Test          Frequency    Reason                     Performed    Due Date  --------------------------------------------------------------------------------    Lipid Panel.  12 months..  atorvastatin.............  11- 11-    [employer] Embedded Care Due Messages. Reference number: [id number].   5/05/2023 11:33:20 AM CDT

## 2023-05-16 ENCOUNTER — OFFICE VISIT (OUTPATIENT)
Dept: INTERNAL MEDICINE | Facility: CLINIC | Age: 88
End: 2023-05-16
Payer: MEDICARE

## 2023-05-16 VITALS
SYSTOLIC BLOOD PRESSURE: 120 MMHG | HEIGHT: 72 IN | HEART RATE: 77 BPM | WEIGHT: 229.5 LBS | RESPIRATION RATE: 20 BRPM | DIASTOLIC BLOOD PRESSURE: 82 MMHG | BODY MASS INDEX: 31.08 KG/M2 | OXYGEN SATURATION: 97 %

## 2023-05-16 DIAGNOSIS — Q61.3 POLYCYSTIC KIDNEY DISEASE: ICD-10-CM

## 2023-05-16 DIAGNOSIS — I10 ESSENTIAL HYPERTENSION: Primary | Chronic | ICD-10-CM

## 2023-05-16 DIAGNOSIS — C61 PROSTATE CANCER: ICD-10-CM

## 2023-05-16 DIAGNOSIS — I70.0 AORTIC ATHEROSCLEROSIS: ICD-10-CM

## 2023-05-16 DIAGNOSIS — I71.40 ABDOMINAL AORTIC ANEURYSM (AAA) WITHOUT RUPTURE, UNSPECIFIED PART: ICD-10-CM

## 2023-05-16 DIAGNOSIS — I25.10 CORONARY ARTERY DISEASE, OCCLUSIVE: Chronic | ICD-10-CM

## 2023-05-16 DIAGNOSIS — R21 RASH: ICD-10-CM

## 2023-05-16 DIAGNOSIS — Z86.711 HISTORY OF PULMONARY EMBOLUS (PE): ICD-10-CM

## 2023-05-16 DIAGNOSIS — N25.81 SECONDARY HYPERPARATHYROIDISM: ICD-10-CM

## 2023-05-16 DIAGNOSIS — M51.36 DDD (DEGENERATIVE DISC DISEASE), LUMBAR: ICD-10-CM

## 2023-05-16 DIAGNOSIS — R30.0 DYSURIA: ICD-10-CM

## 2023-05-16 DIAGNOSIS — K21.9 GASTROESOPHAGEAL REFLUX DISEASE, UNSPECIFIED WHETHER ESOPHAGITIS PRESENT: ICD-10-CM

## 2023-05-16 DIAGNOSIS — E78.5 DYSLIPIDEMIA: Chronic | ICD-10-CM

## 2023-05-16 DIAGNOSIS — E66.9 OBESITY (BMI 30.0-34.9): ICD-10-CM

## 2023-05-16 DIAGNOSIS — Z86.010 HISTORY OF COLON POLYPS: ICD-10-CM

## 2023-05-16 DIAGNOSIS — D69.6 THROMBOCYTOPENIA: ICD-10-CM

## 2023-05-16 DIAGNOSIS — G47.33 OSA ON CPAP: ICD-10-CM

## 2023-05-16 DIAGNOSIS — Z98.61 HISTORY OF CORONARY ANGIOPLASTY: Chronic | ICD-10-CM

## 2023-05-16 PROBLEM — D50.9 IRON DEFICIENCY ANEMIA: Status: RESOLVED | Noted: 2017-03-15 | Resolved: 2023-05-16

## 2023-05-16 PROBLEM — I20.89 STABLE ANGINA PECTORIS: Status: ACTIVE | Noted: 2023-05-16

## 2023-05-16 PROBLEM — E66.811 CLASS 1 OBESITY WITH SERIOUS COMORBIDITY AND BODY MASS INDEX (BMI) OF 30.0 TO 30.9 IN ADULT: Status: RESOLVED | Noted: 2022-05-16 | Resolved: 2023-05-16

## 2023-05-16 PROCEDURE — 99999 PR PBB SHADOW E&M-EST. PATIENT-LVL IV: ICD-10-PCS | Mod: PBBFAC,,, | Performed by: FAMILY MEDICINE

## 2023-05-16 PROCEDURE — 99999 PR PBB SHADOW E&M-EST. PATIENT-LVL IV: CPT | Mod: PBBFAC,,, | Performed by: FAMILY MEDICINE

## 2023-05-16 PROCEDURE — 99214 PR OFFICE/OUTPT VISIT, EST, LEVL IV, 30-39 MIN: ICD-10-PCS | Mod: S$PBB,,, | Performed by: FAMILY MEDICINE

## 2023-05-16 PROCEDURE — 99214 OFFICE O/P EST MOD 30 MIN: CPT | Mod: S$PBB,,, | Performed by: FAMILY MEDICINE

## 2023-05-16 PROCEDURE — 99214 OFFICE O/P EST MOD 30 MIN: CPT | Mod: PBBFAC | Performed by: FAMILY MEDICINE

## 2023-05-16 RX ORDER — CLOTRIMAZOLE AND BETAMETHASONE DIPROPIONATE 10; .64 MG/G; MG/G
CREAM TOPICAL 2 TIMES DAILY
Qty: 45 G | Refills: 0 | Status: SHIPPED | OUTPATIENT
Start: 2023-05-16 | End: 2023-07-21

## 2023-05-16 NOTE — PROGRESS NOTES
Subjective:       Patient ID: Jovanny Olmedo is a 87 y.o. male.    Chief Complaint: Follow-up    87-year-old  male patient accompanied by his wife with Patient Active Problem List:     Essential hypertension     GERD (gastroesophageal reflux disease)     Polycystic kidney disease     Coronary artery disease, occlusive     Refractive error     DDD (degenerative disc disease), lumbar     Open angle with borderline findings, low risk, bilateral     History of coronary angioplasty     Prostate cancer     Secondary hyperparathyroidism     Pseudophakia     Intermediate stage nonexudative age-related macular degeneration of both eyes     Aortic atherosclerosis     CLARISSA on CPAP     Duodenal ulcer     History of colon polyps     Dyslipidemia     Abdominal aortic aneurysm (AAA) without rupture     History of pulmonary embolus (PE)     Thrombocytopenia     Obesity (BMI 30.0-34.9)     Stable angina pectoris  Here for follow-up on chronic medical conditions and reports that patient has been taking his medications  Regularly occasionally has been having rash to the face and would like to get refill on clotrimazole betamethasone cream  Has been having dandruff  to the scalp  Denies any chest pain or difficulty breathing with palpitations  Patient reports minimal discomfort with urination and increased urinary frequency at bedtime    Review of Systems   Constitutional:  Negative for appetite change and fatigue.   Eyes:  Negative for visual disturbance.   Respiratory:  Negative for shortness of breath.    Cardiovascular:  Negative for chest pain, palpitations and leg swelling.   Gastrointestinal:  Negative for abdominal pain, nausea and vomiting.   Genitourinary:  Positive for dysuria and frequency.   Musculoskeletal:  Negative for myalgias.   Skin:  Positive for rash.   Neurological:  Negative for headaches.   Psychiatric/Behavioral:  Negative for sleep disturbance.        /82 (BP Location: Right arm, Patient  Position: Sitting, BP Method: Large (Manual))   Pulse 77   Resp 20   Ht 6' (1.829 m)   Wt 104.1 kg (229 lb 8 oz)   SpO2 97%   BMI 31.13 kg/m²   Objective:      Physical Exam  Constitutional:       Appearance: He is well-developed.   HENT:      Head: Normocephalic and atraumatic.   Cardiovascular:      Rate and Rhythm: Normal rate and regular rhythm.      Heart sounds: Normal heart sounds. No murmur heard.  Pulmonary:      Effort: Pulmonary effort is normal.      Breath sounds: Normal breath sounds. No wheezing.   Abdominal:      General: Bowel sounds are normal.      Palpations: Abdomen is soft.      Tenderness: There is no abdominal tenderness.   Skin:     General: Skin is warm and dry.      Findings: Rash present.      Comments: Fine erythematous rash noted to the forehead   Neurological:      Mental Status: He is alert and oriented to person, place, and time.   Psychiatric:         Mood and Affect: Mood normal.         Assessment/Plan:   1. Essential hypertension  Blood pressure is stable currently on benazepril hydrochlorothiazide 10/12.5 mg daily    2. Dyslipidemia  Currently taking Lipitor 40 mg daily    3. Coronary artery disease, occlusive  4. Abdominal aortic aneurysm (AAA) without rupture, unspecified part  5. History of coronary angioplasty  Stable and asymptomatic followed by Cardiology    6. Gastroesophageal reflux disease, unspecified whether esophagitis present  Clinically doing well on pantoprazole 40 mg daily    7. CLARISSA on CPAP  Overview:  Compliant and benefits from use  On Auto CPAP 5-20 cm  Nasal wisp mask  HME: APRIA   Stable    8. Secondary hyperparathyroidism  15. Polycystic kidney disease  As per Nephrology    9. Prostate cancer  Overview:  Dr stokes  Followed by Dr. Starkey clinically doing well  Advised to discuss further with Urology secondary to nocturia      11. DDD (degenerative disc disease), lumbar  Overview:  Xray in 2011 showed narrowing  Graded exercise regimen  recommended    12. Aortic atherosclerosis  Continue aspirin and statins    14. History of colon polyps      16. History of pulmonary embolus (PE)  Overview:  Last Assessment & Plan:   Formatting of this note might be different from the original.  History & Physical   Patient was on heparin but after discussions with vascular surgery transitioned patient to [facility]  Discharge Summary  86 y/o M with PMH CAD, CKD (baseline Cr 1.7-1.8), CLARISSA on CPAP, htn, and known AAA who presented to [facility] with dyspnea and chest pain (described as dull in the left upper chest with radiation down the arm) that started the night prior and admits to progressive dyspnea on exertion that had been ongoing for the month prior. Work up revealed segmental PEs and descending thoracic aortic dissection, prompting transfer to [facility] for further care.  Patient was admitted to the ICU on a heparin drip with CCMS and vascular consult.  He otherwise remained hemodynamically stable.  It was found that his aneurysm/dissection was actually chronic and that he is followed outpatient by Dr. Berry.  Vascular surgery did not recommend intervention and echocardiogram read: EF 55 to 65% with mildly dilated right ventricle. Patient was considered stable for transfer out of ICU on 9/10 and was able to be weaned off supplemental O2 and transitioned to eliquis for discharge.  Follow-up  To be followed by vascular    17. Thrombocytopenia  Stable and asymptomatic    18. Obesity (BMI 30.0-34.9)  Lifestyle modifications recommended to lose weight with BMI 31    19. Dysuria  -     CBC Auto Differential; Future; Expected date: 05/16/2023  -     Comprehensive Metabolic Panel; Future; Expected date: 05/16/2023  -     Urinalysis, Reflex to Urine Culture Urine, Clean Catch; Future; Expected date: 05/16/2023  Will check urinalysis and will treat if positive for infection but if none patient was advised to discuss further with urologist      21. Rash  -      clotrimazole-betamethasone 1-0.05% (LOTRISONE) cream; Apply topically 2 (two) times daily.  Dispense: 45 g; Refill: 0  Refill given on the cream as requested

## 2023-05-17 DIAGNOSIS — J30.9 ALLERGIC RHINITIS, UNSPECIFIED SEASONALITY, UNSPECIFIED TRIGGER: ICD-10-CM

## 2023-05-17 RX ORDER — FLUTICASONE PROPIONATE 50 MCG
SPRAY, SUSPENSION (ML) NASAL
Qty: 16 G | Refills: 11 | Status: SHIPPED | OUTPATIENT
Start: 2023-05-17

## 2023-05-17 RX ORDER — AZELASTINE 1 MG/ML
SPRAY, METERED NASAL
Qty: 30 ML | Refills: 11 | Status: SHIPPED | OUTPATIENT
Start: 2023-05-17

## 2023-06-28 DIAGNOSIS — E78.5 DYSLIPIDEMIA: ICD-10-CM

## 2023-06-28 DIAGNOSIS — J94.1 PLEURAL FIBROSIS: ICD-10-CM

## 2023-06-28 DIAGNOSIS — R06.09 DOE (DYSPNEA ON EXERTION): ICD-10-CM

## 2023-06-28 DIAGNOSIS — I10 ESSENTIAL HYPERTENSION: ICD-10-CM

## 2023-06-28 DIAGNOSIS — I26.99 PULMONARY EMBOLISM, UNSPECIFIED CHRONICITY, UNSPECIFIED PULMONARY EMBOLISM TYPE, UNSPECIFIED WHETHER ACUTE COR PULMONALE PRESENT: ICD-10-CM

## 2023-06-28 DIAGNOSIS — I20.89 STABLE ANGINA PECTORIS: ICD-10-CM

## 2023-06-28 DIAGNOSIS — Z98.61 HISTORY OF CORONARY ANGIOPLASTY: ICD-10-CM

## 2023-06-28 DIAGNOSIS — I25.10 CORONARY ARTERY DISEASE, OCCLUSIVE: ICD-10-CM

## 2023-06-30 ENCOUNTER — OFFICE VISIT (OUTPATIENT)
Dept: INTERNAL MEDICINE | Facility: CLINIC | Age: 88
End: 2023-06-30
Payer: MEDICARE

## 2023-06-30 VITALS
OXYGEN SATURATION: 99 % | HEART RATE: 53 BPM | TEMPERATURE: 96 F | DIASTOLIC BLOOD PRESSURE: 100 MMHG | SYSTOLIC BLOOD PRESSURE: 160 MMHG | HEIGHT: 72 IN | WEIGHT: 226.19 LBS | BODY MASS INDEX: 30.64 KG/M2

## 2023-06-30 DIAGNOSIS — M54.9 UPPER BACK PAIN: ICD-10-CM
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
## 2023-06-30 DIAGNOSIS — M54.2 NECK PAIN: ICD-10-CM

## 2023-06-30 DIAGNOSIS — R11.0 NAUSEA: ICD-10-CM

## 2023-06-30 PROCEDURE — 99214 OFFICE O/P EST MOD 30 MIN: CPT | Mod: S$PBB,,, | Performed by: NURSE PRACTITIONER

## 2023-06-30 PROCEDURE — 99214 OFFICE O/P EST MOD 30 MIN: CPT | Mod: PBBFAC | Performed by: NURSE PRACTITIONER

## 2023-06-30 PROCEDURE — 99214 PR OFFICE/OUTPT VISIT, EST, LEVL IV, 30-39 MIN: ICD-10-PCS | Mod: S$PBB,,, | Performed by: NURSE PRACTITIONER

## 2023-06-30 PROCEDURE — 99999 PR PBB SHADOW E&M-EST. PATIENT-LVL IV: ICD-10-PCS | Mod: PBBFAC,,, | Performed by: NURSE PRACTITIONER

## 2023-06-30 PROCEDURE — 96372 THER/PROPH/DIAG INJ SC/IM: CPT | Mod: PBBFAC

## 2023-06-30 PROCEDURE — 99999 PR PBB SHADOW E&M-EST. PATIENT-LVL IV: CPT | Mod: PBBFAC,,, | Performed by: NURSE PRACTITIONER

## 2023-06-30 RX ORDER — ONDANSETRON 2 MG/ML
4 INJECTION INTRAMUSCULAR; INTRAVENOUS
Status: COMPLETED | OUTPATIENT
Start: 2023-06-30 | End: 2023-06-30

## 2023-06-30 RX ORDER — ONDANSETRON 4 MG/1
4 TABLET, ORALLY DISINTEGRATING ORAL EVERY 12 HOURS PRN
Qty: 14 TABLET | Refills: 0 | Status: SHIPPED | OUTPATIENT
Start: 2023-06-30 | End: 2023-07-21

## 2023-06-30 RX ORDER — ONDANSETRON 4 MG/1
8 TABLET, ORALLY DISINTEGRATING ORAL
Status: DISCONTINUED | OUTPATIENT
Start: 2023-06-30 | End: 2023-06-30

## 2023-06-30 RX ORDER — BENAZEPRIL HYDROCHLORIDE AND HYDROCHLOROTHIAZIDE 10; 12.5 MG/1; MG/1
TABLET ORAL
Qty: 180 TABLET | Refills: 0 | Status: SHIPPED | OUTPATIENT
Start: 2023-06-30 | End: 2023-10-06 | Stop reason: SDUPTHER

## 2023-06-30 RX ORDER — METHYLPREDNISOLONE 4 MG/1
TABLET ORAL
Qty: 1 EACH | Refills: 0 | Status: SHIPPED | OUTPATIENT
Start: 2023-06-30 | End: 2023-07-21

## 2023-06-30 RX ORDER — METHOCARBAMOL 750 MG/1
1500 TABLET, FILM COATED ORAL
COMMUNITY
Start: 2023-06-28 | End: 2023-07-21 | Stop reason: SDUPTHER

## 2023-06-30 RX ORDER — DOCUSATE SODIUM 100 MG/1
100 CAPSULE, LIQUID FILLED ORAL 2 TIMES DAILY
Qty: 60 CAPSULE | Refills: 0 | Status: SHIPPED | OUTPATIENT
Start: 2023-06-30 | End: 2023-07-21

## 2023-06-30 RX ORDER — TRAMADOL HYDROCHLORIDE 50 MG/1
50 TABLET ORAL EVERY 6 HOURS PRN
COMMUNITY
Start: 2023-06-28 | End: 2023-07-21

## 2023-06-30 RX ADMIN — ONDANSETRON HYDROCHLORIDE 4 MG: 2 SOLUTION INTRAMUSCULAR; INTRAVENOUS at 02:06
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
## 2023-07-06 ENCOUNTER — CLINICAL SUPPORT (OUTPATIENT)
Dept: REHABILITATION | Facility: HOSPITAL | Age: 88
End: 2023-07-06
Payer: MEDICARE

## 2023-07-06 DIAGNOSIS — M54.2 NECK PAIN: ICD-10-CM

## 2023-07-06 DIAGNOSIS — M54.9 UPPER BACK PAIN: ICD-10-CM

## 2023-07-06 DIAGNOSIS — M54.2 CERVICALGIA: ICD-10-CM

## 2023-07-06 DIAGNOSIS — M54.6 ACUTE THORACIC BACK PAIN, UNSPECIFIED BACK PAIN LATERALITY: ICD-10-CM

## 2023-07-06 PROCEDURE — 97161 PT EVAL LOW COMPLEX 20 MIN: CPT | Mod: PN

## 2023-07-06 PROCEDURE — 97110 THERAPEUTIC EXERCISES: CPT | Mod: PN
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
## 2023-07-10 ENCOUNTER — CLINICAL SUPPORT (OUTPATIENT)
Dept: REHABILITATION | Facility: HOSPITAL | Age: 88
End: 2023-07-10
Payer: MEDICARE

## 2023-07-10 DIAGNOSIS — M54.2 CERVICALGIA: Primary | ICD-10-CM

## 2023-07-10 DIAGNOSIS — M54.6 ACUTE THORACIC BACK PAIN, UNSPECIFIED BACK PAIN LATERALITY: ICD-10-CM

## 2023-07-10 PROCEDURE — 97110 THERAPEUTIC EXERCISES: CPT | Mod: PN

## 2023-07-10 PROCEDURE — 97140 MANUAL THERAPY 1/> REGIONS: CPT | Mod: PN

## 2023-07-10 PROCEDURE — 97112 NEUROMUSCULAR REEDUCATION: CPT | Mod: PN

## 2023-07-10 NOTE — PROGRESS NOTES
[facility]   Physical Therapy Treatment Note     Name: Jovanny Olmedo  Clinic Number: [id number]    Therapy Diagnosis:   Encounter Diagnoses   Name Primary?    Cervicalgia Yes    Acute thoracic back pain, unspecified back pain laterality      Physician: Taylor Pantoja NP    Visit Date: 7/10/2023    Physician Orders: PT Eval and Treat   Medical Diagnosis from Referral: Neck pain [M54.2], Upper back pain [M54.9]  Evaluation Date: 7/6/2023  Authorization Period Expiration: 6/29/2024  Plan of Care Expiration: 8/4/2023  Progress Note Due: 8/6/2023  Visit # / Visits authorized: 1/ 1 eval   FOTO: 1/3     Precautions: Standard     PTA Visit #: -/5     Time In: 8:25  Time Out: 9:25  Total Billable Time: 60 minutes    SUBJECTIVE     Pt reports: no significant change since initial evaluation.    He was compliant with home exercise program.  Response to previous treatment: na  Functional change: na    Pain: 7/10  Location: neck    OBJECTIVE     Objective Measures updated at progress report unless specified.     Treatment     Jovanny received the treatments listed below:      Jovanny received the treatments listed below:       therapeutic exercises to develop strength, endurance, ROM, and flexibility for ( 25 minutes) including:              - supine cervical rotatio - 2x10    - seated thoracic rotation with ball 2x10               - seated thoracic extension on machine 10# - 2x10    - seated UT str 2x10 B     manual therapy techniques: were applied to cervical for (15  minutes) , including:               - STM to upper traps and cervical paraspinals    - cervical rotation MWM      neuromuscular re-education activities to improve: Balance, Coordination, Proprioception, Posture, and stability for (20 minutes) . The following activities were included:   - supine cervical nods on wedge - 2x10   - seated scapular retractions 2x10   + shoulder rolls 2x10 (max cues required)      therapeutic activities to  improve functional performance for (- minutes) , including:     gait training to improve functional mobility and safety for (- minutes) , including:     Next visit: moist heat with TENs     Patient Education and Home Exercises     Home Exercises Provided and Patient Education Provided     Education provided:   - Continue HEP    Written Home Exercises Provided: Patient instructed to cont prior HEP. Exercises were reviewed and Jovanny was able to demonstrate them prior to the end of the session.  Jovanny demonstrated good  understanding of the education provided. See EMR under Patient Instructions for exercises provided during therapy sessions    See EMR under Patient Instructions for exercises provided eval    ASSESSMENT     Pt presents to clinic with increase pain to cervical; increased pain noted with cervical rotation. Hypomobility and increase guarding with passive cervical rotation. Maximum verbal cues for proper technique and intensity of exercises.     Jovanny Is progressing well towards his goals.   Pt prognosis is Good.     Pt will continue to benefit from skilled outpatient physical therapy to address the deficits listed in the problem list box on initial evaluation, provide pt/family education and to maximize pt's level of independence in the home and community environment.     Pt's spiritual, cultural and educational needs considered and pt agreeable to plan of care and goals.     Anticipated barriers to physical therapy: transportation, has to take care of disabled wife     Goals:   Short Term Goals:  2 weeks  HEP: Patient will demonstrate 50% independence with HEP   Pain: Pt will demonstrate improved pain less than or equal to 5/10 at worse  Function: Patient will increase functional score to equal or greater than to 20 out of 100 on FOTO.  Mobility: Patient will improve cervical rotation by 10%  Strength: Patient will improve impaired strength to greater or equal to 4/5.   Balance: Patient will improve single  leg balance by 3 seconds.      Long Term Goals:  4 weeks  HEP: Pt will be independent with advanced HEP.  Pain: Pt will demonstrate improved pain less than or equal to 3/10 at worse in order to progress toward maximal functional ability and improve QOL.  Function: Patient will  increase functional score to equal or greater than to 30 out of 100 on FOTO to improve functional independence and quality of life.   Mobility: Patient will demonstrate dysfunctional normal trunk rotation to return to prior level of function.   Strength: Patient will improve impaired strength to greater or equal to 4+/5 in order to return to PLOF  Gait: Patient will demonstrate normalized gait mechanics with minimal compensation in order to reduce risk of falls    PLAN     Plan of care Certification: 7/6/2023 to 8/4/2023.     Outpatient Physical Therapy 2 times weekly for 4 weeks to include the following interventions: Cervical/Lumbar Traction, Electrical Stimulation to cervical region, Manual Therapy, Moist Heat/ Ice, Neuromuscular Re-ed, Patient Education, Self Care, Therapeutic Activities, Therapeutic Exercise, and FDN .     Felisha Ontiveros, PT

## 2023-07-11 ENCOUNTER — HOSPITAL ENCOUNTER (OUTPATIENT)
Dept: RADIOLOGY | Facility: HOSPITAL | Age: 88
Discharge: HOME | End: 2023-07-11
Attending: UROLOGY
Payer: MEDICARE

## 2023-07-11 DIAGNOSIS — C61 PROSTATE CANCER: ICD-10-CM

## 2023-07-11 PROCEDURE — A9503 TC99M MEDRONATE: HCPCS

## 2023-07-11 PROCEDURE — 78306 BONE IMAGING WHOLE BODY: CPT | Mod: 26,,, | Performed by: RADIOLOGY

## 2023-07-11 PROCEDURE — 78306 NM BONE SCAN WHOLE BODY: ICD-10-PCS | Mod: 26,,, | Performed by: RADIOLOGY

## 2023-07-13 ENCOUNTER — CLINICAL SUPPORT (OUTPATIENT)
Dept: REHABILITATION | Facility: HOSPITAL | Age: 88
End: 2023-07-13
Payer: MEDICARE

## 2023-07-13 DIAGNOSIS — M54.6 ACUTE THORACIC BACK PAIN, UNSPECIFIED BACK PAIN LATERALITY: ICD-10-CM

## 2023-07-13 DIAGNOSIS — M54.2 CERVICALGIA: Primary | ICD-10-CM

## 2023-07-13 PROCEDURE — 97014 ELECTRIC STIMULATION THERAPY: CPT | Mod: PN

## 2023-07-13 PROCEDURE — 97112 NEUROMUSCULAR REEDUCATION: CPT | Mod: PN

## 2023-07-13 PROCEDURE — 97110 THERAPEUTIC EXERCISES: CPT | Mod: PN

## 2023-07-13 NOTE — PROGRESS NOTES
[facility]   Physical Therapy Treatment Note     Name: Jovanny Olmedo  Clinic Number: [id number]    Therapy Diagnosis:   Encounter Diagnoses   Name Primary?    Cervicalgia Yes    Acute thoracic back pain, unspecified back pain laterality      Physician: Taylor Pantoja NP    Visit Date: 7/13/2023    Physician Orders: PT Eval and Treat   Medical Diagnosis from Referral: Neck pain [M54.2], Upper back pain [M54.9]  Evaluation Date: 7/6/2023  Authorization Period Expiration: 6/29/2024  Plan of Care Expiration: 8/4/2023  Progress Note Due: 8/6/2023  Visit # / Visits authorized: 1/ 1 eval; 2/20 treatment  FOTO: 1/3     Precautions: Standard     PTA Visit #: -/5     Time In: 9:00  Time Out: 9:58  Total Billable Time: 58 minutes    SUBJECTIVE     Pt reports: rahul horn headache to left head still. States he doesn't notice any significant relief this far.     He was partial compliant with home exercise program.  Response to previous treatment: na  Functional change: na    Pain: 7/10  Location: neck    OBJECTIVE     Objective Measures updated at progress report unless specified.     Treatment     Jovanny received the treatments listed below:      Jovanny received the treatments listed below:       therapeutic exercises to develop strength, endurance, ROM, and flexibility for ( 25 minutes) including:              - supine cervical rotation - 2x10    - seated thoracic rotation with ball tapping on mat 2x10               - seated thoracic extension on machine 20# - 2x10    - seated UT str 2x30s B   +standing thread the needle at wall 2x10 ea      manual therapy techniques: were applied to cervical for (5 minutes) , including:               - STM to upper traps and cervical paraspinals      neuromuscular re-education activities to improve: Balance, Coordination, Proprioception, Posture, and stability for (18 minutes) . The following activities were included:   - supine cervical nods on wedge - 2x10   -  seated scapular retractions 2x10   - shoulder rolls 2x10 (max cues required)      therapeutic activities to improve functional performance for (- minutes) , including:     gait training to improve functional mobility and safety for (- minutes) , including:     Moist heat with TENs (10 minutes)    Patient Education and Home Exercises     Home Exercises Provided and Patient Education Provided     Education provided:   - Continue HEP    Written Home Exercises Provided: Patient instructed to cont prior HEP. Exercises were reviewed and Jovanny was able to demonstrate them prior to the end of the session.  Jovanny demonstrated good  understanding of the education provided. See EMR under Patient Instructions for exercises provided during therapy sessions    See EMR under Patient Instructions for exercises provided eval    ASSESSMENT   Pt demonstrates empty end feel with passive cervical rotation. Unable to tolerate gentle STM to cervical; severe guarding noted. Pt demonstrated pulling to posterior cervical during supine cervical nods. Continue to require cues for encouragement throughout exercises. Good relief noted with moist heat and TENs at end of session.     Jovanny Is progressing well towards his goals.   Pt prognosis is Good.     Pt will continue to benefit from skilled outpatient physical therapy to address the deficits listed in the problem list box on initial evaluation, provide pt/family education and to maximize pt's level of independence in the home and community environment.     Pt's spiritual, cultural and educational needs considered and pt agreeable to plan of care and goals.     Anticipated barriers to physical therapy: transportation, has to take care of disabled wife     Goals:   Short Term Goals:  2 weeks  HEP: Patient will demonstrate 50% independence with HEP   Pain: Pt will demonstrate improved pain less than or equal to 5/10 at worse  Function: Patient will increase functional score to equal or greater than  to 20 out of 100 on FOTO.  Mobility: Patient will improve cervical rotation by 10%  Strength: Patient will improve impaired strength to greater or equal to 4/5.   Balance: Patient will improve single leg balance by 3 seconds.      Long Term Goals:  4 weeks  HEP: Pt will be independent with advanced HEP.  Pain: Pt will demonstrate improved pain less than or equal to 3/10 at worse in order to progress toward maximal functional ability and improve QOL.  Function: Patient will  increase functional score to equal or greater than to 30 out of 100 on FOTO to improve functional independence and quality of life.   Mobility: Patient will demonstrate dysfunctional normal trunk rotation to return to prior level of function.   Strength: Patient will improve impaired strength to greater or equal to 4+/5 in order to return to PLOF  Gait: Patient will demonstrate normalized gait mechanics with minimal compensation in order to reduce risk of falls    PLAN     Plan of care Certification: 7/6/2023 to 8/4/2023.     Outpatient Physical Therapy 2 times weekly for 4 weeks to include the following interventions: Cervical/Lumbar Traction, Electrical Stimulation to cervical region, Manual Therapy, Moist Heat/ Ice, Neuromuscular Re-ed, Patient Education, Self Care, Therapeutic Activities, Therapeutic Exercise, and FDN .     Felisha Ontiveros, PT

## 2023-07-17 ENCOUNTER — CLINICAL SUPPORT (OUTPATIENT)
Dept: REHABILITATION | Facility: HOSPITAL | Age: 88
End: 2023-07-17
Payer: MEDICARE

## 2023-07-17 DIAGNOSIS — M54.2 CERVICALGIA: Primary | ICD-10-CM

## 2023-07-17 DIAGNOSIS — M54.6 ACUTE THORACIC BACK PAIN, UNSPECIFIED BACK PAIN LATERALITY: ICD-10-CM

## 2023-07-17 PROCEDURE — 97014 ELECTRIC STIMULATION THERAPY: CPT | Mod: PN,CQ

## 2023-07-17 PROCEDURE — 97110 THERAPEUTIC EXERCISES: CPT | Mod: PN,CQ

## 2023-07-17 PROCEDURE — 97140 MANUAL THERAPY 1/> REGIONS: CPT | Mod: PN,CQ

## 2023-07-17 PROCEDURE — 97112 NEUROMUSCULAR REEDUCATION: CPT | Mod: PN,CQ

## 2023-07-17 NOTE — PROGRESS NOTES
[facility]   Physical Therapy Treatment Note     Name: Jovanny Olmedo  [facility] Number: [id number]    Therapy Diagnosis:   Encounter Diagnoses   Name Primary?    Cervicalgia Yes    Acute thoracic back pain, unspecified back pain laterality      Physician: Taylor Pantoja NP    Visit Date: 7/17/2023    Physician Orders: PT Eval and Treat   Medical Diagnosis from Referral: Neck pain [M54.2], Upper back pain [M54.9]  Evaluation Date: 7/6/2023  Authorization Period Expiration: 6/29/2024  Plan of Care Expiration: 8/4/2023  Progress Note Due: 8/6/2023  Visit # / Visits authorized: 1/ 1 eval; 3/20 treatment  FOTO: 1/3     Precautions: Standard     PTA Visit #: 1/5     Time In: 9:00  Time Out: 9:55  Total Billable Time: 55 minutes    SUBJECTIVE     Pt reports: continued left sided headache that gets worse with exercise. He is taking pain medication for headaches. He wants to work on strengthening his legs to help with his balance.    He was partial compliant with home exercise program.  Response to previous treatment: na  Functional change: na    Pain: 5/10  Location: neck    OBJECTIVE     Objective Measures updated at progress report unless specified.     Treatment     Jovanny received the treatments listed below:       therapeutic exercises to develop strength, endurance, ROM, and flexibility for ( 10 minutes) including:              - supine cervical rotation - 2x10    - seated thoracic rotation 2x10               - seated thoracic extension on machine 20# - 2x10    - seated UT str 2x30s B   +standing thread the needle at wall 2x10 ea    + seated bow and arrow with red theraband     manual therapy techniques: were applied to cervical for (10 minutes) , including:               - STM to left upper traps and cervical paraspinals  - gentle cervical traction for suboccipital release   + moist heat during manual therapy at beginning of session     neuromuscular re-education activities to improve:  Balance, Coordination, Proprioception, Posture, and stability for (25 minutes) . The following activities were included:   - supine cervical nods on wedge - 2x10   - seated scapular retractions 2x10  - shoulder rolls 2x10 (max cues required)   + posterior pelvic tilts  + mini bridge  + diaphragmatic breathing   + transverse abdominus activation  + clams red theraband  + long arc quad      therapeutic activities to improve functional performance for (- minutes) , including:     gait training to improve functional mobility and safety for (- minutes) , including:     Moist heat with TENs (10 minutes)    Patient Education and Home Exercises     Home Exercises Provided and Patient Education Provided     Education provided:   - Continue HEP    Written Home Exercises Provided: Patient instructed to cont prior HEP. Exercises were reviewed and Jovanny was able to demonstrate them prior to the end of the session.  Jovanny demonstrated good  understanding of the education provided. See EMR under Patient Instructions for exercises provided during therapy sessions    See EMR under Patient Instructions for exercises provided eval    ASSESSMENT   Pt presents to therapy with reports of continued headaches on left side. Noted increased myofascial tension and trigger points in suboccipital muscles. Gentle cervical distraction initiated to improve muscle tension and promote relaxation. Education provided on delayed onset muscle soreness and pain. Patient agreeable to manual therapy within tolerance and performed additional exercises for posture as well as lower extremity strengthening working towards balance goals. Good relief noted with moist heat and TENs at end of session.     Jovanny Is progressing well towards his goals.   Pt prognosis is Good.     Pt will continue to benefit from skilled outpatient physical therapy to address the deficits listed in the problem list box on initial evaluation, provide pt/family education and to maximize  pt's level of independence in the home and community environment.     Pt's spiritual, cultural and educational needs considered and pt agreeable to plan of care and goals.     Anticipated barriers to physical therapy: transportation, has to take care of disabled wife     Goals:   Short Term Goals:  2 weeks  HEP: Patient will demonstrate 50% independence with HEP   Pain: Pt will demonstrate improved pain less than or equal to 5/10 at worse  Function: Patient will increase functional score to equal or greater than to 20 out of 100 on FOTO.  Mobility: Patient will improve cervical rotation by 10%  Strength: Patient will improve impaired strength to greater or equal to 4/5.   Balance: Patient will improve single leg balance by 3 seconds.      Long Term Goals:  4 weeks  HEP: Pt will be independent with advanced HEP.  Pain: Pt will demonstrate improved pain less than or equal to 3/10 at worse in order to progress toward maximal functional ability and improve QOL.  Function: Patient will  increase functional score to equal or greater than to 30 out of 100 on FOTO to improve functional independence and quality of life.   Mobility: Patient will demonstrate dysfunctional normal trunk rotation to return to prior level of function.   Strength: Patient will improve impaired strength to greater or equal to 4+/5 in order to return to PLOF  Gait: Patient will demonstrate normalized gait mechanics with minimal compensation in order to reduce risk of falls    PLAN     Plan of care Certification: 7/6/2023 to 8/4/2023.     Outpatient Physical Therapy 2 times weekly for 4 weeks to include the following interventions: Cervical/Lumbar Traction, Electrical Stimulation to cervical region, Manual Therapy, Moist Heat/ Ice, Neuromuscular Re-ed, Patient Education, Self Care, Therapeutic Activities, Therapeutic Exercise, and FDN .     Dona Anton, PTA

## 2023-07-20 ENCOUNTER — CLINICAL SUPPORT (OUTPATIENT)
Dept: REHABILITATION | Facility: HOSPITAL | Age: 88
End: 2023-07-20
Payer: MEDICARE

## 2023-07-20 DIAGNOSIS — M54.6 ACUTE THORACIC BACK PAIN, UNSPECIFIED BACK PAIN LATERALITY: ICD-10-CM

## 2023-07-20 DIAGNOSIS — M54.2 CERVICALGIA: Primary | ICD-10-CM

## 2023-07-20 PROCEDURE — 97112 NEUROMUSCULAR REEDUCATION: CPT | Mod: PN

## 2023-07-20 PROCEDURE — 97014 ELECTRIC STIMULATION THERAPY: CPT | Mod: PN

## 2023-07-20 PROCEDURE — 97110 THERAPEUTIC EXERCISES: CPT | Mod: PN

## 2023-07-20 NOTE — PROGRESS NOTES
[facility]   Physical Therapy Treatment Note     Name: Jovanny Olmedo  Clinic Number: [id number]    Therapy Diagnosis:   Encounter Diagnoses   Name Primary?    Cervicalgia Yes    Acute thoracic back pain, unspecified back pain laterality      Physician: Taylor Pantoja NP    Visit Date: 7/20/2023    Physician Orders: PT Eval and Treat   Medical Diagnosis from Referral: Neck pain [M54.2], Upper back pain [M54.9]  Evaluation Date: 7/6/2023  Authorization Period Expiration: 6/29/2024  Plan of Care Expiration: 8/4/2023  Progress Note Due: 8/6/2023  Visit # / Visits authorized: 1/ 1 eval; 4/20 treatment  FOTO: 1/3     Precautions: Standard     PTA Visit #: 1/5     Time In: 9:00  Time Out: 9:54  Total Billable Time: 42 minutes (+10 minutes on M/H and TENs)     SUBJECTIVE     Pt reports: continue to note worsening of neck pain at this time.     He was partial compliant with home exercise program.  Response to previous treatment: na  Functional change: na    Pain: 5/10  Location: neck    OBJECTIVE     Objective Measures updated at progress report unless specified.     Treatment     Jovanny received the treatments listed below:    (Exercises performed today in bold)      therapeutic exercises to develop strength, endurance, ROM, and flexibility for ( 12 minutes) including:              - UBE 2'/2'  - supine cervical rotation - 2x10    - seated UT str 2x30s B   - seated bow and arrow with red theraband x10 ea  - standing thread the needle at wall 2x10 ea   - seated thoracic extension on machine 20# - 2x10      manual therapy techniques: were applied to cervical for (00 minutes) , including:               - STM to left upper traps and cervical paraspinals  - gentle cervical traction for suboccipital release   - moist heat during manual therapy at beginning of session     neuromuscular re-education activities to improve: Balance, Coordination, Proprioception, Posture, and stability for (30 minutes) .  The following activities were included:   - supine cervical nods on wedge - 2x10   - seated scapular retractions 2x10  - shoulder rolls 2x10   - posterior pelvic tilts 2x10   - mini bridge 2x10   - diaphragmatic breathing 2 min   - transverse abdominus activation 2x10   - clams red theraband 2x10 ea  - long arc quad 2x10 ea      therapeutic activities to improve functional performance for (- minutes) , including:     gait training to improve functional mobility and safety for (- minutes) , including:     Moist heat with TENs to cervical (10 minutes)    Patient Education and Home Exercises     Home Exercises Provided and Patient Education Provided     Education provided:   - Continue HEP    Written Home Exercises Provided: Patient instructed to cont prior HEP. Exercises were reviewed and Jovanny was able to demonstrate them prior to the end of the session.  Jovanny demonstrated good  understanding of the education provided. See EMR under Patient Instructions for exercises provided during therapy sessions    See EMR under Patient Instructions for exercises provided eval    ASSESSMENT   Pt with better tolerance to exercises today. Good tolerance to progression of repetition and required less encouragement to perform exercises. Focused on mobility and LE strengthening to improve balance in order to work towards goals. Pt demonstrates tight hamstrings bilaterally; able to perform LAQ with posterior trunk lean. Pt with mild LOB posteriorly at wall, however, was able to self recover. Pt will continue to benefit from skilled physical therapy to address impaired deficits and decrease risk of falls.     Jovanny Is progressing well towards his goals.   Pt prognosis is Good.     Pt will continue to benefit from skilled outpatient physical therapy to address the deficits listed in the problem list box on initial evaluation, provide pt/family education and to maximize pt's level of independence in the home and community environment.      Pt's spiritual, cultural and educational needs considered and pt agreeable to plan of care and goals.     Anticipated barriers to physical therapy: transportation, has to take care of disabled wife     Goals:   Short Term Goals:  2 weeks  HEP: Patient will demonstrate 50% independence with HEP   Pain: Pt will demonstrate improved pain less than or equal to 5/10 at worse  Function: Patient will increase functional score to equal or greater than to 20 out of 100 on FOTO.  Mobility: Patient will improve cervical rotation by 10%  Strength: Patient will improve impaired strength to greater or equal to 4/5.   Balance: Patient will improve single leg balance by 3 seconds.      Long Term Goals:  4 weeks  HEP: Pt will be independent with advanced HEP.  Pain: Pt will demonstrate improved pain less than or equal to 3/10 at worse in order to progress toward maximal functional ability and improve QOL.  Function: Patient will  increase functional score to equal or greater than to 30 out of 100 on FOTO to improve functional independence and quality of life.   Mobility: Patient will demonstrate dysfunctional normal trunk rotation to return to prior level of function.   Strength: Patient will improve impaired strength to greater or equal to 4+/5 in order to return to PLOF  Gait: Patient will demonstrate normalized gait mechanics with minimal compensation in order to reduce risk of falls    PLAN     Plan of care Certification: 7/6/2023 to 8/4/2023.     Outpatient Physical Therapy 2 times weekly for 4 weeks to include the following interventions: Cervical/Lumbar Traction, Electrical Stimulation to cervical region, Manual Therapy, Moist Heat/ Ice, Neuromuscular Re-ed, Patient Education, Self Care, Therapeutic Activities, Therapeutic Exercise, and FDN .     Felisha Ontiveros, PT

## 2023-07-21 ENCOUNTER — HOSPITAL ENCOUNTER (OUTPATIENT)
Dept: RADIOLOGY | Facility: HOSPITAL | Age: 88
Discharge: HOME | End: 2023-07-21
Attending: FAMILY MEDICINE
Payer: MEDICARE

## 2023-07-21 ENCOUNTER — TELEPHONE (OUTPATIENT)
Dept: INTERNAL MEDICINE | Facility: CLINIC | Age: 88
End: 2023-07-21

## 2023-07-21 ENCOUNTER — OFFICE VISIT (OUTPATIENT)
Dept: UROLOGY | Facility: CLINIC | Age: 88
End: 2023-07-21
Payer: MEDICARE

## 2023-07-21 ENCOUNTER — OFFICE VISIT (OUTPATIENT)
Dept: INTERNAL MEDICINE | Facility: CLINIC | Age: 88
End: 2023-07-21
Payer: MEDICARE

## 2023-07-21 VITALS
RESPIRATION RATE: 18 BRPM | HEIGHT: 72 IN | SYSTOLIC BLOOD PRESSURE: 124 MMHG | BODY MASS INDEX: 31.08 KG/M2 | WEIGHT: 229.5 LBS | HEART RATE: 72 BPM | OXYGEN SATURATION: 96 % | DIASTOLIC BLOOD PRESSURE: 86 MMHG

## 2023-07-21 VITALS
DIASTOLIC BLOOD PRESSURE: 87 MMHG | WEIGHT: 226.19 LBS | HEIGHT: 72 IN | HEART RATE: 77 BPM | SYSTOLIC BLOOD PRESSURE: 138 MMHG | BODY MASS INDEX: 30.64 KG/M2

## 2023-07-21 DIAGNOSIS — I20.89 STABLE ANGINA PECTORIS: ICD-10-CM

## 2023-07-21 DIAGNOSIS — I71.40 ABDOMINAL AORTIC ANEURYSM (AAA) WITHOUT RUPTURE, UNSPECIFIED PART: Primary | ICD-10-CM

## 2023-07-21 DIAGNOSIS — M62.830 LUMBAR PARASPINAL MUSCLE SPASM: ICD-10-CM

## 2023-07-21 DIAGNOSIS — M62.838 CERVICAL PARASPINAL MUSCLE SPASM: ICD-10-CM

## 2023-07-21 DIAGNOSIS — M62.838 CERVICAL PARASPINAL MUSCLE SPASM: Primary | ICD-10-CM

## 2023-07-21 DIAGNOSIS — Z98.61 HISTORY OF CORONARY ANGIOPLASTY: ICD-10-CM

## 2023-07-21 DIAGNOSIS — I25.10 CORONARY ARTERY DISEASE, OCCLUSIVE: ICD-10-CM

## 2023-07-21 DIAGNOSIS — I10 ESSENTIAL HYPERTENSION: Chronic | ICD-10-CM

## 2023-07-21 DIAGNOSIS — M51.36 DDD (DEGENERATIVE DISC DISEASE), LUMBAR: ICD-10-CM

## 2023-07-21 DIAGNOSIS — C61 PROSTATE CANCER: Primary | ICD-10-CM

## 2023-07-21 DIAGNOSIS — E66.9 OBESITY (BMI 30.0-34.9): ICD-10-CM

## 2023-07-21 PROBLEM — M54.2 CERVICALGIA: Status: RESOLVED | Noted: 2023-07-06 | Resolved: 2023-07-21

## 2023-07-21 PROCEDURE — 72050 XR CERVICAL SPINE COMPLETE 5 VIEW: ICD-10-PCS | Mod: 26,,, | Performed by: RADIOLOGY

## 2023-07-21 PROCEDURE — 72110 X-RAY EXAM L-2 SPINE 4/>VWS: CPT | Mod: 26,,, | Performed by: RADIOLOGY

## 2023-07-21 PROCEDURE — 99999 PR PBB SHADOW E&M-EST. PATIENT-LVL III: CPT | Mod: PBBFAC,,, | Performed by: UROLOGY

## 2023-07-21 PROCEDURE — 99214 OFFICE O/P EST MOD 30 MIN: CPT | Mod: S$PBB,,, | Performed by: UROLOGY

## 2023-07-21 PROCEDURE — 72110 X-RAY EXAM L-2 SPINE 4/>VWS: CPT | Mod: TC

## 2023-07-21 PROCEDURE — 99999 PR PBB SHADOW E&M-EST. PATIENT-LVL III: ICD-10-PCS | Mod: PBBFAC,,, | Performed by: UROLOGY

## 2023-07-21 PROCEDURE — 99214 OFFICE O/P EST MOD 30 MIN: CPT | Mod: S$PBB,,, | Performed by: FAMILY MEDICINE

## 2023-07-21 PROCEDURE — 72110 XR LUMBAR SPINE COMPLETE 5 VIEW: ICD-10-PCS | Mod: 26,,, | Performed by: RADIOLOGY

## 2023-07-21 PROCEDURE — 72050 X-RAY EXAM NECK SPINE 4/5VWS: CPT | Mod: TC

## 2023-07-21 PROCEDURE — 99999 PR PBB SHADOW E&M-EST. PATIENT-LVL V: CPT | Mod: PBBFAC,,, | Performed by: FAMILY MEDICINE

## 2023-07-21 PROCEDURE — 99213 OFFICE O/P EST LOW 20 MIN: CPT | Mod: PBBFAC | Performed by: UROLOGY

## 2023-07-21 PROCEDURE — 99215 OFFICE O/P EST HI 40 MIN: CPT | Mod: PBBFAC,27 | Performed by: FAMILY MEDICINE

## 2023-07-21 PROCEDURE — 72070 XR THORACIC SPINE AP LATERAL: ICD-10-PCS | Mod: 26,,, | Performed by: RADIOLOGY

## 2023-07-21 PROCEDURE — 99999 PR PBB SHADOW E&M-EST. PATIENT-LVL V: ICD-10-PCS | Mod: PBBFAC,,, | Performed by: FAMILY MEDICINE

## 2023-07-21 PROCEDURE — 99214 PR OFFICE/OUTPT VISIT, EST, LEVL IV, 30-39 MIN: ICD-10-PCS | Mod: S$PBB,,, | Performed by: FAMILY MEDICINE

## 2023-07-21 PROCEDURE — 72050 X-RAY EXAM NECK SPINE 4/5VWS: CPT | Mod: 26,,, | Performed by: RADIOLOGY

## 2023-07-21 PROCEDURE — 72070 X-RAY EXAM THORAC SPINE 2VWS: CPT | Mod: 26,,, | Performed by: RADIOLOGY

## 2023-07-21 PROCEDURE — 99214 PR OFFICE/OUTPT VISIT, EST, LEVL IV, 30-39 MIN: ICD-10-PCS | Mod: S$PBB,,, | Performed by: UROLOGY

## 2023-07-21 PROCEDURE — 72070 X-RAY EXAM THORAC SPINE 2VWS: CPT | Mod: TC

## 2023-07-21 RX ORDER — METHOCARBAMOL 750 MG/1
750 TABLET, FILM COATED ORAL 3 TIMES DAILY
Qty: 30 TABLET | Refills: 0 | Status: SHIPPED | OUTPATIENT
Start: 2023-07-21 | End: 2024-02-05

## 2023-07-21 RX ORDER — TAMSULOSIN HYDROCHLORIDE 0.4 MG/1
0.4 CAPSULE ORAL DAILY
Qty: 30 CAPSULE | Refills: 11 | Status: SHIPPED | OUTPATIENT
Start: 2023-07-21 | End: 2024-07-20
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
## 2023-07-21 NOTE — PROGRESS NOTES
Clinic Note  7/20/2023      Subjective:         Chief Complaint:   HPI  Jovanny Olmedo is a 87 y.o. male with history of prostate cancer.. Here with his son.  He established with Dr. Gurrola in 2013 for prostate cancer. Initially had PSA 11.3, [place] 4+3(GG3). He then completed EBRT with Dr. Deng in 2014 at [facility]. Had PE 1 month ago, now on blood thinners. Still SOB with walking.  PSA had dropped to 0. 7 shortly after completion.  PSA then began to rise in 2019.   PSA  3.6 on 1/11/21.  Axumin 2/3/21 showed 2+ cm focus of uptake in right lobe of prostate.Negative SVI, bladder, nodes.  MRI 7/27/2021- 2.3 cm PI-RADS 4 lesion right apex , negative extra prostatic extension, NVBI (neurovascular bundle involvement), SVI (seminal vesicle involvement), nodes.     Path 12/30/2013- [place] 4+3 right base 2/2 70%, right middle 2/2 80%, right apex 2/2 20%.  Bone scan 4//2022- no metastasis     1/6/2023- last visit 10/15/2021!  Had Covid 2 months ago, still has cough. Stable LUTS.    7/21/2023- PSA 10.9. Patient with BCR (biochemical recurrence) after EBRT for unfavorable intermediate risk prostate cancer in 2014. Has elected observation.  Bone scan 7/11/2023- no metastasis.  LUTS- nocturia 3-4x/noc, significant bother.  Discussed ADT, patient refused. Discussed Flomax. He is interested. Discussed usage and side effects.  Cialis not working, asked for BALTAZAR prescription.      Lab Results   Component Value Date    PSA 1.1 08/13/2019    PSA 16.5 (H) 12/16/2013    PSA 6.3 (H) 01/19/2011    PSA 3.0 09/15/2008    PSADIAG 10.9 (H) 07/11/2023    PSADIAG 12.2 (H) 01/06/2023    PSADIAG 9.7 (H) 04/11/2022    PSADIAG 7.0 (H) 10/11/2021    PSADIAG 5.4 (H) 06/14/2021    PSADIAG 3.6 01/11/2021    PSADIAG 3.5 12/02/2020    PSADIAG 0.53 12/05/2018    PSADIAG 0.31 11/27/2017    PSADIAG 0.38 05/19/2017      Past Medical History:   Diagnosis Date    Arthritis     Back pain     CAD (coronary artery disease)     Cataract     CKD (chronic kidney  disease)     GERD (gastroesophageal reflux disease)     Glaucoma     suspect    HTN (hypertension)     Multiple subsegmental pulmonary emboli without acute cor pulmonale 2021    Prostate cancer     tx'd with radiation    PVD (peripheral vascular disease)     stent in right renal artery and aorta    Sleep apnea     Stable angina pectoris 2023     Family History   Problem Relation Age of Onset    Glaucoma Mother     Diabetes Mother     Kidney disease Mother     Colon cancer Father     Cancer Father         colon cancer    Diabetes Sister     Diabetes Brother     Hypertension Brother     Blindness Neg Hx      Social History     Socioeconomic History    Marital status:    Tobacco Use    Smoking status: Former     Packs/day: 0.50     Years: 30.00     Pack years: 15.00     Types: Cigarettes     Quit date: 1995     Years since quittin.8    Smokeless tobacco: Former   Substance and Sexual Activity    Alcohol use: Not Currently     Alcohol/week: 0.0 standard drinks    Drug use: Yes     Frequency: 4.0 times per week     Types: Marijuana    Sexual activity: Yes     Partners: Female   Social History Narrative         Past Surgical History:   Procedure Laterality Date    ABDOMINAL AORTA STENT      CARDIAC CATHETERIZATION      CATARACT EXTRACTION W/  INTRAOCULAR LENS IMPLANT Right 2017    COLONOSCOPY N/A 2020    Procedure: COLONOSCOPY;  Surgeon: Shayy Melvin MD;  Location: [facility] ENDO;  Service: Endoscopy;  Laterality: N/A;    COLONOSCOPY N/A 1/10/2022    Procedure: COLONOSCOPY;  Surgeon: Vi Lara MD;  Location: [facility] ENDO;  Service: Endoscopy;  Laterality: N/A;    CORONARY ANGIOPLASTY      CORONARY STENT PLACEMENT      PCIOL Right 2017    DR. LEDEZMA    PCIOL Left 2019    DR. LEDEZMA    PROSTATE BIOPSY      RENAL ARTERY STENT       Patient Active Problem List   Diagnosis    Essential hypertension    GERD (gastroesophageal reflux disease)    Polycystic kidney  disease    Coronary artery disease, occlusive    Refractive error    DDD (degenerative disc disease), lumbar    Open angle with borderline findings, low risk, bilateral    History of coronary angioplasty    Prostate cancer    Secondary hyperparathyroidism    Pseudophakia    Intermediate stage nonexudative age-related macular degeneration of both eyes    Aortic atherosclerosis    CLARISSA on CPAP    Duodenal ulcer    History of colon polyps    Dyslipidemia    Abdominal aortic aneurysm (AAA) without rupture    History of pulmonary embolus (PE)    Thrombocytopenia    Obesity (BMI 30.0-34.9)    Stable angina pectoris    Cervicalgia    Thoracic back pain     Review of Systems   Constitutional:  Negative for appetite change, chills, fatigue, fever and unexpected weight change.   HENT:  Negative for nosebleeds.    Respiratory:  Negative for shortness of breath and wheezing.    Cardiovascular:  Negative for chest pain, palpitations and leg swelling.   Gastrointestinal:  Negative for abdominal distention, abdominal pain, constipation, diarrhea, nausea and vomiting.   Musculoskeletal:  Negative for arthralgias and back pain.   Skin:  Negative for pallor.   Neurological:  Negative for dizziness, seizures and syncope.   Hematological:  Negative for adenopathy.   Psychiatric/Behavioral:  Negative for dysphoric mood.        Objective:      There were no vitals taken for this visit.  Estimated body mass index is 30.68 kg/m² as calculated from the following:    Height as of 6/30/23: 6' (1.829 m).    Weight as of 6/30/23: 102.6 kg (226 lb 3.1 oz).  Physical Exam      Assessment and Plan:           Problem List Items Addressed This Visit       Prostate cancer - Primary    Overview     Dr mirza            Follow up:   As above.  6 months with PSA.    Nav Mirza

## 2023-07-21 NOTE — TELEPHONE ENCOUNTER
X-ray of the neck upper back and lower back shows significant arthritis changes  Taking medications as prescribed and follow-up with spine specialist    Noted aorta bi-iliac endograft in place-will order CT abdomen pelvis to re assess - or discuss further with Cardiology

## 2023-07-22 DIAGNOSIS — K21.9 GASTROESOPHAGEAL REFLUX DISEASE, UNSPECIFIED WHETHER ESOPHAGITIS PRESENT: ICD-10-CM

## 2023-07-22 RX ORDER — PANTOPRAZOLE SODIUM 40 MG/1
TABLET, DELAYED RELEASE ORAL
Qty: 90 TABLET | Refills: 3 | Status: SHIPPED | OUTPATIENT
Start: 2023-07-22

## 2023-07-22 NOTE — TELEPHONE ENCOUNTER
Refill Decision Note   Jovanny Olmedo  is requesting a refill authorization.  Brief Assessment and Rationale for Refill:  Approve     Medication Therapy Plan:         Comments:     Note composed:5:21 PM 07/22/2023

## 2023-07-24 ENCOUNTER — CLINICAL SUPPORT (OUTPATIENT)
Dept: REHABILITATION | Facility: HOSPITAL | Age: 88
End: 2023-07-24
Payer: MEDICARE

## 2023-07-24 ENCOUNTER — DOCUMENTATION ONLY (OUTPATIENT)
Dept: REHABILITATION | Facility: HOSPITAL | Age: 88
End: 2023-07-24
Payer: MEDICARE

## 2023-07-24 DIAGNOSIS — M54.6 ACUTE THORACIC BACK PAIN, UNSPECIFIED BACK PAIN LATERALITY: Primary | ICD-10-CM

## 2023-07-24 PROCEDURE — 97014 ELECTRIC STIMULATION THERAPY: CPT | Mod: PN,CQ

## 2023-07-24 PROCEDURE — 97530 THERAPEUTIC ACTIVITIES: CPT | Mod: PN,CQ

## 2023-07-24 PROCEDURE — 97110 THERAPEUTIC EXERCISES: CPT | Mod: PN,CQ

## 2023-07-24 PROCEDURE — 97112 NEUROMUSCULAR REEDUCATION: CPT | Mod: PN,CQ

## 2023-07-24 NOTE — PROGRESS NOTES
"[facility]   Physical Therapy Treatment Note     Name: Jovanny Olmedo  Clinic Number: [id number]    Therapy Diagnosis:   Encounter Diagnosis   Name Primary?    Acute thoracic back pain, unspecified back pain laterality Yes     Physician: Taylor Pantoja NP    Visit Date: 7/24/2023    Physician Orders: PT Eval and Treat   Medical Diagnosis from Referral: Neck pain [M54.2], Upper back pain [M54.9]  Evaluation Date: 7/6/2023  Authorization Period Expiration: 6/29/2024  Plan of Care Expiration: 8/4/2023  Progress Note Due: 8/6/2023  Visit # / Visits authorized: 1/ 1 eval; 5/20 treatment  FOTO: 2/3     Intake Outcome Measure for FOTO neck Survey     Therapist reviewed FOTO scores for Jovanny Olmedo on 7/24/2023.   FOTO documents entered into EPIC - see Media section.     Intake Score: 54%           Precautions: Standard     PTA Visit #: 1/5     Time In: 8:55  Time Out: 9:55  Total Billable Time: 45 minutes (+10 minutes on M/H and TENs)     SUBJECTIVE     Pt reports: "I can do things without my neck but I need my legs back." Patient reports he use to bathe but has to shower now because its too painful. His neck pain and headaches are getting worse and he is sleeping in recliner due to neck pain.    He was partial compliant with home exercise program.  Response to previous treatment: good response to modalities  Functional change: na    Pain: 6-7/10  Location: neck    OBJECTIVE     Objective Measures updated at progress report unless specified.     Treatment     Jovanny received the treatments listed below:    (Exercises performed today in bold)      neuromuscular re-education activities to improve: Balance, Coordination, Proprioception, Posture, and stability for (25 minutes) . The following activities were included:  + Heel raises 2x10  + lateral stepping  + marching  + backwards walking  + step taps x 5 (neck pain reported with upright posture)  - diaphragmatic breathing 2 min   - long arc quad " x8 ea      - supine cervical nods on wedge - 2x10   - seated scapular retractions 2x10  - shoulder rolls 2x10   - posterior pelvic tilts 2x10   - mini bridge 2x10   - transverse abdominus activation 2x10   - clams red theraband 2x10 ea    therapeutic activities to improve functional performance for (10 minutes) , including:  Step ups on 4 inch x10 BLE  Sit to stand from 20 inch box 2x5    therapeutic exercises to develop strength, endurance, ROM, and flexibility for ( 10 minutes) including:              - UBE 2'/2'   - seated bow and arrow with red theraband 2x5 ea  - seated thoracic rotation on machine - 2x10   - seated thoracic extension on machine 20# - 2x10  - supine cervical rotation - 2x10  - standing thread the needle at wall 2x10 ea   - seated UT str 2x30s B    manual therapy techniques: were applied to cervical for (00 minutes) , including:               - STM to left upper traps and cervical paraspinals  - gentle cervical traction for suboccipital release   - moist heat during manual therapy at beginning of session     gait training to improve functional mobility and safety for (- minutes) , including:     Moist heat with TENs to cervical (10 minutes)    Patient Education and Home Exercises     Home Exercises Provided and Patient Education Provided     Education provided:   - Continue HEP    Written Home Exercises Provided: Patient instructed to cont prior HEP. Exercises were reviewed and Jovanny was able to demonstrate them prior to the end of the session.  Jovanny demonstrated good  understanding of the education provided. See EMR under Patient Instructions for exercises provided during therapy sessions    See EMR under Patient Instructions for exercises provided eval    ASSESSMENT   Pt presents to therapy with reports of worsening neck pain and headaches. Patient limited with repetitions of exercises due to neck pain when working on upright posture. Patient performed additional leg strengthening and balance  exercises working towards patient goal of returning to taking baths. Patient demonstrates improvement in gait upon exiting therapy. Despite subjective reports of no improvement, patient's foto score demonstrates improvement in ability to perform functional activities.    Pt demonstrates tight hamstrings bilaterally; able to perform LAQ with posterior trunk lean and will benefit from further stretching. Pt will continue to benefit from skilled physical therapy to address impaired deficits and decrease risk of falls.     Jovanny Is progressing well towards his goals.   Pt prognosis is Good.     Pt will continue to benefit from skilled outpatient physical therapy to address the deficits listed in the problem list box on initial evaluation, provide pt/family education and to maximize pt's level of independence in the home and community environment.     Pt's spiritual, cultural and educational needs considered and pt agreeable to plan of care and goals.     Anticipated barriers to physical therapy: transportation, has to take care of disabled wife     Goals:   Short Term Goals:  2 weeks  HEP: Patient will demonstrate 50% independence with HEP   Pain: Pt will demonstrate improved pain less than or equal to 5/10 at worse  Function: Patient will increase functional score to equal or greater than to 20 out of 100 on FOTO.  Mobility: Patient will improve cervical rotation by 10%  Strength: Patient will improve impaired strength to greater or equal to 4/5.   Balance: Patient will improve single leg balance by 3 seconds.      Long Term Goals:  4 weeks  HEP: Pt will be independent with advanced HEP.  Pain: Pt will demonstrate improved pain less than or equal to 3/10 at worse in order to progress toward maximal functional ability and improve QOL.  Function: Patient will  increase functional score to equal or greater than to 30 out of 100 on FOTO to improve functional independence and quality of life.   Mobility: Patient will  demonstrate dysfunctional normal trunk rotation to return to prior level of function.   Strength: Patient will improve impaired strength to greater or equal to 4+/5 in order to return to PLOF  Gait: Patient will demonstrate normalized gait mechanics with minimal compensation in order to reduce risk of falls    PLAN     Plan of care Certification: 7/6/2023 to 8/4/2023.     Outpatient Physical Therapy 2 times weekly for 4 weeks to include the following interventions: Cervical/Lumbar Traction, Electrical Stimulation to cervical region, Manual Therapy, Moist Heat/ Ice, Neuromuscular Re-ed, Patient Education, Self Care, Therapeutic Activities, Therapeutic Exercise, and FDN .     Dona Anton, PTA

## 2023-07-24 NOTE — PROGRESS NOTES
PT/PTA met face to face to discuss pt's treatment plan and progress towards established goals. Pt will be seen by a physical therapist minimally every 6th visit or every 30 days.    Dona Anton PTA

## 2023-07-25 ENCOUNTER — TELEPHONE (OUTPATIENT)
Dept: PAIN MEDICINE | Facility: CLINIC | Age: 88
End: 2023-07-25
Payer: MEDICARE

## 2023-07-26 ENCOUNTER — TELEPHONE (OUTPATIENT)
Dept: PAIN MEDICINE | Facility: CLINIC | Age: 88
End: 2023-07-26
Payer: MEDICARE

## 2023-07-27 ENCOUNTER — CLINICAL SUPPORT (OUTPATIENT)
Dept: REHABILITATION | Facility: HOSPITAL | Age: 88
End: 2023-07-27
Payer: MEDICARE

## 2023-07-27 DIAGNOSIS — M54.6 ACUTE THORACIC BACK PAIN, UNSPECIFIED BACK PAIN LATERALITY: Primary | ICD-10-CM

## 2023-07-27 PROCEDURE — 97112 NEUROMUSCULAR REEDUCATION: CPT | Mod: PN

## 2023-07-27 PROCEDURE — 97530 THERAPEUTIC ACTIVITIES: CPT | Mod: PN

## 2023-07-27 PROCEDURE — 97110 THERAPEUTIC EXERCISES: CPT | Mod: PN

## 2023-07-27 NOTE — PROGRESS NOTES
"[facility]   Physical Therapy Treatment Note     Name: Jovanny Olmedo  Clinic Number: [id number]    Therapy Diagnosis:   Encounter Diagnosis   Name Primary?    Acute thoracic back pain, unspecified back pain laterality Yes     Physician: Taylor Pantoja NP    Visit Date: 7/27/2023    Physician Orders: PT Eval and Treat   Medical Diagnosis from Referral: Neck pain [M54.2], Upper back pain [M54.9]  Evaluation Date: 7/6/2023  Authorization Period Expiration: 6/29/2024  Plan of Care Expiration: 8/4/2023  Progress Note Due: 8/6/2023  Visit # / Visits authorized: 1/ 1 eval; 6/20 treatment  FOTO: 2/3     Intake Outcome Measure for FOTO neck Survey     Therapist reviewed FOTO scores for Jovanny Olmedo on 7/24/2023.   FOTO documents entered into EPIC - see Media section.     Intake Score: 54%           Precautions: Standard     PTA Visit #: 1/5     Time In: 9:00  Time Out: 10:00  Total Billable Time: 60 minutes     SUBJECTIVE     Pt reports: he as seen his PCP and is being referred to pain management for his neck pain. States the earliest he was able to schedule was in September and is hoping they will have a cancel to get in sooner.     He was partial compliant with home exercise program.  Response to previous treatment: good response to modalities  Functional change: na    Pain: 6-7/10  Location: neck    OBJECTIVE     Objective Measures updated at progress report unless specified.     Treatment     Jovanny received the treatments listed below:    (Exercises performed today in bold)      neuromuscular re-education activities to improve: Balance, Coordination, Proprioception, Posture, and stability for (30 minutes) . The following activities were included:  - Heel raises 2x10  - lateral stepping x3 laps in // bar  - marching in // bars 2x10   - forward/backwards walking in // bar x3 laps   - step taps 6" 2x10    - diaphragmatic breathing 2 min   - long arc quad 2x10 ea      - supine cervical nods on " wedge - 2x10   - seated scapular retractions 2x10  - shoulder rolls 2x10   - posterior pelvic tilts 2x10   - mini bridge 2x10   - transverse abdominus activation 2x10   - clams red theraband 2x10 ea    therapeutic activities to improve functional performance for (10 minutes) , including:  - Step ups on 4 inch 2x10 BLE  - Sit to stand from 20 inch box 2x8    therapeutic exercises to develop strength, endurance, ROM, and flexibility for ( 10 minutes) including:              - UBE 2'/2'   - seated bow and arrow with red theraband 2x5 ea  - seated thoracic rotation on machine - 2x10   - seated thoracic extension on machine 40# - 2x10  - supine cervical rotation - 2x10  - standing thread the needle at wall 2x10 ea   - seated UT str 2x30s B    manual therapy techniques: were applied to cervical for (00 minutes) , including:    None performed today     gait training to improve functional mobility and safety for (- minutes) , including:     Moist heat with TENs to cervical (10 minutes)    Patient Education and Home Exercises     Home Exercises Provided and Patient Education Provided     Education provided:   - Continue HEP    Written Home Exercises Provided: Patient instructed to cont prior HEP. Exercises were reviewed and Jovanny was able to demonstrate them prior to the end of the session.  Jovanny demonstrated good  understanding of the education provided. See EMR under Patient Instructions for exercises provided during therapy sessions    See EMR under Patient Instructions for exercises provided eval    ASSESSMENT     Pt with significant improvement in functional movement and lower extremity exercises. Pt with no increase of pain with increase of repetition. Pt able to perform forward walking and backward walking in parallel bar with no upper extremity assist. Performed minimal cervical exercises secondary to increase pain. Provided moist heat and TENs for pain to cervical reduction. Continue to focus on lower extremity  strengthening and balance to improve gait and reduce risk of falls.     Pt demonstrates tight hamstrings bilaterally; able to perform LAQ with posterior trunk lean and will benefit from further stretching. Pt will continue to benefit from skilled physical therapy to address impaired deficits and decrease risk of falls.     Jovanny Is progressing well towards his goals.   Pt prognosis is Good.     Pt will continue to benefit from skilled outpatient physical therapy to address the deficits listed in the problem list box on initial evaluation, provide pt/family education and to maximize pt's level of independence in the home and community environment.     Pt's spiritual, cultural and educational needs considered and pt agreeable to plan of care and goals.     Anticipated barriers to physical therapy: transportation, has to take care of disabled wife     Goals:   Short Term Goals:  2 weeks  HEP: Patient will demonstrate 50% independence with HEP met  Pain: Pt will demonstrate improved pain less than or equal to 5/10 at worse plateau  Function: Patient will increase functional score to equal or greater than to 20 out of 100 on FOTO. progressing  Mobility: Patient will improve cervical rotation by 10% plateau  Strength: Patient will improve impaired strength to greater or equal to 4/5. progressing  Balance: Patient will improve single leg balance by 3 seconds. progressing     Long Term Goals:  4 weeks  HEP: Pt will be independent with advanced HEP.  Pain: Pt will demonstrate improved pain less than or equal to 3/10 at worse in order to progress toward maximal functional ability and improve QOL.  Function: Patient will  increase functional score to equal or greater than to 30 out of 100 on FOTO to improve functional independence and quality of life.   Mobility: Patient will demonstrate dysfunctional normal trunk rotation to return to prior level of function.   Strength: Patient will improve impaired strength to greater or  equal to 4+/5 in order to return to PLOF  Gait: Patient will demonstrate normalized gait mechanics with minimal compensation in order to reduce risk of falls    PLAN     Plan of care Certification: 7/6/2023 to 8/4/2023.     Outpatient Physical Therapy 2 times weekly for 4 weeks to include the following interventions: Cervical/Lumbar Traction, Electrical Stimulation to cervical region, Manual Therapy, Moist Heat/ Ice, Neuromuscular Re-ed, Patient Education, Self Care, Therapeutic Activities, Therapeutic Exercise, and FDN .     Felisha Ontiveros, PT

## 2023-07-31 ENCOUNTER — TELEPHONE (OUTPATIENT)
Dept: INTERNAL MEDICINE | Facility: CLINIC | Age: 88
End: 2023-07-31
Payer: MEDICARE

## 2023-07-31 ENCOUNTER — CLINICAL SUPPORT (OUTPATIENT)
Dept: REHABILITATION | Facility: HOSPITAL | Age: 88
End: 2023-07-31
Payer: MEDICARE

## 2023-07-31 DIAGNOSIS — I71.40 ABDOMINAL AORTIC ANEURYSM (AAA) WITHOUT RUPTURE, UNSPECIFIED PART: Primary | ICD-10-CM

## 2023-07-31 DIAGNOSIS — M54.6 ACUTE THORACIC BACK PAIN, UNSPECIFIED BACK PAIN LATERALITY: Primary | ICD-10-CM

## 2023-07-31 PROCEDURE — 97110 THERAPEUTIC EXERCISES: CPT | Mod: PN

## 2023-07-31 PROCEDURE — 97112 NEUROMUSCULAR REEDUCATION: CPT | Mod: PN

## 2023-07-31 PROCEDURE — 97530 THERAPEUTIC ACTIVITIES: CPT | Mod: PN

## 2023-07-31 NOTE — TELEPHONE ENCOUNTER
----- Message from Rito Sanford MA sent at 7/28/2023 12:21 PM CDT -----  Regarding: FW: CT  Please Advise.  ----- Message -----  From: RT Román  Sent: 7/28/2023  11:20 AM CDT  To: [facility] Staff  Subject: CT                                               Hi,     Mr Olmedo is scheduled for a CTA Abdomen. Per the xray report he needs a CTA ABDOMEN AND PELVIS to image the area of interest. Please change the order so we can include the pelvis.     Also, he needs a STAT CREATININE SERUM ordered and booked at least 1 1/2 hours prior to his CT on Tuesday. If the patient prefers, he can come in any day before then to have them drawn so we will not have to wait on results the day of the scan.     Thanks,   Sparkle   [phone]

## 2023-07-31 NOTE — PROGRESS NOTES
"[facility]   Physical Therapy Treatment Note     Name: Jovanny Olmedo  Clinic Number: [id number]    Therapy Diagnosis:   Encounter Diagnosis   Name Primary?    Acute thoracic back pain, unspecified back pain laterality Yes     Physician: Taylor Pantoja NP    Visit Date: 7/31/2023    Physician Orders: PT Eval and Treat   Medical Diagnosis from Referral: Neck pain [M54.2], Upper back pain [M54.9]  Evaluation Date: 7/6/2023  Authorization Period Expiration: 6/29/2024  Plan of Care Expiration: 8/4/2023  Progress Note Due: 8/6/2023  Visit # / Visits authorized: 1/ 1 eval; 7/20 treatment  FOTO: 2/3     Intake Outcome Measure for FOTO neck Survey     Therapist reviewed FOTO scores for Jovanny Olmedo on 7/24/2023.   FOTO documents entered into EPIC - see Media section.     Intake Score: 54%           Precautions: Standard     PTA Visit #: -/5     Time In: 9:00  Time Out: 9:55  Total Billable Time: 45 minutes +10 minutes of moist heat and tens     SUBJECTIVE     Pt reports: the weekend has been tough. Reports pain everywhere. States he has noticed improvement in his legs since starting physical therapy.     He was partial compliant with home exercise program.  Response to previous treatment: good response to modalities  Functional change: na    Pain: 6-7/10  Location: neck    OBJECTIVE     Objective Measures updated at progress report unless specified.     Treatment     Jovanny received the treatments listed below:    (Exercises performed today in bold)      neuromuscular re-education activities to improve: Balance, Coordination, Proprioception, Posture, and stability for (20 minutes) . The following activities were included:  - Heel raises 2x10  - lateral stepping x3 laps in // bar  - forward/backwards walking in // bar x3 laps   - step taps 6" 2x10    - diaphragmatic breathing 2 min   - long arc quad 2x10 ea     therapeutic activities to improve functional performance for (15 minutes) , " including:  - Step ups on 6 inch 2x10 BLE  - Sit to stand from 20 inch box 2x10    therapeutic exercises to develop strength, endurance, ROM, and flexibility for ( 10 minutes) including:              - UBE 2'/2'   - seated bow and arrow with red theraband 2x5 ea  - seated thoracic rotation on machine - 2x10   - seated thoracic extension on machine 40# - 2x10  - supine cervical rotation - 2x10  - standing thread the needle at wall 2x10 ea   - seated UT str 2x30s B  - seated cervical ROM x10 ea    manual therapy techniques: were applied to cervical for (00 minutes) , including:    None performed today     gait training to improve functional mobility and safety for (- minutes) , including:     Moist heat with TENs to cervical (10 minutes)    Patient Education and Home Exercises     Home Exercises Provided and Patient Education Provided     Education provided:   - Continue HEP    Written Home Exercises Provided: Patient instructed to cont prior HEP. Exercises were reviewed and Jovanny was able to demonstrate them prior to the end of the session.  Jovanny demonstrated good  understanding of the education provided. See EMR under Patient Instructions for exercises provided during therapy sessions    See EMR under Patient Instructions for exercises provided eval    ASSESSMENT     Pt demonstrates improvement with gait pattern, sit to stand transfers, and balance thus far. Continue to have pain with cervical ROM. Encouraged pt to move cervical as tolerated and educated pt on the importance of movement and exercises. Provided moist heat and TENs for pain to cervical reduction.     Jovanny Is progressing well towards his goals.   Pt prognosis is Good.     Pt will continue to benefit from skilled outpatient physical therapy to address the deficits listed in the problem list box on initial evaluation, provide pt/family education and to maximize pt's level of independence in the home and community environment.     Pt's spiritual, cultural  and educational needs considered and pt agreeable to plan of care and goals.     Anticipated barriers to physical therapy: transportation, has to take care of disabled wife     Goals:   Short Term Goals:  2 weeks  HEP: Patient will demonstrate 50% independence with HEP met  Pain: Pt will demonstrate improved pain less than or equal to 5/10 at worse plateau  Function: Patient will increase functional score to equal or greater than to 20 out of 100 on FOTO. progressing  Mobility: Patient will improve cervical rotation by 10% plateau  Strength: Patient will improve impaired strength to greater or equal to 4/5. progressing  Balance: Patient will improve single leg balance by 3 seconds. progressing     Long Term Goals:  4 weeks  HEP: Pt will be independent with advanced HEP.  Pain: Pt will demonstrate improved pain less than or equal to 3/10 at worse in order to progress toward maximal functional ability and improve QOL.  Function: Patient will  increase functional score to equal or greater than to 30 out of 100 on FOTO to improve functional independence and quality of life.   Mobility: Patient will demonstrate dysfunctional normal trunk rotation to return to prior level of function.   Strength: Patient will improve impaired strength to greater or equal to 4+/5 in order to return to PLOF  Gait: Patient will demonstrate normalized gait mechanics with minimal compensation in order to reduce risk of falls    PLAN     Plan of care Certification: 7/6/2023 to 8/4/2023.     Outpatient Physical Therapy 2 times weekly for 4 weeks to include the following interventions: Cervical/Lumbar Traction, Electrical Stimulation to cervical region, Manual Therapy, Moist Heat/ Ice, Neuromuscular Re-ed, Patient Education, Self Care, Therapeutic Activities, Therapeutic Exercise, and FDN .     Felisha Ontiveros, PT

## 2023-08-01 ENCOUNTER — HOSPITAL ENCOUNTER (OUTPATIENT)
Dept: RADIOLOGY | Facility: HOSPITAL | Age: 88
Discharge: HOME | End: 2023-08-01
Attending: FAMILY MEDICINE
Payer: MEDICARE

## 2023-08-01 DIAGNOSIS — I71.40 ABDOMINAL AORTIC ANEURYSM (AAA) WITHOUT RUPTURE, UNSPECIFIED PART: ICD-10-CM

## 2023-08-01 PROCEDURE — 74174 CTA ABD&PLVS W/CONTRAST: CPT | Mod: 26,,, | Performed by: RADIOLOGY

## 2023-08-01 PROCEDURE — 74174 CTA ABD&PLVS W/CONTRAST: CPT | Mod: TC

## 2023-08-01 PROCEDURE — 25500020 PHARM REV CODE 255: Performed by: FAMILY MEDICINE

## 2023-08-01 PROCEDURE — 74174 CTA ABDOMEN AND PELVIS: ICD-10-PCS | Mod: 26,,, | Performed by: RADIOLOGY

## 2023-08-01 RX ADMIN — IOHEXOL 100 ML: 350 INJECTION, SOLUTION INTRAVENOUS at 09:08

## 2023-08-02 DIAGNOSIS — I71.23 ANEURYSM OF DESCENDING THORACIC AORTA WITHOUT RUPTURE: Primary | ICD-10-CM

## 2023-08-03 ENCOUNTER — TELEPHONE (OUTPATIENT)
Dept: CARDIOLOGY | Facility: CLINIC | Age: 88
End: 2023-08-03
Payer: MEDICARE

## 2023-08-03 DIAGNOSIS — I10 HYPERTENSION, UNSPECIFIED TYPE: Primary | ICD-10-CM

## 2023-08-04 ENCOUNTER — TELEPHONE (OUTPATIENT)
Dept: CARDIOLOGY | Facility: CLINIC | Age: 88
End: 2023-08-04
Payer: MEDICARE

## 2023-08-04 ENCOUNTER — CLINICAL SUPPORT (OUTPATIENT)
Dept: REHABILITATION | Facility: HOSPITAL | Age: 88
End: 2023-08-04
Payer: MEDICARE

## 2023-08-04 DIAGNOSIS — M54.6 ACUTE THORACIC BACK PAIN, UNSPECIFIED BACK PAIN LATERALITY: Primary | ICD-10-CM

## 2023-08-04 DIAGNOSIS — I10 HYPERTENSION, UNSPECIFIED TYPE: Primary | ICD-10-CM

## 2023-08-04 PROCEDURE — 97112 NEUROMUSCULAR REEDUCATION: CPT | Mod: PN

## 2023-08-04 PROCEDURE — 97530 THERAPEUTIC ACTIVITIES: CPT | Mod: PN

## 2023-08-04 NOTE — PROGRESS NOTES
[facility]   Physical Therapy Treatment Note/ Discharge Note    Name: Jovanny Olmedo  [facility] Number: [id number]    Therapy Diagnosis:   Encounter Diagnosis   Name Primary?    Acute thoracic back pain, unspecified back pain laterality Yes     Physician: Taylor Pantoja NP    Visit Date: 8/4/2023    Physician Orders: PT Eval and Treat   Medical Diagnosis from Referral: Neck pain [M54.2], Upper back pain [M54.9]  Evaluation Date: 7/6/2023  Authorization Period Expiration: 6/29/2024  Plan of Care Expiration: 8/4/2023  Progress Note Due: 8/6/2023  Visit # / Visits authorized: 1/ 1 eval; 8/20 treatment  FOTO: 3/3    Precautions: Standard     PTA Visit #: -/5     Time In: 9:00  Time Out: 9:55  Total Billable Time: 45 minutes +10 minutes of moist heat and tens     SUBJECTIVE     Pt reports: the weekend has been tough. Reports pain everywhere. States he has noticed improvement in his legs since starting physical therapy.     He was partial compliant with home exercise program.  Response to previous treatment: good response to modalities  Functional change: na    Pain: 6-7/10  Location: neck    OBJECTIVE     OBSERVATION: laceration to distal right knee and ecchymosis to medial left knee      POSTURE: severe fwd head, rounded shoulders      GAIT: amb with walking stick, decreased massimo, decreased step length, decreased heel strike, decreased reciprocal gait pattern      SFMA:  Top Tiers Right  (Spine) Left 8/4/2023   Cervical Flexion DP 40% limited    DP 20% limited   Cervical Extension DP 90% limited    DP 90% limited   Cervical Rotation  DP 80% limited DP 80% limited  (R) DP 70% limited   (L) DN 70% limited   Multi-segmental   Flexion DP knee     NT   Multi-segmental Extension DP unable    NT   Multi-segmental Rotation  DP 70% limited DP 70% limited  NT   Single Leg Stance  (10 sec) unable unable  NT   FN = Functional Normal   FP = Functional Painful  DN = Dysfunctional Normal   DP =  "Dysfuncional Painful      STRENGTH:   Upper Extremity  Strength RIGHT    LEFT 8/4/2023   Shoulder Flexion []1  []2  [x]3*  []4  []5      [x]+ []- []1  []2  [x]3  []4  []5       [x]+ []- (R) 4/5 (L) 4/5   Shoulder Abduction []1  []2  [x]3*  []4  []5      [x]+ []- []1  []2  [x]3  []4  []5       [x]+ []- (R) 4/5 (L) 4/5   Elbow Flexion  []1  []2  []3  [x]4  []5      []+ []- []1  []2  []3  [x]4  []5       []+ []- (R) 4/5 (L) 4/5   Elbow Extension []1  []2  []3  [x]4  []5      []+ []- []1  []2  []3  [x]4  []5       []+ []- (R) 4/5 (L) 4/5 8/4/2023  - 5 second sit to stand = 20 seconds   - amb with walking stick mod I   - short stance amb without AD mod I     Intake Outcome Measure for FOTO neck Survey     Therapist reviewed FOTO scores for Jovanny Olmedo on 8/4/2023.   FOTO documents entered into [employer] - see Media section.     Intake Score: 7%        Treatment     Jovanny received the treatments listed below:    (Exercises performed today in bold)      neuromuscular re-education activities to improve: Balance, Coordination, Proprioception, Posture, and stability for (30 minutes) . The following activities were included:  - Heel raises 3x10  - lateral stepping x5 laps in // bar  - forward/backwards walking in // bar x5 laps   - step taps 6" 3x10    - long arc quad 3x10 ea     therapeutic activities to improve functional performance for (15 minutes) , including:  - Step ups on 6 inch 2x10 BLE  - Sit to stand from 20 inch box 2x10    therapeutic exercises to develop strength, endurance, ROM, and flexibility for ( 00 minutes) including:          none performed today    manual therapy techniques: were applied to cervical for (- minutes) , including:    None performed today     gait training to improve functional mobility and safety for (- minutes) , including:     Moist heat with TENs to cervical (00 minutes)    Patient Education and Home Exercises     Home Exercises Provided and Patient Education Provided     Education " provided:   - Continue HEP    Written Home Exercises Provided: Patient instructed to cont prior HEP. Exercises were reviewed and Jovanny was able to demonstrate them prior to the end of the session.  Jovanny demonstrated good  understanding of the education provided. See EMR under Patient Instructions for exercises provided during therapy sessions    See EMR under Patient Instructions for exercises provided 8/4/2023    ASSESSMENT     Pt with mild fatigue with increased repetition of exercises. Pt continues to refuse cervical exercises despite maximum verbal cues. Focused on lower extremity and provided updated HEP. Pt has completed physician prescription and has reached maximum potential at this time. Pt is scheduled with pain management for further evaluation of cervical pain. Educated pt on importance of HEP compliance and cervical ROM at the end of today's session. Pt discharged.     Jovanny Is progressing well towards his goals.   Pt prognosis is Good.     Pt will continue to benefit from skilled outpatient physical therapy to address the deficits listed in the problem list box on initial evaluation, provide pt/family education and to maximize pt's level of independence in the home and community environment.     Pt's spiritual, cultural and educational needs considered and pt agreeable to plan of care and goals.     Anticipated barriers to physical therapy: transportation, has to take care of disabled wife     Goals:   Short Term Goals:  2 weeks  HEP: Patient will demonstrate 50% independence with HEP met  Pain: Pt will demonstrate improved pain less than or equal to 5/10 at worse plateau  Function: Patient will increase functional score to equal or greater than to 20 out of 100 on FOTO. plateau  Mobility: Patient will improve cervical rotation by 10% plateau  Strength: Patient will improve impaired strength to greater or equal to 4/5. met  Balance: Patient will improve single leg balance by 3 seconds. plateau     Long  Term Goals:  4 weeks  HEP: Pt will be independent with advanced HEP. met  Pain: Pt will demonstrate improved pain less than or equal to 3/10 at worse in order to progress toward maximal functional ability and improve QOL. plateau  Function: Patient will  increase functional score to equal or greater than to 30 out of 100 on FOTO to improve functional independence and quality of life. plateau  Mobility: Patient will demonstrate dysfunctional normal trunk rotation to return to prior level of function. plateau  Strength: Patient will improve impaired strength to greater or equal to 4+/5 in order to return to PLOF plateau  Gait: Patient will demonstrate normalized gait mechanics with minimal compensation in order to reduce risk of falls met    PLAN     Pt discharged from physical therapy.     Felisha Ontiveros, PT

## 2023-08-04 NOTE — PLAN OF CARE
[facility]   Physical Therapy Treatment Note/ Discharge Note    Name: Jovanny Olmedo  [facility] Number: [id number]    Therapy Diagnosis:   Encounter Diagnosis   Name Primary?    Acute thoracic back pain, unspecified back pain laterality Yes     Physician: Taylor Pantoja NP    Visit Date: 8/4/2023    Physician Orders: PT Eval and Treat   Medical Diagnosis from Referral: Neck pain [M54.2], Upper back pain [M54.9]  Evaluation Date: 7/6/2023  Authorization Period Expiration: 6/29/2024  Plan of Care Expiration: 8/4/2023  Progress Note Due: 8/6/2023  Visit # / Visits authorized: 1/ 1 eval; 8/20 treatment  FOTO: 3/3    Precautions: Standard     PTA Visit #: -/5     Time In: 9:00  Time Out: 9:55  Total Billable Time: 45 minutes +10 minutes of moist heat and tens     SUBJECTIVE     Pt reports: the weekend has been tough. Reports pain everywhere. States he has noticed improvement in his legs since starting physical therapy.     He was partial compliant with home exercise program.  Response to previous treatment: good response to modalities  Functional change: na    Pain: 6-7/10  Location: neck    OBJECTIVE     OBSERVATION: laceration to distal right knee and ecchymosis to medial left knee      POSTURE: severe fwd head, rounded shoulders      GAIT: amb with walking stick, decreased massimo, decreased step length, decreased heel strike, decreased reciprocal gait pattern      SFMA:  Top Tiers Right  (Spine) Left 8/4/2023   Cervical Flexion DP 40% limited    DP 20% limited   Cervical Extension DP 90% limited    DP 90% limited   Cervical Rotation  DP 80% limited DP 80% limited  (R) DP 70% limited   (L) DN 70% limited   Multi-segmental   Flexion DP knee     NT   Multi-segmental Extension DP unable    NT   Multi-segmental Rotation  DP 70% limited DP 70% limited  NT   Single Leg Stance  (10 sec) unable unable  NT   FN = Functional Normal   FP = Functional Painful  DN = Dysfunctional Normal   DP =  "Dysfuncional Painful      STRENGTH:   Upper Extremity  Strength RIGHT    LEFT 8/4/2023   Shoulder Flexion []1  []2  [x]3*  []4  []5      [x]+ []- []1  []2  [x]3  []4  []5       [x]+ []- (R) 4/5 (L) 4/5   Shoulder Abduction []1  []2  [x]3*  []4  []5      [x]+ []- []1  []2  [x]3  []4  []5       [x]+ []- (R) 4/5 (L) 4/5   Elbow Flexion  []1  []2  []3  [x]4  []5      []+ []- []1  []2  []3  [x]4  []5       []+ []- (R) 4/5 (L) 4/5   Elbow Extension []1  []2  []3  [x]4  []5      []+ []- []1  []2  []3  [x]4  []5       []+ []- (R) 4/5 (L) 4/5 8/4/2023  - 5 second sit to stand = 20 seconds   - amb with walking stick mod I   - short stance amb without AD mod I     Intake Outcome Measure for FOTO neck Survey     Therapist reviewed FOTO scores for Jovanny Olmedo on 8/4/2023.   FOTO documents entered into [employer] - see Media section.     Intake Score: 7%        Treatment     Jovanny received the treatments listed below:    (Exercises performed today in bold)      neuromuscular re-education activities to improve: Balance, Coordination, Proprioception, Posture, and stability for (30 minutes) . The following activities were included:  - Heel raises 3x10  - lateral stepping x5 laps in // bar  - forward/backwards walking in // bar x5 laps   - step taps 6" 3x10    - long arc quad 3x10 ea     therapeutic activities to improve functional performance for (15 minutes) , including:  - Step ups on 6 inch 2x10 BLE  - Sit to stand from 20 inch box 2x10    therapeutic exercises to develop strength, endurance, ROM, and flexibility for ( 00 minutes) including:          none performed today    manual therapy techniques: were applied to cervical for (- minutes) , including:    None performed today     gait training to improve functional mobility and safety for (- minutes) , including:     Moist heat with TENs to cervical (00 minutes)    Patient Education and Home Exercises     Home Exercises Provided and Patient Education Provided     Education " provided:   - Continue HEP    Written Home Exercises Provided: Patient instructed to cont prior HEP. Exercises were reviewed and Jovanny was able to demonstrate them prior to the end of the session.  Jovanny demonstrated good  understanding of the education provided. See EMR under Patient Instructions for exercises provided during therapy sessions    See EMR under Patient Instructions for exercises provided 8/4/2023    ASSESSMENT     Pt with mild fatigue with increased repetition of exercises. Pt continues to refuse cervical exercises despite maximum verbal cues. Focused on lower extremity and provided updated HEP. Pt has completed physician prescription and has reached maximum potential at this time. Pt is scheduled with pain management for further evaluation of cervical pain. Educated pt on importance of HEP compliance and cervical ROM at the end of today's session. Pt discharged.     Jovanny Is progressing well towards his goals.   Pt prognosis is Good.     Pt will continue to benefit from skilled outpatient physical therapy to address the deficits listed in the problem list box on initial evaluation, provide pt/family education and to maximize pt's level of independence in the home and community environment.     Pt's spiritual, cultural and educational needs considered and pt agreeable to plan of care and goals.     Anticipated barriers to physical therapy: transportation, has to take care of disabled wife     Goals:   Short Term Goals:  2 weeks  HEP: Patient will demonstrate 50% independence with HEP met  Pain: Pt will demonstrate improved pain less than or equal to 5/10 at worse plateau  Function: Patient will increase functional score to equal or greater than to 20 out of 100 on FOTO. plateau  Mobility: Patient will improve cervical rotation by 10% plateau  Strength: Patient will improve impaired strength to greater or equal to 4/5. met  Balance: Patient will improve single leg balance by 3 seconds. plateau     Long  Term Goals:  4 weeks  HEP: Pt will be independent with advanced HEP. met  Pain: Pt will demonstrate improved pain less than or equal to 3/10 at worse in order to progress toward maximal functional ability and improve QOL. plateau  Function: Patient will  increase functional score to equal or greater than to 30 out of 100 on FOTO to improve functional independence and quality of life. plateau  Mobility: Patient will demonstrate dysfunctional normal trunk rotation to return to prior level of function. plateau  Strength: Patient will improve impaired strength to greater or equal to 4+/5 in order to return to PLOF plateau  Gait: Patient will demonstrate normalized gait mechanics with minimal compensation in order to reduce risk of falls met    PLAN     Pt discharged from physical therapy.     Felisha Ontiveros, PT

## 2023-08-06 PROBLEM — R94.31 ABNORMAL ECG: Status: ACTIVE | Noted: 2023-08-06

## 2023-08-06 PROBLEM — I25.118 CORONARY ARTERY DISEASE OF NATIVE ARTERY OF NATIVE HEART WITH STABLE ANGINA PECTORIS: Status: ACTIVE | Noted: 2023-08-06

## 2023-08-06 PROBLEM — I71.23 ANEURYSM OF DESCENDING THORACIC AORTA WITHOUT RUPTURE: Status: ACTIVE | Noted: 2023-08-06
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
## 2023-08-07 ENCOUNTER — OFFICE VISIT (OUTPATIENT)
Dept: CARDIOLOGY | Facility: CLINIC | Age: 88
End: 2023-08-07
Payer: MEDICARE

## 2023-08-07 ENCOUNTER — HOSPITAL ENCOUNTER (OUTPATIENT)
Dept: CARDIOLOGY | Facility: HOSPITAL | Age: 88
Discharge: HOME | End: 2023-08-07
Attending: INTERNAL MEDICINE
Payer: MEDICARE

## 2023-08-07 VITALS
DIASTOLIC BLOOD PRESSURE: 64 MMHG | WEIGHT: 227.75 LBS | SYSTOLIC BLOOD PRESSURE: 100 MMHG | WEIGHT: 227.75 LBS | BODY MASS INDEX: 30.85 KG/M2 | OXYGEN SATURATION: 95 % | BODY MASS INDEX: 30.89 KG/M2 | HEART RATE: 88 BPM | HEIGHT: 72 IN

## 2023-08-07 DIAGNOSIS — G47.33 OSA ON CPAP: ICD-10-CM

## 2023-08-07 DIAGNOSIS — R94.31 ABNORMAL ECG: ICD-10-CM

## 2023-08-07 DIAGNOSIS — E78.5 DYSLIPIDEMIA: Chronic | ICD-10-CM

## 2023-08-07 DIAGNOSIS — E66.9 OBESITY (BMI 30.0-34.9): ICD-10-CM

## 2023-08-07 DIAGNOSIS — I71.23 ANEURYSM OF DESCENDING THORACIC AORTA WITHOUT RUPTURE: ICD-10-CM

## 2023-08-07 DIAGNOSIS — Z98.61 HISTORY OF CORONARY ANGIOPLASTY: Chronic | ICD-10-CM

## 2023-08-07 DIAGNOSIS — Z86.711 HISTORY OF PULMONARY EMBOLUS (PE): ICD-10-CM

## 2023-08-07 DIAGNOSIS — I25.10 CORONARY ARTERY DISEASE, OCCLUSIVE: ICD-10-CM

## 2023-08-07 DIAGNOSIS — I71.43 INFRARENAL ABDOMINAL AORTIC ANEURYSM (AAA) WITHOUT RUPTURE: ICD-10-CM

## 2023-08-07 DIAGNOSIS — I25.118 CORONARY ARTERY DISEASE OF NATIVE ARTERY OF NATIVE HEART WITH STABLE ANGINA PECTORIS: Primary | ICD-10-CM

## 2023-08-07 DIAGNOSIS — N18.32 CHRONIC RENAL IMPAIRMENT, STAGE 3B: ICD-10-CM

## 2023-08-07 DIAGNOSIS — I10 ESSENTIAL HYPERTENSION: Chronic | ICD-10-CM

## 2023-08-07 DIAGNOSIS — I70.0 AORTIC ATHEROSCLEROSIS: ICD-10-CM

## 2023-08-07 DIAGNOSIS — I10 HYPERTENSION, UNSPECIFIED TYPE: ICD-10-CM

## 2023-08-07 PROCEDURE — 93010 EKG 12-LEAD: ICD-10-PCS | Mod: ,,, | Performed by: INTERNAL MEDICINE

## 2023-08-07 PROCEDURE — 99214 OFFICE O/P EST MOD 30 MIN: CPT | Mod: S$PBB,,, | Performed by: INTERNAL MEDICINE

## 2023-08-07 PROCEDURE — 99214 PR OFFICE/OUTPT VISIT, EST, LEVL IV, 30-39 MIN: ICD-10-PCS | Mod: S$PBB,,, | Performed by: INTERNAL MEDICINE

## 2023-08-07 PROCEDURE — 99999 PR PBB SHADOW E&M-EST. PATIENT-LVL IV: ICD-10-PCS | Mod: PBBFAC,,, | Performed by: INTERNAL MEDICINE

## 2023-08-07 PROCEDURE — 99999 PR PBB SHADOW E&M-EST. PATIENT-LVL IV: CPT | Mod: PBBFAC,,, | Performed by: INTERNAL MEDICINE

## 2023-08-07 PROCEDURE — 99214 OFFICE O/P EST MOD 30 MIN: CPT | Mod: PBBFAC | Performed by: INTERNAL MEDICINE

## 2023-08-07 PROCEDURE — 93010 ELECTROCARDIOGRAM REPORT: CPT | Mod: ,,, | Performed by: INTERNAL MEDICINE

## 2023-08-07 PROCEDURE — 93005 ELECTROCARDIOGRAM TRACING: CPT

## 2023-08-16 ENCOUNTER — TELEPHONE (OUTPATIENT)
Dept: INTERNAL MEDICINE | Facility: CLINIC | Age: 88
End: 2023-08-16
Payer: MEDICARE

## 2023-08-16 ENCOUNTER — HOSPITAL ENCOUNTER (OUTPATIENT)
Dept: RADIOLOGY | Facility: HOSPITAL | Age: 88
Discharge: HOME | End: 2023-08-16
Attending: FAMILY MEDICINE
Payer: MEDICARE

## 2023-08-16 DIAGNOSIS — I71.23 ANEURYSM OF DESCENDING THORACIC AORTA WITHOUT RUPTURE: ICD-10-CM

## 2023-08-16 DIAGNOSIS — I71.40 ABDOMINAL AORTIC ANEURYSM (AAA) WITHOUT RUPTURE, UNSPECIFIED PART: Primary | ICD-10-CM

## 2023-08-16 PROCEDURE — 71275 CT ANGIOGRAPHY CHEST: CPT | Mod: TC

## 2023-08-16 PROCEDURE — 71275 CT ANGIOGRAPHY CHEST: CPT | Mod: 26,,, | Performed by: RADIOLOGY

## 2023-08-16 PROCEDURE — 25500020 PHARM REV CODE 255: Performed by: FAMILY MEDICINE

## 2023-08-16 PROCEDURE — 71275 CTA CHEST AORTA NON CORONARY: ICD-10-PCS | Mod: 26,,, | Performed by: RADIOLOGY

## 2023-08-16 RX ADMIN — IOHEXOL 100 ML: 350 INJECTION, SOLUTION INTRAVENOUS at 01:08

## 2023-08-16 NOTE — TELEPHONE ENCOUNTER
Ultrasound showed thoracic aortic aneurysm-discuss further with vascular Cardiology as scheduled for further evaluation  Also noted multiple cyst in the liver and kidneys-if any discomfort to the stomach please discuss further with Gastroenterology

## 2023-09-25 ENCOUNTER — TELEPHONE (OUTPATIENT)
Dept: PAIN MEDICINE | Facility: CLINIC | Age: 88
End: 2023-09-25
Payer: MEDICARE

## 2023-10-06 DIAGNOSIS — I26.99 PULMONARY EMBOLISM, UNSPECIFIED CHRONICITY, UNSPECIFIED PULMONARY EMBOLISM TYPE, UNSPECIFIED WHETHER ACUTE COR PULMONALE PRESENT: ICD-10-CM

## 2023-10-06 DIAGNOSIS — I25.10 CORONARY ARTERY DISEASE, OCCLUSIVE: ICD-10-CM

## 2023-10-06 DIAGNOSIS — R06.09 DOE (DYSPNEA ON EXERTION): ICD-10-CM

## 2023-10-06 DIAGNOSIS — Z98.61 HISTORY OF CORONARY ANGIOPLASTY: ICD-10-CM

## 2023-10-06 DIAGNOSIS — J94.1 PLEURAL FIBROSIS: ICD-10-CM

## 2023-10-06 DIAGNOSIS — I10 ESSENTIAL HYPERTENSION: ICD-10-CM

## 2023-10-06 DIAGNOSIS — I20.89 STABLE ANGINA PECTORIS: ICD-10-CM

## 2023-10-06 DIAGNOSIS — E78.5 DYSLIPIDEMIA: ICD-10-CM

## 2023-10-06 RX ORDER — BENAZEPRIL HYDROCHLORIDE AND HYDROCHLOROTHIAZIDE 10; 12.5 MG/1; MG/1
TABLET ORAL
Qty: 180 TABLET | Refills: 1 | Status: ON HOLD | OUTPATIENT
Start: 2023-10-06 | End: 2024-01-28 | Stop reason: HOSPADM

## 2023-10-06 NOTE — TELEPHONE ENCOUNTER
----- Message from Jazlyn Maldonado sent at 10/6/2023  9:13 AM CDT -----  States his blood pressure medicine was denied at the pharmacy. He would like the nurse to give him a call. He is out of his blood pressure medicine. Please call pt [phone]. Thank you

## 2023-10-06 NOTE — TELEPHONE ENCOUNTER
No care due was identified.  [facility] Embedded Care Due Messages. Reference number: [id number].   10/06/2023 10:50:33 AM CDT

## 2023-11-13 DIAGNOSIS — E78.5 DYSLIPIDEMIA: Chronic | ICD-10-CM

## 2023-11-13 RX ORDER — ATORVASTATIN CALCIUM 40 MG/1
TABLET, FILM COATED ORAL
Qty: 90 TABLET | Refills: 1 | Status: SHIPPED | OUTPATIENT
Start: 2023-11-13

## 2023-11-13 NOTE — TELEPHONE ENCOUNTER
Refill Routing Note   Medication(s) are not appropriate for processing by [facility] for the following reason(s):      Required labs outdated    [facility] action(s):  Defer Care Due:  Labs due            Appointments  past 12m or future 3m with PCP    Date Provider   Last Visit   7/21/2023 Miryam Jimenez MD   Next Visit   11/21/2023 Miryam Jimenez MD   ED visits in past 90 days: 0        Note composed:9:47 AM 11/13/2023

## 2023-11-13 NOTE — TELEPHONE ENCOUNTER
Care Due:                  Date            Visit Type   Department     Provider  --------------------------------------------------------------------------------                                EP -                              PRIMARY      [facility] INTERNAL  Miryam Mainampati  Last Visit: 07-      CARE ([facility])   MEDICINE       Jimenez                              EP -                              PRIMARY      [facility] INTER[facility]  Miryam Mainampati  Next Visit: 11-      CARE ([facility])   MEDICINE       Jimenez                                                            Last  Test          Frequency    Reason                     Performed    Due Date  --------------------------------------------------------------------------------    Lipid Panel.  12 months..  atorvastatin.............  11- 11-    [employer] Embedded Care Due Messages. Reference number: [id number].   11/13/2023 9:37:42 AM CST

## 2023-12-03 DIAGNOSIS — Z71.89 COMPLEX CARE COORDINATION: ICD-10-CM

## 2023-12-21 ENCOUNTER — OFFICE VISIT (OUTPATIENT)
Dept: INTERNAL MEDICINE | Facility: CLINIC | Age: 88
End: 2023-12-21
Payer: MEDICARE

## 2023-12-21 VITALS
WEIGHT: 230.63 LBS | SYSTOLIC BLOOD PRESSURE: 110 MMHG | OXYGEN SATURATION: 98 % | DIASTOLIC BLOOD PRESSURE: 75 MMHG | BODY MASS INDEX: 31.24 KG/M2 | HEIGHT: 72 IN | TEMPERATURE: 98 F | HEART RATE: 56 BPM

## 2023-12-21 DIAGNOSIS — I10 ESSENTIAL HYPERTENSION: Chronic | ICD-10-CM

## 2023-12-21 DIAGNOSIS — J01.90 ACUTE SINUSITIS, RECURRENCE NOT SPECIFIED, UNSPECIFIED LOCATION: Primary | ICD-10-CM

## 2023-12-21 DIAGNOSIS — N18.32 CHRONIC RENAL IMPAIRMENT, STAGE 3B: ICD-10-CM

## 2023-12-21 PROCEDURE — 99999 PR PBB SHADOW E&M-EST. PATIENT-LVL V: ICD-10-PCS | Mod: PBBFAC,,, | Performed by: PHYSICIAN ASSISTANT

## 2023-12-21 PROCEDURE — 99215 OFFICE O/P EST HI 40 MIN: CPT | Mod: PBBFAC | Performed by: PHYSICIAN ASSISTANT

## 2023-12-21 PROCEDURE — 99214 OFFICE O/P EST MOD 30 MIN: CPT | Mod: S$PBB,,, | Performed by: PHYSICIAN ASSISTANT

## 2023-12-21 PROCEDURE — 99999 PR PBB SHADOW E&M-EST. PATIENT-LVL V: CPT | Mod: PBBFAC,,, | Performed by: PHYSICIAN ASSISTANT

## 2023-12-21 PROCEDURE — 99214 PR OFFICE/OUTPT VISIT, EST, LEVL IV, 30-39 MIN: ICD-10-PCS | Mod: S$PBB,,, | Performed by: PHYSICIAN ASSISTANT

## 2023-12-21 RX ORDER — AMOXICILLIN AND CLAVULANATE POTASSIUM 875; 125 MG/1; MG/1
1 TABLET, FILM COATED ORAL 2 TIMES DAILY
Qty: 20 TABLET | Refills: 0 | Status: SHIPPED | OUTPATIENT
Start: 2023-12-21 | End: 2023-12-31

## 2023-12-21 NOTE — PROGRESS NOTES
Subjective:      Patient ID: Jovanny Olmedo is a 88 y.o. male.    Chief Complaint: Sinus Problem    Sinus Problem  This is a recurrent problem. The current episode started more than 1 month ago. There has been no fever. Associated symptoms include congestion, coughing, headaches, sinus pressure and a sore throat. Pertinent negatives include no chills, diaphoresis, ear pain, hoarse voice, neck pain, shortness of breath, sneezing or swollen glands. Past treatments include nothing.     Headaches on and off. No numbness/tingling or weakness anywhere. Headache is frontal. Pt reports that it feels like a sinus headache.   BP stable and controlled.   Patient Active Problem List   Diagnosis    Essential hypertension    GERD (gastroesophageal reflux disease)    Polycystic kidney disease    Coronary artery disease, occlusive    Chronic renal impairment, stage 3b    Refractive error    DDD (degenerative disc disease), lumbar    Open angle with borderline findings, low risk, bilateral    History of coronary angioplasty    Prostate cancer    Secondary hyperparathyroidism    Pseudophakia    Intermediate stage nonexudative age-related macular degeneration of both eyes    Aortic atherosclerosis    CLARISSA on CPAP    Duodenal ulcer    History of colon polyps    Dyslipidemia    Abdominal aortic aneurysm (AAA) without rupture    History of pulmonary embolus (PE)    Thrombocytopenia    Obesity (BMI 30.0-34.9)    Stable angina pectoris    Thoracic back pain    Abnormal ECG    Coronary artery disease of native artery of native heart with stable angina pectoris    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         Current Outpatient Medications:     aspirin (ECOTRIN) 81 MG EC tablet, Take 81 mg by mouth once daily., Disp: , Rfl:     atorvastatin (LIPITOR) 40 MG tablet, Take 1 tablet by mouth once daily, Disp: 90 tablet, Rfl: 1    azelastine (ASTELIN) 137 mcg (0.1 %) nasal spray, USE 2 SPRAY(S) IN EACH NOSTRIL TWICE DAILY, Disp: 30  mL, Rfl: 11    benazepril-hydrochlorthiazide (LOTENSIN HCT) 10-12.5 mg Tab, Take 1 tablet by mouth twice daily, Disp: 180 tablet, Rfl: 1    ferrous sulfate 325 (65 FE) MG EC tablet, Take 325 mg by mouth once daily., Disp: , Rfl:     fluticasone propionate (FLONASE) 50 mcg/actuation nasal spray, Use 2 spray(s) in each nostril once daily, Disp: 16 g, Rfl: 11    methocarbamoL (ROBAXIN) 750 MG Tab, Take 1 tablet (750 mg total) by mouth 3 (three) times daily., Disp: 30 tablet, Rfl: 0    pantoprazole (PROTONIX) 40 MG tablet, Take 1 tablet by mouth once daily, Disp: 90 tablet, Rfl: 3    potassium chloride SA (K-DUR,KLOR-CON) 20 MEQ tablet, Take 20 mEq by mouth., Disp: , Rfl:     tamsulosin (FLOMAX) 0.4 mg Cap, Take 1 capsule (0.4 mg total) by mouth once daily., Disp: 30 capsule, Rfl: 11    vacuum erection device system Kit, 1 kit by Misc.(Non-Drug; Combo Route) route daily as needed., Disp: 1 kit, Rfl: 0    amoxicillin-clavulanate 875-125mg (AUGMENTIN) 875-125 mg per tablet, Take 1 tablet by mouth 2 (two) times daily. for 10 days, Disp: 20 tablet, Rfl: 0    tadalafiL (CIALIS) 20 MG Tab, Take 1 tablet (20 mg total) by mouth daily as needed., Disp: 10 tablet, Rfl: 11    Review of Systems   Constitutional:  Negative for activity change, appetite change, chills, diaphoresis, fatigue, fever and unexpected weight change.   HENT:  Positive for congestion, sinus pressure, sinus pain and sore throat. Negative for ear pain, hearing loss, hoarse voice, postnasal drip, rhinorrhea, sneezing, tinnitus, trouble swallowing and voice change.    Eyes: Negative.  Negative for visual disturbance.   Respiratory:  Positive for cough. Negative for choking, chest tightness and shortness of breath.    Cardiovascular:  Negative for chest pain, palpitations and leg swelling.   Gastrointestinal:  Negative for abdominal distention, abdominal pain, blood in stool, constipation, diarrhea, nausea and vomiting.   Endocrine: Negative for cold intolerance,  "heat intolerance, polydipsia and polyuria.   Genitourinary: Negative.  Negative for difficulty urinating and frequency.   Musculoskeletal:  Negative for arthralgias, back pain, gait problem, joint swelling, myalgias and neck pain.   Skin:  Negative for color change, pallor, rash and wound.   Neurological:  Positive for headaches. Negative for dizziness, tremors, weakness, light-headedness and numbness.   Hematological:  Negative for adenopathy.   Psychiatric/Behavioral:  Negative for behavioral problems, confusion, self-injury, sleep disturbance and suicidal ideas. The patient is not nervous/anxious.      Objective:   /75 (BP Location: Left arm, Patient Position: Sitting, BP Method: Large (Manual))   Pulse (!) 56   Temp 97.9 °F (36.6 °C) (Tympanic)   Ht 6' 0.01" (1.829 m)   Wt 104.6 kg (230 lb 9.6 oz)   SpO2 98%   BMI 31.27 kg/m²     Physical Exam  Vitals and nursing note reviewed.   Constitutional:       General: He is not in acute distress.     Appearance: Normal appearance. He is well-developed. He is not ill-appearing, toxic-appearing or diaphoretic.   HENT:      Head: Normocephalic and atraumatic.      Right Ear: Hearing, tympanic membrane, ear canal and external ear normal. No tenderness.      Left Ear: Hearing, tympanic membrane, ear canal and external ear normal. No tenderness.      Nose: Mucosal edema, congestion and rhinorrhea present.      Right Sinus: Maxillary sinus tenderness and frontal sinus tenderness present.      Left Sinus: Maxillary sinus tenderness and frontal sinus tenderness present.      Mouth/Throat:      Mouth: Mucous membranes are moist. No oral lesions.      Dentition: Normal dentition. No dental caries or dental abscesses.      Tongue: No lesions.      Palate: No lesions.      Pharynx: Uvula midline. Oropharyngeal exudate and posterior oropharyngeal erythema present. No pharyngeal swelling or uvula swelling.      Tonsils: No tonsillar exudate or tonsillar abscesses.   Eyes: "      General:         Right eye: No discharge.         Left eye: No discharge.      Extraocular Movements: Extraocular movements intact.      Conjunctiva/sclera: Conjunctivae normal.      Pupils: Pupils are equal, round, and reactive to light.   Cardiovascular:      Rate and Rhythm: Normal rate and regular rhythm.      Heart sounds: Normal heart sounds. No murmur heard.     No friction rub. No gallop.   Pulmonary:      Effort: Pulmonary effort is normal. No respiratory distress.      Breath sounds: Normal breath sounds. No wheezing or rales.   Musculoskeletal:      Cervical back: Normal range of motion and neck supple.   Lymphadenopathy:      Cervical: No cervical adenopathy.   Skin:     General: Skin is warm.      Coloration: Skin is not pale.      Findings: No erythema or rash.   Neurological:      Mental Status: He is alert and oriented to person, place, and time.   Psychiatric:         Mood and Affect: Mood normal.         Behavior: Behavior normal.         Thought Content: Thought content normal.         Judgment: Judgment normal.       Lab Results   Component Value Date    CREATININE 1.7 (H) 08/01/2023    BUN 33 (H) 05/16/2023     05/16/2023    K 4.6 05/16/2023     05/16/2023    CO2 27 05/16/2023       Assessment:     1. Acute sinusitis, recurrence not specified, unspecified location    2. Essential hypertension    3. Chronic renal impairment, stage 3b      Plan:   Acute sinusitis, recurrence not specified, unspecified location  -     amoxicillin-clavulanate 875-125mg (AUGMENTIN) 875-125 mg per tablet; Take 1 tablet by mouth 2 (two) times daily. for 10 days  Dispense: 20 tablet; Refill: 0  -coricidin hbp    Essential hypertension  -bp stable and controlled today    Chronic renal impairment, stage 3b    -red flags regarding headaches discussed in detail with patient. I no improvement in headaches with antibiotic will need further evaluation    Follow up if symptoms worsen or fail to improve.

## 2023-12-28 ENCOUNTER — TELEPHONE (OUTPATIENT)
Dept: UROLOGY | Facility: CLINIC | Age: 88
End: 2023-12-28
Payer: MEDICARE

## 2023-12-28 ENCOUNTER — PATIENT MESSAGE (OUTPATIENT)
Dept: UROLOGY | Facility: CLINIC | Age: 88
End: 2023-12-28
Payer: MEDICARE

## 2023-12-28 NOTE — TELEPHONE ENCOUNTER
Called pt and informed him that his pending appt with Dr. Mirza had to be rescheduled as Dr. Mirza would not be coming to [place] on that day; pt verbalized understanding and appt rescheduled.

## 2024-01-08 ENCOUNTER — OFFICE VISIT (OUTPATIENT)
Dept: INTERNAL MEDICINE | Facility: CLINIC | Age: 89
End: 2024-01-08
Payer: MEDICARE

## 2024-01-08 ENCOUNTER — LAB VISIT (OUTPATIENT)
Dept: LAB | Facility: HOSPITAL | Age: 89
End: 2024-01-08
Attending: FAMILY MEDICINE
Payer: MEDICARE

## 2024-01-08 VITALS
HEIGHT: 72 IN | SYSTOLIC BLOOD PRESSURE: 124 MMHG | BODY MASS INDEX: 30.31 KG/M2 | DIASTOLIC BLOOD PRESSURE: 78 MMHG | WEIGHT: 223.75 LBS | HEART RATE: 82 BPM | OXYGEN SATURATION: 97 %

## 2024-01-08 DIAGNOSIS — I71.23 ANEURYSM OF DESCENDING THORACIC AORTA WITHOUT RUPTURE: ICD-10-CM

## 2024-01-08 DIAGNOSIS — R22.1 LOCALIZED SWELLING, MASS OR LUMP OF NECK: Primary | ICD-10-CM

## 2024-01-08 LAB
CREAT SERPL-MCNC: 1.7 MG/DL (ref 0.5–1.4)
EST. GFR  (NO RACE VARIABLE): 38.5 ML/MIN/1.73 M^2

## 2024-01-08 PROCEDURE — 99999 PR PBB SHADOW E&M-EST. PATIENT-LVL III: CPT | Mod: PBBFAC,,, | Performed by: FAMILY MEDICINE

## 2024-01-08 PROCEDURE — 82565 ASSAY OF CREATININE: CPT | Performed by: FAMILY MEDICINE

## 2024-01-08 PROCEDURE — 36415 COLL VENOUS BLD VENIPUNCTURE: CPT | Performed by: FAMILY MEDICINE

## 2024-01-08 PROCEDURE — 99214 OFFICE O/P EST MOD 30 MIN: CPT | Mod: S$PBB,,, | Performed by: FAMILY MEDICINE

## 2024-01-08 PROCEDURE — 99213 OFFICE O/P EST LOW 20 MIN: CPT | Mod: PBBFAC | Performed by: FAMILY MEDICINE

## 2024-01-08 RX ORDER — CLOTRIMAZOLE AND BETAMETHASONE DIPROPIONATE 10; .64 MG/G; MG/G
CREAM TOPICAL 2 TIMES DAILY
COMMUNITY

## 2024-01-08 RX ORDER — MELOXICAM 15 MG/1
15 TABLET ORAL DAILY
COMMUNITY

## 2024-01-08 NOTE — PROGRESS NOTES
"Subjective:   Patient ID: Jovanny Olmedo is a 88 y.o. male.  Chief Complaint:  Neck Swelling    PCP Dr. Jimenez     Presents for evaluation of painless and nonpulsatile anterolateral neck swelling   Reports 5-7 day duration   Denies trauma   Treated end of December for sinusitis with Augmentin   Does complain of chronic headache previously evaluated and told related to degenerative disease of cervical spine   Overall poor historian, but has undergone epidural steroid injections possibly within the past 4-6 weeks  Does have history of thoracic and aortic aneurysms  Most recent imaging recommended CTA of chest now ordered to further elucidate size and shape of the thoracic aneurysm to determine if anything else needs to be done  Imaging was never scheduled  Denies ever having any swelling like this before   Does not have any new constitutional symptoms swelling      Objective:   /78 (BP Location: Right arm, Patient Position: Sitting, BP Method: Large (Manual))   Pulse 82   Ht 6' 0.01" (1.829 m)   Wt 101.5 kg (223 lb 12.3 oz)   SpO2 97%   BMI 30.34 kg/m²     Physical Exam  Vitals and nursing note reviewed.   Constitutional:       Appearance: Normal appearance. He is well-developed. He is obese.   Neck:      Thyroid: No thyroid mass, thyromegaly or thyroid tenderness.      Vascular: Normal carotid pulses. No carotid bruit, hepatojugular reflux or JVD.      Trachea: Trachea and phonation normal.      Comments:   Prominent anterior left-sided anterior lateral neck and supraclavicular swelling  Not tender   Not red or warm   Not pulsatile   No skin breakdown  No palpable mass   No palpable no  Cardiovascular:      Rate and Rhythm: Normal rate and regular rhythm.      Pulses:           Radial pulses are 2+ on the right side and 2+ on the left side.      Heart sounds: Normal heart sounds.   Pulmonary:      Effort: Pulmonary effort is normal.   Musculoskeletal:      Cervical back: Full passive range of motion without " pain.      Right lower leg: No edema.      Left lower leg: No edema.   Lymphadenopathy:      Cervical: No cervical adenopathy.   Skin:     Capillary Refill: Capillary refill takes less than 2 seconds.      Findings: No rash.   Psychiatric:         Mood and Affect: Mood and affect normal.       Assessment:       ICD-10-CM ICD-9-CM   1. Localized swelling, mass or lump of neck  R22.1 784.2   2. 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 Jimenez as scheduled/as needed    30+ minutes of total time spent on the encounter, which includes face to face time and non-face to face time preparing to see the patient (eg, review of tests), Obtaining and/or reviewing separately obtained history, documenting clinical information in the electronic or other health record, independently interpreting results (not separately reported) and communicating results to the patient/family/caregiver, or Care coordination (not separately reported).

## 2024-01-17 ENCOUNTER — TELEPHONE (OUTPATIENT)
Dept: INTERNAL MEDICINE | Facility: CLINIC | Age: 89
End: 2024-01-17
Payer: MEDICARE

## 2024-01-17 NOTE — TELEPHONE ENCOUNTER
----- Message from Christal Webster sent at 1/17/2024  2:48 PM CST -----  Daughter Mimi Olmedo . Is calling in regards to patients CPAP machine replacement,Please call back at  [phone].thanks

## 2024-01-17 NOTE — TELEPHONE ENCOUNTER
Please see message regarding CPAP replacement. Contact patient's daughter Mimi at [phone]. Thank you.//carla

## 2024-01-23 ENCOUNTER — HOSPITAL ENCOUNTER (OUTPATIENT)
Dept: RADIOLOGY | Facility: HOSPITAL | Age: 89
Discharge: HOME | DRG: 948 | End: 2024-01-23
Attending: FAMILY MEDICINE
Payer: MEDICARE

## 2024-01-23 DIAGNOSIS — R22.1 LOCALIZED SWELLING, MASS OR LUMP OF NECK: ICD-10-CM

## 2024-01-23 DIAGNOSIS — R22.2 SUPRACLAVICULAR MASS: Primary | ICD-10-CM

## 2024-01-23 DIAGNOSIS — I71.23 ANEURYSM OF DESCENDING THORACIC AORTA WITHOUT RUPTURE: ICD-10-CM

## 2024-01-23 PROCEDURE — 25500020 PHARM REV CODE 255: Performed by: FAMILY MEDICINE

## 2024-01-23 PROCEDURE — 76536 US EXAM OF HEAD AND NECK: CPT | Mod: TC

## 2024-01-23 PROCEDURE — 71275 CT ANGIOGRAPHY CHEST: CPT | Mod: TC

## 2024-01-23 PROCEDURE — 76536 US EXAM OF HEAD AND NECK: CPT | Mod: 26,,, | Performed by: RADIOLOGY

## 2024-01-23 PROCEDURE — 71275 CT ANGIOGRAPHY CHEST: CPT | Mod: 26,,, | Performed by: STUDENT IN AN ORGANIZED HEALTH CARE EDUCATION/TRAINING PROGRAM

## 2024-01-23 RX ADMIN — IOHEXOL 100 ML: 350 INJECTION, SOLUTION INTRAVENOUS at 08:01

## 2024-01-24 ENCOUNTER — TELEPHONE (OUTPATIENT)
Dept: CARDIOLOGY | Facility: CLINIC | Age: 89
End: 2024-01-24
Payer: MEDICARE

## 2024-01-24 ENCOUNTER — HOSPITAL ENCOUNTER (INPATIENT)
Facility: HOSPITAL | Age: 89
LOS: 3 days | Discharge: HOME | DRG: 948 | End: 2024-01-28
Attending: EMERGENCY MEDICINE | Admitting: INTERNAL MEDICINE
Payer: MEDICARE

## 2024-01-24 ENCOUNTER — TELEPHONE (OUTPATIENT)
Dept: INTERNAL MEDICINE | Facility: CLINIC | Age: 89
End: 2024-01-24
Payer: MEDICARE

## 2024-01-24 DIAGNOSIS — R79.89 ELEVATED TROPONIN: ICD-10-CM

## 2024-01-24 DIAGNOSIS — I71.43 INFRARENAL ABDOMINAL AORTIC ANEURYSM (AAA) WITHOUT RUPTURE: ICD-10-CM

## 2024-01-24 DIAGNOSIS — I71.23 ANEURYSM OF DESCENDING THORACIC AORTA WITHOUT RUPTURE: ICD-10-CM

## 2024-01-24 DIAGNOSIS — N28.9 RENAL INSUFFICIENCY: Primary | ICD-10-CM

## 2024-01-24 DIAGNOSIS — R07.9 CHEST PAIN: ICD-10-CM

## 2024-01-24 DIAGNOSIS — R79.89 TROPONIN LEVEL ELEVATED: ICD-10-CM

## 2024-01-24 DIAGNOSIS — R79.89 ELEVATED TROPONIN I LEVEL: ICD-10-CM

## 2024-01-24 DIAGNOSIS — R22.2 SUPRACLAVICULAR MASS: ICD-10-CM

## 2024-01-24 DIAGNOSIS — I71.21 ANEURYSM OF ASCENDING AORTA WITHOUT RUPTURE: ICD-10-CM

## 2024-01-24 DIAGNOSIS — I25.118 CORONARY ARTERY DISEASE OF NATIVE ARTERY OF NATIVE HEART WITH STABLE ANGINA PECTORIS: ICD-10-CM

## 2024-01-24 DIAGNOSIS — I25.10 CAD (CORONARY ARTERY DISEASE): ICD-10-CM

## 2024-01-24 DIAGNOSIS — R53.1 WEAKNESS: ICD-10-CM

## 2024-01-24 LAB
ALBUMIN SERPL BCP-MCNC: 3.9 G/DL (ref 3.5–5.2)
ALP SERPL-CCNC: 74 U/L (ref 55–135)
ALT SERPL W/O P-5'-P-CCNC: 48 U/L (ref 10–44)
ANION GAP SERPL CALC-SCNC: 13 MMOL/L (ref 8–16)
AORTIC ROOT ANNULUS: 4.27 CM
APTT PPP: 31.5 SEC (ref 21–32)
ASCENDING AORTA: 3.76 CM
AST SERPL-CCNC: 36 U/L (ref 10–40)
AV INDEX (PROSTH): 0.59
AV MEAN GRADIENT: 9 MMHG
AV PEAK GRADIENT: 11 MMHG
AV VALVE AREA BY VELOCITY RATIO: 2.55 CM²
AV VALVE AREA: 2.25 CM²
AV VELOCITY RATIO: 0.67
BASOPHILS # BLD AUTO: 0.02 K/UL (ref 0–0.2)
BASOPHILS NFR BLD: 0.3 % (ref 0–1.9)
BILIRUB SERPL-MCNC: 0.6 MG/DL (ref 0.1–1)
BSA FOR ECHO PROCEDURE: 2.34 M2
BUN SERPL-MCNC: 24 MG/DL (ref 8–23)
CALCIUM SERPL-MCNC: 9.6 MG/DL (ref 8.7–10.5)
CHLORIDE SERPL-SCNC: 103 MMOL/L (ref 95–110)
CK SERPL-CCNC: 376 U/L (ref 20–200)
CO2 SERPL-SCNC: 26 MMOL/L (ref 23–29)
CREAT SERPL-MCNC: 1.6 MG/DL (ref 0.5–1.4)
CV ECHO LV RWT: 0.62 CM
D DIMER PPP IA.FEU-MCNC: 29.41 MG/L FEU
DIFFERENTIAL METHOD BLD: ABNORMAL
DOP CALC AO PEAK VEL: 1.64 M/S
DOP CALC AO VTI: 44 CM
DOP CALC LVOT AREA: 3.8 CM2
DOP CALC LVOT DIAMETER: 2.2 CM
DOP CALC LVOT PEAK VEL: 1.1 M/S
DOP CALC LVOT STROKE VOLUME: 99.16 CM3
DOP CALC RVOT PEAK VEL: 0.67 M/S
DOP CALC RVOT VTI: 15.8 CM
DOP CALCLVOT PEAK VEL VTI: 26.1 CM
E WAVE DECELERATION TIME: 297.64 MSEC
E/A RATIO: 0.69
E/E' RATIO: 7.44 M/S
ECHO LV POSTERIOR WALL: 1.16 CM (ref 0.6–1.1)
EOSINOPHIL # BLD AUTO: 0 K/UL (ref 0–0.5)
EOSINOPHIL NFR BLD: 0.4 % (ref 0–8)
ERYTHROCYTE [DISTWIDTH] IN BLOOD BY AUTOMATED COUNT: 12.2 % (ref 11.5–14.5)
EST. GFR  (NO RACE VARIABLE): 41 ML/MIN/1.73 M^2
FRACTIONAL SHORTENING: 27 % (ref 28–44)
GLUCOSE SERPL-MCNC: 101 MG/DL (ref 70–110)
HCT VFR BLD AUTO: 44.2 % (ref 40–54)
HGB BLD-MCNC: 14.8 G/DL (ref 14–18)
IMM GRANULOCYTES # BLD AUTO: 0.03 K/UL (ref 0–0.04)
IMM GRANULOCYTES NFR BLD AUTO: 0.4 % (ref 0–0.5)
INR PPP: 1.1 (ref 0.8–1.2)
INTERVENTRICULAR SEPTUM: 1.21 CM (ref 0.6–1.1)
IVC DIAMETER: 1.08 CM
IVRT: 87.54 MSEC
LA MAJOR: 5.6 CM
LA MINOR: 4.68 CM
LA WIDTH: 3.2 CM
LEFT ATRIUM SIZE: 3.9 CM
LEFT ATRIUM VOLUME INDEX: 23.6 ML/M2
LEFT ATRIUM VOLUME: 54.09 CM3
LEFT INTERNAL DIMENSION IN SYSTOLE: 2.71 CM (ref 2.1–4)
LEFT VENTRICLE DIASTOLIC VOLUME INDEX: 25.89 ML/M2
LEFT VENTRICLE DIASTOLIC VOLUME: 59.29 ML
LEFT VENTRICLE MASS INDEX: 64 G/M2
LEFT VENTRICLE SYSTOLIC VOLUME INDEX: 11.9 ML/M2
LEFT VENTRICLE SYSTOLIC VOLUME: 27.35 ML
LEFT VENTRICULAR INTERNAL DIMENSION IN DIASTOLE: 3.73 CM (ref 3.5–6)
LEFT VENTRICULAR MASS: 146.27 G
LV LATERAL E/E' RATIO: 8.38 M/S
LV SEPTAL E/E' RATIO: 6.7 M/S
LVOT MG: 3.7 MMHG
LVOT MV: 0.94 CM/S
LYMPHOCYTES # BLD AUTO: 1.4 K/UL (ref 1–4.8)
LYMPHOCYTES NFR BLD: 17.7 % (ref 18–48)
MCH RBC QN AUTO: 30.6 PG (ref 27–31)
MCHC RBC AUTO-ENTMCNC: 33.5 G/DL (ref 32–36)
MCV RBC AUTO: 92 FL (ref 82–98)
MONOCYTES # BLD AUTO: 0.5 K/UL (ref 0.3–1)
MONOCYTES NFR BLD: 6.9 % (ref 4–15)
MV PEAK A VEL: 0.97 M/S
MV PEAK E VEL: 0.67 M/S
MV STENOSIS PRESSURE HALF TIME: 86.32 MS
MV VALVE AREA P 1/2 METHOD: 2.55 CM2
NEUTROPHILS # BLD AUTO: 5.7 K/UL (ref 1.8–7.7)
NEUTROPHILS NFR BLD: 74.3 % (ref 38–73)
NRBC BLD-RTO: 0 /100 WBC
PISA TR MAX VEL: 2.3 M/S
PLATELET # BLD AUTO: 136 K/UL (ref 150–450)
PMV BLD AUTO: 11.6 FL (ref 9.2–12.9)
POTASSIUM SERPL-SCNC: 3.5 MMOL/L (ref 3.5–5.1)
PROT SERPL-MCNC: 7.7 G/DL (ref 6–8.4)
PROTHROMBIN TIME: 11.4 SEC (ref 9–12.5)
PV MEAN GRADIENT: 1 MMHG
RA MAJOR: 4.49 CM
RA PRESSURE ESTIMATED: 3 MMHG
RA WIDTH: 3 CM
RBC # BLD AUTO: 4.83 M/UL (ref 4.6–6.2)
RV TB RVSP: 5 MMHG
SODIUM SERPL-SCNC: 142 MMOL/L (ref 136–145)
STJ: 3.59 CM
TDI LATERAL: 0.08 M/S
TDI SEPTAL: 0.1 M/S
TDI: 0.09 M/S
TR MAX PG: 21 MMHG
TRICUSPID ANNULAR PLANE SYSTOLIC EXCURSION: 2.02 CM
TROPONIN I SERPL DL<=0.01 NG/ML-MCNC: 0.05 NG/ML (ref 0–0.03)
TROPONIN I SERPL DL<=0.01 NG/ML-MCNC: 0.06 NG/ML (ref 0–0.03)
TROPONIN I SERPL DL<=0.01 NG/ML-MCNC: 0.06 NG/ML (ref 0–0.03)
TV REST PULMONARY ARTERY PRESSURE: 24 MMHG
WBC # BLD AUTO: 7.63 K/UL (ref 3.9–12.7)
Z-SCORE OF LEFT VENTRICULAR DIMENSION IN END DIASTOLE: -8.52
Z-SCORE OF LEFT VENTRICULAR DIMENSION IN END SYSTOLE: -5.28

## 2024-01-24 PROCEDURE — 27000190 HC CPAP FULL FACE MASK W/VALVE

## 2024-01-24 PROCEDURE — 36415 COLL VENOUS BLD VENIPUNCTURE: CPT | Performed by: INTERNAL MEDICINE

## 2024-01-24 PROCEDURE — 85025 COMPLETE CBC W/AUTO DIFF WBC: CPT | Performed by: NURSE PRACTITIONER

## 2024-01-24 PROCEDURE — 63600175 PHARM REV CODE 636 W HCPCS: Mod: JZ,JG | Performed by: INTERNAL MEDICINE

## 2024-01-24 PROCEDURE — 85730 THROMBOPLASTIN TIME PARTIAL: CPT | Performed by: EMERGENCY MEDICINE

## 2024-01-24 PROCEDURE — 80053 COMPREHEN METABOLIC PANEL: CPT | Performed by: NURSE PRACTITIONER

## 2024-01-24 PROCEDURE — 84484 ASSAY OF TROPONIN QUANT: CPT | Mod: 91 | Performed by: INTERNAL MEDICINE

## 2024-01-24 PROCEDURE — 96374 THER/PROPH/DIAG INJ IV PUSH: CPT

## 2024-01-24 PROCEDURE — 85379 FIBRIN DEGRADATION QUANT: CPT | Performed by: EMERGENCY MEDICINE

## 2024-01-24 PROCEDURE — 63600175 PHARM REV CODE 636 W HCPCS: Performed by: INTERNAL MEDICINE

## 2024-01-24 PROCEDURE — G0378 HOSPITAL OBSERVATION PER HR: HCPCS

## 2024-01-24 PROCEDURE — 85610 PROTHROMBIN TIME: CPT | Performed by: EMERGENCY MEDICINE

## 2024-01-24 PROCEDURE — 82550 ASSAY OF CK (CPK): CPT | Performed by: NURSE PRACTITIONER

## 2024-01-24 PROCEDURE — 84484 ASSAY OF TROPONIN QUANT: CPT | Performed by: NURSE PRACTITIONER

## 2024-01-24 PROCEDURE — 25000003 PHARM REV CODE 250: Performed by: INTERNAL MEDICINE

## 2024-01-24 PROCEDURE — 63600175 PHARM REV CODE 636 W HCPCS: Performed by: EMERGENCY MEDICINE

## 2024-01-24 PROCEDURE — 25000003 PHARM REV CODE 250: Performed by: EMERGENCY MEDICINE

## 2024-01-24 PROCEDURE — 94660 CPAP INITIATION&MGMT: CPT

## 2024-01-24 PROCEDURE — 93010 ELECTROCARDIOGRAM REPORT: CPT | Mod: ,,, | Performed by: INTERNAL MEDICINE

## 2024-01-24 PROCEDURE — 99900035 HC TECH TIME PER 15 MIN (STAT)

## 2024-01-24 PROCEDURE — 93005 ELECTROCARDIOGRAM TRACING: CPT

## 2024-01-24 PROCEDURE — 5A09357 ASSISTANCE WITH RESPIRATORY VENTILATION, LESS THAN 24 CONSECUTIVE HOURS, CONTINUOUS POSITIVE AIRWAY PRESSURE: ICD-10-PCS | Performed by: INTERNAL MEDICINE

## 2024-01-24 PROCEDURE — 99291 CRITICAL CARE FIRST HOUR: CPT

## 2024-01-24 RX ORDER — GLUCAGON 1 MG
1 KIT INJECTION
Status: DISCONTINUED | OUTPATIENT
Start: 2024-01-24 | End: 2024-01-28 | Stop reason: HOSPADM

## 2024-01-24 RX ORDER — IBUPROFEN 200 MG
24 TABLET ORAL
Status: DISCONTINUED | OUTPATIENT
Start: 2024-01-24 | End: 2024-01-28 | Stop reason: HOSPADM

## 2024-01-24 RX ORDER — BUTALBITAL, ACETAMINOPHEN AND CAFFEINE 50; 325; 40 MG/1; MG/1; MG/1
1 TABLET ORAL EVERY 4 HOURS PRN
Status: DISCONTINUED | OUTPATIENT
Start: 2024-01-24 | End: 2024-01-28 | Stop reason: HOSPADM

## 2024-01-24 RX ORDER — HYDRALAZINE HYDROCHLORIDE 20 MG/ML
10 INJECTION INTRAMUSCULAR; INTRAVENOUS ONCE
Status: COMPLETED | OUTPATIENT
Start: 2024-01-24 | End: 2024-01-24

## 2024-01-24 RX ORDER — SIMETHICONE 80 MG
1 TABLET,CHEWABLE ORAL 4 TIMES DAILY PRN
Status: DISCONTINUED | OUTPATIENT
Start: 2024-01-24 | End: 2024-01-28 | Stop reason: HOSPADM

## 2024-01-24 RX ORDER — PANTOPRAZOLE SODIUM 40 MG/1
40 TABLET, DELAYED RELEASE ORAL DAILY
Status: DISCONTINUED | OUTPATIENT
Start: 2024-01-25 | End: 2024-01-28 | Stop reason: HOSPADM

## 2024-01-24 RX ORDER — ATORVASTATIN CALCIUM 40 MG/1
40 TABLET, FILM COATED ORAL NIGHTLY
Status: DISCONTINUED | OUTPATIENT
Start: 2024-01-24 | End: 2024-01-24

## 2024-01-24 RX ORDER — LABETALOL HYDROCHLORIDE 5 MG/ML
20 INJECTION, SOLUTION INTRAVENOUS ONCE
Status: COMPLETED | OUTPATIENT
Start: 2024-01-24 | End: 2024-01-24

## 2024-01-24 RX ORDER — IBUPROFEN 200 MG
16 TABLET ORAL
Status: DISCONTINUED | OUTPATIENT
Start: 2024-01-24 | End: 2024-01-28 | Stop reason: HOSPADM

## 2024-01-24 RX ORDER — LISINOPRIL 5 MG/1
10 TABLET ORAL DAILY
Status: DISCONTINUED | OUTPATIENT
Start: 2024-01-24 | End: 2024-01-25

## 2024-01-24 RX ORDER — ASPIRIN 81 MG/1
81 TABLET ORAL DAILY
Status: DISCONTINUED | OUTPATIENT
Start: 2024-01-25 | End: 2024-01-28 | Stop reason: HOSPADM

## 2024-01-24 RX ORDER — TAMSULOSIN HYDROCHLORIDE 0.4 MG/1
0.4 CAPSULE ORAL DAILY
Status: DISCONTINUED | OUTPATIENT
Start: 2024-01-25 | End: 2024-01-28 | Stop reason: HOSPADM

## 2024-01-24 RX ORDER — HYDRALAZINE HYDROCHLORIDE 20 MG/ML
10 INJECTION INTRAMUSCULAR; INTRAVENOUS
Status: DISCONTINUED | OUTPATIENT
Start: 2024-01-24 | End: 2024-01-24

## 2024-01-24 RX ORDER — ALUMINUM HYDROXIDE, MAGNESIUM HYDROXIDE, AND SIMETHICONE 1200; 120; 1200 MG/30ML; MG/30ML; MG/30ML
30 SUSPENSION ORAL 4 TIMES DAILY PRN
Status: DISCONTINUED | OUTPATIENT
Start: 2024-01-24 | End: 2024-01-28 | Stop reason: HOSPADM

## 2024-01-24 RX ORDER — NALOXONE HCL 0.4 MG/ML
0.4 VIAL (ML) INJECTION
Status: DISCONTINUED | OUTPATIENT
Start: 2024-01-24 | End: 2024-01-28 | Stop reason: HOSPADM

## 2024-01-24 RX ORDER — LABETALOL HYDROCHLORIDE 5 MG/ML
10 INJECTION, SOLUTION INTRAVENOUS
Status: COMPLETED | OUTPATIENT
Start: 2024-01-24 | End: 2024-01-24

## 2024-01-24 RX ORDER — LISINOPRIL 20 MG/1
20 TABLET ORAL NIGHTLY
Status: COMPLETED | OUTPATIENT
Start: 2024-01-24 | End: 2024-01-24

## 2024-01-24 RX ORDER — BENAZEPRIL HYDROCHLORIDE AND HYDROCHLOROTHIAZIDE 10; 12.5 MG/1; MG/1
1 TABLET ORAL DAILY
Status: DISCONTINUED | OUTPATIENT
Start: 2024-01-24 | End: 2024-01-24 | Stop reason: CLARIF

## 2024-01-24 RX ORDER — ONDANSETRON HYDROCHLORIDE 2 MG/ML
4 INJECTION, SOLUTION INTRAVENOUS EVERY 8 HOURS PRN
Status: DISCONTINUED | OUTPATIENT
Start: 2024-01-24 | End: 2024-01-28 | Stop reason: HOSPADM

## 2024-01-24 RX ORDER — LABETALOL HYDROCHLORIDE 5 MG/ML
10 INJECTION, SOLUTION INTRAVENOUS EVERY 6 HOURS PRN
Status: DISCONTINUED | OUTPATIENT
Start: 2024-01-24 | End: 2024-01-24

## 2024-01-24 RX ORDER — LABETALOL HYDROCHLORIDE 5 MG/ML
20 INJECTION, SOLUTION INTRAVENOUS EVERY 4 HOURS PRN
Status: DISCONTINUED | OUTPATIENT
Start: 2024-01-24 | End: 2024-01-28 | Stop reason: HOSPADM

## 2024-01-24 RX ORDER — HYDROCHLOROTHIAZIDE 12.5 MG/1
12.5 TABLET ORAL DAILY
Status: DISCONTINUED | OUTPATIENT
Start: 2024-01-24 | End: 2024-01-25

## 2024-01-24 RX ORDER — TALC
6 POWDER (GRAM) TOPICAL NIGHTLY PRN
Status: DISCONTINUED | OUTPATIENT
Start: 2024-01-24 | End: 2024-01-28 | Stop reason: HOSPADM

## 2024-01-24 RX ORDER — ATORVASTATIN CALCIUM 40 MG/1
40 TABLET, FILM COATED ORAL NIGHTLY
Status: DISCONTINUED | OUTPATIENT
Start: 2024-01-25 | End: 2024-01-28 | Stop reason: HOSPADM

## 2024-01-24 RX ORDER — SODIUM CHLORIDE 0.9 % (FLUSH) 0.9 %
10 SYRINGE (ML) INJECTION EVERY 12 HOURS PRN
Status: DISCONTINUED | OUTPATIENT
Start: 2024-01-24 | End: 2024-01-28 | Stop reason: HOSPADM

## 2024-01-24 RX ADMIN — HYDRALAZINE HYDROCHLORIDE 10 MG: 20 INJECTION INTRAMUSCULAR; INTRAVENOUS at 05:01

## 2024-01-24 RX ADMIN — LABETALOL HYDROCHLORIDE 10 MG: 5 INJECTION INTRAVENOUS at 03:01

## 2024-01-24 RX ADMIN — LABETALOL HYDROCHLORIDE 20 MG: 5 INJECTION INTRAVENOUS at 06:01

## 2024-01-24 RX ADMIN — BUTALBITAL, ACETAMINOPHEN, AND CAFFEINE 1 TABLET: 325; 50; 40 TABLET ORAL at 06:01

## 2024-01-24 RX ADMIN — LISINOPRIL 10 MG: 10 TABLET ORAL at 12:01

## 2024-01-24 RX ADMIN — LISINOPRIL 20 MG: 20 TABLET ORAL at 09:01

## 2024-01-24 RX ADMIN — HYDROCHLOROTHIAZIDE 12.5 MG: 12.5 TABLET ORAL at 12:01

## 2024-01-24 RX ADMIN — LABETALOL HYDROCHLORIDE 10 MG: 5 INJECTION INTRAVENOUS at 12:01

## 2024-01-24 RX ADMIN — BUTALBITAL, ACETAMINOPHEN, AND CAFFEINE 1 TABLET: 325; 50; 40 TABLET ORAL at 01:01

## 2024-01-24 NOTE — FIRST PROVIDER EVALUATION
Medical screening examination initiated.  I have conducted a focused provider triage encounter, findings are as follows:    Brief history of present illness:  88-year-old male with complaint of elevated blood pressure weakness and chest pain over the past couple days.    Vitals:    01/24/24 1025   Pulse: 80   Resp: 16   TempSrc: Oral   SpO2: 95%       Pertinent physical exam:  Alert awake, no respiratory distress    Brief workup plan: cardiac workup    Preliminary workup initiated; this workup will be continued and followed by the physician or advanced practice provider that is assigned to the patient when roomed.

## 2024-01-24 NOTE — TELEPHONE ENCOUNTER
Spoke with patient daughter she states pt is currently in the ED in [place] and needs to see a cardiologist. Let her know they could bring him to [facility] on [address] where we have cardiologist on staff. ----- Message from Shanika Larry sent at 1/24/2024  8:31 AM CST -----  Contact: Mimi (Daughter)  Pts daughter states the pts bp was elevated, he is in the ED in [place] and they want to have him admitted somewhere with a cardiologist. Please call her back at [phone].    Thanks  TS

## 2024-01-24 NOTE — TELEPHONE ENCOUNTER
Spoke with daughter Alecia. [facility] [place] [facility] has been admitted to [facility] due to elevated BP. BP was 200/120 upon admission. Daughter states pt had a CT scan for hematoma of neck on 1/23/2024 which required dye. [facility] [place] [facility] had consult today to discuss mass on neck but is unsure if the pt will make appt due to being admitted to hospital. Pt is awaiting a cardiac bed as, per daughter, there are no cardiac beds available in any surrounding hospitals and no cardiology providers at [facility]. Daughter states pt wants to leave AMA. Advised to call cardiologist Dr. EVERETTE Aquino MD (former cardiac doctor was Dr. Zeny MD) to receive recommendations from a cardiac standpoint. Verbalized understanding.//carla DE GUZMAN

## 2024-01-24 NOTE — ED PROVIDER NOTES
Emergency Medicine Provider Note - 1/24/2024       SCRIBE NOTE: Maximino KNOX and Raheem Leon, am scribing for, and in the presence of, Andreia Austin DO.     History     Chief Complaint   Patient presents with    Hypertension     Reports HTN, HA, and nausea since last night. Seen at [facility] last night. No BP meds today       Allergies:  Review of patient's allergies indicates:   Allergen Reactions    Codeine Other (See Comments)     When taking codeine, pt becomes very anxious        History of Present Illness   HPI    1/24/2024, 11:07 AM  The history is provided by the daughter and patient    Jovanny Olmedo is a 88 y.o. male with a PMHx of AAA, CAD, CKD, GERD, HTN, PVD, and sleep apnea presenting to the ED for HTN (SBP of 200's)' Pt was seen last night at [facility]. Daughter states that the doctors there were concerned about his troponin,' It was recommended to be transferred to a facility with cardiology services.Pt signed out AMA secondary to a room/transfer delays.       Per patient:He had chest pain, onset yesterday.  It was mid-sternal CP, non radiating.    It was described as severe.   Treated with Aspirin 325 mg (1:00 AM) & Lovenox 1 mg/kg at 1:00 AM., nitroglycerin, and Morphine. Pt denies any fever, abd pain, hematochezia, numbness, weakness, dysphagia, and N/V. Pt reports sporadic compliance with medications. His vascular DrAbby Berry (Neurovascular Surgery).    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  Patient had undergone CTA of chest at [facility].    Study Result  EXAM: CTA CHEST AORTA NON CORONARY    CLINICAL HISTORY: Aortic aneurysm, known or suspected; [I71.23]-Aneurysm of the descending thoracic aorta, without rupture.    TECHNIQUE: Contiguous axial images obtained through the chest afterintravenous administration of iodinated contrast. Sagittal and coronal  reconstructions performed. 3-D and MIP reconstructions also reviewed.    COMPARISON: CTA dated 08/16/2023    FINDINGS:  Ascending aortic aneurysm with mural thrombus appears unchanged measuring up to 4.5 cm in diameter. 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 Descending aortic aneurysm measuring up to 5.5 cm diameter with extensive mural thrombus. Descending aortic aneurysm does not quite extend to the level of the hiatus measuring up to 4.2 cm diameter distally. Aorta measures 3.2 cm diameter at the level of the hiatus.    Pulmonary trunk and main pulmonary arteries appear normal in size.    Heart is normal in size. No CT evidence of significant right heart strain. No significant pericardial effusion.    Scattered coronary atherosclerosis.    Trachea and bronchial trees are patent with normal branching pattern. No significant bronchiectasis or bronchial wall thickening.    Mild left than right dependent subsegmental atelectasis versus scarring. Lungs are otherwise clear. No significant nodule or consolidation.    No significant pleural effusion or pneumothorax.    Mildly prominent hilar and lower mediastinal lymph nodes.    Soft tissues of the lower neck, chest wall, and axilla are unremarkable aside from bilateral gynecomastia.    Numerous hepatic and renal cysts. Visualized upper abdominal contents otherwise unremarkable.    No acute bony abnormality. No aggressive lytic or blastic lesion seen. Degenerative changes noted in spine.      Impression:    Relatively unchanged thoracic aortic aneurysm as detailed above.    Mild left greater than right dependent subsegmental atelectasis versus scarring.    Mildly prominent hilar and lower mediastinal lymph nodes, unchanged and nonspecific but likely reactive.      All CT scans at [this location] are performed using dose modulation techniques as appropriate to a performed exam including the following: automated exposure  control; adjustment of the mA and/or kV according to patient size (this includes techniques or standardized protocols for targeted exams where dose is matched to indication / reason for exam; i.e. extremities or head); use of iterative reconstruction technique.    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 8/6/2023    Arthritis     Back pain     CAD (coronary artery disease)     Cataract     CKD (chronic kidney disease)     GERD (gastroesophageal reflux disease)     Glaucoma     suspect    HTN (hypertension)     Multiple subsegmental pulmonary emboli without acute cor pulmonale 9/9/2021    Prostate cancer     tx'd with radiation    PVD (peripheral vascular disease)     stent in right renal artery and aorta    Sleep apnea     Stable angina pectoris 5/16/2023       Past Surgical History:  Past Surgical History:   Procedure Laterality Date    ABDOMINAL AORTA STENT      CARDIAC CATHETERIZATION      CATARACT EXTRACTION W/  INTRAOCULAR LENS IMPLANT Right 04/29/2017    COLONOSCOPY N/A 9/24/2020    Procedure: COLONOSCOPY;  Surgeon: Shayy Melvin MD;  Location: [facility] ENDO;  Service: Endoscopy;  Laterality: N/A;    COLONOSCOPY N/A 1/10/2022    Procedure: COLONOSCOPY;  Surgeon: Vi Lara MD;  Location: [facility] ENDO;  Service: Endoscopy;  Laterality: N/A;    CORONARY ANGIOPLASTY      CORONARY STENT PLACEMENT      PCIOL Right 03/29/2017    DR. LEDEZMA    PCIOL Left 07/03/2019    DR. LEDEZMA    PROSTATE BIOPSY      RENAL ARTERY STENT           Family History:  Family History   Problem Relation Age of Onset    Glaucoma Mother     Diabetes Mother     Kidney disease Mother     Colon cancer Father      Cancer Father         colon cancer    Diabetes Sister     Diabetes Brother     Hypertension Brother     Blindness Neg Hx        Social History:  Social History     Tobacco Use    Smoking status: Former     Current packs/day: 0.00     Average packs/day: 0.5 packs/day for 30.0 years (15.0 ttl pk-yrs)     Types: Cigarettes     Start date: 1965     Quit date: 1995     Years since quittin.3    Smokeless tobacco: Former   Substance and Sexual Activity    Alcohol use: Not Currently     Alcohol/week: 0.0 standard drinks of alcohol    Drug use: Yes     Frequency: 4.0 times per week     Types: Marijuana    Sexual activity: Yes     Partners: Female        Review of Systems   Review of Systems   Constitutional:  Negative for fever.        (+) Malaise   HENT:  Negative for sore throat and trouble swallowing.    Respiratory:  Positive for shortness of breath.    Cardiovascular:  Positive for chest pain (non-radiating, mid-sternal) and leg swelling (BLE).        (+) Hypertension   Gastrointestinal:  Negative for abdominal pain, blood in stool, nausea and vomiting.   Genitourinary:  Negative for dysuria.   Musculoskeletal:  Negative for back pain.   Skin:  Negative for rash.   Neurological:  Positive for headaches. Negative for weakness and numbness.   Hematological:  Does not bruise/bleed easily.   All other systems reviewed and are negative.       Physical Exam     Initial Vitals [24 1025]   BP Pulse Resp Temp SpO2   (!) 150/96 80 16 98.2 °F (36.8 °C) 96 %      MAP       --          Physical Exam    Nursing Notes and Vital Signs Reviewed.  Constitutional: Patient is in no acute distress. Well-developed and well-nourished.  Head: Atraumatic. Normocephalic.  Eyes: PERRL. EOM intact. Conjunctivae are not pale. No scleral icterus.  ENT: Mucous membranes are moist. Oropharynx is clear and symmetric.    Neck: Supple. Full ROM. No lymphadenopathy.  Cardiovascular: Regular rate. Regular rhythm. No murmurs, rubs, or  gallops. Distal pulses are 2+ and symmetric.  Pulmonary/Chest: No respiratory distress.  Soft 3-4 cm movable mass left supraclavicular space.  No warmth or erythema. Clear to auscultation bilaterally. No wheezing or rales.  Abdominal: Soft and non-distended.  There is no tenderness.  No rebound, guarding, or rigidity. Good bowel sounds.  Genitourinary: No CVA tenderness  Musculoskeletal: Moves all extremities. No obvious deformities. No edema. No calf tenderness.  Skin: Warm and dry.  Neurological:  Alert, awake, and appropriate.  Normal speech.  No acute focal neurological deficits are appreciated. Cranial nerves II-XII intact.  Psychiatric: Normal affect. Good eye contact. Appropriate in content.     ED Course   ED Procedures:  Critical Care    Date/Time: 1/24/2024 12:23 PM    Performed by: Andreia Austin DO  Authorized by: Andreia Austin DO  Direct patient critical care time: 10 minutes  Additional history critical care time: 5 minutes  Ordering / reviewing critical care time: 5 minutes  Documentation critical care time: 5 minutes  Consulting other physicians critical care time: 5 minutes  Consult with family critical care time: 5 minutes  Total critical care time (exclusive of procedural time) : 35 minutes  Critical care time was exclusive of separately billable procedures and treating other patients and teaching time.  Critical care was necessary to treat or prevent imminent or life-threatening deterioration of the following conditions: cardiac failure.  Critical care was time spent personally by me on the following activities: blood draw for specimens, development of treatment plan with patient or surrogate, discussions with consultants, interpretation of cardiac output measurements, evaluation of patient's response to treatment, examination of patient, obtaining history from patient or surrogate, ordering and performing treatments and interventions, ordering and review of laboratory studies,  ordering and review of radiographic studies, pulse oximetry, re-evaluation of patient's condition and review of old charts.          ED Vital Signs:  Vitals:    01/24/24 1509 01/24/24 1547 01/24/24 1632 01/24/24 1716   BP: (!) 200/115 (!) 175/100 (!) 187/89 (!) 177/103   Pulse:  66 64 70   Resp:   19 18   Temp:    97.9 °F (36.6 °C)   TempSrc:       SpO2:  95% 95% 96%   Weight:       Height:        01/24/24 1802 01/24/24 1823 01/24/24 1842 01/24/24 1923   BP: (!) 171/103 (!) 150/89 (!) 165/96 (!) 163/96   Pulse: 81 83 72 70   Resp:    16   Temp:    98.6 °F (37 °C)   TempSrc:       SpO2: 99%   98%   Weight:       Height:        01/24/24 2000 01/24/24 2040 01/25/24 0016 01/25/24 0025   BP:  (!) 142/88 121/79    Pulse: 64 73 65 73   Resp:  16 18 17   Temp:   97.5 °F (36.4 °C)    TempSrc:       SpO2:  98% 99% 99%   Weight:       Height:        01/25/24 0400 01/25/24 0446 01/25/24 0532   BP:  (!) 156/97    Pulse: 64 66 64   Resp:  18 13   Temp:  97.5 °F (36.4 °C)    TempSrc:      SpO2:  98% 99%   Weight:      Height:          Abnormal Lab Results:  Labs Reviewed   CBC W/ AUTO DIFFERENTIAL - Abnormal; Notable for the following components:       Result Value    Platelets 136 (*)     Gran % 74.3 (*)     Lymph % 17.7 (*)     All other components within normal limits   COMPREHENSIVE METABOLIC PANEL - Abnormal; Notable for the following components:    BUN 24 (*)     Creatinine 1.6 (*)     ALT 48 (*)     eGFR 41 (*)     All other components within normal limits   CK - Abnormal; Notable for the following components:     (*)     All other components within normal limits   TROPONIN I - Abnormal; Notable for the following components:    Troponin I 0.062 (*)     All other components within normal limits   D DIMER, QUANTITATIVE - Abnormal; Notable for the following components:    D-Dimer 29.41 (*)     All other components within normal limits   TROPONIN I - Abnormal; Notable for the following components:    Troponin I 0.064 (*)      All other components within normal limits    Narrative:     STAT, if not done in ED, then at 2nd and 6th hour from  initial draw.   PROTIME-INR   APTT        All Lab Results:  Results for orders placed or performed during the hospital encounter of 01/24/24   CBC auto differential   Result Value Ref Range    WBC 7.63 3.90 - 12.70 K/uL    RBC 4.83 4.60 - 6.20 M/uL    Hemoglobin 14.8 14.0 - 18.0 g/dL    Hematocrit 44.2 40.0 - 54.0 %    MCV 92 82 - 98 fL    MCH 30.6 27.0 - 31.0 pg    MCHC 33.5 32.0 - 36.0 g/dL    RDW 12.2 11.5 - 14.5 %    Platelets 136 (L) 150 - 450 K/uL    MPV 11.6 9.2 - 12.9 fL    Immature Granulocytes 0.4 0.0 - 0.5 %    Gran # (ANC) 5.7 1.8 - 7.7 K/uL    Immature Grans (Abs) 0.03 0.00 - 0.04 K/uL    Lymph # 1.4 1.0 - 4.8 K/uL    Mono # 0.5 0.3 - 1.0 K/uL    Eos # 0.0 0.0 - 0.5 K/uL    Baso # 0.02 0.00 - 0.20 K/uL    nRBC 0 0 /100 WBC    Gran % 74.3 (H) 38.0 - 73.0 %    Lymph % 17.7 (L) 18.0 - 48.0 %    Mono % 6.9 4.0 - 15.0 %    Eosinophil % 0.4 0.0 - 8.0 %    Basophil % 0.3 0.0 - 1.9 %    Differential Method Automated    Comprehensive metabolic panel   Result Value Ref Range    Sodium 142 136 - 145 mmol/L    Potassium 3.5 3.5 - 5.1 mmol/L    Chloride 103 95 - 110 mmol/L    CO2 26 23 - 29 mmol/L    Glucose 101 70 - 110 mg/dL    BUN 24 (H) 8 - 23 mg/dL    Creatinine 1.6 (H) 0.5 - 1.4 mg/dL    Calcium 9.6 8.7 - 10.5 mg/dL    Total Protein 7.7 6.0 - 8.4 g/dL    Albumin 3.9 3.5 - 5.2 g/dL    Total Bilirubin 0.6 0.1 - 1.0 mg/dL    Alkaline Phosphatase 74 55 - 135 U/L    AST 36 10 - 40 U/L    ALT 48 (H) 10 - 44 U/L    eGFR 41 (A) >60 mL/min/1.73 m^2    Anion Gap 13 8 - 16 mmol/L   CPK   Result Value Ref Range     (H) 20 - 200 U/L   Troponin I   Result Value Ref Range    Troponin I 0.062 (H) 0.000 - 0.026 ng/mL   D-Dimer, Quantitative   Result Value Ref Range    D-Dimer 29.41 (H) <0.50 mg/L FEU   Protime-INR   Result Value Ref Range    Prothrombin Time 11.4 9.0 - 12.5 sec    INR 1.1 0.8 - 1.2    APTT   Result Value Ref Range    aPTT 31.5 21.0 - 32.0 sec   Troponin I   Result Value Ref Range    Troponin I 0.064 (H) 0.000 - 0.026 ng/mL   Troponin I   Result Value Ref Range    Troponin I 0.047 (H) 0.000 - 0.026 ng/mL   Comprehensive Metabolic Panel (CMP)   Result Value Ref Range    Sodium 137 136 - 145 mmol/L    Potassium 3.3 (L) 3.5 - 5.1 mmol/L    Chloride 104 95 - 110 mmol/L    CO2 27 23 - 29 mmol/L    Glucose 97 70 - 110 mg/dL    BUN 23 8 - 23 mg/dL    Creatinine 1.5 (H) 0.5 - 1.4 mg/dL    Calcium 9.2 8.7 - 10.5 mg/dL    Total Protein 6.9 6.0 - 8.4 g/dL    Albumin 3.4 (L) 3.5 - 5.2 g/dL    Total Bilirubin 0.6 0.1 - 1.0 mg/dL    Alkaline Phosphatase 66 55 - 135 U/L    AST 26 10 - 40 U/L    ALT 40 10 - 44 U/L    eGFR 45 (A) >60 mL/min/1.73 m^2    Anion Gap 6 (L) 8 - 16 mmol/L   Phosphorus   Result Value Ref Range    Phosphorus 4.0 2.7 - 4.5 mg/dL   Magnesium   Result Value Ref Range    Magnesium 1.2 (L) 1.6 - 2.6 mg/dL   Echo   Result Value Ref Range    BSA 2.34 m2    LVOT stroke volume 99.16 cm3    LVIDd 3.73 3.5 - 6.0 cm    LV Systolic Volume 27.35 mL    LV Systolic Volume Index 11.9 mL/m2    LVIDs 2.71 2.1 - 4.0 cm    LV Diastolic Volume 59.29 mL    LV Diastolic Volume Index 25.89 mL/m2    IVS 1.21 (A) 0.6 - 1.1 cm    LVOT diameter 2.20 cm    LVOT area 3.8 cm2    FS 27 (A) 28 - 44 %    Left Ventricle Relative Wall Thickness 0.62 cm    Posterior Wall 1.16 (A) 0.6 - 1.1 cm    LV mass 146.27 g    LV Mass Index 64 g/m2    MV Peak E Medardo 0.67 m/s    TDI LATERAL 0.08 m/s    TDI SEPTAL 0.10 m/s    E/E' ratio 7.44 m/s    MV Peak A Medardo 0.97 m/s    TR Max Medardo 2.30 m/s    E/A ratio 0.69     IVRT 87.54 msec    E wave deceleration time 297.64 msec    LV SEPTAL E/E' RATIO 6.70 m/s    LV LATERAL E/E' RATIO 8.38 m/s    LVOT peak medardo 1.10 m/s    Left Ventricular Outflow Tract Mean Velocity 0.94 cm/s    Left Ventricular Outflow Tract Mean Gradient 3.70 mmHg    RVOT peak VTI 15.8 cm    TAPSE 2.02 cm    LA size 3.90 cm     Left Atrium Minor Axis 4.68 cm    Left Atrium Major Axis 5.60 cm    RA Major Axis 4.49 cm    AV mean gradient 9 mmHg    AV peak gradient 11 mmHg    Ao peak carol 1.64 m/s    Ao VTI 44.00 cm    LVOT peak VTI 26.10 cm    AV valve area 2.25 cm²    AV Velocity Ratio 0.67     AV index (prosthetic) 0.59     KILLIAN by Velocity Ratio 2.55 cm²    MV stenosis pressure 1/2 time 86.32 ms    MV valve area p 1/2 method 2.55 cm2    Triscuspid Valve Regurgitation Peak Gradient 21 mmHg    PV mean gradient 1 mmHg    RVOT peak carol 0.67 m/s    Ao root annulus 4.27 cm    STJ 3.59 cm    Ascending aorta 3.76 cm    IVC diameter 1.08 cm    Mean e' 0.09 m/s    ZLVIDS -5.28     ZLVIDD -8.52     LA Volume Index 23.6 mL/m2    LA volume 54.09 cm3    LA WIDTH 3.2 cm    RA Width 3.0 cm    TV resting pulmonary artery pressure 24 mmHg    RV TB RVSP 5 mmHg    Est. RA pres 3 mmHg      Reviewed Prior Labs:   Latest Reference Range & Units 05/16/22 11:08 11/01/22 11:12 02/06/23 09:45 05/16/23 15:13 08/01/23 08:23 01/08/24 12:05 01/24/24 11:40   BUN 8 - 23 mg/dL 23  32 (H) 33 (H)   24 (H)   Creatinine 0.5 - 1.4 mg/dL 1.6 (H) 2.2 (H) 1.9 (H) 1.9 (H) 1.7 (H) 1.7 (H) 1.6 (H)   (H): Data is abnormally high    The EKG was ordered, reviewed, and independently interpreted by the ED provider:      ECG Results              EKG 12-lead (Final result)  Result time 01/24/24 19:25:37      Final result by Interface, Lab In [facility] (01/24/24 19:25:37)                   Narrative:    Test Reason : R07.9,    Vent. Rate : 077 BPM     Atrial Rate : 077 BPM     P-R Int : 174 ms          QRS Dur : 112 ms      QT Int : 418 ms       P-R-T Axes : -17 -81 041 degrees     QTc Int : 473 ms    Sinus rhythm with Premature atrial complexes  Left axis deviation  Minimal voltage criteria for LVH, may be normal variant  When compared with ECG of 07-AUG-2023 09:51,  Right bundle branch block is no longer Present  Confirmed by MARAH IRVIN, REBECCA WILSON ([id number]) on 1/24/2024 7:25:25 PM    Referred  By: [email or web address]   SELF           Confirmed By:REBECCA CRYSTAL MD                      Wet Read by Andreia Austin DO (01/24/24 11:08:49, [facility] - Emergency Dept., Emergency Medicine)    Rate of 77 beats per minute.  Sinus rhythm.  Left axis deviation.  LVH.  No ST segment elevation.  No STEMI.  This is compared to EKG dated August 7, 2023 no acute changes                                    Imaging Results:  Imaging Results              X-Ray Chest 1 View (Final result)  Result time 01/24/24 11:03:24      Final result by Sheldon Boothe), MD (01/24/24 11:03:24)                   Impression:      Stable chest.      Electronically signed by: Sheldon Boothe MD  Date:    01/24/2024  Time:    11:03               Narrative:    EXAMINATION:  XR CHEST 1 VIEW    CLINICAL HISTORY:  Weakness,    COMPARISON:  Chest, 11/01/2022    FINDINGS:  Heart is normal in size.  Tortuous thoracic aorta.  No focal infiltrate.                                       Type of Interpretation: ED Physician (Independently Interpreted).  Radiology Procedure Done: Portable CXR.  Interpretation: Normal cardiac silhouette size.  Tortuous aorta-note known history of thoracic aneurism.          The Emergency Provider reviewed the vital signs and test results, which are outlined above.     ED Discussion   ED Medication(s):  Medications   butalbital-acetaminophen-caffeine -40 mg per tablet 1 tablet (1 tablet Oral Given 1/25/24 9416)   sodium chloride 0.9% flush 10 mL (has no administration in time range)   naloxone 0.4 mg/mL injection 0.4 mg (has no administration in time range)   glucose chewable tablet 16 g (has no administration in time range)   glucose chewable tablet 24 g (has no administration in time range)   glucagon (human recombinant) injection 1 mg (has no administration in time range)   ondansetron injection 4 mg (has no administration in time range)   aluminum-magnesium hydroxide-simethicone 200-200-20 mg/5 mL suspension 30  mL (has no administration in time range)   simethicone chewable tablet 80 mg (has no administration in time range)   pantoprazole EC tablet 40 mg (has no administration in time range)   aspirin EC tablet 81 mg (has no administration in time range)   tamsulosin 24 hr capsule 0.4 mg (has no administration in time range)   atorvastatin tablet 40 mg (has no administration in time range)   labetaloL injection 20 mg (has no administration in time range)   melatonin tablet 6 mg (has no administration in time range)   lisinopriL tablet 40 mg (has no administration in time range)     And   hydroCHLOROthiazide tablet 12.5 mg (has no administration in time range)   magnesium oxide tablet 800 mg (has no administration in time range)   labetaloL injection 10 mg (10 mg Intravenous Given 1/24/24 1239)   hydrALAZINE injection 10 mg (10 mg Intravenous Given 1/24/24 1700)   labetaloL injection 20 mg (20 mg Intravenous Given 1/24/24 1821)   lisinopriL tablet 20 mg (20 mg Oral Given 1/24/24 2119)       ED Course as of 01/25/24 0756   Wed Jan 24, 2024   1213 Secure chat with Dr. Morrow.    (CVT surgery) [LB]   1216 Hemoglobin: 14.8 [LB]   1216 Hematocrit: 44.2 [LB]   1229 D-Dimer(!): 29.41 [LB]   1229 CPK(!): 376  Patient had CTA of chest yesterday.  See HPI [LB]   1252 Troponin I(!): 0.062 [LB]   1254 Creatinine(!): 1.6 [LB]   1254 BUN(!): 24 [LB]   1258 Case discussed with Dr. Cook.    (Cardiology)  Hold anticoagulation until repeat troponin. [LB]   1259 Dr. Montilla (Vascular) regarding arch, descending aneurysm.   They will see the patient. [LB]      ED Course User Index  [LB] Andreia uAstin DO          6:01 PM: Discussed case with Jana Carlton MD (Hospital Medicine). Dr. Carlton agrees with current care and management of pt and accepts admission.   Admitting Service: Hospital Medicine  Admitting Physician: Dr. Carlton  Admit to: Inpatient Med/Surg      6:01 PM: Re-evaluated pt. I have discussed test results, shared  treatment plan, and the need for admission with patient and family at bedside. Pt and family express understanding at this time and agree with all information. All questions answered. Pt and family have no further questions or concerns at this time. Pt is ready for admit.       MIPS Measures     Smoker? Yes; former     Hypertension: History of Hypertension: The patient has elevated blood pressure (higher than 120/80) while being treated in the ED but has a history of hypertension.       Medical Decision Making                 Medical Decision Making  Differential Diagnosis:  NSTEMI, Hypertensive emergency, aneurysm    Amount and/or Complexity of Data Reviewed  External Data Reviewed: radiology and notes.     Details: Reading Physician Reading Date Result Priority  Charles Zamora III, MD  [phone] 8/1/2023 Routine    Narrative & Impression  EXAMINATION:  CTA ABDOMEN AND PELVIS     CLINICAL HISTORY:  Aortic aneurysm, known or suspected;  Abdominal aortic aneurysm, without rupture, unspecified     TECHNIQUE:  Postcontrast axial images were obtained from the lung bases inferiorly to the lesser trochanters during the arterial phase along with a 2 minute delayed acquisition.     COMPARISON:  CT abdomen and pelvis 04/20/2021     FINDINGS:  Evaluation of the visualized descending thoracic aorta demonstrates aneurysmal dilatation, measuring 6 cm x 5.7 cm this has enlarged in the interval.  There is also mural thrombus here which appears moderate.  The distal descending thoracic aorta is tortuous, similar to the previous exam.  Consider follow-up CTA of the chest for additional evaluation.     Aortic stent graft again noted with the proximal landing zone inferior to the renal arteries bilaterally.  Distal landing zones within the common iliac arteries.  No evidence of endoleak.  The infrarenal abdominal aorta measures up to 3.8 cm in AP diameter, unchanged.     There is a stent within the proximal left renal  artery without renal artery stenosis.  No stenosis of the right renal artery or mesenteric arteries.  The SHEILA is not opacified and likely occluded.     The right internal iliac artery is occluded at its origin with reconstitution of flow distally.  No significant stenosis within the common or external iliac arteries.  Mild plaque within the femoral arteries bilaterally without significant stenosis.     Numerous hepatic and renal cysts.  No worrisome cyst identified.  No solid renal mass or hydronephrosis.  The adrenal glands, spleen, pancreas and gallbladder appear normal.     No bowel obstruction.  No free air, free fluid or adenopathy.  Prostate calcifications without enlargement.  The bladder is unremarkable.     There is coronary artery calcification.  Interstitial prominence at the lung bases.     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  Consider follow-up CTA of the chest to evaluate the remainder of the thoracic aorta.     Unchanged infrarenal abdominal aortic aneurysm with aortic stent graft.  No endoleak.     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        Electronically signed by: Mack Zamora  Date:                                            08/01/2023  Time:                                           14:52        Exam Ended: 08/01/23 09:16 CDT Last Resulted: 08/01/23 14:52 CDT      Reading Physician Reading Date Result Priority  Carmelo Puga MD  [phone] 1/23/2024 Routine    Narrative & Impression  EXAMINATION:  US SOFT TISSUE HEAD NECK THYROID     CLINICAL HISTORY:  .  Localized swelling, mass and lump, neck     TECHNIQUE:  Ultrasound of the thyroid and cervical lymph nodes was performed.     COMPARISON:  Targeted ultrasound of the left supraclavicular region was performed.     FINDINGS:  There is an oval heterogeneous mostly isoechoic lesion in the area of clinical interest in the left  supraclavicular space, possibly intramuscular in location.  Lesion measures 4.7 x 1.5 x 3.7 cm.  No significant internal Doppler vascularity is demonstrated.  Considerations include intramuscular hematoma as well as lipoma or other nonspecific mass.  Further evaluation could be obtained with MRI as indicated.     Incidental note is made of several small regional lymph nodes, all subcentimeter in size.     Impression:     As above.        Electronically signed by: CATARINO Puga MD  Date:                                            01/23/2024  Time:                                           10:38      Labs: ordered. Decision-making details documented in ED Course.     Details: Troponin 0.062, platelet count 136, BUN 24, creatinine 1.6, D-dimer 29.4 note patient had undergone CTA of the chest yesterday.  See HPI.  Radiology: ordered and independent interpretation performed. Decision-making details documented in ED Course.  ECG/medicine tests: ordered and independent interpretation performed. Decision-making details documented in ED Course.     Details: Independent review of chest x-ray shows mediastinal widening, no infiltrate.  Discussion of management or test interpretation with external provider(s): Discussed case with Dr. Coko (Cardiology) regarding NSTEMI.  He recommended holding heparin until repeat troponin.  Case was discussed with CVT surgery Dr. Morrow regarding ascending aortic aneurysm.  He recommended 6 month surveillance.  Follow up in clinic  case was discussed with vascular surgery regarding descending aneurism-see consult    Risk  Prescription drug management.  Decision regarding hospitalization.        Coding    Prescription Management: I performed a review of the patient's current Rx medication list as input by nursing staff.    Current Discharge Medication List        CONTINUE these medications which have NOT CHANGED    Details   aspirin (ECOTRIN) 81 MG EC tablet Take 81 mg by mouth once  daily.      atorvastatin (LIPITOR) 40 MG tablet Take 1 tablet by mouth once daily  Qty: 90 tablet, Refills: 1    Associated Diagnoses: Dyslipidemia      azelastine (ASTELIN) 137 mcg (0.1 %) nasal spray USE 2 SPRAY(S) IN EACH NOSTRIL TWICE DAILY  Qty: 30 mL, Refills: 11    Associated Diagnoses: Allergic rhinitis, unspecified seasonality, unspecified trigger      benazepril-hydrochlorthiazide (LOTENSIN HCT) 10-12.5 mg Tab Take 1 tablet by mouth twice daily  Qty: 180 tablet, Refills: 1    Associated Diagnoses: Essential hypertension; History of coronary angioplasty; WELLS (dyspnea on exertion); Pleural fibrosis; Pulmonary embolism, unspecified chronicity, unspecified pulmonary embolism type, unspecified whether acute cor pulmonale present; Dyslipidemia; Stable angina pectoris; Coronary artery disease, occlusive      ferrous sulfate 325 (65 FE) MG EC tablet Take 325 mg by mouth once daily.      fluticasone propionate (FLONASE) 50 mcg/actuation nasal spray Use 2 spray(s) in each nostril once daily  Qty: 16 g, Refills: 11    Associated Diagnoses: Allergic rhinitis, unspecified seasonality, unspecified trigger      meloxicam (MOBIC) 15 MG tablet Take 15 mg by mouth once daily.      methocarbamoL (ROBAXIN) 750 MG Tab Take 1 tablet (750 mg total) by mouth 3 (three) times daily.  Qty: 30 tablet, Refills: 0    Associated Diagnoses: Cervical paraspinal muscle spasm; Lumbar paraspinal muscle spasm      pantoprazole (PROTONIX) 40 MG tablet Take 1 tablet by mouth once daily  Qty: 90 tablet, Refills: 3    Associated Diagnoses: Gastroesophageal reflux disease, unspecified whether esophagitis present      tamsulosin (FLOMAX) 0.4 mg Cap Take 1 capsule (0.4 mg total) by mouth once daily.  Qty: 30 capsule, Refills: 11      clotrimazole-betamethasone 1-0.05% (LOTRISONE) cream Apply topically 2 (two) times daily.      tadalafiL (CIALIS) 20 MG Tab Take 1 tablet (20 mg total) by mouth daily as needed.  Qty: 10 tablet, Refills: 11      vacuum  "erection device system Kit 1 kit by Misc.(Non-Drug; Combo Route) route daily as needed.  Qty: 1 kit, Refills: 0              Discussed case with:Cardiovascular Surgery    12:19 PM: Discussed pt's case with Dr. Morrow (Cardiothoracic Surgery) who recommends following pt's AAA with serial Cts every 6 months or yearly at Dr. Morrow's clinic. He also recommends consulting vascular surgery for pt's descending aneurysm, which is 5.5 cm.    12:59 PM: Also discussed pt's case with Dr. Cook (Interventional Cardiology). Both Dr. Cook and Dr. Morrow recommending holding heparin.     1:01 PM: Dr. Montilla (Vascular Surgery) states that he will review pt's notes and see pt himself.     Portions of this note may have been created with voice recognition software. Occasional "wrong-word" or "sound-a-like" substitutions may have occurred due to the inherent limitations of voice recognition software. Please, read the note carefully and recognize, using context, where substitutions have occurred.          Clinical Impression       ICD-10-CM ICD-9-CM   1. Renal insufficiency  N28.9 593.9   2. Chest pain  R07.9 786.50   3. Weakness  R53.1 780.79   4. Elevated troponin  R79.89 790.6   5. Supraclavicular mass, L:     R22.2 786.6   6. 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  I71.23 441.2   7. Aneurysm of ascending aorta without rupture  I71.21 441.2   8. Infrarenal abdominal aortic aneurysm (AAA) without rupture  I71.43 441.4         ED Disposition     Disposition: Admit to med/surg floor  Patient condition: Fair        Scribe Attestation:   Scribe #1: I performed the above scribed service and the documentation accurately describes the services I performed. I attest to the accuracy of the note.     Attending:   Physician Attestation Statement for Scribe #1: I, Maximino Boyle and Raheem Leon, personally performed the services described in this documentation, as scribed by Andreia Austin, in my presence, and it " is both accurate and complete.                 Andreia Austin, DO  01/25/24 0802

## 2024-01-24 NOTE — ASSESSMENT & PLAN NOTE
-labile readings  -continue home medications  -prn IV labetalol for elevated readings  -monitor blood pressure

## 2024-01-24 NOTE — TELEPHONE ENCOUNTER
----- Message from Diamone Speed sent at 1/24/2024  8:17 AM CST -----  Regarding: daughter  Type: Patient Call Back       Who called: daughter        What is the request in detail: pt martha stated that she needs a call back about her father being in the hospital        Can the clinic reply by [facility]? Yes       Would the patient rather a call back or a response via [facility]? Call back       Best call back number:[phone]      Additional Information:

## 2024-01-24 NOTE — SUBJECTIVE & OBJECTIVE
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 8/6/2023    Arthritis     Back pain     CAD (coronary artery disease)     Cataract     CKD (chronic kidney disease)     GERD (gastroesophageal reflux disease)     Glaucoma     suspect    HTN (hypertension)     Multiple subsegmental pulmonary emboli without acute cor pulmonale 9/9/2021    Prostate cancer     tx'd with radiation    PVD (peripheral vascular disease)     stent in right renal artery and aorta    Sleep apnea     Stable angina pectoris 5/16/2023       Past Surgical History:   Procedure Laterality Date    ABDOMINAL AORTA STENT      CARDIAC CATHETERIZATION      CATARACT EXTRACTION W/  INTRAOCULAR LENS IMPLANT Right 04/29/2017    COLONOSCOPY N/A 9/24/2020    Procedure: COLONOSCOPY;  Surgeon: Shayy Melvin MD;  Location: [facility] ENDO;  Service: Endoscopy;  Laterality: N/A;    COLONOSCOPY N/A 1/10/2022    Procedure: COLONOSCOPY;  Surgeon: Vi Lara MD;  Location: [facility] ENDO;  Service: Endoscopy;  Laterality: N/A;    CORONARY ANGIOPLASTY      CORONARY STENT PLACEMENT      PCIOL Right 03/29/2017    DR. LEDEZMA    PCIOL Left 07/03/2019    DR. LEDEZMA    PROSTATE BIOPSY      RENAL ARTERY STENT         Review of patient's allergies indicates:   Allergen Reactions    Codeine Other (See Comments)     When taking codeine, pt becomes very anxious       No current facility-administered medications on file prior to encounter.     Current Outpatient Medications on File Prior to Encounter   Medication Sig    aspirin (ECOTRIN) 81 MG EC tablet Take 81 mg by mouth once daily.    atorvastatin (LIPITOR) 40 MG tablet Take 1 tablet by mouth once daily    azelastine (ASTELIN) 137 mcg (0.1 %) nasal spray USE 2 SPRAY(S) IN EACH NOSTRIL TWICE DAILY    benazepril-hydrochlorthiazide (LOTENSIN HCT) 10-12.5 mg Tab Take 1 tablet by mouth twice daily     ferrous sulfate 325 (65 FE) MG EC tablet Take 325 mg by mouth once daily.    fluticasone propionate (FLONASE) 50 mcg/actuation nasal spray Use 2 spray(s) in each nostril once daily    meloxicam (MOBIC) 15 MG tablet Take 15 mg by mouth once daily.    methocarbamoL (ROBAXIN) 750 MG Tab Take 1 tablet (750 mg total) by mouth 3 (three) times daily.    pantoprazole (PROTONIX) 40 MG tablet Take 1 tablet by mouth once daily    tamsulosin (FLOMAX) 0.4 mg Cap Take 1 capsule (0.4 mg total) by mouth once daily.    clotrimazole-betamethasone 1-0.05% (LOTRISONE) cream Apply topically 2 (two) times daily.    tadalafiL (CIALIS) 20 MG Tab Take 1 tablet (20 mg total) by mouth daily as needed.    vacuum erection device system Kit 1 kit by Misc.(Non-Drug; Combo Route) route daily as needed.    [DISCONTINUED] potassium chloride SA (K-DUR,KLOR-CON) 20 MEQ tablet Take 20 mEq by mouth.     Family History       Problem Relation (Age of Onset)    Cancer Father    Colon cancer Father    Diabetes Mother, Sister, Brother    Glaucoma Mother    Hypertension Brother    Kidney disease Mother          Tobacco Use    Smoking status: Former     Current packs/day: 0.00     Average packs/day: 0.5 packs/day for 30.0 years (15.0 ttl pk-yrs)     Types: Cigarettes     Start date: 1965     Quit date: 1995     Years since quittin.3    Smokeless tobacco: Former   Substance and Sexual Activity    Alcohol use: Not Currently     Alcohol/week: 0.0 standard drinks of alcohol    Drug use: Yes     Frequency: 4.0 times per week     Types: Marijuana    Sexual activity: Yes     Partners: Female     Review of Systems   Cardiovascular:  Positive for chest pain.   Gastrointestinal:  Positive for nausea.   Neurological:  Positive for headaches.   All other systems reviewed and are negative.    Objective:     Vital Signs (Most Recent):  Temp: 98.2 °F (36.8 °C) (24 1025)  Pulse: 72 (24 1403)  Resp: (!) 22 (24 1403)  BP: (!) 185/93 (24  1403)  SpO2: 97 % (01/24/24 1403) Vital Signs (24h Range):  Temp:  [98.2 °F (36.8 °C)] 98.2 °F (36.8 °C)  Pulse:  [66-80] 72  Resp:  [15-22] 22  SpO2:  [94 %-97 %] 97 %  BP: (126-185)/(89-96) 185/93     Weight: 107.5 kg (237 lb)  Body mass index is 32.14 kg/m².     Physical Exam  HENT:      Head: Normocephalic and atraumatic.   Cardiovascular:      Rate and Rhythm: Normal rate and regular rhythm.      Heart sounds: No murmur heard.  Pulmonary:      Effort: Pulmonary effort is normal. No respiratory distress.      Breath sounds: Normal breath sounds. No wheezing.   Abdominal:      General: Bowel sounds are normal. There is no distension.      Palpations: Abdomen is soft.      Tenderness: There is no abdominal tenderness.   Musculoskeletal:         General: No swelling.   Skin:     General: Skin is warm and dry.   Neurological:      Mental Status: He is alert and oriented to person, place, and time. Mental status is at baseline.                Significant Labs: All pertinent labs within the past 24 hours have been reviewed.  CBC:   Recent Labs   Lab 01/24/24  1140   WBC 7.63   HGB 14.8   HCT 44.2   *     CMP:   Recent Labs   Lab 01/24/24  1140      K 3.5      CO2 26      BUN 24*   CREATININE 1.6*   CALCIUM 9.6   PROT 7.7   ALBUMIN 3.9   BILITOT 0.6   ALKPHOS 74   AST 36   ALT 48*   ANIONGAP 13     Troponin:   Recent Labs   Lab 01/24/24  1140   TROPONINI 0.062*       Significant Imaging: I have reviewed all pertinent imaging results/findings within the past 24 hours.

## 2024-01-24 NOTE — ASSESSMENT & PLAN NOTE
Patient with known CAD , which is controlled Will continue ASA and Statin and monitor for S/Sx of angina/ACS. Continue to monitor on telemetry.

## 2024-01-24 NOTE — ASSESSMENT & PLAN NOTE
Patient was found to have thrombocytopenia, , will monitor the platelets Daily. Hold DVT prophylaxis if platelets are <50k. The patient's platelet results have been reviewed and are listed below.  Recent Labs   Lab 01/24/24  1140   *

## 2024-01-24 NOTE — HPI
The patient is an 87 yo male with past medical history of aortic aneurysm, CAD, HTN, glaucoma, CKD and GERD who presented to the ED with elevated blood pressure and headache. He reports he was at [facility] last night as he had an episode of chest pain. His chest pain was substernal. It subsided. He had elevated cardiac enzymes and the plan was to transfer him to facility with cardiology. He left AMA as he wanted to be seen by cardiologist. He is currently chest pain free. He reports he has a pounding headache. Cardiology, CT surgery, hospital medicine and vascular surgery consulted. Patient placed in observation.

## 2024-01-24 NOTE — H&P
[facility] Emergency Dept.  [facility] Medicine  History & Physical    Patient Name: Jovanny Olmedo  MRN: [id number]  Patient Class: OP- Observation  Admission Date: 1/24/2024  Attending Physician: Jana Carlton MD   Primary Care Provider: Miryam Jimenez MD         Patient information was obtained from patient, relative(s), past medical records, and ER records.     Subjective:     Principal Problem:Elevated troponin    Chief Complaint:   Chief Complaint   Patient presents with    Hypertension     Reports HTN, HA, and nausea since last night. Seen at [facility] last night. No BP meds today        HPI: The patient is an 87 yo male with past medical history of aortic aneurysm, CAD, HTN, glaucoma, CKD and GERD who presented to the ED with elevated blood pressure and headache. He reports he was at [facility] last night as he had an episode of chest pain. His chest pain was substernal. It subsided. He had elevated cardiac enzymes and the plan was to transfer him to facility with cardiology. He left AMA as he wanted to be seen by cardiologist. He is currently chest pain free. He reports he has a pounding headache. Cardiology, CT surgery, hospital medicine and vascular surgery consulted. Patient placed in observation.    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 8/6/2023    Arthritis     Back pain     CAD (coronary artery disease)     Cataract     CKD (chronic kidney disease)     GERD (gastroesophageal reflux disease)     Glaucoma     suspect    HTN (hypertension)     Multiple subsegmental pulmonary emboli without acute cor pulmonale 9/9/2021    Prostate cancer     tx'd with radiation    PVD (peripheral vascular disease)     stent in right renal artery and aorta    Sleep apnea     Stable angina pectoris 5/16/2023       Past Surgical History:   Procedure Laterality  Date    ABDOMINAL AORTA STENT      CARDIAC CATHETERIZATION      CATARACT EXTRACTION W/  INTRAOCULAR LENS IMPLANT Right 04/29/2017    COLONOSCOPY N/A 9/24/2020    Procedure: COLONOSCOPY;  Surgeon: Shayy Melvin MD;  Location: [facility] ENDO;  Service: Endoscopy;  Laterality: N/A;    COLONOSCOPY N/A 1/10/2022    Procedure: COLONOSCOPY;  Surgeon: Vi Lara MD;  Location: [facility] ENDO;  Service: Endoscopy;  Laterality: N/A;    CORONARY ANGIOPLASTY      CORONARY STENT PLACEMENT      PCIOL Right 03/29/2017    DR. LEDEZMA    PCIOL Left 07/03/2019    DR. LEDEZMA    PROSTATE BIOPSY      RENAL ARTERY STENT         Review of patient's allergies indicates:   Allergen Reactions    Codeine Other (See Comments)     When taking codeine, pt becomes very anxious       No current facility-administered medications on file prior to encounter.     Current Outpatient Medications on File Prior to Encounter   Medication Sig    aspirin (ECOTRIN) 81 MG EC tablet Take 81 mg by mouth once daily.    atorvastatin (LIPITOR) 40 MG tablet Take 1 tablet by mouth once daily    azelastine (ASTELIN) 137 mcg (0.1 %) nasal spray USE 2 SPRAY(S) IN EACH NOSTRIL TWICE DAILY    benazepril-hydrochlorthiazide (LOTENSIN HCT) 10-12.5 mg Tab Take 1 tablet by mouth twice daily    ferrous sulfate 325 (65 FE) MG EC tablet Take 325 mg by mouth once daily.    fluticasone propionate (FLONASE) 50 mcg/actuation nasal spray Use 2 spray(s) in each nostril once daily    meloxicam (MOBIC) 15 MG tablet Take 15 mg by mouth once daily.    methocarbamoL (ROBAXIN) 750 MG Tab Take 1 tablet (750 mg total) by mouth 3 (three) times daily.    pantoprazole (PROTONIX) 40 MG tablet Take 1 tablet by mouth once daily    tamsulosin (FLOMAX) 0.4 mg Cap Take 1 capsule (0.4 mg total) by mouth once daily.    clotrimazole-betamethasone 1-0.05% (LOTRISONE) cream Apply topically 2 (two) times daily.    tadalafiL (CIALIS) 20 MG Tab Take 1 tablet (20 mg total) by mouth daily as needed.    vacuum  erection device system Kit 1 kit by Misc.(Non-Drug; Combo Route) route daily as needed.    [DISCONTINUED] potassium chloride SA (K-DUR,KLOR-CON) 20 MEQ tablet Take 20 mEq by mouth.     Family History       Problem Relation (Age of Onset)    Cancer Father    Colon cancer Father    Diabetes Mother, Sister, Brother    Glaucoma Mother    Hypertension Brother    Kidney disease Mother          Tobacco Use    Smoking status: Former     Current packs/day: 0.00     Average packs/day: 0.5 packs/day for 30.0 years (15.0 ttl pk-yrs)     Types: Cigarettes     Start date: 1965     Quit date: 1995     Years since quittin.3    Smokeless tobacco: Former   Substance and Sexual Activity    Alcohol use: Not Currently     Alcohol/week: 0.0 standard drinks of alcohol    Drug use: Yes     Frequency: 4.0 times per week     Types: Marijuana    Sexual activity: Yes     Partners: Female     Review of Systems   Cardiovascular:  Positive for chest pain.   Gastrointestinal:  Positive for nausea.   Neurological:  Positive for headaches.   All other systems reviewed and are negative.    Objective:     Vital Signs (Most Recent):  Temp: 98.2 °F (36.8 °C) (24 1025)  Pulse: 72 (24 1403)  Resp: (!) 22 (24 1403)  BP: (!) 185/93 (24 1403)  SpO2: 97 % (24 1403) Vital Signs (24h Range):  Temp:  [98.2 °F (36.8 °C)] 98.2 °F (36.8 °C)  Pulse:  [66-80] 72  Resp:  [15-22] 22  SpO2:  [94 %-97 %] 97 %  BP: (126-185)/(89-96) 185/93     Weight: 107.5 kg (237 lb)  Body mass index is 32.14 kg/m².     Physical Exam  HENT:      Head: Normocephalic and atraumatic.   Cardiovascular:      Rate and Rhythm: Normal rate and regular rhythm.      Heart sounds: No murmur heard.  Pulmonary:      Effort: Pulmonary effort is normal. No respiratory distress.      Breath sounds: Normal breath sounds. No wheezing.   Abdominal:      General: Bowel sounds are normal. There is no distension.      Palpations: Abdomen is soft.      Tenderness:  There is no abdominal tenderness.   Musculoskeletal:         General: No swelling.   Skin:     General: Skin is warm and dry.   Neurological:      Mental Status: He is alert and oriented to person, place, and time. Mental status is at baseline.                Significant Labs: All pertinent labs within the past 24 hours have been reviewed.  CBC:   Recent Labs   Lab 01/24/24  1140   WBC 7.63   HGB 14.8   HCT 44.2   *     CMP:   Recent Labs   Lab 01/24/24  1140      K 3.5      CO2 26      BUN 24*   CREATININE 1.6*   CALCIUM 9.6   PROT 7.7   ALBUMIN 3.9   BILITOT 0.6   ALKPHOS 74   AST 36   ALT 48*   ANIONGAP 13     Troponin:   Recent Labs   Lab 01/24/24  1140   TROPONINI 0.062*       Significant Imaging: I have reviewed all pertinent imaging results/findings within the past 24 hours.  Assessment/Plan:     * Elevated troponin  -trend troponin  -echo  -consult cardiology  -BP control      Coronary artery disease of native artery of native heart with stable angina pectoris  Patient with known CAD , which is controlled Will continue ASA and Statin and monitor for S/Sx of angina/ACS. Continue to monitor on telemetry.     Thrombocytopenia  Patient was found to have thrombocytopenia, , will monitor the platelets Daily. Hold DVT prophylaxis if platelets are <50k. The patient's platelet results have been reviewed and are listed below.  Recent Labs   Lab 01/24/24  1140   *         Chronic renal impairment, stage 3b  Creatine stable for now. BMP reviewed- noted Estimated Creatinine Clearance: 41.2 mL/min (A) (based on SCr of 1.6 mg/dL (H)). according to latest data. Based on current GFR, CKD stage is stage 3 - GFR 30-59.  Monitor UOP and serial BMP and adjust therapy as needed. Renally dose meds. Avoid nephrotoxic medications and procedures.    GERD (gastroesophageal reflux disease)  -continue PPI      Essential hypertension  -labile readings  -continue home medications  -prn IV labetalol for  elevated readings  -monitor blood pressure      VTE Risk Mitigation (From admission, onward)           Ordered     IP VTE HIGH RISK PATIENT  Once         01/24/24 1332     Place sequential compression device  Until discontinued         01/24/24 1332                       On 01/24/2024, patient should be placed in hospital observation services under my care.             Jana Carlton MD  Department of Hospital Medicine  On license of [facility] - Emergency Dept.

## 2024-01-24 NOTE — TELEPHONE ENCOUNTER
Per daughter patient left [facility] AMA. Pt is ten minutes away from [facility]. States he decided to leave to be at a facility that offers cardiac providers.       FYI

## 2024-01-24 NOTE — PHARMACY MED REC
"Admission Medication History     The home medication history was taken by Radha Haider.    You may go to "Admission" then "Reconcile Home Medications" tabs to review and/or act upon these items.     The home medication list has been updated by the Pharmacy department.   Please read ALL comments highlighted in yellow.   Please address this information as you see fit.    Feel free to contact us if you have any questions or require assistance.      The medications listed below were removed from the home medication list. Please reorder if appropriate:  Patient reports no longer taking the following medication(s):  Potassium 20 meq        Medications listed below were obtained from: Patient/family and Analytic software- [email or web address],medication list brought from home by  daughters at bedside  (Not in a hospital admission)      LAST MED REC COMPLETED:         Radha Haider  [id number]    Current Outpatient Medications on File Prior to Encounter   Medication Sig Dispense Refill Last Dose    aspirin (ECOTRIN) 81 MG EC tablet Take 81 mg by mouth once daily.   1/24/2024    atorvastatin (LIPITOR) 40 MG tablet Take 1 tablet by mouth once daily 90 tablet 1 1/24/2024    azelastine (ASTELIN) 137 mcg (0.1 %) nasal spray USE 2 SPRAY(S) IN EACH NOSTRIL TWICE DAILY 30 mL 11 1/24/2024    benazepril-hydrochlorthiazide (LOTENSIN HCT) 10-12.5 mg Tab Take 1 tablet by mouth twice daily 180 tablet 1 1/24/2024    ferrous sulfate 325 (65 FE) MG EC tablet Take 325 mg by mouth once daily.   1/24/2024    fluticasone propionate (FLONASE) 50 mcg/actuation nasal spray Use 2 spray(s) in each nostril once daily 16 g 11 1/24/2024    meloxicam (MOBIC) 15 MG tablet Take 15 mg by mouth once daily.   1/24/2024    methocarbamoL (ROBAXIN) 750 MG Tab Take 1 tablet (750 mg total) by mouth 3 (three) times daily. 30 tablet 0 1/24/2024    pantoprazole (PROTONIX) 40 MG tablet Take 1 tablet by mouth once daily 90 tablet 3 1/24/2024    tamsulosin (FLOMAX) 0.4 mg " Cap Take 1 capsule (0.4 mg total) by mouth once daily. 30 capsule 11 Past Week    clotrimazole-betamethasone 1-0.05% (LOTRISONE) cream Apply topically 2 (two) times daily.   Unknown    tadalafiL (CIALIS) 20 MG Tab Take 1 tablet (20 mg total) by mouth daily as needed. 10 tablet 11     vacuum erection device system Kit 1 kit by Misc.(Non-Drug; Combo Route) route daily as needed. 1 kit 0                            .

## 2024-01-24 NOTE — CONSULTS
Consult    Consulting Physician:  Jana Carlton MD  Reason for Consultation:  Thoracic aortic aneurysm    CC: Hypertension (Reports HTN, HA, and nausea since last night. Seen at [facility] last night. No BP meds today)      HPI: Jovanny Olmedo is a 88 y.o. male with PMHx as seen below, was admitted on 1/24/2024 secondary to shortness of breath and hypertension as well as some chest pain.  Seen at an outside hospital with blood pressure above 200.  CT scan done yesterday shows large descending thoracic aortic aneurysm.  Patient has known about this for a while and has been following this.  He was told in the past that they were going to just continue to monitor this.  Also of note he has an infrarenal abdominal aortic aneurysm which has been repaired in the past.        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 8/6/2023    Arthritis     Back pain     CAD (coronary artery disease)     Cataract     CKD (chronic kidney disease)     GERD (gastroesophageal reflux disease)     Glaucoma     suspect    HTN (hypertension)     Multiple subsegmental pulmonary emboli without acute cor pulmonale 9/9/2021    Prostate cancer     tx'd with radiation    PVD (peripheral vascular disease)     stent in right renal artery and aorta    Sleep apnea     Stable angina pectoris 5/16/2023       Past Surgical History:   Procedure Laterality Date    ABDOMINAL AORTA STENT      CARDIAC CATHETERIZATION      CATARACT EXTRACTION W/  INTRAOCULAR LENS IMPLANT Right 04/29/2017    COLONOSCOPY N/A 9/24/2020    Procedure: COLONOSCOPY;  Surgeon: Shayy Melvin MD;  Location: [facility] ENDO;  Service: Endoscopy;  Laterality: N/A;    COLONOSCOPY N/A 1/10/2022    Procedure: COLONOSCOPY;  Surgeon: Vi Lara MD;  Location: [facility] ENDO;  Service: Endoscopy;  Laterality: N/A;    CORONARY ANGIOPLASTY       CORONARY STENT PLACEMENT      PCIOL Right 2017    DR. LEDEZMA    PCIOL Left 2019    DR. LEDEZMA    PROSTATE BIOPSY      RENAL ARTERY STENT         Social History     Socioeconomic History    Marital status:    Tobacco Use    Smoking status: Former     Current packs/day: 0.00     Average packs/day: 0.5 packs/day for 30.0 years (15.0 ttl pk-yrs)     Types: Cigarettes     Start date: 1965     Quit date: 1995     Years since quittin.3    Smokeless tobacco: Former   Substance and Sexual Activity    Alcohol use: Not Currently     Alcohol/week: 0.0 standard drinks of alcohol    Drug use: Yes     Frequency: 4.0 times per week     Types: Marijuana    Sexual activity: Yes     Partners: Female   Social History Narrative         Social Determinants of Health     Stress: No Stress Concern Present (2020)    [employer] [place] [employer] - Occupational Stress Questionnaire     Feeling of Stress : Not at all       Family History   Problem Relation Age of Onset    Glaucoma Mother     Diabetes Mother     Kidney disease Mother     Colon cancer Father     Cancer Father         colon cancer    Diabetes Sister     Diabetes Brother     Hypertension Brother     Blindness Neg Hx          Current Facility-Administered Medications:     aluminum-magnesium hydroxide-simethicone 200-200-20 mg/5 mL suspension 30 mL, 30 mL, Oral, QID PRN, Jana Carlton MD    [START ON 2024] aspirin EC tablet 81 mg, 81 mg, Oral, Daily, Jana Carlton MD    [START ON 2024] atorvastatin tablet 40 mg, 40 mg, Oral, QHS, Jana Carlton MD    butalbital-acetaminophen-caffeine -40 mg per tablet 1 tablet, 1 tablet, Oral, Q4H PRN, Jana Carlton MD, 1 tablet at 24 1346    glucagon (human recombinant) injection 1 mg, 1 mg, Intramuscular, PRN, Jana Carlton MD    glucose chewable tablet 16 g, 16 g, Oral, PRN, Jana Carlton MD    glucose chewable tablet 24 g, 24  g, Oral, PRN, Jana Carlton MD    lisinopriL tablet 10 mg, 10 mg, Oral, Daily, 10 mg at 01/24/24 1205 **AND** hydroCHLOROthiazide tablet 12.5 mg, 12.5 mg, Oral, Daily, Andreia Austin DO, 12.5 mg at 01/24/24 1205    labetaloL injection 10 mg, 10 mg, Intravenous, Q6H PRN, Jana Carlton MD, 10 mg at 01/24/24 1509    naloxone 0.4 mg/mL injection 0.4 mg, 0.4 mg, Intravenous, PRN, Jana Carlton MD    ondansetron injection 4 mg, 4 mg, Intravenous, Q8H PRN, Jana Carlton MD    [START ON 1/25/2024] pantoprazole EC tablet 40 mg, 40 mg, Oral, Daily, Jana Carlton MD    simethicone chewable tablet 80 mg, 1 tablet, Oral, QID PRN, Jana Carlton MD    sodium chloride 0.9% flush 10 mL, 10 mL, Intravenous, Q12H PRN, Jana Carlton MD    [START ON 1/25/2024] tamsulosin 24 hr capsule 0.4 mg, 0.4 mg, Oral, Daily, Jana Carlton MD    Current Outpatient Medications:     aspirin (ECOTRIN) 81 MG EC tablet, Take 81 mg by mouth once daily., Disp: , Rfl:     atorvastatin (LIPITOR) 40 MG tablet, Take 1 tablet by mouth once daily, Disp: 90 tablet, Rfl: 1    azelastine (ASTELIN) 137 mcg (0.1 %) nasal spray, USE 2 SPRAY(S) IN EACH NOSTRIL TWICE DAILY, Disp: 30 mL, Rfl: 11    benazepril-hydrochlorthiazide (LOTENSIN HCT) 10-12.5 mg Tab, Take 1 tablet by mouth twice daily, Disp: 180 tablet, Rfl: 1    ferrous sulfate 325 (65 FE) MG EC tablet, Take 325 mg by mouth once daily., Disp: , Rfl:     fluticasone propionate (FLONASE) 50 mcg/actuation nasal spray, Use 2 spray(s) in each nostril once daily, Disp: 16 g, Rfl: 11    meloxicam (MOBIC) 15 MG tablet, Take 15 mg by mouth once daily., Disp: , Rfl:     methocarbamoL (ROBAXIN) 750 MG Tab, Take 1 tablet (750 mg total) by mouth 3 (three) times daily., Disp: 30 tablet, Rfl: 0    pantoprazole (PROTONIX) 40 MG tablet, Take 1 tablet by mouth once daily, Disp: 90 tablet, Rfl: 3    tamsulosin (FLOMAX) 0.4 mg Cap, Take 1 capsule (0.4 mg total) by mouth once  daily., Disp: 30 capsule, Rfl: 11    clotrimazole-betamethasone 1-0.05% (LOTRISONE) cream, Apply topically 2 (two) times daily., Disp: , Rfl:     tadalafiL (CIALIS) 20 MG Tab, Take 1 tablet (20 mg total) by mouth daily as needed., Disp: 10 tablet, Rfl: 11    vacuum erection device system Kit, 1 kit by Misc.(Non-Drug; Combo Route) route daily as needed., Disp: 1 kit, Rfl: 0    Review of patient's allergies indicates:   Allergen Reactions    Codeine Other (See Comments)     When taking codeine, pt becomes very anxious       ROS:  10 point review of systems is negative except as mentioned in HPI.    Vitals:    01/24/24 1509   BP: (!) 200/115   Pulse:    Resp:    Temp:        Objective:  General Appearance:  Comfortable, well-appearing, in no acute distress and not in pain.    Vital signs: (most recent): Blood pressure (!) 200/115, pulse 73, temperature 98.2 °F (36.8 °C), temperature source Oral, resp. rate (!) 22, height 6' (1.829 m), weight 107.5 kg (237 lb), SpO2 96 %.  Vital signs are normal.    HEENT: Normal HEENT exam.    Lungs:  Normal effort and normal respiratory rate.    Heart: Normal rate.  Regular rhythm.    Abdomen: Abdomen is soft.  There is no abdominal tenderness.     Extremities: Normal range of motion.    Neurological: Patient is alert and oriented to person, place and time.  Normal strength.    Pupils:  Pupils are equal, round, and reactive to light.    Skin:  Warm and dry.        LABS:  I have reviewed all pertinent lab results within the past 24 hours.  CBC:   Recent Labs   Lab 01/24/24  1140   WBC 7.63   RBC 4.83   HGB 14.8   HCT 44.2   *   MCV 92   MCH 30.6   MCHC 33.5     BMP:   Recent Labs   Lab 01/24/24  1140         K 3.5      CO2 26   BUN 24*   CREATININE 1.6*   CALCIUM 9.6       IMAGING:  I have personally reviewed the imaging.  I compared the CTA from August 2023 to yesterday's scan.  There is roughly a 6.6 cm descending thoracic aortic aneurysm with no evidence of  leak.  Comparing this to August it may be slightly larger.  X-Ray Chest 1 View   Final Result      Stable chest.         Electronically signed by: Sheldon Boothe MD   Date:    01/24/2024   Time:    11:03          MDM     Amount and/or Complexity of Data Reviewed  Tests in the radiology section of CPT®: reviewed  Discussion of test results with the performing providers: yes  Obtain history from someone other than the patient: no  Review and summarize past medical records: yes  Discuss the patient with other providers: yes  Independent visualization of images, tracings, or specimens: yes    Risk of Complications, Morbidity, and/or Mortality  Presenting problems: high  Diagnostic procedures: high  Management options: high        Assessment:    Condition: In stable condition.   (Asymptomatic descending thoracic aortic aneurysm).     Plan:    I discussed the diagnosis with the patient and 2 of his daughters.  It appears it may be slightly larger than what it was roughly 5 months ago.  Of significance is a previous infrarenal aortic aneurysm repair.  This increase his risk for paralysis with repair of the descending thoracic aneurysm.  I quoted them roughly 15% risk.  While patient is admitted will order CTA of the abdomen and pelvis to see if I can get a stent graft into his chest from his groin area.  Do not think this gentleman would be an open thoracic aneurysm repair candidate.  Patient does not need to be hospitalized for this workup.  My plan will be to get needed imaging and follow him up in the outpatient clinic and possibly schedule an elective endo graft repair.  We will follow.  Also discussed my plan with admitting attending Dr. Carlton..         I discussed this plan with the patient and family and he understands, agrees, and appreciates our input and would like to proceed with our above stated plan.    Mack Lehman Iii  1/24/2024  [facility]  Vascular Surgery  [phone] ([facility]  Number)    Active Hospital Problems    Diagnosis  POA    *Elevated troponin [R79.89]  Yes    Coronary artery disease of native artery of native heart with stable angina pectoris [I25.118]  Yes    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 [I71.23]  Yes    Thrombocytopenia [D69.6]  Yes    Aortic atherosclerosis [I70.0]  Yes    Chronic renal impairment, stage 3b [N18.32]  Yes    GERD (gastroesophageal reflux disease) [K21.9]  Yes    Essential hypertension [I10]  Yes     Chronic      Resolved Hospital Problems   No resolved problems to display.

## 2024-01-25 LAB
ALBUMIN SERPL BCP-MCNC: 3.4 G/DL (ref 3.5–5.2)
ALP SERPL-CCNC: 66 U/L (ref 55–135)
ALT SERPL W/O P-5'-P-CCNC: 40 U/L (ref 10–44)
ANION GAP SERPL CALC-SCNC: 6 MMOL/L (ref 8–16)
AST SERPL-CCNC: 26 U/L (ref 10–40)
BILIRUB SERPL-MCNC: 0.6 MG/DL (ref 0.1–1)
BUN SERPL-MCNC: 23 MG/DL (ref 8–23)
CALCIUM SERPL-MCNC: 9.2 MG/DL (ref 8.7–10.5)
CHLORIDE SERPL-SCNC: 104 MMOL/L (ref 95–110)
CO2 SERPL-SCNC: 27 MMOL/L (ref 23–29)
CREAT SERPL-MCNC: 1.5 MG/DL (ref 0.5–1.4)
EST. GFR  (NO RACE VARIABLE): 45 ML/MIN/1.73 M^2
GLUCOSE SERPL-MCNC: 97 MG/DL (ref 70–110)
MAGNESIUM SERPL-MCNC: 1.2 MG/DL (ref 1.6–2.6)
PHOSPHATE SERPL-MCNC: 4 MG/DL (ref 2.7–4.5)
POTASSIUM SERPL-SCNC: 3.3 MMOL/L (ref 3.5–5.1)
PROT SERPL-MCNC: 6.9 G/DL (ref 6–8.4)
SODIUM SERPL-SCNC: 137 MMOL/L (ref 136–145)

## 2024-01-25 PROCEDURE — 63600175 PHARM REV CODE 636 W HCPCS: Performed by: INTERNAL MEDICINE

## 2024-01-25 PROCEDURE — 11000001 HC ACUTE MED/SURG PRIVATE ROOM

## 2024-01-25 PROCEDURE — 99900035 HC TECH TIME PER 15 MIN (STAT)

## 2024-01-25 PROCEDURE — 25000003 PHARM REV CODE 250: Performed by: INTERNAL MEDICINE

## 2024-01-25 PROCEDURE — 94761 N-INVAS EAR/PLS OXIMETRY MLT: CPT

## 2024-01-25 PROCEDURE — 94660 CPAP INITIATION&MGMT: CPT

## 2024-01-25 PROCEDURE — 27100171 HC OXYGEN HIGH FLOW UP TO 24 HOURS

## 2024-01-25 PROCEDURE — 83735 ASSAY OF MAGNESIUM: CPT | Performed by: INTERNAL MEDICINE

## 2024-01-25 PROCEDURE — 80053 COMPREHEN METABOLIC PANEL: CPT | Performed by: INTERNAL MEDICINE

## 2024-01-25 PROCEDURE — 84100 ASSAY OF PHOSPHORUS: CPT | Performed by: INTERNAL MEDICINE

## 2024-01-25 PROCEDURE — 94799 UNLISTED PULMONARY SVC/PX: CPT

## 2024-01-25 PROCEDURE — 36415 COLL VENOUS BLD VENIPUNCTURE: CPT | Performed by: INTERNAL MEDICINE

## 2024-01-25 PROCEDURE — 99222 1ST HOSP IP/OBS MODERATE 55: CPT | Mod: 25,,, | Performed by: INTERNAL MEDICINE

## 2024-01-25 RX ORDER — FLUTICASONE PROPIONATE 50 MCG
2 SPRAY, SUSPENSION (ML) NASAL DAILY
Status: DISCONTINUED | OUTPATIENT
Start: 2024-01-26 | End: 2024-01-28 | Stop reason: HOSPADM

## 2024-01-25 RX ORDER — HYDROCHLOROTHIAZIDE 12.5 MG/1
12.5 TABLET ORAL DAILY
Status: DISCONTINUED | OUTPATIENT
Start: 2024-01-25 | End: 2024-01-25

## 2024-01-25 RX ORDER — SODIUM CHLORIDE 0.9 % (FLUSH) 0.9 %
10 SYRINGE (ML) INJECTION
Status: DISCONTINUED | OUTPATIENT
Start: 2024-01-25 | End: 2024-01-28 | Stop reason: HOSPADM

## 2024-01-25 RX ORDER — SODIUM CHLORIDE 9 MG/ML
INJECTION, SOLUTION INTRAVENOUS CONTINUOUS
Status: ACTIVE | OUTPATIENT
Start: 2024-01-25 | End: 2024-01-25

## 2024-01-25 RX ORDER — DIPHENHYDRAMINE HCL 25 MG
50 CAPSULE ORAL ONCE
Status: COMPLETED | OUTPATIENT
Start: 2024-01-25 | End: 2024-01-25

## 2024-01-25 RX ORDER — REGADENOSON 0.08 MG/ML
0.4 INJECTION, SOLUTION INTRAVENOUS ONCE
Status: COMPLETED | OUTPATIENT
Start: 2024-01-25 | End: 2024-01-25

## 2024-01-25 RX ORDER — LISINOPRIL 20 MG/1
40 TABLET ORAL DAILY
Status: DISCONTINUED | OUTPATIENT
Start: 2024-01-25 | End: 2024-01-28 | Stop reason: HOSPADM

## 2024-01-25 RX ORDER — LANOLIN ALCOHOL/MO/W.PET/CERES
800 CREAM (GRAM) TOPICAL ONCE
Status: COMPLETED | OUTPATIENT
Start: 2024-01-25 | End: 2024-01-25

## 2024-01-25 RX ORDER — DIPHENHYDRAMINE HCL 25 MG
25 CAPSULE ORAL ONCE
Status: DISCONTINUED | OUTPATIENT
Start: 2024-01-25 | End: 2024-01-28 | Stop reason: HOSPADM

## 2024-01-25 RX ADMIN — DIPHENHYDRAMINE HYDROCHLORIDE 50 MG: 25 CAPSULE ORAL at 09:01

## 2024-01-25 RX ADMIN — REGADENOSON 0.4 MG: 0.08 INJECTION, SOLUTION INTRAVENOUS at 03:01

## 2024-01-25 RX ADMIN — TAMSULOSIN HYDROCHLORIDE 0.4 MG: 0.4 CAPSULE ORAL at 09:01

## 2024-01-25 RX ADMIN — LABETALOL HYDROCHLORIDE 20 MG: 5 INJECTION INTRAVENOUS at 09:01

## 2024-01-25 RX ADMIN — ATORVASTATIN CALCIUM 40 MG: 40 TABLET, FILM COATED ORAL at 09:01

## 2024-01-25 RX ADMIN — ASPIRIN 81 MG: 81 TABLET, COATED ORAL at 09:01

## 2024-01-25 RX ADMIN — Medication 6 MG: at 09:01

## 2024-01-25 RX ADMIN — PANTOPRAZOLE SODIUM 40 MG: 40 TABLET, DELAYED RELEASE ORAL at 09:01

## 2024-01-25 RX ADMIN — BUTALBITAL, ACETAMINOPHEN, AND CAFFEINE 1 TABLET: 325; 50; 40 TABLET ORAL at 09:01

## 2024-01-25 RX ADMIN — LISINOPRIL 40 MG: 20 TABLET ORAL at 09:01

## 2024-01-25 RX ADMIN — BUTALBITAL, ACETAMINOPHEN, AND CAFFEINE 1 TABLET: 325; 50; 40 TABLET ORAL at 04:01

## 2024-01-25 RX ADMIN — Medication 800 MG: at 09:01

## 2024-01-25 NOTE — SUBJECTIVE & OBJECTIVE
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 8/6/2023    Arthritis     Back pain     CAD (coronary artery disease)     Cataract     CKD (chronic kidney disease)     GERD (gastroesophageal reflux disease)     Glaucoma     suspect    HTN (hypertension)     Multiple subsegmental pulmonary emboli without acute cor pulmonale 9/9/2021    Prostate cancer     tx'd with radiation    PVD (peripheral vascular disease)     stent in right renal artery and aorta    Sleep apnea     Stable angina pectoris 5/16/2023       Past Surgical History:   Procedure Laterality Date    ABDOMINAL AORTA STENT      CARDIAC CATHETERIZATION      CATARACT EXTRACTION W/  INTRAOCULAR LENS IMPLANT Right 04/29/2017    COLONOSCOPY N/A 9/24/2020    Procedure: COLONOSCOPY;  Surgeon: Shayy Melvin MD;  Location: [facility] ENDO;  Service: Endoscopy;  Laterality: N/A;    COLONOSCOPY N/A 1/10/2022    Procedure: COLONOSCOPY;  Surgeon: Vi Lara MD;  Location: [facility] ENDO;  Service: Endoscopy;  Laterality: N/A;    CORONARY ANGIOPLASTY      CORONARY STENT PLACEMENT      PCIOL Right 03/29/2017    DR. LEDEZMA    PCIOL Left 07/03/2019    DR. LEDEZMA    PROSTATE BIOPSY      RENAL ARTERY STENT         Review of patient's allergies indicates:   Allergen Reactions    Codeine Other (See Comments)     When taking codeine, pt becomes very anxious       No current facility-administered medications on file prior to encounter.     Current Outpatient Medications on File Prior to Encounter   Medication Sig    aspirin (ECOTRIN) 81 MG EC tablet Take 81 mg by mouth once daily.    atorvastatin (LIPITOR) 40 MG tablet Take 1 tablet by mouth once daily    azelastine (ASTELIN) 137 mcg (0.1 %) nasal spray USE 2 SPRAY(S) IN EACH NOSTRIL TWICE DAILY    benazepril-hydrochlorthiazide (LOTENSIN HCT) 10-12.5 mg Tab Take 1 tablet by mouth twice daily     ferrous sulfate 325 (65 FE) MG EC tablet Take 325 mg by mouth once daily.    fluticasone propionate (FLONASE) 50 mcg/actuation nasal spray Use 2 spray(s) in each nostril once daily    meloxicam (MOBIC) 15 MG tablet Take 15 mg by mouth once daily.    methocarbamoL (ROBAXIN) 750 MG Tab Take 1 tablet (750 mg total) by mouth 3 (three) times daily.    pantoprazole (PROTONIX) 40 MG tablet Take 1 tablet by mouth once daily    tamsulosin (FLOMAX) 0.4 mg Cap Take 1 capsule (0.4 mg total) by mouth once daily.    clotrimazole-betamethasone 1-0.05% (LOTRISONE) cream Apply topically 2 (two) times daily.    tadalafiL (CIALIS) 20 MG Tab Take 1 tablet (20 mg total) by mouth daily as needed.    vacuum erection device system Kit 1 kit by Misc.(Non-Drug; Combo Route) route daily as needed.     Family History       Problem Relation (Age of Onset)    Cancer Father    Colon cancer Father    Diabetes Mother, Sister, Brother    Glaucoma Mother    Hypertension Brother    Kidney disease Mother          Tobacco Use    Smoking status: Former     Current packs/day: 0.00     Average packs/day: 0.5 packs/day for 30.0 years (15.0 total pack years)     Types: Cigarettes     Start date: 1965     Quit date: 1995     Years since quittin.3    Smokeless tobacco: Former   Substance and Sexual Activity    Alcohol use: Not Currently     Alcohol/week: 0.0 standard drinks of alcohol    Drug use: Yes     Frequency: 4.0 times per week     Types: Marijuana    Sexual activity: Yes     Partners: Female     Review of Systems   Constitutional: Negative. Negative for weight gain.   HENT: Negative.     Eyes: Negative.    Cardiovascular: Negative.  Negative for chest pain, leg swelling and palpitations.   Respiratory: Negative.  Negative for shortness of breath.    Endocrine: Negative.    Hematologic/Lymphatic: Negative.    Skin: Negative.    Musculoskeletal:  Negative for muscle weakness.   Gastrointestinal: Negative.    Genitourinary: Negative.     Neurological: Negative.  Negative for dizziness.   Psychiatric/Behavioral: Negative.     Allergic/Immunologic: Negative.    All other systems reviewed and are negative.    Objective:     Vital Signs (Most Recent):  Temp: 97.5 °F (36.4 °C) (01/25/24 0802)  Pulse: 63 (01/25/24 0805)  Resp: 18 (01/25/24 0802)  BP: (Abnormal) 157/95 (01/25/24 0802)  SpO2: 95 % (01/25/24 0802) Vital Signs (24h Range):  Temp:  [97.5 °F (36.4 °C)-98.6 °F (37 °C)] 97.5 °F (36.4 °C)  Pulse:  [63-83] 63  Resp:  [13-22] 18  SpO2:  [94 %-99 %] 95 %  BP: (121-200)/() 157/95     Weight: 107.5 kg (237 lb)  Body mass index is 32.14 kg/m².    SpO2: 95 %         Intake/Output Summary (Last 24 hours) at 1/25/2024 0839  Last data filed at 1/25/2024 0400  Gross per 24 hour   Intake no documentation   Output 650 ml   Net -650 ml       Lines/Drains/Airways       Peripheral Intravenous Line       Name Duration         Peripheral IV - Single Lumen 01/24/24 1141 20 G Left Antecubital <1 day                     Physical Exam  Vitals and nursing note reviewed.   Constitutional:       Appearance: He is well-developed.   HENT:      Head: Normocephalic and atraumatic.   Eyes:      Conjunctiva/sclera: Conjunctivae normal.      Pupils: Pupils are equal, round, and reactive to light.   Cardiovascular:      Rate and Rhythm: Normal rate and regular rhythm.      Pulses: Intact distal pulses.      Heart sounds: Normal heart sounds.   Pulmonary:      Effort: Pulmonary effort is normal.      Breath sounds: Normal breath sounds.   Abdominal:      General: Bowel sounds are normal.      Palpations: Abdomen is soft.   Musculoskeletal:      Cervical back: Normal range of motion and neck supple.   Skin:     General: Skin is warm and dry.   Neurological:      Mental Status: He is alert and oriented to person, place, and time.          Significant Labs: All pertinent lab results from the last 24 hours have been reviewed.    Significant Imaging: X-Ray: CXR: X-Ray Chest 1  View (CXR):   Results for orders placed or performed during the hospital encounter of 01/24/24   X-Ray Chest 1 View    Narrative    EXAMINATION:  XR CHEST 1 VIEW    CLINICAL HISTORY:  Weakness,    COMPARISON:  Chest, 11/01/2022    FINDINGS:  Heart is normal in size.  Tortuous thoracic aorta.  No focal infiltrate.      Impression    Stable chest.      Electronically signed by: Sheldon Boothe MD  Date:    01/24/2024  Time:    11:03

## 2024-01-25 NOTE — HPI
87 y/o male with PMhx for CAD, h/o PCI, AAA, AGATA, PAD, HTN, CRI, hyperlipidemia, admitted for Accelerated HTN, CP and (+) troponin. Pt has abdominal aneurysm  And is being seen by vascular surgery. EKG no changes. Echo shows normal EKG

## 2024-01-25 NOTE — PROGRESS NOTES
[facility]  [facility] Medicine  Progress Note    Patient Name: Jovanny Olmedo  MRN: [id number]  Patient Class: OP- Observation   Admission Date: 1/24/2024  Length of Stay: 0 days  Attending Physician: Jana Carlton MD  Primary Care Provider: Miryam Jimenez MD        Subjective:     Principal Problem:Troponin level elevated        HPI:  The patient is an 89 yo male with past medical history of aortic aneurysm, CAD, HTN, glaucoma, CKD and GERD who presented to the ED with elevated blood pressure and headache. He reports he was at [facility] last night as he had an episode of chest pain. His chest pain was substernal. It subsided. He had elevated cardiac enzymes and the plan was to transfer him to facility with cardiology. He left A as he wanted to be seen by cardiologist. He is currently chest pain free. He reports he has a pounding headache. Cardiology, CT surgery, hospital medicine and vascular surgery consulted. Patient placed in observation.    Overview/Hospital Course:  No notes on file    Interval History: patient reports he slept but he still has a headache discussed BP still elevated, adjusting medications. Cardiology evaluated and plan [facility] today    Review of Systems  Objective:     Vital Signs (Most Recent):  Temp: 97.5 °F (36.4 °C) (01/25/24 0802)  Pulse: 63 (01/25/24 0805)  Resp: 18 (01/25/24 0802)  BP: (!) 157/95 (01/25/24 0802)  SpO2: 95 % (01/25/24 0802) Vital Signs (24h Range):  Temp:  [97.5 °F (36.4 °C)-98.6 °F (37 °C)] 97.5 °F (36.4 °C)  Pulse:  [63-83] 63  Resp:  [13-22] 18  SpO2:  [94 %-99 %] 95 %  BP: (121-200)/() 157/95     Weight: 107.5 kg (237 lb)  Body mass index is 32.14 kg/m².    Intake/Output Summary (Last 24 hours) at 1/25/2024 0932  Last data filed at 1/25/2024 0400  Gross per 24 hour   Intake --   Output 650 ml   Net -650 ml         Physical Exam  HENT:      Head: Normocephalic and atraumatic.   Cardiovascular:      Rate and Rhythm: Normal rate and regular  rhythm.      Heart sounds: No murmur heard.  Pulmonary:      Effort: Pulmonary effort is normal. No respiratory distress.      Breath sounds: Normal breath sounds. No wheezing.   Abdominal:      General: Bowel sounds are normal. There is no distension.      Palpations: Abdomen is soft.      Tenderness: There is no abdominal tenderness.   Musculoskeletal:         General: No swelling.   Skin:     General: Skin is warm and dry.   Neurological:      Mental Status: He is alert and oriented to person, place, and time. Mental status is at baseline.             Significant Labs: All pertinent labs within the past 24 hours have been reviewed.  CBC:   Recent Labs   Lab 01/24/24  1140   WBC 7.63   HGB 14.8   HCT 44.2   *     CMP:   Recent Labs   Lab 01/24/24  1140 01/25/24  0359    137   K 3.5 3.3*    104   CO2 26 27    97   BUN 24* 23   CREATININE 1.6* 1.5*   CALCIUM 9.6 9.2   PROT 7.7 6.9   ALBUMIN 3.9 3.4*   BILITOT 0.6 0.6   ALKPHOS 74 66   AST 36 26   ALT 48* 40   ANIONGAP 13 6*       Troponin:   Recent Labs   Lab 01/24/24  1140 01/24/24  1510 01/24/24  2149   TROPONINI 0.062* 0.064* 0.047*       Significant Imaging: I have reviewed all pertinent imaging results/findings within the past 24 hours.    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 Lehman  -unable to obtain CTA yesterday as he had CTA at outside hospital prior to arrival here        Coronary artery disease of native artery of native heart with stable angina pectoris  Patient with known CAD , which is controlled Will continue ASA and Statin and monitor for S/Sx of angina/ACS. Continue to monitor on telemetry.     Thrombocytopenia  Patient was found to have thrombocytopenia, , will monitor the platelets Daily. Hold DVT prophylaxis if  platelets are <50k. The patient's platelet results have been reviewed and are listed below.  Recent Labs   Lab 01/24/24  1140   *           CLARISSA on CPAP  -continue CPAP nightly      Chronic renal impairment, stage 3b  Creatine stable for now. BMP reviewed- noted Estimated Creatinine Clearance: 44 mL/min (A) (based on SCr of 1.5 mg/dL (H)). according to latest data. Based on current GFR, CKD stage is stage 3 - GFR 30-59.  Monitor UOP and serial BMP and adjust therapy as needed. Renally dose meds. Avoid nephrotoxic medications and procedures.    GERD (gastroesophageal reflux disease)  -continue PPI      Essential hypertension  -labile readings  -continue home medications, increased ACE  -prn IV labetalol for elevated readings  -monitor blood pressure      VTE Risk Mitigation (From admission, onward)           Ordered     IP VTE HIGH RISK PATIENT  Once         01/24/24 1332     Place sequential compression device  Until discontinued         01/24/24 1332                    Discharge Planning   BRIT:      Code Status: Full Code   Is the patient medically ready for discharge?:     Reason for patient still in hospital (select all that apply): Patient trending condition, Laboratory test, Treatment, Imaging, and Consult recommendations                     Jana Carlton MD  Department of Hospital Medicine   [facility]

## 2024-01-25 NOTE — ASSESSMENT & PLAN NOTE
-labile readings  -continue home medications, increased ACE  -prn IV labetalol for elevated readings  -monitor blood pressure

## 2024-01-25 NOTE — ASSESSMENT & PLAN NOTE
Creatine stable for now. BMP reviewed- noted Estimated Creatinine Clearance: 44 mL/min (A) (based on SCr of 1.5 mg/dL (H)). according to latest data. Based on current GFR, CKD stage is stage 3 - GFR 30-59.  Monitor UOP and serial BMP and adjust therapy as needed. Renally dose meds. Avoid nephrotoxic medications and procedures.

## 2024-01-25 NOTE — ASSESSMENT & PLAN NOTE
89 y/o male with PMhx for CAD, h/o PCI, AAA, AGATA, PAD, HTN, CRI, hyperlipidemia, admitted for Accelerated HTN, CP and (+) troponin. Pt has abdominal aneurysm  And is being seen by vascular surgery. EKG no changes. Echo shows normal EKG      NSTEMI vs demand ischemia  Discussed cath with pt  Risks and benefits explained

## 2024-01-25 NOTE — PLAN OF CARE
[facility]  Initial Discharge Assessment       Primary Care Provider: Miryam Jimenez MD    Admission Diagnosis: Weakness [R53.1]  Chest pain [R07.9]    Admission Date: 1/24/2024  Expected Discharge Date:     Transition of Care Barriers: None    Payor: MEDICARE / Plan: MEDICARE PART A & B / Product Type: Government /     Extended Emergency Contact Information  Primary Emergency Contact: Munir Olmedo  Address: [address] [place], [place] [address] [place] [address]  Home Phone: [phone]  Mobile Phone: [phone]  Relation: Spouse  Secondary Emergency Contact: Ridge Olmedo  Mobile Phone: [phone]  Relation: Son    Discharge Plan A: Home with family, Home  Discharge Plan B: Home, Home with family      [facility] - [place], [place] - [address]  [address] [facility] [address]  Phone: [phone] Fax: [phone]      Initial Assessment (most recent)       Adult Discharge Assessment - 01/25/24 1313          Discharge Assessment    Assessment Type Discharge Planning Assessment     Confirmed/corrected address, phone number and insurance Yes     Confirmed Demographics Correct on Facesheet     Source of Information patient     Communicated BRIT with patient/caregiver Date not available/Unable to determine     Reason For Admission Troponin level elevated     People in Home spouse     Facility Arrived From: Home     Do you expect to return to your current living situation? Yes     Do you have help at home or someone to help you manage your care at home? Yes     Who are your caregiver(s) and their phone number(s)? Spouse and children     Prior to hospitilization cognitive status: Alert/Oriented     Current cognitive status: Alert/Oriented     Walking or Climbing Stairs Difficulty yes     Walking or Climbing Stairs ambulation difficulty, requires equipment     Mobility Management cane     Equipment Currently Used at Home cane, straight;CPAP     Readmission within  30 days? No     Patient currently being followed by outpatient case management? No     Do you currently have service(s) that help you manage your care at home? No     Do you take prescription medications? Yes     Do you have prescription coverage? Yes     Do you have any problems affording any of your prescribed medications? No     Is the patient taking medications as prescribed? yes     Who is going to help you get home at discharge? Family will assist     How do you get to doctors appointments? car, drives self     Are you on dialysis? No     Do you take coumadin? No     Discharge Plan A Home with family;Home     Discharge Plan B Home;Home with family     DME Needed Upon Discharge  none     Discharge Plan discussed with: Patient;Adult children     Transition of Care Barriers None                   SW met with patient and several relatives at bedside to complete assessment. Pt reports living at home with spouse. Pt is mostly independent with Adls; uses a cane and CPAP. Pt still drives and family will assist with discharge transportation. Pt declined CM needs at this time.     Pt's whiteboard updated with CM contact and anticipated discharge disposition. SW to remain available as needed.

## 2024-01-25 NOTE — HOSPITAL COURSE
1-25-24 addendum. 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

## 2024-01-25 NOTE — SUBJECTIVE & OBJECTIVE
Interval History:     Review of Systems   Constitutional: Negative. Negative for weight gain.   HENT: Negative.     Eyes: Negative.    Cardiovascular: Negative.  Negative for chest pain, leg swelling and palpitations.   Respiratory: Negative.  Negative for shortness of breath.    Endocrine: Negative.    Hematologic/Lymphatic: Negative.    Skin: Negative.    Musculoskeletal:  Negative for muscle weakness.   Gastrointestinal: Negative.    Genitourinary: Negative.    Neurological: Negative.  Negative for dizziness.   Psychiatric/Behavioral: Negative.     Allergic/Immunologic: Negative.    All other systems reviewed and are negative.    Objective:     Vital Signs (Most Recent):  Temp: 97.4 °F (36.3 °C) (01/25/24 1215)  Pulse: 70 (01/25/24 1215)  Resp: 18 (01/25/24 1215)  BP: (Abnormal) 158/106 (01/25/24 1215)  SpO2: 97 % (01/25/24 1215) Vital Signs (24h Range):  Temp:  [97.4 °F (36.3 °C)-98.6 °F (37 °C)] 97.4 °F (36.3 °C)  Pulse:  [63-83] 70  Resp:  [13-22] 18  SpO2:  [95 %-99 %] 97 %  BP: (121-200)/() 158/106     Weight: 107.5 kg (237 lb)  Body mass index is 32.14 kg/m².     SpO2: 97 %         Intake/Output Summary (Last 24 hours) at 1/25/2024 1253  Last data filed at 1/25/2024 1233  Gross per 24 hour   Intake no documentation   Output 1050 ml   Net -1050 ml       Lines/Drains/Airways       Drain       Name Duration    Male External Urinary Catheter 01/25/24 1010 <1 day              Peripheral Intravenous Line       Name Duration         Peripheral IV - Single Lumen 01/24/24 1141 20 G Left Antecubital 1 day                       Physical Exam  Vitals and nursing note reviewed.   Constitutional:       Appearance: He is well-developed.   HENT:      Head: Normocephalic and atraumatic.   Eyes:      Conjunctiva/sclera: Conjunctivae normal.      Pupils: Pupils are equal, round, and reactive to light.   Cardiovascular:      Rate and Rhythm: Normal rate and regular rhythm.      Pulses: Intact distal pulses.      Heart  sounds: Normal heart sounds.   Pulmonary:      Effort: Pulmonary effort is normal.      Breath sounds: Normal breath sounds.   Abdominal:      General: Bowel sounds are normal.      Palpations: Abdomen is soft.   Musculoskeletal:      Cervical back: Normal range of motion and neck supple.   Skin:     General: Skin is warm and dry.   Neurological:      Mental Status: He is alert and oriented to person, place, and time.            Significant Labs: All pertinent lab results from the last 24 hours have been reviewed.    Significant Imaging: X-Ray: CXR: X-Ray Chest 1 View (CXR):   Results for orders placed or performed during the hospital encounter of 01/24/24   X-Ray Chest 1 View    Narrative    EXAMINATION:  XR CHEST 1 VIEW    CLINICAL HISTORY:  Weakness,    COMPARISON:  Chest, 11/01/2022    FINDINGS:  Heart is normal in size.  Tortuous thoracic aorta.  No focal infiltrate.      Impression    Stable chest.      Electronically signed by: Sheldon Boothe MD  Date:    01/24/2024  Time:    11:03    and X-Ray Chest PA and Lateral (CXR): No results found for this visit on 01/24/24.

## 2024-01-25 NOTE — ASSESSMENT & PLAN NOTE
-appreciate vascular surgery  -will obtain CTA prior to discharge as discussed with Dr. Lehman  -unable to obtain CTA yesterday as he had CTA at outside hospital prior to arrival here

## 2024-01-25 NOTE — PLAN OF CARE
.Inpatient Upgrade Note    Jovanny Olmedo has warranted treatment spanning two or more midnights of hospital level care for the management of  Elevated troponin, chest pain, HTN . He continues to require IV fluids, further testing/imaging, monitoring of vital signs, and further evaluation by consultants. His condition is also complicated by the following comorbidities: Coronary Artery Disease, Hypertension, and CKD.  .

## 2024-01-25 NOTE — PROGRESS NOTES
[facility]  Cardiology  Progress Note    Patient Name: Jovanny Olmedo  MRN: [id number]  Admission Date: 1/24/2024  Hospital Length of Stay: 0 days  Code Status: Full Code   Attending Physician: Jana Carlton MD   Primary Care Physician: Miryam Jimenez MD  Expected Discharge Date:   Principal Problem:Troponin level elevated    Subjective:     Hospital Course:   1-25-24 addendum. 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 Negative for weight gain.   HENT: Negative.     Eyes: Negative.    Cardiovascular: Negative.  Negative for chest pain, leg swelling and palpitations.   Respiratory: Negative.  Negative for shortness of breath.    Endocrine: Negative.    Hematologic/Lymphatic: Negative.    Skin: Negative.    Musculoskeletal:  Negative for muscle weakness.   Gastrointestinal: Negative.    Genitourinary: Negative.    Neurological: Negative.  Negative for dizziness.   Psychiatric/Behavioral: Negative.     Allergic/Immunologic: Negative.    All other systems reviewed and are negative.    Objective:     Vital Signs (Most Recent):  Temp: 97.4 °F (36.3 °C) (01/25/24 1215)  Pulse: 70 (01/25/24 1215)  Resp: 18 (01/25/24 1215)  BP: (Abnormal) 158/106 (01/25/24 1215)  SpO2: 97 % (01/25/24 1215) Vital Signs (24h Range):  Temp:  [97.4 °F (36.3 °C)-98.6 °F (37 °C)] 97.4 °F (36.3 °C)  Pulse:  [63-83] 70  Resp:  [13-22] 18  SpO2:  [95 %-99 %] 97 %  BP: (121-200)/() 158/106     Weight: 107.5 kg (237 lb)  Body mass index is 32.14 kg/m².     SpO2: 97 %         Intake/Output Summary (Last 24 hours) at 1/25/2024 1253  Last data filed at 1/25/2024 1233  Gross per 24 hour   Intake no documentation   Output 1050 ml   Net -1050 ml       Lines/Drains/Airways       Drain       Name Duration    Male External Urinary Catheter 01/25/24 1010 <1 day               Peripheral Intravenous Line       Name Duration         Peripheral IV - Single Lumen 01/24/24 1141 20 G Left Antecubital 1 day                       Physical Exam  Vitals and nursing note reviewed.   Constitutional:       Appearance: He is well-developed.   HENT:      Head: Normocephalic and atraumatic.   Eyes:      Conjunctiva/sclera: Conjunctivae normal.      Pupils: Pupils are equal, round, and reactive to light.   Cardiovascular:      Rate and Rhythm: Normal rate and regular rhythm.      Pulses: Intact distal pulses.      Heart sounds: Normal heart sounds.   Pulmonary:      Effort: Pulmonary effort is normal.      Breath sounds: Normal breath sounds.   Abdominal:      General: Bowel sounds are normal.      Palpations: Abdomen is soft.   Musculoskeletal:      Cervical back: Normal range of motion and neck supple.   Skin:     General: Skin is warm and dry.   Neurological:      Mental Status: He is alert and oriented to person, place, and time.            Significant Labs: All pertinent lab results from the last 24 hours have been reviewed.    Significant Imaging: X-Ray: CXR: X-Ray Chest 1 View (CXR):   Results for orders placed or performed during the hospital encounter of 01/24/24   X-Ray Chest 1 View    Narrative    EXAMINATION:  XR CHEST 1 VIEW    CLINICAL HISTORY:  Weakness,    COMPARISON:  Chest, 11/01/2022    FINDINGS:  Heart is normal in size.  Tortuous thoracic aorta.  No focal infiltrate.      Impression    Stable chest.      Electronically signed by: Sheldon Boothe MD  Date:    01/24/2024  Time:    11:03    and X-Ray Chest PA and Lateral (CXR): No results found for this visit on 01/24/24.  Assessment and Plan:     Brief HPI:     * Troponin level elevated  89 y/o male with PMhx for CAD, h/o PCI, AAA, AGATA, PAD, HTN, CRI, hyperlipidemia, admitted for Accelerated HTN, CP and (+) troponin. Pt has abdominal aneurysm  And is being seen by vascular surgery. EKG no changes. Echo shows normal EKG      NSTEMI vs  demand ischemia  Discussed cath with pt  Risks and benefits explained         VTE Risk Mitigation (From admission, onward)           Ordered     IP VTE HIGH RISK PATIENT  Once         01/24/24 1332     Place sequential compression device  Until discontinued         01/24/24 1332                    Omid Cook MD  Cardiology  [facility]

## 2024-01-25 NOTE — SUBJECTIVE & OBJECTIVE
Interval History: patient reports he slept but he still has a headache discussed BP still elevated, adjusting medications. Cardiology evaluated and plan [facility] today    Review of Systems  Objective:     Vital Signs (Most Recent):  Temp: 97.5 °F (36.4 °C) (01/25/24 0802)  Pulse: 63 (01/25/24 0805)  Resp: 18 (01/25/24 0802)  BP: (!) 157/95 (01/25/24 0802)  SpO2: 95 % (01/25/24 0802) Vital Signs (24h Range):  Temp:  [97.5 °F (36.4 °C)-98.6 °F (37 °C)] 97.5 °F (36.4 °C)  Pulse:  [63-83] 63  Resp:  [13-22] 18  SpO2:  [94 %-99 %] 95 %  BP: (121-200)/() 157/95     Weight: 107.5 kg (237 lb)  Body mass index is 32.14 kg/m².    Intake/Output Summary (Last 24 hours) at 1/25/2024 0952  Last data filed at 1/25/2024 0400  Gross per 24 hour   Intake --   Output 650 ml   Net -650 ml         Physical Exam  HENT:      Head: Normocephalic and atraumatic.   Cardiovascular:      Rate and Rhythm: Normal rate and regular rhythm.      Heart sounds: No murmur heard.  Pulmonary:      Effort: Pulmonary effort is normal. No respiratory distress.      Breath sounds: Normal breath sounds. No wheezing.   Abdominal:      General: Bowel sounds are normal. There is no distension.      Palpations: Abdomen is soft.      Tenderness: There is no abdominal tenderness.   Musculoskeletal:         General: No swelling.   Skin:     General: Skin is warm and dry.   Neurological:      Mental Status: He is alert and oriented to person, place, and time. Mental status is at baseline.             Significant Labs: All pertinent labs within the past 24 hours have been reviewed.  CBC:   Recent Labs   Lab 01/24/24  1140   WBC 7.63   HGB 14.8   HCT 44.2   *     CMP:   Recent Labs   Lab 01/24/24  1140 01/25/24  0359    137   K 3.5 3.3*    104   CO2 26 27    97   BUN 24* 23   CREATININE 1.6* 1.5*   CALCIUM 9.6 9.2   PROT 7.7 6.9   ALBUMIN 3.9 3.4*   BILITOT 0.6 0.6   ALKPHOS 74 66   AST 36 26   ALT 48* 40   ANIONGAP 13 6*       Troponin:    Recent Labs   Lab 01/24/24  1140 01/24/24  1510 01/24/24  2149   TROPONINI 0.062* 0.064* 0.047*       Significant Imaging: I have reviewed all pertinent imaging results/findings within the past 24 hours.

## 2024-01-25 NOTE — PLAN OF CARE
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    Arthritis     Back pain     CAD (coronary artery disease)     Cataract     CKD (chronic kidney disease)     GERD (gastroesophageal reflux disease)     Glaucoma     suspect    HTN (hypertension)     Multiple subsegmental pulmonary emboli without acute cor pulmonale 9/9/2021    Prostate cancer     tx'd with radiation    PVD (peripheral vascular disease)     stent in right renal artery and aorta    Sleep apnea     Stable angina pectoris 5/16/2023      PRN meds    aluminum-magnesium hydroxide-simethicone, 30 mL, QID PRN  butalbital-acetaminophen-caffeine -40 mg, 1 tablet, Q4H PRN  glucagon (human recombinant), 1 mg, PRN  glucose, 16 g, PRN  glucose, 24 g, PRN  labetaloL, 20 mg, Q4H PRN  melatonin, 6 mg, Nightly PRN  naloxone, 0.4 mg, PRN  ondansetron, 4 mg, Q8H PRN  simethicone, 1 tablet, QID PRN  sodium chloride 0.9%, 10 mL, Q12H PRN  sodium chloride 0.9%, 10 mL, PRN      Chart check completed. Will continue plan of care.               Lead Monitored: Lead II Rhythm: normal sinus rhythm    Cardiac/Telemetry Box Number: [id number]  VTE Required Core Measure: (SCDs) Sequential compression device initiated/maintained Last Bowel Movement: 01/24/24  Diet Cardiac  Voiding Characteristics: external catheter  Mani Score: 20  Fall Risk Score: 13  Accucheck []   Freq?      Lines/Drains/Airways       Drain  Duration             Male External Urinary Catheter 01/25/24 1010 <1 day              Peripheral Intravenous Line  Duration                  Peripheral IV -  Single Lumen 01/24/24 1141 20 G Left Antecubital 1 day                       Problem: Adult Inpatient Plan of Care  Goal: Plan of Care Review  Outcome: Ongoing, Progressing

## 2024-01-25 NOTE — CONSULTS
[facility]  Cardiology  Consult Note    Patient Name: Jovanny Olmedo  MRN: [id number]  Admission Date: 1/24/2024  Hospital Length of Stay: 0 days  Code Status: Full Code   Attending Provider: Jana Carlton MD   Consulting Provider: Omid Cook MD  Primary Care Physician: Miryam Jimenez MD  Principal Problem:Troponin level elevated    Patient information was obtained from patient and ER records.     Inpatient consult to Cardiology  Consult performed by: Omid Cook MD  Consult ordered by: Andreia Austin DO        Subjective:     Chief Complaint:  CP     HPI:   89 y/o male with PMhx for CAD, h/o PCI, AAA, AGATA, PAD, HTN, CRI, hyperlipidemia, admitted for Accelerated HTN, CP and (+) troponin. Pt has abdominal aneurysm  And is being seen by vascular surgery. EKG no changes. 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 8/6/2023    Arthritis     Back pain     CAD (coronary artery disease)     Cataract     CKD (chronic kidney disease)     GERD (gastroesophageal reflux disease)     Glaucoma     suspect    HTN (hypertension)     Multiple subsegmental pulmonary emboli without acute cor pulmonale 9/9/2021    Prostate cancer     tx'd with radiation    PVD (peripheral vascular disease)     stent in right renal artery and aorta    Sleep apnea     Stable angina pectoris 5/16/2023       Past Surgical History:   Procedure Laterality Date    ABDOMINAL AORTA STENT      CARDIAC CATHETERIZATION      CATARACT EXTRACTION W/  INTRAOCULAR LENS IMPLANT Right 04/29/2017    COLONOSCOPY N/A 9/24/2020    Procedure: COLONOSCOPY;  Surgeon: Shayy Melvin MD;  Location: [facility] ENDO;  Service: Endoscopy;  Laterality: N/A;    COLONOSCOPY N/A 1/10/2022    Procedure: COLONOSCOPY;  Surgeon: Vi Lara MD;  Location: [facility] ENDO;  Service: Endoscopy;   Laterality: N/A;    CORONARY ANGIOPLASTY      CORONARY STENT PLACEMENT      PCIOL Right 03/29/2017    DR. LEDEZMA    PCIOL Left 07/03/2019    DR. LEDEZMA    PROSTATE BIOPSY      RENAL ARTERY STENT         Review of patient's allergies indicates:   Allergen Reactions    Codeine Other (See Comments)     When taking codeine, pt becomes very anxious       No current facility-administered medications on file prior to encounter.     Current Outpatient Medications on File Prior to Encounter   Medication Sig    aspirin (ECOTRIN) 81 MG EC tablet Take 81 mg by mouth once daily.    atorvastatin (LIPITOR) 40 MG tablet Take 1 tablet by mouth once daily    azelastine (ASTELIN) 137 mcg (0.1 %) nasal spray USE 2 SPRAY(S) IN EACH NOSTRIL TWICE DAILY    benazepril-hydrochlorthiazide (LOTENSIN HCT) 10-12.5 mg Tab Take 1 tablet by mouth twice daily    ferrous sulfate 325 (65 FE) MG EC tablet Take 325 mg by mouth once daily.    fluticasone propionate (FLONASE) 50 mcg/actuation nasal spray Use 2 spray(s) in each nostril once daily    meloxicam (MOBIC) 15 MG tablet Take 15 mg by mouth once daily.    methocarbamoL (ROBAXIN) 750 MG Tab Take 1 tablet (750 mg total) by mouth 3 (three) times daily.    pantoprazole (PROTONIX) 40 MG tablet Take 1 tablet by mouth once daily    tamsulosin (FLOMAX) 0.4 mg Cap Take 1 capsule (0.4 mg total) by mouth once daily.    clotrimazole-betamethasone 1-0.05% (LOTRISONE) cream Apply topically 2 (two) times daily.    tadalafiL (CIALIS) 20 MG Tab Take 1 tablet (20 mg total) by mouth daily as needed.    vacuum erection device system Kit 1 kit by Misc.(Non-Drug; Combo Route) route daily as needed.     Family History       Problem Relation (Age of Onset)    Cancer Father    Colon cancer Father    Diabetes Mother, Sister, Brother    Glaucoma Mother    Hypertension Brother    Kidney disease Mother          Tobacco Use    Smoking status: Former     Current packs/day: 0.00     Average packs/day: 0.5 packs/day for 30.0  years (15.0 total pack years)     Types: Cigarettes     Start date: 1965     Quit date: 1995     Years since quittin.3    Smokeless tobacco: Former   Substance and Sexual Activity    Alcohol use: Not Currently     Alcohol/week: 0.0 standard drinks of alcohol    Drug use: Yes     Frequency: 4.0 times per week     Types: Marijuana    Sexual activity: Yes     Partners: Female     Review of Systems   Constitutional: Negative. Negative for weight gain.   HENT: Negative.     Eyes: Negative.    Cardiovascular: Negative.  Negative for chest pain, leg swelling and palpitations.   Respiratory: Negative.  Negative for shortness of breath.    Endocrine: Negative.    Hematologic/Lymphatic: Negative.    Skin: Negative.    Musculoskeletal:  Negative for muscle weakness.   Gastrointestinal: Negative.    Genitourinary: Negative.    Neurological: Negative.  Negative for dizziness.   Psychiatric/Behavioral: Negative.     Allergic/Immunologic: Negative.    All other systems reviewed and are negative.    Objective:     Vital Signs (Most Recent):  Temp: 97.5 °F (36.4 °C) (24 0802)  Pulse: 63 (24 0805)  Resp: 18 (24 08)  BP: (Abnormal) 157/95 (24 0802)  SpO2: 95 % (24 08) Vital Signs (24h Range):  Temp:  [97.5 °F (36.4 °C)-98.6 °F (37 °C)] 97.5 °F (36.4 °C)  Pulse:  [63-83] 63  Resp:  [13-22] 18  SpO2:  [94 %-99 %] 95 %  BP: (121-200)/() 157/95     Weight: 107.5 kg (237 lb)  Body mass index is 32.14 kg/m².    SpO2: 95 %         Intake/Output Summary (Last 24 hours) at 2024 0839  Last data filed at 2024 0400  Gross per 24 hour   Intake no documentation   Output 650 ml   Net -650 ml       Lines/Drains/Airways       Peripheral Intravenous Line       Name Duration         Peripheral IV - Single Lumen 24 1141 20 G Left Antecubital <1 day                     Physical Exam  Vitals and nursing note reviewed.   Constitutional:       Appearance: He is well-developed.   HENT:       Head: Normocephalic and atraumatic.   Eyes:      Conjunctiva/sclera: Conjunctivae normal.      Pupils: Pupils are equal, round, and reactive to light.   Cardiovascular:      Rate and Rhythm: Normal rate and regular rhythm.      Pulses: Intact distal pulses.      Heart sounds: Normal heart sounds.   Pulmonary:      Effort: Pulmonary effort is normal.      Breath sounds: Normal breath sounds.   Abdominal:      General: Bowel sounds are normal.      Palpations: Abdomen is soft.   Musculoskeletal:      Cervical back: Normal range of motion and neck supple.   Skin:     General: Skin is warm and dry.   Neurological:      Mental Status: He is alert and oriented to person, place, and time.          Significant Labs: All pertinent lab results from the last 24 hours have been reviewed.    Significant Imaging: X-Ray: CXR: X-Ray Chest 1 View (CXR):   Results for orders placed or performed during the hospital encounter of 01/24/24   X-Ray Chest 1 View    Narrative    EXAMINATION:  XR CHEST 1 VIEW    CLINICAL HISTORY:  Weakness,    COMPARISON:  Chest, 11/01/2022    FINDINGS:  Heart is normal in size.  Tortuous thoracic aorta.  No focal infiltrate.      Impression    Stable chest.      Electronically signed by: Sheldon Boothe MD  Date:    01/24/2024  Time:    11:03     Assessment and Plan:     * Troponin level elevated  89 y/o male with PMhx for CAD, h/o PCI, AAA, AGATA, PAD, HTN, CRI, hyperlipidemia, admitted for Accelerated HTN, CP and (+) troponin. Pt has abdominal aneurysm  And is being seen by vascular surgery. EKG no changes. Echo shows normal EKG      NSTEMI vs demand ischemia  Discussed cath with pt  Risks and benefits explained         VTE Risk Mitigation (From admission, onward)           Ordered     IP VTE HIGH RISK PATIENT  Once         01/24/24 1332     Place sequential compression device  Until discontinued         01/24/24 1332                    Thank you for your consult. I will follow-up with patient. Please  contact us if you have any additional questions.    Omid Cook MD  Cardiology   [facility]

## 2024-01-26 LAB
ALBUMIN SERPL BCP-MCNC: 3.3 G/DL (ref 3.5–5.2)
ALP SERPL-CCNC: 66 U/L (ref 55–135)
ALT SERPL W/O P-5'-P-CCNC: 33 U/L (ref 10–44)
ANION GAP SERPL CALC-SCNC: 5 MMOL/L (ref 8–16)
AST SERPL-CCNC: 23 U/L (ref 10–40)
BILIRUB SERPL-MCNC: 0.7 MG/DL (ref 0.1–1)
BUN SERPL-MCNC: 22 MG/DL (ref 8–23)
CALCIUM SERPL-MCNC: 9.4 MG/DL (ref 8.7–10.5)
CHLORIDE SERPL-SCNC: 105 MMOL/L (ref 95–110)
CO2 SERPL-SCNC: 27 MMOL/L (ref 23–29)
CREAT SERPL-MCNC: 1.6 MG/DL (ref 0.5–1.4)
CV STRESS BASE HR: 81 BPM
DIASTOLIC BLOOD PRESSURE: 91 MMHG
EJECTION FRACTION- HIGH: 73 %
END DIASTOLIC INDEX-HIGH: 165 ML/M2
END DIASTOLIC INDEX-LOW: 101 ML/M2
END SYSTOLIC INDEX-HIGH: 64 ML/M2
END SYSTOLIC INDEX-LOW: 28 ML/M2
EST. GFR  (NO RACE VARIABLE): 41 ML/MIN/1.73 M^2
GLUCOSE SERPL-MCNC: 100 MG/DL (ref 70–110)
MAGNESIUM SERPL-MCNC: 1.5 MG/DL (ref 1.6–2.6)
NUC REST EJECTION FRACTION: 64
NUC STRESS EJECTION FRACTION: 66 %
OHS CV CPX 85 PERCENT MAX PREDICTED HEART RATE MALE: 113
OHS CV CPX MAX PREDICTED HEART RATE: 133
OHS CV CPX PATIENT IS FEMALE: 0
OHS CV CPX PATIENT IS MALE: 1
OHS CV CPX PEAK DIASTOLIC BLOOD PRESSURE: 92 MMHG
OHS CV CPX PEAK HEAR RATE: 100 BPM
OHS CV CPX PEAK RATE PRESSURE PRODUCT: NORMAL
OHS CV CPX PEAK SYSTOLIC BLOOD PRESSURE: 164 MMHG
OHS CV CPX PERCENT MAX PREDICTED HEART RATE ACHIEVED: 75
OHS CV CPX RATE PRESSURE PRODUCT PRESENTING: NORMAL
PHOSPHATE SERPL-MCNC: 3 MG/DL (ref 2.7–4.5)
POTASSIUM SERPL-SCNC: 3.6 MMOL/L (ref 3.5–5.1)
PROT SERPL-MCNC: 6.7 G/DL (ref 6–8.4)
RETIRED EF AND QEF - SEE NOTES: 59 %
SODIUM SERPL-SCNC: 137 MMOL/L (ref 136–145)
SYSTOLIC BLOOD PRESSURE: 137 MMHG

## 2024-01-26 PROCEDURE — 25000003 PHARM REV CODE 250: Performed by: INTERNAL MEDICINE

## 2024-01-26 PROCEDURE — 94761 N-INVAS EAR/PLS OXIMETRY MLT: CPT

## 2024-01-26 PROCEDURE — 99900035 HC TECH TIME PER 15 MIN (STAT)

## 2024-01-26 PROCEDURE — 36415 COLL VENOUS BLD VENIPUNCTURE: CPT | Performed by: INTERNAL MEDICINE

## 2024-01-26 PROCEDURE — 11000001 HC ACUTE MED/SURG PRIVATE ROOM

## 2024-01-26 PROCEDURE — 27100171 HC OXYGEN HIGH FLOW UP TO 24 HOURS

## 2024-01-26 PROCEDURE — 83735 ASSAY OF MAGNESIUM: CPT | Performed by: INTERNAL MEDICINE

## 2024-01-26 PROCEDURE — 25000242 PHARM REV CODE 250 ALT 637 W/ HCPCS: Performed by: INTERNAL MEDICINE

## 2024-01-26 PROCEDURE — 25500020 PHARM REV CODE 255: Performed by: INTERNAL MEDICINE

## 2024-01-26 PROCEDURE — 94660 CPAP INITIATION&MGMT: CPT

## 2024-01-26 PROCEDURE — 84100 ASSAY OF PHOSPHORUS: CPT | Performed by: INTERNAL MEDICINE

## 2024-01-26 PROCEDURE — 80053 COMPREHEN METABOLIC PANEL: CPT | Performed by: INTERNAL MEDICINE

## 2024-01-26 RX ORDER — NIFEDIPINE 30 MG/1
30 TABLET, EXTENDED RELEASE ORAL NIGHTLY
Status: DISCONTINUED | OUTPATIENT
Start: 2024-01-26 | End: 2024-01-27

## 2024-01-26 RX ORDER — METOPROLOL TARTRATE 25 MG/1
25 TABLET, FILM COATED ORAL 2 TIMES DAILY
Status: DISCONTINUED | OUTPATIENT
Start: 2024-01-26 | End: 2024-01-28 | Stop reason: HOSPADM

## 2024-01-26 RX ORDER — LANOLIN ALCOHOL/MO/W.PET/CERES
800 CREAM (GRAM) TOPICAL ONCE
Status: COMPLETED | OUTPATIENT
Start: 2024-01-26 | End: 2024-01-26

## 2024-01-26 RX ADMIN — IOHEXOL 100 ML: 350 INJECTION, SOLUTION INTRAVENOUS at 04:01

## 2024-01-26 RX ADMIN — FLUTICASONE PROPIONATE 100 MCG: 50 SPRAY, METERED NASAL at 08:01

## 2024-01-26 RX ADMIN — BUTALBITAL, ACETAMINOPHEN, AND CAFFEINE 1 TABLET: 325; 50; 40 TABLET ORAL at 01:01

## 2024-01-26 RX ADMIN — METOPROLOL TARTRATE 25 MG: 25 TABLET, FILM COATED ORAL at 08:01

## 2024-01-26 RX ADMIN — ATORVASTATIN CALCIUM 40 MG: 40 TABLET, FILM COATED ORAL at 08:01

## 2024-01-26 RX ADMIN — LISINOPRIL 40 MG: 20 TABLET ORAL at 08:01

## 2024-01-26 RX ADMIN — Medication 6 MG: at 08:01

## 2024-01-26 RX ADMIN — Medication 800 MG: at 09:01

## 2024-01-26 RX ADMIN — METOPROLOL TARTRATE 25 MG: 25 TABLET, FILM COATED ORAL at 11:01

## 2024-01-26 RX ADMIN — NIFEDIPINE 30 MG: 30 TABLET, FILM COATED, EXTENDED RELEASE ORAL at 08:01

## 2024-01-26 RX ADMIN — TAMSULOSIN HYDROCHLORIDE 0.4 MG: 0.4 CAPSULE ORAL at 08:01

## 2024-01-26 RX ADMIN — PANTOPRAZOLE SODIUM 40 MG: 40 TABLET, DELAYED RELEASE ORAL at 08:01

## 2024-01-26 RX ADMIN — ASPIRIN 81 MG: 81 TABLET, COATED ORAL at 08:01

## 2024-01-26 RX ADMIN — BUTALBITAL, ACETAMINOPHEN, AND CAFFEINE 1 TABLET: 325; 50; 40 TABLET ORAL at 08:01

## 2024-01-27 LAB
ALBUMIN SERPL BCP-MCNC: 3.3 G/DL (ref 3.5–5.2)
ALP SERPL-CCNC: 65 U/L (ref 55–135)
ALT SERPL W/O P-5'-P-CCNC: 34 U/L (ref 10–44)
ANION GAP SERPL CALC-SCNC: 11 MMOL/L (ref 8–16)
AST SERPL-CCNC: 27 U/L (ref 10–40)
BACTERIA #/AREA URNS HPF: ABNORMAL /HPF
BILIRUB SERPL-MCNC: 0.6 MG/DL (ref 0.1–1)
BILIRUB UR QL STRIP: NEGATIVE
BUN SERPL-MCNC: 24 MG/DL (ref 8–23)
CALCIUM SERPL-MCNC: 9.4 MG/DL (ref 8.7–10.5)
CHLORIDE SERPL-SCNC: 106 MMOL/L (ref 95–110)
CLARITY UR: CLEAR
CO2 SERPL-SCNC: 20 MMOL/L (ref 23–29)
COLOR UR: COLORLESS
CREAT SERPL-MCNC: 1.5 MG/DL (ref 0.5–1.4)
EST. GFR  (NO RACE VARIABLE): 45 ML/MIN/1.73 M^2
GLUCOSE SERPL-MCNC: 102 MG/DL (ref 70–110)
GLUCOSE UR QL STRIP: NEGATIVE
HGB UR QL STRIP: ABNORMAL
HYALINE CASTS #/AREA URNS LPF: 0 /LPF
KETONES UR QL STRIP: NEGATIVE
LEUKOCYTE ESTERASE UR QL STRIP: NEGATIVE
MAGNESIUM SERPL-MCNC: 1.7 MG/DL (ref 1.6–2.6)
MICROSCOPIC COMMENT: ABNORMAL
NITRITE UR QL STRIP: NEGATIVE
PH UR STRIP: 7 [PH] (ref 5–8)
PHOSPHATE SERPL-MCNC: 2.9 MG/DL (ref 2.7–4.5)
POTASSIUM SERPL-SCNC: 3.6 MMOL/L (ref 3.5–5.1)
PROT SERPL-MCNC: 6.7 G/DL (ref 6–8.4)
PROT UR QL STRIP: ABNORMAL
RBC #/AREA URNS HPF: 11 /HPF (ref 0–4)
SODIUM SERPL-SCNC: 137 MMOL/L (ref 136–145)
SP GR UR STRIP: 1.02 (ref 1–1.03)
URN SPEC COLLECT METH UR: ABNORMAL
UROBILINOGEN UR STRIP-ACNC: NEGATIVE EU/DL
WBC #/AREA URNS HPF: 1 /HPF (ref 0–5)

## 2024-01-27 PROCEDURE — 81000 URINALYSIS NONAUTO W/SCOPE: CPT | Performed by: INTERNAL MEDICINE

## 2024-01-27 PROCEDURE — 99900035 HC TECH TIME PER 15 MIN (STAT)

## 2024-01-27 PROCEDURE — 36415 COLL VENOUS BLD VENIPUNCTURE: CPT | Performed by: INTERNAL MEDICINE

## 2024-01-27 PROCEDURE — 25000003 PHARM REV CODE 250: Performed by: INTERNAL MEDICINE

## 2024-01-27 PROCEDURE — 84100 ASSAY OF PHOSPHORUS: CPT | Performed by: INTERNAL MEDICINE

## 2024-01-27 PROCEDURE — 80053 COMPREHEN METABOLIC PANEL: CPT | Performed by: INTERNAL MEDICINE

## 2024-01-27 PROCEDURE — 11000001 HC ACUTE MED/SURG PRIVATE ROOM

## 2024-01-27 PROCEDURE — 25000242 PHARM REV CODE 250 ALT 637 W/ HCPCS: Performed by: INTERNAL MEDICINE

## 2024-01-27 PROCEDURE — 83735 ASSAY OF MAGNESIUM: CPT | Performed by: INTERNAL MEDICINE

## 2024-01-27 PROCEDURE — 94660 CPAP INITIATION&MGMT: CPT

## 2024-01-27 PROCEDURE — 94761 N-INVAS EAR/PLS OXIMETRY MLT: CPT

## 2024-01-27 PROCEDURE — 27100171 HC OXYGEN HIGH FLOW UP TO 24 HOURS

## 2024-01-27 RX ORDER — NIFEDIPINE 60 MG/1
60 TABLET, EXTENDED RELEASE ORAL NIGHTLY
Status: DISCONTINUED | OUTPATIENT
Start: 2024-01-27 | End: 2024-01-28 | Stop reason: HOSPADM

## 2024-01-27 RX ORDER — FINASTERIDE 5 MG/1
5 TABLET, FILM COATED ORAL DAILY
Status: DISCONTINUED | OUTPATIENT
Start: 2024-01-27 | End: 2024-01-28 | Stop reason: HOSPADM

## 2024-01-27 RX ORDER — NIFEDIPINE 30 MG/1
30 TABLET, EXTENDED RELEASE ORAL ONCE
Status: COMPLETED | OUTPATIENT
Start: 2024-01-27 | End: 2024-01-27

## 2024-01-27 RX ADMIN — ASPIRIN 81 MG: 81 TABLET, COATED ORAL at 08:01

## 2024-01-27 RX ADMIN — FINASTERIDE 5 MG: 5 TABLET, FILM COATED ORAL at 12:01

## 2024-01-27 RX ADMIN — Medication 6 MG: at 08:01

## 2024-01-27 RX ADMIN — BUTALBITAL, ACETAMINOPHEN, AND CAFFEINE 1 TABLET: 325; 50; 40 TABLET ORAL at 08:01

## 2024-01-27 RX ADMIN — NIFEDIPINE 60 MG: 60 TABLET, FILM COATED, EXTENDED RELEASE ORAL at 08:01

## 2024-01-27 RX ADMIN — METOPROLOL TARTRATE 25 MG: 25 TABLET, FILM COATED ORAL at 08:01

## 2024-01-27 RX ADMIN — LISINOPRIL 40 MG: 20 TABLET ORAL at 08:01

## 2024-01-27 RX ADMIN — FLUTICASONE PROPIONATE 100 MCG: 50 SPRAY, METERED NASAL at 08:01

## 2024-01-27 RX ADMIN — PANTOPRAZOLE SODIUM 40 MG: 40 TABLET, DELAYED RELEASE ORAL at 08:01

## 2024-01-27 RX ADMIN — TAMSULOSIN HYDROCHLORIDE 0.4 MG: 0.4 CAPSULE ORAL at 08:01

## 2024-01-27 RX ADMIN — ATORVASTATIN CALCIUM 40 MG: 40 TABLET, FILM COATED ORAL at 08:01

## 2024-01-27 RX ADMIN — NIFEDIPINE 30 MG: 30 TABLET, FILM COATED, EXTENDED RELEASE ORAL at 01:01

## 2024-01-27 NOTE — ASSESSMENT & PLAN NOTE
-labile readings  -continue home medications, increased dose of ACE  -start nifedipine  -prn IV labetalol for elevated readings  -monitor blood pressure

## 2024-01-27 NOTE — PROGRESS NOTES
[facility]  [facility] Medicine  Progress Note    Patient Name: Jovanny Olmedo  MRN: [id number]  Patient Class: IP- Inpatient   Admission Date: 1/24/2024  Length of Stay: 1 days  Attending Physician: Jana Carlton MD  Primary Care Provider: Miryam Jimenez MD        Subjective:     Principal Problem:Troponin level elevated        HPI:  The patient is an 89 yo male with past medical history of aortic aneurysm, CAD, HTN, glaucoma, CKD and GERD who presented to the ED with elevated blood pressure and headache. He reports he was at [facility] last night as he had an episode of chest pain. His chest pain was substernal. It subsided. He had elevated cardiac enzymes and the plan was to transfer him to facility with cardiology. He left A as he wanted to be seen by cardiologist. He is currently chest pain free. He reports he has a pounding headache. Cardiology, CT surgery, hospital medicine and vascular surgery consulted. Patient placed in observation.    Overview/Hospital Course:  No notes on file    Interval History: stress test negative. Okay to discharge from cardiac standpoint but BP not controlled. Discussed needs better blood pressure control prior to discharing    Review of Systems  Objective:     Vital Signs (Most Recent):  Temp: 98.2 °F (36.8 °C) (01/26/24 1717)  Pulse: 73 (01/26/24 1717)  Resp: 18 (01/26/24 1717)  BP: (!) 157/95 (01/26/24 1717)  SpO2: 96 % (01/26/24 1717) Vital Signs (24h Range):  Temp:  [96.9 °F (36.1 °C)-98.9 °F (37.2 °C)] 98.2 °F (36.8 °C)  Pulse:  [57-75] 73  Resp:  [16-24] 18  SpO2:  [95 %-100 %] 96 %  BP: (128-165)/() 157/95     Weight: 98.9 kg (218 lb 0.6 oz)  Body mass index is 29.57 kg/m².    Intake/Output Summary (Last 24 hours) at 1/26/2024 1905  Last data filed at 1/26/2024 1814  Gross per 24 hour   Intake 240 ml   Output 801 ml   Net -561 ml         Physical Exam  HENT:      Head: Normocephalic and atraumatic.   Cardiovascular:      Rate and Rhythm: Normal  rate and regular rhythm.      Heart sounds: No murmur heard.  Pulmonary:      Effort: Pulmonary effort is normal. No respiratory distress.      Breath sounds: Normal breath sounds. No wheezing.   Abdominal:      General: Bowel sounds are normal. There is no distension.      Palpations: Abdomen is soft.      Tenderness: There is no abdominal tenderness.   Musculoskeletal:         General: No swelling.   Skin:     General: Skin is warm and dry.   Neurological:      Mental Status: He is alert and oriented to person, place, and time. Mental status is at baseline.             Significant Labs: All pertinent labs within the past 24 hours have been reviewed.    CMP:   Recent Labs   Lab 01/25/24  0359 01/26/24  0654    137   K 3.3* 3.6    105   CO2 27 27   GLU 97 100   BUN 23 22   CREATININE 1.5* 1.6*   CALCIUM 9.2 9.4   PROT 6.9 6.7   ALBUMIN 3.4* 3.3*   BILITOT 0.6 0.7   ALKPHOS 66 66   AST 26 23   ALT 40 33   ANIONGAP 6* 5*       Significant Imaging: I have reviewed all pertinent imaging results/findings within the past 24 hours.    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 Continue to monitor on telemetry.     Thrombocytopenia  Patient was found to have thrombocytopenia, , will monitor the platelets Daily. Hold DVT prophylaxis if platelets are <50k. The patient's platelet results have been reviewed and are listed below.          CLARISSA on CPAP  -continue CPAP nightly      Chronic renal impairment, stage 3b  Creatine stable for now. BMP reviewed- noted Estimated Creatinine Clearance: 39.6 mL/min (A) (based on SCr of 1.6 mg/dL (H)). according to  latest data. Based on current GFR, CKD stage is stage 3 - GFR 30-59.  Monitor UOP and serial BMP and adjust therapy as needed. Renally dose meds. Avoid nephrotoxic medications and procedures.    GERD (gastroesophageal reflux disease)  -continue PPI      Essential hypertension  -labile readings  -continue home medications, increased dose of ACE  -start nifedipine  -prn IV labetalol for elevated readings  -monitor blood pressure      VTE Risk Mitigation (From admission, onward)           Ordered     IP VTE HIGH RISK PATIENT  Once         01/24/24 1332     Place sequential compression device  Until discontinued         01/24/24 1332                    Discharge Planning   BRIT:      Code Status: Full Code   Is the patient medically ready for discharge?:     Reason for patient still in hospital (select all that apply): Patient trending condition, Treatment, and Consult recommendations  Discharge Plan A: Home with family, Home                  Jana Carlton MD  Department of Hospital Medicine   [facility]

## 2024-01-27 NOTE — PROGRESS NOTES
Patient was seen and examined this morning on rounds he is comfortable and anxious to leave hospital he is undergoing stress test. His cta abd pelvis was pending at the time of evaluation. Once this is completed he is ok from vascular standpoint for discharge and I will plan to see him in office in the next few weeks to discuss options moving forward.      JM Martinez

## 2024-01-27 NOTE — ASSESSMENT & PLAN NOTE
Creatine stable for now. BMP reviewed- noted Estimated Creatinine Clearance: 39.6 mL/min (A) (based on SCr of 1.6 mg/dL (H)). according to latest data. Based on current GFR, CKD stage is stage 3 - GFR 30-59.  Monitor UOP and serial BMP and adjust therapy as needed. Renally dose meds. Avoid nephrotoxic medications and procedures.

## 2024-01-27 NOTE — ASSESSMENT & PLAN NOTE
Patient was found to have thrombocytopenia, , will monitor the platelets Daily. Hold DVT prophylaxis if platelets are <50k. The patient's platelet results have been reviewed and are listed below.

## 2024-01-27 NOTE — SUBJECTIVE & OBJECTIVE
Interval History: stress test negative. Okay to discharge from cardiac standpoint but BP not controlled. Discussed needs better blood pressure control prior to discharing    Review of Systems  Objective:     Vital Signs (Most Recent):  Temp: 98.2 °F (36.8 °C) (01/26/24 1717)  Pulse: 73 (01/26/24 1717)  Resp: 18 (01/26/24 1717)  BP: (!) 157/95 (01/26/24 1717)  SpO2: 96 % (01/26/24 1717) Vital Signs (24h Range):  Temp:  [96.9 °F (36.1 °C)-98.9 °F (37.2 °C)] 98.2 °F (36.8 °C)  Pulse:  [57-75] 73  Resp:  [16-24] 18  SpO2:  [95 %-100 %] 96 %  BP: (128-165)/() 157/95     Weight: 98.9 kg (218 lb 0.6 oz)  Body mass index is 29.57 kg/m².    Intake/Output Summary (Last 24 hours) at 1/26/2024 1905  Last data filed at 1/26/2024 1814  Gross per 24 hour   Intake 240 ml   Output 801 ml   Net -561 ml         Physical Exam  HENT:      Head: Normocephalic and atraumatic.   Cardiovascular:      Rate and Rhythm: Normal rate and regular rhythm.      Heart sounds: No murmur heard.  Pulmonary:      Effort: Pulmonary effort is normal. No respiratory distress.      Breath sounds: Normal breath sounds. No wheezing.   Abdominal:      General: Bowel sounds are normal. There is no distension.      Palpations: Abdomen is soft.      Tenderness: There is no abdominal tenderness.   Musculoskeletal:         General: No swelling.   Skin:     General: Skin is warm and dry.   Neurological:      Mental Status: He is alert and oriented to person, place, and time. Mental status is at baseline.             Significant Labs: All pertinent labs within the past 24 hours have been reviewed.    CMP:   Recent Labs   Lab 01/25/24  0359 01/26/24  0654    137   K 3.3* 3.6    105   CO2 27 27   GLU 97 100   BUN 23 22   CREATININE 1.5* 1.6*   CALCIUM 9.2 9.4   PROT 6.9 6.7   ALBUMIN 3.4* 3.3*   BILITOT 0.6 0.7   ALKPHOS 66 66   AST 26 23   ALT 40 33   ANIONGAP 6* 5*       Significant Imaging: I have reviewed all pertinent imaging results/findings  within the past 24 hours.

## 2024-01-28 VITALS
DIASTOLIC BLOOD PRESSURE: 79 MMHG | BODY MASS INDEX: 29.54 KG/M2 | SYSTOLIC BLOOD PRESSURE: 126 MMHG | OXYGEN SATURATION: 96 % | HEART RATE: 67 BPM | WEIGHT: 218.06 LBS | HEIGHT: 72 IN | RESPIRATION RATE: 18 BRPM | TEMPERATURE: 98 F

## 2024-01-28 LAB
ALBUMIN SERPL BCP-MCNC: 3.4 G/DL (ref 3.5–5.2)
ANION GAP SERPL CALC-SCNC: 12 MMOL/L (ref 8–16)
BUN SERPL-MCNC: 24 MG/DL (ref 8–23)
CALCIUM SERPL-MCNC: 9.1 MG/DL (ref 8.7–10.5)
CHLORIDE SERPL-SCNC: 105 MMOL/L (ref 95–110)
CO2 SERPL-SCNC: 20 MMOL/L (ref 23–29)
CREAT SERPL-MCNC: 1.6 MG/DL (ref 0.5–1.4)
EST. GFR  (NO RACE VARIABLE): 41 ML/MIN/1.73 M^2
GLUCOSE SERPL-MCNC: 102 MG/DL (ref 70–110)
PHOSPHATE SERPL-MCNC: 3.3 MG/DL (ref 2.7–4.5)
POTASSIUM SERPL-SCNC: 4.1 MMOL/L (ref 3.5–5.1)
SODIUM SERPL-SCNC: 137 MMOL/L (ref 136–145)

## 2024-01-28 PROCEDURE — 94660 CPAP INITIATION&MGMT: CPT

## 2024-01-28 PROCEDURE — 36415 COLL VENOUS BLD VENIPUNCTURE: CPT | Performed by: INTERNAL MEDICINE

## 2024-01-28 PROCEDURE — 25000003 PHARM REV CODE 250: Performed by: INTERNAL MEDICINE

## 2024-01-28 PROCEDURE — 27100171 HC OXYGEN HIGH FLOW UP TO 24 HOURS

## 2024-01-28 PROCEDURE — 99900035 HC TECH TIME PER 15 MIN (STAT)

## 2024-01-28 PROCEDURE — 80069 RENAL FUNCTION PANEL: CPT | Performed by: INTERNAL MEDICINE

## 2024-01-28 RX ORDER — METOPROLOL TARTRATE 25 MG/1
25 TABLET, FILM COATED ORAL 2 TIMES DAILY
Qty: 60 TABLET | Refills: 1 | Status: SHIPPED | OUTPATIENT
Start: 2024-01-28 | End: 2024-03-29 | Stop reason: SDUPTHER

## 2024-01-28 RX ORDER — FINASTERIDE 5 MG/1
5 TABLET, FILM COATED ORAL DAILY
Qty: 30 TABLET | Refills: 0 | Status: SHIPPED | OUTPATIENT
Start: 2024-01-29 | End: 2025-01-28

## 2024-01-28 RX ORDER — NIFEDIPINE 60 MG/1
60 TABLET, EXTENDED RELEASE ORAL NIGHTLY
Qty: 30 TABLET | Refills: 1 | Status: SHIPPED | OUTPATIENT
Start: 2024-01-28 | End: 2024-01-31 | Stop reason: DRUGHIGH

## 2024-01-28 RX ORDER — LISINOPRIL 40 MG/1
40 TABLET ORAL DAILY
Qty: 30 TABLET | Refills: 1 | Status: SHIPPED | OUTPATIENT
Start: 2024-01-29 | End: 2024-03-28 | Stop reason: SDUPTHER

## 2024-01-28 RX ADMIN — ASPIRIN 81 MG: 81 TABLET, COATED ORAL at 08:01

## 2024-01-28 RX ADMIN — LISINOPRIL 40 MG: 20 TABLET ORAL at 08:01

## 2024-01-28 RX ADMIN — PANTOPRAZOLE SODIUM 40 MG: 40 TABLET, DELAYED RELEASE ORAL at 08:01

## 2024-01-28 RX ADMIN — FLUTICASONE PROPIONATE 100 MCG: 50 SPRAY, METERED NASAL at 08:01

## 2024-01-28 RX ADMIN — FINASTERIDE 5 MG: 5 TABLET, FILM COATED ORAL at 08:01

## 2024-01-28 RX ADMIN — TAMSULOSIN HYDROCHLORIDE 0.4 MG: 0.4 CAPSULE ORAL at 08:01

## 2024-01-28 RX ADMIN — METOPROLOL TARTRATE 25 MG: 25 TABLET, FILM COATED ORAL at 08:01

## 2024-01-28 NOTE — ASSESSMENT & PLAN NOTE
-labile readings  -continue home medications, increased dose of ACE  -increase nifedipine  -prn IV labetalol for elevated readings  -monitor blood pressure

## 2024-01-28 NOTE — PROGRESS NOTES
[facility]  [facility] Medicine  Progress Note    Patient Name: Jovanny Olmedo  MRN: [id number]  Patient Class: IP- Inpatient   Admission Date: 1/24/2024  Length of Stay: 2 days  Attending Physician: Jana Carlton MD  Primary Care Provider: Miryam Jimenez MD        Subjective:     Principal Problem:Troponin level elevated        HPI:  The patient is an 89 yo male with past medical history of aortic aneurysm, CAD, HTN, glaucoma, CKD and GERD who presented to the ED with elevated blood pressure and headache. He reports he was at [facility] last night as he had an episode of chest pain. His chest pain was substernal. It subsided. He had elevated cardiac enzymes and the plan was to transfer him to facility with cardiology. He left A as he wanted to be seen by cardiologist. He is currently chest pain free. He reports he has a pounding headache. Cardiology, CT surgery, hospital medicine and vascular surgery consulted. Patient placed in observation.    Overview/Hospital Course:  No notes on file    Interval History: BP not controlled, adjusting medication as patient has headache with elevated readings    Review of Systems  Objective:     Vital Signs (Most Recent):  Temp: 97.8 °F (36.6 °C) (01/27/24 1629)  Pulse: 81 (01/27/24 1700)  Resp: 18 (01/27/24 1629)  BP: (!) 170/93 (01/27/24 1629)  SpO2: 97 % (01/27/24 1629) Vital Signs (24h Range):  Temp:  [97.7 °F (36.5 °C)-98.5 °F (36.9 °C)] 97.8 °F (36.6 °C)  Pulse:  [58-87] 81  Resp:  [16-23] 18  SpO2:  [95 %-99 %] 97 %  BP: (147-170)/(82-93) 170/93     Weight: 98.9 kg (218 lb 0.6 oz)  Body mass index is 29.57 kg/m².    Intake/Output Summary (Last 24 hours) at 1/27/2024 1859  Last data filed at 1/27/2024 1645  Gross per 24 hour   Intake 820 ml   Output 1350 ml   Net -530 ml         Physical Exam  HENT:      Head: Normocephalic and atraumatic.   Cardiovascular:      Rate and Rhythm: Normal rate and regular rhythm.      Heart sounds: No murmur  heard.  Pulmonary:      Effort: Pulmonary effort is normal. No respiratory distress.      Breath sounds: Normal breath sounds. No wheezing.   Abdominal:      General: Bowel sounds are normal. There is no distension.      Palpations: Abdomen is soft.      Tenderness: There is no abdominal tenderness.   Musculoskeletal:         General: No swelling.   Skin:     General: Skin is warm and dry.   Neurological:      Mental Status: He is alert and oriented to person, place, and time. Mental status is at baseline.             Significant Labs: All pertinent labs within the past 24 hours have been reviewed.    CMP:   Recent Labs   Lab 01/26/24  0654 01/27/24  0315    137   K 3.6 3.6    106   CO2 27 20*    102   BUN 22 24*   CREATININE 1.6* 1.5*   CALCIUM 9.4 9.4   PROT 6.7 6.7   ALBUMIN 3.3* 3.3*   BILITOT 0.7 0.6   ALKPHOS 66 65   AST 23 27   ALT 33 34   ANIONGAP 5* 11       Significant Imaging: I have reviewed all pertinent imaging results/findings within the past 24 hours.    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 Continue to monitor on telemetry.     Thrombocytopenia  Patient was found to have thrombocytopenia, , will monitor the platelets Daily. Hold DVT prophylaxis if platelets are <50k. The patient's platelet results have been reviewed and are listed below.          CLARISSA on CPAP  -continue CPAP nightly      Chronic renal impairment, stage 3b  Creatine stable for now. BMP reviewed- noted Estimated Creatinine Clearance: 42.3 mL/min (A) (based on SCr of 1.5 mg/dL (H)). according to latest data. Based on current GFR, CKD stage is stage 3 -  GFR 30-59.  Monitor UOP and serial BMP and adjust therapy as needed. Renally dose meds. Avoid nephrotoxic medications and procedures.    GERD (gastroesophageal reflux disease)  -continue PPI      Essential hypertension  -labile readings  -continue home medications, increased dose of ACE  -increase nifedipine  -prn IV labetalol for elevated readings  -monitor blood pressure      VTE Risk Mitigation (From admission, onward)           Ordered     IP VTE HIGH RISK PATIENT  Once         01/24/24 1332     Place sequential compression device  Until discontinued         01/24/24 1332                    Discharge Planning   BRIT:      Code Status: Full Code   Is the patient medically ready for discharge?:     Reason for patient still in hospital (select all that apply): Patient trending condition and Treatment  Discharge Plan A: Home with family, Home                  Jana Carlton MD  Department of Hospital Medicine   [facility]

## 2024-01-28 NOTE — DISCHARGE SUMMARY
[facility]  Hospital Medicine  Discharge Summary      Patient Name: Jovanny Olmedo  MRN: [id number]  POOJA: [id number]  Patient Class: IP- Inpatient  Admission Date: 1/24/2024  Hospital Length of Stay: 3 days  Discharge Date and Time: 1/28/2024  1:10 PM  Attending Physician: No att. providers found   Discharging Provider: Jana Carlton MD  Primary Care Provider: Miryam Jimenez MD    Primary Care Team: Networked reference to record PCT     HPI:   The patient is an 87 yo male with past medical history of aortic aneurysm, CAD, HTN, glaucoma, CKD and GERD who presented to the ED with elevated blood pressure and headache. He reports he was at [facility] last night as he had an episode of chest pain. His chest pain was substernal. It subsided. He had elevated cardiac enzymes and the plan was to transfer him to facility with cardiology. He left A as he wanted to be seen by cardiologist. He is currently chest pain free. He reports he has a pounding headache. Cardiology, CT surgery, hospital medicine and vascular surgery consulted. Patient placed in observation.    Procedure(s) (LRB):  Left heart cath (Left)      Hospital Course:   The patient presented with chest pain, headache and elevated blood pressures. He had elevated troponin. Cardiology was consulted. Initially plan LHC but due to aortic aneurysms LHC deferred. NM stress performed and normal perfusion reported. His medications were adjusted for better blood pressure control. His headache resolved once BP normalized. Patient instructed to call his cardiologist if his BP becomes elevated so medications can be adjusted. He and his family verbalized understanding. Patient seen and examined, stable for discharge.    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 CTA abdomen/pelvis obtain. Plan outpatient follow-up.     Goals of Care Treatment Preferences:  Code Status: Full Code      Consults:   Consults (From admission, onward)          Status  Ordering Provider     Inpatient consult to Social Work  Once        Provider:  (Not yet assigned)    Completed REINA SINGLETON     Inpatient consult to Vascular Surgery  Once        Provider:  Joe Montilla MD    Acknowledged JESSICA MORA     Inpatient consult to Cardiology  Once        Provider:  Omid Cook MD    Completed JESSICA MORA     Inpatient consult to Vascular Surgery  Once        Provider:  Eh Morrow MD    Completed JESSICA MORA                Final Active Diagnoses:    Diagnosis Date Noted POA    PRINCIPAL PROBLEM:  Troponin level elevated [R79.89] 01/24/2024 Yes    Coronary artery disease of native artery of native heart with stable angina pectoris [I25.118] 08/06/2023 Yes    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    Specialty: Family Medicine  Contact information:  [address]  [place] [address]  [phone]               Ponce Aquino MD Follow up.    Specialty: Cardiology  Contact information:  [address]  [place] [address]  [phone]                           Patient Instructions:      Diet Cardiac     Activity as tolerated       Significant Diagnostic Studies: Radiology:   Imaging Results              X-Ray Chest 1 View (Final result)  Result time 01/24/24 11:03:24      Final result by Sheldon Boothe MD (Timothy) (01/24/24 11:03:24)                   Impression:      Stable  chest.      Electronically signed by: Sheldon Boothe MD  Date:    01/24/2024  Time:    11:03               Narrative:    EXAMINATION:  XR CHEST 1 VIEW    CLINICAL HISTORY:  Weakness,    COMPARISON:  Chest, 11/01/2022    FINDINGS:  Heart is normal in size.  Tortuous thoracic aorta.  No focal infiltrate.                                      Cardiac Graphics: Echocardiogram: Transthoracic echo (TTE) complete (Cupid Only):   Results for orders placed or performed during the hospital encounter of 01/24/24   Echo   Result Value Ref Range    BSA 2.34 m2    LVOT stroke volume 99.16 cm3    LVIDd 3.73 3.5 - 6.0 cm    LV Systolic Volume 27.35 mL    LV Systolic Volume Index 11.9 mL/m2    LVIDs 2.71 2.1 - 4.0 cm    LV Diastolic Volume 59.29 mL    LV Diastolic Volume Index 25.89 mL/m2    IVS 1.21 (A) 0.6 - 1.1 cm    LVOT diameter 2.20 cm    LVOT area 3.8 cm2    FS 27 (A) 28 - 44 %    Left Ventricle Relative Wall Thickness 0.62 cm    Posterior Wall 1.16 (A) 0.6 - 1.1 cm    LV mass 146.27 g    LV Mass Index 64 g/m2    MV Peak E Medardo 0.67 m/s    TDI LATERAL 0.08 m/s    TDI SEPTAL 0.10 m/s    E/E' ratio 7.44 m/s    MV Peak A Medardo 0.97 m/s    TR Max Medardo 2.30 m/s    E/A ratio 0.69     IVRT 87.54 msec    E wave deceleration time 297.64 msec    LV SEPTAL E/E' RATIO 6.70 m/s    LV LATERAL E/E' RATIO 8.38 m/s    LVOT peak medardo 1.10 m/s    Left Ventricular Outflow Tract Mean Velocity 0.94 cm/s    Left Ventricular Outflow Tract Mean Gradient 3.70 mmHg    RVOT peak VTI 15.8 cm    TAPSE 2.02 cm    LA size 3.90 cm    Left Atrium Minor Axis 4.68 cm    Left Atrium Major Axis 5.60 cm    RA Major Axis 4.49 cm    AV mean gradient 9 mmHg    AV peak gradient 11 mmHg    Ao peak medardo 1.64 m/s    Ao VTI 44.00 cm    LVOT peak VTI 26.10 cm    AV valve area 2.25 cm²    AV Velocity Ratio 0.67     AV index (prosthetic) 0.59     KILLIAN by Velocity Ratio 2.55 cm²    MV stenosis pressure 1/2 time 86.32 ms    MV valve area p 1/2 method 2.55 cm2    Triscuspid Valve  Regurgitation Peak Gradient 21 mmHg    PV mean gradient 1 mmHg    RVOT peak carol 0.67 m/s    Ao root annulus 4.27 cm    STJ 3.59 cm    Ascending aorta 3.76 cm    IVC diameter 1.08 cm    Mean e' 0.09 m/s    ZLVIDS -5.28     ZLVIDD -8.52     LA Volume Index 23.6 mL/m2    LA volume 54.09 cm3    LA WIDTH 3.2 cm    RA Width 3.0 cm    TV resting pulmonary artery pressure 24 mmHg    RV TB RVSP 5 mmHg    Est. RA pres 3 mmHg    Narrative      Left Ventricle: The left ventricle is normal in size. Normal wall   thickness. There is concentric remodeling. Normal wall motion. There is   normal systolic function with a visually estimated ejection fraction of 60   - 65%. There is normal diastolic function.    Right Ventricle: Normal right ventricular cavity size. Wall thickness   is normal. Right ventricle wall motion  is normal. Systolic function is   normal.    Pulmonary Artery: The estimated pulmonary artery systolic pressure is   24 mmHg.    IVC/SVC: Normal venous pressure at 3 mmHg.      and Stress Test:   Normal myocardial perfusion scan. There is no evidence of myocardial ischemia or infarction.    There is a  mild to moderate intensity fixed perfusion abnormality in the inferior wall of the left ventricle secondary to diaphragm attenuation.    The gated perfusion images showed an ejection fraction of 64% at rest. The gated perfusion images showed an ejection fraction of 66% post stress. Normal ejection fraction is greater than 59%.    There is normal wall motion at rest and post stress.    LV cavity size is normal at rest and normal at stress.    The ECG portion of the study is negative for ischemia    Pending Diagnostic Studies:       None           Medications:  Reconciled Home Medications:      Medication List        START taking these medications      finasteride 5 mg tablet  Commonly known as: PROSCAR  Take 1 tablet (5 mg total) by mouth once daily.  Start taking on: January 29, 2024     lisinopriL 40 MG  tablet  Commonly known as: PRINIVIL,ZESTRIL  Take 1 tablet (40 mg total) by mouth once daily.  Start taking on: January 29, 2024     metoprolol tartrate 25 MG tablet  Commonly known as: LOPRESSOR  Take 1 tablet (25 mg total) by mouth 2 (two) times daily.     NIFEdipine 60 MG (OSM) 24 hr tablet  Commonly known as: PROCARDIA-XL  Take 1 tablet (60 mg total) by mouth every evening.            CONTINUE taking these medications      aspirin 81 MG EC tablet  Commonly known as: ECOTRIN  Take 81 mg by mouth once daily.     atorvastatin 40 MG tablet  Commonly known as: LIPITOR  Take 1 tablet by mouth once daily     azelastine 137 mcg (0.1 %) nasal spray  Commonly known as: ASTELIN  USE 2 SPRAY(S) IN EACH NOSTRIL TWICE DAILY     clotrimazole-betamethasone 1-0.05% cream  Commonly known as: LOTRISONE  Apply topically 2 (two) times daily.     ferrous sulfate 325 (65 FE) MG EC tablet  Take 325 mg by mouth once daily.     fluticasone propionate 50 mcg/actuation nasal spray  Commonly known as: FLONASE  Use 2 spray(s) in each nostril once daily     meloxicam 15 MG tablet  Commonly known as: MOBIC  Take 15 mg by mouth once daily.     methocarbamoL 750 MG Tab  Commonly known as: ROBAXIN  Take 1 tablet (750 mg total) by mouth 3 (three) times daily.     pantoprazole 40 MG tablet  Commonly known as: PROTONIX  Take 1 tablet by mouth once daily     tadalafiL 20 MG Tab  Commonly known as: CIALIS  Take 1 tablet (20 mg total) by mouth daily as needed.     tamsulosin 0.4 mg Cap  Commonly known as: FLOMAX  Take 1 capsule (0.4 mg total) by mouth once daily.     vacuum erection device system Kit  1 kit by Misc.(Non-Drug; Combo Route) route daily as needed.            STOP taking these medications      benazepril-hydrochlorthiazide 10-12.5 mg Tab  Commonly known as: LOTENSIN HCT              Indwelling Lines/Drains at time of discharge:   Lines/Drains/Airways       None                   Time spent on the discharge of patient: 31 minutes          Jana Carlton MD  Department of Hospital Medicine  [facility]

## 2024-01-28 NOTE — PLAN OF CARE
[facility]  Discharge Final Note    Primary Care Provider: Miryam Jimenez MD    Expected Discharge Date: 1/28/2024    Final Discharge Note (most recent)       Final Note - 01/28/24 1227          Final Note    Assessment Type Final Discharge Note     Anticipated Discharge Disposition Home or Self Care        Post-Acute Status    Discharge Delays None known at this time                   No needs at time of chart review. KM,MSW    Important Message from Medicare             Contact Info       Miryam Jimenez MD   Specialty: Family Medicine   Relationship: PCP - General    [address]  [place] [address]   Phone: [phone]       Next Steps: Follow up    Ponce Aquino MD   Specialty: Cardiology    [address]  [place] [address]   Phone: [phone]       Next Steps: Follow up

## 2024-01-28 NOTE — NURSING
Telemetry monitor removed and returned to monitor tech. PIV removed. Discharge instructions reviewed with patient including discharge medications, follow-up appointments, diet, and activity restrictions. Patient verbalizes understanding and voices no concerns. All personal belongings left with patient. Discharged home via wheelchair to private vehicle in no acute distress.

## 2024-01-28 NOTE — ASSESSMENT & PLAN NOTE
Creatine stable for now. BMP reviewed- noted Estimated Creatinine Clearance: 42.3 mL/min (A) (based on SCr of 1.5 mg/dL (H)). according to latest data. Based on current GFR, CKD stage is stage 3 - GFR 30-59.  Monitor UOP and serial BMP and adjust therapy as needed. Renally dose meds. Avoid nephrotoxic medications and procedures.

## 2024-01-28 NOTE — HOSPITAL COURSE
The patient presented with chest pain, headache and elevated blood pressures. He had elevated troponin. Cardiology was consulted. Initially plan LHC but due to aortic aneurysms LHC deferred. NM stress performed and normal perfusion reported. His medications were adjusted for better blood pressure control. His headache resolved once BP normalized. Patient instructed to call his cardiologist if his BP becomes elevated so medications can be adjusted. He and his family verbalized understanding. Patient seen and examined, stable for discharge.    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 CTA abdomen/pelvis obtain. Plan outpatient follow-up.

## 2024-01-30 ENCOUNTER — TELEPHONE (OUTPATIENT)
Dept: INTERNAL MEDICINE | Facility: CLINIC | Age: 89
End: 2024-01-30
Payer: MEDICARE

## 2024-01-30 ENCOUNTER — PATIENT OUTREACH (OUTPATIENT)
Dept: ADMINISTRATIVE | Facility: CLINIC | Age: 89
End: 2024-01-30
Payer: MEDICARE

## 2024-01-30 DIAGNOSIS — I10 ESSENTIAL HYPERTENSION: Primary | ICD-10-CM

## 2024-01-30 NOTE — TELEPHONE ENCOUNTER
Spoke with daughter Mimi. States BP is currently elevated. 150/96. Pt has appt on 2/1/2024 for hosp fu. Is there is any intervention needed regarding BP prior to visit. Please advise.//ddw

## 2024-01-30 NOTE — PROGRESS NOTES
C3 nurse spoke with Jovanny Olmedo for a TCC post hospital discharge follow up call. The patient has a scheduled [facility] appointment with Miryam Jimenez MD on 02/01/2024 @ 1100.

## 2024-01-30 NOTE — TELEPHONE ENCOUNTER
----- Message from Cristina Cantu sent at 1/30/2024  1:56 PM CST -----  Regarding: medial advice  Contact: Mimi  .Type:  Needs Medical Advice    Who Called: Mimi  Symptoms (please be specific):  elevated blood pressure   How long has patient had these symptoms:    Pharmacy name and phone #:    Would the patient rather a call back or a response via [facility]? call  Best Call Back Number:   [phone]  Additional Information:  The patient was discharged Sunday. His blood pressure today 150/96. His daughter is requesting a callback from the nurse today prior to the appt Thursday.

## 2024-01-31 RX ORDER — NIFEDIPINE 90 MG/1
90 TABLET, EXTENDED RELEASE ORAL DAILY
Qty: 30 TABLET | Refills: 3 | Status: SHIPPED | OUTPATIENT
Start: 2024-01-31 | End: 2024-03-21 | Stop reason: DRUGHIGH

## 2024-01-31 NOTE — TELEPHONE ENCOUNTER
Will increase procardia from 60 to 90 mg daily and have them monitor blood pressures closely , if remains higher than 140/90

## 2024-01-31 NOTE — TELEPHONE ENCOUNTER
Spoke with daughter Mimi and pt. States pt is now taking finasteride, metoprolol, and lisinopril in the morning. Pt takes procardia and metoprolol in the evening. Per daughter pt's BP stabilizes after taking the procardia. BP readings this morning 138/95 and 140/91. States slight headache. 7/10 on pain scale. No dizziness or blurred vision noted. Please advise if any intervention is needed.//ddw

## 2024-02-05 ENCOUNTER — OFFICE VISIT (OUTPATIENT)
Dept: INTERNAL MEDICINE | Facility: CLINIC | Age: 89
End: 2024-02-05
Payer: MEDICARE

## 2024-02-05 ENCOUNTER — LAB VISIT (OUTPATIENT)
Dept: LAB | Facility: HOSPITAL | Age: 89
End: 2024-02-05
Attending: FAMILY MEDICINE
Payer: MEDICARE

## 2024-02-05 VITALS
OXYGEN SATURATION: 98 % | DIASTOLIC BLOOD PRESSURE: 74 MMHG | RESPIRATION RATE: 18 BRPM | BODY MASS INDEX: 29.38 KG/M2 | HEART RATE: 77 BPM | HEIGHT: 72 IN | SYSTOLIC BLOOD PRESSURE: 130 MMHG | WEIGHT: 216.94 LBS

## 2024-02-05 DIAGNOSIS — I10 ESSENTIAL HYPERTENSION: Chronic | ICD-10-CM

## 2024-02-05 DIAGNOSIS — N18.32 STAGE 3B CHRONIC KIDNEY DISEASE: ICD-10-CM

## 2024-02-05 DIAGNOSIS — I25.118 CORONARY ARTERY DISEASE OF NATIVE ARTERY OF NATIVE HEART WITH STABLE ANGINA PECTORIS: ICD-10-CM

## 2024-02-05 DIAGNOSIS — Z09 HOSPITAL DISCHARGE FOLLOW-UP: ICD-10-CM

## 2024-02-05 DIAGNOSIS — I71.43 INFRARENAL ABDOMINAL AORTIC ANEURYSM (AAA) WITHOUT RUPTURE: ICD-10-CM

## 2024-02-05 DIAGNOSIS — R79.89 TROPONIN LEVEL ELEVATED: ICD-10-CM

## 2024-02-05 DIAGNOSIS — L98.9 SKIN LESION OF NECK: ICD-10-CM

## 2024-02-05 DIAGNOSIS — R79.89 TROPONIN LEVEL ELEVATED: Primary | ICD-10-CM

## 2024-02-05 DIAGNOSIS — N25.81 SECONDARY HYPERPARATHYROIDISM: ICD-10-CM

## 2024-02-05 DIAGNOSIS — D69.6 THROMBOCYTOPENIA: ICD-10-CM

## 2024-02-05 DIAGNOSIS — I71.23 ANEURYSM OF DESCENDING THORACIC AORTA WITHOUT RUPTURE: ICD-10-CM

## 2024-02-05 DIAGNOSIS — C61 PROSTATE CANCER: ICD-10-CM

## 2024-02-05 DIAGNOSIS — I25.10 CORONARY ARTERY DISEASE, OCCLUSIVE: Chronic | ICD-10-CM

## 2024-02-05 LAB
ALBUMIN SERPL BCP-MCNC: 3.9 G/DL (ref 3.5–5.2)
ALP SERPL-CCNC: 80 U/L (ref 55–135)
ALT SERPL W/O P-5'-P-CCNC: 32 U/L (ref 10–44)
ANION GAP SERPL CALC-SCNC: 14 MMOL/L (ref 8–16)
AST SERPL-CCNC: 24 U/L (ref 10–40)
BASOPHILS # BLD AUTO: 0.03 K/UL (ref 0–0.2)
BASOPHILS NFR BLD: 0.4 % (ref 0–1.9)
BILIRUB SERPL-MCNC: 0.6 MG/DL (ref 0.1–1)
BUN SERPL-MCNC: 26 MG/DL (ref 8–23)
CALCIUM SERPL-MCNC: 10.5 MG/DL (ref 8.7–10.5)
CHLORIDE SERPL-SCNC: 107 MMOL/L (ref 95–110)
CHOLEST SERPL-MCNC: 219 MG/DL (ref 120–199)
CHOLEST/HDLC SERPL: 4.8 {RATIO} (ref 2–5)
CO2 SERPL-SCNC: 21 MMOL/L (ref 23–29)
CREAT SERPL-MCNC: 1.6 MG/DL (ref 0.5–1.4)
DIFFERENTIAL METHOD BLD: ABNORMAL
EOSINOPHIL # BLD AUTO: 0.2 K/UL (ref 0–0.5)
EOSINOPHIL NFR BLD: 2.2 % (ref 0–8)
ERYTHROCYTE [DISTWIDTH] IN BLOOD BY AUTOMATED COUNT: 12.4 % (ref 11.5–14.5)
EST. GFR  (NO RACE VARIABLE): 41.4 ML/MIN/1.73 M^2
GLUCOSE SERPL-MCNC: 87 MG/DL (ref 70–110)
HCT VFR BLD AUTO: 46 % (ref 40–54)
HDLC SERPL-MCNC: 46 MG/DL (ref 40–75)
HDLC SERPL: 21 % (ref 20–50)
HGB BLD-MCNC: 14.8 G/DL (ref 14–18)
IMM GRANULOCYTES # BLD AUTO: 0.03 K/UL (ref 0–0.04)
IMM GRANULOCYTES NFR BLD AUTO: 0.4 % (ref 0–0.5)
LDLC SERPL CALC-MCNC: 145.2 MG/DL (ref 63–159)
LYMPHOCYTES # BLD AUTO: 2.2 K/UL (ref 1–4.8)
LYMPHOCYTES NFR BLD: 27.6 % (ref 18–48)
MCH RBC QN AUTO: 30.7 PG (ref 27–31)
MCHC RBC AUTO-ENTMCNC: 32.2 G/DL (ref 32–36)
MCV RBC AUTO: 95 FL (ref 82–98)
MONOCYTES # BLD AUTO: 0.7 K/UL (ref 0.3–1)
MONOCYTES NFR BLD: 9.2 % (ref 4–15)
NEUTROPHILS # BLD AUTO: 4.7 K/UL (ref 1.8–7.7)
NEUTROPHILS NFR BLD: 60.2 % (ref 38–73)
NONHDLC SERPL-MCNC: 173 MG/DL
NRBC BLD-RTO: 0 /100 WBC
PLATELET # BLD AUTO: 143 K/UL (ref 150–450)
PMV BLD AUTO: 12.1 FL (ref 9.2–12.9)
POTASSIUM SERPL-SCNC: 5 MMOL/L (ref 3.5–5.1)
PROT SERPL-MCNC: 7.5 G/DL (ref 6–8.4)
RBC # BLD AUTO: 4.82 M/UL (ref 4.6–6.2)
SODIUM SERPL-SCNC: 142 MMOL/L (ref 136–145)
TRIGL SERPL-MCNC: 139 MG/DL (ref 30–150)
TROPONIN I SERPL DL<=0.01 NG/ML-MCNC: 0.01 NG/ML (ref 0–0.03)
TSH SERPL DL<=0.005 MIU/L-ACNC: 6.15 UIU/ML (ref 0.4–4)
WBC # BLD AUTO: 7.79 K/UL (ref 3.9–12.7)

## 2024-02-05 PROCEDURE — 99214 OFFICE O/P EST MOD 30 MIN: CPT | Mod: PBBFAC | Performed by: FAMILY MEDICINE

## 2024-02-05 PROCEDURE — 36415 COLL VENOUS BLD VENIPUNCTURE: CPT | Performed by: FAMILY MEDICINE

## 2024-02-05 PROCEDURE — 80053 COMPREHEN METABOLIC PANEL: CPT | Performed by: FAMILY MEDICINE

## 2024-02-05 PROCEDURE — 99999 PR PBB SHADOW E&M-EST. PATIENT-LVL IV: CPT | Mod: PBBFAC,,, | Performed by: FAMILY MEDICINE

## 2024-02-05 PROCEDURE — 84443 ASSAY THYROID STIM HORMONE: CPT | Performed by: FAMILY MEDICINE

## 2024-02-05 PROCEDURE — 84439 ASSAY OF FREE THYROXINE: CPT | Performed by: FAMILY MEDICINE

## 2024-02-05 PROCEDURE — 99495 TRANSJ CARE MGMT MOD F2F 14D: CPT | Mod: S$PBB,,, | Performed by: FAMILY MEDICINE

## 2024-02-05 PROCEDURE — 80061 LIPID PANEL: CPT | Performed by: FAMILY MEDICINE

## 2024-02-05 PROCEDURE — 84484 ASSAY OF TROPONIN QUANT: CPT | Performed by: FAMILY MEDICINE

## 2024-02-05 PROCEDURE — 85025 COMPLETE CBC W/AUTO DIFF WBC: CPT | Performed by: FAMILY MEDICINE

## 2024-02-05 NOTE — PROGRESS NOTES
Subjective:       Patient ID: Jovanny Olmedo is a 88 y.o. male.    Chief Complaint: Hospital Follow Up    Transitional Care Note    Family and/or Caretaker present at visit?  No.  Diagnostic tests reviewed/disposition: No diagnosic tests pending after this hospitalization.  Disease/illness education:  encouraged to take his medications regularly  Home health/community services discussion/referrals: Patient does not have home health established from hospital visit.  They do not need home health.  If needed, we will set up home health for the patient.   Establishment or re-establishment of referral orders for community resources: No other necessary community resources.   Discussion with other health care providers:  patient to keep appointment with vascular as well as Cardiology.      88-year-old  male patient with Patient Active Problem List:     Essential hypertension     GERD (gastroesophageal reflux disease)     Polycystic kidney disease     Coronary artery disease, occlusive     Chronic renal impairment, stage 3b     Refractive error     DDD (degenerative disc disease), lumbar     Open angle with borderline findings, low risk, bilateral     History of coronary angioplasty     Prostate cancer     Secondary hyperparathyroidism     Pseudophakia     Intermediate stage nonexudative age-related macular degeneration of both eyes     Aortic atherosclerosis     CLARISSA on CPAP     Duodenal ulcer     History of colon polyps     Dyslipidemia     Abdominal aortic aneurysm (AAA) without rupture     History of pulmonary embolus (PE)     Thrombocytopenia     Obesity (BMI 30.0-34.9)     Stable angina pectoris     Thoracic back pain     Abnormal ECG     Coronary artery disease of native artery of native heart with stable angina pectoris     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     Troponin level elevated   Here for [facility] hospital discharge follow-up from 01/24/2024- 1/28/2024,  secondary to headache and  elevated blood pressures,  patient had elevated troponin levels and had nuclear stress test which was stable,  was unable to get left heart catheterization secondary to aneurysms and CTA of the chest showed aneurysm of descending thoracic aorta and has been having stable infrarenal abdominal aortic aneurysm,  patient was advised to follow-up with vascular as outpatient   Denies any chest pain or headaches or blurry vision,  blood pressure has been stabilized since changing the medication   Patient has been doing well otherwise   Was concerned about left supraclavicular mass for which patient had ultrasound done showing possibility of intramuscular hematoma versus lipoma versus unknown mass,  was recommended by Dr. Casillas to see General surgery and referral placed but no appointments were given,  patient reports that he is noticed at swelling a month ago and denies any excessive weight loss or cough or night sweats            Review of Systems   Constitutional:  Negative for appetite change and fatigue.   HENT:  Negative for trouble swallowing and voice change.    Eyes:  Negative for visual disturbance.   Respiratory:  Negative for shortness of breath.    Cardiovascular:  Negative for chest pain, palpitations and leg swelling.   Gastrointestinal:  Negative for abdominal pain, nausea and vomiting.   Musculoskeletal:  Negative for myalgias.   Skin:  Negative for rash.   Neurological:  Negative for dizziness, syncope and headaches.   Psychiatric/Behavioral:  Negative for sleep disturbance.          /74 (BP Location: Right arm, Patient Position: Sitting, BP Method: Medium (Manual))   Pulse 77   Resp 18   Ht 6' (1.829 m)   Wt 98.4 kg (216 lb 14.9 oz)   SpO2 98%   BMI 29.42 kg/m²   Objective:      Physical Exam  Constitutional:       Appearance: He is well-developed.   HENT:      Head: Normocephalic and atraumatic.   Cardiovascular:      Rate and Rhythm: Normal rate and regular rhythm.      Heart sounds:  Normal heart sounds. No murmur heard.  Pulmonary:      Effort: Pulmonary effort is normal.      Breath sounds: Normal breath sounds. No wheezing.   Chest:      Chest wall: No tenderness.   Abdominal:      General: Bowel sounds are normal.      Palpations: Abdomen is soft.      Tenderness: There is no abdominal tenderness.   Musculoskeletal:         General: Swelling present.      Comments:  Noted supraclavicular swelling on the left side 3 x 4 cm nontender and mobile   Skin:     General: Skin is warm and dry.      Findings: No rash.   Neurological:      Mental Status: He is alert and oriented to person, place, and time.   Psychiatric:         Mood and Affect: Mood normal.         CTA Abdomen and Pelvis  Narrative: EXAMINATION:  CTA ABDOMEN AND PELVIS    CLINICAL HISTORY:  Aortic aneurysm    TECHNIQUE:  Contrast enhanced CTA of the abdomen and pelvis performed with contrast utilizing multiplanar 2-D and 3-D MIP reformats and permanently archived.    All CT scans at this facility are performed  using dose modulation techniques as appropriate to performed exam including the following:  automated exposure control; adjustment of mA and/or kV according to the patients size (this includes techniques or standardized protocols for targeted exams where dose is matched to indication/reason for exam: i.e. extremities or head);  iterative reconstruction technique.    COMPARISON:  CT abdomen pelvis 08/01/2023.    FINDINGS:  Vascular: There is aneurysmal dilatation seen of the distal descending thoracic aorta with mural plaque along the wall.  Aneurysm measures 6.3 x 6.2 cm in diameter.  Patient is status post stent graft repair abdominal aortic aneurysm.  No extra vascular contrast identified no endoleak.  Stent is patent without significant narrowing.  There is a patent stent related to the proximal left renal artery as well.  There is atherosclerosis related to the right renal artery and celiac artery and superior mesenteric  artery.  Inferior mesenteric artery is not identified.  Abdominal aortic aneurysm sac appears essentially stable.  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    Nonvascular:    Multiple hepatic cysts are present.  No suspicious masses.  Spleen is unremarkable.  The pancreas is unremarkable.  Adrenal glands appear unremarkable.  There are multiple large renal cysts as well.  Largest cyst is seen in the lower pole of the right kidney measuring 9.5 cm.  Cysts appear to be simple.  No suspicious renal lesions.  Visualized bowel is unremarkable.  No abnormal pelvic masses or fluid collections.    Skeleton is intact.  Impression: Stable appearing descending thoracic aortic aneurysm.  Stable appearing abdominal aortic aneurysm.  Status post stent graft repair.  No evidence of endovascular or extravascular leak.    Stable benign hepatic cysts and renal cysts.  No follow-up necessary for the cysts.    Electronically signed by: Sheldon Boothe MD  Date:    01/26/2024  Time:    16:39  Nuclear Stress - Cardiology Interpreted    Normal myocardial perfusion scan. There is no evidence of myocardial   ischemia or infarction.    There is a  mild to moderate intensity fixed perfusion abnormality in   the inferior wall of the left ventricle secondary to diaphragm   attenuation.    The gated perfusion images showed an ejection fraction of 64% at rest.   The gated perfusion images showed an ejection fraction of 66% post stress.   Normal ejection fraction is greater than 59%.    There is normal wall motion at rest and post stress.    LV cavity size is normal at rest and normal at stress.    The ECG portion of the study is negative for ischemia.        Ultrasound of the soft tissue of the neck- 01/23/2024    EXAMINATION:  US SOFT TISSUE HEAD NECK THYROID     CLINICAL HISTORY:  .  Localized swelling, mass and lump, neck     TECHNIQUE:  Ultrasound of the thyroid and cervical lymph nodes was performed.      COMPARISON:  Targeted ultrasound of the left supraclavicular region was performed.     FINDINGS:  There is an oval heterogeneous mostly isoechoic lesion in the area of clinical interest in the left supraclavicular space, possibly intramuscular in location.  Lesion measures 4.7 x 1.5 x 3.7 cm.  No significant internal Doppler vascularity is demonstrated.  Considerations include intramuscular hematoma as well as lipoma or other nonspecific mass.  Further evaluation could be obtained with MRI as indicated.     Incidental note is made of several small regional lymph nodes, all subcentimeter in size.     Impression:     As above.        Electronically signed by: CATARINO Puga MD  Date:                                            01/23/2024  Time:                                           10:38  Assessment/Plan:   1. Troponin level elevated  -     TROPONIN I; Future; Expected date: 02/05/2024  2. 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 Coronary artery disease of native artery of native heart with stable angina pectoris   Reviewed hospital records in detail,  noted elevated troponin levels but nuclear stress test and further testing has been normal   Patient was advised to follow-up with Cardiology and vascular as outpatient   Patient verbalized understanding    4. Essential hypertension  -     Comprehensive Metabolic Panel; Future; Expected date: 02/05/2024  -     TSH; Future; Expected date: 02/05/2024  -     Lipid Panel; Future; Expected date: 02/05/2024   Blood pressure is stable currently on lisinopril 40 mg and metoprolol 25 mg twice daily and Procardia 90 mg daily    5. Coronary artery disease, occlusive  6. Hospital discharge follow-up  -     CBC Auto Differential; Future; Expected date: 02/05/2024   Stable and asymptomatic    7. Infrarenal abdominal aortic aneurysm (AAA) without rupture   stable    8. Thrombocytopenia  -     CBC Auto Differential; Future; Expected date: 02/05/2024   Stable  and asymptomatic    9. Prostate cancer  Overview:  Dr stokes   Followed by urology    10. Secondary hyperparathyroidism   stable    11. Skin lesion of neck  -     Ambulatory referral/consult to General Surgery; Future; Expected date: 02/12/2024   Reviewed ultrasound,  advised to discuss further with General surgery   Patient clinically doing well    12. Stage 3b chronic kidney disease  -     Comprehensive Metabolic Panel; Future; Expected date: 02/05/2024   Encouraged to drink adequate fluids and avoid over-the-counter NSAIDs

## 2024-02-06 LAB — T4 FREE SERPL-MCNC: 1.12 NG/DL (ref 0.71–1.51)

## 2024-02-12 ENCOUNTER — LAB VISIT (OUTPATIENT)
Dept: LAB | Facility: HOSPITAL | Age: 89
End: 2024-02-12
Attending: UROLOGY
Payer: MEDICARE

## 2024-02-12 ENCOUNTER — OFFICE VISIT (OUTPATIENT)
Dept: VASCULAR SURGERY | Facility: CLINIC | Age: 89
End: 2024-02-12
Payer: MEDICARE

## 2024-02-12 VITALS — SYSTOLIC BLOOD PRESSURE: 125 MMHG | DIASTOLIC BLOOD PRESSURE: 79 MMHG

## 2024-02-12 DIAGNOSIS — Z01.818 ENCOUNTER FOR OTHER PREPROCEDURAL EXAMINATION: ICD-10-CM

## 2024-02-12 DIAGNOSIS — E78.2 MIXED HYPERLIPIDEMIA: ICD-10-CM

## 2024-02-12 DIAGNOSIS — I10 ESSENTIAL HYPERTENSION: ICD-10-CM

## 2024-02-12 DIAGNOSIS — C61 PROSTATE CANCER: ICD-10-CM

## 2024-02-12 DIAGNOSIS — I71.23 ANEURYSM OF DESCENDING THORACIC AORTA WITHOUT RUPTURE: Primary | ICD-10-CM

## 2024-02-12 LAB — COMPLEXED PSA SERPL-MCNC: 6.6 NG/ML (ref 0–4)

## 2024-02-12 PROCEDURE — 36415 COLL VENOUS BLD VENIPUNCTURE: CPT | Performed by: UROLOGY

## 2024-02-12 PROCEDURE — 99212 OFFICE O/P EST SF 10 MIN: CPT | Mod: PBBFAC | Performed by: SURGERY

## 2024-02-12 PROCEDURE — 99999 PR PBB SHADOW E&M-EST. PATIENT-LVL II: CPT | Mod: PBBFAC,,, | Performed by: SURGERY

## 2024-02-12 PROCEDURE — 99214 OFFICE O/P EST MOD 30 MIN: CPT | Mod: S$PBB,,, | Performed by: SURGERY

## 2024-02-12 PROCEDURE — 84153 ASSAY OF PSA TOTAL: CPT | Performed by: UROLOGY

## 2024-02-12 NOTE — PROGRESS NOTES
[facility]    OFFICE NOTE    DATE OF VISIT: 2024  PATIENT NAME: Jovanny Olmedo  : 1935  MRN: [id number]  PRIMARY CARE PHYSICIAN: Miryam Jimenez MD      CHIEF COMPLAINT   Chief Complaint   Patient presents with    Results     Patient to clinic for CTA results.        HISTORY OF PRESENT ILLNESS:  Jovanny Olmedo is a 88 y.o. male who presents to clinic today for follow-up of thoracic aneurysm.  He was previously followed by Dr. Berry.  Has previous abdominal stent graft in place.  He was recently admitted to the hospital with hypertensive urgency.  Vascular surgery consulted for evaluation of thoracic aneurysm.  His blood pressure has been under better control.  He denies any upper thoracic back pain.  He ambulates with assistance of cane but able to do all his ADLs.  I have had the opportunity to review recent CTA chest/abd/pelvis as well as CTA chest from 2023.      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 2023    Arthritis     Back pain     CAD (coronary artery disease)     Cataract     CKD (chronic kidney disease)     GERD (gastroesophageal reflux disease)     Glaucoma     suspect    HTN (hypertension)     Multiple subsegmental pulmonary emboli without acute cor pulmonale 2021    Prostate cancer     tx'd with radiation    PVD (peripheral vascular disease)     stent in right renal artery and aorta    Sleep apnea     Stable angina pectoris 2023       PAST SURGICAL HISTORY:  Past Surgical History:   Procedure Laterality Date    ABDOMINAL AORTA STENT      CARDIAC CATHETERIZATION      CATARACT EXTRACTION W/  INTRAOCULAR LENS IMPLANT Right 2017    COLONOSCOPY N/A  2020    Procedure: COLONOSCOPY;  Surgeon: Shayy Melvin MD;  Location: [facility] ENDO;  Service: Endoscopy;  Laterality: N/A;    COLONOSCOPY N/A 1/10/2022    Procedure: COLONOSCOPY;  Surgeon: Vi Lara MD;  Location: [facility] ENDO;  Service: Endoscopy;  Laterality: N/A;    CORONARY ANGIOPLASTY      CORONARY STENT PLACEMENT      PCIOL Right 2017    DR. LEDEZMA    PCIOL Left 2019    DR. LEDEZMA    PROSTATE BIOPSY      RENAL ARTERY STENT         SOCIAL HISTORY:   Social History     Tobacco Use    Smoking status: Former     Current packs/day: 0.00     Average packs/day: 0.5 packs/day for 30.0 years (15.0 ttl pk-yrs)     Types: Cigarettes     Start date: 1965     Quit date: 1995     Years since quittin.4    Smokeless tobacco: Former   Substance Use Topics    Alcohol use: Not Currently     Alcohol/week: 0.0 standard drinks of alcohol    Drug use: Yes     Frequency: 4.0 times per week     Types: Marijuana       FAMILY HISTORY:  Family History   Problem Relation Age of Onset    Glaucoma Mother     Diabetes Mother     Kidney disease Mother     Colon cancer Father     Cancer Father         colon cancer    Diabetes Sister     Diabetes Brother     Hypertension Brother     Blindness Neg Hx        REVIEW OF SYSTEMS:  Review of Systems   Constitutional: Negative.   HENT: Negative.     Eyes: Negative.    Cardiovascular: Negative.    Respiratory: Negative.     Endocrine: Negative.    Hematologic/Lymphatic: Negative.    Skin: Negative.    Musculoskeletal: Negative.    Gastrointestinal: Negative.    Neurological: Negative.    Psychiatric/Behavioral: Negative.     Allergic/Immunologic: Negative.        PHYSICAL EXAM:  Vitals:    24 0923   BP: 125/79      Physical Exam  Vitals reviewed.   Constitutional:       Appearance: Normal appearance. He is normal weight.   HENT:      Head: Normocephalic and atraumatic.      Nose: Nose normal.   Eyes:      Pupils: Pupils are equal, round, and reactive to  light.   Cardiovascular:      Rate and Rhythm: Normal rate and regular rhythm.      Comments: Feet warm and perfused  Pulmonary:      Effort: Pulmonary effort is normal.      Breath sounds: Normal breath sounds.   Abdominal:      General: Bowel sounds are normal.      Palpations: Abdomen is soft.   Musculoskeletal:         General: Normal range of motion.      Cervical back: Normal range of motion.   Skin:     General: Skin is warm.      Capillary Refill: Capillary refill takes less than 2 seconds.   Neurological:      General: No focal deficit present.      Mental Status: He is alert and oriented to person, place, and time. Mental status is at baseline.   Psychiatric:         Mood and Affect: Mood normal.         Behavior: Behavior normal.         Thought Content: Thought content normal.          VASCULAR LAB STUDIES:    CTA chest reviewed and thoracic aneurysm measures 5.8-5.9 cm in maximal diameter.  This is stable compared to CTA from August of 2023.    ASSESSMENT AND PLAN:  87 y/o male with known thoracic aneurysm.  Measures 5.8-5.9 cm in maximal cross-sectional diameter and stable from August of 2023.  He is a stent graft candidate but this is not without considerable risk of paraplegia given his prior abdominal stent graft.  I believe risk of paralysis would be on the order of 15-20%.  There are some things we could do to help mitigate this risks but we can't mitigate risk down to 0%.  Patient told me he would rather have this rupture than be paralyzed.  I believe we can continue to monitor aneurysm at this size and if it reaches the 6 cm threshold we can have a difference conversation if needed.  Patient is 87 y/o and will be 89 this year.  He admits he is weak and does not ambulate as much as he used to but still able to care for himself.  We had a long discussion and both agreed it is best to keep monitoring at this time.  We will see him back in 6 months with CTA chest.    Hyperlipidemia: stable on home  regimen    Hypertension: stable on home regimen    Joe Montilla  [facility]  Vascular Surgery  [phone] (Clinic Number)

## 2024-02-15 ENCOUNTER — OFFICE VISIT (OUTPATIENT)
Dept: SURGERY | Facility: CLINIC | Age: 89
End: 2024-02-15
Payer: MEDICARE

## 2024-02-15 VITALS
SYSTOLIC BLOOD PRESSURE: 108 MMHG | HEART RATE: 72 BPM | HEIGHT: 72 IN | WEIGHT: 218.69 LBS | TEMPERATURE: 98 F | BODY MASS INDEX: 29.62 KG/M2 | DIASTOLIC BLOOD PRESSURE: 61 MMHG

## 2024-02-15 DIAGNOSIS — L98.9 SKIN LESION OF NECK: ICD-10-CM

## 2024-02-15 DIAGNOSIS — D17.0 LIPOMA OF NECK: Primary | ICD-10-CM

## 2024-02-15 PROCEDURE — 99203 OFFICE O/P NEW LOW 30 MIN: CPT | Mod: S$PBB,,, | Performed by: SURGERY

## 2024-02-15 PROCEDURE — 99215 OFFICE O/P EST HI 40 MIN: CPT | Mod: PBBFAC | Performed by: SURGERY

## 2024-02-15 PROCEDURE — 99999 PR PBB SHADOW E&M-EST. PATIENT-LVL V: CPT | Mod: PBBFAC,,, | Performed by: SURGERY

## 2024-02-15 NOTE — PHYSICIAN QUERY
PT Name: Jovanny Olmedo  MR #: [id number]     DOCUMENTATION CLARIFICATION      CDS/: IRAJ Ponce, RN, CCDS               Contact information: [email or web address]  This form is a permanent document in the medical record.     Query Date: February 15, 2024    By submitting this query, we are merely seeking further clarification of documentation.  Please utilize your independent clinical judgment when addressing the question(s) below.     The Medical Record contains the following:    Clinical Information Location in Medical Record   past medical history of aortic aneurysm, CAD, HTN, glaucoma, CKD and GERD who presented to the ED with elevated blood pressure and headache     presented with chest pain, headache and elevated blood pressures. He had elevated troponin. Cardiology was consulted.     Initially plan LHC but due to aortic aneurysms LHC deferred. NM stress performed and normal perfusion reported.     His medications were adjusted for better blood pressure control. His headache resolved once BP normalized.     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 Pt has abdominal aneurysm  And is being seen by vascular surgery. EKG no changes. Echo shows normal EKG     NSTEMI vs demand ischemia  Discussed cath with pt  Risks and benefits explained  DCS: Dr. Carlton 1/28                                    Cards PN: Dr. Cook 1/25     Please document your best medical  opinion regarding the etiology of _elevated troponin_ _____?       [   ] Elevated blood pressure   [   ] Coronary artery disease of native artery of native heart with stable angina pectoris   [   ] NSTEMI   [   ] Demand ischemia   [  x ] Other etiology (please specify):____________multifactorial_______           Please document in your progress notes daily for the duration of treatment, until resolved, and include in your discharge summary.

## 2024-02-15 NOTE — PROGRESS NOTES
Clinic Note  2/15/2024      Subjective:         Chief Complaint:   HPI  Jovanny Olmedo is a 88 y.o. male with history of prostate cancer.. Here with his son.  He established with Dr. Gurrola in 2013 for prostate cancer. Initially had PSA 11.3, [place] 4+3(GG3). He then completed EBRT with Dr. Deng in 2014 at [facility]. Had PE 1 month ago, now on blood thinners. Still SOB with walking.  PSA had dropped to 0. 7 shortly after completion.  PSA then began to rise in 2019.   PSA  3.6 on 1/11/21.  Axumin 2/3/21 showed 2+ cm focus of uptake in right lobe of prostate.Negative SVI, bladder, nodes.  MRI 7/27/2021- 2.3 cm PI-RADS 4 lesion right apex , negative extra prostatic extension, NVBI (neurovascular bundle involvement), SVI (seminal vesicle involvement), nodes.     Path 12/30/2013- [place] 4+3 right base 2/2 70%, right middle 2/2 80%, right apex 2/2 20%.  Bone scan 4//2022- no metastasis     1/6/2023- last visit 10/15/2021!  Had Covid 2 months ago, still has cough. Stable LUTS.     7/21/2023- PSA 10.9. Patient with BCR (biochemical recurrence) after EBRT for unfavorable intermediate risk prostate cancer in 2014. Has elected observation.  Bone scan 7/11/2023- no metastasis.  LUTS- nocturia 3-4x/noc, significant bother.  Discussed ADT, patient refused. Discussed Flomax. He is interested. Discussed usage and side effects.  Cialis not working, asked for BALTAZAR prescription.    2/16/2024- Patient with BCR (biochemical recurrence) after EBRT who has elected observation.PSA 6.6 (corrected 13.2).  Taking Flomax and Proscar for LUTS. He is not sure which meds he is taking.Nephew just traded from [employer] to [employer].  LUTS- double voids periodically. Hospitalized 2 weeks ago for hypertensive crisis.    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 2023    Arthritis     Back pain     CAD (coronary artery disease)     Cataract     CKD (chronic kidney disease)     GERD (gastroesophageal reflux disease)     Glaucoma     suspect    HTN (hypertension)     Multiple subsegmental pulmonary emboli without acute cor pulmonale 2021    Prostate cancer     tx'd with radiation    PVD (peripheral vascular disease)     stent in right renal artery and aorta    Sleep apnea     Stable angina pectoris 2023     Family History   Problem Relation Age of Onset    Glaucoma Mother     Diabetes Mother     Kidney disease Mother     Colon cancer Father     Cancer Father         colon cancer    Diabetes Sister     Diabetes Brother     Hypertension Brother     Blindness Neg Hx      Social History     Socioeconomic History    Marital status:    Tobacco Use    Smoking status: Former     Current packs/day: 0.00     Average packs/day: 0.5 packs/day for 30.0 years (15.0 ttl pk-yrs)     Types: Cigarettes     Start date: 1965     Quit date: 1995     Years since quittin.4    Smokeless tobacco: Former   Substance and Sexual Activity    Alcohol use: Not Currently     Alcohol/week: 0.0 standard drinks of alcohol    Drug use: Yes     Frequency: 4.0 times per week     Types: Marijuana    Sexual activity: Yes     Partners: Female   Social History Narrative         Social Determinants of Health     Financial Resource Strain: Low Risk  (2024)    Overall Financial Resource Strain (CARDIA)     Difficulty of Paying Living Expenses: Not hard at all   Food Insecurity: No Food Insecurity (2024)     Hunger Vital Sign     Worried About Running Out of Food in the Last Year: Never true     Ran Out of Food in the Last Year: Never true   Transportation Needs: No Transportation Needs (1/27/2024)    PRAPARE - Transportation     Lack of Transportation (Medical): No     Lack of Transportation (Non-Medical): No   Stress: No Stress Concern Present (1/27/2024)    [employer] - Occupational Stress Questionnaire     Feeling of Stress : Not at all   Housing Stability: Low Risk  (1/27/2024)    Housing Stability Vital Sign     Unable to Pay for Housing in the Last Year: No     Number of Places Lived in the Last Year: 1     Unstable Housing in the Last Year: No     Past Surgical History:   Procedure Laterality Date    ABDOMINAL AORTA STENT      CARDIAC CATHETERIZATION      CATARACT EXTRACTION W/  INTRAOCULAR LENS IMPLANT Right 04/29/2017    COLONOSCOPY N/A 9/24/2020    Procedure: COLONOSCOPY;  Surgeon: Shayy Melvin MD;  Location: [facility] ENDO;  Service: Endoscopy;  Laterality: N/A;    COLONOSCOPY N/A 1/10/2022    Procedure: COLONOSCOPY;  Surgeon: Vi Lara MD;  Location: [facility] ENDO;  Service: Endoscopy;  Laterality: N/A;    CORONARY ANGIOPLASTY      CORONARY STENT PLACEMENT      PCIOL Right 03/29/2017    DR. LEDEZMA    PCIOL Left 07/03/2019    DR. LEDEZMA    PROSTATE BIOPSY      RENAL ARTERY STENT       Patient Active Problem List   Diagnosis    Essential hypertension    GERD (gastroesophageal reflux disease)    Polycystic kidney disease    Coronary artery disease, occlusive    Chronic renal impairment, stage 3b    Refractive error    DDD (degenerative disc disease), lumbar    Open angle with borderline findings, low risk, bilateral    History of coronary angioplasty    Prostate cancer    Secondary hyperparathyroidism    Pseudophakia    Intermediate stage nonexudative age-related macular degeneration of both eyes    Aortic atherosclerosis    CLARISSA on CPAP    Duodenal ulcer    History of colon  polyps    Dyslipidemia    Abdominal aortic aneurysm (AAA) without rupture    History of pulmonary embolus (PE)    Thrombocytopenia    Obesity (BMI 30.0-34.9)    Stable angina pectoris    Thoracic back pain    Abnormal ECG    Coronary artery disease of native artery of native heart with stable angina pectoris    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   HENT:  Negative for nosebleeds.    Respiratory:  Negative for chest tightness, shortness of breath and wheezing.    Cardiovascular:  Negative for chest pain, palpitations and leg swelling.   Gastrointestinal:  Negative for abdominal distention, abdominal pain, constipation, diarrhea, nausea and vomiting.   Genitourinary:  Negative for dysuria and hematuria.   Musculoskeletal:  Positive for arthralgias. Negative for back pain.   Skin:  Negative for pallor.   Neurological:  Negative for dizziness, seizures and syncope.   Hematological:  Negative for adenopathy.   Psychiatric/Behavioral:  Negative for dysphoric mood.          Objective:      There were no vitals taken for this visit.  Estimated body mass index is 29.42 kg/m² as calculated from the following:    Height as of 2/5/24: 6' (1.829 m).    Weight as of 2/5/24: 98.4 kg (216 lb 14.9 oz).  Physical Exam      Assessment and Plan:           Problem List Items Addressed This Visit       Prostate cancer - Primary    Overview     Dr mirza            Follow up:   Patient wants to continue current course.  6 months with bone scan, PSA.    Nav Mirza

## 2024-02-15 NOTE — PATIENT INSTRUCTIONS
You have a lipoma or benign fatty tumor.  We do not need to do anything about it unless it begins to cause pain or gets larger.  These 2 things usually go together.      If you have any concerns about your lipoma please let us know.      We will plan to see you back in 6 months    Our office phone numbers are  [phone] and

## 2024-02-15 NOTE — PROGRESS NOTES
Patient ID: Jovanny Olmedo is a 88 y.o. male.    Chief Complaint: Consult (Neck cyst)      HPI:  88-year-old male who noticed a fullness in his left neck compared to his right neck.  He brought this to the attention of his primary provider.  An ultrasound was done.      Surgical consultation was obtained.      The patient states that his neck on the left looks a little for the in his neck on the right.  He has not have any pain or discomfort.      He offers no other complaints      Review of Systems   Constitutional: Negative.    HENT: Negative.          Fullness of the left neck just above the collar bone   Eyes: Negative.    Respiratory: Negative.     Cardiovascular: Negative.    Gastrointestinal: Negative.    Endocrine: Negative.    Genitourinary: Negative.    Musculoskeletal: Negative.    Skin: Negative.    Allergic/Immunologic: Negative.    Neurological: Negative.    Hematological: Negative.    Psychiatric/Behavioral: Negative.         Current Outpatient Medications   Medication Sig Dispense Refill    aspirin (ECOTRIN) 81 MG EC tablet Take 81 mg by mouth once daily.      atorvastatin (LIPITOR) 40 MG tablet Take 1 tablet by mouth once daily 90 tablet 1    azelastine (ASTELIN) 137 mcg (0.1 %) nasal spray USE 2 SPRAY(S) IN EACH NOSTRIL TWICE DAILY 30 mL 11    clotrimazole-betamethasone 1-0.05% (LOTRISONE) cream Apply topically 2 (two) times daily.      ferrous sulfate 325 (65 FE) MG EC tablet Take 325 mg by mouth once daily.      finasteride (PROSCAR) 5 mg tablet Take 1 tablet (5 mg total) by mouth once daily. 30 tablet 0    fluticasone propionate (FLONASE) 50 mcg/actuation nasal spray Use 2 spray(s) in each nostril once daily 16 g 11    lisinopriL (PRINIVIL,ZESTRIL) 40 MG tablet Take 1 tablet (40 mg total) by mouth once daily. 30 tablet 1    meloxicam (MOBIC) 15 MG tablet Take 15 mg by mouth once daily.      metoprolol tartrate (LOPRESSOR) 25 MG tablet Take 1 tablet (25 mg total) by mouth 2 (two) times daily. 60  tablet 1    NIFEdipine (PROCARDIA-XL) 90 MG (OSM) 24 hr tablet Take 1 tablet (90 mg total) by mouth once daily. 30 tablet 3    pantoprazole (PROTONIX) 40 MG tablet Take 1 tablet by mouth once daily 90 tablet 3    tamsulosin (FLOMAX) 0.4 mg Cap Take 1 capsule (0.4 mg total) by mouth once daily. 30 capsule 11     No current facility-administered medications for this visit.       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 8/6/2023    Arthritis     Back pain     CAD (coronary artery disease)     Cataract     CKD (chronic kidney disease)     GERD (gastroesophageal reflux disease)     Glaucoma     suspect    HTN (hypertension)     Multiple subsegmental pulmonary emboli without acute cor pulmonale 9/9/2021    Prostate cancer     tx'd with radiation    PVD (peripheral vascular disease)     stent in right renal artery and aorta    Sleep apnea     Stable angina pectoris 5/16/2023       Past Surgical History:   Procedure Laterality Date    ABDOMINAL AORTA STENT      CARDIAC CATHETERIZATION      CATARACT EXTRACTION W/  INTRAOCULAR LENS IMPLANT Right 04/29/2017    COLONOSCOPY N/A 9/24/2020    Procedure: COLONOSCOPY;  Surgeon: Shayy Melvin MD;  Location: [facility] ENDO;  Service: Endoscopy;  Laterality: N/A;    COLONOSCOPY N/A 1/10/2022    Procedure: COLONOSCOPY;  Surgeon: Vi Lara MD;  Location: [facility] ENDO;  Service: Endoscopy;  Laterality: N/A;    CORONARY ANGIOPLASTY      CORONARY STENT PLACEMENT      PCIOL Right 03/29/2017    DR. LEDEZMA    PCIOL Left 07/03/2019    DR. LEDEZMA    PROSTATE BIOPSY      RENAL ARTERY STENT         Social History     Socioeconomic History    Marital status:    Tobacco Use    Smoking status: Former      Current packs/day: 0.00     Average packs/day: 0.5 packs/day for 30.0 years (15.0 ttl pk-yrs)     Types: Cigarettes     Start date: 1965     Quit date: 1995     Years since quittin.4    Smokeless tobacco: Former   Substance and Sexual Activity    Alcohol use: Not Currently     Alcohol/week: 0.0 standard drinks of alcohol    Drug use: Yes     Frequency: 4.0 times per week     Types: Marijuana    Sexual activity: Yes     Partners: Female   Social History Narrative         Social Determinants of Health     Financial Resource Strain: Low Risk  (2024)    Overall Financial Resource Strain (CARDIA)     Difficulty of Paying Living Expenses: Not hard at all   Food Insecurity: No Food Insecurity (2024)    Hunger Vital Sign     Worried About Running Out of Food in the Last Year: Never true     Ran Out of Food in the Last Year: Never true   Transportation Needs: No Transportation Needs (2024)    PRAPARE - Transportation     Lack of Transportation (Medical): No     Lack of Transportation (Non-Medical): No   Stress: No Stress Concern Present (2024)    [employer] - Occupational Stress Questionnaire     Feeling of Stress : Not at all   Housing Stability: Low Risk  (2024)    Housing Stability Vital Sign     Unable to Pay for Housing in the Last Year: No     Number of Places Lived in the Last Year: 1     Unstable Housing in the Last Year: No       Vitals:    02/15/24 0938   BP: 108/61   Pulse: 72   Temp: 98 °F (36.7 °C)       Physical Exam  Constitutional:       General: He is not in acute distress.     Appearance: He is well-developed.   HENT:      Head: Normocephalic and atraumatic.   Eyes:      General: No scleral icterus.     Pupils: Pupils are equal, round, and reactive to light.   Neck:      Thyroid: No thyromegaly.      Vascular: No JVD.      Trachea: No tracheal deviation.      Comments: There is a soft mobile fullness just above the collar bone  of the left neck  Cardiovascular:      Rate and Rhythm: Normal rate and regular rhythm.      Heart sounds: Normal heart sounds.   Pulmonary:      Effort: Pulmonary effort is normal.      Breath sounds: Normal breath sounds.   Abdominal:      General: Bowel sounds are normal. There is no distension.      Palpations: Abdomen is soft. There is no mass.      Tenderness: There is no abdominal tenderness. There is no guarding or rebound.   Musculoskeletal:         General: Normal range of motion.      Cervical back: Normal range of motion and neck supple.   Lymphadenopathy:      Cervical: No cervical adenopathy.   Skin:     General: Skin is warm and dry.   Neurological:      Mental Status: He is alert and oriented to person, place, and time.   Psychiatric:         Behavior: Behavior normal.         Thought Content: Thought content normal.         Judgment: Judgment normal.       EXAMINATION:  US SOFT TISSUE HEAD NECK THYROID     CLINICAL HISTORY:  .  Localized swelling, mass and lump, neck     TECHNIQUE:  Ultrasound of the thyroid and cervical lymph nodes was performed.     COMPARISON:  Targeted ultrasound of the left supraclavicular region was performed.     FINDINGS:  There is an oval heterogeneous mostly isoechoic lesion in the area of clinical interest in the left supraclavicular space, possibly intramuscular in location.  Lesion measures 4.7 x 1.5 x 3.7 cm.  No significant internal Doppler vascularity is demonstrated.  Considerations include intramuscular hematoma as well as lipoma or other nonspecific mass.  Further evaluation could be obtained with MRI as indicated.     Incidental note is made of several small regional lymph nodes, all subcentimeter in size.     Impression:     As above.        Electronically signed by: CATARINO Puga MD  Date:                                            01/23/2024  Time:                                           10:38  Assessment & Plan:      Lipoma of the left neck.      It was  asymptomatic and I recommended observation.      Follow up in 6 months.  If the lipoma is stable the no other follow up would be recommended.  If the lipomas enlarging or becoming symptomatic we can discuss excision.      Contact information was provided    Patient Active Problem List   Diagnosis    Essential hypertension    GERD (gastroesophageal reflux disease)    Polycystic kidney disease    Coronary artery disease, occlusive    Chronic renal impairment, stage 3b    Refractive error    DDD (degenerative disc disease), lumbar    Open angle with borderline findings, low risk, bilateral    History of coronary angioplasty    Prostate cancer    Secondary hyperparathyroidism    Pseudophakia    Intermediate stage nonexudative age-related macular degeneration of both eyes    Aortic atherosclerosis    CLARISSA on CPAP    Duodenal ulcer    History of colon polyps    Dyslipidemia    Abdominal aortic aneurysm (AAA) without rupture    History of pulmonary embolus (PE)    Thrombocytopenia    Obesity (BMI 30.0-34.9)    Stable angina pectoris    Thoracic back pain    Abnormal ECG    Coronary artery disease of native artery of native heart with stable angina pectoris    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

## 2024-02-16 ENCOUNTER — OFFICE VISIT (OUTPATIENT)
Dept: UROLOGY | Facility: CLINIC | Age: 89
End: 2024-02-16
Payer: MEDICARE

## 2024-02-16 VITALS
RESPIRATION RATE: 14 BRPM | WEIGHT: 220.69 LBS | HEART RATE: 72 BPM | HEIGHT: 73 IN | BODY MASS INDEX: 29.25 KG/M2 | SYSTOLIC BLOOD PRESSURE: 121 MMHG | DIASTOLIC BLOOD PRESSURE: 74 MMHG

## 2024-02-16 DIAGNOSIS — C61 PROSTATE CANCER: Primary | ICD-10-CM

## 2024-02-16 PROCEDURE — 99214 OFFICE O/P EST MOD 30 MIN: CPT | Mod: S$PBB,,, | Performed by: UROLOGY

## 2024-02-16 PROCEDURE — 99999 PR PBB SHADOW E&M-EST. PATIENT-LVL III: CPT | Mod: PBBFAC,,, | Performed by: UROLOGY

## 2024-02-16 PROCEDURE — 99213 OFFICE O/P EST LOW 20 MIN: CPT | Mod: PBBFAC | Performed by: UROLOGY

## 2024-03-11 ENCOUNTER — PATIENT OUTREACH (OUTPATIENT)
Dept: ADMINISTRATIVE | Facility: HOSPITAL | Age: 89
End: 2024-03-11
Payer: MEDICARE

## 2024-03-11 NOTE — PROGRESS NOTES
[facility] CMS chart audits/FLU VACCINE REPORT Chart review completed for HM test overdue (mammograms, Colonoscopies, pap smears, DM labs, and/or EYE EXAMs)      Care Everywhere and media, updates requested and reviewed.    No documentation of flu shot.    If the patient does not wish to have a Flu vaccine, please document in the office visit notes.

## 2024-03-12 ENCOUNTER — TELEPHONE (OUTPATIENT)
Dept: CARDIOLOGY | Facility: HOSPITAL | Age: 89
End: 2024-03-12
Payer: MEDICARE

## 2024-03-12 ENCOUNTER — PATIENT MESSAGE (OUTPATIENT)
Dept: CARDIOLOGY | Facility: CLINIC | Age: 89
End: 2024-03-12
Payer: MEDICARE

## 2024-03-21 ENCOUNTER — OFFICE VISIT (OUTPATIENT)
Dept: INTERNAL MEDICINE | Facility: CLINIC | Age: 89
End: 2024-03-21
Payer: MEDICARE

## 2024-03-21 ENCOUNTER — HOSPITAL ENCOUNTER (OUTPATIENT)
Dept: RADIOLOGY | Facility: HOSPITAL | Age: 89
Discharge: HOME | End: 2024-03-21
Attending: FAMILY MEDICINE
Payer: MEDICARE

## 2024-03-21 ENCOUNTER — TELEPHONE (OUTPATIENT)
Dept: INTERNAL MEDICINE | Facility: CLINIC | Age: 89
End: 2024-03-21

## 2024-03-21 VITALS
BODY MASS INDEX: 29.04 KG/M2 | RESPIRATION RATE: 18 BRPM | WEIGHT: 219.13 LBS | HEART RATE: 74 BPM | OXYGEN SATURATION: 96 % | SYSTOLIC BLOOD PRESSURE: 108 MMHG | DIASTOLIC BLOOD PRESSURE: 72 MMHG | HEIGHT: 73 IN

## 2024-03-21 DIAGNOSIS — I25.10 CORONARY ARTERY DISEASE, OCCLUSIVE: Chronic | ICD-10-CM

## 2024-03-21 DIAGNOSIS — Z98.61 HISTORY OF CORONARY ANGIOPLASTY: Chronic | ICD-10-CM

## 2024-03-21 DIAGNOSIS — N25.81 SECONDARY HYPERPARATHYROIDISM: ICD-10-CM

## 2024-03-21 DIAGNOSIS — D69.6 THROMBOCYTOPENIA: ICD-10-CM

## 2024-03-21 DIAGNOSIS — I71.43 INFRARENAL ABDOMINAL AORTIC ANEURYSM (AAA) WITHOUT RUPTURE: ICD-10-CM

## 2024-03-21 DIAGNOSIS — R94.6 ABNORMAL RESULTS OF THYROID FUNCTION STUDIES: ICD-10-CM

## 2024-03-21 DIAGNOSIS — I25.118 CORONARY ARTERY DISEASE OF NATIVE ARTERY OF NATIVE HEART WITH STABLE ANGINA PECTORIS: ICD-10-CM

## 2024-03-21 DIAGNOSIS — I71.23 ANEURYSM OF DESCENDING THORACIC AORTA WITHOUT RUPTURE: ICD-10-CM

## 2024-03-21 DIAGNOSIS — Z86.711 HISTORY OF PULMONARY EMBOLUS (PE): ICD-10-CM

## 2024-03-21 DIAGNOSIS — R51.9 ACUTE INTRACTABLE HEADACHE, UNSPECIFIED HEADACHE TYPE: Primary | ICD-10-CM

## 2024-03-21 DIAGNOSIS — R51.9 ACUTE INTRACTABLE HEADACHE, UNSPECIFIED HEADACHE TYPE: ICD-10-CM

## 2024-03-21 DIAGNOSIS — I70.0 AORTIC ATHEROSCLEROSIS: ICD-10-CM

## 2024-03-21 DIAGNOSIS — I10 ESSENTIAL HYPERTENSION: Chronic | ICD-10-CM

## 2024-03-21 DIAGNOSIS — C61 PROSTATE CANCER: ICD-10-CM

## 2024-03-21 DIAGNOSIS — R79.89 ABNORMAL TSH: ICD-10-CM

## 2024-03-21 DIAGNOSIS — E78.5 DYSLIPIDEMIA: Chronic | ICD-10-CM

## 2024-03-21 DIAGNOSIS — N18.32 CHRONIC RENAL IMPAIRMENT, STAGE 3B: ICD-10-CM

## 2024-03-21 LAB
ANION GAP SERPL CALC-SCNC: 9 MMOL/L (ref 8–16)
BUN SERPL-MCNC: 22 MG/DL (ref 8–23)
CALCIUM SERPL-MCNC: 9.5 MG/DL (ref 8.7–10.5)
CHLORIDE SERPL-SCNC: 109 MMOL/L (ref 95–110)
CO2 SERPL-SCNC: 24 MMOL/L (ref 23–29)
CREAT SERPL-MCNC: 1.5 MG/DL (ref 0.5–1.4)
EST. GFR  (NO RACE VARIABLE): 44.8 ML/MIN/1.73 M^2
GLUCOSE SERPL-MCNC: 88 MG/DL (ref 70–110)
POTASSIUM SERPL-SCNC: 3.9 MMOL/L (ref 3.5–5.1)
PTH-INTACT SERPL-MCNC: 134.9 PG/ML (ref 9–77)
SODIUM SERPL-SCNC: 142 MMOL/L (ref 136–145)

## 2024-03-21 PROCEDURE — 80048 BASIC METABOLIC PNL TOTAL CA: CPT | Performed by: FAMILY MEDICINE

## 2024-03-21 PROCEDURE — 99214 OFFICE O/P EST MOD 30 MIN: CPT | Mod: S$PBB,,, | Performed by: FAMILY MEDICINE

## 2024-03-21 PROCEDURE — 84443 ASSAY THYROID STIM HORMONE: CPT | Performed by: FAMILY MEDICINE

## 2024-03-21 PROCEDURE — 70220 X-RAY EXAM OF SINUSES: CPT | Mod: TC

## 2024-03-21 PROCEDURE — 99214 OFFICE O/P EST MOD 30 MIN: CPT | Mod: PBBFAC,25 | Performed by: FAMILY MEDICINE

## 2024-03-21 PROCEDURE — 99999 PR PBB SHADOW E&M-EST. PATIENT-LVL IV: CPT | Mod: PBBFAC,,, | Performed by: FAMILY MEDICINE

## 2024-03-21 PROCEDURE — 70220 X-RAY EXAM OF SINUSES: CPT | Mod: 26,,, | Performed by: RADIOLOGY

## 2024-03-21 PROCEDURE — 83970 ASSAY OF PARATHORMONE: CPT | Performed by: FAMILY MEDICINE

## 2024-03-21 RX ORDER — NIFEDIPINE 60 MG/1
60 TABLET, EXTENDED RELEASE ORAL DAILY
COMMUNITY
Start: 2024-03-15 | End: 2024-04-17 | Stop reason: SDUPTHER

## 2024-03-21 RX ORDER — BUTALBITAL, ACETAMINOPHEN AND CAFFEINE 50; 325; 40 MG/1; MG/1; MG/1
1 TABLET ORAL
Qty: 30 TABLET | Refills: 0 | Status: SHIPPED | OUTPATIENT
Start: 2024-03-21 | End: 2024-04-20

## 2024-03-21 NOTE — PROGRESS NOTES
Subjective:       Patient ID: Jovanny Olmedo is a 88 y.o. male.    Chief Complaint: Follow-up and Headache (Due to Unknown Medication)    88-year-old  male patient with Patient Active Problem List:     Essential hypertension     GERD (gastroesophageal reflux disease)     Polycystic kidney disease     Coronary artery disease, occlusive     Chronic renal impairment, stage 3b     Refractive error     DDD (degenerative disc disease), lumbar     Open angle with borderline findings, low risk, bilateral     History of coronary angioplasty     Prostate cancer     Secondary hyperparathyroidism     Pseudophakia     Intermediate stage nonexudative age-related macular degeneration of both eyes     Aortic atherosclerosis     CLARISSA on CPAP     Duodenal ulcer     History of colon polyps     Dyslipidemia     Abdominal aortic aneurysm (AAA) without rupture     History of pulmonary embolus (PE)     Thrombocytopenia     Obesity (BMI 30.0-34.9)     Stable angina pectoris     Thoracic back pain     Abnormal ECG     Coronary artery disease of native artery of native heart with stable angina pectoris     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   Denies of any chest pain or difficulty breathing with palpitations and reports dry skin to the forehead, reports fatigue and having sleep disturbances lately      Review of Systems   Constitutional:  Negative for appetite change and fatigue.   HENT:  Positive for congestion. Negative for postnasal drip, rhinorrhea and sinus pressure.    Eyes:  Negative for visual disturbance.   Respiratory:  Negative for shortness of breath.    Cardiovascular:  Negative for chest pain, palpitations and leg swelling.   Gastrointestinal:   "Negative for abdominal pain, nausea and vomiting.   Musculoskeletal:  Negative for myalgias.   Skin:  Negative for rash.   Neurological:  Positive for headaches.   Psychiatric/Behavioral:  Positive for sleep disturbance.          /72 (BP Location: Right arm, Patient Position: Sitting, BP Method: Large (Manual))   Pulse 74   Resp 18   Ht 6' 1" (1.854 m)   Wt 99.4 kg (219 lb 2.2 oz)   SpO2 96%   BMI 28.91 kg/m²   Objective:      Physical Exam  Constitutional:       Appearance: He is well-developed.   HENT:      Head: Normocephalic and atraumatic.   Cardiovascular:      Rate and Rhythm: Normal rate and regular rhythm.      Heart sounds: Normal heart sounds. No murmur heard.  Pulmonary:      Effort: Pulmonary effort is normal.      Breath sounds: Normal breath sounds. No wheezing.   Abdominal:      General: Bowel sounds are normal.      Palpations: Abdomen is soft.      Tenderness: There is no abdominal tenderness.   Skin:     General: Skin is warm and dry.      Findings: No rash.   Neurological:      Mental Status: He is alert and oriented to person, place, and time.   Psychiatric:         Mood and Affect: Mood normal.           Assessment/Plan:   1. Acute intractable headache, unspecified headache type  -     X-Ray Sinuses 3 or more views; Future; Expected date: 03/21/2024  -     TSH; Future; Expected date: 03/21/2024  -     Basic Metabolic Panel; Future; Expected date: 03/21/2024  Will get x-ray of the sinuses for further evaluation and will check further labs  If normal sinus x-ray will consider treating with Fioricet    2. Essential hypertension  -     Basic Metabolic Panel; Future; Expected date: 03/21/2024  Blood pressure is stable currently on lisinopril 40 mg metoprolol 25 mg twice daily and Procardia 60 mg daily    3. Coronary artery disease, occlusive  4. History of coronary angioplasty  5. 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 Coronary artery disease of native artery of native " heart with stable angina pectoris    Followed by Cardiology and vascular    7. Thrombocytopenia  Stable and asymptomatic    8. History of pulmonary embolus (PE)  Overview:  Last Assessment & Plan:   Formatting of this note might be different from the original.  History & Physical   Patient was on heparin but after discussions with vascular surgery transitioned patient to [facility]  Discharge Summary  84 y/o M with PMH CAD, CKD (baseline Cr 1.7-1.8), CLARISSA on CPAP, htn, and known AAA who presented to [facility] with dyspnea and chest pain (described as dull in the left upper chest with radiation down the arm) that started the night prior and admits to progressive dyspnea on exertion that had been ongoing for the month prior. Work up revealed segmental PEs and descending thoracic aortic dissection, prompting transfer to [facility] for further care.  Patient was admitted to the ICU on a heparin drip with CCMS and vascular consult.  He otherwise remained hemodynamically stable.  It was found that his aneurysm/dissection was actually chronic and that he is followed outpatient by Dr. Berry.  Vascular surgery did not recommend intervention and echocardiogram read: EF 55 to 65% with mildly dilated right ventricle. Patient was considered stable for transfer out of ICU on 9/10 and was able to be weaned off supplemental O2 and transitioned to eliquis for discharge.  Follow-up  9. Infrarenal abdominal aortic aneurysm (AAA) without rupture  Followed by vascular currently taking aspirin    10. Dyslipidemia  Stable on Lipitor 40 mg daily    11. Aortic atherosclerosis  Continue aspirin and statins    12. Prostate cancer  Overview:  Dr stokes  Followed by urology    13. Chronic renal impairment, stage 3b  -     Basic Metabolic Panel; Future; Expected date: 03/21/2024  Encouraged to drink adequate fluids and avoid over-the-counter NSAIDs    14. Abnormal TSH  -     TSH; Future; Expected date: 03/21/2024  15. Abnormal results of thyroid  function studies  -     TSH; Future; Expected date: 03/21/2024  Reviewed recent labs showing abnormal thyroid, will plan to repeat labs, if continues to have abnormal thyroid labs will consider starting on low dose of thyroid medication    16. Secondary hyperparathyroidism  -     PTH, Intact; Future; Expected date: 03/21/2024  Stable and asymptomatic

## 2024-03-22 LAB — TSH SERPL DL<=0.005 MIU/L-ACNC: 3.08 UIU/ML (ref 0.4–4)

## 2024-03-28 DIAGNOSIS — I10 ESSENTIAL HYPERTENSION: Primary | ICD-10-CM

## 2024-03-28 RX ORDER — LISINOPRIL 40 MG/1
40 TABLET ORAL DAILY
Qty: 30 TABLET | Refills: 1 | Status: SHIPPED | OUTPATIENT
Start: 2024-03-28 | End: 2024-05-29

## 2024-03-28 NOTE — TELEPHONE ENCOUNTER
----- Message from Shaan Dawkins sent at 3/28/2024 11:06 AM CDT -----  Contact: sindy/daughter  Type:  RX Refill Request    Who Called: sindy  Refill or New Rx:refill  RX Name and Strength:lisinopriL (PRINIVIL,ZESTRIL) 40 MG tablet   How is the patient currently taking it? (ex. 1XDay):unknown  Is this a 30 day or 90 day RX:unknown  Preferred Pharmacy with phone number:  [facility] [address] [address]  Phone: [phone] Fax: [phone]  Local or Mail Order:local  Ordering Provider:  Would the patient rather a call back or a response via [facility]? unknown  Best Call Back Number:[phone]  Additional Information:

## 2024-03-28 NOTE — TELEPHONE ENCOUNTER
No care due was identified.  [facility] Embedded Care Due Messages. Reference number: [id number].   3/28/2024 11:10:52 AM CDT

## 2024-03-29 ENCOUNTER — NURSE TRIAGE (OUTPATIENT)
Dept: ADMINISTRATIVE | Facility: CLINIC | Age: 89
End: 2024-03-29
Payer: MEDICARE

## 2024-03-29 RX ORDER — METOPROLOL TARTRATE 25 MG/1
25 TABLET, FILM COATED ORAL 2 TIMES DAILY
Qty: 60 TABLET | Refills: 0 | Status: SHIPPED | OUTPATIENT
Start: 2024-03-29 | End: 2024-04-23

## 2024-03-29 NOTE — TELEPHONE ENCOUNTER
Pt's EC, Mimi, reports pt had requested his BP meds to be refilled, but only one was sent in to his pharmacy, pt is needing the Lopressor, he is completley out. Call placed to  ON Call MD, Dr. Lux, who authorized for a 30 day supply to be called into pt's pharmacy. Ms. Le informed and encouraged to call back with any worsening symptoms or questions. She verbalized understanding.    Reason for Disposition   [1] Prescription refill request for ESSENTIAL medicine (i.e., likelihood of harm to patient if not taken) AND [2] triager unable to refill per department policy    Protocols used: Medication Refill and Renewal Call-A-

## 2024-04-03 ENCOUNTER — PATIENT MESSAGE (OUTPATIENT)
Dept: PULMONOLOGY | Facility: CLINIC | Age: 89
End: 2024-04-03
Payer: MEDICARE

## 2024-04-17 ENCOUNTER — OFFICE VISIT (OUTPATIENT)
Dept: CARDIOLOGY | Facility: CLINIC | Age: 89
End: 2024-04-17
Payer: MEDICARE

## 2024-04-17 VITALS
DIASTOLIC BLOOD PRESSURE: 78 MMHG | HEIGHT: 73 IN | WEIGHT: 216.94 LBS | OXYGEN SATURATION: 96 % | SYSTOLIC BLOOD PRESSURE: 110 MMHG | HEART RATE: 72 BPM | BODY MASS INDEX: 28.75 KG/M2

## 2024-04-17 DIAGNOSIS — I71.23 ANEURYSM OF DESCENDING THORACIC AORTA WITHOUT RUPTURE: ICD-10-CM

## 2024-04-17 DIAGNOSIS — I20.89 STABLE ANGINA PECTORIS: ICD-10-CM

## 2024-04-17 DIAGNOSIS — E78.5 DYSLIPIDEMIA: Chronic | ICD-10-CM

## 2024-04-17 DIAGNOSIS — I71.43 INFRARENAL ABDOMINAL AORTIC ANEURYSM (AAA) WITHOUT RUPTURE: ICD-10-CM

## 2024-04-17 DIAGNOSIS — G47.33 OSA ON CPAP: ICD-10-CM

## 2024-04-17 DIAGNOSIS — I25.118 CORONARY ARTERY DISEASE OF NATIVE ARTERY OF NATIVE HEART WITH STABLE ANGINA PECTORIS: ICD-10-CM

## 2024-04-17 DIAGNOSIS — I10 ESSENTIAL HYPERTENSION: ICD-10-CM

## 2024-04-17 DIAGNOSIS — I70.0 AORTIC ATHEROSCLEROSIS: Primary | ICD-10-CM

## 2024-04-17 PROCEDURE — G2211 COMPLEX E/M VISIT ADD ON: HCPCS | Mod: S$PBB,,, | Performed by: INTERNAL MEDICINE

## 2024-04-17 PROCEDURE — 99214 OFFICE O/P EST MOD 30 MIN: CPT | Mod: S$PBB,,, | Performed by: INTERNAL MEDICINE

## 2024-04-17 PROCEDURE — 99214 OFFICE O/P EST MOD 30 MIN: CPT | Mod: PBBFAC | Performed by: INTERNAL MEDICINE

## 2024-04-17 PROCEDURE — 99999 PR PBB SHADOW E&M-EST. PATIENT-LVL IV: CPT | Mod: PBBFAC,,, | Performed by: INTERNAL MEDICINE

## 2024-04-17 RX ORDER — NIFEDIPINE 60 MG/1
60 TABLET, EXTENDED RELEASE ORAL NIGHTLY
Qty: 90 TABLET | Refills: 1 | Status: SHIPPED | OUTPATIENT
Start: 2024-04-17 | End: 2024-05-16

## 2024-04-17 RX ORDER — NITROGLYCERIN 0.4 MG/1
0.4 TABLET SUBLINGUAL EVERY 5 MIN PRN
Qty: 30 TABLET | Refills: 0 | Status: SHIPPED | OUTPATIENT
Start: 2024-04-17 | End: 2025-04-17

## 2024-04-17 RX ORDER — TRAMADOL HYDROCHLORIDE 50 MG/1
50 TABLET ORAL DAILY PRN
COMMUNITY
Start: 2024-02-15

## 2024-04-17 NOTE — PROGRESS NOTES
Subjective:   Patient ID:  Jovanny Olmedo is a 88 y.o. male who presents for cardiac consult of Chest Pain and Medication Refill (Nifedipine )      Referral by: No referring provider defined for this encounter.     Reason for consult:       HPI  The patient came in today for cardiac consult of Chest Pain and Medication Refill (Nifedipine )      Jovanny Olmedo is a 88 y.o. male     Jan 2024 HPI:   The patient is an 87 yo male with past medical history of aortic aneurysm, CAD, HTN, glaucoma, CKD and GERD who presented to the ED with elevated blood pressure and headache. He reports he was at [facility] last night as he had an episode of chest pain. His chest pain was substernal. It subsided. He had elevated cardiac enzymes and the plan was to transfer him to facility with cardiology. He left AMA as he wanted to be seen by cardiologist. He is currently chest pain free. He reports he has a pounding headache. Cardiology, CT surgery, hospital medicine and vascular surgery consulted. Patient placed in observation.    Hospital Course:   The patient presented with chest pain, headache and elevated blood pressures. He had elevated troponin. Cardiology was consulted. Initially plan [facility] but due to aortic aneurysms LHC deferred. NM stress performed and normal perfusion reported. His medications were adjusted for better blood pressure control. His headache resolved once BP normalized. Patient instructed to call his cardiologist if his BP becomes elevated so medications can be adjusted. He and his family verbalized understanding. Patient seen and examined, stable for discharge.  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 CTA abdomen/pelvis obtain. Plan outpatient follow-up.     4/17/24  Pt follow up from Jan hosp stay for CP - aortic aneurysm noted.     Vascular: There is aneurysmal dilatation seen of the distal descending thoracic aorta with mural plaque along the wall. Aneurysm measures 6.3 x 6.2 cm in  diameter. Patient is status post stent graft repair abdominal aortic aneurysm. No extra vascular contrast identified no endoleak. Stent is patent without significant narrowing. There is a patent stent related to the proximal left renal artery as well. There is atherosclerosis related to the right renal artery and celiac artery and superior mesenteric artery. Inferior mesenteric artery is not identified. Abdominal aortic aneurysm sac appears essentially stable. 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     ECHO and nuclear stress neg 1/2024.     BP and HR stable. He has occ atypical CP.     Results for orders placed during the hospital encounter of 01/24/24    Echo    Interpretation Summary    Left Ventricle: The left ventricle is normal in size. Normal wall thickness. There is concentric remodeling. Normal wall motion. There is normal systolic function with a visually estimated ejection fraction of 60 - 65%. There is normal diastolic function.    Right Ventricle: Normal right ventricular cavity size. Wall thickness is normal. Right ventricle wall motion  is normal. Systolic function is normal.    Pulmonary Artery: The estimated pulmonary artery systolic pressure is 24 mmHg.    IVC/SVC: Normal venous pressure at 3 mmHg.      Results for orders placed during the hospital encounter of 01/24/24    Nuclear Stress - Cardiology Interpreted    Interpretation Summary    Normal myocardial perfusion scan. There is no evidence of myocardial ischemia or infarction.    There is a  mild to moderate intensity fixed perfusion abnormality in the inferior wall of the left ventricle secondary to diaphragm attenuation.    The gated perfusion images showed an ejection fraction of 64% at rest. The gated perfusion images showed an ejection fraction of 66% post stress. Normal ejection fraction is greater than 59%.    There is normal wall motion at rest and post stress.    LV cavity size is normal at rest and normal at  stress.    The ECG portion of the study is negative for ischemia.      No results found for this or any previous visit.      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 2023    Arthritis     Back pain     CAD (coronary artery disease)     Cataract     CKD (chronic kidney disease)     GERD (gastroesophageal reflux disease)     Glaucoma     suspect    HTN (hypertension)     Multiple subsegmental pulmonary emboli without acute cor pulmonale 2021    Prostate cancer     tx'd with radiation    PVD (peripheral vascular disease)     stent in right renal artery and aorta    Sleep apnea     Stable angina pectoris 2023       Past Surgical History:   Procedure Laterality Date    ABDOMINAL AORTA STENT      CARDIAC CATHETERIZATION      CATARACT EXTRACTION W/  INTRAOCULAR LENS IMPLANT Right 2017    COLONOSCOPY N/A 2020    Procedure: COLONOSCOPY;  Surgeon: Shayy Melvin MD;  Location: [facility] ENDO;  Service: Endoscopy;  Laterality: N/A;    COLONOSCOPY N/A 1/10/2022    Procedure: COLONOSCOPY;  Surgeon: Vi Lara MD;  Location: [facility] ENDO;  Service: Endoscopy;  Laterality: N/A;    CORONARY ANGIOPLASTY      CORONARY STENT PLACEMENT      PCIOL Right 2017    DR. LEDEZMA    PCIOL Left 2019    DR. LEDEZMA    PROSTATE BIOPSY      RENAL ARTERY STENT         Social History     Tobacco Use    Smoking status: Former     Current packs/day: 0.00     Average packs/day: 0.5 packs/day for 30.0 years (15.0 ttl pk-yrs)     Types: Cigarettes     Start date: 1965     Quit date: 1995     Years since quittin.6    Smokeless tobacco: Former   Substance Use Topics    Alcohol use: Not Currently     Alcohol/week: 0.0 standard drinks of alcohol    Drug use: Yes     Frequency: 4.0 times per week      Types: Marijuana       Family History   Problem Relation Name Age of Onset    Glaucoma Mother      Diabetes Mother      Kidney disease Mother      Colon cancer Father      Cancer Father          colon cancer    Diabetes Sister      Diabetes Brother      Hypertension Brother      Blindness Neg Hx         Patient's Medications   New Prescriptions    NITROGLYCERIN (NITROSTAT) 0.4 MG SL TABLET    Place 1 tablet (0.4 mg total) under the tongue every 5 (five) minutes as needed for Chest pain.   Previous Medications    ASPIRIN (ECOTRIN) 81 MG EC TABLET    Take 81 mg by mouth once daily.    ATORVASTATIN (LIPITOR) 40 MG TABLET    Take 1 tablet by mouth once daily    AZELASTINE (ASTELIN) 137 MCG (0.1 %) NASAL SPRAY    USE 2 SPRAY(S) IN EACH NOSTRIL TWICE DAILY    BUTALBITAL-ACETAMINOPHEN-CAFFEINE -40 MG (FIORICET, ESGIC) -40 MG PER TABLET    Take 1 tablet by mouth after meals as needed for Headaches.    FERROUS SULFATE 325 (65 FE) MG EC TABLET    Take 325 mg by mouth once daily.    FINASTERIDE (PROSCAR) 5 MG TABLET    Take 1 tablet (5 mg total) by mouth once daily.    FLUTICASONE PROPIONATE (FLONASE) 50 MCG/ACTUATION NASAL SPRAY    Use 2 spray(s) in each nostril once daily    LISINOPRIL (PRINIVIL,ZESTRIL) 40 MG TABLET    Take 1 tablet (40 mg total) by mouth once daily.    MELOXICAM (MOBIC) 15 MG TABLET    Take 15 mg by mouth once daily.    METOPROLOL TARTRATE (LOPRESSOR) 25 MG TABLET    Take 1 tablet (25 mg total) by mouth 2 (two) times daily.    PANTOPRAZOLE (PROTONIX) 40 MG TABLET    Take 1 tablet by mouth once daily    TAMSULOSIN (FLOMAX) 0.4 MG CAP    Take 1 capsule (0.4 mg total) by mouth once daily.    TRAMADOL (ULTRAM) 50 MG TABLET    Take 50 mg by mouth daily as needed.   Modified Medications    Modified Medication Previous Medication    NIFEDIPINE (PROCARDIA-XL) 60 MG (OSM) 24 HR TABLET NIFEdipine (PROCARDIA-XL) 60 MG (OSM) 24 hr tablet       Take 1 tablet (60 mg total) by mouth every evening.    Take 60  "mg by mouth once daily.   Discontinued Medications    No medications on file       Review of Systems   Constitutional: Negative.    HENT: Negative.     Eyes: Negative.    Respiratory: Negative.     Cardiovascular:  Positive for chest pain.   Gastrointestinal: Negative.    Genitourinary: Negative.    Musculoskeletal: Negative.    Skin: Negative.    Neurological: Negative.    Endo/Heme/Allergies: Negative.    Psychiatric/Behavioral: Negative.     All 12 systems otherwise negative.      Wt Readings from Last 3 Encounters:   04/17/24 98.4 kg (216 lb 14.9 oz)   03/21/24 99.4 kg (219 lb 2.2 oz)   02/16/24 100.1 kg (220 lb 10.9 oz)     Temp Readings from Last 3 Encounters:   02/15/24 98 °F (36.7 °C)   01/28/24 97.6 °F (36.4 °C) (Oral)   12/21/23 97.9 °F (36.6 °C) (Tympanic)     BP Readings from Last 3 Encounters:   04/17/24 110/78   03/21/24 108/72   02/16/24 121/74     Pulse Readings from Last 3 Encounters:   04/17/24 72   03/21/24 74   02/16/24 72       /78 (BP Location: Right arm, Patient Position: Sitting, BP Method: Medium (Manual))   Pulse 72   Ht 6' 1" (1.854 m)   Wt 98.4 kg (216 lb 14.9 oz)   SpO2 96%   BMI 28.62 kg/m²     Objective:   Physical Exam  Vitals and nursing note reviewed.   Constitutional:       General: He is not in acute distress.     Appearance: He is well-developed. He is not diaphoretic.   HENT:      Head: Normocephalic and atraumatic.      Nose: Nose normal.   Eyes:      General: No scleral icterus.     Conjunctiva/sclera: Conjunctivae normal.   Neck:      Thyroid: No thyromegaly.      Vascular: No JVD.   Cardiovascular:      Rate and Rhythm: Normal rate and regular rhythm.      Heart sounds: S1 normal and S2 normal. No murmur heard.     No friction rub. No gallop. No S3 or S4 sounds.   Pulmonary:      Effort: Pulmonary effort is normal. No respiratory distress.      Breath sounds: Normal breath sounds. No stridor. No wheezing or rales.   Chest:      Chest wall: No tenderness. "   Abdominal:      General: Bowel sounds are normal. There is no distension.      Palpations: Abdomen is soft. There is no mass.      Tenderness: There is no abdominal tenderness. There is no rebound.   Genitourinary:     Comments: Deferred  Musculoskeletal:         General: No tenderness or deformity. Normal range of motion.      Cervical back: Normal range of motion and neck supple.   Lymphadenopathy:      Cervical: No cervical adenopathy.   Skin:     General: Skin is warm and dry.      Coloration: Skin is not pale.      Findings: No erythema or rash.   Neurological:      Mental Status: He is alert and oriented to person, place, and time.      Motor: No abnormal muscle tone.      Coordination: Coordination normal.   Psychiatric:         Behavior: Behavior normal.         Thought Content: Thought content normal.         Judgment: Judgment normal.         Lab Results   Component Value Date     03/21/2024    K 3.9 03/21/2024     03/21/2024    CO2 24 03/21/2024    BUN 22 03/21/2024    CREATININE 1.5 (H) 03/21/2024    GLU 88 03/21/2024    MG 1.7 01/27/2024    AST 24 02/05/2024    ALT 32 02/05/2024    ALBUMIN 3.9 02/05/2024    PROT 7.5 02/05/2024    BILITOT 0.6 02/05/2024    WBC 7.79 02/05/2024    HGB 14.8 02/05/2024    HCT 46.0 02/05/2024    MCV 95 02/05/2024     (L) 02/05/2024    INR 1.1 01/24/2024    TSH 3.077 03/21/2024    CHOL 219 (H) 02/05/2024    HDL 46 02/05/2024    LDLCALC 145.2 02/05/2024    TRIG 139 02/05/2024         INR (no units)   Date Value   01/24/2024 1.1   01/01/2014 1.1   01/16/2009 1.0          Assessment:      1. Aortic atherosclerosis    2. 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 Infrarenal abdominal aortic aneurysm (AAA) without rupture    4. Essential hypertension    5. CLARISSA on CPAP    6. Coronary artery disease of native artery of native heart with stable angina pectoris    7. Stable angina pectoris    8. Dyslipidemia        Plan:     CAD, stable   - cont  asa, statin, BB  -ECHO and nuclear stress neg 1/2024.   - start PRN NTG     2. HTN  - titrate meds    3. AAA status post stent graft repair abdominal aortic aneurysm., aortic atheros  - cont statin, BB  - f/u vasc surg - will f/u with Dr. Montilla    4. CLARISSA  - cont CPAP    5. HLD  - cont statin    Visit today included increased complexity associated with the care of the episodic problem CAD addressed and managing the longitudinal care of the patient due to the serious and/or complex managed problem(s) .        Thank you for allowing me to participate in this patient's care. Please do not hesitate to contact me with any questions or concerns. Consult note has been forwarded to the referral physician.

## 2024-04-22 NOTE — TELEPHONE ENCOUNTER
No care due was identified.  [facility] Embedded Care Due Messages. Reference number: [id number].   4/22/2024 9:40:44 AM CDT

## 2024-04-23 RX ORDER — METOPROLOL TARTRATE 25 MG/1
25 TABLET, FILM COATED ORAL 2 TIMES DAILY
Qty: 180 TABLET | Refills: 1 | Status: SHIPPED | OUTPATIENT
Start: 2024-04-23 | End: 2024-05-16

## 2024-04-23 NOTE — TELEPHONE ENCOUNTER
Refill Routing Note   Medication(s) are not appropriate for processing by [facility] for the following reason(s):        No active prescription written by provider    [facility] action(s):  Defer               Appointments  past 12m or future 3m with PCP    Date Provider   Last Visit   3/21/2024 Miryam Jimenez MD   Next Visit   Visit date not found Miryam Jimenez MD   ED visits in past 90 days: 0        Note composed:4:49 AM 04/23/2024

## 2024-05-16 ENCOUNTER — OFFICE VISIT (OUTPATIENT)
Dept: CARDIOLOGY | Facility: CLINIC | Age: 89
End: 2024-05-16
Payer: MEDICARE

## 2024-05-16 VITALS
OXYGEN SATURATION: 96 % | DIASTOLIC BLOOD PRESSURE: 80 MMHG | BODY MASS INDEX: 29.54 KG/M2 | WEIGHT: 222.88 LBS | HEART RATE: 76 BPM | HEIGHT: 73 IN | SYSTOLIC BLOOD PRESSURE: 120 MMHG

## 2024-05-16 DIAGNOSIS — Z98.61 HISTORY OF CORONARY ANGIOPLASTY: ICD-10-CM

## 2024-05-16 DIAGNOSIS — N18.32 CHRONIC RENAL IMPAIRMENT, STAGE 3B: ICD-10-CM

## 2024-05-16 DIAGNOSIS — G47.33 OSA ON CPAP: ICD-10-CM

## 2024-05-16 DIAGNOSIS — I70.0 AORTIC ATHEROSCLEROSIS: Primary | ICD-10-CM

## 2024-05-16 DIAGNOSIS — I71.23 ANEURYSM OF DESCENDING THORACIC AORTA WITHOUT RUPTURE: ICD-10-CM

## 2024-05-16 DIAGNOSIS — E78.5 DYSLIPIDEMIA: ICD-10-CM

## 2024-05-16 DIAGNOSIS — I20.89 STABLE ANGINA PECTORIS: ICD-10-CM

## 2024-05-16 DIAGNOSIS — I10 ESSENTIAL HYPERTENSION: ICD-10-CM

## 2024-05-16 DIAGNOSIS — I25.118 CORONARY ARTERY DISEASE OF NATIVE ARTERY OF NATIVE HEART WITH STABLE ANGINA PECTORIS: ICD-10-CM

## 2024-05-16 DIAGNOSIS — I71.43 INFRARENAL ABDOMINAL AORTIC ANEURYSM (AAA) WITHOUT RUPTURE: ICD-10-CM

## 2024-05-16 PROCEDURE — 99999 PR PBB SHADOW E&M-EST. PATIENT-LVL III: CPT | Mod: PBBFAC,,, | Performed by: STUDENT IN AN ORGANIZED HEALTH CARE EDUCATION/TRAINING PROGRAM

## 2024-05-16 PROCEDURE — 99214 OFFICE O/P EST MOD 30 MIN: CPT | Mod: S$PBB,,, | Performed by: STUDENT IN AN ORGANIZED HEALTH CARE EDUCATION/TRAINING PROGRAM

## 2024-05-16 PROCEDURE — 99213 OFFICE O/P EST LOW 20 MIN: CPT | Mod: PBBFAC | Performed by: STUDENT IN AN ORGANIZED HEALTH CARE EDUCATION/TRAINING PROGRAM

## 2024-05-16 RX ORDER — CARVEDILOL 12.5 MG/1
12.5 TABLET ORAL 2 TIMES DAILY WITH MEALS
Qty: 180 TABLET | Refills: 1 | Status: SHIPPED | OUTPATIENT
Start: 2024-05-16 | End: 2025-05-16

## 2024-05-16 NOTE — PROGRESS NOTES
Section of Cardiology                  Cardiac Clinic Note      HPI:   Jovanny Olmedo is a 88 y.o. male       Jan 2024 HPI:   The patient is an 89 yo male with past medical history of aortic aneurysm, CAD, HTN, glaucoma, CKD and GERD who presented to the ED with elevated blood pressure and headache. He reports he was at [facility] last night as he had an episode of chest pain. His chest pain was substernal. It subsided. He had elevated cardiac enzymes and the plan was to transfer him to facility with cardiology. He left AMA as he wanted to be seen by cardiologist. He is currently chest pain free. He reports he has a pounding headache. Cardiology, CT surgery, hospital medicine and vascular surgery consulted. Patient placed in observation.    Hospital Course:   The patient presented with chest pain, headache and elevated blood pressures. He had elevated troponin. Cardiology was consulted. Initially plan LHC but due to aortic aneurysms LHC deferred. NM stress performed and normal perfusion reported. His medications were adjusted for better blood pressure control. His headache resolved once BP normalized. Patient instructed to call his cardiologist if his BP becomes elevated so medications can be adjusted. He and his family verbalized understanding. Patient seen and examined, stable for discharge.  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 CTA abdomen/pelvis obtain. Plan outpatient follow-up.     4/17/24  Pt follow up from Jan hosp stay for CP - aortic aneurysm noted.     Vascular: There is aneurysmal dilatation seen of the distal descending thoracic aorta with mural plaque along the wall. Aneurysm measures 6.3 x 6.2 cm in diameter. Patient is status post stent graft repair abdominal aortic aneurysm. No extra vascular contrast identified no endoleak. Stent is patent without significant narrowing. There is a patent stent related to the proximal left renal artery as well. There is  atherosclerosis related to the right renal artery and celiac artery and superior mesenteric artery. Inferior mesenteric artery is not identified. Abdominal aortic aneurysm sac appears essentially stable. 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     ECHO and nuclear stress neg 2024.     BP and HR stable. He has occ atypical CP.         24  Wants to switch providers  Will be seeing vascular in August to re-evaluate descending thoracic aortic aneurysm- reports order brother  of abdominal aortic rupture in   Has intermittent chest pain, self-limiting  Fairly active at home, but sits a lot   Applies ankle and feet swelling bilateral    Shortness of breath, chest pain      EKG 2024 sinus rhythm, PACs          Results for orders placed during the hospital encounter of 24    Echo    Interpretation Summary    Left Ventricle: The left ventricle is normal in size. Normal wall thickness. There is concentric remodeling. Normal wall motion. There is normal systolic function with a visually estimated ejection fraction of 60 - 65%. There is normal diastolic function.    Right Ventricle: Normal right ventricular cavity size. Wall thickness is normal. Right ventricle wall motion  is normal. Systolic function is normal.    Pulmonary Artery: The estimated pulmonary artery systolic pressure is 24 mmHg.    IVC/SVC: Normal venous pressure at 3 mmHg.      Results for orders placed during the hospital encounter of 24    Nuclear Stress - Cardiology Interpreted    Interpretation Summary    Normal myocardial perfusion scan. There is no evidence of myocardial ischemia or infarction.    There is a  mild to moderate intensity fixed perfusion abnormality in the inferior wall of the left ventricle secondary to diaphragm attenuation.    The gated perfusion images showed an ejection fraction of 64% at rest. The gated perfusion images showed an ejection fraction of 66% post stress. Normal ejection  fraction is greater than 59%.    There is normal wall motion at rest and post stress.    LV cavity size is normal at rest and normal at stress.    The ECG portion of the study is negative for ischemia.        EKG NSR, no acute ST - T wave changes    ECHO  Results for orders placed during the hospital encounter of 01/24/24    Echo    Interpretation Summary    Left Ventricle: The left ventricle is normal in size. Normal wall thickness. There is concentric remodeling. Normal wall motion. There is normal systolic function with a visually estimated ejection fraction of 60 - 65%. There is normal diastolic function.    Right Ventricle: Normal right ventricular cavity size. Wall thickness is normal. Right ventricle wall motion  is normal. Systolic function is normal.    Pulmonary Artery: The estimated pulmonary artery systolic pressure is 24 mmHg.    IVC/SVC: Normal venous pressure at 3 mmHg.       STRESS TEST Results for orders placed during the hospital encounter of 01/24/24    Nuclear Stress - Cardiology Interpreted    Interpretation Summary    Normal myocardial perfusion scan. There is no evidence of myocardial ischemia or infarction.    There is a  mild to moderate intensity fixed perfusion abnormality in the inferior wall of the left ventricle secondary to diaphragm attenuation.    The gated perfusion images showed an ejection fraction of 64% at rest. The gated perfusion images showed an ejection fraction of 66% post stress. Normal ejection fraction is greater than 59%.    There is normal wall motion at rest and post stress.    LV cavity size is normal at rest and normal at stress.    The ECG portion of the study is negative for ischemia.       [facility] No results found for this or any previous visit.            ROS: All 10 systems reviewed. Please refer to the HPI for pertinent positives. All other systems negative.     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 2023    Arthritis     Back pain     CAD (coronary artery disease)     Cataract     CKD (chronic kidney disease)     GERD (gastroesophageal reflux disease)     Glaucoma     suspect    HTN (hypertension)     Multiple subsegmental pulmonary emboli without acute cor pulmonale 2021    Prostate cancer     tx'd with radiation    PVD (peripheral vascular disease)     stent in right renal artery and aorta    Sleep apnea     Stable angina pectoris 2023       Surgical History  Past Surgical History:   Procedure Laterality Date    ABDOMINAL AORTA STENT      CARDIAC CATHETERIZATION      CATARACT EXTRACTION W/  INTRAOCULAR LENS IMPLANT Right 2017    COLONOSCOPY N/A 2020    Procedure: COLONOSCOPY;  Surgeon: Shayy Melvin MD;  Location: [facility] ENDO;  Service: Endoscopy;  Laterality: N/A;    COLONOSCOPY N/A 1/10/2022    Procedure: COLONOSCOPY;  Surgeon: Vi Lara MD;  Location: [facility] ENDO;  Service: Endoscopy;  Laterality: N/A;    CORONARY ANGIOPLASTY      CORONARY STENT PLACEMENT      PCIOL Right 2017    DR. LEDEZMA    PCIOL Left 2019    DR. LEDEZMA    PROSTATE BIOPSY      RENAL ARTERY STENT            Allergies:   Review of patient's allergies indicates:   Allergen Reactions    Codeine Other (See Comments)     When taking codeine, pt becomes very anxious       Social History:  Social History     Socioeconomic History    Marital status:    Tobacco Use    Smoking status: Former     Current packs/day: 0.00     Average packs/day: 0.5 packs/day for 30.0 years (15.0 ttl pk-yrs)     Types: Cigarettes     Start date: 1965     Quit date: 1995     Years since quittin.6    Smokeless tobacco: Former   Substance and Sexual Activity    Alcohol use: Not Currently     Alcohol/week: 0.0 standard drinks of alcohol    Drug use: Yes     Frequency: 4.0 times per week     Types:  Marijuana    Sexual activity: Yes     Partners: Female   Social History Narrative         Social Determinants of Health     Financial Resource Strain: Low Risk  (1/27/2024)    Overall Financial Resource Strain (CARDIA)     Difficulty of Paying Living Expenses: Not hard at all   Food Insecurity: No Food Insecurity (1/27/2024)    Hunger Vital Sign     Worried About Running Out of Food in the Last Year: Never true     Ran Out of Food in the Last Year: Never true   Transportation Needs: No Transportation Needs (1/27/2024)    PRAPARE - Transportation     Lack of Transportation (Medical): No     Lack of Transportation (Non-Medical): No   Stress: No Stress Concern Present (1/27/2024)    [employer] - Occupational Stress Questionnaire     Feeling of Stress : Not at all   Housing Stability: Low Risk  (1/27/2024)    Housing Stability Vital Sign     Unable to Pay for Housing in the Last Year: No     Number of Places Lived in the Last Year: 1     Unstable Housing in the Last Year: No       Family History:  family history includes Cancer in his father; Colon cancer in his father; Diabetes in his brother, mother, and sister; Glaucoma in his mother; Hypertension in his brother; Kidney disease in his mother.    Home Medications:  Current Outpatient Medications on File Prior to Visit   Medication Sig Dispense Refill    aspirin (ECOTRIN) 81 MG EC tablet Take 81 mg by mouth once daily.      atorvastatin (LIPITOR) 40 MG tablet Take 1 tablet by mouth once daily 90 tablet 1    azelastine (ASTELIN) 137 mcg (0.1 %) nasal spray USE 2 SPRAY(S) IN EACH NOSTRIL TWICE DAILY 30 mL 11    ferrous sulfate 325 (65 FE) MG EC tablet Take 325 mg by mouth once daily.      finasteride (PROSCAR) 5 mg tablet Take 1 tablet (5 mg total) by mouth once daily. 30 tablet 0    fluticasone propionate (FLONASE) 50 mcg/actuation nasal spray Use 2 spray(s) in each nostril once daily 16 g 11    lisinopriL (PRINIVIL,ZESTRIL) 40 MG  "tablet Take 1 tablet (40 mg total) by mouth once daily. 30 tablet 1    meloxicam (MOBIC) 15 MG tablet Take 15 mg by mouth once daily.      nitroGLYCERIN (NITROSTAT) 0.4 MG SL tablet Place 1 tablet (0.4 mg total) under the tongue every 5 (five) minutes as needed for Chest pain. 30 tablet 0    pantoprazole (PROTONIX) 40 MG tablet Take 1 tablet by mouth once daily 90 tablet 3    tamsulosin (FLOMAX) 0.4 mg Cap Take 1 capsule (0.4 mg total) by mouth once daily. 30 capsule 11    traMADoL (ULTRAM) 50 mg tablet Take 50 mg by mouth daily as needed.      [DISCONTINUED] metoprolol tartrate (LOPRESSOR) 25 MG tablet Take 1 tablet by mouth twice daily 180 tablet 1    [DISCONTINUED] NIFEdipine (PROCARDIA-XL) 60 MG (OSM) 24 hr tablet Take 1 tablet (60 mg total) by mouth every evening. 90 tablet 1     No current facility-administered medications on file prior to visit.       Physical exam:  /80 (BP Location: Right arm, Patient Position: Sitting, BP Method: Medium (Manual))   Pulse 76   Ht 6' 1" (1.854 m)   Wt 101.1 kg (222 lb 14.2 oz)   SpO2 96%   BMI 29.41 kg/m²         General: Pt is a 88 y.o. year old male who is AAOx3, in NAD, is pleasant, well nourished, looks stated age  HEENT: PERRL, EOMI, Oral mucosa pink & moist  CVS  No abnormal cardiac pulsations noted on inspection. JVP not raised. The apical impulse is normal on palpation, and is located in the left 5th intercostal space in the mid - clavicular line. No palpable thrills or abnormal pulsations noted. RR, S1 - S2 heard, no murmurs, rubs or gallops appreciated.   PUL : CTA B/L. No wheezes/crackles heard   ABD : BS +, soft. No tenderness elicited   LE : No C/C/E. Distal Pulses palpable B/L         LABS:    Chemistry:   Lab Results   Component Value Date     03/21/2024    K 3.9 03/21/2024     03/21/2024    CO2 24 03/21/2024    BUN 22 03/21/2024    CREATININE 1.5 (H) 03/21/2024    CALCIUM 9.5 03/21/2024     Cardiac Markers:   Lab Results   Component " Value Date    TROPONINI 0.010 02/05/2024     Cardiac Markers (Last 3):   Lab Results   Component Value Date    TROPONINI 0.010 02/05/2024    TROPONINI 0.047 (H) 01/24/2024    TROPONINI 0.064 (H) 01/24/2024     CBC:   Lab Results   Component Value Date    WBC 7.79 02/05/2024    HGB 14.8 02/05/2024    HCT 46.0 02/05/2024    MCV 95 02/05/2024     (L) 02/05/2024     Lipids:   Lab Results   Component Value Date    CHOL 219 (H) 02/05/2024    TRIG 139 02/05/2024    HDL 46 02/05/2024     Coagulation:   Lab Results   Component Value Date    INR 1.1 01/24/2024    APTT 31.5 01/24/2024           Assessment        1. Aortic atherosclerosis    2. 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 Essential hypertension    4. CLARISSA on CPAP    5. Coronary artery disease of native artery of native heart with stable angina pectoris    6. Infrarenal abdominal aortic aneurysm (AAA) without rupture    7. Stable angina pectoris    8. Dyslipidemia    9. History of coronary angioplasty    10. Chronic renal impairment, stage 3b         Plan:      CAD  Stable intermittent atypical chest pain  cont asa, statin, BB  ECHO and nuclear stress neg 1/2024    HTN  Stable   Continue lisinopril  Stop nifedipine 60 (due to ankle and feet swelling), Lopressor 25 bid  Start Coreg 12.5 mg b.i.d.  If blood pressure gets too high, can restart nifedipine at 30 mg daily    CKD   Stable    AAA status post stent graft repair abdominal aortic aneurysm  Has descending aortic aneurysm of 6 cm  Patient concerned about undergoing procedure and being paralyzed  aortic atherosclerosis and atherosclerosis of the abdominal arteries  cont statin, BB  f/u vasc surg - seen Dr. Montilla 2/24, will see Dr. Martinez 8/24    CLARISSA  cont CPAP    HLD   as of 2/24  cont statin  Will consider adding zetia next visit     Low salt, low fat diet  Exercise as tolerated, at least 30 min daily     This note was prepared using voice recognition system and is likely to have  sound alike errors that may have been overlooked even after proofreading.     I have reviewed all pertinent chart information.  Plans and recommendations have been formulated under my direct supervision. All questions answered and patient voiced understanding.   If symptoms persist go to the ED.    [facility] in [address] [place], [place] [address]nino Aquino MD  Cardiology

## 2024-05-16 NOTE — PATIENT INSTRUCTIONS
Put the procardia and metoprolol to the side. Start carvedilol. If blood pressure goes high at home, take just half of the procardia daily.

## 2024-05-22 DIAGNOSIS — E78.5 DYSLIPIDEMIA: Chronic | ICD-10-CM

## 2024-05-22 RX ORDER — ATORVASTATIN CALCIUM 40 MG/1
40 TABLET, FILM COATED ORAL DAILY
Qty: 90 TABLET | Refills: 2 | Status: SHIPPED | OUTPATIENT
Start: 2024-05-22

## 2024-05-22 NOTE — TELEPHONE ENCOUNTER
No care due was identified.  Health [facility] Embedded Care Due Messages. Reference number: [id number].   5/22/2024 4:37:11 PM CDT

## 2024-05-23 NOTE — TELEPHONE ENCOUNTER
Refill Decision Note   Jovanny Olmedo  is requesting a refill authorization.  Brief Assessment and Rationale for Refill:  Approve     Medication Therapy Plan:         Comments:     Note composed:9:43 PM 05/22/2024

## 2024-05-29 DIAGNOSIS — I10 ESSENTIAL HYPERTENSION: ICD-10-CM

## 2024-05-29 RX ORDER — LISINOPRIL 40 MG/1
40 TABLET ORAL
Qty: 90 TABLET | Refills: 1 | Status: SHIPPED | OUTPATIENT
Start: 2024-05-29

## 2024-05-29 NOTE — TELEPHONE ENCOUNTER
Refill Routing Note   Medication(s) are not appropriate for processing by [facility] for the following reason(s):        New or recently adjusted medication  Required labs abnormal    [facility] action(s):  Defer             Appointments  past 12m or future 3m with PCP    Date Provider   Last Visit   3/21/2024 Miryam Jimenez MD   Next Visit   Visit date not found Miryam Jimenez MD   ED visits in past 90 days: 0        Note composed:10:32 AM 05/29/2024

## 2024-05-29 NOTE — TELEPHONE ENCOUNTER
No care due was identified.  Health [facility] Embedded Care Due Messages. Reference number: [id number].   5/29/2024 10:15:21 AM CDT

## 2024-06-12 DIAGNOSIS — J30.9 ALLERGIC RHINITIS, UNSPECIFIED SEASONALITY, UNSPECIFIED TRIGGER: ICD-10-CM

## 2024-06-17 RX ORDER — AZELASTINE 1 MG/ML
SPRAY, METERED NASAL
Qty: 30 ML | Refills: 3 | Status: SHIPPED | OUTPATIENT
Start: 2024-06-17

## 2024-06-17 RX ORDER — FLUTICASONE PROPIONATE 50 MCG
SPRAY, SUSPENSION (ML) NASAL
Qty: 16 G | Refills: 3 | Status: SHIPPED | OUTPATIENT
Start: 2024-06-17

## 2024-07-03 DIAGNOSIS — Z71.89 COMPLEX CARE COORDINATION: ICD-10-CM

## 2024-07-29 DIAGNOSIS — K21.9 GASTROESOPHAGEAL REFLUX DISEASE, UNSPECIFIED WHETHER ESOPHAGITIS PRESENT: ICD-10-CM

## 2024-07-29 NOTE — TELEPHONE ENCOUNTER
No care due was identified.  Health [facility] Embedded Care Due Messages. Reference number: [id number].   7/29/2024 6:15:09 PM CDT

## 2024-07-30 RX ORDER — PANTOPRAZOLE SODIUM 40 MG/1
TABLET, DELAYED RELEASE ORAL
Qty: 90 TABLET | Refills: 2 | Status: SHIPPED | OUTPATIENT
Start: 2024-07-30

## 2024-07-30 NOTE — TELEPHONE ENCOUNTER
Refill Decision Note   Jovanny Olmedo  is requesting a refill authorization.  Brief Assessment and Rationale for Refill:  Approve     Medication Therapy Plan:        Comments:     Note composed:11:17 AM 07/30/2024

## 2024-08-09 RX ORDER — TAMSULOSIN HYDROCHLORIDE 0.4 MG/1
1 CAPSULE ORAL
Qty: 30 CAPSULE | Refills: 0 | Status: SHIPPED | OUTPATIENT
Start: 2024-08-09

## 2024-08-12 ENCOUNTER — TELEPHONE (OUTPATIENT)
Dept: SURGERY | Facility: CLINIC | Age: 89
End: 2024-08-12
Payer: MEDICARE

## 2024-08-12 NOTE — TELEPHONE ENCOUNTER
Called patient to reschedule appointment on 8/15 with Dr. Kinney.  Spoke with aide and she said to reschedule appointment to following Thursday 8/22. She is aware of date time and location.

## 2024-08-15 ENCOUNTER — TELEPHONE (OUTPATIENT)
Dept: UROLOGY | Facility: CLINIC | Age: 89
End: 2024-08-15
Payer: MEDICARE

## 2024-08-15 NOTE — TELEPHONE ENCOUNTER
Returned call to pts sister; he did not show up for his NM scan; I offered to reschedule but she stated that she would call back as he had multiple appts that needed to be rescheduled.

## 2024-08-19 ENCOUNTER — TELEPHONE (OUTPATIENT)
Dept: UROLOGY | Facility: CLINIC | Age: 89
End: 2024-08-19
Payer: MEDICARE

## 2024-09-03 ENCOUNTER — HOSPITAL ENCOUNTER (OUTPATIENT)
Dept: RADIOLOGY | Facility: HOSPITAL | Age: 89
Discharge: HOME | End: 2024-09-03
Attending: UROLOGY
Payer: MEDICARE

## 2024-09-03 DIAGNOSIS — C61 PROSTATE CANCER: ICD-10-CM

## 2024-09-03 PROCEDURE — 78306 BONE IMAGING WHOLE BODY: CPT | Mod: TC

## 2024-09-03 PROCEDURE — A9503 TC99M MEDRONATE: HCPCS | Performed by: UROLOGY

## 2024-09-03 PROCEDURE — 78306 BONE IMAGING WHOLE BODY: CPT | Mod: 26,,, | Performed by: RADIOLOGY

## 2024-09-03 RX ADMIN — TC 99M MEDRONATE 25 MILLICURIE: 20 INJECTION, POWDER, LYOPHILIZED, FOR SOLUTION INTRAVENOUS at 09:09

## 2024-09-04 NOTE — PROGRESS NOTES
Clinic Note  9/4/2024      Subjective:         Chief Complaint:   HPI  Jovanny Olmedo is a 88 y.o. male with history of prostate cancer.. Here with his son.  He established with Dr. Gurrola in 2013 for prostate cancer. Initially had PSA 11.3, Breanne 4+3(GG3). He then completed EBRT with Dr. Deng in 2014 at [facility]. Had PE 1 month ago, now on blood thinners. Still SOB with walking.  PSA had dropped to 0. 7 shortly after completion.  PSA then began to rise in 2019.   PSA  3.6 on 1/11/21.  Axumin 2/3/21 showed 2+ cm focus of uptake in right lobe of prostate.Negative SVI, bladder, nodes.  MRI 7/27/2021- 2.3 cm PI-RADS 4 lesion right apex , negative extra prostatic extension, NVBI (neurovascular bundle involvement), SVI (seminal vesicle involvement), nodes.     Path 12/30/2013- Breanne 4+3 right base 2/2 70%, right middle 2/2 80%, right apex 2/2 20%.  Bone scan 4//2022- no metastasis     1/6/2023- last visit 10/15/2021!  Had Covid 2 months ago, still has cough. Stable LUTS.     7/21/2023- PSA 10.9. Patient with BCR (biochemical recurrence) after EBRT for unfavorable intermediate risk prostate cancer in 2014. Has elected observation.  Bone scan 7/11/2023- no metastasis.  LUTS- nocturia 3-4x/noc, significant bother.  Discussed ADT, patient refused. Discussed Flomax. He is interested. Discussed usage and side effects.  Cialis not working, asked for BALTAZAR prescription.     2/16/2024- Patient with BCR (biochemical recurrence) after EBRT who has elected observation.PSA 6.6 (corrected 13.2).  Taking Flomax and Proscar for LUTS. He is not sure which meds he is taking.Nephew just traded from [employer] to [employer].  LUTS- double voids periodically. Hospitalized 2 weeks ago for hypertensive crisis.     9/6/2024- PSA 14.9 (corrected 29.8).  Bone scan- no bony metastatic disease.  LUTS-nocturia 3-4x/noc, urge incontinence, no pads.    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 2023    Arthritis     Back pain     CAD (coronary artery disease)     Cataract     CKD (chronic kidney disease)     GERD (gastroesophageal reflux disease)     Glaucoma     suspect    HTN (hypertension)     Multiple subsegmental pulmonary emboli without acute cor pulmonale 2021    Prostate cancer     tx'd with radiation    PVD (peripheral vascular disease)     stent in right renal artery and aorta    Sleep apnea     Stable angina pectoris 2023     Family History   Problem Relation Name Age of Onset    Glaucoma Mother      Diabetes Mother      Kidney disease Mother      Colon cancer Father      Cancer Father          colon cancer    Diabetes Sister      Diabetes Brother      Hypertension Brother      Blindness Neg Hx       Social History     Socioeconomic History    Marital status:    Tobacco Use    Smoking status: Former     Current packs/day: 0.00     Average packs/day: 0.5 packs/day for 30.0 years (15.0 ttl pk-yrs)     Types: Cigarettes     Start date: 1965     Quit date: 1995     Years since quittin.9    Smokeless tobacco: Former   Substance and Sexual Activity    Alcohol use: Not Currently     Alcohol/week: 0.0 standard drinks of alcohol    Drug use: Yes     Frequency: 4.0 times per week     Types: Marijuana    Sexual activity: Yes     Partners: Female   Social History Narrative         Social Determinants of Health     Financial Resource Strain: Low Risk  (2024)     Overall Financial Resource Strain (CARDIA)     Difficulty of Paying Living Expenses: Not hard at all   Food Insecurity: No Food Insecurity (1/27/2024)    Hunger Vital Sign     Worried About Running Out of Food in the Last Year: Never true     Ran Out of Food in the Last Year: Never true   Transportation Needs: No Transportation Needs (1/27/2024)    PRAPARE - Transportation     Lack of Transportation (Medical): No     Lack of Transportation (Non-Medical): No   Stress: No Stress Concern Present (1/27/2024)    [employer] - Occupational Stress Questionnaire     Feeling of Stress : Not at all   Housing Stability: Low Risk  (1/27/2024)    Housing Stability Vital Sign     Unable to Pay for Housing in the Last Year: No     Number of Places Lived in the Last Year: 1     Unstable Housing in the Last Year: No     Past Surgical History:   Procedure Laterality Date    ABDOMINAL AORTA STENT      CARDIAC CATHETERIZATION      CATARACT EXTRACTION W/  INTRAOCULAR LENS IMPLANT Right 04/29/2017    COLONOSCOPY N/A 9/24/2020    Procedure: COLONOSCOPY;  Surgeon: Shayy Melvin MD;  Location: [facility] ENDO;  Service: Endoscopy;  Laterality: N/A;    COLONOSCOPY N/A 1/10/2022    Procedure: COLONOSCOPY;  Surgeon: Vi Lara MD;  Location: [facility] ENDO;  Service: Endoscopy;  Laterality: N/A;    CORONARY ANGIOPLASTY      CORONARY STENT PLACEMENT      PCIOL Right 03/29/2017    DR. LEDEZMA    PCIOL Left 07/03/2019    DR. LEDEZMA    PROSTATE BIOPSY      RENAL ARTERY STENT       Patient Active Problem List   Diagnosis    Essential hypertension    GERD (gastroesophageal reflux disease)    Polycystic kidney disease    Coronary artery disease, occlusive    Chronic renal impairment, stage 3b    Refractive error    DDD (degenerative disc disease), lumbar    Open angle with borderline findings, low risk, bilateral    History of coronary angioplasty    Prostate cancer    Secondary hyperparathyroidism    Pseudophakia     "Intermediate stage nonexudative age-related macular degeneration of both eyes    Aortic atherosclerosis    CLARISSA on CPAP    Duodenal ulcer    History of colon polyps    Dyslipidemia    Abdominal aortic aneurysm (AAA) without rupture    History of pulmonary embolus (PE)    Thrombocytopenia    Obesity (BMI 30.0-34.9)    Stable angina pectoris    Thoracic back pain    Abnormal ECG    Coronary artery disease of native artery of native heart with stable angina pectoris    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  Estimated body mass index is 29.41 kg/m² as calculated from the following:    Height as of 5/16/24: 6' 1" (1.854 m).    Weight as of 5/16/24: 101.1 kg (222 lb 14.2 oz).  Physical Exam      Assessment and Plan:           Problem List Items Addressed This Visit       Prostate cancer - Primary    Overview     Dr mirza            Follow up:   Discussed ADT vs observation. Patient interested in ADT. Order and prior auth placed.  Ditropan 5 mg.  6 months with PSA.  Letter to Dr. Jimenez.  Nav Mirza          "

## 2024-09-06 ENCOUNTER — OFFICE VISIT (OUTPATIENT)
Dept: UROLOGY | Facility: CLINIC | Age: 89
End: 2024-09-06
Payer: MEDICARE

## 2024-09-06 VITALS
SYSTOLIC BLOOD PRESSURE: 125 MMHG | HEIGHT: 73 IN | BODY MASS INDEX: 29.41 KG/M2 | DIASTOLIC BLOOD PRESSURE: 77 MMHG | HEART RATE: 85 BPM

## 2024-09-06 DIAGNOSIS — C61 PROSTATE CANCER: Primary | ICD-10-CM

## 2024-09-06 PROCEDURE — 99213 OFFICE O/P EST LOW 20 MIN: CPT | Mod: PBBFAC | Performed by: UROLOGY

## 2024-09-06 PROCEDURE — 99999 PR PBB SHADOW E&M-EST. PATIENT-LVL III: CPT | Mod: PBBFAC,,, | Performed by: UROLOGY

## 2024-09-06 RX ORDER — OXYBUTYNIN CHLORIDE 5 MG/1
5 TABLET, EXTENDED RELEASE ORAL DAILY
Qty: 30 TABLET | Refills: 11 | Status: SHIPPED | OUTPATIENT
Start: 2024-09-06 | End: 2025-09-06

## 2024-09-06 NOTE — LETTER
September 6, 2024        Miryam Jimenez MD  [id number] [facility]  [place] [id number]             0[facility] access hospital Urology  [address] [place] [address] [address] [place] [address]  Phone: [phone]  Fax: [phone]   Patient: Jovanny Olmedo   MR Number: [id number]   YOB: 1935   Date of Visit: 9/6/2024       Dear Dr. Jimenez:    Thank you for referring Jovanny Olmedo to me for evaluation. Attached you will find relevant portions of my assessment and plan of care.    If you have questions, please do not hesitate to call me. I look forward to following Jovanny Olmedo along with you.    Sincerely,      Nav Mirza MD            CC    No Recipients    Enclosure

## 2024-09-12 RX ORDER — TAMSULOSIN HYDROCHLORIDE 0.4 MG/1
1 CAPSULE ORAL
Qty: 30 CAPSULE | Refills: 0 | Status: SHIPPED | OUTPATIENT
Start: 2024-09-12

## 2024-09-27 ENCOUNTER — HOSPITAL ENCOUNTER (EMERGENCY)
Facility: HOSPITAL | Age: 89
Discharge: HOME | End: 2024-09-27
Attending: EMERGENCY MEDICINE
Payer: MEDICARE

## 2024-09-27 VITALS
OXYGEN SATURATION: 97 % | TEMPERATURE: 98 F | HEART RATE: 80 BPM | SYSTOLIC BLOOD PRESSURE: 152 MMHG | HEIGHT: 73 IN | WEIGHT: 215.63 LBS | RESPIRATION RATE: 17 BRPM | DIASTOLIC BLOOD PRESSURE: 88 MMHG | BODY MASS INDEX: 28.58 KG/M2

## 2024-09-27 DIAGNOSIS — R09.81 NASAL CONGESTION: ICD-10-CM

## 2024-09-27 DIAGNOSIS — R05.9 COUGH: ICD-10-CM

## 2024-09-27 DIAGNOSIS — R09.89 CHEST CONGESTION: Primary | ICD-10-CM

## 2024-09-27 LAB
INFLUENZA A, MOLECULAR: NEGATIVE
INFLUENZA B, MOLECULAR: NEGATIVE
SARS-COV-2 RDRP RESP QL NAA+PROBE: NEGATIVE
SPECIMEN SOURCE: NORMAL

## 2024-09-27 PROCEDURE — 87502 INFLUENZA DNA AMP PROBE: CPT | Performed by: NURSE PRACTITIONER

## 2024-09-27 PROCEDURE — U0002 COVID-19 LAB TEST NON-CDC: HCPCS | Performed by: NURSE PRACTITIONER

## 2024-09-27 PROCEDURE — 99284 EMERGENCY DEPT VISIT MOD MDM: CPT | Mod: 25

## 2024-09-27 RX ORDER — FLUTICASONE PROPIONATE 50 MCG
2 SPRAY, SUSPENSION (ML) NASAL DAILY
Qty: 9.9 ML | Refills: 0 | Status: SHIPPED | OUTPATIENT
Start: 2024-09-27 | End: 2024-10-27

## 2024-09-27 RX ORDER — PROMETHAZINE HYDROCHLORIDE AND DEXTROMETHORPHAN HYDROBROMIDE 6.25; 15 MG/5ML; MG/5ML
5 SYRUP ORAL EVERY 4 HOURS PRN
Qty: 150 ML | Refills: 0 | Status: SHIPPED | OUTPATIENT
Start: 2024-09-27 | End: 2024-10-02

## 2024-09-27 RX ORDER — CETIRIZINE HYDROCHLORIDE 10 MG/1
5 TABLET ORAL DAILY
Qty: 15 TABLET | Refills: 0 | Status: SHIPPED | OUTPATIENT
Start: 2024-09-27 | End: 2024-10-27

## 2024-09-27 RX ORDER — AMOXICILLIN AND CLAVULANATE POTASSIUM 875; 125 MG/1; MG/1
1 TABLET, FILM COATED ORAL 2 TIMES DAILY
Qty: 14 TABLET | Refills: 0 | Status: SHIPPED | OUTPATIENT
Start: 2024-09-27 | End: 2024-10-04

## 2024-09-28 ENCOUNTER — PATIENT OUTREACH (OUTPATIENT)
Dept: EMERGENCY MEDICINE | Facility: HOSPITAL | Age: 89
End: 2024-09-28

## 2024-09-28 NOTE — ED PROVIDER NOTES
Encounter Date: 9/27/2024       History     Chief Complaint   Patient presents with    Nasal Congestion    Chest Congestion    Cough     Head and chest congestion x 2 weeks, productive cough.      Patient presents to ER for cough, onset 2 weeks ago.  Associated symptoms include nasal/chest congestion and shortness of breath.  Symptoms have been intermittent since onset.  He has tried nothing for his symptoms.  Denies any known ill contacts.  He denies chest pain, fever, chills, generalized body aches, abdominal pain, nausea, vomiting, diarrhea, weakness, fatigue.    The history is provided by the patient.     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    Arthritis     Back pain     CAD (coronary artery disease)     Cataract     CKD (chronic kidney disease)     GERD (gastroesophageal reflux disease)     Glaucoma     suspect    HTN (hypertension)     Multiple subsegmental pulmonary emboli without acute cor pulmonale 9/9/2021    Prostate cancer     tx'd with radiation    PVD (peripheral vascular disease)     stent in right renal artery and aorta    Sleep apnea     Stable angina pectoris 5/16/2023     Past Surgical History:   Procedure Laterality Date    ABDOMINAL AORTA STENT      CARDIAC CATHETERIZATION      CATARACT EXTRACTION W/  INTRAOCULAR LENS IMPLANT Right 04/29/2017    COLONOSCOPY N/A 9/24/2020    Procedure: COLONOSCOPY;  Surgeon: Shayy Melvin MD;  Location: [facility] ENDO;  Service: Endoscopy;  Laterality: N/A;    COLONOSCOPY N/A 1/10/2022    Procedure: COLONOSCOPY;  Surgeon: Vi Lara MD;  Location: [facility] ENDO;  Service: Endoscopy;  Laterality: N/A;    CORONARY ANGIOPLASTY      CORONARY STENT PLACEMENT      PCIOL  Right 2017    DR. LEDEZMA    PCIOL Left 2019    DR. LEDEZMA    PROSTATE BIOPSY      RENAL ARTERY STENT       Family History   Problem Relation Name Age of Onset    Glaucoma Mother      Diabetes Mother      Kidney disease Mother      Colon cancer Father      Cancer Father          colon cancer    Diabetes Sister      Diabetes Brother      Hypertension Brother      Blindness Neg Hx       Social History     Tobacco Use    Smoking status: Former     Current packs/day: 0.00     Average packs/day: 0.5 packs/day for 30.0 years (15.0 ttl pk-yrs)     Types: Cigarettes     Start date: 1965     Quit date: 1995     Years since quittin.0    Smokeless tobacco: Former   Substance Use Topics    Alcohol use: Not Currently     Alcohol/week: 0.0 standard drinks of alcohol    Drug use: Yes     Frequency: 4.0 times per week     Types: Marijuana     Review of Systems   Constitutional:  Negative for chills, fatigue and fever.   HENT:  Positive for congestion. Negative for ear pain, sinus pain and sore throat.    Eyes:  Negative for pain.   Respiratory:  Positive for cough and shortness of breath.    Cardiovascular:  Negative for chest pain.   Gastrointestinal:  Negative for abdominal pain, nausea and vomiting.   Genitourinary:  Negative for dysuria.   Musculoskeletal:  Negative for back pain, myalgias and neck pain.   Skin:  Negative for rash.   Neurological:  Positive for headaches. Negative for weakness.       Physical Exam     Initial Vitals [24]   BP Pulse Resp Temp SpO2   (!) 147/84 71 18 98.2 °F (36.8 °C) 98 %      MAP       --         Physical Exam    Nursing note and vitals reviewed.  Constitutional: He is not diaphoretic. He is cooperative.  Non-toxic appearance. No distress.   HENT:   Head: Normocephalic and atraumatic.   Right Ear: Tympanic membrane, external ear and ear canal normal.   Left Ear: Tympanic membrane, external ear and ear canal normal.   Nose: Nose normal.   Mouth/Throat:  Uvula is midline, oropharynx is clear and moist and mucous membranes are normal. No oropharyngeal exudate, posterior oropharyngeal edema or posterior oropharyngeal erythema.   Eyes: Conjunctivae are normal.   Neck: Neck supple.   Normal range of motion.  Cardiovascular:  Normal rate, regular rhythm and intact distal pulses.           Pulmonary/Chest: Breath sounds normal. No respiratory distress. He has no wheezes. He has no rhonchi. He has no rales.   Abdominal: Abdomen is soft. There is no abdominal tenderness.   Musculoskeletal:         General: Normal range of motion.      Cervical back: Normal range of motion and neck supple.     Neurological: He is alert and oriented to person, place, and time. He has normal strength. GCS score is 15. GCS eye subscore is 4. GCS verbal subscore is 5. GCS motor subscore is 6.   Skin: Skin is warm and dry. Capillary refill takes less than 2 seconds.         ED Course   Procedures  Labs Reviewed   INFLUENZA A & B BY MOLECULAR       Result Value    Influenza A, Molecular Negative      Influenza B, Molecular Negative      Flu A & B Source NP     SARS-COV-2 RNA AMPLIFICATION, QUAL    SARS-CoV-2 RNA, Amplification, Qual Negative            Imaging Results              X-Ray Chest PA And Lateral (Final result)  Result time 09/27/24 20:45:34      Final result by Tee Muller MD (09/27/24 20:45:34)                   Impression:      Stable findings.  No change.      Electronically signed by: Tee Muller  Date:    09/27/2024  Time:    20:45               Narrative:    EXAMINATION:  XR CHEST PA AND LATERAL    CLINICAL HISTORY:  Cough, unspecified    TECHNIQUE:  PA and lateral views of the chest were performed.    COMPARISON:  Prior    FINDINGS:  Lungs are clear.  No acute osseous injury.  Stable thoracic border wall.  Please see prior exam.  Please see recent CT.                                       Medications - No data to display  Medical Decision Making  Amount and/or Complexity  of Data Reviewed  Radiology: ordered.    Risk  OTC drugs.  Prescription drug management.                        Results reviewed and discussed with patient and he verbalized understanding with no further concerns.  He is nontoxic/non ill-appearing.  Vital signs stable.  He is in no acute distress.  Discussed outpatient follow-up with PCP.  Discussed signs symptoms to return to ER.  Patient agrees with plan and voiced no further concerns.    I discussed with patient that evaluation in the ED does not suggest any emergent or life threatening medical conditions requiring immediate intervention beyond what was provided in the ED, and I believe patient is safe for discharge. Regardless, an unremarkable evaluation in the ED does not preclude the development or presence of a serious of life threatening condition. As such, patient was instructed to return immediately for any worsening or change in current symptoms.                Clinical Impression:  Final diagnoses:  [R05.9] Cough  [R09.89] Chest congestion (Primary)  [R09.81] Nasal congestion          ED Disposition Condition    Discharge Stable          ED Prescriptions       Medication Sig Dispense Start Date End Date Auth. Provider    amoxicillin-clavulanate 875-125mg (AUGMENTIN) 875-125 mg per tablet Take 1 tablet by mouth 2 (two) times daily. for 7 days 14 tablet 9/27/2024 10/4/2024 Mika Badillo NP    cetirizine (ZYRTEC) 10 MG tablet Take 0.5 tablets (5 mg total) by mouth once daily. 15 tablet 9/27/2024 10/27/2024 Mika Badillo NP    fluticasone propionate (FLONASE) 50 mcg/actuation nasal spray 2 sprays (100 mcg total) by Each Nostril route once daily. 9.9 mL 9/27/2024 10/27/2024 Mika Badillo NP    promethazine-dextromethorphan (PROMETHAZINE-DM) 6.25-15 mg/5 mL Syrp Take 5 mLs by mouth every 4 (four) hours as needed (cough). 150 mL 9/27/2024 10/2/2024 Mika Badillo NP          Follow-up Information       Follow up With Specialties Details Why Contact  Info    Miryam Jimenez MD Family Medicine In 2 days  [id number]  [place] [address]  [phone]      [facility]. Emergency Medicine  As needed, If symptoms worsen [id number] [facility] [address] [phone]             Mika Badillo, NP  09/28/24 0004

## 2024-10-02 NOTE — PROGRESS NOTES
Pt visited the ED on 9/27/24. ED Navigation made 2 attempts to reach patient to assist with scheduling a post ED 7-day follow up with PCP. Unable to reach. Closing encounter.    Dee Dee Castorena

## 2024-10-14 RX ORDER — TAMSULOSIN HYDROCHLORIDE 0.4 MG/1
1 CAPSULE ORAL
Qty: 30 CAPSULE | Refills: 0 | Status: SHIPPED | OUTPATIENT
Start: 2024-10-14

## 2024-10-17 ENCOUNTER — OFFICE VISIT (OUTPATIENT)
Dept: URGENT CARE | Facility: CLINIC | Age: 89
End: 2024-10-17
Payer: MEDICARE

## 2024-10-17 VITALS
WEIGHT: 215.63 LBS | DIASTOLIC BLOOD PRESSURE: 78 MMHG | RESPIRATION RATE: 16 BRPM | OXYGEN SATURATION: 96 % | SYSTOLIC BLOOD PRESSURE: 125 MMHG | TEMPERATURE: 98 F | BODY MASS INDEX: 28.58 KG/M2 | HEIGHT: 73 IN | HEART RATE: 66 BPM

## 2024-10-17 DIAGNOSIS — L98.9 SKIN LESION OF BACK: ICD-10-CM

## 2024-10-17 DIAGNOSIS — R09.89 CHEST CONGESTION: Primary | ICD-10-CM

## 2024-10-17 PROCEDURE — 99214 OFFICE O/P EST MOD 30 MIN: CPT | Mod: S$GLB,,, | Performed by: PHYSICIAN ASSISTANT

## 2024-10-17 RX ORDER — ALBUTEROL SULFATE 90 UG/1
2 INHALANT RESPIRATORY (INHALATION) EVERY 6 HOURS PRN
Qty: 6.7 G | Refills: 0 | Status: SHIPPED | OUTPATIENT
Start: 2024-10-17

## 2024-10-17 NOTE — PROGRESS NOTES
"Subjective:      Patient ID: Jovanny Olmedo is a 89 y.o. male.    Vitals:  height is 6' 1" (1.854 m) and weight is 97.8 kg (215 lb 9.8 oz). His oral temperature is 97.9 °F (36.6 °C). His blood pressure is 125/78 and his pulse is 66. His respiration is 16 and oxygen saturation is 96%.     Chief Complaint: Cough (Chest congestion, rash on back)    Pt presents to the clinic today with cough, congestion, and a skin lesion on his lower back that has all been going on for the last 2-3 weeks.  Patient was seen in the ED on 09/27/2024 for cough and chest congestion.  He was given a prescription for Augmentin, cough syrup, Flonase, and Zyrtec.  He states that his symptoms have not improved.  According to patient's chart his PCP attempted twice to contact him on 09/28/2024 for post ED visit.  They were unable to get in touch with him.    Cough  This is a recurrent problem. The current episode started 1 to 4 weeks ago. The problem has been gradually worsening. The problem occurs every few minutes. The cough is Productive of sputum. Pertinent negatives include no chest pain, chills, ear congestion, ear pain, fever, headaches, heartburn, hemoptysis, myalgias, nasal congestion, postnasal drip, rash, rhinorrhea, sore throat, shortness of breath, sweats, weight loss or wheezing. Nothing aggravates the symptoms. He has tried prescription cough suppressant for the symptoms. The treatment provided no relief.       Constitution: Negative for chills and fever.   HENT:  Negative for ear pain, postnasal drip and sore throat.    Cardiovascular:  Negative for chest pain.   Respiratory:  Positive for cough. Negative for bloody sputum, shortness of breath and wheezing.    Gastrointestinal:  Negative for heartburn.   Musculoskeletal:  Negative for muscle ache.   Skin:  Negative for rash.   Neurological:  Negative for headaches.      Objective:     Physical Exam   Constitutional: He is oriented to person, place, and time. He appears " well-developed.   HENT:   Head: Normocephalic and atraumatic.   Ears:   Right Ear: External ear normal.   Left Ear: External ear normal.   Nose: Nose normal.   Mouth/Throat: Oropharynx is clear and moist.   Eyes: Conjunctivae, EOM and lids are normal.   Neck: Neck supple.   Cardiovascular: Normal rate.   Pulmonary/Chest: Effort normal. No respiratory distress. He has no wheezes. He has no rhonchi.   Musculoskeletal: Normal range of motion.         General: Normal range of motion.   Neurological: He is alert and oriented to person, place, and time.   Skin: Skin is warm, dry and intact.         Comments: Nga-sized scaly lesion noted to center of lower back.   Psychiatric: His speech is normal and behavior is normal. Judgment and thought content normal.   Nursing note and vitals reviewed.      Assessment:     1. Chest congestion    2. Skin lesion of back        Plan:   VSS. Patient non-toxic appearing. Discussed medication being prescribed.  Advised patient to follow up with PCP as needed.  Patient verbalized understanding, agrees with the plan, and is comfortable with discharge.      Chest congestion  -     albuterol (VENTOLIN HFA) 90 mcg/actuation inhaler; Inhale 2 puffs into the lungs every 6 (six) hours as needed for Wheezing. Rescue  Dispense: 6.7 g; Refill: 0    Skin lesion of back  -     Ambulatory referral/consult to Dermatology

## 2024-10-23 ENCOUNTER — OFFICE VISIT (OUTPATIENT)
Dept: DERMATOLOGY | Facility: CLINIC | Age: 89
End: 2024-10-23
Payer: MEDICARE

## 2024-10-23 DIAGNOSIS — L30.9 DERMATITIS: Primary | ICD-10-CM

## 2024-10-23 PROCEDURE — G2211 COMPLEX E/M VISIT ADD ON: HCPCS | Mod: S$PBB,,, | Performed by: PHYSICIAN ASSISTANT

## 2024-10-23 PROCEDURE — 99214 OFFICE O/P EST MOD 30 MIN: CPT | Mod: S$PBB,,, | Performed by: PHYSICIAN ASSISTANT

## 2024-10-23 PROCEDURE — 99999 PR PBB SHADOW E&M-EST. PATIENT-LVL III: CPT | Mod: PBBFAC,,, | Performed by: PHYSICIAN ASSISTANT

## 2024-10-23 PROCEDURE — 99213 OFFICE O/P EST LOW 20 MIN: CPT | Mod: PBBFAC,PO | Performed by: PHYSICIAN ASSISTANT

## 2024-10-23 RX ORDER — GENTAMICIN SULFATE 1 MG/G
OINTMENT TOPICAL 2 TIMES DAILY
Qty: 30 G | Refills: 0 | Status: SHIPPED | OUTPATIENT
Start: 2024-10-23

## 2024-10-23 RX ORDER — MICONAZOLE NITRATE 2 G/100G
POWDER TOPICAL
Qty: 85 G | Refills: 2 | Status: SHIPPED | OUTPATIENT
Start: 2024-10-23 | End: 2025-10-23

## 2024-10-23 RX ORDER — TRIAMCINOLONE ACETONIDE 0.25 MG/G
CREAM TOPICAL 2 TIMES DAILY
Qty: 80 G | Refills: 0 | Status: SHIPPED | OUTPATIENT
Start: 2024-10-23

## 2024-10-23 RX ORDER — KETOCONAZOLE 20 MG/G
CREAM TOPICAL 2 TIMES DAILY
Qty: 60 G | Refills: 1 | Status: SHIPPED | OUTPATIENT
Start: 2024-10-23

## 2024-10-23 NOTE — PROGRESS NOTES
History of Present Illness: The patient presents with chief complaint of skin lesion .  Location: groin and lower back   Duration: 1-2 months   Signs/Symptoms: dryness, burning     Prior treatments: clotrimazole and betamethasone ointment/ non effective    Subjective:      Patient ID:  Jovanny Olmedo is a 89 y.o. male who presents for No chief complaint on file.    History of Present Illness: The patient presents with chief complaint of skin lesion .  Location: groin and lower back   Duration: 1-2 months   Signs/Symptoms: dryness, burning. Pt unable to describe is more of a rash /irritation or lesions.       Prior treatments: clotrimazole and betamethasone ointment/ non effective      The patient denies personal or family history of skin cancer.            Review of Systems   Constitutional:  Negative for fever and chills.   Gastrointestinal:  Negative for nausea and vomiting.   Skin:  Positive for activity-related sunscreen use. Negative for itching, rash, dry skin, sun sensitivity, daily sunscreen use, recent sunburn and dry lips.   Hematologic/Lymphatic: Does not bruise/bleed easily.       Objective:   Physical Exam   Constitutional: He appears well-developed and well-nourished. No distress.   Genitourinary:         Neurological: He is alert and oriented to person, place, and time. He is not disoriented.   Psychiatric: He has a normal mood and affect.   Skin:   Areas Examined (abnormalities noted in diagram):   Head / Face Inspection Performed  Neck Inspection Performed  Chest / Axilla Inspection Performed  Abdomen Inspection Performed  Back Inspection Performed  RUE Inspected  LUE Inspection Performed            Diagram Legend     Erythematous scaling macule/papule c/w actinic keratosis       Vascular papule c/w angioma      Pigmented verrucoid papule/plaque c/w seborrheic keratosis      Yellow umbilicated papule c/w sebaceous hyperplasia      Irregularly shaped tan macule c/w lentigo     1-2 mm smooth white  papules consistent with Milia      Movable subcutaneous cyst with punctum c/w epidermal inclusion cyst      Subcutaneous movable cyst c/w pilar cyst      Firm pink to brown papule c/w dermatofibroma      Pedunculated fleshy papule(s) c/w skin tag(s)      Evenly pigmented macule c/w junctional nevus     Mildly variegated pigmented, slightly irregular-bordered macule c/w mildly atypical nevus      Flesh colored to evenly pigmented papule c/w intradermal nevus       Pink pearly papule/plaque c/w basal cell carcinoma      Erythematous hyperkeratotic cursted plaque c/w SCC      Surgical scar with no sign of skin cancer recurrence      Open and closed comedones      Inflammatory papules and pustules      Verrucoid papule consistent consistent with wart     Erythematous eczematous patches and plaques     Dystrophic onycholytic nail with subungual debris c/w onychomycosis     Umbilicated papule    Erythematous-base heme-crusted tan verrucoid plaque consistent with inflamed seborrheic keratosis     Erythematous Silvery Scaling Plaque c/w Psoriasis     See annotation      Assessment / Plan:        Dermatitis  -     ketoconazole (NIZORAL) 2 % cream; Apply topically 2 (two) times daily. For rash of groin.  Dispense: 60 g; Refill: 1  -     gentamicin (GARAMYCIN) 0.1 % ointment; Apply topically 2 (two) times a day. For rash of groin.  Dispense: 30 g; Refill: 0  -     triamcinolone acetonide 0.025% (KENALOG) 0.025 % cream; Apply topically 2 (two) times daily. PRN rash of groin.  Dispense: 80 g; Refill: 0  -     miconazole NITRATE 2 % (ZEASORB AF) 2 % top powder; Use two to three times daily to prevent rash  Dispense: 85 g; Refill: 2  Ddx: intertriginous vs. PsO and inverse PsO vs. Eczematous vs. Can not r/o impetiginization  +h/o urinary incontinence and diapers will cover for gram negative bacteria w/gentamicin oint. Start above rx, strict sensitive skin care, avoidance of fragrance, frequent diaper changes, and air time for  affected area as tolerated.     For back, would consider mometaonse oint in future.          Follow up in about 2 months (around 12/23/2024) for dermatitis.

## 2024-10-24 ENCOUNTER — PATIENT MESSAGE (OUTPATIENT)
Dept: INTERNAL MEDICINE | Facility: CLINIC | Age: 89
End: 2024-10-24
Payer: MEDICARE

## 2024-10-24 ENCOUNTER — TELEPHONE (OUTPATIENT)
Dept: CARDIOLOGY | Facility: CLINIC | Age: 89
End: 2024-10-24
Payer: MEDICARE

## 2024-10-25 DIAGNOSIS — I10 ESSENTIAL HYPERTENSION: ICD-10-CM

## 2024-10-25 RX ORDER — LISINOPRIL 40 MG/1
40 TABLET ORAL
Qty: 90 TABLET | Refills: 0 | Status: SHIPPED | OUTPATIENT
Start: 2024-10-25

## 2024-10-25 NOTE — TELEPHONE ENCOUNTER
No care due was identified.  [facility] Embedded Care Due Messages. Reference number: [id number].   10/25/2024 10:19:42 AM CDT

## 2024-10-28 ENCOUNTER — TELEPHONE (OUTPATIENT)
Dept: INTERNAL MEDICINE | Facility: CLINIC | Age: 89
End: 2024-10-28
Payer: MEDICARE

## 2024-10-28 RX ORDER — NIFEDIPINE 60 MG/1
60 TABLET, EXTENDED RELEASE ORAL DAILY
Qty: 90 TABLET | Refills: 1 | Status: SHIPPED | OUTPATIENT
Start: 2024-10-28 | End: 2024-10-29 | Stop reason: SDUPTHER

## 2024-10-28 RX ORDER — NIFEDIPINE 60 MG/1
60 TABLET, EXTENDED RELEASE ORAL
COMMUNITY
Start: 2024-07-19 | End: 2024-10-28 | Stop reason: SDUPTHER

## 2024-10-29 RX ORDER — NIFEDIPINE 60 MG/1
60 TABLET, EXTENDED RELEASE ORAL DAILY
Qty: 90 TABLET | Refills: 1 | Status: SHIPPED | OUTPATIENT
Start: 2024-10-29

## 2024-10-29 RX ORDER — NIFEDIPINE 60 MG/1
60 TABLET, EXTENDED RELEASE ORAL DAILY
Qty: 90 TABLET | Refills: 1 | Status: SHIPPED | OUTPATIENT
Start: 2024-10-29 | End: 2024-10-29 | Stop reason: SDUPTHER

## 2024-11-01 ENCOUNTER — TELEPHONE (OUTPATIENT)
Dept: INTERNAL MEDICINE | Facility: CLINIC | Age: 89
End: 2024-11-01
Payer: MEDICARE

## 2024-11-01 RX ORDER — METOPROLOL TARTRATE 25 MG/1
25 TABLET, FILM COATED ORAL 2 TIMES DAILY
Qty: 180 TABLET | Refills: 0 | OUTPATIENT
Start: 2024-11-01

## 2024-11-06 ENCOUNTER — HOSPITAL ENCOUNTER (OUTPATIENT)
Facility: HOSPITAL | Age: 89
Discharge: HOME | End: 2024-11-07
Attending: EMERGENCY MEDICINE | Admitting: STUDENT IN AN ORGANIZED HEALTH CARE EDUCATION/TRAINING PROGRAM
Payer: MEDICARE

## 2024-11-06 DIAGNOSIS — R79.89 ELEVATED TROPONIN: ICD-10-CM

## 2024-11-06 DIAGNOSIS — R79.89 TROPONIN LEVEL ELEVATED: ICD-10-CM

## 2024-11-06 DIAGNOSIS — R07.9 CHEST PAIN: Primary | ICD-10-CM

## 2024-11-06 PROBLEM — R21 RASH: Status: ACTIVE | Noted: 2024-11-06

## 2024-11-06 PROBLEM — R07.2 PRECORDIAL PAIN: Status: ACTIVE | Noted: 2024-11-06

## 2024-11-06 LAB
ALBUMIN SERPL BCP-MCNC: 3.6 G/DL (ref 3.5–5.2)
ALP SERPL-CCNC: 79 U/L (ref 40–150)
ALT SERPL W/O P-5'-P-CCNC: 13 U/L (ref 10–44)
ANION GAP SERPL CALC-SCNC: 11 MMOL/L (ref 8–16)
AORTIC ROOT ANNULUS: 4.29 CM
ASCENDING AORTA: 3.69 CM
AST SERPL-CCNC: 20 U/L (ref 10–40)
AV INDEX (PROSTH): 0.65
AV MEAN GRADIENT: 4.4 MMHG
AV PEAK GRADIENT: 5.8 MMHG
AV VALVE AREA BY VELOCITY RATIO: 3 CM²
AV VALVE AREA: 2.9 CM²
AV VELOCITY RATIO: 0.67
BASOPHILS # BLD AUTO: 0.04 K/UL (ref 0–0.2)
BASOPHILS NFR BLD: 0.6 % (ref 0–1.9)
BILIRUB SERPL-MCNC: 0.4 MG/DL (ref 0.1–1)
BNP SERPL-MCNC: 19 PG/ML (ref 0–99)
BSA FOR ECHO PROCEDURE: 2.19 M2
BUN SERPL-MCNC: 26 MG/DL (ref 8–23)
CALCIUM SERPL-MCNC: 9.3 MG/DL (ref 8.7–10.5)
CHLORIDE SERPL-SCNC: 109 MMOL/L (ref 95–110)
CO2 SERPL-SCNC: 23 MMOL/L (ref 23–29)
CREAT SERPL-MCNC: 1.8 MG/DL (ref 0.5–1.4)
CV ECHO LV RWT: 0.67 CM
DIFFERENTIAL METHOD BLD: ABNORMAL
DOP CALC AO PEAK VEL: 1.2 M/S
DOP CALC AO VTI: 29.2 CM
DOP CALC LVOT AREA: 4.5 CM2
DOP CALC LVOT DIAMETER: 2.4 CM
DOP CALC LVOT PEAK VEL: 0.8 M/S
DOP CALC LVOT STROKE VOLUME: 85.9 CM3
DOP CALC RVOT PEAK VEL: 0.75 M/S
DOP CALC RVOT VTI: 21.4 CM
DOP CALCLVOT PEAK VEL VTI: 19 CM
E WAVE DECELERATION TIME: 344.71 MSEC
E/A RATIO: 0.52
E/E' RATIO: 6.25 M/S
ECHO LV POSTERIOR WALL: 1.3 CM (ref 0.6–1.1)
EJECTION FRACTION: 60 %
EOSINOPHIL # BLD AUTO: 0.3 K/UL (ref 0–0.5)
EOSINOPHIL NFR BLD: 4 % (ref 0–8)
ERYTHROCYTE [DISTWIDTH] IN BLOOD BY AUTOMATED COUNT: 13.1 % (ref 11.5–14.5)
EST. GFR  (NO RACE VARIABLE): 36 ML/MIN/1.73 M^2
FRACTIONAL SHORTENING: 30.8 % (ref 28–44)
GLUCOSE SERPL-MCNC: 114 MG/DL (ref 70–110)
HCT VFR BLD AUTO: 39.7 % (ref 40–54)
HGB BLD-MCNC: 12.9 G/DL (ref 14–18)
IMM GRANULOCYTES # BLD AUTO: 0.03 K/UL (ref 0–0.04)
IMM GRANULOCYTES NFR BLD AUTO: 0.4 % (ref 0–0.5)
INTERVENTRICULAR SEPTUM: 1.3 CM (ref 0.6–1.1)
IVC DIAMETER: 1.46 CM
IVRT: 88.49 MSEC
LA MAJOR: 4.75 CM
LA MINOR: 4.49 CM
LA WIDTH: 3.7 CM
LEFT ATRIUM SIZE: 3.77 CM
LEFT ATRIUM VOLUME INDEX: 25.2 ML/M2
LEFT ATRIUM VOLUME: 54.73 CM3
LEFT INTERNAL DIMENSION IN SYSTOLE: 2.7 CM (ref 2.1–4)
LEFT VENTRICLE DIASTOLIC VOLUME INDEX: 29.76 ML/M2
LEFT VENTRICLE DIASTOLIC VOLUME: 64.57 ML
LEFT VENTRICLE MASS INDEX: 82.8 G/M2
LEFT VENTRICLE SYSTOLIC VOLUME INDEX: 12 ML/M2
LEFT VENTRICLE SYSTOLIC VOLUME: 25.96 ML
LEFT VENTRICULAR INTERNAL DIMENSION IN DIASTOLE: 3.9 CM (ref 3.5–6)
LEFT VENTRICULAR MASS: 179.7 G
LV LATERAL E/E' RATIO: 7.14 M/S
LV SEPTAL E/E' RATIO: 5.56 M/S
LVED V (TEICH): 64.57 ML
LVES V (TEICH): 25.96 ML
LVOT MG: 1.95 MMHG
LVOT MV: 0.69 CM/S
LYMPHOCYTES # BLD AUTO: 1.7 K/UL (ref 1–4.8)
LYMPHOCYTES NFR BLD: 23 % (ref 18–48)
MCH RBC QN AUTO: 29.9 PG (ref 27–31)
MCHC RBC AUTO-ENTMCNC: 32.5 G/DL (ref 32–36)
MCV RBC AUTO: 92 FL (ref 82–98)
MONOCYTES # BLD AUTO: 0.7 K/UL (ref 0.3–1)
MONOCYTES NFR BLD: 9.3 % (ref 4–15)
MV PEAK A VEL: 0.97 M/S
MV PEAK E VEL: 0.5 M/S
MV STENOSIS PRESSURE HALF TIME: 99.97 MS
MV VALVE AREA P 1/2 METHOD: 2.2 CM2
NEUTROPHILS # BLD AUTO: 4.5 K/UL (ref 1.8–7.7)
NEUTROPHILS NFR BLD: 62.7 % (ref 38–73)
NRBC BLD-RTO: 0 /100 WBC
OHS QRS DURATION: 116 MS
OHS QTC CALCULATION: 466 MS
PLATELET # BLD AUTO: 128 K/UL (ref 150–450)
PMV BLD AUTO: 11.2 FL (ref 9.2–12.9)
POTASSIUM SERPL-SCNC: 4.1 MMOL/L (ref 3.5–5.1)
PROT SERPL-MCNC: 7.4 G/DL (ref 6–8.4)
PV MEAN GRADIENT: 2 MMHG
RA MAJOR: 4.38 CM
RA PRESSURE ESTIMATED: 3 MMHG
RA WIDTH: 3.5 CM
RBC # BLD AUTO: 4.32 M/UL (ref 4.6–6.2)
RIGHT VENTRICLE DIASTOLIC MID DIMENSION: 2.9 CM
RV MID DIAMA: 2.91 CM
SODIUM SERPL-SCNC: 143 MMOL/L (ref 136–145)
STJ: 3.78 CM
TDI LATERAL: 0.07 M/S
TDI SEPTAL: 0.09 M/S
TDI: 0.08 M/S
TROPONIN I SERPL DL<=0.01 NG/ML-MCNC: 0.02 NG/ML (ref 0–0.03)
TROPONIN I SERPL DL<=0.01 NG/ML-MCNC: 0.02 NG/ML (ref 0–0.03)
TROPONIN I SERPL DL<=0.01 NG/ML-MCNC: 0.03 NG/ML (ref 0–0.03)
TROPONIN I SERPL DL<=0.01 NG/ML-MCNC: 0.03 NG/ML (ref 0–0.03)
TROPONIN I SERPL DL<=0.01 NG/ML-MCNC: 0.04 NG/ML (ref 0–0.03)
WBC # BLD AUTO: 7.22 K/UL (ref 3.9–12.7)
Z-SCORE OF LEFT VENTRICULAR DIMENSION IN END DIASTOLE: -6.1
Z-SCORE OF LEFT VENTRICULAR DIMENSION IN END SYSTOLE: -3.81

## 2024-11-06 PROCEDURE — 25000003 PHARM REV CODE 250: Performed by: NURSE PRACTITIONER

## 2024-11-06 PROCEDURE — 83880 ASSAY OF NATRIURETIC PEPTIDE: CPT | Performed by: EMERGENCY MEDICINE

## 2024-11-06 PROCEDURE — 96372 THER/PROPH/DIAG INJ SC/IM: CPT | Performed by: NURSE PRACTITIONER

## 2024-11-06 PROCEDURE — 93010 ELECTROCARDIOGRAM REPORT: CPT | Mod: ,,, | Performed by: INTERNAL MEDICINE

## 2024-11-06 PROCEDURE — 63600175 PHARM REV CODE 636 W HCPCS: Performed by: NURSE PRACTITIONER

## 2024-11-06 PROCEDURE — 99285 EMERGENCY DEPT VISIT HI MDM: CPT | Mod: 25

## 2024-11-06 PROCEDURE — 85025 COMPLETE CBC W/AUTO DIFF WBC: CPT | Performed by: EMERGENCY MEDICINE

## 2024-11-06 PROCEDURE — 80053 COMPREHEN METABOLIC PANEL: CPT | Performed by: EMERGENCY MEDICINE

## 2024-11-06 PROCEDURE — 36415 COLL VENOUS BLD VENIPUNCTURE: CPT | Performed by: NURSE PRACTITIONER

## 2024-11-06 PROCEDURE — 25000003 PHARM REV CODE 250: Performed by: INTERNAL MEDICINE

## 2024-11-06 PROCEDURE — G0378 HOSPITAL OBSERVATION PER HR: HCPCS

## 2024-11-06 PROCEDURE — 99214 OFFICE O/P EST MOD 30 MIN: CPT | Mod: 25,,, | Performed by: INTERNAL MEDICINE

## 2024-11-06 PROCEDURE — 25000003 PHARM REV CODE 250: Performed by: STUDENT IN AN ORGANIZED HEALTH CARE EDUCATION/TRAINING PROGRAM

## 2024-11-06 PROCEDURE — 93005 ELECTROCARDIOGRAM TRACING: CPT

## 2024-11-06 PROCEDURE — 84484 ASSAY OF TROPONIN QUANT: CPT | Mod: 91 | Performed by: EMERGENCY MEDICINE

## 2024-11-06 PROCEDURE — 84484 ASSAY OF TROPONIN QUANT: CPT | Performed by: EMERGENCY MEDICINE

## 2024-11-06 PROCEDURE — 84484 ASSAY OF TROPONIN QUANT: CPT | Mod: 91 | Performed by: NURSE PRACTITIONER

## 2024-11-06 RX ORDER — FINASTERIDE 5 MG/1
5 TABLET, FILM COATED ORAL DAILY
Status: DISCONTINUED | OUTPATIENT
Start: 2024-11-06 | End: 2024-11-06

## 2024-11-06 RX ORDER — GLUCAGON 1 MG
1 KIT INJECTION
Status: DISCONTINUED | OUTPATIENT
Start: 2024-11-06 | End: 2024-11-07 | Stop reason: HOSPADM

## 2024-11-06 RX ORDER — TAMSULOSIN HYDROCHLORIDE 0.4 MG/1
1 CAPSULE ORAL DAILY
Status: DISCONTINUED | OUTPATIENT
Start: 2024-11-06 | End: 2024-11-07 | Stop reason: HOSPADM

## 2024-11-06 RX ORDER — SODIUM CHLORIDE 0.9 % (FLUSH) 0.9 %
10 SYRINGE (ML) INJECTION EVERY 12 HOURS PRN
Status: DISCONTINUED | OUTPATIENT
Start: 2024-11-06 | End: 2024-11-07 | Stop reason: HOSPADM

## 2024-11-06 RX ORDER — LISINOPRIL 20 MG/1
40 TABLET ORAL DAILY
Status: DISCONTINUED | OUTPATIENT
Start: 2024-11-06 | End: 2024-11-07 | Stop reason: HOSPADM

## 2024-11-06 RX ORDER — SODIUM CHLORIDE 0.9 % (FLUSH) 0.9 %
10 SYRINGE (ML) INJECTION
Status: DISCONTINUED | OUTPATIENT
Start: 2024-11-06 | End: 2024-11-07 | Stop reason: HOSPADM

## 2024-11-06 RX ORDER — ENOXAPARIN SODIUM 100 MG/ML
40 INJECTION SUBCUTANEOUS EVERY 24 HOURS
Status: DISCONTINUED | OUTPATIENT
Start: 2024-11-06 | End: 2024-11-07 | Stop reason: HOSPADM

## 2024-11-06 RX ORDER — ALUMINUM HYDROXIDE, MAGNESIUM HYDROXIDE, AND SIMETHICONE 1200; 120; 1200 MG/30ML; MG/30ML; MG/30ML
30 SUSPENSION ORAL
Status: DISCONTINUED | OUTPATIENT
Start: 2024-11-06 | End: 2024-11-07

## 2024-11-06 RX ORDER — ACETAMINOPHEN 325 MG/1
650 TABLET ORAL EVERY 8 HOURS PRN
Status: DISCONTINUED | OUTPATIENT
Start: 2024-11-06 | End: 2024-11-07 | Stop reason: HOSPADM

## 2024-11-06 RX ORDER — NITROGLYCERIN 0.4 MG/1
0.4 TABLET SUBLINGUAL EVERY 5 MIN PRN
Status: DISCONTINUED | OUTPATIENT
Start: 2024-11-06 | End: 2024-11-07 | Stop reason: HOSPADM

## 2024-11-06 RX ORDER — IBUPROFEN 200 MG
24 TABLET ORAL
Status: DISCONTINUED | OUTPATIENT
Start: 2024-11-06 | End: 2024-11-07 | Stop reason: HOSPADM

## 2024-11-06 RX ORDER — TALC
6 POWDER (GRAM) TOPICAL NIGHTLY PRN
Status: DISCONTINUED | OUTPATIENT
Start: 2024-11-06 | End: 2024-11-07 | Stop reason: HOSPADM

## 2024-11-06 RX ORDER — PANTOPRAZOLE SODIUM 40 MG/1
40 TABLET, DELAYED RELEASE ORAL 2 TIMES DAILY
Status: DISCONTINUED | OUTPATIENT
Start: 2024-11-06 | End: 2024-11-07 | Stop reason: HOSPADM

## 2024-11-06 RX ORDER — DOCUSATE SODIUM 100 MG
600 CAPSULE ORAL
Status: DISCONTINUED | OUTPATIENT
Start: 2024-11-06 | End: 2024-11-07 | Stop reason: HOSPADM

## 2024-11-06 RX ORDER — CARVEDILOL 12.5 MG/1
12.5 TABLET ORAL 2 TIMES DAILY WITH MEALS
Status: DISCONTINUED | OUTPATIENT
Start: 2024-11-06 | End: 2024-11-07 | Stop reason: HOSPADM

## 2024-11-06 RX ORDER — NALOXONE HCL 0.4 MG/ML
0.02 VIAL (ML) INJECTION
Status: DISCONTINUED | OUTPATIENT
Start: 2024-11-06 | End: 2024-11-07 | Stop reason: HOSPADM

## 2024-11-06 RX ORDER — OXYBUTYNIN CHLORIDE 5 MG/1
5 TABLET, EXTENDED RELEASE ORAL DAILY
Status: DISCONTINUED | OUTPATIENT
Start: 2024-11-06 | End: 2024-11-07 | Stop reason: HOSPADM

## 2024-11-06 RX ORDER — LANOLIN ALCOHOL/MO/W.PET/CERES
1 CREAM (GRAM) TOPICAL DAILY
Status: DISCONTINUED | OUTPATIENT
Start: 2024-11-06 | End: 2024-11-07 | Stop reason: HOSPADM

## 2024-11-06 RX ORDER — SUCRALFATE 1 G/10ML
1 SUSPENSION ORAL EVERY 6 HOURS
Status: DISCONTINUED | OUTPATIENT
Start: 2024-11-06 | End: 2024-11-07 | Stop reason: HOSPADM

## 2024-11-06 RX ORDER — NIFEDIPINE 60 MG/1
60 TABLET, EXTENDED RELEASE ORAL DAILY
Status: DISCONTINUED | OUTPATIENT
Start: 2024-11-06 | End: 2024-11-06

## 2024-11-06 RX ORDER — ATORVASTATIN CALCIUM 40 MG/1
40 TABLET, FILM COATED ORAL DAILY
Status: DISCONTINUED | OUTPATIENT
Start: 2024-11-06 | End: 2024-11-07 | Stop reason: HOSPADM

## 2024-11-06 RX ORDER — ONDANSETRON HYDROCHLORIDE 2 MG/ML
4 INJECTION, SOLUTION INTRAVENOUS EVERY 8 HOURS PRN
Status: DISCONTINUED | OUTPATIENT
Start: 2024-11-06 | End: 2024-11-07 | Stop reason: HOSPADM

## 2024-11-06 RX ORDER — ASPIRIN 81 MG/1
81 TABLET ORAL DAILY
Status: DISCONTINUED | OUTPATIENT
Start: 2024-11-06 | End: 2024-11-07 | Stop reason: HOSPADM

## 2024-11-06 RX ORDER — IBUPROFEN 200 MG
16 TABLET ORAL
Status: DISCONTINUED | OUTPATIENT
Start: 2024-11-06 | End: 2024-11-07 | Stop reason: HOSPADM

## 2024-11-06 RX ADMIN — ATORVASTATIN CALCIUM 40 MG: 40 TABLET, FILM COATED ORAL at 11:11

## 2024-11-06 RX ADMIN — ASPIRIN 81 MG: 81 TABLET, COATED ORAL at 11:11

## 2024-11-06 RX ADMIN — OXYBUTYNIN CHLORIDE 5 MG: 5 TABLET, EXTENDED RELEASE ORAL at 11:11

## 2024-11-06 RX ADMIN — ALUMINUM HYDROXIDE, MAGNESIUM HYDROXIDE, AND DIMETHICONE 30 ML: 200; 20; 200 SUSPENSION ORAL at 11:11

## 2024-11-06 RX ADMIN — Medication 600 ML: at 08:11

## 2024-11-06 RX ADMIN — NIFEDIPINE 90 MG: 30 TABLET, FILM COATED, EXTENDED RELEASE ORAL at 01:11

## 2024-11-06 RX ADMIN — CARVEDILOL 12.5 MG: 12.5 TABLET, FILM COATED ORAL at 05:11

## 2024-11-06 RX ADMIN — PANTOPRAZOLE SODIUM 40 MG: 40 TABLET, DELAYED RELEASE ORAL at 01:11

## 2024-11-06 RX ADMIN — Medication 600 ML: at 03:11

## 2024-11-06 RX ADMIN — TAMSULOSIN HYDROCHLORIDE 0.4 MG: 0.4 CAPSULE ORAL at 11:11

## 2024-11-06 RX ADMIN — ENOXAPARIN SODIUM 40 MG: 40 INJECTION SUBCUTANEOUS at 05:11

## 2024-11-06 RX ADMIN — SUCRALFATE 1 G: 1 SUSPENSION ORAL at 05:11

## 2024-11-06 RX ADMIN — SUCRALFATE 1 G: 1 SUSPENSION ORAL at 11:11

## 2024-11-06 RX ADMIN — PANTOPRAZOLE SODIUM 40 MG: 40 TABLET, DELAYED RELEASE ORAL at 08:11

## 2024-11-06 RX ADMIN — FERROUS SULFATE TAB 325 MG (65 MG ELEMENTAL FE) 1 EACH: 325 (65 FE) TAB at 11:11

## 2024-11-06 RX ADMIN — LISINOPRIL 40 MG: 20 TABLET ORAL at 11:11

## 2024-11-06 RX ADMIN — ALUMINUM HYDROXIDE, MAGNESIUM HYDROXIDE, AND DIMETHICONE 30 ML: 200; 20; 200 SUSPENSION ORAL at 08:11

## 2024-11-06 RX ADMIN — ALUMINUM HYDROXIDE, MAGNESIUM HYDROXIDE, AND DIMETHICONE 30 ML: 200; 20; 200 SUSPENSION ORAL at 05:11

## 2024-11-06 NOTE — ASSESSMENT & PLAN NOTE
Patient with known CAD s/p stent placement, which is controlled Will continue ASA and Statin and monitor for S/Sx of angina/ACS. Continue to monitor on telemetry.     PLAN:  Serial troponin 0.026>0.021>0.031  Cardiology consulted  EKG SR w/ PVCs, left axis deviation, RBBB - no significant change   Stress test on 1/25/24 normal myocardial perfusion scan  ECHO pending  Continue BB, ASA, ACEI

## 2024-11-06 NOTE — ASSESSMENT & PLAN NOTE
Lab Results   Component Value Date    CHOL 219 (H) 02/05/2024    HDL 46 02/05/2024    LDLCALC 145.2 02/05/2024    TRIG 139 02/05/2024     -Continue statin.

## 2024-11-06 NOTE — ED PROVIDER NOTES
SCRIBE #1 NOTE: I, Yannick Pal, am scribing for, and in the presence of, Familia Ontiveros MD. I have scribed the HPI/ROS/PEx.    SCRIBE #2 NOTE: I, Oli Casillas Jr., am scribing for, and in the presence of,  Adiel Doherty MD. I have scribed the remaining portions of the note not scribed by Scribe #1.      History     Chief Complaint   Patient presents with    Chest Pain     Pt c/o CP with radiation down L arm. Onset of 0200 pain was 10/10. [facility] gave 4 SL NTG, 1'' NTG paste, 325mg ASA. Pt reports pain is 0/10 at this time.      Review of patient's allergies indicates:   Allergen Reactions    Codeine Other (See Comments)     When taking codeine, pt becomes very anxious         History of Present Illness     HPI    11/6/2024, 4:25 AM  History obtained from the patient      History of Present Illness: Jovanny Olmedo is a 89 y.o. 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 Pt states he is unsure if he was woken up by the chest pain, but says that he was in his bed resting. Pt experienced the same CP 2-3 days ago but it went away soon after onset so he did not worry about it. Pt states he is currently asymptomatic and that he feels much better now. No mitigating or exacerbating factors reported. Associated sxs included belching, congestion, and a cough. Patient denies any back pain, dizziness, nausea, diaphoresis, emesis, SOB, fever, rhinorrhea, sore throat, and all other sxs at this time. Prior Tx includes 4 SL NTG, 1 '' NTG paste, and 325 ASA en route which has alleviated sxs.. No further complaints or concerns at this time.       Arrival mode: [facility]    PCP: Miryam Jimenez MD        Past Medical History:  Past Medical  History:   Diagnosis Date    Abdominal aortic aneurysm (AAA) without rupture 3/16/2021    Abnormal ECG 2023    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 2023    Arthritis     Back pain     CAD (coronary artery disease)     Cataract     CKD (chronic kidney disease)     GERD (gastroesophageal reflux disease)     Glaucoma     suspect    HTN (hypertension)     Multiple subsegmental pulmonary emboli without acute cor pulmonale 2021    Prostate cancer     tx'd with radiation    PVD (peripheral vascular disease)     stent in right renal artery and aorta    Sleep apnea     Stable angina pectoris 2023       Past Surgical History:  Past Surgical History:   Procedure Laterality Date    ABDOMINAL AORTA STENT      CARDIAC CATHETERIZATION      CATARACT EXTRACTION W/  INTRAOCULAR LENS IMPLANT Right 2017    COLONOSCOPY N/A 2020    Procedure: COLONOSCOPY;  Surgeon: Shayy Melvin MD;  Location: [facility] ENDO;  Service: Endoscopy;  Laterality: N/A;    COLONOSCOPY N/A 1/10/2022    Procedure: COLONOSCOPY;  Surgeon: Vi Lara MD;  Location: [facility] ENDO;  Service: Endoscopy;  Laterality: N/A;    CORONARY ANGIOPLASTY      CORONARY STENT PLACEMENT      PCIOL Right 2017    DR. LEDEZMA    PCIOL Left 2019    DR. LEDEZMA    PROSTATE BIOPSY      RENAL ARTERY STENT           Family History:  Family History   Problem Relation Name Age of Onset    Glaucoma Mother      Diabetes Mother      Kidney disease Mother      Colon cancer Father      Cancer Father          colon cancer    Diabetes Sister      Diabetes Brother      Hypertension Brother      Blindness Neg Hx         Social History:  Social History     Tobacco Use    Smoking status: Former     Current packs/day: 0.00     Average packs/day: 0.5 packs/day for 30.0 years (15.0 ttl pk-yrs)     Types: Cigarettes     Start date: 1965     Quit date: 1995     Years since quittin.1    Smokeless tobacco: Former   Substance and  Sexual Activity    Alcohol use: Not Currently     Alcohol/week: 0.0 standard drinks of alcohol    Drug use: Yes     Frequency: 4.0 times per week     Types: Marijuana    Sexual activity: Yes     Partners: Female        Review of Systems     Review of Systems   Constitutional:  Negative for diaphoresis and fever.   HENT:  Positive for congestion. Negative for rhinorrhea and sore throat.    Respiratory:  Positive for cough. Negative for shortness of breath.    Cardiovascular:  Positive for chest pain.   Gastrointestinal:  Negative for nausea and vomiting.        (+) belching   Genitourinary:  Negative for dysuria.   Musculoskeletal:  Negative for back pain.        (+) L arm pain   Skin:  Negative for rash.   Neurological:  Negative for dizziness and weakness.   Hematological:  Does not bruise/bleed easily.   All other systems reviewed and are negative.       Physical Exam     Initial Vitals [11/06/24 0413]   BP Pulse Resp Temp SpO2   126/84 85 18 98.3 °F (36.8 °C) 95 %      MAP       --          Physical Exam  Nursing Notes and Vital Signs Reviewed.  Constitutional: Patient is in no apparent distress. Well-developed and well-nourished.  Head: Atraumatic. Normocephalic.  Eyes: PERRL. EOM intact. Conjunctivae are not pale. No scleral icterus.  ENT: Mucous membranes are moist. Oropharynx is clear and symmetric.    Neck: Supple. Full ROM. No lymphadenopathy.  Cardiovascular: Regular rate. Regular rhythm. No murmurs, rubs, or gallops. Distal pulses are 2+ and symmetric.  Pulmonary/Chest: No respiratory distress. Clear to auscultation bilaterally. No wheezing or rales.  Abdominal: Soft and non-distended.  There is no tenderness.  No rebound, guarding, or rigidity. Good bowel sounds.  Genitourinary: No CVA tenderness  Musculoskeletal: Moves all extremities. No obvious deformities. No edema. No calf tenderness.  Skin: Warm and dry.  Neurological:  Alert, awake, and appropriate.  Normal speech.  No acute focal neurological  deficits are appreciated.  Psychiatric: Normal affect. Good eye contact. Appropriate in content.     ED Course   Procedures  ED Vital Signs:  Vitals:    11/06/24 0413 11/06/24 0438 11/06/24 0444 11/06/24 0502   BP: 126/84 123/81  124/71   Pulse: 85  81 77   Resp: 18  19 14   Temp: 98.3 °F (36.8 °C)      TempSrc: Oral      SpO2: 95%  96%    Weight:  97.7 kg (215 lb 6.2 oz)      11/06/24 0532 11/06/24 0602 11/06/24 0631 11/06/24 0730   BP: 123/73 128/76 (!) 115/59 116/75   Pulse:  70 77 90   Resp:    20   Temp:       TempSrc:       SpO2:  95% 95% 96%   Weight:        11/06/24 0830 11/06/24 0900   BP: 121/77 127/77   Pulse: 77 70   Resp: 20 20   Temp:     TempSrc:     SpO2: 97% 97%   Weight:         Abnormal Lab Results:  Labs Reviewed   CBC W/ AUTO DIFFERENTIAL - Abnormal       Result Value    WBC 7.22      RBC 4.32 (*)     Hemoglobin 12.9 (*)     Hematocrit 39.7 (*)     MCV 92      MCH 29.9      MCHC 32.5      RDW 13.1      Platelets 128 (*)     MPV 11.2      Immature Granulocytes 0.4      Gran # (ANC) 4.5      Immature Grans (Abs) 0.03      Lymph # 1.7      Mono # 0.7      Eos # 0.3      Baso # 0.04      nRBC 0      Gran % 62.7      Lymph % 23.0      Mono % 9.3      Eosinophil % 4.0      Basophil % 0.6      Differential Method Automated     COMPREHENSIVE METABOLIC PANEL - Abnormal    Sodium 143      Potassium 4.1      Chloride 109      CO2 23      Glucose 114 (*)     BUN 26 (*)     Creatinine 1.8 (*)     Calcium 9.3      Total Protein 7.4      Albumin 3.6      Total Bilirubin 0.4      Alkaline Phosphatase 79      AST 20      ALT 13      eGFR 36 (*)     Anion Gap 11     TROPONIN I - Abnormal    Troponin I 0.031 (*)    TROPONIN I    Troponin I 0.021     TROPONIN I    Troponin I 0.026     B-TYPE NATRIURETIC PEPTIDE    BNP 19          All Lab Results:  Results for orders placed or performed during the hospital encounter of 11/06/24   EKG 12-lead    Collection Time: 11/06/24  4:18 AM   Result Value Ref Range    QRS  Duration 116 ms    [facility] QTC Calculation 466 ms   CBC auto differential    Collection Time: 11/06/24  4:33 AM   Result Value Ref Range    WBC 7.22 3.90 - 12.70 K/uL    RBC 4.32 (L) 4.60 - 6.20 M/uL    Hemoglobin 12.9 (L) 14.0 - 18.0 g/dL    Hematocrit 39.7 (L) 40.0 - 54.0 %    MCV 92 82 - 98 fL    MCH 29.9 27.0 - 31.0 pg    MCHC 32.5 32.0 - 36.0 g/dL    RDW 13.1 11.5 - 14.5 %    Platelets 128 (L) 150 - 450 K/uL    MPV 11.2 9.2 - 12.9 fL    Immature Granulocytes 0.4 0.0 - 0.5 %    Gran # (ANC) 4.5 1.8 - 7.7 K/uL    Immature Grans (Abs) 0.03 0.00 - 0.04 K/uL    Lymph # 1.7 1.0 - 4.8 K/uL    Mono # 0.7 0.3 - 1.0 K/uL    Eos # 0.3 0.0 - 0.5 K/uL    Baso # 0.04 0.00 - 0.20 K/uL    nRBC 0 0 /100 WBC    Gran % 62.7 38.0 - 73.0 %    Lymph % 23.0 18.0 - 48.0 %    Mono % 9.3 4.0 - 15.0 %    Eosinophil % 4.0 0.0 - 8.0 %    Basophil % 0.6 0.0 - 1.9 %    Differential Method Automated    Comprehensive metabolic panel    Collection Time: 11/06/24  4:33 AM   Result Value Ref Range    Sodium 143 136 - 145 mmol/L    Potassium 4.1 3.5 - 5.1 mmol/L    Chloride 109 95 - 110 mmol/L    CO2 23 23 - 29 mmol/L    Glucose 114 (H) 70 - 110 mg/dL    BUN 26 (H) 8 - 23 mg/dL    Creatinine 1.8 (H) 0.5 - 1.4 mg/dL    Calcium 9.3 8.7 - 10.5 mg/dL    Total Protein 7.4 6.0 - 8.4 g/dL    Albumin 3.6 3.5 - 5.2 g/dL    Total Bilirubin 0.4 0.1 - 1.0 mg/dL    Alkaline Phosphatase 79 40 - 150 U/L    AST 20 10 - 40 U/L    ALT 13 10 - 44 U/L    eGFR 36 (A) >60 mL/min/1.73 m^2    Anion Gap 11 8 - 16 mmol/L   Troponin I #1    Collection Time: 11/06/24  4:33 AM   Result Value Ref Range    Troponin I 0.021 0.000 - 0.026 ng/mL   Troponin I #2    Collection Time: 11/06/24  4:33 AM   Result Value Ref Range    Troponin I 0.026 0.000 - 0.026 ng/mL   BNP    Collection Time: 11/06/24  4:33 AM   Result Value Ref Range    BNP 19 0 - 99 pg/mL   Troponin I    Collection Time: 11/06/24  7:50 AM   Result Value Ref Range    Troponin I 0.031 (H) 0.000 - 0.026 ng/mL     *Note: Due  to a large number of results and/or encounters for the requested time period, some results have not been displayed. A complete set of results can be found in Results Review.        Imaging Results:  Imaging Results              X-Ray Chest AP Portable (Final result)  Result time 11/06/24 06:29:33      Final result by Nav Gooden MD (11/06/24 06:29:33)                   Impression:      No acute abnormality.      Electronically signed by: Nav Gooden  Date:    11/06/2024  Time:    06:29               Narrative:    EXAMINATION:  XR CHEST AP PORTABLE    CLINICAL HISTORY:  Chest Pain;    TECHNIQUE:  Single frontal view of the chest was performed.    COMPARISON:  Multiple    FINDINGS:  The lungs are clear, with normal appearance of pulmonary vasculature and no pleural effusion or pneumothorax.    The cardiac silhouette is normal in size. The hilar and mediastinal contours are unremarkable.    Bones are intact.                                     4:54: Per [facility], CT Abdomen & Pelvis Without Contrast:  Impression: Subtle perinephric inflammatory fat stranding which can be seen with early pyelonephritis. Correlate with clinical and laboratory findings.  Radiologist: Chano Mahoney MD    5:10: Per [facility], CT of Chest:  Impression: No acute findings.       The EKG was ordered, reviewed, and independently interpreted by the ED provider.  Interpretation time: 4:18  Rate: 77 BPM  Rhythm:  Sinus rhythm with premature supraventricular complexes.   Interpretation: Left axis deviation. Right bundle branch block. No STEMI.           The Emergency Provider reviewed the vital signs and test results, which are outlined above.     ED Discussion       5:56 AM: Dr. Ontiveros transfers care of patient to Dr. Doherty pending lab results.     8:51 AM: Discussed case with Dr. Lara  ([facility]). Dr. Lara agrees with current care and management of pt and accepts admission.   Admitting Service:   Admitting Physician:   Marco  Admit to: OBS     8:51 AM: Re-evaluated pt. I have discussed test results, shared treatment plan, and the need for admission with patient and family at bedside. Pt and family express understanding at this time and agree with all information. All questions answered. Pt and family have no further questions or concerns at this time. Pt is ready for admit.        Medical Decision Making  DDx: chest pain, NSTEMI    Amount and/or Complexity of Data Reviewed  Labs: ordered. Decision-making details documented in ED Course.     Details: Initial troponin 0.026 repeat 0.031  Radiology: ordered. Decision-making details documented in ED Course.  ECG/medicine tests: ordered and independent interpretation performed. Decision-making details documented in ED Course.    Risk  Decision regarding hospitalization.                ED Medication(s):  Medications - No data to display    Current Discharge Medication List                  Scribe Attestation:   Scribe #1: I performed the above scribed service and the documentation accurately describes the services I performed. I attest to the accuracy of the note.     Attending:   Physician Attestation Statement for Scribe #1: I, Familia Ontiveros MD, personally performed the services described in this documentation, as scribed by Yannick Pal, in my presence, and it is both accurate and complete.       Scribe Attestation:   Scribe #2: I performed the above scribed service and the documentation accurately describes the services I performed. I attest to the accuracy of the note.    Attending Attestation:           Physician Attestation for Scribe:    Physician Attestation Statement for Scribe #2: I, Adiel Doherty MD, reviewed documentation, as scribed by Oli Casillas Jr. in my presence, and it is both accurate and complete. I also acknowledge and confirm the content of the note done by Jasonibe #1.           Clinical Impression       ICD-10-CM ICD-9-CM   1. Chest pain  R07.9 786.50    2. Elevated troponin  R79.89 790.6       Disposition:   Disposition: Placed in Observation  Condition: Stable        Adiel Doherty MD  11/06/24 0917

## 2024-11-06 NOTE — SUBJECTIVE & OBJECTIVE
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 8/6/2023    Arthritis     Back pain     CAD (coronary artery disease)     Cataract     CKD (chronic kidney disease)     GERD (gastroesophageal reflux disease)     Glaucoma     suspect    HTN (hypertension)     Multiple subsegmental pulmonary emboli without acute cor pulmonale 9/9/2021    Prostate cancer     tx'd with radiation    PVD (peripheral vascular disease)     stent in right renal artery and aorta    Sleep apnea     Stable angina pectoris 5/16/2023       Past Surgical History:   Procedure Laterality Date    ABDOMINAL AORTA STENT      CARDIAC CATHETERIZATION      CATARACT EXTRACTION W/  INTRAOCULAR LENS IMPLANT Right 04/29/2017    COLONOSCOPY N/A 9/24/2020    Procedure: COLONOSCOPY;  Surgeon: Shayy Melvin MD;  Location: [facility] ENDO;  Service: Endoscopy;  Laterality: N/A;    COLONOSCOPY N/A 1/10/2022    Procedure: COLONOSCOPY;  Surgeon: Vi Lara MD;  Location: [facility] ENDO;  Service: Endoscopy;  Laterality: N/A;    CORONARY ANGIOPLASTY      CORONARY STENT PLACEMENT      PCIOL Right 03/29/2017    DR. LEDEZMA    PCIOL Left 07/03/2019    DR. LEDEZMA    PROSTATE BIOPSY      RENAL ARTERY STENT         Review of patient's allergies indicates:   Allergen Reactions    Codeine Other (See Comments)     When taking codeine, pt becomes very anxious       No current facility-administered medications on file prior to encounter.     Current Outpatient Medications on File Prior to Encounter   Medication Sig    albuterol (VENTOLIN HFA) 90 mcg/actuation inhaler Inhale 2 puffs into the lungs every 6 (six) hours as needed for Wheezing. Rescue    aspirin (ECOTRIN) 81 MG EC tablet Take 81 mg by mouth once daily.    atorvastatin (LIPITOR) 40 MG tablet Take 1 tablet (40 mg total) by mouth once daily.    azelastine (ASTELIN) 137 mcg (0.1 %) nasal  spray USE 2 SPRAY(S) IN EACH NOSTRIL TWICE DAILY    carvediloL (COREG) 12.5 MG tablet Take 1 tablet (12.5 mg total) by mouth 2 (two) times daily with meals.    cetirizine (ZYRTEC) 10 MG tablet Take 0.5 tablets (5 mg total) by mouth once daily.    ferrous sulfate 325 (65 FE) MG EC tablet Take 325 mg by mouth once daily.    finasteride (PROSCAR) 5 mg tablet Take 1 tablet (5 mg total) by mouth once daily.    gentamicin (GARAMYCIN) 0.1 % ointment Apply topically 2 (two) times a day. For rash of groin.    ketoconazole (NIZORAL) 2 % cream Apply topically 2 (two) times daily. For rash of groin.    lisinopriL (PRINIVIL,ZESTRIL) 40 MG tablet Take 1 tablet by mouth once daily    meloxicam (MOBIC) 15 MG tablet Take 15 mg by mouth once daily.    miconazole NITRATE 2 % (ZEASORB AF) 2 % top powder Use two to three times daily to prevent rash    NIFEdipine (PROCARDIA-XL) 60 MG (OSM) 24 hr tablet Take 1 tablet (60 mg total) by mouth once daily.    nitroGLYCERIN (NITROSTAT) 0.4 MG SL tablet Place 1 tablet (0.4 mg total) under the tongue every 5 (five) minutes as needed for Chest pain.    oxybutynin (DITROPAN-XL) 5 MG TR24 Take 1 tablet (5 mg total) by mouth once daily.    pantoprazole (PROTONIX) 40 MG tablet Take 1 tablet by mouth once daily    tamsulosin (FLOMAX) 0.4 mg Cap Take 1 capsule by mouth once daily    traMADoL (ULTRAM) 50 mg tablet Take 50 mg by mouth daily as needed.    triamcinolone acetonide 0.025% (KENALOG) 0.025 % cream Apply topically 2 (two) times daily. PRN rash of groin.     Family History       Problem Relation (Age of Onset)    Cancer Father    Colon cancer Father    Diabetes Mother, Sister, Brother    Glaucoma Mother    Hypertension Brother    Kidney disease Mother          Tobacco Use    Smoking status: Former     Current packs/day: 0.00     Average packs/day: 0.5 packs/day for 30.0 years (15.0 ttl pk-yrs)     Types: Cigarettes     Start date: 1965     Quit date: 1995     Years since quittin.1     Smokeless tobacco: Former   Substance and Sexual Activity    Alcohol use: Not Currently     Alcohol/week: 0.0 standard drinks of alcohol    Drug use: Yes     Frequency: 4.0 times per week     Types: Marijuana    Sexual activity: Yes     Partners: Female     Review of Systems   Constitutional: Negative. Negative for weight gain.   HENT: Negative.     Eyes: Negative.    Cardiovascular: Negative.  Negative for chest pain, leg swelling and palpitations.   Respiratory: Negative.  Negative for shortness of breath.    Endocrine: Negative.    Hematologic/Lymphatic: Negative.    Skin: Negative.    Musculoskeletal:  Negative for muscle weakness.   Gastrointestinal: Negative.    Genitourinary: Negative.    Neurological: Negative.  Negative for dizziness.   Psychiatric/Behavioral: Negative.     Allergic/Immunologic: Negative.    All other systems reviewed and are negative.    Objective:     Vital Signs (Most Recent):  Temp: 98.6 °F (37 °C) (11/06/24 0915)  Pulse: 72 (11/06/24 0915)  Resp: 17 (11/06/24 0915)  BP: 126/77 (11/06/24 0915)  SpO2: 95 % (11/06/24 0915) Vital Signs (24h Range):  Temp:  [98.3 °F (36.8 °C)-98.6 °F (37 °C)] 98.6 °F (37 °C)  Pulse:  [70-90] 72  Resp:  [14-20] 17  SpO2:  [95 %-97 %] 95 %  BP: (115-128)/(59-84) 126/77     Weight: 94.5 kg (208 lb 5.4 oz)  Body mass index is 28.26 kg/m².    SpO2: 95 %       No intake or output data in the 24 hours ending 11/06/24 1154    Lines/Drains/Airways       Peripheral Intravenous Line  Duration                  Peripheral IV - Single Lumen 11/06/24 0437 20 G Left Antecubital <1 day                     Physical Exam  Vitals and nursing note reviewed.   Constitutional:       Appearance: He is well-developed.   HENT:      Head: Normocephalic and atraumatic.   Eyes:      Conjunctiva/sclera: Conjunctivae normal.      Pupils: Pupils are equal, round, and reactive to light.   Cardiovascular:      Rate and Rhythm: Normal rate and regular rhythm.      Pulses: Intact distal  pulses.      Heart sounds: Normal heart sounds.   Pulmonary:      Effort: Pulmonary effort is normal.      Breath sounds: Normal breath sounds.   Abdominal:      General: Bowel sounds are normal.      Palpations: Abdomen is soft.   Musculoskeletal:      Cervical back: Normal range of motion and neck supple.   Skin:     General: Skin is warm and dry.   Neurological:      Mental Status: He is alert and oriented to person, place, and time.          Significant Labs: All pertinent lab results from the last 24 hours have been reviewed.    Significant Imaging: X-Ray: CXR: X-Ray Chest 1 View (CXR): No results found for this visit on 11/06/24.

## 2024-11-06 NOTE — CONSULTS
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 2023    Arthritis     Back pain     CAD (coronary artery disease)     Cataract     CKD (chronic kidney disease)     GERD (gastroesophageal reflux disease)     Glaucoma     suspect    HTN (hypertension)     Multiple subsegmental pulmonary emboli without acute cor pulmonale 2021    Prostate cancer     tx'd with radiation    PVD (peripheral vascular disease)     stent in right renal artery and aorta    Sleep apnea     Stable angina pectoris 2023       Social History     Socioeconomic History    Marital status:    Tobacco Use    Smoking status: Former     Current packs/day: 0.00     Average packs/day: 0.5 packs/day for 30.0 years (15.0 ttl pk-yrs)     Types: Cigarettes     Start date: 1965     Quit date: 1995     Years since quittin.1    Smokeless tobacco: Former   Substance and Sexual Activity    Alcohol use: Not Currently     Alcohol/week: 0.0 standard drinks of alcohol    Drug use: Yes     Frequency: 4.0 times per week     Types: Marijuana    Sexual activity: Yes     Partners: Female   Social History Narrative         Social Drivers of Health     Financial Resource Strain: Low Risk  (2024)    Overall Financial Resource Strain (CARDIA)     Difficulty of Paying Living Expenses: Not hard at all   Food Insecurity: No Food Insecurity (2024)    Hunger Vital Sign     Worried About Running Out of Food in the Last Year: Never true     Ran Out of Food in the Last Year: Never true   Transportation Needs: No Transportation Needs (2024)    PRAPARE - Transportation     Lack of  Transportation (Medical): No     Lack of Transportation (Non-Medical): No   Stress: No Stress Concern Present (1/27/2024)    [employer] [place] [employer] - Occupational Stress Questionnaire     Feeling of Stress : Not at all   Housing Stability: Low Risk  (1/27/2024)    Housing Stability Vital Sign     Unable to Pay for Housing in the Last Year: No     Number of Places Lived in the Last Year: 1     Unstable Housing in the Last Year: No       Precautions:     Allergies as of 11/06/2024 - Reviewed 11/06/2024   Allergen Reaction Noted    Codeine Other (See Comments) 11/18/2013       [facility] Assessment Details/Treatment     Consulted on this 89 y/o male patient for present on admission ASI to scrotum. Patient was admitted 11/6/24 for coronary artery disease of native artery of native heart with stable angina pectoris. PMH significant for aortic aneurysm, CAD, HTN, glaucoma, CKD, GERD, arthritis, prostate cancer, PVD.   Patient resting in bed, agreeable to assessment.    Patient presents with dry crusted scattered rash to the generalized back with one lesion noted on the left posterior shoulder. Also noted to have nodule type lesions to medial left thigh and lateral left thigh. Rash also noted to the bilateral groins and pubis. Patient denies any pain in these areas but states the groin/pubis area itches a lot. Patient reports he saw outpatient dermatology and was prescribed some topical therapies for this rash. All areas seem to be dry and crusted with no drainage noted at this time, unsure of exact etiology. Patient reported having blood in his undergarments prior to admission, no blood noted at this visit. Will reach out to attending for further discussion on treatment at this time.   Per chart review it appears patient discussed with outpatient dermatology following up in late December. Recommend patient follows up with outpatient dermatology sooner than previously discussed to explore further treatment  options.      11/06/24 1340   WOCN Assessment   WOCN Total Time (mins) 45   Visit Date 11/06/24   Visit Time 1340   Consult Type New   WOCN Speciality Wound   Wound other  (rash unsure of etiology)   Intervention assessed;chart review;orders   Teaching on-going;complication        Wound 11/06/24 0930 Rash Pubis   Date First Assessed/Time First Assessed: 11/06/24 0930   Present on Original Admission: Yes  Primary Wound Type: Rash  Location: Pubis   Wound Image    Distribution generalized   Characteristics pruritus/itching;redness/erythema;dryness;crusting   Color purple;deep red   Dressing Appearance Open to air   Drainage Amount None   Appearance Maroon;Purple;Dry        Wound 11/06/24 1342 Rash Left medial Thigh   Date First Assessed/Time First Assessed: 11/06/24 1342   Present on Original Admission: Yes  Primary Wound Type: Rash  Side: Left  Orientation: medial  Location: Thigh   Wound Image    Distribution generalized   Characteristics dryness;redness/erythema   Color deep red;purple        Wound 11/06/24 1343 Rash Left lateral Thigh nodule   Date First Assessed/Time First Assessed: 11/06/24 1343   Present on Original Admission: Yes  Primary Wound Type: Rash  Side: Left  Orientation: lateral  Location: Thigh  Type: nodule   Wound Image    Distribution generalized   Characteristics dryness;redness/erythema;crusting   Color deep red;purple        Wound 11/06/24 1344 Rash Back other (see comments)   Date First Assessed/Time First Assessed: 11/06/24 1344   Present on Original Admission: Yes  Primary Wound Type: Rash  Location: Back  Type: (c) other (see comments)   Wound Image    Distribution generalized   Characteristics crusting;dryness;redness/erythema   Color deep red;purple        Wound 11/06/24 1345 Rash Left Groin   Date First Assessed/Time First Assessed: 11/06/24 1345   Present on Original Admission: Yes  Primary Wound Type: Rash  Side: Left  Location: Groin   Wound Image    Distribution regional    Characteristics pruritus/itching;redness/erythema   Color deep red;purple        Wound 11/06/24 1347 Rash Left posterior Shoulder   Date First Assessed/Time First Assessed: 11/06/24 1347   Primary Wound Type: Rash  Side: Left  Orientation: posterior  Location: Shoulder   Wound Image    Distribution generalized   Characteristics crusting;dryness;redness/erythema   Color deep red;purple     11/06/2024

## 2024-11-06 NOTE — ASSESSMENT & PLAN NOTE
-Followed by Dr. Mirza  -He established with Dr. Gurrola in 2013 for prostate cancer. Initially had PSA 11.3, Totz 4+3(GG3). He then completed EBRT with Dr. Deng in 2014 at [facility].   -Last seen by Dr. Mirza on 9/4/24 -- monitoring PSA.   -Continue Ditropan and flomax.

## 2024-11-06 NOTE — HPI
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 That presented to the ED with c/o Chest pain. Patient reports CP started a few days ago and returned last night. Then he admitted chest pain has been "going on awhile." CP located to sternal area. No radiation of pain. Pain described as "pressure." He initially thought it was indigestion because he was doing a lot of belching which gave some relief. He is followed by Dr. Aquino (cardiology). Had a stress test on 1/25/24 that was normal. ED workup showed: WBC count 7.22, Hgb/Hct 12.9/39.7, Plt 128, creatinine 1.8, troponin 0.06>0.021>0.031. CXR with no acute findings. EKG with no significant change from previous. He is currently chest pain free. Pt admitted to observation for chest pain.   "

## 2024-11-06 NOTE — ASSESSMENT & PLAN NOTE
-Followed by Dr. Montilla outpatient.   -Per clinic note in 02/2024 -- continue to monitor aneurysm at this size and if it reaches the 6 cm threshold we can have a different conversation if needed. F/U in 6 months.  -CTA chest ordered but patient has yet to get.  -Will repeat imaging while hospitalized.

## 2024-11-06 NOTE — ASSESSMENT & PLAN NOTE
-Patient located to back and genital area. Saw Dermatology on 10/23/24 -- reccommended : ketoconazole (NIZORAL) 2 % cream; gentamicin (GARAMYCIN) 0.1 % ointment, triamcinolone acetonide 0.025% (KENALOG) 0.025 % cream; and miconazole NITRATE 2 % (ZEASORB AF) 2 % top powder. Patient states not improving. Will have Dr. Lara take a look and give recs.

## 2024-11-06 NOTE — CONSULTS
[facility] - Telemetry ([facility])  Cardiology  Consult Note    Patient Name: Jovanny Olmedo  MRN: [id number]  Admission Date: 11/6/2024  Hospital Length of Stay: 0 days  Code Status: Full Code   Attending Provider: Herb Lara DO   Consulting Provider: Omid Cook MD  Primary Care Physician: Miryam Jimenez MD  Principal Problem:Coronary artery disease of native artery of native heart with stable angina pectoris    Patient information was obtained from patient and ER records.     Inpatient consult to Cardiology  Consult performed by: Omid Cook MD  Consult ordered by: Kari Tariq FNP        Subjective:     Chief Complaint:  CP     HPI:   89 yo male with past medical history of aortic aneurysm, CAD, HTN, glaucoma, CKD and GERD presented with atypical CP at rest. No exertional sx.  EKG shows no changes, two troponins normal last troponin mildly elevated.Pt states DBP at home > 100. NMT/stress 1-24 normal.    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 8/6/2023    Arthritis     Back pain     CAD (coronary artery disease)     Cataract     CKD (chronic kidney disease)     GERD (gastroesophageal reflux disease)     Glaucoma     suspect    HTN (hypertension)     Multiple subsegmental pulmonary emboli without acute cor pulmonale 9/9/2021    Prostate cancer     tx'd with radiation    PVD (peripheral vascular disease)     stent in right renal artery and aorta    Sleep apnea     Stable angina pectoris 5/16/2023       Past Surgical History:   Procedure Laterality Date    ABDOMINAL AORTA STENT      CARDIAC CATHETERIZATION      CATARACT EXTRACTION W/  INTRAOCULAR LENS IMPLANT Right 04/29/2017    COLONOSCOPY N/A 9/24/2020    Procedure: COLONOSCOPY;  Surgeon: Shayy Melvin MD;  Location: [facility];  Service: Endoscopy;  Laterality: N/A;    COLONOSCOPY N/A 1/10/2022     Procedure: COLONOSCOPY;  Surgeon: Vi Lara MD;  Location: [facility];  Service: Endoscopy;  Laterality: N/A;    CORONARY ANGIOPLASTY      CORONARY STENT PLACEMENT      PCIOL Right 03/29/2017    DR. LEDEZMA    PCIOL Left 07/03/2019    DR. LEDEZMA    PROSTATE BIOPSY      RENAL ARTERY STENT         Review of patient's allergies indicates:   Allergen Reactions    Codeine Other (See Comments)     When taking codeine, pt becomes very anxious       No current facility-administered medications on file prior to encounter.     Current Outpatient Medications on File Prior to Encounter   Medication Sig    albuterol (VENTOLIN HFA) 90 mcg/actuation inhaler Inhale 2 puffs into the lungs every 6 (six) hours as needed for Wheezing. Rescue    aspirin (ECOTRIN) 81 MG EC tablet Take 81 mg by mouth once daily.    atorvastatin (LIPITOR) 40 MG tablet Take 1 tablet (40 mg total) by mouth once daily.    azelastine (ASTELIN) 137 mcg (0.1 %) nasal spray USE 2 SPRAY(S) IN EACH NOSTRIL TWICE DAILY    carvediloL (COREG) 12.5 MG tablet Take 1 tablet (12.5 mg total) by mouth 2 (two) times daily with meals.    cetirizine (ZYRTEC) 10 MG tablet Take 0.5 tablets (5 mg total) by mouth once daily.    ferrous sulfate 325 (65 FE) MG EC tablet Take 325 mg by mouth once daily.    finasteride (PROSCAR) 5 mg tablet Take 1 tablet (5 mg total) by mouth once daily.    gentamicin (GARAMYCIN) 0.1 % ointment Apply topically 2 (two) times a day. For rash of groin.    ketoconazole (NIZORAL) 2 % cream Apply topically 2 (two) times daily. For rash of groin.    lisinopriL (PRINIVIL,ZESTRIL) 40 MG tablet Take 1 tablet by mouth once daily    meloxicam (MOBIC) 15 MG tablet Take 15 mg by mouth once daily.    miconazole NITRATE 2 % (ZEASORB AF) 2 % top powder Use two to three times daily to prevent rash    NIFEdipine (PROCARDIA-XL) 60 MG (OSM) 24 hr tablet Take 1 tablet (60 mg total) by mouth once daily.    nitroGLYCERIN (NITROSTAT) 0.4 MG SL tablet Place 1 tablet  (0.4 mg total) under the tongue every 5 (five) minutes as needed for Chest pain.    oxybutynin (DITROPAN-XL) 5 MG TR24 Take 1 tablet (5 mg total) by mouth once daily.    pantoprazole (PROTONIX) 40 MG tablet Take 1 tablet by mouth once daily    tamsulosin (FLOMAX) 0.4 mg Cap Take 1 capsule by mouth once daily    traMADoL (ULTRAM) 50 mg tablet Take 50 mg by mouth daily as needed.    triamcinolone acetonide 0.025% (KENALOG) 0.025 % cream Apply topically 2 (two) times daily. PRN rash of groin.     Family History       Problem Relation (Age of Onset)    Cancer Father    Colon cancer Father    Diabetes Mother, Sister, Brother    Glaucoma Mother    Hypertension Brother    Kidney disease Mother          Tobacco Use    Smoking status: Former     Current packs/day: 0.00     Average packs/day: 0.5 packs/day for 30.0 years (15.0 ttl pk-yrs)     Types: Cigarettes     Start date: 1965     Quit date: 1995     Years since quittin.1    Smokeless tobacco: Former   Substance and Sexual Activity    Alcohol use: Not Currently     Alcohol/week: 0.0 standard drinks of alcohol    Drug use: Yes     Frequency: 4.0 times per week     Types: Marijuana    Sexual activity: Yes     Partners: Female     Review of Systems   Constitutional: Negative. Negative for weight gain.   HENT: Negative.     Eyes: Negative.    Cardiovascular: Negative.  Negative for chest pain, leg swelling and palpitations.   Respiratory: Negative.  Negative for shortness of breath.    Endocrine: Negative.    Hematologic/Lymphatic: Negative.    Skin: Negative.    Musculoskeletal:  Negative for muscle weakness.   Gastrointestinal: Negative.    Genitourinary: Negative.    Neurological: Negative.  Negative for dizziness.   Psychiatric/Behavioral: Negative.     Allergic/Immunologic: Negative.    All other systems reviewed and are negative.    Objective:     Vital Signs (Most Recent):  Temp: 98.6 °F (37 °C) (24)  Pulse: 72 (24)  Resp: 17  (11/06/24 0915)  BP: 126/77 (11/06/24 0915)  SpO2: 95 % (11/06/24 0915) Vital Signs (24h Range):  Temp:  [98.3 °F (36.8 °C)-98.6 °F (37 °C)] 98.6 °F (37 °C)  Pulse:  [70-90] 72  Resp:  [14-20] 17  SpO2:  [95 %-97 %] 95 %  BP: (115-128)/(59-84) 126/77     Weight: 94.5 kg (208 lb 5.4 oz)  Body mass index is 28.26 kg/m².    SpO2: 95 %       No intake or output data in the 24 hours ending 11/06/24 1154    Lines/Drains/Airways       Peripheral Intravenous Line  Duration                  Peripheral IV - Single Lumen 11/06/24 0437 20 G Left Antecubital <1 day                     Physical Exam  Vitals and nursing note reviewed.   Constitutional:       Appearance: He is well-developed.   HENT:      Head: Normocephalic and atraumatic.   Eyes:      Conjunctiva/sclera: Conjunctivae normal.      Pupils: Pupils are equal, round, and reactive to light.   Cardiovascular:      Rate and Rhythm: Normal rate and regular rhythm.      Pulses: Intact distal pulses.      Heart sounds: Normal heart sounds.   Pulmonary:      Effort: Pulmonary effort is normal.      Breath sounds: Normal breath sounds.   Abdominal:      General: Bowel sounds are normal.      Palpations: Abdomen is soft.   Musculoskeletal:      Cervical back: Normal range of motion and neck supple.   Skin:     General: Skin is warm and dry.   Neurological:      Mental Status: He is alert and oriented to person, place, and time.          Significant Labs: All pertinent lab results from the last 24 hours have been reviewed.    Significant Imaging: X-Ray: CXR: X-Ray Chest 1 View (CXR): No results found for this visit on 11/06/24.  Assessment and Plan:     87 yo male with past medical history of aortic aneurysm, CAD, HTN, glaucoma, CKD and GERD presented with atypical CP at rest. No exertional sx.  EKG shows no changes, two troponins normal last troponin mildly elevated.Pt states DBP at home > 100. NMT/stress 1-24 normal.    Recommend increase PPI dose , increase nifedipine  dose.  Follow up cardiology clinic 2 weeks      VTE Risk Mitigation (From admission, onward)           Ordered     enoxaparin injection 40 mg  Daily         11/06/24 1025     IP VTE HIGH RISK PATIENT  Once         11/06/24 1025     Place sequential compression device  Until discontinued         11/06/24 1025     Place sequential compression device  Until discontinued         11/06/24 1002                    Thank you for your consult. I will follow-up with patient. Please contact us if you have any additional questions.    Omid Cook MD  Cardiology   [facility] - Telemetry ([facility])

## 2024-11-06 NOTE — SUBJECTIVE & OBJECTIVE
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 8/6/2023    Arthritis     Back pain     CAD (coronary artery disease)     Cataract     CKD (chronic kidney disease)     GERD (gastroesophageal reflux disease)     Glaucoma     suspect    HTN (hypertension)     Multiple subsegmental pulmonary emboli without acute cor pulmonale 9/9/2021    Prostate cancer     tx'd with radiation    PVD (peripheral vascular disease)     stent in right renal artery and aorta    Sleep apnea     Stable angina pectoris 5/16/2023       Past Surgical History:   Procedure Laterality Date    ABDOMINAL AORTA STENT      CARDIAC CATHETERIZATION      CATARACT EXTRACTION W/  INTRAOCULAR LENS IMPLANT Right 04/29/2017    COLONOSCOPY N/A 9/24/2020    Procedure: COLONOSCOPY;  Surgeon: Shayy Melvin MD;  Location: [facility] ENDO;  Service: Endoscopy;  Laterality: N/A;    COLONOSCOPY N/A 1/10/2022    Procedure: COLONOSCOPY;  Surgeon: Vi Lara MD;  Location: [facility] ENDO;  Service: Endoscopy;  Laterality: N/A;    CORONARY ANGIOPLASTY      CORONARY STENT PLACEMENT      PCIOL Right 03/29/2017    DR. LEDEZMA    PCIOL Left 07/03/2019    DR. LEDEZMA    PROSTATE BIOPSY      RENAL ARTERY STENT         Review of patient's allergies indicates:   Allergen Reactions    Codeine Other (See Comments)     When taking codeine, pt becomes very anxious       No current facility-administered medications on file prior to encounter.     Current Outpatient Medications on File Prior to Encounter   Medication Sig    albuterol (VENTOLIN HFA) 90 mcg/actuation inhaler Inhale 2 puffs into the lungs every 6 (six) hours as needed for Wheezing. Rescue    aspirin (ECOTRIN) 81 MG EC tablet Take 81 mg by mouth once daily.    atorvastatin (LIPITOR) 40 MG tablet Take 1 tablet (40 mg total) by mouth once daily.    azelastine (ASTELIN) 137 mcg (0.1 %) nasal  spray USE 2 SPRAY(S) IN EACH NOSTRIL TWICE DAILY    carvediloL (COREG) 12.5 MG tablet Take 1 tablet (12.5 mg total) by mouth 2 (two) times daily with meals.    cetirizine (ZYRTEC) 10 MG tablet Take 0.5 tablets (5 mg total) by mouth once daily.    ferrous sulfate 325 (65 FE) MG EC tablet Take 325 mg by mouth once daily.    finasteride (PROSCAR) 5 mg tablet Take 1 tablet (5 mg total) by mouth once daily.    gentamicin (GARAMYCIN) 0.1 % ointment Apply topically 2 (two) times a day. For rash of groin.    ketoconazole (NIZORAL) 2 % cream Apply topically 2 (two) times daily. For rash of groin.    lisinopriL (PRINIVIL,ZESTRIL) 40 MG tablet Take 1 tablet by mouth once daily    meloxicam (MOBIC) 15 MG tablet Take 15 mg by mouth once daily.    miconazole NITRATE 2 % (ZEASORB AF) 2 % top powder Use two to three times daily to prevent rash    NIFEdipine (PROCARDIA-XL) 60 MG (OSM) 24 hr tablet Take 1 tablet (60 mg total) by mouth once daily.    nitroGLYCERIN (NITROSTAT) 0.4 MG SL tablet Place 1 tablet (0.4 mg total) under the tongue every 5 (five) minutes as needed for Chest pain.    oxybutynin (DITROPAN-XL) 5 MG TR24 Take 1 tablet (5 mg total) by mouth once daily.    pantoprazole (PROTONIX) 40 MG tablet Take 1 tablet by mouth once daily    tamsulosin (FLOMAX) 0.4 mg Cap Take 1 capsule by mouth once daily    traMADoL (ULTRAM) 50 mg tablet Take 50 mg by mouth daily as needed.    triamcinolone acetonide 0.025% (KENALOG) 0.025 % cream Apply topically 2 (two) times daily. PRN rash of groin.     Family History       Problem Relation (Age of Onset)    Cancer Father    Colon cancer Father    Diabetes Mother, Sister, Brother    Glaucoma Mother    Hypertension Brother    Kidney disease Mother          Tobacco Use    Smoking status: Former     Current packs/day: 0.00     Average packs/day: 0.5 packs/day for 30.0 years (15.0 ttl pk-yrs)     Types: Cigarettes     Start date: 1965     Quit date: 1995     Years since quittin.1     Smokeless tobacco: Former   Substance and Sexual Activity    Alcohol use: Not Currently     Alcohol/week: 0.0 standard drinks of alcohol    Drug use: Yes     Frequency: 4.0 times per week     Types: Marijuana    Sexual activity: Yes     Partners: Female     Review of Systems   Constitutional:  Positive for activity change. Negative for chills and fever.   HENT:  Negative for trouble swallowing.    Eyes:  Negative for visual disturbance.   Respiratory:  Negative for cough, choking, chest tightness and shortness of breath.    Cardiovascular:  Positive for chest pain.   Gastrointestinal:  Negative for abdominal pain, constipation, diarrhea, nausea and vomiting.   Genitourinary:  Negative for difficulty urinating.   Musculoskeletal:  Negative for arthralgias.   Skin:  Positive for rash.   Neurological:  Positive for weakness. Negative for seizures, syncope, facial asymmetry, speech difficulty, light-headedness, numbness and headaches.   Psychiatric/Behavioral:  Negative for agitation, behavioral problems and confusion.      Objective:     Vital Signs (Most Recent):  Temp: 98.6 °F (37 °C) (11/06/24 0915)  Pulse: 72 (11/06/24 0915)  Resp: 17 (11/06/24 0915)  BP: 126/77 (11/06/24 0915)  SpO2: 95 % (11/06/24 0915) Vital Signs (24h Range):  Temp:  [98.3 °F (36.8 °C)-98.6 °F (37 °C)] 98.6 °F (37 °C)  Pulse:  [70-90] 72  Resp:  [14-20] 17  SpO2:  [95 %-97 %] 95 %  BP: (115-128)/(59-84) 126/77     Weight: 94.5 kg (208 lb 5.4 oz)  Body mass index is 28.26 kg/m².     Physical Exam  Constitutional:       General: He is not in acute distress.     Appearance: He is not ill-appearing.   HENT:      Head: Normocephalic and atraumatic.      Mouth/Throat:      Mouth: Mucous membranes are moist.   Eyes:      Extraocular Movements: Extraocular movements intact.   Cardiovascular:      Rate and Rhythm: Normal rate.      Heart sounds: Murmur heard.   Pulmonary:      Effort: Pulmonary effort is normal. No respiratory distress.      Breath  sounds: Normal breath sounds.   Abdominal:      General: Bowel sounds are normal. There is no distension.      Palpations: Abdomen is soft.      Tenderness: There is no abdominal tenderness.   Genitourinary:     Comments: deferred  Musculoskeletal:      Cervical back: Normal range of motion.      Right lower leg: No edema.      Left lower leg: No edema.   Skin:     General: Skin is warm and dry.      Capillary Refill: Capillary refill takes less than 2 seconds.      Findings: Lesion and rash present.   Neurological:      Mental Status: He is alert and oriented to person, place, and time.   Psychiatric:         Mood and Affect: Mood normal.         Behavior: Behavior normal.         Thought Content: Thought content normal.                  Significant Labs: All pertinent labs within the past 24 hours have been reviewed.  CBC:   Recent Labs   Lab 11/06/24 0433   WBC 7.22   HGB 12.9*   HCT 39.7*   *     CMP:   Recent Labs   Lab 11/06/24 0433      K 4.1      CO2 23   *   BUN 26*   CREATININE 1.8*   CALCIUM 9.3   PROT 7.4   ALBUMIN 3.6   BILITOT 0.4   ALKPHOS 79   AST 20   ALT 13   ANIONGAP 11     Troponin:   Recent Labs   Lab 11/06/24 0433 11/06/24  0750   TROPONINI 0.021  0.026 0.031*       Significant Imaging:   Imaging Results              X-Ray Chest AP Portable (Final result)  Result time 11/06/24 06:29:33      Final result by Nav Gooden MD (11/06/24 06:29:33)                   Impression:      No acute abnormality.      Electronically signed by: Nav Gooden  Date:    11/06/2024  Time:    06:29               Narrative:    EXAMINATION:  XR CHEST AP PORTABLE    CLINICAL HISTORY:  Chest Pain;    TECHNIQUE:  Single frontal view of the chest was performed.    COMPARISON:  Multiple    FINDINGS:  The lungs are clear, with normal appearance of pulmonary vasculature and no pleural effusion or pneumothorax.    The cardiac silhouette is normal in size. The hilar and mediastinal contours  are unremarkable.    Bones are intact.

## 2024-11-06 NOTE — PLAN OF CARE
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    Arthritis     Back pain     CAD (coronary artery disease)     Cataract     CKD (chronic kidney disease)     GERD (gastroesophageal reflux disease)     Glaucoma     suspect    HTN (hypertension)     Multiple subsegmental pulmonary emboli without acute cor pulmonale 9/9/2021    Prostate cancer     tx'd with radiation    PVD (peripheral vascular disease)     stent in right renal artery and aorta    Sleep apnea     Stable angina pectoris 5/16/2023      PRN meds    acetaminophen, 650 mg, Q8H PRN  dextrose 10%, 12.5 g, PRN  dextrose 10%, 25 g, PRN  glucagon (human recombinant), 1 mg, PRN  glucose, 16 g, PRN  glucose, 24 g, PRN  melatonin, 6 mg, Nightly PRN  naloxone, 0.02 mg, PRN  nitroGLYCERIN, 0.4 mg, Q5 Min PRN  ondansetron, 4 mg, Q8H PRN  sodium chloride 0.9%, 10 mL, Q12H PRN  sodium chloride 0.9%, 10 mL, PRN      Chart check completed. Will continue plan of care.         [place] Coma Scale Score: 15       Rhythm: normal sinus rhythm Frequency/Ectopy: PVCs  Cardiac/Telemetry Box Number: [id number]    Last Bowel Movement: 11/06/24  Diet Cardiac Standard Tray     Mani Score: 16  Fall Risk Score: 8  Accucheck []   Freq?      Lines/Drains/Airways       Peripheral Intravenous Line  Duration                  Peripheral IV - Single Lumen 11/06/24 0437 20 G Left Antecubital <1 day

## 2024-11-06 NOTE — ASSESSMENT & PLAN NOTE
-Normally takes PPI but will give carafate/antacid while hospitalized due to PPI shortage. He can resume PPI upon discharge.

## 2024-11-06 NOTE — NURSING TRANSFER
Nursing Transfer Note      11/6/2024   09:15 AM    Nurse giving handoff:ED   Nurse receiving handoff: MERON     Reason patient is being transferred: ADMIT    Transfer To: TELE    Transfer via stretcher    Transfer with cardiac monitoring    Transported by PT TRANSPORT WITH FAMILY     Telemetry: Box Number [id number]  Order for Tele Monitor? Yes    Patient belongings transferred with patient: Yes    Chart send with patient: Yes    Notified: FAMILY AT BEDSIDE

## 2024-11-06 NOTE — HPI
89 yo male with past medical history of aortic aneurysm, CAD, HTN, glaucoma, CKD and GERD presented with atypical CP at rest. No exertional sx.  EKG shows no changes, two troponins normal last troponin mildly elevated.Pt states DBP at home > 100. NMT/stress 1-24 normal.

## 2024-11-06 NOTE — ASSESSMENT & PLAN NOTE
Baseline creatinine is  1.5-1.7 . Most recent creatinine and eGFR are listed below.  Recent Labs     11/06/24  0433   CREATININE 1.8*   EGFRNORACEVR 36*      Plan  - SANJAY is stable  - Avoid nephrotoxins and renally dose meds for GFR listed above  - Monitor urine output, serial BMP, and adjust therapy as needed

## 2024-11-06 NOTE — H&P
"[facility]  History & Physical    Patient Name: Jovanny Olmedo  MRN: [id number]  Patient Class: OP- Observation  Admission Date: 11/6/2024  Attending Physician: Herb Lara DO   Primary Care Provider: Miryam Jimenez MD         Patient information was obtained from patient and ER records.     Subjective:     Principal Problem:Coronary artery disease of native artery of native heart with stable angina pectoris    Chief Complaint:   Chief Complaint   Patient presents with    Chest Pain     Pt c/o CP with radiation down L arm. Onset of 0200 pain was 10/10. AASI gave 4 SL NTG, 1'' NTG paste, 325mg ASA. Pt reports pain is 0/10 at this time.         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 That presented to the ED with c/o Chest pain. Patient reports CP started a few days ago and returned last night. Then he admitted chest pain has been "going on awhile." CP located to sternal area. No radiation of pain. Pain described as "pressure." He initially thought it was indigestion because he was doing a lot of belching which gave some relief. He is followed by Dr. Aquino (cardiology). Had a stress test on 1/25/24 that was normal. ED workup showed: WBC count 7.22, Hgb/Hct 12.9/39.7, Plt 128, creatinine 1.8, troponin 0.06>0.021>0.031. CXR with no acute findings. EKG with no significant change from previous. He is currently chest pain free. Pt admitted to observation for chest pain.     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 8/6/2023    Arthritis     Back pain     CAD (coronary artery disease)     Cataract     CKD (chronic kidney disease)     GERD (gastroesophageal reflux disease)     Glaucoma     suspect    HTN (hypertension)     Multiple subsegmental pulmonary emboli without acute cor pulmonale 9/9/2021    Prostate cancer     tx'd with radiation    PVD (peripheral vascular disease)     stent in right renal artery and aorta    Sleep apnea     Stable angina pectoris 5/16/2023       Past Surgical History:   Procedure Laterality Date    ABDOMINAL AORTA STENT      CARDIAC CATHETERIZATION      CATARACT EXTRACTION W/  INTRAOCULAR LENS IMPLANT Right 04/29/2017    COLONOSCOPY N/A 9/24/2020    Procedure: COLONOSCOPY;  Surgeon: Shayy Melvin MD;  Location: [facility] ENDO;  Service: Endoscopy;  Laterality: N/A;    COLONOSCOPY N/A 1/10/2022    Procedure: COLONOSCOPY;  Surgeon: Vi Lara MD;  Location: [facility] ENDO;  Service: Endoscopy;  Laterality: N/A;    CORONARY ANGIOPLASTY      CORONARY STENT PLACEMENT      PCIOL Right 03/29/2017    DR. LEDEZMA    PCIOL Left 07/03/2019    DR. LEDEZMA    PROSTATE BIOPSY      RENAL ARTERY STENT         Review of patient's allergies indicates:   Allergen Reactions    Codeine Other (See Comments)     When taking codeine, pt becomes very anxious       No current facility-administered medications on file prior to encounter.     Current Outpatient Medications on File Prior to Encounter   Medication Sig    albuterol (VENTOLIN HFA) 90 mcg/actuation inhaler Inhale 2 puffs into the lungs every 6 (six) hours as needed for Wheezing. Rescue    aspirin (ECOTRIN) 81 MG EC tablet Take 81 mg by mouth once daily.    atorvastatin (LIPITOR) 40 MG tablet Take 1 tablet (40 mg total) by mouth once daily.    azelastine (ASTELIN) 137 mcg (0.1 %) nasal spray USE 2 SPRAY(S) IN EACH  NOSTRIL TWICE DAILY    carvediloL (COREG) 12.5 MG tablet Take 1 tablet (12.5 mg total) by mouth 2 (two) times daily with meals.    cetirizine (ZYRTEC) 10 MG tablet Take 0.5 tablets (5 mg total) by mouth once daily.    ferrous sulfate 325 (65 FE) MG EC tablet Take 325 mg by mouth once daily.    finasteride (PROSCAR) 5 mg tablet Take 1 tablet (5 mg total) by mouth once daily.    gentamicin (GARAMYCIN) 0.1 % ointment Apply topically 2 (two) times a day. For rash of groin.    ketoconazole (NIZORAL) 2 % cream Apply topically 2 (two) times daily. For rash of groin.    lisinopriL (PRINIVIL,ZESTRIL) 40 MG tablet Take 1 tablet by mouth once daily    meloxicam (MOBIC) 15 MG tablet Take 15 mg by mouth once daily.    miconazole NITRATE 2 % (ZEASORB AF) 2 % top powder Use two to three times daily to prevent rash    NIFEdipine (PROCARDIA-XL) 60 MG (OSM) 24 hr tablet Take 1 tablet (60 mg total) by mouth once daily.    nitroGLYCERIN (NITROSTAT) 0.4 MG SL tablet Place 1 tablet (0.4 mg total) under the tongue every 5 (five) minutes as needed for Chest pain.    oxybutynin (DITROPAN-XL) 5 MG TR24 Take 1 tablet (5 mg total) by mouth once daily.    pantoprazole (PROTONIX) 40 MG tablet Take 1 tablet by mouth once daily    tamsulosin (FLOMAX) 0.4 mg Cap Take 1 capsule by mouth once daily    traMADoL (ULTRAM) 50 mg tablet Take 50 mg by mouth daily as needed.    triamcinolone acetonide 0.025% (KENALOG) 0.025 % cream Apply topically 2 (two) times daily. PRN rash of groin.     Family History       Problem Relation (Age of Onset)    Cancer Father    Colon cancer Father    Diabetes Mother, Sister, Brother    Glaucoma Mother    Hypertension Brother    Kidney disease Mother          Tobacco Use    Smoking status: Former     Current packs/day: 0.00     Average packs/day: 0.5 packs/day for 30.0 years (15.0 ttl pk-yrs)     Types: Cigarettes     Start date: 1965     Quit date: 1995     Years since quittin.1    Smokeless tobacco: Former    Substance and Sexual Activity    Alcohol use: Not Currently     Alcohol/week: 0.0 standard drinks of alcohol    Drug use: Yes     Frequency: 4.0 times per week     Types: Marijuana    Sexual activity: Yes     Partners: Female     Review of Systems   Constitutional:  Positive for activity change. Negative for chills and fever.   HENT:  Negative for trouble swallowing.    Eyes:  Negative for visual disturbance.   Respiratory:  Negative for cough, choking, chest tightness and shortness of breath.    Cardiovascular:  Positive for chest pain.   Gastrointestinal:  Negative for abdominal pain, constipation, diarrhea, nausea and vomiting.   Genitourinary:  Negative for difficulty urinating.   Musculoskeletal:  Negative for arthralgias.   Skin:  Positive for rash.   Neurological:  Positive for weakness. Negative for seizures, syncope, facial asymmetry, speech difficulty, light-headedness, numbness and headaches.   Psychiatric/Behavioral:  Negative for agitation, behavioral problems and confusion.      Objective:     Vital Signs (Most Recent):  Temp: 98.6 °F (37 °C) (11/06/24 0915)  Pulse: 72 (11/06/24 0915)  Resp: 17 (11/06/24 0915)  BP: 126/77 (11/06/24 0915)  SpO2: 95 % (11/06/24 0915) Vital Signs (24h Range):  Temp:  [98.3 °F (36.8 °C)-98.6 °F (37 °C)] 98.6 °F (37 °C)  Pulse:  [70-90] 72  Resp:  [14-20] 17  SpO2:  [95 %-97 %] 95 %  BP: (115-128)/(59-84) 126/77     Weight: 94.5 kg (208 lb 5.4 oz)  Body mass index is 28.26 kg/m².     Physical Exam  Constitutional:       General: He is not in acute distress.     Appearance: He is not ill-appearing.   HENT:      Head: Normocephalic and atraumatic.      Mouth/Throat:      Mouth: Mucous membranes are moist.   Eyes:      Extraocular Movements: Extraocular movements intact.   Cardiovascular:      Rate and Rhythm: Normal rate.      Heart sounds: Murmur heard.   Pulmonary:      Effort: Pulmonary effort is normal. No respiratory distress.      Breath sounds: Normal breath sounds.    Abdominal:      General: Bowel sounds are normal. There is no distension.      Palpations: Abdomen is soft.      Tenderness: There is no abdominal tenderness.   Genitourinary:     Comments: deferred  Musculoskeletal:      Cervical back: Normal range of motion.      Right lower leg: No edema.      Left lower leg: No edema.   Skin:     General: Skin is warm and dry.      Capillary Refill: Capillary refill takes less than 2 seconds.      Findings: Lesion and rash present.   Neurological:      Mental Status: He is alert and oriented to person, place, and time.   Psychiatric:         Mood and Affect: Mood normal.         Behavior: Behavior normal.         Thought Content: Thought content normal.                  Significant Labs: All pertinent labs within the past 24 hours have been reviewed.  CBC:   Recent Labs   Lab 11/06/24 0433   WBC 7.22   HGB 12.9*   HCT 39.7*   *     CMP:   Recent Labs   Lab 11/06/24  0433      K 4.1      CO2 23   *   BUN 26*   CREATININE 1.8*   CALCIUM 9.3   PROT 7.4   ALBUMIN 3.6   BILITOT 0.4   ALKPHOS 79   AST 20   ALT 13   ANIONGAP 11     Troponin:   Recent Labs   Lab 11/06/24  0433 11/06/24  0750   TROPONINI 0.021  0.026 0.031*       Significant Imaging:   Imaging Results              X-Ray Chest AP Portable (Final result)  Result time 11/06/24 06:29:33      Final result by Nav Gooden MD (11/06/24 06:29:33)                   Impression:      No acute abnormality.      Electronically signed by: Nav Gooden  Date:    11/06/2024  Time:    06:29               Narrative:    EXAMINATION:  XR CHEST AP PORTABLE    CLINICAL HISTORY:  Chest Pain;    TECHNIQUE:  Single frontal view of the chest was performed.    COMPARISON:  Multiple    FINDINGS:  The lungs are clear, with normal appearance of pulmonary vasculature and no pleural effusion or pneumothorax.    The cardiac silhouette is normal in size. The hilar and mediastinal contours are unremarkable.    Bones  are intact.                                     Assessment/Plan:     * Coronary artery disease of native artery of native heart with stable angina pectoris  Patient with known CAD s/p stent placement, which is controlled Will continue ASA and Statin and monitor for S/Sx of angina/ACS. Continue to monitor on telemetry.     PLAN:  Serial troponin 0.026>0.021>0.031  Cardiology consulted  EKG SR w/ PVCs, left axis deviation, RBBB - no significant change   Stress test on 1/25/24 normal myocardial perfusion scan  ECHO pending  Continue BB, ASA, ACEI      Rash  -Patient located to back and genital area. Saw Dermatology on 10/23/24 -- reccommended : ketoconazole (NIZORAL) 2 % cream; gentamicin (GARAMYCIN) 0.1 % ointment, triamcinolone acetonide 0.025% (KENALOG) 0.025 % cream; and miconazole NITRATE 2 % (ZEASORB AF) 2 % top powder. Patient states not improving. Will have Dr. Lara take a look and give recs.       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 Montilla outpatient.   -Per clinic note in 02/2024 -- continue to monitor aneurysm at this size and if it reaches the 6 cm threshold we can have a different conversation if needed. F/U in 6 months.  -CTA chest ordered but patient has yet to get.  -Will repeat imaging while hospitalized.       Dyslipidemia  Lab Results   Component Value Date    CHOL 219 (H) 02/05/2024    HDL 46 02/05/2024    LDLCALC 145.2 02/05/2024    TRIG 139 02/05/2024     -Continue statin.    Prostate cancer  -Followed by Dr. Mirza  -He established with Dr. Gurrola in 2013 for prostate cancer. Initially had PSA 11.3, Breanne 4+3(GG3). He then completed EBRT with Dr. Deng in 2014 at [facility].   -Last seen by Dr. Mirza on 9/4/24 -- monitoring PSA.   -Continue Ditropan and flomax.      GERD (gastroesophageal reflux disease)  -Normally takes PPI but will give carafate/antacid while hospitalized due to PPI shortage. He can resume PPI upon discharge.       Essential  hypertension  Patients blood pressure range in the last 24 hours was: BP  Min: 115/59  Max: 128/76.The patient's inpatient anti-hypertensive regimen is listed below:  Current Antihypertensives  carvediloL tablet 12.5 mg, 2 times daily with meals, Oral  nitroGLYCERIN SL tablet 0.4 mg, Every 5 min PRN, Sublingual  lisinopriL tablet 40 mg, Daily, Oral    Plan  - BP is controlled, no changes needed to their regimen    Acute renal failure superimposed on chronic kidney disease  Baseline creatinine is  1.5-1.7 . Most recent creatinine and eGFR are listed below.  Recent Labs     11/06/24  0433   CREATININE 1.8*   EGFRNORACEVR 36*      Plan  - SANJAY is stable  - Avoid nephrotoxins and renally dose meds for GFR listed above  - Monitor urine output, serial BMP, and adjust therapy as needed      VTE Risk Mitigation (From admission, onward)           Ordered     enoxaparin injection 40 mg  Daily         11/06/24 1025     IP VTE HIGH RISK PATIENT  Once         11/06/24 1025     Place sequential compression device  Until discontinued         11/06/24 1025     Place sequential compression device  Until discontinued         11/06/24 1002                       On 11/06/2024, patient should be placed in hospital observation services under Dr. Lara's care in collaboration with Kari Tariq NP.           MAGI Alvarado  Department of Hospital Medicine  [facility] - Telemetry ([facility])

## 2024-11-06 NOTE — ASSESSMENT & PLAN NOTE
Patients blood pressure range in the last 24 hours was: BP  Min: 115/59  Max: 128/76.The patient's inpatient anti-hypertensive regimen is listed below:  Current Antihypertensives  carvediloL tablet 12.5 mg, 2 times daily with meals, Oral  nitroGLYCERIN SL tablet 0.4 mg, Every 5 min PRN, Sublingual  lisinopriL tablet 40 mg, Daily, Oral    Plan  - BP is controlled, no changes needed to their regimen

## 2024-11-07 VITALS
SYSTOLIC BLOOD PRESSURE: 145 MMHG | HEART RATE: 77 BPM | TEMPERATURE: 98 F | WEIGHT: 209.88 LBS | OXYGEN SATURATION: 95 % | RESPIRATION RATE: 18 BRPM | HEIGHT: 72 IN | DIASTOLIC BLOOD PRESSURE: 86 MMHG | BODY MASS INDEX: 28.43 KG/M2

## 2024-11-07 LAB
ALBUMIN SERPL BCP-MCNC: 3.5 G/DL (ref 3.5–5.2)
ALP SERPL-CCNC: 82 U/L (ref 40–150)
ALT SERPL W/O P-5'-P-CCNC: 12 U/L (ref 10–44)
ANION GAP SERPL CALC-SCNC: 9 MMOL/L (ref 8–16)
AST SERPL-CCNC: 17 U/L (ref 10–40)
BASOPHILS # BLD AUTO: 0.03 K/UL (ref 0–0.2)
BASOPHILS NFR BLD: 0.5 % (ref 0–1.9)
BILIRUB SERPL-MCNC: 0.6 MG/DL (ref 0.1–1)
BUN SERPL-MCNC: 19 MG/DL (ref 8–23)
CALCIUM SERPL-MCNC: 9.2 MG/DL (ref 8.7–10.5)
CHLORIDE SERPL-SCNC: 107 MMOL/L (ref 95–110)
CO2 SERPL-SCNC: 24 MMOL/L (ref 23–29)
CREAT SERPL-MCNC: 1.4 MG/DL (ref 0.5–1.4)
DIFFERENTIAL METHOD BLD: ABNORMAL
EOSINOPHIL # BLD AUTO: 0.3 K/UL (ref 0–0.5)
EOSINOPHIL NFR BLD: 4.2 % (ref 0–8)
ERYTHROCYTE [DISTWIDTH] IN BLOOD BY AUTOMATED COUNT: 13 % (ref 11.5–14.5)
EST. GFR  (NO RACE VARIABLE): 48 ML/MIN/1.73 M^2
GLUCOSE SERPL-MCNC: 105 MG/DL (ref 70–110)
HCT VFR BLD AUTO: 40.4 % (ref 40–54)
HGB BLD-MCNC: 13 G/DL (ref 14–18)
IMM GRANULOCYTES # BLD AUTO: 0.02 K/UL (ref 0–0.04)
IMM GRANULOCYTES NFR BLD AUTO: 0.3 % (ref 0–0.5)
LYMPHOCYTES # BLD AUTO: 1.5 K/UL (ref 1–4.8)
LYMPHOCYTES NFR BLD: 23 % (ref 18–48)
MAGNESIUM SERPL-MCNC: 1.8 MG/DL (ref 1.6–2.6)
MCH RBC QN AUTO: 29.3 PG (ref 27–31)
MCHC RBC AUTO-ENTMCNC: 32.2 G/DL (ref 32–36)
MCV RBC AUTO: 91 FL (ref 82–98)
MONOCYTES # BLD AUTO: 0.6 K/UL (ref 0.3–1)
MONOCYTES NFR BLD: 8.6 % (ref 4–15)
NEUTROPHILS # BLD AUTO: 4.1 K/UL (ref 1.8–7.7)
NEUTROPHILS NFR BLD: 63.4 % (ref 38–73)
NRBC BLD-RTO: 0 /100 WBC
PHOSPHATE SERPL-MCNC: 2.4 MG/DL (ref 2.7–4.5)
PLATELET # BLD AUTO: 131 K/UL (ref 150–450)
PMV BLD AUTO: 11.2 FL (ref 9.2–12.9)
POTASSIUM SERPL-SCNC: 4 MMOL/L (ref 3.5–5.1)
PROT SERPL-MCNC: 7.2 G/DL (ref 6–8.4)
RBC # BLD AUTO: 4.44 M/UL (ref 4.6–6.2)
SODIUM SERPL-SCNC: 140 MMOL/L (ref 136–145)
WBC # BLD AUTO: 6.43 K/UL (ref 3.9–12.7)

## 2024-11-07 PROCEDURE — 25000003 PHARM REV CODE 250: Performed by: NURSE PRACTITIONER

## 2024-11-07 PROCEDURE — 36415 COLL VENOUS BLD VENIPUNCTURE: CPT | Performed by: NURSE PRACTITIONER

## 2024-11-07 PROCEDURE — 25000003 PHARM REV CODE 250: Performed by: INTERNAL MEDICINE

## 2024-11-07 PROCEDURE — 80053 COMPREHEN METABOLIC PANEL: CPT | Performed by: NURSE PRACTITIONER

## 2024-11-07 PROCEDURE — 25000003 PHARM REV CODE 250: Performed by: STUDENT IN AN ORGANIZED HEALTH CARE EDUCATION/TRAINING PROGRAM

## 2024-11-07 PROCEDURE — 83735 ASSAY OF MAGNESIUM: CPT | Performed by: NURSE PRACTITIONER

## 2024-11-07 PROCEDURE — G0378 HOSPITAL OBSERVATION PER HR: HCPCS

## 2024-11-07 PROCEDURE — 84100 ASSAY OF PHOSPHORUS: CPT | Performed by: NURSE PRACTITIONER

## 2024-11-07 PROCEDURE — 85025 COMPLETE CBC W/AUTO DIFF WBC: CPT | Performed by: NURSE PRACTITIONER

## 2024-11-07 RX ORDER — NIFEDIPINE 90 MG/1
90 TABLET, EXTENDED RELEASE ORAL DAILY
Qty: 60 TABLET | Refills: 2 | Status: SHIPPED | OUTPATIENT
Start: 2024-11-08

## 2024-11-07 RX ORDER — NIFEDIPINE 90 MG/1
90 TABLET, EXTENDED RELEASE ORAL DAILY
Qty: 30 TABLET | Refills: 11 | Status: SHIPPED | OUTPATIENT
Start: 2024-11-08 | End: 2024-11-07

## 2024-11-07 RX ORDER — PANTOPRAZOLE SODIUM 40 MG/1
40 TABLET, DELAYED RELEASE ORAL 2 TIMES DAILY
Qty: 60 TABLET | Refills: 0 | Status: SHIPPED | OUTPATIENT
Start: 2024-11-07 | End: 2024-11-12 | Stop reason: SDUPTHER

## 2024-11-07 RX ADMIN — NIFEDIPINE 90 MG: 30 TABLET, FILM COATED, EXTENDED RELEASE ORAL at 08:11

## 2024-11-07 RX ADMIN — ATORVASTATIN CALCIUM 40 MG: 40 TABLET, FILM COATED ORAL at 08:11

## 2024-11-07 RX ADMIN — PANTOPRAZOLE SODIUM 40 MG: 40 TABLET, DELAYED RELEASE ORAL at 08:11

## 2024-11-07 RX ADMIN — Medication 600 ML: at 08:11

## 2024-11-07 RX ADMIN — OXYBUTYNIN CHLORIDE 5 MG: 5 TABLET, EXTENDED RELEASE ORAL at 08:11

## 2024-11-07 RX ADMIN — ALUMINUM HYDROXIDE, MAGNESIUM HYDROXIDE, AND DIMETHICONE 30 ML: 200; 20; 200 SUSPENSION ORAL at 11:11

## 2024-11-07 RX ADMIN — LISINOPRIL 40 MG: 20 TABLET ORAL at 08:11

## 2024-11-07 RX ADMIN — CARVEDILOL 12.5 MG: 12.5 TABLET, FILM COATED ORAL at 08:11

## 2024-11-07 RX ADMIN — Medication 600 ML: at 12:11

## 2024-11-07 RX ADMIN — TAMSULOSIN HYDROCHLORIDE 0.4 MG: 0.4 CAPSULE ORAL at 08:11

## 2024-11-07 RX ADMIN — ALUMINUM HYDROXIDE, MAGNESIUM HYDROXIDE, AND DIMETHICONE 30 ML: 200; 20; 200 SUSPENSION ORAL at 05:11

## 2024-11-07 RX ADMIN — ASPIRIN 81 MG: 81 TABLET, COATED ORAL at 09:11

## 2024-11-07 RX ADMIN — FERROUS SULFATE TAB 325 MG (65 MG ELEMENTAL FE) 1 EACH: 325 (65 FE) TAB at 08:11

## 2024-11-07 RX ADMIN — SUCRALFATE 1 G: 1 SUSPENSION ORAL at 11:11

## 2024-11-07 RX ADMIN — SUCRALFATE 1 G: 1 SUSPENSION ORAL at 05:11

## 2024-11-07 RX ADMIN — Medication 600 ML: at 11:11

## 2024-11-07 RX ADMIN — SUCRALFATE 1 G: 1 SUSPENSION ORAL at 12:11

## 2024-11-07 NOTE — PLAN OF CARE
[facility] - Telemetry (Hospital)  Discharge Final Note    Primary Care Provider: Miryam Jimenez MD    Expected Discharge Date: 11/7/2024    Final Discharge Note (most recent)       Final Note - 11/07/24 1345          Final Note    Assessment Type Final Discharge Note     Anticipated Discharge Disposition Home-Health Care [facility]     Hospital Resources/Appts/Education Provided Appointments scheduled and added to AVS        Post-Acute Status    Post-Acute Authorization Home Health     Home Health Status Set-up Complete/Auth obtained     Coverage Medicare A & B     Discharge Delays None known at this time                     Important Message from Medicare              Follow-up providers       Miryam Jimenez MD   Specialty: Family Medicine   Relationship: PCP - General    [address] [facility]  [place] [address]   Phone: [phone]       Next Steps: Schedule an appointment as soon as possible for a visit in 1 week(s)    [facility] - Cardiology   Specialty: Cardiology    [address] [place] [address] [address] [place] [address]   Phone: [phone]       Next Steps: Follow up    [facility] -    Specialty: Dermatology    [address] [facility]  [place] [address]   Phone: [phone]       Next Steps: Go on 11/11/2024    Instructions: ::Appoitment setup with Dr. Katelyn Gill Jr.  9:10AM              After-discharge care                Home Medical Care       *[facility]   Service: Home Health Services    [address]  [facility] [address]   Phone: [phone]                             DC Disposition: [facility]  Family Notified: Patient and family at bedside  Transportation: personal transportation    Patient needed Home Health services set up upon discharge. Patient has [facility] set up and information has been added to Patient's AVS.    Patient has PCP hospital follow up with Brad Jimenez MD, on 11/12/2024 at 3 pm.

## 2024-11-07 NOTE — NURSING
Discharge instructions given to patient, setup for VN done, pt awaiting teaching  Tele monitor removed and brought to monitor tech.  IV d/c'd with tip intact, pressure dressing applied.  Pt instructed to call  to have transport notified to take pt to front of hospital via w/c to be discharged home when discharge teaching is complete.

## 2024-11-07 NOTE — PLAN OF CARE
11/07/24 1322   Post-Acute Status   Post-Acute Authorization Home Health   Home Health Status Referrals Sent   Coverage Medicare A & B   Discharge Delays None known at this time   Discharge Plan   Discharge Plan A Home Health       SW meet with patient and family at bedside to discuss home health at bedside.   Patient wished for [facility] upon DC. SW stated she would send referral and orders to [facility] and they would contact patient/ family to set up initial visit.    SW will continue to follow and assist as needed.    [Initial Visit ___] : [unfilled] is here today for an initial visit  for [unfilled]

## 2024-11-07 NOTE — PLAN OF CARE
[facility] - Telemetry (Hospital)  Initial Discharge Assessment       Primary Care Provider: Miryam Jimenez MD    Admission Diagnosis: Elevated troponin [R79.89]  Chest pain [R07.9]    Admission Date: 11/6/2024  Expected Discharge Date: 11/7/2024    Transition of Care Barriers: None    Payor: MEDICARE / Plan: MEDICARE PART A & B / Product Type: Government /     Extended Emergency Contact Information  Primary Emergency Contact: Munir Olmedo  Address: [address] [place] [address] [id number] [place] [address]  Home Phone: [phone]  Mobile Phone: [phone]  Relation: Spouse  Secondary Emergency Contact: Ridge Olmedo  Mobile Phone: [phone]  Relation: Son    Discharge Plan A: Home with family         [facility] - [place], [place] - [address]  [address] [facility] [address]  Phone: [phone] Fax: [phone]      Initial Assessment (most recent)       Adult Discharge Assessment - 11/07/24 0915          Discharge Assessment    Assessment Type Discharge Planning Assessment     Confirmed/corrected address, phone number and insurance Yes     Confirmed Demographics Correct on Facesheet     Source of Information patient     When was your last doctors appointment? 10/23/24   Indira Black PA-C - Dermatology    Communicated BRIT with patient/caregiver Date not available/Unable to determine     Reason For Admission Coronary artery disease of native artery of native heart with stable angina pectoris     People in Home spouse     Facility Arrived From: home     Do you expect to return to your current living situation? Yes     Do you have help at home or someone to help you manage your care at home? Yes     Who are your caregiver(s) and their phone number(s)? son and daughter in law     Prior to hospitilization cognitive status: Alert/Oriented     Current cognitive status: Alert/Oriented     Walking or Climbing Stairs Difficulty no     Dressing/Bathing Difficulty no      Home Accessibility wheelchair accessible     Equipment Currently Used at Home CPAP;nebulizer     Readmission within 30 days? No     Patient currently being followed by outpatient case management? No     Do you currently have service(s) that help you manage your care at home? No     Do you take prescription medications? Yes     Do you have prescription coverage? Yes     Coverage Medicare A & B     Do you have any problems affording any of your prescribed medications? No     Is the patient taking medications as prescribed? yes     Who is going to help you get home at discharge? son and daughter in law     How do you get to doctors appointments? family or friend will provide     Are you on dialysis? No     Do you take coumadin? No     Discharge Plan A Home with family     DME Needed Upon Discharge  none     Discharge Plan discussed with: Patient     Transition of Care Barriers None        Transportation Needs    Has the lack of transportation kept you from medical appointments, meetings, work or from getting things needed for daily living? No                   Anticipated DC dispo: home with family  Prior Level of Function: modified independence with ADLs  People in home:  Munir Olmedo - spouse    Comments:  SW met with patient at bedside to introduce role and discuss discharge planning. Patient's daughter in law and son will be help at home and son can provide transport at time of discharge. Confirmed demographics, insurance, and emergency contacts. SW updated Patient's whiteboard with contact information and anticipated DC plan. CM discharge needs depends on hospital progress. SW will continue following to assist with other needs.

## 2024-11-07 NOTE — DISCHARGE INSTRUCTIONS
-You were admitted to our hospital for evaluation of chest pain. Over the course of your hospitalization, our Cardiology team evaluated you and adjusted your home Nifedipine and Pantoprazole dose. They recommended you followup with your Cardiologist in 2 weeks.  Please setup an appointment with your recent Cardiologist Dr. Aquino. If unable to followup with them. We have placed a referral to Cardiology.  Please give our scheduling line a call at [phone] to schedule an appointment with them if it has not been setup already.    -Please read the attached information regarding chest pain and go to your nearest ED if any of the alarm/concerning signs develop.    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     -Your blood pressures were also elevated during your hospitalization.  As you resume your home blood pressure medications, please make sure to frequently monitor your home BP, keep a BP log and take it to your next healthcare provider visit.  Please reach out to a healthcare provider if your blood pressures are  too low or too high.      -Your labs remained stable, but some labs still remained abnormal. As we discussed, it is extremely important for you to followup with a primary care physician within 1 week for repeat lab work to ensure normalization, follow up of pending labs, medication adjustments, routine post-discharge care, and any other evaluations as necessitated.        Our goal at [facility] is to always give you outstanding care and exceptional service. You may receive a survey by mail, text or e-mail in the next 7-10 days from Nathaly Finn and our leadership team asking about the care you received with us. The survey should only take 5-10 minutes to  complete and is very important to us.     Your feedback provides us with a way to recognize our staff who work tirelessly to provide the best care! Also, your responses help us learn how to improve when your experience was below our aspiration of excellence. We WILL use your feedback to continue making improvements to help us provide the highest quality care. We keep your personal information and feedback confidential. We appreciate your time completing this survey and can't wait to hear from you!!!     We want you to leave us today feeling like you can DEFINITELY recommend us to others! We look forward to your continued care with us! Thanks so much for choosing [facility] for your healthcare needs!

## 2024-11-08 ENCOUNTER — PATIENT OUTREACH (OUTPATIENT)
Dept: ADMINISTRATIVE | Facility: CLINIC | Age: 89
End: 2024-11-08
Payer: MEDICARE

## 2024-11-08 NOTE — PROGRESS NOTES
C3 nurse attempted to contact Jovanny Olmedo  for a TCC post hospital discharge follow up call. No answer. Left voicemail with callback information. The patient has a scheduled HOSFU appointment with Miryam Jimenez MD on 11/12/24 @ 1500.

## 2024-11-09 NOTE — HOSPITAL COURSE
Admitted to Hospital Medicine for evaluation of chest pain.  Cardiology consulted for further evaluation.  Troponin peaked at 0.044.  TTE nonacute.  Cardiology evaluation noted concerns for elevated blood pressures at home and after review of recent imaging/cardiac stress testing recommended increasing home nifedipine dose and increase PPI dose.  SANJAY on admission improved with fluid intake.  Wound care nurse evaluation also noted concerns for worsening rash since prior last dermatology evaluation in 10/2024 and recommended earlier evaluation than what was recommended at the visit.  Patient was able to set up appointment for dermatology evaluation within 1 week at [facility] for further evaluation and possible skin biopsy.     Discharge plan of care was discussed at length with patient including patient's need for close outpatient follow-up. Outpatient follow-ups were scheduled, or if unable to be scheduled ambulatory referrals were placed. Counseled no following up with scheduled dermatology appointment.  Counseled on follow up with established cardiologist.  Counseled on PCP follow up within 1 week with repeat lab work to ensure normalization, follow up of pending labs, or any further evaluation as necessitated.  Patient reported he was deemed  to not be a candidate for intervention due to his age so he stopped following up; counseled patient on continuing follow ups with vascular surgery for aneurysm monitoring. Counseled on frequent home BP monitoring while resuming home BP med.  Counseled on maintaining BP logs and taking  to PCP follow up visit within 1 week for evaluation.. All questions and concerns were answered. Return precautions provided.  Patient counseled to return to the hospital or seek medical care if baseline status should suddenly change, return of symptoms occurs, or for any new or concerning symptoms that arise.  Patient was in agreement with plan of care going forward. Patient seen and  examined on the day of discharge. Patient deemed stable for discharge.

## 2024-11-09 NOTE — DISCHARGE SUMMARY
"[facility] ([facility])  [facility] Medicine  Discharge Summary      Patient Name: Jovanny Olmedo  MRN: [id number]  POOJA: [id number]  Patient Class: OP- Observation  Admission Date: 11/6/2024  Hospital Length of Stay: 0 days  Discharge Date and Time: 11/7/2024  5:35 PM  Attending Physician: No att. 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 That presented to the ED with c/o Chest pain. Patient reports CP started a few days ago and returned last night. Then he admitted chest pain has been "going on awhile." CP located to sternal area. No radiation of pain. Pain described as "pressure." He initially thought it was indigestion because he was doing a lot of belching which gave some relief. He is followed by Dr. Aquino (cardiology). Had a stress test on 1/25/24 that was normal. ED workup showed: WBC count 7.22, Hgb/Hct 12.9/39.7, Plt 128, creatinine 1.8, troponin 0.06>0.021>0.031. CXR with no acute findings. EKG with no significant change from previous. He is currently chest pain free. Pt admitted to observation for chest pain.     * No surgery found *      Hospital Course:   Admitted to Hospital Medicine for evaluation of chest pain.  Cardiology consulted for further evaluation.  Troponin peaked at 0.044.  TTE nonacute.  Cardiology evaluation noted concerns for elevated blood pressures at " home and after review of recent imaging/cardiac stress testing recommended increasing home nifedipine dose and increase PPI dose.  SANJAY on admission improved with fluid intake.  Wound care nurse evaluation also noted concerns for worsening rash since prior last dermatology evaluation in 10/2024 and recommended earlier evaluation than what was recommende at the visit.  Patient was able to set up appointment for dermatology evaluation within 1 week at [facility] for further evaluation and possible skin biopsy.     Discharge plan of care was discussed at length with patient including patient's need for close outpatient follow-up. Outpatient follow-ups were scheduled, or if unable to be scheduled ambulatory referrals were placed. Counseled no following up with scheduled dermatology appointment.  Counseled on follow up with established cardiologist.  Counseled on PCP follow up within 1 week with repeat lab work to ensure normalization, follow up of pending labs, or any further evaluation as necessitated.  Patient reported he was deemed  to not be a candidate for intervention due to his age so he stopped following up; counseled patient on continuing follow ups with vascular surgery for aneurysm monitoring. Counseled on frequent home BP monitoring while resuming home BP med.  Counseled on maintaining BP logs and taking  to PCP follow up visit within 1 week for evaluation.. All questions and concerns were answered. Return precautions provided.  Patient counseled to return to the hospital or seek medical care if baseline status should suddenly change, return of symptoms occurs, or for any new or concerning symptoms that arise.  Patient was in agreement with plan of care going forward. Patient seen and examined on the day of discharge. Patient deemed stable for discharge.              Goals of Care Treatment Preferences:  Code Status: Full Code      SDOH Screening:  The patient was screened for food insecurity, housing  instability, transportation needs, utility difficulties, and interpersonal safety. The social determinant(s) of health identified as a concern this admission are:  Food insecurity      Social Drivers of Health with Concerns     Food Insecurity: Food Insecurity Present (11/6/2024)        Consults:   Consults (From admission, onward)          Status Ordering Provider     Inpatient consult to Cardiology  Once        Provider:  Omid Cook MD    Completed NAZIA NG              Final Active Diagnoses:    Diagnosis Date Noted POA    PRINCIPAL PROBLEM:  Coronary artery disease of native artery of native heart with stable angina pectoris [I25.118] 08/06/2023 Yes    Rash [R21] 11/06/2024 Yes    Precordial pain [R07.2] 11/06/2024 Unknown    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 Schedule an appointment as soon as possible for a visit in 1 week(s).    Specialty: Family Medicine  Contact information:  [address] [facility] [place] [address]  [phone]               [facility] Follow up.    Specialty: Cardiology  Contact information:  [id number] [place] [address] [address] Ankit [address] [place] [address] [phone]  Additional information:  Please take [address] to the Medical Office Building. Check in on the 4th floor.             , [facility] -. Go on 11/11/2024.    Specialty: Dermatology  Why: ::Appoitment setup with Dr. Katelyn Gill Jr.  9:10AM  Contact  information:  [address]  [facility] [address]  [phone]                       Contact information for after-discharge care       Home Medical Care       [facility] .    Service: Home Health Services  Contact information:  [address] [facility] [address]  [facility] [address]  [phone]                                 Patient Instructions:      Ambulatory referral/consult to Outpatient Case Management   Referral Priority: Routine Referral Type: Consultation   Referral Reason: Specialty Services Required   Number of Visits Requested: 1     Notify your health care provider if you experience any of the following:  temperature >100.4     Notify your health care provider if you experience any of the following:  persistent nausea and vomiting or diarrhea     Notify your health care provider if you experience any of the following:  severe uncontrolled pain     Notify your health care provider if you experience any of the following:  redness, tenderness, or signs of infection (pain, swelling, redness, odor or green/yellow discharge around incision site)     Notify your health care provider if you experience any of the following:  difficulty breathing or increased cough     Notify your health care provider if you experience any of the following:  severe persistent headache     Notify your health care provider if you experience any of the following:  worsening rash     Notify your health care provider if you experience any of the following:  persistent dizziness, light-headedness, or visual disturbances     Notify your health care provider if you experience any of the following:  increased confusion or weakness     Notify your health care provider if you experience any of the following:   Order Comments: HTN EMERGENCY: Please return to the ED immediately if you experience any of the following: severe headache, chest pain, shortness of breath, visual changes, confusion,  nausea, vomiting, or sudden weakness or numbness, which may indicate a hypertensive emergency with potential organ damage such as stroke, heart attack, or renal failure.     CHEST PAIN: Please return to the ED immediately if you experience any of the following: worsening or new chest pain, shortness of breath, dizziness, fainting, nausea, sweating, or pain radiating to your arm, neck, jaw, or back, as these could be signs of a heart attack or other serious cardiac condition. Additionally, if your pain becomes sharp, persistent, or is associated with a feeling of tightness or pressure in your chest, seek emergent care.    WORSENING RENAL FAILURE: Please return to the ED immediately if you experience any of the following: significant changes in urine output (such as little to no urine or very dark urine), swelling in your legs, ankles, or feet, shortness of breath, chest pain, nausea, vomiting, confusion, or severe fatigue, as these may be signs of worsening renal failure or electrolyte imbalances.       Significant Diagnostic Studies:   Significant Labs: All pertinent labs within the past 24 hours have been reviewed.  LABS:  Recent Labs   Lab 11/06/24 0433 11/07/24  0546    140   K 4.1 4.0    107   CO2 23 24   BUN 26* 19   CREATININE 1.8* 1.4   * 105   ANIONGAP 11 9     Recent Labs   Lab 11/07/24  0546   MG 1.8   PHOS 2.4*     Recent Labs   Lab 11/06/24 0433 11/07/24  0546   AST 20 17   ALT 13 12   ALKPHOS 79 82   BILITOT 0.4 0.6   ALBUMIN 3.6 3.5    Recent Labs   Lab 11/06/24 0433 11/07/24  0546   WBC 7.22 6.43   HGB 12.9* 13.0*   HCT 39.7* 40.4   * 131*   GRAN 62.7  4.5 63.4  4.1          Significant Imaging:   X-Ray Chest AP Portable   Final Result      No acute abnormality.         Electronically signed by: Nav Gooden   Date:    11/06/2024   Time:    06:29            Pending Diagnostic Studies:       None           Medications:  Reconciled Home Medications:      Medication List         CHANGE how you take these medications      NIFEdipine 90 MG (OSM) 24 hr tablet  Commonly known as: PROCARDIA-XL  Take 1 tablet (90 mg total) by mouth once daily.  What changed:   medication strength  how much to take     pantoprazole 40 MG tablet  Commonly known as: PROTONIX  Take 1 tablet (40 mg total) by mouth 2 (two) times daily.  What changed: when to take this            CONTINUE taking these medications      albuterol 90 mcg/actuation inhaler  Commonly known as: VENTOLIN HFA  Inhale 2 puffs into the lungs every 6 (six) hours as needed for Wheezing. Rescue     aspirin 81 MG EC tablet  Commonly known as: ECOTRIN  Take 81 mg by mouth once daily.     atorvastatin 40 MG tablet  Commonly known as: LIPITOR  Take 1 tablet (40 mg total) by mouth once daily.     azelastine 137 mcg (0.1 %) nasal spray  Commonly known as: ASTELIN  USE 2 SPRAY(S) IN EACH NOSTRIL TWICE DAILY     carvediloL 12.5 MG tablet  Commonly known as: COREG  Take 1 tablet (12.5 mg total) by mouth 2 (two) times daily with meals.     ferrous sulfate 325 (65 FE) MG EC tablet  Take 325 mg by mouth once daily.     finasteride 5 mg tablet  Commonly known as: PROSCAR  Take 1 tablet (5 mg total) by mouth once daily.     gentamicin 0.1 % ointment  Commonly known as: GARAMYCIN  Apply topically 2 (two) times a day. For rash of groin.     ketoconazole 2 % cream  Commonly known as: NIZORAL  Apply topically 2 (two) times daily. For rash of groin.     lisinopriL 40 MG tablet  Commonly known as: PRINIVIL,ZESTRIL  Take 1 tablet by mouth once daily     miconazole NITRATE 2 % 2 % top powder  Commonly known as: ZEASORB AF  Use two to three times daily to prevent rash     nitroGLYCERIN 0.4 MG SL tablet  Commonly known as: NITROSTAT  Place 1 tablet (0.4 mg total) under the tongue every 5 (five) minutes as needed for Chest pain.     oxybutynin 5 MG Tr24  Commonly known as: DITROPAN-XL  Take 1 tablet (5 mg total) by mouth once daily.     tamsulosin 0.4 mg  Cap  Commonly known as: FLOMAX  Take 1 capsule by mouth once daily     traMADoL 50 mg tablet  Commonly known as: ULTRAM  Take 50 mg by mouth daily as needed.     triamcinolone acetonide 0.025% 0.025 % cream  Commonly known as: KENALOG  Apply topically 2 (two) times daily. PRN rash of groin.            STOP taking these medications      cetirizine 10 MG tablet  Commonly known as: ZYRTEC     meloxicam 15 MG tablet  Commonly known as: MOBIC              Indwelling Lines/Drains at time of discharge:   Lines/Drains/Airways       None                   Time spent on the discharge of patient: 45 minutes         Herb Lara DO  Department of Hospital Medicine  [facility] ([facility])    Voice recognition software was used in the creation of this note/communication and any sound-alike/typographical errors which may have occurred despite initial review prior to signing should be taken in context when interpreting.  If such errors prevent a clear understanding of the note/communication, please contact the provider/office for clarification.

## 2024-11-12 ENCOUNTER — OFFICE VISIT (OUTPATIENT)
Dept: INTERNAL MEDICINE | Facility: CLINIC | Age: 89
End: 2024-11-12
Payer: MEDICARE

## 2024-11-12 ENCOUNTER — LAB VISIT (OUTPATIENT)
Dept: LAB | Facility: HOSPITAL | Age: 89
End: 2024-11-12
Attending: FAMILY MEDICINE
Payer: MEDICARE

## 2024-11-12 VITALS
OXYGEN SATURATION: 98 % | RESPIRATION RATE: 18 BRPM | DIASTOLIC BLOOD PRESSURE: 86 MMHG | HEART RATE: 95 BPM | HEIGHT: 72 IN | WEIGHT: 212.06 LBS | BODY MASS INDEX: 28.72 KG/M2 | SYSTOLIC BLOOD PRESSURE: 138 MMHG

## 2024-11-12 DIAGNOSIS — R21 RASH: ICD-10-CM

## 2024-11-12 DIAGNOSIS — R79.89 TROPONIN LEVEL ELEVATED: ICD-10-CM

## 2024-11-12 DIAGNOSIS — Z98.61 HISTORY OF CORONARY ANGIOPLASTY: Chronic | ICD-10-CM

## 2024-11-12 DIAGNOSIS — N18.31 CHRONIC RENAL IMPAIRMENT, STAGE 3A: ICD-10-CM

## 2024-11-12 DIAGNOSIS — I25.118 CORONARY ARTERY DISEASE OF NATIVE ARTERY OF NATIVE HEART WITH STABLE ANGINA PECTORIS: ICD-10-CM

## 2024-11-12 DIAGNOSIS — Z09 HOSPITAL DISCHARGE FOLLOW-UP: ICD-10-CM

## 2024-11-12 DIAGNOSIS — I10 ESSENTIAL HYPERTENSION: Chronic | ICD-10-CM

## 2024-11-12 DIAGNOSIS — I71.23 ANEURYSM OF DESCENDING THORACIC AORTA WITHOUT RUPTURE: ICD-10-CM

## 2024-11-12 DIAGNOSIS — L40.9 PSORIASIS: ICD-10-CM

## 2024-11-12 DIAGNOSIS — Z86.711 HISTORY OF PULMONARY EMBOLUS (PE): ICD-10-CM

## 2024-11-12 DIAGNOSIS — K21.9 GASTROESOPHAGEAL REFLUX DISEASE, UNSPECIFIED WHETHER ESOPHAGITIS PRESENT: ICD-10-CM

## 2024-11-12 DIAGNOSIS — R07.2 PRECORDIAL PAIN: Primary | ICD-10-CM

## 2024-11-12 PROBLEM — M54.6 THORACIC BACK PAIN: Status: RESOLVED | Noted: 2023-07-06 | Resolved: 2024-11-12

## 2024-11-12 LAB
ANION GAP SERPL CALC-SCNC: 13 MMOL/L (ref 8–16)
BASOPHILS # BLD AUTO: 0.01 K/UL (ref 0–0.2)
BASOPHILS NFR BLD: 0.1 % (ref 0–1.9)
BUN SERPL-MCNC: 31 MG/DL (ref 8–23)
CALCIUM SERPL-MCNC: 10.3 MG/DL (ref 8.7–10.5)
CHLORIDE SERPL-SCNC: 104 MMOL/L (ref 95–110)
CO2 SERPL-SCNC: 26 MMOL/L (ref 23–29)
CREAT SERPL-MCNC: 1.8 MG/DL (ref 0.5–1.4)
DIFFERENTIAL METHOD BLD: ABNORMAL
EOSINOPHIL # BLD AUTO: 0 K/UL (ref 0–0.5)
EOSINOPHIL NFR BLD: 0.1 % (ref 0–8)
ERYTHROCYTE [DISTWIDTH] IN BLOOD BY AUTOMATED COUNT: 13.7 % (ref 11.5–14.5)
EST. GFR  (NO RACE VARIABLE): 35.8 ML/MIN/1.73 M^2
GLUCOSE SERPL-MCNC: 129 MG/DL (ref 70–110)
HCT VFR BLD AUTO: 44 % (ref 40–54)
HGB BLD-MCNC: 13.7 G/DL (ref 14–18)
IMM GRANULOCYTES # BLD AUTO: 0.05 K/UL (ref 0–0.04)
IMM GRANULOCYTES NFR BLD AUTO: 0.4 % (ref 0–0.5)
LYMPHOCYTES # BLD AUTO: 1.9 K/UL (ref 1–4.8)
LYMPHOCYTES NFR BLD: 15.5 % (ref 18–48)
MCH RBC QN AUTO: 30 PG (ref 27–31)
MCHC RBC AUTO-ENTMCNC: 31.1 G/DL (ref 32–36)
MCV RBC AUTO: 97 FL (ref 82–98)
MONOCYTES # BLD AUTO: 0.8 K/UL (ref 0.3–1)
MONOCYTES NFR BLD: 6.4 % (ref 4–15)
NEUTROPHILS # BLD AUTO: 9.5 K/UL (ref 1.8–7.7)
NEUTROPHILS NFR BLD: 77.5 % (ref 38–73)
NRBC BLD-RTO: 0 /100 WBC
PLATELET # BLD AUTO: 175 K/UL (ref 150–450)
PMV BLD AUTO: 12.5 FL (ref 9.2–12.9)
POTASSIUM SERPL-SCNC: 4.4 MMOL/L (ref 3.5–5.1)
RBC # BLD AUTO: 4.56 M/UL (ref 4.6–6.2)
SODIUM SERPL-SCNC: 143 MMOL/L (ref 136–145)
TROPONIN I SERPL DL<=0.01 NG/ML-MCNC: 0.01 NG/ML (ref 0–0.03)
WBC # BLD AUTO: 12.19 K/UL (ref 3.9–12.7)

## 2024-11-12 PROCEDURE — 99214 OFFICE O/P EST MOD 30 MIN: CPT | Mod: PBBFAC | Performed by: FAMILY MEDICINE

## 2024-11-12 PROCEDURE — 80048 BASIC METABOLIC PNL TOTAL CA: CPT | Performed by: FAMILY MEDICINE

## 2024-11-12 PROCEDURE — 36415 COLL VENOUS BLD VENIPUNCTURE: CPT | Performed by: FAMILY MEDICINE

## 2024-11-12 PROCEDURE — 99999 PR PBB SHADOW E&M-EST. PATIENT-LVL IV: CPT | Mod: PBBFAC,,, | Performed by: FAMILY MEDICINE

## 2024-11-12 PROCEDURE — 84484 ASSAY OF TROPONIN QUANT: CPT | Performed by: FAMILY MEDICINE

## 2024-11-12 PROCEDURE — 85025 COMPLETE CBC W/AUTO DIFF WBC: CPT | Performed by: FAMILY MEDICINE

## 2024-11-12 RX ORDER — BETAMETHASONE DIPROPIONATE 0.5 MG/G
CREAM TOPICAL DAILY
COMMUNITY
Start: 2024-11-11

## 2024-11-12 RX ORDER — PANTOPRAZOLE SODIUM 40 MG/1
40 TABLET, DELAYED RELEASE ORAL 2 TIMES DAILY
Qty: 180 TABLET | Refills: 1 | Status: SHIPPED | OUTPATIENT
Start: 2024-11-12

## 2024-11-12 RX ORDER — CARVEDILOL 12.5 MG/1
12.5 TABLET ORAL 2 TIMES DAILY WITH MEALS
Qty: 60 TABLET | Refills: 1 | Status: SHIPPED | OUTPATIENT
Start: 2024-11-12 | End: 2025-11-12

## 2024-11-12 RX ORDER — METOPROLOL TARTRATE 25 MG/1
25 TABLET, FILM COATED ORAL 2 TIMES DAILY
COMMUNITY
Start: 2024-07-22 | End: 2024-11-12

## 2024-11-12 NOTE — PROGRESS NOTES
Transitional Care Note    Family and/or Caretaker present at visit?  Yes.  Diagnostic tests reviewed/disposition: No diagnosic tests pending after this hospitalization.  Disease/illness education:  ENCOURAGED TO TAKE HIS MEDICATIONS REGULARLY AS PRESCRIBED  Home health/community services discussion/referrals: Patient does not have home health established from hospital visit.  They do not need home health.  If needed, we will set up home health for the patient.   Establishment or re-establishment of referral orders for community resources: No other necessary community resources.   Discussion with other health care providers:  Patient to keep appointment with cardiology as scheduled  .   Subjective:       Patient ID: Jovanny Olmedo is a 89 y.o. male presents with   Patient Active Problem List   Diagnosis    Essential hypertension    GERD (gastroesophageal reflux disease)    Polycystic kidney disease    Coronary artery disease, occlusive    Chronic renal impairment, stage 3a    Refractive error    DDD (degenerative disc disease), lumbar    Open angle with borderline findings, low risk, bilateral    History of coronary angioplasty    Prostate cancer    Secondary hyperparathyroidism    Pseudophakia    Intermediate stage nonexudative age-related macular degeneration of both eyes    Aortic atherosclerosis    CLARISSA on CPAP    Duodenal ulcer    History of colon polyps    Dyslipidemia    Abdominal aortic aneurysm (AAA) without rupture    History of pulmonary embolus (PE)    Thrombocytopenia    Obesity (BMI 30.0-34.9)    Stable angina pectoris    Abnormal ECG    Coronary artery disease of native artery of native heart with stable angina pectoris    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 He reports experiencing chest pain occurring around 2-3 AM on two recent nights. He attempted to self-treat with baking soda. He was recently hospitalized due to chest pain. During hospitalization, a chest XR showed no new changes, an echocardiogram revealed a good EF, and heart enzymes were initially elevated but improved before discharge. He reports recent changes in medication regimen. Protonix (pantoprazole) dosage has been increased from daily to twice daily, with no noticeable difference in symptoms yet. Nifedipine (Procardia) dosage was initially reduced from 90 mg to 60 mg, and now increased back to 90 mg. He confirms taking lisinopril, oxybutynin, and atorvastatin. He has a scheduled cardiology follow-up visit with Dr. Aquino in December.  Patient not sure whether his currently taking carvedilol, and requesting refill today  Has been drinking excessive coffee daily  Secondary to worsening of the rash patient had seen [place] dermatologist yesterday and was prescribed medication for psoriasis    ROS:  General: -fever, -chills, -fatigue, -weight gain, -weight loss, -loss of appetite  Eyes: -vision changes, -blurry vision, -eye pain, -eye discharge  ENT: -ear pain, -hearing loss, -tinnitus, -nasal congestion, -sore throat  Cardiovascular: +chest pain, -palpitations, -lower extremity edema  Respiratory: -cough, -shortness of breath, -wheezing, -sputum production  Endocrine: -polyuria, -polydipsia, -heat intolerance, -cold intolerance  Gastrointestinal: -abdominal pain, +heartburn, -nausea, -vomiting, -diarrhea, -constipation, -blood in stool  Genitourinary: -dysuria, -urgency, -frequency, -hematuria, -nocturia, -incontinence  Heme & Lymphatic: -easy or excessive bleeding, -easy bruising, -swollen lymph nodes  Musculoskeletal: -muscle pain, -back pain, -joint pain, -joint swelling  Skin: -rash, -lesion, -itching, -skin texture changes, -skin color changes  Neurological: -headache,  -dizziness, -numbness, -tingling, -seizure activity, -speech difficulty, -memory loss, -confusion  Psychiatric: -anxiety, -depression, -sleep difficulty                /86 (BP Location: Left arm, Patient Position: Sitting)   Pulse 95   Resp 18   Ht 6' (1.829 m)   Wt 96.2 kg (212 lb 1.3 oz)   SpO2 98%   BMI 28.76 kg/m²   Objective:      Physical Exam  Constitutional:       Appearance: He is well-developed.   HENT:      Head: Normocephalic and atraumatic.   Cardiovascular:      Rate and Rhythm: Normal rate and regular rhythm.      Heart sounds: Normal heart sounds. No murmur heard.  Pulmonary:      Effort: Pulmonary effort is normal.      Breath sounds: Normal breath sounds. No wheezing.   Chest:      Chest wall: No tenderness.   Abdominal:      General: Bowel sounds are normal.      Palpations: Abdomen is soft.      Tenderness: There is no abdominal tenderness.   Skin:     General: Skin is warm and dry.      Findings: Rash present.      Comments: Noted rash to the back   Neurological:      Mental Status: He is alert and oriented to person, place, and time.   Psychiatric:         Mood and Affect: Mood normal.           Assessment/Plan:   1. Precordial pain  Overview:  89 yo male with past medical history of aortic aneurysm, CAD, HTN, glaucoma, CKD and GERD presented with atypical CP at rest. No exertional sx.  EKG shows no changes, two troponins normal last troponin mildly elevated.Pt states DBP at home > 100. NMT/stress 1-24 normal.    Recommend increase PPI , increase nifidepine dose, follow cardiology clinic 2 weeks  2. Coronary artery disease of native artery of native heart with stable angina pectoris  -     CBC Auto Differential; Future; Expected date: 11/12/2024  -     Basic Metabolic Panel; Future; Expected date: 11/12/2024  -     carvediloL (COREG) 12.5 MG tablet; Take 1 tablet (12.5 mg total) by mouth 2 (two) times daily with meals.  Dispense: 60 tablet; Refill: 1  3. Troponin level elevated  -      TROPONIN I; Future; Expected date: 11/12/2024  4. 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 Rash  6. Hospital discharge follow-up    Reviewed hospital discharge summary in detail  Reviewed medications in detail and encouraged to continue taking Procardia 90 mg and pantoprazole has been increased from 40 mg once daily to twice a day  Refill has been given on carvedilol as requested  Patient was advised to keep appointment with cardiology as scheduled  Continue to follow-up with Dermatology secondary to psoriasis rash  Eat small frequent meals and avoid spicy diet and cut down caffeine and carbonated beverages    7. History of pulmonary embolus (PE)  Overview:  Last Assessment & Plan:   Formatting of this note might be different from the original.  History & Physical   Patient was on heparin but after discussions with vascular surgery transitioned patient to [facility]  Discharge Summary  86 y/o M with PMH CAD, CKD (baseline Cr 1.7-1.8), CLARISSA on CPAP, htn, and known AAA who presented to [facility] with dyspnea and chest pain (described as dull in the left upper chest with radiation down the arm) that started the night prior and admits to progressive dyspnea on exertion that had been ongoing for the month prior. Work up revealed segmental PEs and descending thoracic aortic dissection, prompting transfer to [facility] for further care.  Patient was admitted to the ICU on a heparin drip with CCMS and vascular consult.  He otherwise remained hemodynamically stable.  It was found that his aneurysm/dissection was actually chronic and that he is followed outpatient by Dr. Berry.  Vascular surgery did not recommend intervention and echocardiogram read: EF 55 to 65% with mildly dilated right ventricle. Patient was considered stable for transfer out of ICU on 9/10 and was able to be weaned off supplemental O2 and transitioned to eliquis for discharge.  Follow-up  Currently taking aspirin    8. Essential  hypertension  9. History of coronary angioplasty  10. Chronic renal impairment, stage 3a  Blood pressure is stable currently on lisinopril 40 mg  Carvedilol 12.5 mg twice daily and Procardia 90 mg  Encouraged to drink adequate fluids and avoid over-the-counter NSAIDs to prevent worsening kidney functions    11. Gastroesophageal reflux disease, unspecified whether esophagitis present  -     pantoprazole (PROTONIX) 40 MG tablet; Take 1 tablet (40 mg total) by mouth 2 (two) times daily.  Dispense: 180 tablet; Refill: 1  Pantoprazole has been increased from once daily to twice a day    12. Psoriasis  As per [place] dermatologist    Visit today included increased complexity associated with the care of the episodic problem  addressed and managing the longitudinal care of the patient due to the serious and/or complex managed problem(s) .          This note was generated with the assistance of ambient listening technology. Verbal consent was obtained by the patient and accompanying visitor(s) for the recording of patient appointment to facilitate this note. I attest to having reviewed and edited the generated note for accuracy, though some syntax or spelling errors may persist. Please contact the author of this note for any clarification.

## 2024-11-14 ENCOUNTER — OFFICE VISIT (OUTPATIENT)
Dept: CARDIOLOGY | Facility: CLINIC | Age: 89
End: 2024-11-14
Payer: MEDICARE

## 2024-11-14 VITALS
DIASTOLIC BLOOD PRESSURE: 62 MMHG | HEIGHT: 72 IN | SYSTOLIC BLOOD PRESSURE: 104 MMHG | OXYGEN SATURATION: 96 % | WEIGHT: 207 LBS | HEART RATE: 80 BPM | BODY MASS INDEX: 28.04 KG/M2

## 2024-11-14 DIAGNOSIS — R07.9 CHEST PAIN, UNSPECIFIED TYPE: ICD-10-CM

## 2024-11-14 DIAGNOSIS — Z98.61 HISTORY OF CORONARY ANGIOPLASTY: ICD-10-CM

## 2024-11-14 DIAGNOSIS — I10 HYPERTENSION, UNSPECIFIED TYPE: ICD-10-CM

## 2024-11-14 DIAGNOSIS — I25.118 CORONARY ARTERY DISEASE OF NATIVE ARTERY OF NATIVE HEART WITH STABLE ANGINA PECTORIS: ICD-10-CM

## 2024-11-14 DIAGNOSIS — I25.10 CORONARY ARTERY DISEASE, OCCLUSIVE: ICD-10-CM

## 2024-11-14 DIAGNOSIS — I71.23 ANEURYSM OF DESCENDING THORACIC AORTA WITHOUT RUPTURE: ICD-10-CM

## 2024-11-14 DIAGNOSIS — R79.89 TROPONIN LEVEL ELEVATED: ICD-10-CM

## 2024-11-14 DIAGNOSIS — I71.43 INFRARENAL ABDOMINAL AORTIC ANEURYSM (AAA) WITHOUT RUPTURE: ICD-10-CM

## 2024-11-14 DIAGNOSIS — R06.09 DOE (DYSPNEA ON EXERTION): ICD-10-CM

## 2024-11-14 DIAGNOSIS — G47.33 OSA ON CPAP: ICD-10-CM

## 2024-11-14 DIAGNOSIS — R94.31 ABNORMAL ECG: ICD-10-CM

## 2024-11-14 DIAGNOSIS — Z86.711 HISTORY OF PULMONARY EMBOLUS (PE): ICD-10-CM

## 2024-11-14 DIAGNOSIS — E78.5 DYSLIPIDEMIA: ICD-10-CM

## 2024-11-14 DIAGNOSIS — N18.32 CHRONIC RENAL IMPAIRMENT, STAGE 3B: ICD-10-CM

## 2024-11-14 DIAGNOSIS — K21.9 GASTROESOPHAGEAL REFLUX DISEASE, UNSPECIFIED WHETHER ESOPHAGITIS PRESENT: Primary | ICD-10-CM

## 2024-11-14 DIAGNOSIS — I70.0 AORTIC ATHEROSCLEROSIS: ICD-10-CM

## 2024-11-14 DIAGNOSIS — E66.811 OBESITY (BMI 30.0-34.9): ICD-10-CM

## 2024-11-14 PROCEDURE — 99214 OFFICE O/P EST MOD 30 MIN: CPT | Mod: S$PBB,,, | Performed by: STUDENT IN AN ORGANIZED HEALTH CARE EDUCATION/TRAINING PROGRAM

## 2024-11-14 PROCEDURE — 99215 OFFICE O/P EST HI 40 MIN: CPT | Mod: PBBFAC | Performed by: STUDENT IN AN ORGANIZED HEALTH CARE EDUCATION/TRAINING PROGRAM

## 2024-11-14 PROCEDURE — 99999 PR PBB SHADOW E&M-EST. PATIENT-LVL V: CPT | Mod: PBBFAC,,, | Performed by: STUDENT IN AN ORGANIZED HEALTH CARE EDUCATION/TRAINING PROGRAM

## 2024-11-14 NOTE — PROGRESS NOTES
Section of Cardiology                  Cardiac Clinic Note      HPI:   Jovanny Olmedo is a 89 y.o. male       Jan 2024 HPI:   The patient is an 87 yo male with past medical history of aortic aneurysm, CAD, HTN, glaucoma, CKD and GERD who presented to the ED with elevated blood pressure and headache. He reports he was at [facility] last night as he had an episode of chest pain. His chest pain was substernal. It subsided. He had elevated cardiac enzymes and the plan was to transfer him to facility with cardiology. He left AMA as he wanted to be seen by cardiologist. He is currently chest pain free. He reports he has a pounding headache. Cardiology, CT surgery, hospital medicine and vascular surgery consulted. Patient placed in observation.    Hospital Course:   The patient presented with chest pain, headache and elevated blood pressures. He had elevated troponin. Cardiology was consulted. Initially plan LHC but due to aortic aneurysms LHC deferred. NM stress performed and normal perfusion reported. His medications were adjusted for better blood pressure control. His headache resolved once BP normalized. Patient instructed to call his cardiologist if his BP becomes elevated so medications can be adjusted. He and his family verbalized understanding. Patient seen and examined, stable for discharge.  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 CTA abdomen/pelvis obtain. Plan outpatient follow-up.     4/17/24  Pt follow up from Jan hosp stay for CP - aortic aneurysm noted.     Vascular: There is aneurysmal dilatation seen of the distal descending thoracic aorta with mural plaque along the wall. Aneurysm measures 6.3 x 6.2 cm in diameter. Patient is status post stent graft repair abdominal aortic aneurysm. No extra vascular contrast identified no endoleak. Stent is patent without significant narrowing. There is a patent stent related to the proximal left renal artery as well. There is  atherosclerosis related to the right renal artery and celiac artery and superior mesenteric artery. Inferior mesenteric artery is not identified. Abdominal aortic aneurysm sac appears essentially stable. 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     ECHO and nuclear stress neg 2024.     BP and HR stable. He has occ atypical CP.         24  Wants to switch providers  Will be seeing vascular in August to re-evaluate descending thoracic aortic aneurysm- reports order brother  of abdominal aortic rupture in   Has intermittent chest pain, self-limiting  Fairly active at home, but sits a lot   Applies ankle and feet swelling bilateral    Shortness of breath, chest pain      24  Was admitted for chest pain, diastolic BP elevated  BP meds changed (increased nifedipine and PPI)  BP good today  Has been belching and passing gas a lto   Echo  EF 60%  Lost 15 lbs since last visit    Denies Shortness of breath, chest pain      EKG 24 NSR, PACs, RBBB  EKG 2024 sinus rhythm, PACs      Results for orders placed during the hospital encounter of 24    Echo    Interpretation Summary    Left Ventricle: The left ventricle is normal in size. Normal wall thickness. There is concentric remodeling. There is normal systolic function. Ejection fraction is approximately 60%. There is normal diastolic function.    Right Ventricle: Normal right ventricular cavity size. Wall thickness is normal. Systolic function is normal.    IVC/SVC: Normal venous pressure at 3 mmHg.      Results for orders placed during the hospital encounter of 24    Echo    Interpretation Summary    Left Ventricle: The left ventricle is normal in size. Normal wall thickness. There is concentric remodeling. Normal wall motion. There is normal systolic function with a visually estimated ejection fraction of 60 - 65%. There is normal diastolic function.    Right Ventricle: Normal right ventricular cavity size.  Wall thickness is normal. Right ventricle wall motion  is normal. Systolic function is normal.    Pulmonary Artery: The estimated pulmonary artery systolic pressure is 24 mmHg.    IVC/SVC: Normal venous pressure at 3 mmHg.      Results for orders placed during the hospital encounter of 01/24/24    Nuclear Stress - Cardiology Interpreted    Interpretation Summary    Normal myocardial perfusion scan. There is no evidence of myocardial ischemia or infarction.    There is a  mild to moderate intensity fixed perfusion abnormality in the inferior wall of the left ventricle secondary to diaphragm attenuation.    The gated perfusion images showed an ejection fraction of 64% at rest. The gated perfusion images showed an ejection fraction of 66% post stress. Normal ejection fraction is greater than 59%.    There is normal wall motion at rest and post stress.    LV cavity size is normal at rest and normal at stress.    The ECG portion of the study is negative for ischemia.        EKG NSR, no acute ST - T wave changes    ECHO  Results for orders placed during the hospital encounter of 01/24/24    Echo    Interpretation Summary    Left Ventricle: The left ventricle is normal in size. Normal wall thickness. There is concentric remodeling. Normal wall motion. There is normal systolic function with a visually estimated ejection fraction of 60 - 65%. There is normal diastolic function.    Right Ventricle: Normal right ventricular cavity size. Wall thickness is normal. Right ventricle wall motion  is normal. Systolic function is normal.    Pulmonary Artery: The estimated pulmonary artery systolic pressure is 24 mmHg.    IVC/SVC: Normal venous pressure at 3 mmHg.       STRESS TEST Results for orders placed during the hospital encounter of 01/24/24    Nuclear Stress - Cardiology Interpreted    Interpretation Summary    Normal myocardial perfusion scan. There is no evidence of myocardial ischemia or infarction.    There is a  mild to  moderate intensity fixed perfusion abnormality in the inferior wall of the left ventricle secondary to diaphragm attenuation.    The gated perfusion images showed an ejection fraction of 64% at rest. The gated perfusion images showed an ejection fraction of 66% post stress. Normal ejection fraction is greater than 59%.    There is normal wall motion at rest and post stress.    LV cavity size is normal at rest and normal at stress.    The ECG portion of the study is negative for ischemia.       [facility] No results found for this or any previous visit.            ROS: All 10 systems reviewed. Please refer to the HPI for pertinent positives. All other systems negative.     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    Arthritis     Back pain     CAD (coronary artery disease)     Cataract     CKD (chronic kidney disease)     GERD (gastroesophageal reflux disease)     Glaucoma     suspect    HTN (hypertension)     Multiple subsegmental pulmonary emboli without acute cor pulmonale 09/09/2021    Prostate cancer     tx'd with radiation    PVD (peripheral vascular disease)     stent in right renal artery and aorta    Sleep apnea     Stable angina pectoris 05/16/2023       Surgical History  Past Surgical History:   Procedure Laterality Date    ABDOMINAL AORTA STENT      CARDIAC CATHETERIZATION      CATARACT EXTRACTION W/  INTRAOCULAR LENS IMPLANT Right 04/29/2017    COLONOSCOPY N/A 9/24/2020    Procedure: COLONOSCOPY;  Surgeon: Shayy Melvin MD;  Location: [facility] ENDO;  Service: Endoscopy;  Laterality: N/A;    COLONOSCOPY N/A 1/10/2022    Procedure: COLONOSCOPY;  Surgeon: Vi Lara MD;  Location: [facility] ENDO;  Service: Endoscopy;  Laterality: N/A;    CORONARY ANGIOPLASTY      CORONARY STENT PLACEMENT      PCIOL Right 03/29/2017    DR. LEDEZMA     PCIOL Left 2019    DR. LEDEZMA    PROSTATE BIOPSY      RENAL ARTERY STENT            Allergies:   Review of patient's allergies indicates:   Allergen Reactions    Codeine Other (See Comments)     When taking codeine, pt becomes very anxious       Social History:  Social History     Socioeconomic History    Marital status:    Tobacco Use    Smoking status: Former     Current packs/day: 0.00     Average packs/day: 0.5 packs/day for 30.0 years (15.0 ttl pk-yrs)     Types: Cigarettes     Start date: 1965     Quit date: 1995     Years since quittin.1    Smokeless tobacco: Former   Substance and Sexual Activity    Alcohol use: Not Currently     Alcohol/week: 0.0 standard drinks of alcohol    Drug use: Yes     Frequency: 4.0 times per week     Types: Marijuana    Sexual activity: Yes     Partners: Female   Social History Narrative         Social Drivers of Health     Financial Resource Strain: Low Risk  (2024)    Overall Financial Resource Strain (CARDIA)     Difficulty of Paying Living Expenses: Not very hard   Food Insecurity: Food Insecurity Present (2024)    Hunger Vital Sign     Worried About Running Out of Food in the Last Year: Sometimes true     Ran Out of Food in the Last Year: Sometimes true   Transportation Needs: No Transportation Needs (2024)    TRANSPORTATION NEEDS     Transportation : No   Stress: Stress Concern Present (2024)    [employer] - Occupational Stress Questionnaire     Feeling of Stress : To some extent   Housing Stability: Low Risk  (2024)    Housing Stability Vital Sign     Unable to Pay for Housing in the Last Year: No     Homeless in the Last Year: No       Family History:  family history includes Cancer in his father; Colon cancer in his father; Diabetes in his brother, mother, and sister; Glaucoma in his mother; Hypertension in his brother; Kidney disease in his mother.    Home Medications:  Current  Outpatient Medications on File Prior to Visit   Medication Sig Dispense Refill    albuterol (VENTOLIN HFA) 90 mcg/actuation inhaler Inhale 2 puffs into the lungs every 6 (six) hours as needed for Wheezing. Rescue 6.7 g 0    aspirin (ECOTRIN) 81 MG EC tablet Take 81 mg by mouth once daily.      atorvastatin (LIPITOR) 40 MG tablet Take 1 tablet (40 mg total) by mouth once daily. 90 tablet 2    azelastine (ASTELIN) 137 mcg (0.1 %) nasal spray USE 2 SPRAY(S) IN EACH NOSTRIL TWICE DAILY 30 mL 3    betamethasone dipropionate 0.05 % cream Apply topically once daily.      carvediloL (COREG) 12.5 MG tablet Take 1 tablet (12.5 mg total) by mouth 2 (two) times daily with meals. 60 tablet 1    ferrous sulfate 325 (65 FE) MG EC tablet Take 325 mg by mouth once daily.      gentamicin (GARAMYCIN) 0.1 % ointment Apply topically 2 (two) times a day. For rash of groin. 30 g 0    ketoconazole (NIZORAL) 2 % cream Apply topically 2 (two) times daily. For rash of groin. 60 g 1    lisinopriL (PRINIVIL,ZESTRIL) 40 MG tablet Take 1 tablet by mouth once daily 90 tablet 0    miconazole NITRATE 2 % (ZEASORB AF) 2 % top powder Use two to three times daily to prevent rash 85 g 2    NIFEdipine (PROCARDIA-XL) 90 MG (OSM) 24 hr tablet Take 1 tablet (90 mg total) by mouth once daily. 60 tablet 2    nitroGLYCERIN (NITROSTAT) 0.4 MG SL tablet Place 1 tablet (0.4 mg total) under the tongue every 5 (five) minutes as needed for Chest pain. 30 tablet 0    oxybutynin (DITROPAN-XL) 5 MG TR24 Take 1 tablet (5 mg total) by mouth once daily. 30 tablet 11    pantoprazole (PROTONIX) 40 MG tablet Take 1 tablet (40 mg total) by mouth 2 (two) times daily. 180 tablet 1    tamsulosin (FLOMAX) 0.4 mg Cap Take 1 capsule by mouth once daily 30 capsule 0    traMADoL (ULTRAM) 50 mg tablet Take 50 mg by mouth daily as needed.      triamcinolone acetonide 0.025% (KENALOG) 0.025 % cream Apply topically 2 (two) times daily. PRN rash of groin. 80 g 0    finasteride (PROSCAR) 5  mg tablet Take 1 tablet (5 mg total) by mouth once daily. (Patient not taking: Reported on 11/14/2024) 30 tablet 0     Current Facility-Administered Medications on File Prior to Visit   Medication Dose Route Frequency Provider Last Rate Last Admin    leuprolide acetate (6 month) injection 45 mg  45 mg Intramuscular Q6 Months            Physical exam:  /62 (BP Location: Right arm, Patient Position: Sitting)   Pulse 80   Ht 6' (1.829 m)   Wt 93.9 kg (207 lb 0.2 oz)   SpO2 96%   BMI 28.08 kg/m²         General: Pt is a 89 y.o. year old male who is AAOx3, in NAD, is pleasant, well nourished, looks stated age  HEENT: PERRL, EOMI, Oral mucosa pink & moist  CVS  No abnormal cardiac pulsations noted on inspection. JVP not raised. The apical impulse is normal on palpation, and is located in the left 5th intercostal space in the mid - clavicular line. No palpable thrills or abnormal pulsations noted. RR, S1 - S2 heard, no murmurs, rubs or gallops appreciated.   PUL : CTA B/L. No wheezes/crackles heard   ABD : BS +, soft. No tenderness elicited   LE : No C/C/E. Distal Pulses palpable B/L         LABS:    Chemistry:   Lab Results   Component Value Date     11/12/2024    K 4.4 11/12/2024     11/12/2024    CO2 26 11/12/2024    BUN 31 (H) 11/12/2024    CREATININE 1.8 (H) 11/12/2024    CALCIUM 10.3 11/12/2024     Cardiac Markers:   Lab Results   Component Value Date    TROPONINI 0.010 11/12/2024     Cardiac Markers (Last 3):   Lab Results   Component Value Date    TROPONINI 0.010 11/12/2024    TROPONINI 0.023 11/06/2024    TROPONINI 0.044 (H) 11/06/2024     CBC:   Lab Results   Component Value Date    WBC 12.19 11/12/2024    HGB 13.7 (L) 11/12/2024    HCT 44.0 11/12/2024    MCV 97 11/12/2024     11/12/2024     Lipids:   Lab Results   Component Value Date    CHOL 219 (H) 02/05/2024    TRIG 139 02/05/2024    HDL 46 02/05/2024     Coagulation:   Lab Results   Component Value Date    INR 1.1 01/24/2024     APTT 31.5 01/24/2024           Assessment        1. Chest pain, unspecified type    2. Aortic atherosclerosis    3. 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 Infrarenal abdominal aortic aneurysm (AAA) without rupture    5. Coronary artery disease of native artery of native heart with stable angina pectoris    6. CLARISSA on CPAP    7. History of pulmonary embolus (PE)    8. Hypertension, unspecified type    9. Chronic renal impairment, stage 3b    10. History of coronary angioplasty    11. Dyslipidemia    12. Obesity (BMI 30.0-34.9)    13. Coronary artery disease, occlusive    14. WELLS (dyspnea on exertion)    15. Abnormal ECG    16. Troponin level elevated           Plan:      CAD  Stable intermittent atypical chest pain  cont asa, statin, BB  ECHO and nuclear stress neg 1/2024    HTN  Stable   Continue lisinopril  Stopped nifedipine 60 (due to ankle and feet swelling) but was restarted at 90 mg daily to help BP,  Coreg 12.5 mg b.i.d.    CKD   Stable     AAA status post stent graft repair abdominal aortic aneurysm  Has descending aortic aneurysm of 6 cm with aortic atherosclerosis and atherosclerosis of the abdominal arteries  Patient concerned about undergoing procedure and becoming paralyzed as a complication of surgery   cont statin, BB  f/u vasc surg    CLARISSA  cont CPAP    HLD   as of 2/24  cont statin  Wants to reduce cholesterol intake (eats eggs/sausage/bauman for breakfast)    Low salt, low fat diet  Exercise as tolerated, at least 30 min daily     This note was prepared using voice recognition system and is likely to have sound alike errors that may have been overlooked even after proofreading.     I have reviewed all pertinent chart information.  Plans and recommendations have been formulated under my direct supervision. All questions answered and patient voiced understanding.   If symptoms persist go to the ED.    RTC in         Ponce Aquino MD  Cardiology

## 2024-11-20 RX ORDER — TAMSULOSIN HYDROCHLORIDE 0.4 MG/1
1 CAPSULE ORAL
Qty: 30 CAPSULE | Refills: 0 | Status: SHIPPED | OUTPATIENT
Start: 2024-11-20

## 2024-11-26 ENCOUNTER — OFFICE VISIT (OUTPATIENT)
Dept: GASTROENTEROLOGY | Facility: CLINIC | Age: 89
End: 2024-11-26
Payer: MEDICARE

## 2024-11-26 ENCOUNTER — LAB VISIT (OUTPATIENT)
Dept: LAB | Facility: HOSPITAL | Age: 89
End: 2024-11-26
Attending: NURSE PRACTITIONER
Payer: MEDICARE

## 2024-11-26 VITALS
HEIGHT: 72 IN | DIASTOLIC BLOOD PRESSURE: 84 MMHG | HEART RATE: 76 BPM | WEIGHT: 204.38 LBS | SYSTOLIC BLOOD PRESSURE: 132 MMHG | BODY MASS INDEX: 27.68 KG/M2

## 2024-11-26 DIAGNOSIS — K21.9 GASTROESOPHAGEAL REFLUX DISEASE, UNSPECIFIED WHETHER ESOPHAGITIS PRESENT: ICD-10-CM

## 2024-11-26 DIAGNOSIS — D64.9 ANEMIA, UNSPECIFIED TYPE: Primary | ICD-10-CM

## 2024-11-26 DIAGNOSIS — D64.9 ANEMIA, UNSPECIFIED TYPE: ICD-10-CM

## 2024-11-26 LAB
BASOPHILS # BLD AUTO: 0.04 K/UL (ref 0–0.2)
BASOPHILS NFR BLD: 0.4 % (ref 0–1.9)
DIFFERENTIAL METHOD BLD: ABNORMAL
EOSINOPHIL # BLD AUTO: 0.2 K/UL (ref 0–0.5)
EOSINOPHIL NFR BLD: 1.4 % (ref 0–8)
ERYTHROCYTE [DISTWIDTH] IN BLOOD BY AUTOMATED COUNT: 13.5 % (ref 11.5–14.5)
FERRITIN SERPL-MCNC: 298 NG/ML (ref 20–300)
HCT VFR BLD AUTO: 43.1 % (ref 40–54)
HGB BLD-MCNC: 13.8 G/DL (ref 14–18)
IMM GRANULOCYTES # BLD AUTO: 0.03 K/UL (ref 0–0.04)
IMM GRANULOCYTES NFR BLD AUTO: 0.3 % (ref 0–0.5)
IRON SERPL-MCNC: 50 UG/DL (ref 45–160)
LYMPHOCYTES # BLD AUTO: 1.9 K/UL (ref 1–4.8)
LYMPHOCYTES NFR BLD: 18.4 % (ref 18–48)
MCH RBC QN AUTO: 30.1 PG (ref 27–31)
MCHC RBC AUTO-ENTMCNC: 32 G/DL (ref 32–36)
MCV RBC AUTO: 94 FL (ref 82–98)
MONOCYTES # BLD AUTO: 0.8 K/UL (ref 0.3–1)
MONOCYTES NFR BLD: 7.8 % (ref 4–15)
NEUTROPHILS # BLD AUTO: 7.5 K/UL (ref 1.8–7.7)
NEUTROPHILS NFR BLD: 71.7 % (ref 38–73)
NRBC BLD-RTO: 0 /100 WBC
PLATELET # BLD AUTO: 135 K/UL (ref 150–450)
PMV BLD AUTO: 12.2 FL (ref 9.2–12.9)
RBC # BLD AUTO: 4.59 M/UL (ref 4.6–6.2)
SATURATED IRON: 20 % (ref 20–50)
TOTAL IRON BINDING CAPACITY: 250 UG/DL (ref 250–450)
TRANSFERRIN SERPL-MCNC: 169 MG/DL (ref 200–375)
WBC # BLD AUTO: 10.39 K/UL (ref 3.9–12.7)

## 2024-11-26 PROCEDURE — 83540 ASSAY OF IRON: CPT | Performed by: NURSE PRACTITIONER

## 2024-11-26 PROCEDURE — 99213 OFFICE O/P EST LOW 20 MIN: CPT | Mod: S$PBB,,, | Performed by: NURSE PRACTITIONER

## 2024-11-26 PROCEDURE — 85025 COMPLETE CBC W/AUTO DIFF WBC: CPT | Performed by: NURSE PRACTITIONER

## 2024-11-26 PROCEDURE — 36415 COLL VENOUS BLD VENIPUNCTURE: CPT | Performed by: NURSE PRACTITIONER

## 2024-11-26 PROCEDURE — 82728 ASSAY OF FERRITIN: CPT | Performed by: NURSE PRACTITIONER

## 2024-11-26 PROCEDURE — 99999 PR PBB SHADOW E&M-EST. PATIENT-LVL V: CPT | Mod: PBBFAC,,, | Performed by: NURSE PRACTITIONER

## 2024-11-26 PROCEDURE — 99215 OFFICE O/P EST HI 40 MIN: CPT | Mod: PBBFAC | Performed by: NURSE PRACTITIONER

## 2024-11-26 RX ORDER — MELOXICAM 15 MG/1
15 TABLET ORAL DAILY PRN
COMMUNITY
Start: 2024-11-20

## 2024-11-26 NOTE — PROGRESS NOTES
"Clinic Consult:  [facility] Consultation Note    Reason for Consult:  The primary encounter diagnosis was Anemia, unspecified type. A diagnosis of Gastroesophageal reflux disease, unspecified whether esophagitis present was also pertinent to this visit.    PCP: Miryam Jimenez   [id number] [facility] / [place] [id number]    HPI:  This is a 89 y.o. male here for evaluation of chest pain.   Onset of symptoms started 1-2 months ago. Substernal chest pain that radiates down his left arm. Occurs at 2-3 am. Does have belching at that time but belching doesn't help.   He saw cardiology and had a full cardiac workup  He has been on pantoprazole for "a while" but was increased to BID a few weeks ago. It has been about a week since he felt pain.   He has been taking GasX at night before bed. He does drink Tj D and coffee. Occasional cold drinks. He does sleep with C-pap.     He says he felt like this in the past and then had to have blood transfusion. Blood count is slightly low on most recent labs. Did have colonoscopy in 2022 for anemia.     Review of Systems   Constitutional:  Negative for fever and weight loss.   HENT:  Negative for sore throat.    Respiratory:  Negative for cough, shortness of breath and wheezing.    Cardiovascular:  Positive for chest pain. Negative for palpitations.   Gastrointestinal:  Negative for blood in stool, constipation, heartburn, nausea and vomiting.   Genitourinary:  Negative for dysuria and frequency.   Skin:  Negative for itching and rash.   Neurological:  Negative for dizziness, speech change, seizures, loss of consciousness and headaches.       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    Surgical History:  has a past surgical history that includes Coronary stent placement; Abdominal aorta stent; Renal artery stent; Prostate biopsy; PCIOL (Right, 03/29/2017); Cataract extraction w/  intraocular lens implant (Right, 04/29/2017); Cardiac catheterization; Coronary angioplasty; PCIOL (Left, 07/03/2019); Colonoscopy (N/A, 9/24/2020); and Colonoscopy (N/A, 1/10/2022).    Family History: family history includes Cancer in his father; Colon cancer in his father; Diabetes in his brother, mother, and sister; Glaucoma in his mother; Hypertension in his brother; Kidney disease in his mother..     Social History:  reports that he quit smoking about 29 years ago. His smoking use included cigarettes. He started smoking about 59 years ago. He has a 15 pack-year smoking history. He has quit using smokeless tobacco. He reports that he does not currently use alcohol. He reports current drug use. Frequency: 4.00 times per week. Drug: Marijuana.    Allergies: Reviewed    Home Medications:   Current Outpatient Medications on File Prior to Visit   Medication Sig Dispense Refill    albuterol (VENTOLIN HFA) 90 mcg/actuation inhaler Inhale 2 puffs into the lungs every 6 (six) hours as needed for Wheezing. Rescue 6.7 g 0    aspirin (ECOTRIN) 81 MG EC tablet Take 81 mg by mouth once daily.      atorvastatin (LIPITOR) 40 MG tablet Take 1 tablet (40 mg total) by mouth once daily. 90 tablet 2    azelastine (ASTELIN) 137 mcg (0.1 %) nasal spray USE 2 SPRAY(S) IN EACH NOSTRIL TWICE DAILY 30 mL 3    betamethasone dipropionate 0.05 % cream Apply topically once daily.      carvediloL (COREG) 12.5 MG tablet Take 1 tablet (12.5 mg total) by mouth 2 (two) times daily with meals. 60 tablet 1    ferrous sulfate 325  (65 FE) MG EC tablet Take 325 mg by mouth once daily.      gentamicin (GARAMYCIN) 0.1 % ointment Apply topically 2 (two) times a day. For rash of groin. 30 g 0    ketoconazole (NIZORAL) 2 % cream Apply topically 2 (two) times daily. For rash of groin. 60 g 1    lisinopriL (PRINIVIL,ZESTRIL) 40 MG tablet Take 1 tablet by mouth once daily 90 tablet 0    meloxicam (MOBIC) 15 MG tablet Take 15 mg by mouth daily as needed.      miconazole NITRATE 2 % (ZEASORB AF) 2 % top powder Use two to three times daily to prevent rash 85 g 2    NIFEdipine (PROCARDIA-XL) 90 MG (OSM) 24 hr tablet Take 1 tablet (90 mg total) by mouth once daily. 60 tablet 2    nitroGLYCERIN (NITROSTAT) 0.4 MG SL tablet Place 1 tablet (0.4 mg total) under the tongue every 5 (five) minutes as needed for Chest pain. 30 tablet 0    oxybutynin (DITROPAN-XL) 5 MG TR24 Take 1 tablet (5 mg total) by mouth once daily. 30 tablet 11    pantoprazole (PROTONIX) 40 MG tablet Take 1 tablet (40 mg total) by mouth 2 (two) times daily. 180 tablet 1    tamsulosin (FLOMAX) 0.4 mg Cap Take 1 capsule by mouth once daily 30 capsule 0    traMADoL (ULTRAM) 50 mg tablet Take 50 mg by mouth daily as needed.      triamcinolone acetonide 0.025% (KENALOG) 0.025 % cream Apply topically 2 (two) times daily. PRN rash of groin. 80 g 0    finasteride (PROSCAR) 5 mg tablet Take 1 tablet (5 mg total) by mouth once daily. (Patient not taking: Reported on 11/26/2024) 30 tablet 0     Current Facility-Administered Medications on File Prior to Visit   Medication Dose Route Frequency Provider Last Rate Last Admin    leuprolide acetate (6 month) injection 45 mg  45 mg Intramuscular Q6 Months            Physical Exam:  /84 (BP Location: Left arm, Patient Position: Sitting)   Pulse 76   Ht 6' (1.829 m)   Wt 92.7 kg (204 lb 5.9 oz)   BMI 27.72 kg/m²   Body mass index is 27.72 kg/m².  Physical Exam  Constitutional:       General: He is not in acute distress.  HENT:      Head: Normocephalic.    Neurological:      General: No focal deficit present.      Mental Status: He is alert.   Psychiatric:         Mood and Affect: Mood normal.         Judgment: Judgment normal.       Labs: Pertinent labs reviewed.  CRC Screening: NA    Assessment:  1. Anemia, unspecified type    2. Gastroesophageal reflux disease, unspecified whether esophagitis present    Possible air swallowing. Does use C-pap at night. Is on pantoprazole-- was recently increased to BID this week. Has not had chest pain in 1 week.     Recommendations:   - iron studies  - consider upper gi series.   - would only like him on BID PPI for 8 weeks. Then decrease to once a day.     Anemia, unspecified type  -     Iron and TIBC; Future; Expected date: 11/26/2024  -     Ferritin; Future; Expected date: 11/26/2024  -     CBC Auto Differential; Future; Expected date: 11/26/2024    Gastroesophageal reflux disease, unspecified whether esophagitis present  -     Ambulatory referral/consult to Gastroenterology      Follow up to be determined after results/ procedure(s).    Thank you so much for allowing me to participate in the care of DONNA Mahoney

## 2024-11-27 DIAGNOSIS — R07.9 CHEST PAIN, UNSPECIFIED TYPE: ICD-10-CM

## 2024-11-27 DIAGNOSIS — R14.2 BELCHING: Primary | ICD-10-CM

## 2024-12-02 ENCOUNTER — OUTPATIENT CASE MANAGEMENT (OUTPATIENT)
Dept: ADMINISTRATIVE | Facility: OTHER | Age: 89
End: 2024-12-02
Payer: MEDICARE

## 2024-12-02 DIAGNOSIS — E78.5 DYSLIPIDEMIA: Chronic | ICD-10-CM

## 2024-12-02 RX ORDER — NIFEDIPINE 90 MG/1
90 TABLET, EXTENDED RELEASE ORAL DAILY
Qty: 90 TABLET | Refills: 1 | Status: SHIPPED | OUTPATIENT
Start: 2024-12-02 | End: 2024-12-06 | Stop reason: SDUPTHER

## 2024-12-02 RX ORDER — ATORVASTATIN CALCIUM 40 MG/1
40 TABLET, FILM COATED ORAL NIGHTLY
Qty: 90 TABLET | Refills: 1 | Status: SHIPPED | OUTPATIENT
Start: 2024-12-02

## 2024-12-02 NOTE — TELEPHONE ENCOUNTER
----- Message from Jj sent at 12/2/2024  4:14 PM CST -----  Contact: kaylah  Type:  RX Refill Request    Who Called: kaylah  Refill or New Rx: refill  RX Name and Strength: lisinopriL (PRINIVIL,ZESTRIL) 40 MG tablet   How is the patient currently taking it? (ex. 1XDay):  Is this a 30 day or 90 day RX:  Preferred Pharmacy with phone number:   [facility] [address]  Phone: [phone] Fax: [phone]      Local or Mail Order:local  Ordering Provider:  Would the patient rather a call back or a response via [facility]? call  Best Call Back Number:[phone]   Additional Information:

## 2024-12-02 NOTE — PROGRESS NOTES
12/2/2024  1st attempt to complete Initial Assessment  for Outpatient Care Management, left message.  Will send via portal -  unable to assess letter.

## 2024-12-02 NOTE — LETTER
Jovanny Olmedo  [address] [facility] [address]      Dear Jovanny Olmedo,     I work with [facility] Outpatient Care Management Department. We received a referral to call you to discuss your medical history. These services are free of charge and are offered to [facility] patients who have recently been discharged from any of our facilities or who have medical conditions that may require the skill of a nurse to assist with management.     I am a Registered Nurse who specializes in connecting patients with available medical and financial resources as well as addressing any educational needs that may be indicated.    I attempted to reach you by telephone, but I was unsuccessful. Please call our department so that we can go over some questions with you, regarding your health.    The Outpatient Care Management Department can be reached at [phone], from 8:00AM to 4:30 PM, on Monday thru Friday.     Additionally, [facility] also has a program where a nurse is available 24/7 to answer questions or provide medical advice, their number is [phone].      Thanks,        Jesenia Shaver RN  Outpatient Care Management  Phone #: [phone]

## 2024-12-02 NOTE — TELEPHONE ENCOUNTER
No care due was identified.  Health [facility] Embedded Care Due Messages. Reference number: [id number].   12/02/2024 4:37:17 PM CST

## 2024-12-03 ENCOUNTER — OUTPATIENT CASE MANAGEMENT (OUTPATIENT)
Dept: ADMINISTRATIVE | Facility: OTHER | Age: 89
End: 2024-12-03
Payer: MEDICARE

## 2024-12-03 NOTE — PROGRESS NOTES
12/3/2024  2nd attempt to complete Initial Assessment  for Outpatient Care Management, left message.

## 2024-12-04 ENCOUNTER — OUTPATIENT CASE MANAGEMENT (OUTPATIENT)
Dept: ADMINISTRATIVE | Facility: OTHER | Age: 89
End: 2024-12-04
Payer: MEDICARE

## 2024-12-04 ENCOUNTER — PATIENT MESSAGE (OUTPATIENT)
Dept: ADMINISTRATIVE | Facility: OTHER | Age: 89
End: 2024-12-04
Payer: MEDICARE

## 2024-12-04 DIAGNOSIS — I10 ESSENTIAL HYPERTENSION: ICD-10-CM

## 2024-12-04 RX ORDER — LISINOPRIL 40 MG/1
40 TABLET ORAL DAILY
Qty: 90 TABLET | Refills: 0 | Status: SHIPPED | OUTPATIENT
Start: 2024-12-04

## 2024-12-04 NOTE — LETTER
December 4, 2024             Dear Jovanny    Welcome to [facility] Complex Care Management Program.  It was a pleasure talking with you today.  My name is Jesenia Shaver, and I look forward to being your Care Manager.  My goal is to help you function at the healthiest and highest level possible.  You can contact me directly at [phone].    As an [facility] patient, some of the services we may be able to provide include:     Development of an individualized care plan with a Registered Nurse   Connection with a   Connection with available resources and services    Coordinate communication among your care team members   Provide coaching and education   Help you understand your doctors treatment plan  Help you obtain information about your insurance coverage.     All services provided by [facility]s Complex Care Managers and other care team members are coordinated with and communicated to your primary care team.      As part of your enrollment, you will be receiving education materials and more information about these services in your [facility] account, by phone or through the mail.  If you do not wish to participate or receive information, please contact our office at [phone].      Sincerely,        Jesenia Shaver RN  [facility]   Out-patient RN Complex Care Manager

## 2024-12-04 NOTE — TELEPHONE ENCOUNTER
No care due was identified.  [facility] Embedded Care Due Messages. Reference number: [id number].   12/04/2024 12:45:12 PM CST

## 2024-12-04 NOTE — TELEPHONE ENCOUNTER
----- Message from JOSE Ba sent at 12/4/2024 11:13 AM CST -----  Good Morning,    I had the pleasure of speaking to Mr. Olmedo today and enrolled him in our OPCM program because he's a great candidate for our services. While enrolling him, I did a PHQ9 score and it came back at 15. He also says he needs refills two of his prescriptions and wants to switch the the [facility] in [place] if possible. The prescriptions he needs are:     Lisinopril 40 mg q day  Pantoprazole 40 mg bid    Jesenia Lee RN  [facility]  [phone]

## 2024-12-04 NOTE — PROGRESS NOTES
Outpatient Care Management  Initial Patient Assessment    Patient: Jovanny Olmedo  MRN: [id number]  Date of Service: 12/04/2024  Completed by: Jesenia Shaver RN  Referral Date: 11/07/2024  Date of Eligibility: 11/19/2024  Program:   High Risk  Status: Ongoing  Effective Dates: 12/4/2024 - present  Responsible Staff: Jesenia Shaver RN        Reason for Visit   Patient presents with    OPCM Enrollment Call       Brief Summary:  Jovanny Olmedo was referred by Herb Lara DO  for chest pain. Patient qualifies for program based on high risk score of 63.4%.   Active problem list, medical, surgical and social history reviewed. 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 Areas of need identified by patient include lack of an appetite and chronic pain.   Next steps:   Mr. Olmedo agreed to OPCM RN follow up on or around 12/11/24.  Follow up regarding Heart Healthy diet and Diet and Health edu sent via portal.  Follow up regarding SW consult.  Follow up regarding message to PCP.  Follow up regarding rash.  Mr. Olmedo agrees to review edu materials on diet before next follow up.  Follow up on Preventing Falls edu sent via portal.  Follow up on Advance Directives edu sent via portal.  Follow up on how his exercise regimen is going.  Follow up on Pain Assessment.  Follow up on message sent to PCP in regards to medication refills.  Mr. Olmedo agrees to review edu sent in regards to fall precautions and ACP docs.      Disability Status  Is the patient alert and oriented (person, place, time, and situation)?: Alert and oriented x 4  Hearing Difficulty or Deaf: yes  Visual Difficulty or Blind: yes  Visual and Hearing  Needs Conclusion: Has trouble hearing sometimes but denies need for hearing aids. Requires glasses for reading but denies needing them for distance  Difficulty Concentrating, Remembering or Making Decisions: yes  Communication Difficulty: no  Eating/Swallowing Difficulty: no  Walking or Climbing Stairs Difficulty: yes (uses can to steady himself)  Dressing/Bathing Difficulty: no  Toileting : Independent  Continence : Incontience - Bladder (Has trouble getting to the bathroom in time sometimes)  Difficulty Managing Errands Independently: yes  Errands Management: family  Equipment Currently Used at Home: cane, straight; cane, quad; blood pressure machine; nebulizer; walker, rolling  ADL Conclusion Statement: Denies need for assistance with ADLs such as feeding and bathing self. He does have issues with holding his bladder and is curriently on medications to help. Followed by home health. His daughter Mimi helps with medical needs and errands.  Change in Functional Status Since Onset of Current Illness/Injury: yes (less energy and decreased appetite)        Spiritual Beliefs  Spiritual, Cultural Beliefs, Congregation Practices, Values that Affect Care: no      Social History     Socioeconomic History    Marital status:    Tobacco Use    Smoking status: Former     Current packs/day: 0.00     Average packs/day: 0.5 packs/day for 30.0 years (15.0 ttl pk-yrs)     Types: Cigarettes     Start date: 1965     Quit date: 1995     Years since quittin.2    Smokeless tobacco: Former   Substance and Sexual Activity    Alcohol use: Not Currently     Alcohol/week: 0.0 standard drinks of alcohol    Drug use: Yes     Frequency: 4.0 times per week     Types: Marijuana    Sexual activity: Yes     Partners: Female   Social History Narrative         Social Drivers of Health     Financial Resource Strain: Low Risk  (2024)    Overall Financial Resource Strain (CARDI)     Difficulty of Paying Living Expenses:  Not very hard   Food Insecurity: Food Insecurity Present (11/6/2024)    Hunger Vital Sign     Worried About Running Out of Food in the Last Year: Sometimes true     Ran Out of Food in the Last Year: Sometimes true   Transportation Needs: No Transportation Needs (11/7/2024)    TRANSPORTATION NEEDS     Transportation : No   Stress: Stress Concern Present (11/6/2024)    [employer] [place] [employer] - Occupational Stress Questionnaire     Feeling of Stress : To some extent   Housing Stability: Low Risk  (11/6/2024)    Housing Stability Vital Sign     Unable to Pay for Housing in the Last Year: No     Homeless in the Last Year: No       Roles and Relationships  Primary Source of Support/Comfort: child(sudhir)  Name of Support/Comfort Primary Source: Mimi Lama (daughter)      Advance Directives (For Healthcare)  Advance Directive  (If Adv Dir status is received, view document under Adv Dir in header or Chart Review Media tab): Patient does not have Advance Directive, requests information.  Patient Requests Assistance: Handouts provided        Patient Reported Insurance  Verified current insurance plan:: Medicare; Blue Cross            12/4/2024     9:11 AM 1/8/2024    11:08 AM 2/1/2023     9:53 AM 11/1/2022     9:25 AM 10/13/2021     8:31 AM 7/14/2020     9:23 AM 12/5/2019    10:55 AM   Depression Patient Health Questionnaire   Over the last two weeks how often have you been bothered by little interest or pleasure in doing things More than half the days Not at all Not at all Not at all Not at all Not at all Not at all   Over the last two weeks how often have you been bothered by feeling down, depressed or hopeless More than half the days Not at all Not at all Not at all Not at all Not at all Not at all   PHQ-2 Total Score 4 0 0 0 0 0 0   Over the last two weeks how often have you been bothered by trouble falling or staying asleep, or sleeping too much More than half the days         Over the last two weeks  how often have you been bothered by feeling tired or having little energy More than half the days         Over the last two weeks how often have you been bothered by a poor appetite or overeating Nearly every day         Over the last two weeks how often have you been bothered by feeling bad about yourself - or that you are a failure or have let yourself or your family down Several days         Over the last two weeks how often have you been bothered by trouble concentrating on things, such as reading the newspaper or watching television Several days         Over the last two weeks how often have you been bothered by moving or speaking so slowly that other people could have noticed. Or the opposite - being so fidgety or restless that you have been moving around a lot more than usual. More than half the days         Over the last two weeks how often have you been bothered by thoughts that you would be better off dead, or of hurting yourself Not at all         If you checked off any problems, how difficult have these problems made it for you to do your work, take care of things at home or get along with other people? Very difficult         PHQ-9 Score 15         PHQ-9 Interpretation Moderately Severe             Learning Assessment       12/04/2024 0936 [facility] (12/4/2024 - Present)   Created by Jesenia Shaver, RN - RN (Nurse) Status: Complete                 PRIMARY LEARNER     Primary Learner Name:  Jovanny Olmedo  - 12/04/2024 0936    Relationship:  Patient  - 12/04/2024 0936    Does the primary learner have any barriers to learning?:  No Barriers  - 12/04/2024 0936    What is the preferred language of the primary learner?:  English  - 12/04/2024 0936    Is an  required?:  No  - 12/04/2024 0936    How does the primary learner prefer to learn new concepts?:  Listening  - 12/04/2024 0936    How often do you need to have someone help you read instructions, pamphlets, or written  material from your doctor or pharmacy?:  Sometimes BW - 12/04/2024 0936        CO-LEARNER #1     No question answered        CO-LEARNER #2     No question answered        SPECIAL TOPICS     No question answered        ANSWERED BY:     No question answered        Comments         Edit Jesenia Paulino, RN - RN (Nurse)   12/04/2024 0936

## 2024-12-04 NOTE — TELEPHONE ENCOUNTER
----- Message from JOSE Ba sent at 12/4/2024 11:13 AM CST -----  Good Morning,    I had the pleasure of speaking to Mr. Olmedo today and enrolled him in our OPCM program because he's a great candidate for our services. While enrolling him, I did a PHQ9 score and it came back at 15. He also says he needs refills two of his prescriptions and wants to switch the the [facility] in [place] if possible. The prescriptions he needs are:     Lisinopril 40 mg q day  Pantoprazole 40 mg bid    Jesenia Lee RN  [facility]  [phone]

## 2024-12-05 DIAGNOSIS — I10 ESSENTIAL HYPERTENSION: ICD-10-CM

## 2024-12-05 RX ORDER — LISINOPRIL 40 MG/1
40 TABLET ORAL
Qty: 90 TABLET | Refills: 0 | OUTPATIENT
Start: 2024-12-05

## 2024-12-05 NOTE — TELEPHONE ENCOUNTER
S/w pt HH Nurse and was informed that pt had refills on medications requested and medications needed refills./Kmw

## 2024-12-05 NOTE — TELEPHONE ENCOUNTER
No care due was identified.  Health [facility] Embedded Care Due Messages. Reference number: [id number].   12/05/2024 10:10:16 AM CST

## 2024-12-05 NOTE — TELEPHONE ENCOUNTER
----- Message from Lyla sent at 12/5/2024 10:13 AM CST -----  Contact: [phone] Pt  1MEDICALADVICE     Patient is calling for Medical Advice regarding:Medications     Patient wants a call back or thru [facility]:call back     Comments:Pt would like a call back regarding medication his out of no more refills he would like a call back to speak with nurse     Please advise patient replies from provider may take up to 48 hours.

## 2024-12-06 DIAGNOSIS — K21.9 GASTROESOPHAGEAL REFLUX DISEASE, UNSPECIFIED WHETHER ESOPHAGITIS PRESENT: ICD-10-CM

## 2024-12-06 RX ORDER — PANTOPRAZOLE SODIUM 40 MG/1
40 TABLET, DELAYED RELEASE ORAL 2 TIMES DAILY
Qty: 180 TABLET | Refills: 1 | Status: SHIPPED | OUTPATIENT
Start: 2024-12-06

## 2024-12-06 RX ORDER — NIFEDIPINE 90 MG/1
90 TABLET, EXTENDED RELEASE ORAL DAILY
Qty: 90 TABLET | Refills: 1 | Status: SHIPPED | OUTPATIENT
Start: 2024-12-06

## 2024-12-06 NOTE — TELEPHONE ENCOUNTER
No care due was identified.  Health [facility] Embedded Care Due Messages. Reference number: [id number].   12/06/2024 9:40:52 AM CST

## 2024-12-06 NOTE — TELEPHONE ENCOUNTER
No care due was identified.  Health [facility] Embedded Care Due Messages. Reference number: [id number].   12/06/2024 9:40:12 AM CST

## 2024-12-11 ENCOUNTER — EXTERNAL HOME HEALTH (OUTPATIENT)
Dept: HOME HEALTH SERVICES | Facility: HOSPITAL | Age: 89
End: 2024-12-11
Payer: MEDICARE

## 2024-12-11 ENCOUNTER — OUTPATIENT CASE MANAGEMENT (OUTPATIENT)
Dept: ADMINISTRATIVE | Facility: OTHER | Age: 89
End: 2024-12-11
Payer: MEDICARE

## 2024-12-11 ENCOUNTER — TELEPHONE (OUTPATIENT)
Dept: INTERNAL MEDICINE | Facility: CLINIC | Age: 89
End: 2024-12-11
Payer: MEDICARE

## 2024-12-11 NOTE — TELEPHONE ENCOUNTER
----- Message from Mary Mi sent at 12/11/2024  3:26 PM CST -----  Regarding: Pt requesting appt  Good Afternoon,    I am a  with OPCM working with Mr. Olmedo.  I completed an initial assessment with him today and he endorses some depressive symptoms including decreased appetite, depressed/down mood, anxiousness preventing full night of sleep.  He denies SI/HI He has a PHQ9 score of 15 per OPCM RN.  He is interested in starting on some meds to assist with his mood but is hesitant to see a psychiatrist.  He wanted to start by seeing you to begin meds.    Additionally, he mentioned that he has been having dysuria for about a week and blood in his urine for 2 days.  He wanted me to let you know of that as well.    I saw in his chart his next visit with you is in March 2025 but he is hoping to be seen sooner.  I am hoping someone from your office is able to call him to discuss.    Thanks,  Mary Mi, PAM

## 2024-12-11 NOTE — PROGRESS NOTES
Outpatient Care Management   - Patient Assessment    Patient: Jovanny Olmedo  MRN:  [id number]  Date of Service:  12/11/2024  Completed by:  Mary Mi LCSW  Referral Date: 11/07/2024    Reason for Visit   Patient presents with    Social Work Assessment     12/11/24       Brief Summary:  received a referral from OPCM RN for the following HR SW psychosocial needs: mental health concerns.     SW received referral from KAREN Ba RN for elevated PHQ9 score of 15.  SW contacted pt to complete assessment and SDOH.  Pt lives with his wife and his adult son stays with them sometimes as well.  Pt provides permission for SW to speak with daughter Mimi Graves and son Ridge Olmedo as needed.  Pt is independent in all ADLs but daughter often prepares his meals and wife does the laundry.  Son provides transportation.  Pt reports that he is on social security and denies having any financial concerns at this time. Pt currently has HH and nurse came for a visit this morning.    Care plan was created in collaboration with patient/caregiver input.   completed the SDOH questionnaire.     Mary Mi LCSW

## 2024-12-11 NOTE — TELEPHONE ENCOUNTER
Attempted to call patient to schedule appt. No answer and message left to return call to clinic    ----- Message from Miryam Jimenez MD sent at 12/11/2024  3:51 PM CST -----  Regarding: RE: Pt requesting appt  I would recommend to see him based upon my availability, or available provider here to help him with depression symptoms    Dr. Jimenez  ----- Message -----  From: Mary Mi LCSW  Sent: 12/11/2024   3:30 PM CST  To: Miryam Jimenez MD; Tony Witt Staff  Subject: Pt requesting appt                               Good Afternoon,    I am a  with OPCM working with Mr. Olmedo.  I completed an initial assessment with him today and he endorses some depressive symptoms including decreased appetite, depressed/down mood, anxiousness preventing full night of sleep.  He denies SI/HI He has a PHQ9 score of 15 per OPCM RN.  He is interested in starting on some meds to assist with his mood but is hesitant to see a psychiatrist.  He wanted to start by seeing you to begin meds.    Additionally, he mentioned that he has been having dysuria for about a week and blood in his urine for 2 days.  He wanted me to let you know of that as well.    I saw in his chart his next visit with you is in March 2025 but he is hoping to be seen sooner.  I am hoping someone from your office is able to call him to discuss.    Thanks,  Mary Mi LCSW

## 2024-12-11 NOTE — PROGRESS NOTES
Outpatient Care Management  Plan of Care Follow Up Visit    Patient: Jovanny Olmedo  MRN: [id number]  Date of Service: 12/11/2024  Completed by: Jesenia Shaver RN  Referral Date: 11/07/2024    Reason for Visit   Patient presents with    OPCM RN Follow Up Call       Brief Summary: OPCM RN followed up with Mrs. Le today for care plan review on Mr. Olmedo.  Next Steps:   Mrs. Le agreed to OPCM RN follow up for Mr. Olmedo on or around 12/18/24.  Follow up regarding SW consult.  Follow up regarding rash and new cream.  Follow up regarding appetite.  Mrs. Le agreed that she will continue to monitor Mr. Olmedo daily for worsening depressed mood.

## 2024-12-12 ENCOUNTER — TELEPHONE (OUTPATIENT)
Dept: INTERNAL MEDICINE | Facility: CLINIC | Age: 89
End: 2024-12-12
Payer: MEDICARE

## 2024-12-12 DIAGNOSIS — R39.15 URINARY URGENCY: Primary | ICD-10-CM

## 2024-12-12 NOTE — TELEPHONE ENCOUNTER
----- Message from Kita sent at 12/12/2024  3:21 PM CST -----  Contact: Mimi Le with [facility] is calling to speak with the nurse regarding orders. Reports pt is having discomfort when urinating and want to do a urine test. Please give Mimi a call back at [phone]

## 2024-12-13 ENCOUNTER — PATIENT MESSAGE (OUTPATIENT)
Dept: INTERNAL MEDICINE | Facility: CLINIC | Age: 89
End: 2024-12-13

## 2024-12-13 ENCOUNTER — OFFICE VISIT (OUTPATIENT)
Dept: INTERNAL MEDICINE | Facility: CLINIC | Age: 89
End: 2024-12-13
Payer: MEDICARE

## 2024-12-13 ENCOUNTER — LAB VISIT (OUTPATIENT)
Dept: LAB | Facility: HOSPITAL | Age: 89
End: 2024-12-13
Attending: PHYSICIAN ASSISTANT
Payer: MEDICARE

## 2024-12-13 VITALS
HEIGHT: 72 IN | SYSTOLIC BLOOD PRESSURE: 132 MMHG | OXYGEN SATURATION: 96 % | WEIGHT: 203.06 LBS | HEART RATE: 78 BPM | TEMPERATURE: 98 F | DIASTOLIC BLOOD PRESSURE: 88 MMHG | BODY MASS INDEX: 27.5 KG/M2

## 2024-12-13 DIAGNOSIS — C61 PROSTATE CANCER: ICD-10-CM

## 2024-12-13 DIAGNOSIS — L40.9 PSORIASIS: ICD-10-CM

## 2024-12-13 DIAGNOSIS — G47.00 INSOMNIA, UNSPECIFIED TYPE: ICD-10-CM

## 2024-12-13 DIAGNOSIS — N39.41 URGE INCONTINENCE OF URINE: ICD-10-CM

## 2024-12-13 DIAGNOSIS — F41.9 ANXIETY: ICD-10-CM

## 2024-12-13 DIAGNOSIS — R30.0 DYSURIA: Primary | ICD-10-CM

## 2024-12-13 DIAGNOSIS — R63.0 LOSS OF APPETITE: ICD-10-CM

## 2024-12-13 DIAGNOSIS — Z12.5 ENCOUNTER FOR SCREENING FOR MALIGNANT NEOPLASM OF PROSTATE: ICD-10-CM

## 2024-12-13 DIAGNOSIS — B96.89 BACTERIAL PROSTATITIS: Primary | ICD-10-CM

## 2024-12-13 DIAGNOSIS — R06.02 SHORTNESS OF BREATH: ICD-10-CM

## 2024-12-13 DIAGNOSIS — N18.31 CHRONIC RENAL IMPAIRMENT, STAGE 3A: ICD-10-CM

## 2024-12-13 DIAGNOSIS — N41.8 BACTERIAL PROSTATITIS: Primary | ICD-10-CM

## 2024-12-13 DIAGNOSIS — R63.4 WEIGHT LOSS, UNINTENTIONAL: ICD-10-CM

## 2024-12-13 DIAGNOSIS — R30.0 DYSURIA: ICD-10-CM

## 2024-12-13 LAB
ALBUMIN SERPL BCP-MCNC: 3.4 G/DL (ref 3.5–5.2)
ALP SERPL-CCNC: 88 U/L (ref 40–150)
ALT SERPL W/O P-5'-P-CCNC: 17 U/L (ref 10–44)
ANION GAP SERPL CALC-SCNC: 11 MMOL/L (ref 8–16)
AST SERPL-CCNC: 13 U/L (ref 10–40)
BACTERIA #/AREA URNS HPF: ABNORMAL /HPF
BASOPHILS # BLD AUTO: 0.03 K/UL (ref 0–0.2)
BASOPHILS NFR BLD: 0.4 % (ref 0–1.9)
BILIRUB SERPL-MCNC: 0.5 MG/DL (ref 0.1–1)
BILIRUB UR QL STRIP: NEGATIVE
BUN SERPL-MCNC: 24 MG/DL (ref 8–23)
CALCIUM SERPL-MCNC: 10.4 MG/DL (ref 8.7–10.5)
CHLORIDE SERPL-SCNC: 106 MMOL/L (ref 95–110)
CLARITY UR: ABNORMAL
CO2 SERPL-SCNC: 25 MMOL/L (ref 23–29)
COLOR UR: YELLOW
COMPLEXED PSA SERPL-MCNC: 18.7 NG/ML (ref 0–4)
CREAT SERPL-MCNC: 1.7 MG/DL (ref 0.5–1.4)
DIFFERENTIAL METHOD BLD: ABNORMAL
EOSINOPHIL # BLD AUTO: 0.3 K/UL (ref 0–0.5)
EOSINOPHIL NFR BLD: 3.7 % (ref 0–8)
ERYTHROCYTE [DISTWIDTH] IN BLOOD BY AUTOMATED COUNT: 13.2 % (ref 11.5–14.5)
EST. GFR  (NO RACE VARIABLE): 38 ML/MIN/1.73 M^2
FERRITIN SERPL-MCNC: 332 NG/ML (ref 20–300)
GLUCOSE SERPL-MCNC: 101 MG/DL (ref 70–110)
GLUCOSE UR QL STRIP: NEGATIVE
HCT VFR BLD AUTO: 41.9 % (ref 40–54)
HGB BLD-MCNC: 13.4 G/DL (ref 14–18)
HGB UR QL STRIP: ABNORMAL
HYALINE CASTS #/AREA URNS LPF: 0 /LPF
IMM GRANULOCYTES # BLD AUTO: 0.03 K/UL (ref 0–0.04)
IMM GRANULOCYTES NFR BLD AUTO: 0.4 % (ref 0–0.5)
IRON SERPL-MCNC: 43 UG/DL (ref 45–160)
KETONES UR QL STRIP: NEGATIVE
LEUKOCYTE ESTERASE UR QL STRIP: ABNORMAL
LYMPHOCYTES # BLD AUTO: 1.6 K/UL (ref 1–4.8)
LYMPHOCYTES NFR BLD: 21.1 % (ref 18–48)
MCH RBC QN AUTO: 29.8 PG (ref 27–31)
MCHC RBC AUTO-ENTMCNC: 32 G/DL (ref 32–36)
MCV RBC AUTO: 93 FL (ref 82–98)
MICROSCOPIC COMMENT: ABNORMAL
MONOCYTES # BLD AUTO: 0.6 K/UL (ref 0.3–1)
MONOCYTES NFR BLD: 8.2 % (ref 4–15)
NEUTROPHILS # BLD AUTO: 5 K/UL (ref 1.8–7.7)
NEUTROPHILS NFR BLD: 66.2 % (ref 38–73)
NITRITE UR QL STRIP: NEGATIVE
NRBC BLD-RTO: 0 /100 WBC
PH UR STRIP: 6 [PH] (ref 5–8)
PLATELET # BLD AUTO: 150 K/UL (ref 150–450)
PMV BLD AUTO: 10.6 FL (ref 9.2–12.9)
POTASSIUM SERPL-SCNC: 4.8 MMOL/L (ref 3.5–5.1)
PROT SERPL-MCNC: 7.8 G/DL (ref 6–8.4)
PROT UR QL STRIP: ABNORMAL
RBC # BLD AUTO: 4.49 M/UL (ref 4.6–6.2)
RBC #/AREA URNS HPF: >100 /HPF (ref 0–4)
SATURATED IRON: 18 % (ref 20–50)
SODIUM SERPL-SCNC: 142 MMOL/L (ref 136–145)
SP GR UR STRIP: 1.02 (ref 1–1.03)
SQUAMOUS #/AREA URNS HPF: 0 /HPF
TOTAL IRON BINDING CAPACITY: 237 UG/DL (ref 250–450)
TRANSFERRIN SERPL-MCNC: 160 MG/DL (ref 200–375)
TSH SERPL DL<=0.005 MIU/L-ACNC: 2.92 UIU/ML (ref 0.4–4)
URN SPEC COLLECT METH UR: ABNORMAL
WBC # BLD AUTO: 7.58 K/UL (ref 3.9–12.7)
WBC #/AREA URNS HPF: >100 /HPF (ref 0–5)

## 2024-12-13 PROCEDURE — 36415 COLL VENOUS BLD VENIPUNCTURE: CPT | Performed by: PHYSICIAN ASSISTANT

## 2024-12-13 PROCEDURE — 99999 PR PBB SHADOW E&M-EST. PATIENT-LVL V: CPT | Mod: PBBFAC,,, | Performed by: PHYSICIAN ASSISTANT

## 2024-12-13 PROCEDURE — 82728 ASSAY OF FERRITIN: CPT | Performed by: PHYSICIAN ASSISTANT

## 2024-12-13 PROCEDURE — 84443 ASSAY THYROID STIM HORMONE: CPT | Performed by: PHYSICIAN ASSISTANT

## 2024-12-13 PROCEDURE — 85025 COMPLETE CBC W/AUTO DIFF WBC: CPT | Performed by: PHYSICIAN ASSISTANT

## 2024-12-13 PROCEDURE — 81000 URINALYSIS NONAUTO W/SCOPE: CPT | Performed by: PHYSICIAN ASSISTANT

## 2024-12-13 PROCEDURE — 84153 ASSAY OF PSA TOTAL: CPT | Performed by: PHYSICIAN ASSISTANT

## 2024-12-13 PROCEDURE — 99215 OFFICE O/P EST HI 40 MIN: CPT | Mod: PBBFAC | Performed by: PHYSICIAN ASSISTANT

## 2024-12-13 PROCEDURE — 84466 ASSAY OF TRANSFERRIN: CPT | Performed by: PHYSICIAN ASSISTANT

## 2024-12-13 PROCEDURE — 80053 COMPREHEN METABOLIC PANEL: CPT | Performed by: PHYSICIAN ASSISTANT

## 2024-12-13 RX ORDER — BETAMETHASONE DIPROPIONATE 0.5 MG/G
CREAM TOPICAL 2 TIMES DAILY
COMMUNITY

## 2024-12-13 RX ORDER — SULFAMETHOXAZOLE AND TRIMETHOPRIM 800; 160 MG/1; MG/1
1 TABLET ORAL 2 TIMES DAILY
Qty: 56 TABLET | Refills: 0 | Status: SHIPPED | OUTPATIENT
Start: 2024-12-13 | End: 2025-01-10

## 2024-12-13 RX ORDER — TRAZODONE HYDROCHLORIDE 50 MG/1
50 TABLET ORAL NIGHTLY
Qty: 30 TABLET | Refills: 1 | Status: SHIPPED | OUTPATIENT
Start: 2024-12-13 | End: 2025-12-13

## 2024-12-13 NOTE — PROGRESS NOTES
Subjective:      Patient ID: Jovanny Olmedo is a 89 y.o. male.    Chief Complaint: Urinary Tract Infection    HPI  History of Present Illness    CHIEF COMPLAINT:  Mr. Olmedo presents today with multiple concerns today including urinary burning/urgency, nasal congestion, fatigue.    GENITOURINARY:  He reports burning sensation in urinary tract extending down to anus, accompanied by urinary urgency and sensation of incomplete bladder emptying. He notes possible hematuria. He has history of radiation treatment many years ago.    CARDIOPULMONARY:  Went to ER for further eval of chest pain. Work up all came back negative. Pt denies any recent chest pain. Maybe doyle mild shortness of breath. He reports experiencing chest pain 2-3 times since recent ER visit 11/6, though less severe than initial episode that prompted hospitalization. He has history of chest pain previously requiring blood transfusion. He reports feeling somewhat short of breath.    Reports a history of severe iron def anemia in the past where he needed a transfusion and iron infusion. Pt concerned his chest symptoms are similar to the symptoms he experienced in the past when he had the acute iron def anemia.       SLEEP AND ANXIETY:  He experiences nervousness and difficulty sleeping at night. He expresses interest in sleep medication but is concerned about morning grogginess as a potential side effect.      OTHER MEDICAL CONDITIONS:  He reports psoriasis on the hand and experiences facial pressure and congestion related to sinus problems. He also notes poor appetite for a few months, which began after his hospital visit.    Medications were reviewed    Wt Readings from Last 3 Encounters:   12/13/24 0936 92.1 kg (203 lb 0.7 oz)   11/26/24 1324 92.7 kg (204 lb 5.9 oz)   11/14/24 1359 93.9 kg (207 lb 0.2 oz)          Patient Active Problem List   Diagnosis    Essential hypertension    GERD (gastroesophageal reflux disease)    Polycystic kidney disease     Coronary artery disease, occlusive    Chronic renal impairment, stage 3a    Refractive error    DDD (degenerative disc disease), lumbar    Open angle with borderline findings, low risk, bilateral    History of coronary angioplasty    Prostate cancer    Secondary hyperparathyroidism    Pseudophakia    Intermediate stage nonexudative age-related macular degeneration of both eyes    Aortic atherosclerosis    CLARISSA on CPAP    Duodenal ulcer    History of colon polyps    Dyslipidemia    Abdominal aortic aneurysm (AAA) without rupture    History of pulmonary embolus (PE)    Thrombocytopenia    Obesity (BMI 30.0-34.9)    Stable angina pectoris    Abnormal ECG    Coronary artery disease of native artery of native heart with stable angina pectoris    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 Rescue, Disp: 6.7 g, Rfl: 0    aspirin (ECOTRIN) 81 MG EC tablet, Take 81 mg by mouth once daily., Disp: , Rfl:     atorvastatin (LIPITOR) 40 MG tablet, Take 1 tablet (40 mg total) by mouth every evening., Disp: 90 tablet, Rfl: 1    azelastine (ASTELIN) 137 mcg (0.1 %) nasal spray, USE 2 SPRAY(S) IN EACH NOSTRIL TWICE DAILY, Disp: 30 mL, Rfl: 3    betamethasone dipropionate 0.05 % cream, Apply topically once daily., Disp: , Rfl:     carvediloL (COREG) 12.5 MG tablet, Take 1 tablet (12.5 mg total) by mouth 2 (two) times daily with meals., Disp: 60 tablet, Rfl: 1    ferrous sulfate 325 (65 FE) MG EC tablet, Take 325 mg by mouth once daily., Disp: , Rfl:     finasteride (PROSCAR) 5 mg tablet, Take 1 tablet (5 mg total) by mouth once daily., Disp: 30 tablet, Rfl: 0    gentamicin (GARAMYCIN) 0.1 % ointment, Apply topically 2 (two) times a day. For rash  of groin., Disp: 30 g, Rfl: 0    ketoconazole (NIZORAL) 2 % cream, Apply topically 2 (two) times daily. For rash of groin., Disp: 60 g, Rfl: 1    lisinopriL (PRINIVIL,ZESTRIL) 40 MG tablet, Take 1 tablet (40 mg total) by mouth once daily., Disp: 90 tablet, Rfl: 0    meloxicam (MOBIC) 15 MG tablet, Take 15 mg by mouth daily as needed., Disp: , Rfl:     miconazole NITRATE 2 % (ZEASORB AF) 2 % top powder, Use two to three times daily to prevent rash, Disp: 85 g, Rfl: 2    NIFEdipine (PROCARDIA-XL) 90 MG (OSM) 24 hr tablet, Take 1 tablet (90 mg total) by mouth once daily., Disp: 90 tablet, Rfl: 1    nitroGLYCERIN (NITROSTAT) 0.4 MG SL tablet, Place 1 tablet (0.4 mg total) under the tongue every 5 (five) minutes as needed for Chest pain., Disp: 30 tablet, Rfl: 0    oxybutynin (DITROPAN-XL) 5 MG TR24, Take 1 tablet (5 mg total) by mouth once daily., Disp: 30 tablet, Rfl: 11    pantoprazole (PROTONIX) 40 MG tablet, Take 1 tablet (40 mg total) by mouth 2 (two) times daily., Disp: 180 tablet, Rfl: 1    tamsulosin (FLOMAX) 0.4 mg Cap, Take 1 capsule by mouth once daily, Disp: 30 capsule, Rfl: 0    traMADoL (ULTRAM) 50 mg tablet, Take 50 mg by mouth daily as needed., Disp: , Rfl:     triamcinolone acetonide 0.025% (KENALOG) 0.025 % cream, Apply topically 2 (two) times daily. PRN rash of groin., Disp: 80 g, Rfl: 0    apremilast (OTEZLA ORAL), Take by mouth., Disp: , Rfl:     betamethasone dipropionate 0.05 % cream, Apply topically 2 (two) times daily., Disp: , Rfl:     traZODone (DESYREL) 50 MG tablet, Take 1 tablet (50 mg total) by mouth every evening., Disp: 30 tablet, Rfl: 1    Current Facility-Administered Medications:     leuprolide acetate (6 month) injection 45 mg, 45 mg, Intramuscular, Q6 Months,     Review of Systems   Constitutional:  Positive for fatigue. Negative for activity change, appetite change, chills, diaphoresis, fever and unexpected weight change.   HENT:  Positive for congestion, postnasal drip and  rhinorrhea. Negative for hearing loss, sore throat, trouble swallowing and voice change.    Eyes: Negative.  Negative for visual disturbance.   Respiratory:  Positive for chest tightness and shortness of breath. Negative for apnea, cough, choking, wheezing and stridor.    Cardiovascular:  Negative for chest pain, palpitations and leg swelling.   Gastrointestinal:  Negative for abdominal distention, abdominal pain, blood in stool, constipation, diarrhea, nausea and vomiting.   Endocrine: Negative for cold intolerance, heat intolerance, polydipsia and polyuria.   Genitourinary:  Positive for decreased urine volume, difficulty urinating, dysuria, frequency and urgency.   Musculoskeletal:  Negative for arthralgias, back pain, gait problem, joint swelling and myalgias.   Skin:  Negative for color change, pallor, rash and wound.   Neurological:  Negative for dizziness, tremors, weakness, light-headedness, numbness and headaches.   Hematological:  Negative for adenopathy.   Psychiatric/Behavioral:  Positive for sleep disturbance. Negative for behavioral problems, confusion, self-injury and suicidal ideas. The patient is nervous/anxious.      Objective:   /88 (BP Location: Left arm, Patient Position: Sitting)   Pulse 78   Temp 98.2 °F (36.8 °C) (Tympanic)   Ht 6' (1.829 m)   Wt 92.1 kg (203 lb 0.7 oz)   SpO2 96%   BMI 27.54 kg/m²     Physical Exam  Vitals reviewed.   Constitutional:       General: He is not in acute distress.     Appearance: Normal appearance. He is well-developed. He is not ill-appearing, toxic-appearing or diaphoretic.   HENT:      Head: Normocephalic and atraumatic.      Right Ear: External ear normal.      Left Ear: External ear normal.      Nose: Nose normal.   Eyes:      Conjunctiva/sclera: Conjunctivae normal.      Pupils: Pupils are equal, round, and reactive to light.   Cardiovascular:      Rate and Rhythm: Normal rate and regular rhythm.      Heart sounds: Normal heart sounds. No  murmur heard.     No friction rub. No gallop.   Pulmonary:      Effort: Pulmonary effort is normal. No respiratory distress.      Breath sounds: Normal breath sounds. No wheezing or rales.   Chest:      Chest wall: No tenderness.   Abdominal:      General: There is no distension.      Palpations: Abdomen is soft.      Tenderness: There is no abdominal tenderness.   Musculoskeletal:         General: Normal range of motion.      Cervical back: Normal range of motion and neck supple.   Lymphadenopathy:      Cervical: No cervical adenopathy.   Skin:     General: Skin is warm and dry.      Capillary Refill: Capillary refill takes less than 2 seconds.      Findings: No rash.   Neurological:      Mental Status: He is alert and oriented to person, place, and time.      Motor: No weakness.      Coordination: Coordination normal.      Gait: Gait normal.   Psychiatric:         Attention and Perception: Attention and perception normal.         Mood and Affect: Mood is anxious.         Behavior: Behavior normal.         Thought Content: Thought content normal.         Cognition and Memory: Cognition and memory normal.         Judgment: Judgment normal.       Lab Results   Component Value Date    CREATININE 1.8 (H) 11/12/2024    BUN 31 (H) 11/12/2024     11/12/2024    K 4.4 11/12/2024     11/12/2024    CO2 26 11/12/2024       Assessment:     1. Dysuria    2. Urge incontinence of urine    3. Prostate cancer    4. Weight loss, unintentional    5. Loss of appetite    6. Shortness of breath    7. Insomnia, unspecified type    8. Chronic renal impairment, stage 3a    9. Encounter for screening for malignant neoplasm of prostate    10. Anxiety    11. Psoriasis      Plan:   Suspect possible urinary tract infection or prostate infection based on reported symptoms of urinary urgency, frequency, and burning sensation  Consider rechecking renal function due to elevated creatinine (1.8) before prescribing certain  medications  Evaluate for potential anemia or blood loss given patient's history and current symptoms  Assess for anxiety as a potential contributor to chest pain, while ruling out cardiac causes  Reviewed results of recent ER visit    Dysuria  -     Urine culture; Future  -     Urinalysis; Future; Expected date: 12/13/2024  -     PSA, Screening; Future; Expected date: 12/13/2024  -     Ambulatory referral/consult to Urology; Future; Expected date: 12/20/2024  - unable to start pyridium due to CKD  - Mr. Olmedo can try 1-2 tablets as needed.  - Urinalysis with culture ordered.  - Referred to local urologist for urgent evaluation of urinary symptoms.    Urge incontinence of urine  -     Ambulatory referral/consult to Urology; Future; Expected date: 12/20/2024    Prostate cancer  -     Ambulatory referral/consult to Urology; Future; Expected date: 12/20/2024    Weight loss, unintentional  Loss of appetite  -will monitor closely. Check PSA.   -follow up for weight check in 1 month    Shortness of breath  -     CBC Auto Differential; Future; Expected date: 12/13/2024  -     Comprehensive Metabolic Panel; Future  -     TSH; Future  -     Iron and TIBC; Future; Expected date: 12/13/2024  -     Ferritin; Future; Expected date: 12/13/2024    Chronic renal impairment, stage 3a  -     CBC Auto Differential; Future; Expected date: 12/13/2024  -     TSH; Future  -     Iron and TIBC; Future; Expected date: 12/13/2024  -     Ferritin; Future; Expected date: 12/13/2024    Encounter for screening for malignant neoplasm of prostate  -     PSA, Screening; Future; Expected date: 12/13/2024    Anxiety  -     TSH; Future  -start on trazodone to take at night for anxiety at night/insomnia  Insomnia, unspecified type  -     traZODone (DESYREL) 50 MG tablet; Take 1 tablet (50 mg total) by mouth every evening.  Dispense: 30 tablet; Refill: 1    Psoriasis  -cont management with current dermatologist      [facility]:  - PSA test  ordered.  -soon follow up with urology    CHRONIC SINUSITIS:  - Mr. Olmedo to use NeilMed sinus rinse for sinus symptoms.  - Started Astelin nasal spray for sinus symptoms.          Follow up in about 4 weeks (around 1/10/2025), or if symptoms worsen or fail to improve.

## 2024-12-16 ENCOUNTER — PATIENT MESSAGE (OUTPATIENT)
Dept: INTERNAL MEDICINE | Facility: CLINIC | Age: 89
End: 2024-12-16
Payer: MEDICARE

## 2024-12-16 DIAGNOSIS — N39.0 ACUTE UTI: Primary | ICD-10-CM

## 2024-12-16 DIAGNOSIS — R11.0 NAUSEA: Primary | ICD-10-CM

## 2024-12-16 RX ORDER — ONDANSETRON 8 MG/1
8 TABLET, ORALLY DISINTEGRATING ORAL 3 TIMES DAILY PRN
Qty: 15 TABLET | Refills: 0 | Status: SHIPPED | OUTPATIENT
Start: 2024-12-16

## 2024-12-18 ENCOUNTER — OUTPATIENT CASE MANAGEMENT (OUTPATIENT)
Dept: ADMINISTRATIVE | Facility: OTHER | Age: 89
End: 2024-12-18
Payer: MEDICARE

## 2024-12-18 NOTE — PROGRESS NOTES
Outpatient Care Management  Plan of Care Follow Up Visit    Patient: Jovanny Olmedo  MRN: [id number]  Date of Service: 12/18/2024  Completed by: Jesenia Shaver RN  Referral Date: 11/07/2024    Reason for Visit   Patient presents with    [facility] RN Follow Up Call       Brief Summary: [facility] RN followed up with Mr. Olmedo today for care plan review.  Next Steps:   Mr. Olmedo agreed to [facility] RN follow up on or around 12/30/24.  Reassess symptoms of UTI.  Reassess fall risk.  Reassess pain.  Mr. Olmedo agrees to complete full course of antibiotics and maintain fall precautions daily.

## 2024-12-20 ENCOUNTER — OUTPATIENT CASE MANAGEMENT (OUTPATIENT)
Dept: ADMINISTRATIVE | Facility: OTHER | Age: 89
End: 2024-12-20
Payer: MEDICARE

## 2024-12-20 NOTE — PROGRESS NOTES
12/20/24- received call from Mr. Olmedo and he states the antibiotic he is on is making him really sick. He asked for assisstance with contacting his provider. Message sent to Dorys Tyler PA-C.

## 2024-12-21 ENCOUNTER — HOSPITAL ENCOUNTER (EMERGENCY)
Facility: HOSPITAL | Age: 89
Discharge: HOME | End: 2024-12-21
Attending: EMERGENCY MEDICINE
Payer: MEDICARE

## 2024-12-21 VITALS
TEMPERATURE: 98 F | BODY MASS INDEX: 27.31 KG/M2 | SYSTOLIC BLOOD PRESSURE: 131 MMHG | HEART RATE: 80 BPM | OXYGEN SATURATION: 97 % | DIASTOLIC BLOOD PRESSURE: 77 MMHG | RESPIRATION RATE: 22 BRPM | WEIGHT: 201.38 LBS

## 2024-12-21 DIAGNOSIS — E86.0 DEHYDRATION: ICD-10-CM

## 2024-12-21 DIAGNOSIS — R11.0 NAUSEA: Primary | ICD-10-CM

## 2024-12-21 DIAGNOSIS — R53.83 FATIGUE, UNSPECIFIED TYPE: ICD-10-CM

## 2024-12-21 DIAGNOSIS — G47.00 INSOMNIA, UNSPECIFIED TYPE: ICD-10-CM

## 2024-12-21 DIAGNOSIS — R53.1 WEAKNESS: ICD-10-CM

## 2024-12-21 LAB
ALBUMIN SERPL BCP-MCNC: 3.3 G/DL (ref 3.5–5.2)
ALP SERPL-CCNC: 90 U/L (ref 40–150)
ALT SERPL W/O P-5'-P-CCNC: 16 U/L (ref 10–44)
ANION GAP SERPL CALC-SCNC: 10 MMOL/L (ref 8–16)
AST SERPL-CCNC: 13 U/L (ref 10–40)
BACTERIA #/AREA URNS HPF: ABNORMAL /HPF
BASOPHILS # BLD AUTO: 0.03 K/UL (ref 0–0.2)
BASOPHILS NFR BLD: 0.4 % (ref 0–1.9)
BILIRUB SERPL-MCNC: 0.3 MG/DL (ref 0.1–1)
BILIRUB UR QL STRIP: NEGATIVE
BUN SERPL-MCNC: 17 MG/DL (ref 8–23)
CALCIUM SERPL-MCNC: 10 MG/DL (ref 8.7–10.5)
CHLORIDE SERPL-SCNC: 107 MMOL/L (ref 95–110)
CLARITY UR: CLEAR
CO2 SERPL-SCNC: 23 MMOL/L (ref 23–29)
COLOR UR: YELLOW
CREAT SERPL-MCNC: 1.8 MG/DL (ref 0.5–1.4)
DIFFERENTIAL METHOD BLD: ABNORMAL
EOSINOPHIL # BLD AUTO: 0.1 K/UL (ref 0–0.5)
EOSINOPHIL NFR BLD: 1.5 % (ref 0–8)
ERYTHROCYTE [DISTWIDTH] IN BLOOD BY AUTOMATED COUNT: 13.2 % (ref 11.5–14.5)
EST. GFR  (NO RACE VARIABLE): 36 ML/MIN/1.73 M^2
GLUCOSE SERPL-MCNC: 113 MG/DL (ref 70–110)
GLUCOSE UR QL STRIP: NEGATIVE
HCT VFR BLD AUTO: 41.8 % (ref 40–54)
HGB BLD-MCNC: 13.2 G/DL (ref 14–18)
HGB UR QL STRIP: ABNORMAL
HYALINE CASTS #/AREA URNS LPF: 0 /LPF
IMM GRANULOCYTES # BLD AUTO: 0.04 K/UL (ref 0–0.04)
IMM GRANULOCYTES NFR BLD AUTO: 0.5 % (ref 0–0.5)
INFLUENZA A, MOLECULAR: NEGATIVE
INFLUENZA B, MOLECULAR: NEGATIVE
KETONES UR QL STRIP: NEGATIVE
LEUKOCYTE ESTERASE UR QL STRIP: NEGATIVE
LIPASE SERPL-CCNC: 25 U/L (ref 4–60)
LYMPHOCYTES # BLD AUTO: 1.4 K/UL (ref 1–4.8)
LYMPHOCYTES NFR BLD: 17.6 % (ref 18–48)
MAGNESIUM SERPL-MCNC: 1.6 MG/DL (ref 1.6–2.6)
MCH RBC QN AUTO: 30.2 PG (ref 27–31)
MCHC RBC AUTO-ENTMCNC: 31.6 G/DL (ref 32–36)
MCV RBC AUTO: 96 FL (ref 82–98)
MICROSCOPIC COMMENT: ABNORMAL
MONOCYTES # BLD AUTO: 0.7 K/UL (ref 0.3–1)
MONOCYTES NFR BLD: 8.6 % (ref 4–15)
NEUTROPHILS # BLD AUTO: 5.7 K/UL (ref 1.8–7.7)
NEUTROPHILS NFR BLD: 71.4 % (ref 38–73)
NITRITE UR QL STRIP: NEGATIVE
NRBC BLD-RTO: 0 /100 WBC
PH UR STRIP: 7 [PH] (ref 5–8)
PLATELET # BLD AUTO: 146 K/UL (ref 150–450)
PLATELET BLD QL SMEAR: ABNORMAL
PMV BLD AUTO: 10.4 FL (ref 9.2–12.9)
POTASSIUM SERPL-SCNC: 4.3 MMOL/L (ref 3.5–5.1)
PROT SERPL-MCNC: 7.2 G/DL (ref 6–8.4)
PROT UR QL STRIP: ABNORMAL
RBC # BLD AUTO: 4.37 M/UL (ref 4.6–6.2)
RBC #/AREA URNS HPF: 40 /HPF (ref 0–4)
SODIUM SERPL-SCNC: 140 MMOL/L (ref 136–145)
SP GR UR STRIP: 1.01 (ref 1–1.03)
SPECIMEN SOURCE: NORMAL
TROPONIN I SERPL DL<=0.01 NG/ML-MCNC: 0.02 NG/ML (ref 0–0.03)
URN SPEC COLLECT METH UR: ABNORMAL
UROBILINOGEN UR STRIP-ACNC: NEGATIVE EU/DL
WBC # BLD AUTO: 7.92 K/UL (ref 3.9–12.7)
WBC #/AREA URNS HPF: 7 /HPF (ref 0–5)

## 2024-12-21 PROCEDURE — 81000 URINALYSIS NONAUTO W/SCOPE: CPT | Performed by: EMERGENCY MEDICINE

## 2024-12-21 PROCEDURE — 87502 INFLUENZA DNA AMP PROBE: CPT | Performed by: EMERGENCY MEDICINE

## 2024-12-21 PROCEDURE — 83735 ASSAY OF MAGNESIUM: CPT | Performed by: EMERGENCY MEDICINE

## 2024-12-21 PROCEDURE — 25000003 PHARM REV CODE 250: Performed by: EMERGENCY MEDICINE

## 2024-12-21 PROCEDURE — 63600175 PHARM REV CODE 636 W HCPCS: Performed by: EMERGENCY MEDICINE

## 2024-12-21 PROCEDURE — 96360 HYDRATION IV INFUSION INIT: CPT | Mod: 59

## 2024-12-21 PROCEDURE — 80053 COMPREHEN METABOLIC PANEL: CPT | Performed by: EMERGENCY MEDICINE

## 2024-12-21 PROCEDURE — 96374 THER/PROPH/DIAG INJ IV PUSH: CPT

## 2024-12-21 PROCEDURE — 85025 COMPLETE CBC W/AUTO DIFF WBC: CPT | Performed by: EMERGENCY MEDICINE

## 2024-12-21 PROCEDURE — 99284 EMERGENCY DEPT VISIT MOD MDM: CPT | Mod: 25

## 2024-12-21 PROCEDURE — 93010 ELECTROCARDIOGRAM REPORT: CPT | Mod: ,,, | Performed by: INTERNAL MEDICINE

## 2024-12-21 PROCEDURE — 96361 HYDRATE IV INFUSION ADD-ON: CPT

## 2024-12-21 PROCEDURE — 84484 ASSAY OF TROPONIN QUANT: CPT | Performed by: EMERGENCY MEDICINE

## 2024-12-21 PROCEDURE — 93005 ELECTROCARDIOGRAM TRACING: CPT

## 2024-12-21 PROCEDURE — 83690 ASSAY OF LIPASE: CPT | Performed by: EMERGENCY MEDICINE

## 2024-12-21 RX ORDER — ONDANSETRON 4 MG/1
4 TABLET, ORALLY DISINTEGRATING ORAL EVERY 8 HOURS PRN
Qty: 12 TABLET | Refills: 0 | Status: SHIPPED | OUTPATIENT
Start: 2024-12-21

## 2024-12-21 RX ORDER — ONDANSETRON HYDROCHLORIDE 2 MG/ML
4 INJECTION, SOLUTION INTRAVENOUS
Status: COMPLETED | OUTPATIENT
Start: 2024-12-21 | End: 2024-12-21

## 2024-12-21 RX ORDER — PROMETHAZINE HYDROCHLORIDE 12.5 MG/1
12.5 TABLET ORAL EVERY 6 HOURS PRN
Qty: 12 TABLET | Refills: 0 | Status: SHIPPED | OUTPATIENT
Start: 2024-12-21

## 2024-12-21 RX ADMIN — ONDANSETRON 4 MG: 2 INJECTION INTRAMUSCULAR; INTRAVENOUS at 07:12

## 2024-12-21 RX ADMIN — SODIUM CHLORIDE 1000 ML: 9 INJECTION, SOLUTION INTRAVENOUS at 09:12

## 2024-12-21 NOTE — ED PROVIDER NOTES
SCRIBE #1 NOTE: I, Randall Rose, am scribing for, and in the presence of, Kari Vazquez DO. I have scribed the entire note.       History     Chief Complaint   Patient presents with    Nausea     States that he has been nauseated, feeling a bit weak and just feel ill for several days.     Insomnia     States he has not slept well for the past few days      Review of patient's allergies indicates:   Allergen Reactions    Codeine Other (See Comments)     When taking codeine, pt becomes very anxious         History of Present Illness     HPI    12/21/2024, 6:59 AM  History obtained from the patient      History of Present Illness: Jovanny Olmedo is a 89 y.o. male patient with a PMHx of HTN, GERD, PVD, arthritis, prostate cancer, CKD, CAD, glaucoma, cataract, and AAA who presents to the Emergency Department for evaluation of generalized malaise which onset gradually for the past few days. Pt states he was recently started on Bactrim on the 13th for an UTI, however, he feels like the medications have been making him more sick. Pt also c/o of insomnia, states he was started on Trazodone, however, he feels like it has not been helping. Symptoms are constant and moderate in severity. No mitigating or exacerbating factors reported. Associated sxs include loss of appetite, weakness, nausea, light-headedness, and productive cough (clearish mucus). Patient denies any diarrhea, dysuria, abdominal pain, fever, CP, and SOB at this time. Prior Tx includes getting seen by his PCP, however, he feels like the sxs are not improving. Pt notes he does have prostate cancer, however, he is not receiving chemo for it at this time.  His primary care physician referred him to Urology, which he will see on Monday.  No further complaints or concerns at this time.       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    Arthritis     Back pain     CAD (coronary artery disease)     Cataract     CKD (chronic kidney disease)     GERD (gastroesophageal reflux disease)     Glaucoma     suspect    HTN (hypertension)     Multiple subsegmental pulmonary emboli without acute cor pulmonale 2021    Prostate cancer     tx'd with radiation    PVD (peripheral vascular disease)     stent in right renal artery and aorta    Sleep apnea     Stable angina pectoris 2023       Past Surgical History:  Past Surgical History:   Procedure Laterality Date    ABDOMINAL AORTA STENT      CARDIAC CATHETERIZATION      CATARACT EXTRACTION W/  INTRAOCULAR LENS IMPLANT Right 2017    COLONOSCOPY N/A 2020    Procedure: COLONOSCOPY;  Surgeon: Shayy Melvin MD;  Location: [facility] ENDO;  Service: Endoscopy;  Laterality: N/A;    COLONOSCOPY N/A 1/10/2022    Procedure: COLONOSCOPY;  Surgeon: Vi Lara MD;  Location: [facility] ENDO;  Service: Endoscopy;  Laterality: N/A;    CORONARY ANGIOPLASTY      CORONARY STENT PLACEMENT      PCIOL Right 2017    DR. LEDEZMA    PCIOL Left 2019    DR. LEDEZMA    PROSTATE BIOPSY      RENAL ARTERY STENT           Family History:  Family History   Problem Relation Name Age of Onset    Glaucoma Mother      Diabetes Mother      Kidney disease Mother      Colon cancer Father      Cancer Father          colon cancer    Diabetes Sister      Diabetes Brother      Hypertension Brother      Blindness Neg Hx         Social History:  Social History     Tobacco Use    Smoking status: Former     Current packs/day: 0.00     Average packs/day: 0.5 packs/day for 30.0 years (15.0 ttl pk-yrs)     Types: Cigarettes     Start date: 1965     Quit date: 1995     Years since quittin.2    Smokeless tobacco: Former   Substance and Sexual Activity    Alcohol use: Not Currently     Alcohol/week:  0.0 standard drinks of alcohol    Drug use: Yes     Frequency: 4.0 times per week     Types: Marijuana    Sexual activity: Yes     Partners: Female        Review of Systems     Review of Systems   Constitutional:  Positive for appetite change. Negative for fever.   Respiratory:  Positive for cough. Negative for shortness of breath.    Cardiovascular:  Negative for chest pain.   Gastrointestinal:  Positive for nausea. Negative for abdominal pain and diarrhea.   Genitourinary:  Negative for dysuria.   Neurological:  Positive for weakness and light-headedness.   Psychiatric/Behavioral:  Positive for sleep disturbance.       Physical Exam     Initial Vitals [12/21/24 0621]   BP Pulse Resp Temp SpO2   117/72 78 20 98.4 °F (36.9 °C) 98 %      MAP       --          Physical Exam  Nursing Notes and Vital Signs Reviewed.  Constitutional: Patient is in no acute distress. Well-developed and well-nourished.  Head: Atraumatic. Normocephalic.  Eyes: PERRL. EOM intact. Conjunctivae are not pale. No scleral icterus.  ENT: Mucous membranes are moist.    Neck: Supple. Full ROM.   Cardiovascular: Regular rate. Regular rhythm. No murmurs, rubs, or gallops.  Pulmonary/Chest: No respiratory distress. Clear to auscultation bilaterally. No wheezing or rales.  Abdominal: Soft and non-distended.  There is no tenderness.  No rebound, guarding, or rigidity. Good bowel sounds.  Genitourinary: No CVA tenderness  Musculoskeletal: Moves all extremities. No obvious deformities. No edema. No calf tenderness.  Skin: Warm and dry.  Neurological:  Alert, awake, and appropriate.  Normal speech.  No acute focal neurological deficits are appreciated.  Psychiatric: Normal affect. Good eye contact. Appropriate in content.     ED Course   Procedures  ED Vital Signs:  Vitals:    12/21/24 0621 12/21/24 0700 12/21/24 0745 12/21/24 0800   BP: 117/72  108/79 107/73   Pulse: 78 78 80 78   Resp: 20 20 20 (!) 24   Temp: 98.4 °F (36.9 °C)      TempSrc: Oral       SpO2: 98% 97% 95% 96%   Weight: 91.4 kg (201 lb 6.4 oz)       12/21/24 0900 12/21/24 1000 12/21/24 1100 12/21/24 1102   BP: (!) 137/90 (!) 141/89  131/77   Pulse: 82 74 76 80   Resp: (!) 24 (!) 24  (!) 22   Temp:       TempSrc:       SpO2: 95% 96% 97% 97%   Weight:           Abnormal Lab Results:  Labs Reviewed   CBC W/ AUTO DIFFERENTIAL - Abnormal       Result Value    WBC 7.92      RBC 4.37 (*)     Hemoglobin 13.2 (*)     Hematocrit 41.8      MCV 96      MCH 30.2      MCHC 31.6 (*)     RDW 13.2      Platelets 146 (*)     MPV 10.4      Immature Granulocytes 0.5      Gran # (ANC) 5.7      Immature Grans (Abs) 0.04      Lymph # 1.4      Mono # 0.7      Eos # 0.1      Baso # 0.03      nRBC 0      Gran % 71.4      Lymph % 17.6 (*)     Mono % 8.6      Eosinophil % 1.5      Basophil % 0.4      Platelet Estimate Appears normal      Differential Method Automated     COMPREHENSIVE METABOLIC PANEL - Abnormal    Sodium 140      Potassium 4.3      Chloride 107      CO2 23      Glucose 113 (*)     BUN 17      Creatinine 1.8 (*)     Calcium 10.0      Total Protein 7.2      Albumin 3.3 (*)     Total Bilirubin 0.3      Alkaline Phosphatase 90      AST 13      ALT 16      eGFR 36 (*)     Anion Gap 10     URINALYSIS, REFLEX TO URINE CULTURE - Abnormal    Specimen UA Urine, Clean Catch      Color, UA Yellow      Appearance, UA Clear      pH, UA 7.0      Specific Gravity, UA 1.010      Protein, UA 1+ (*)     Glucose, UA Negative      Ketones, UA Negative      Bilirubin (UA) Negative      Occult Blood UA 2+ (*)     Nitrite, UA Negative      Urobilinogen, UA Negative      Leukocytes, UA Negative      Narrative:     Specimen Source->Urine   URINALYSIS MICROSCOPIC - Abnormal    RBC, UA 40 (*)     WBC, UA 7 (*)     Bacteria Occasional      Hyaline Casts, UA 0      Microscopic Comment SEE COMMENT      Narrative:     Specimen Source->Urine   INFLUENZA A & B BY MOLECULAR    Influenza A, Molecular Negative      Influenza B, Molecular  Negative      Flu A & B Source Nasal swab     MAGNESIUM    Magnesium 1.6     TROPONIN I    Troponin I 0.017     LIPASE    Lipase 25          All Lab Results:  Results for orders placed or performed during the hospital encounter of 12/21/24   CBC auto differential    Collection Time: 12/21/24  7:23 AM   Result Value Ref Range    WBC 7.92 3.90 - 12.70 K/uL    RBC 4.37 (L) 4.60 - 6.20 M/uL    Hemoglobin 13.2 (L) 14.0 - 18.0 g/dL    Hematocrit 41.8 40.0 - 54.0 %    MCV 96 82 - 98 fL    MCH 30.2 27.0 - 31.0 pg    MCHC 31.6 (L) 32.0 - 36.0 g/dL    RDW 13.2 11.5 - 14.5 %    Platelets 146 (L) 150 - 450 K/uL    MPV 10.4 9.2 - 12.9 fL    Immature Granulocytes 0.5 0.0 - 0.5 %    Gran # (ANC) 5.7 1.8 - 7.7 K/uL    Immature Grans (Abs) 0.04 0.00 - 0.04 K/uL    Lymph # 1.4 1.0 - 4.8 K/uL    Mono # 0.7 0.3 - 1.0 K/uL    Eos # 0.1 0.0 - 0.5 K/uL    Baso # 0.03 0.00 - 0.20 K/uL    nRBC 0 0 /100 WBC    Gran % 71.4 38.0 - 73.0 %    Lymph % 17.6 (L) 18.0 - 48.0 %    Mono % 8.6 4.0 - 15.0 %    Eosinophil % 1.5 0.0 - 8.0 %    Basophil % 0.4 0.0 - 1.9 %    Platelet Estimate Appears normal     Differential Method Automated    Urinalysis, Reflex to Urine Culture Urine, Clean Catch    Collection Time: 12/21/24  7:36 AM    Specimen: Urine, Clean Catch   Result Value Ref Range    Specimen UA Urine, Clean Catch     Color, UA Yellow Yellow, Straw, Rachel    Appearance, UA Clear Clear    pH, UA 7.0 5.0 - 8.0    Specific Gravity, UA 1.010 1.005 - 1.030    Protein, UA 1+ (A) Negative    Glucose, UA Negative Negative    Ketones, UA Negative Negative    Bilirubin (UA) Negative Negative    Occult Blood UA 2+ (A) Negative    Nitrite, UA Negative Negative    Urobilinogen, UA Negative <2.0 EU/dL    Leukocytes, UA Negative Negative   Urinalysis Microscopic    Collection Time: 12/21/24  7:36 AM   Result Value Ref Range    RBC, UA 40 (H) 0 - 4 /hpf    WBC, UA 7 (H) 0 - 5 /hpf    Bacteria Occasional None-Occ /hpf    Hyaline Casts, UA 0 0-1/lpf /lpf     Microscopic Comment SEE COMMENT    Comprehensive metabolic panel    Collection Time: 12/21/24  7:38 AM   Result Value Ref Range    Sodium 140 136 - 145 mmol/L    Potassium 4.3 3.5 - 5.1 mmol/L    Chloride 107 95 - 110 mmol/L    CO2 23 23 - 29 mmol/L    Glucose 113 (H) 70 - 110 mg/dL    BUN 17 8 - 23 mg/dL    Creatinine 1.8 (H) 0.5 - 1.4 mg/dL    Calcium 10.0 8.7 - 10.5 mg/dL    Total Protein 7.2 6.0 - 8.4 g/dL    Albumin 3.3 (L) 3.5 - 5.2 g/dL    Total Bilirubin 0.3 0.1 - 1.0 mg/dL    Alkaline Phosphatase 90 40 - 150 U/L    AST 13 10 - 40 U/L    ALT 16 10 - 44 U/L    eGFR 36 (A) >60 mL/min/1.73 m^2    Anion Gap 10 8 - 16 mmol/L   Magnesium    Collection Time: 12/21/24  7:38 AM   Result Value Ref Range    Magnesium 1.6 1.6 - 2.6 mg/dL   Troponin I    Collection Time: 12/21/24  7:38 AM   Result Value Ref Range    Troponin I 0.017 0.000 - 0.026 ng/mL   Lipase    Collection Time: 12/21/24  7:38 AM   Result Value Ref Range    Lipase 25 4 - 60 U/L   Influenza A & B by Molecular    Collection Time: 12/21/24  7:43 AM    Specimen: Nasopharyngeal Swab   Result Value Ref Range    Influenza A, Molecular Negative Negative    Influenza B, Molecular Negative Negative    Flu A & B Source Nasal swab      *Note: Due to a large number of results and/or encounters for the requested time period, some results have not been displayed. A complete set of results can be found in Results Review.         Imaging Results:  Imaging Results    None          The EKG was ordered, reviewed, and independently interpreted by the ED provider.  Interpretation time: 6:56  Rate: 95 BPM  Rhythm: Sinus rhythm with Premature atrial complexes  Interpretation: Left axis deviation. Minimal voltage criteria for LVH, may be normal variant (Vamsi product). Anterolateral infarct, age undetermined. No STEMI.           The Emergency Provider reviewed the vital signs and test results, which are outlined above.     ED Discussion     10:29 AM: Reassessed pt at this  time. Discussed with pt all pertinent ED information and results. Discussed pt dx and plan of tx. Gave pt all f/u and return to the ED instructions. All questions and concerns were addressed at this time. Pt expresses understanding of information and instructions, and is comfortable with plan to discharge. Pt is stable for discharge.    I discussed with patient and/or family/caretaker that evaluation in the ED does not suggest any emergent or life threatening medical conditions requiring immediate intervention beyond what was provided in the ED, and I believe patient is safe for discharge.  Regardless, an unremarkable evaluation in the ED does not preclude the development or presence of a serious of life threatening condition. As such, patient was instructed to return immediately for any worsening or change in current symptoms.         Medical Decision Making  89-year-old male with a history of hypertension, prostate cancer, CKD, and AAA presents to the ER with nausea and insomnia.  He was started on Bactrim for a UTI and or prostate infection and has an appointment with Urology on Monday.  UA shows signs of infection with blood.  Symptoms improved after receiving IV fluids and Zofran.  Nausea may has been secondary to Bactrim which may have led to dehydration.  Nausea causing discomfort nasal actively insomnia.  He has no fever or leukocytosis.  His renal function is near baseline.  Lipase is negative for pancreatitis.  He has no significant electrolyte derangement flu is negative.  Cardiac workup unremarkable.  Patient will be discharged with Zofran to take as needed during the day for nausea he may take Phenergan at night which will also help his nausea and help him sleep.      Amount and/or Complexity of Data Reviewed  Labs: ordered. Decision-making details documented in ED Course.  ECG/medicine tests: ordered and independent interpretation performed. Decision-making details documented in ED  Course.    Risk  Prescription drug management.       Additional MDM:   Differential Diagnosis:   UTI, pancreatitis flu, SANJAY, electrolyte derangement, ACS             ED Medication(s):  Medications   ondansetron injection 4 mg (4 mg Intravenous Given 12/21/24 0712)   sodium chloride 0.9% bolus 1,000 mL 1,000 mL (0 mLs Intravenous Stopped 12/21/24 1103)       Discharge Medication List as of 12/21/2024 10:26 AM        START taking these medications    Details   !! ondansetron (ZOFRAN-ODT) 4 MG TbDL Take 1 tablet (4 mg total) by mouth every 8 (eight) hours as needed (Nausea)., Starting Sat 12/21/2024, Print      promethazine (PHENERGAN) 12.5 MG Tab Take 1 tablet (12.5 mg total) by mouth every 6 (six) hours as needed (Q.h.s. PRN nausea)., Starting Sat 12/21/2024, Print       !! - Potential duplicate medications found. Please discuss with provider.           Follow-up Information       Miryam Jimenez MD On 12/23/2024.    Specialty: Family Medicine  Contact information:  [address]  [place] [address] [phone]               [facility]On license of [facility] - Emergency Dept..    Specialty: Emergency Medicine  Why: As needed, If symptoms worsen  Contact information:  [address] [address] [phone]                               Scribe Attestation:   Scribe #1: I performed the above scribed service and the documentation accurately describes the services I performed. I attest to the accuracy of the note.     Attending:   Physician Attestation Statement for Scribe #1: I, Kari Vazquez DO, personally performed the services described in this documentation, as scribed by Randall Rose, in my presence, and it is both accurate and complete.           Clinical Impression       ICD-10-CM ICD-9-CM   1. Nausea  R11.0 787.02   2. Weakness  R53.1 780.79   3. Dehydration  E86.0 276.51   4. Fatigue, unspecified type  R53.83 780.79   5. Insomnia, unspecified type  G47.00 780.52       Disposition:    Disposition: Discharged  Condition: Stable       Kari Vazquez, DO  12/22/24 7016

## 2024-12-23 ENCOUNTER — OUTPATIENT CASE MANAGEMENT (OUTPATIENT)
Dept: ADMINISTRATIVE | Facility: OTHER | Age: 89
End: 2024-12-23
Payer: MEDICARE

## 2024-12-23 RX ORDER — CEFDINIR 300 MG/1
300 CAPSULE ORAL 2 TIMES DAILY
Qty: 20 CAPSULE | Refills: 0 | Status: SHIPPED | OUTPATIENT
Start: 2024-12-23 | End: 2025-01-02

## 2024-12-23 RX ORDER — TAMSULOSIN HYDROCHLORIDE 0.4 MG/1
1 CAPSULE ORAL DAILY
Qty: 30 CAPSULE | Refills: 11 | Status: SHIPPED | OUTPATIENT
Start: 2024-12-23

## 2024-12-30 ENCOUNTER — OUTPATIENT CASE MANAGEMENT (OUTPATIENT)
Dept: ADMINISTRATIVE | Facility: OTHER | Age: 89
End: 2024-12-30
Payer: MEDICARE

## 2024-12-30 NOTE — PROGRESS NOTES
Outpatient Care Management  Plan of Care Follow Up Visit    Patient: Jovanny Olmedo  MRN: [id number]  Date of Service: 12/30/2024  Completed by: Jesenia Shaver RN  Referral Date: 11/07/2024    Reason for Visit   Patient presents with    OPCM RN Follow Up Call       Brief Summary: OPCM RN followed up with Mr. Olmedo today for care plan review.    Next Steps:   Mr. Olmedo agreed to OPCM RN follow up on or around 1/13/25.  Follow up regarding appetite.  Follow up on weight.  Consider case closure if still doing better and eating more.  Mr. Olmedo agreed to continue working on eating healthy daily.

## 2025-01-02 ENCOUNTER — OFFICE VISIT (OUTPATIENT)
Dept: UROLOGY | Facility: CLINIC | Age: 89
End: 2025-01-02
Payer: MEDICARE

## 2025-01-02 VITALS
BODY MASS INDEX: 27.33 KG/M2 | WEIGHT: 201.5 LBS | RESPIRATION RATE: 16 BRPM | SYSTOLIC BLOOD PRESSURE: 131 MMHG | DIASTOLIC BLOOD PRESSURE: 77 MMHG | HEART RATE: 70 BPM

## 2025-01-02 DIAGNOSIS — N39.41 URGE INCONTINENCE OF URINE: ICD-10-CM

## 2025-01-02 DIAGNOSIS — R30.0 DYSURIA: Primary | ICD-10-CM

## 2025-01-02 DIAGNOSIS — C61 PROSTATE CANCER: ICD-10-CM

## 2025-01-02 LAB
BILIRUB UR QL STRIP: NEGATIVE
GLUCOSE UR QL STRIP: NEGATIVE
KETONES UR QL STRIP: NEGATIVE
LEUKOCYTE ESTERASE UR QL STRIP: NEGATIVE
PH, POC UA: 6
POC BLOOD, URINE: POSITIVE
POC NITRATES, URINE: NEGATIVE
PROT UR QL STRIP: POSITIVE
SP GR UR STRIP: 1.03 (ref 1–1.03)
UROBILINOGEN UR STRIP-ACNC: 0.2 (ref 0.3–2.2)

## 2025-01-02 PROCEDURE — 99999PBSHW POCT URINALYSIS, DIPSTICK, AUTOMATED, W/O SCOPE: Mod: PBBFAC,,,

## 2025-01-02 PROCEDURE — 99999 PR PBB SHADOW E&M-EST. PATIENT-LVL III: CPT | Mod: PBBFAC,,, | Performed by: UROLOGY

## 2025-01-02 PROCEDURE — 81003 URINALYSIS AUTO W/O SCOPE: CPT | Mod: PBBFAC | Performed by: UROLOGY

## 2025-01-02 PROCEDURE — 99214 OFFICE O/P EST MOD 30 MIN: CPT | Mod: S$PBB,,, | Performed by: UROLOGY

## 2025-01-02 PROCEDURE — 99213 OFFICE O/P EST LOW 20 MIN: CPT | Mod: PBBFAC | Performed by: UROLOGY

## 2025-01-02 NOTE — PROGRESS NOTES
Chief Complaint:   Encounter Diagnoses   Name Primary?    Dysuria Yes    Urge incontinence of urine     Prostate cancer        HPI:  HPI Jovanny Olmedo is a 89 y.o. male who presents with follow up from an ER visit.  He was seen with some nausea and vomiting and diagnosed with a UTI.  It was a pansensitive E coli.  He was started on Bactrim but could not tolerate it.  He was switched over to Omnicef and is currently taking this.  He did have some urinary frequency.  He is not having any dysuria.  He denies any fevers or chills.  He is overall feeling better.  He feels he is emptying his bladder well.  He does have a history of prostate cancer which was treated with radiation therapy in .  PSA jessica to 0.7 with a pretreatment PSA of 13.  PSA began rising in 2019.  His most recent PSA corrects to 38.  He has a resisted treatment with hormone ablation due to maintaining his quality of life.      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    Arthritis     Back pain     CAD (coronary artery disease)     Cataract     CKD (chronic kidney disease)     GERD (gastroesophageal reflux disease)     Glaucoma     suspect    HTN (hypertension)     Multiple subsegmental pulmonary emboli without acute cor pulmonale 2021    Prostate cancer     tx'd with radiation    PVD (peripheral vascular disease)     stent  in right renal artery and aorta    Sleep apnea     Stable angina pectoris 05/16/2023     Past Surgical History:   Procedure Laterality Date    ABDOMINAL AORTA STENT      CARDIAC CATHETERIZATION      CATARACT EXTRACTION W/  INTRAOCULAR LENS IMPLANT Right 04/29/2017    COLONOSCOPY N/A 9/24/2020    Procedure: COLONOSCOPY;  Surgeon: Shayy Melvin MD;  Location: [facility] ENDO;  Service: Endoscopy;  Laterality: N/A;    COLONOSCOPY N/A 1/10/2022    Procedure: COLONOSCOPY;  Surgeon: Vi Lara MD;  Location: [facility] ENDO;  Service: Endoscopy;  Laterality: N/A;    CORONARY ANGIOPLASTY      CORONARY STENT PLACEMENT      PCIOL Right 03/29/2017    DR. LEDEZMA    PCIOL Left 07/03/2019    DR. LEDEZMA    PROSTATE BIOPSY      RENAL ARTERY STENT       Family History   Problem Relation Name Age of Onset    Glaucoma Mother      Diabetes Mother      Kidney disease Mother      Colon cancer Father      Cancer Father          colon cancer    Diabetes Sister      Diabetes Brother      Hypertension Brother      Blindness Neg Hx         Current Outpatient Medications on File Prior to Visit   Medication Sig Dispense Refill    albuterol (VENTOLIN HFA) 90 mcg/actuation inhaler Inhale 2 puffs into the lungs every 6 (six) hours as needed for Wheezing. Rescue 6.7 g 0    apremilast (OTEZLA ORAL) Take by mouth.      aspirin (ECOTRIN) 81 MG EC tablet Take 81 mg by mouth once daily.      atorvastatin (LIPITOR) 40 MG tablet Take 1 tablet (40 mg total) by mouth every evening. 90 tablet 1    azelastine (ASTELIN) 137 mcg (0.1 %) nasal spray USE 2 SPRAY(S) IN EACH NOSTRIL TWICE DAILY 30 mL 3    betamethasone dipropionate 0.05 % cream Apply topically once daily.      betamethasone dipropionate 0.05 % cream Apply topically 2 (two) times daily.      carvediloL (COREG) 12.5 MG tablet Take 1 tablet (12.5 mg total) by mouth 2 (two) times daily with meals. 60 tablet 1    cefdinir (OMNICEF) 300 MG capsule Take 1 capsule (300 mg total) by mouth 2 (two) times  daily. for 10 days 20 capsule 0    ferrous sulfate 325 (65 FE) MG EC tablet Take 325 mg by mouth once daily.      finasteride (PROSCAR) 5 mg tablet Take 1 tablet (5 mg total) by mouth once daily. 30 tablet 0    gentamicin (GARAMYCIN) 0.1 % ointment Apply topically 2 (two) times a day. For rash of groin. 30 g 0    ketoconazole (NIZORAL) 2 % cream Apply topically 2 (two) times daily. For rash of groin. 60 g 1    lisinopriL (PRINIVIL,ZESTRIL) 40 MG tablet Take 1 tablet (40 mg total) by mouth once daily. 90 tablet 0    meloxicam (MOBIC) 15 MG tablet Take 15 mg by mouth daily as needed.      miconazole NITRATE 2 % (ZEASORB AF) 2 % top powder Use two to three times daily to prevent rash 85 g 2    NIFEdipine (PROCARDIA-XL) 90 MG (OSM) 24 hr tablet Take 1 tablet (90 mg total) by mouth once daily. 90 tablet 1    nitroGLYCERIN (NITROSTAT) 0.4 MG SL tablet Place 1 tablet (0.4 mg total) under the tongue every 5 (five) minutes as needed for Chest pain. 30 tablet 0    ondansetron (ZOFRAN-ODT) 4 MG TbDL Take 1 tablet (4 mg total) by mouth every 8 (eight) hours as needed (Nausea). 12 tablet 0    ondansetron (ZOFRAN-ODT) 8 MG TbDL Take 1 tablet (8 mg total) by mouth 3 (three) times daily as needed (nausea). 15 tablet 0    oxybutynin (DITROPAN-XL) 5 MG TR24 Take 1 tablet (5 mg total) by mouth once daily. 30 tablet 11    pantoprazole (PROTONIX) 40 MG tablet Take 1 tablet (40 mg total) by mouth 2 (two) times daily. 180 tablet 1    promethazine (PHENERGAN) 12.5 MG Tab Take 1 tablet (12.5 mg total) by mouth every 6 (six) hours as needed (Q.h.s. PRN nausea). 12 tablet 0    sulfamethoxazole-trimethoprim 800-160mg (BACTRIM DS) 800-160 mg Tab Take 1 tablet by mouth 2 (two) times daily. 56 tablet 0    tamsulosin (FLOMAX) 0.4 mg Cap Take 1 capsule (0.4 mg total) by mouth once daily. 30 capsule 11    traMADoL (ULTRAM) 50 mg tablet Take 50 mg by mouth daily as needed.      traZODone (DESYREL) 50 MG tablet Take 1 tablet (50 mg total) by mouth  every evening. 30 tablet 1    triamcinolone acetonide 0.025% (KENALOG) 0.025 % cream Apply topically 2 (two) times daily. PRN rash of groin. 80 g 0     Current Facility-Administered Medications on File Prior to Visit   Medication Dose Route Frequency Provider Last Rate Last Admin    leuprolide acetate (6 month) injection 45 mg  45 mg Intramuscular Q6 Months             Objective:     Vitals:    01/02/25 1129   BP: 131/77   Patient Position: Sitting   Pulse: 70   Resp: 16   Weight: 91.4 kg (201 lb 8 oz)      BMI Readings from Last 1 Encounters:   01/02/25 27.33 kg/m²          Physical Exam  No acute distress alert and oriented   Respirations even unlabored   Abdomen is soft nontender     Urinalysis shows moderate blood    Postvoid residual 14 cc    CT scan from about a year ago of his upper tract shows multiple renal cysts.  Lab Results   Component Value Date    PSADIAG 14.9 (H) 09/03/2024    PSADIAG 6.6 (H) 02/12/2024    PSADIAG 10.9 (H) 07/11/2023    PSADIAG 12.2 (H) 01/06/2023    PSADIAG 9.7 (H) 04/11/2022    PSADIAG 7.0 (H) 10/11/2021    PSADIAG 5.4 (H) 06/14/2021    PSADIAG 3.6 01/11/2021    PSADIAG 3.5 12/02/2020    PSADIAG 0.53 12/05/2018    PSADIAG 0.31 11/27/2017    PSADIAG 0.38 05/19/2017    PSADIAG 0.38 11/23/2016    PSADIAG 0.56 04/18/2016    PSADIAG 0.80 10/20/2015    PSADIAG 1.3 07/20/2015    PSADIAG 1.2 04/14/2015    PSADIAG 2.0 01/06/2015    PSADIAG 2.4 10/01/2014    PSADIAG 5.3 (H) 07/01/2014    PSADIAG 7.0 (H) 04/25/2014    PSA 18.7 (H) 12/13/2024    PSA 1.1 08/13/2019    PSA 16.5 (H) 12/16/2013    PSA 6.3 (H) 01/19/2011    PSA 3.0 09/15/2008        Lab Results   Component Value Date    CREATININE 1.8 (H) 12/21/2024      Assessment:       1. Dysuria    2. Urge incontinence of urine    3. Prostate cancer        Plan:     1. Dysuria    2. Urge incontinence of urine    3. Prostate cancer       Orders Placed This Encounter    NM PET CT F 18 PYL PSMA, [facility] to [facility]    POCT Urinalysis, Dipstick,  Automated, W/O Scope      Rising PSA after treatment of prostate cancer.  Recommend PSMA PET scan to restage.  Discussed pros and cons of starting hormone ablation.  We will get PSMA PET scan in preparation for Dr. Chamorro visit in march recent.  Recent UTI appears to be resolving with antibiotic therapy.  Patient is mostly asymptomatic.  He is emptying well in his upper tracts are normal within the past year.  We will hold off on cystoscopy for now.

## 2025-01-09 ENCOUNTER — HOSPITAL ENCOUNTER (OUTPATIENT)
Dept: RADIOLOGY | Facility: HOSPITAL | Age: 89
Discharge: HOME | End: 2025-01-09
Attending: UROLOGY
Payer: MEDICARE

## 2025-01-09 DIAGNOSIS — C61 PROSTATE CANCER: ICD-10-CM

## 2025-01-09 PROCEDURE — 78815 PET IMAGE W/CT SKULL-THIGH: CPT | Mod: 26,PS,, | Performed by: STUDENT IN AN ORGANIZED HEALTH CARE EDUCATION/TRAINING PROGRAM

## 2025-01-09 PROCEDURE — A9595 HC PIFLUFOLASTAT F-18, DX, PER 1 MCI: HCPCS | Mod: TB | Performed by: UROLOGY

## 2025-01-09 PROCEDURE — 78815 PET IMAGE W/CT SKULL-THIGH: CPT | Mod: TC

## 2025-01-09 RX ADMIN — PIFLUFOLASTAT F-18 9 MILLICURIE: 80 INJECTION INTRAVENOUS at 12:01

## 2025-01-13 ENCOUNTER — OUTPATIENT CASE MANAGEMENT (OUTPATIENT)
Dept: ADMINISTRATIVE | Facility: OTHER | Age: 89
End: 2025-01-13
Payer: MEDICARE

## 2025-01-13 NOTE — PROGRESS NOTES
Outpatient Care Management  Plan of Care Follow Up Visit    Patient: Jovanny Olmedo  MRN: [id number]  Date of Service: 01/13/2025  Completed by: Jesenia Shaver RN  Referral Date: 11/07/2024    Reason for Visit   Patient presents with    OPCM RN Follow Up Call       Brief Summary: OPCM RN followed up with Mr. Olmedo today for care plan review.    Next Steps:   Mr. Olmedo agreed to OPCM RN follow up on or around 1/27/25.  Reassess pain.  Reassess falls.  Reassess mood and visit with PCP.  Mr. Olmedo agreed to discuss depression medications at appointment on 1/13/25.

## 2025-01-14 ENCOUNTER — OFFICE VISIT (OUTPATIENT)
Dept: INTERNAL MEDICINE | Facility: CLINIC | Age: 89
End: 2025-01-14
Payer: MEDICARE

## 2025-01-14 VITALS
HEART RATE: 79 BPM | SYSTOLIC BLOOD PRESSURE: 130 MMHG | HEIGHT: 72 IN | OXYGEN SATURATION: 95 % | WEIGHT: 204.38 LBS | TEMPERATURE: 96 F | DIASTOLIC BLOOD PRESSURE: 80 MMHG | BODY MASS INDEX: 27.68 KG/M2

## 2025-01-14 DIAGNOSIS — I10 ESSENTIAL HYPERTENSION: Chronic | ICD-10-CM

## 2025-01-14 DIAGNOSIS — I70.0 AORTIC ATHEROSCLEROSIS: ICD-10-CM

## 2025-01-14 DIAGNOSIS — I25.118 CORONARY ARTERY DISEASE OF NATIVE ARTERY OF NATIVE HEART WITH STABLE ANGINA PECTORIS: ICD-10-CM

## 2025-01-14 DIAGNOSIS — L72.0 EPIDERMAL CYST: ICD-10-CM

## 2025-01-14 DIAGNOSIS — L40.9 PSORIASIS: ICD-10-CM

## 2025-01-14 DIAGNOSIS — R63.0 LOSS OF APPETITE: ICD-10-CM

## 2025-01-14 DIAGNOSIS — F41.1 GENERALIZED ANXIETY DISORDER: Primary | ICD-10-CM

## 2025-01-14 DIAGNOSIS — N18.31 CHRONIC RENAL IMPAIRMENT, STAGE 3A: ICD-10-CM

## 2025-01-14 PROCEDURE — 99215 OFFICE O/P EST HI 40 MIN: CPT | Mod: PBBFAC | Performed by: PHYSICIAN ASSISTANT

## 2025-01-14 PROCEDURE — 99999 PR PBB SHADOW E&M-EST. PATIENT-LVL V: CPT | Mod: PBBFAC,,, | Performed by: PHYSICIAN ASSISTANT

## 2025-01-14 PROCEDURE — G2211 COMPLEX E/M VISIT ADD ON: HCPCS | Mod: S$PBB,,, | Performed by: PHYSICIAN ASSISTANT

## 2025-01-14 PROCEDURE — 99214 OFFICE O/P EST MOD 30 MIN: CPT | Mod: S$PBB,,, | Performed by: PHYSICIAN ASSISTANT

## 2025-01-14 RX ORDER — HYDROXYZINE HYDROCHLORIDE 10 MG/1
10 TABLET, FILM COATED ORAL 3 TIMES DAILY PRN
COMMUNITY

## 2025-01-14 RX ORDER — MIRTAZAPINE 7.5 MG/1
7.5 TABLET, FILM COATED ORAL NIGHTLY
Qty: 30 TABLET | Refills: 11 | Status: CANCELLED | OUTPATIENT
Start: 2025-01-14 | End: 2026-01-14

## 2025-01-14 RX ORDER — ESCITALOPRAM OXALATE 10 MG/1
10 TABLET ORAL DAILY
Qty: 30 TABLET | Refills: 11 | Status: SHIPPED | OUTPATIENT
Start: 2025-01-14 | End: 2026-01-14

## 2025-01-14 RX ORDER — CARVEDILOL 12.5 MG/1
12.5 TABLET ORAL 2 TIMES DAILY WITH MEALS
Qty: 180 TABLET | Refills: 1 | Status: SHIPPED | OUTPATIENT
Start: 2025-01-14 | End: 2026-01-14

## 2025-01-14 RX ORDER — LEVOCETIRIZINE DIHYDROCHLORIDE 5 MG/1
5 TABLET, FILM COATED ORAL NIGHTLY
COMMUNITY

## 2025-01-14 NOTE — PROGRESS NOTES
"  Subjective:      Patient ID: Jovanny Olmedo is a 89 y.o. male.    Chief Complaint: Follow-up    HPI  History of Present Illness    CHIEF COMPLAINT:  Mr. Olmedo presents today for follow-up on various health concerns.    ANXIETY AND STRESS:  He reports experiencing significant anxiety and stress with numerous worries, expressing a need for medication to calm his nerves. He experiences chest pain which may be anxiety-related. A previous ER visit for chest pain with radiation to the arm was negative for cardiac etiology. He had a recent mild episode of chest pain a few days ago.    MUSCULOSKELETAL:  He reports persistent neck pain following a previous accident, describing the sensation as "crickets" in his neck. The pain is severe enough to interfere with his ability to walk. He is currently under care of another physician for this issue with ongoing litigation.    CARDIOVASCULAR:  He has a history of cardiac stent placement in 2007. His blood pressure is generally well-controlled with occasional elevations.    DERMATOLOGIC:  He was recently diagnosed with a painful superficial skin cyst noticed a couple weeks ago. He has ongoing psoriasis and recently visited a dermatologist who prescribed Hydroxyzine 10 mg for itching.    UROLOGIC:  Recent urologist visit revealed increased prostate activity on MRI. No evidence of disease spread outside of prostate.  Urinary infection resolved with antibiotics.     SLEEP:  He reports difficulty maintaining sleep, waking up early morning unable to fall back asleep approximately 4 days per month. He has an irregular sleep schedule, going to bed between 9-10 PM. Usually wakes up at 4am and unable to go back to sleep. He denies difficulty with sleep initiation.      Medications were reviewed        Weight stable. Up a few lbs.     Wt Readings from Last 3 Encounters:   01/14/25 0807 92.7 kg (204 lb 5.9 oz)   01/02/25 1129 91.4 kg (201 lb 8 oz)   12/21/24 0621 91.4 kg (201 lb 6.4 oz)    "   Patient Active Problem List   Diagnosis    Essential hypertension    GERD (gastroesophageal reflux disease)    Polycystic kidney disease    Coronary artery disease, occlusive    Chronic renal impairment, stage 3a    Refractive error    DDD (degenerative disc disease), lumbar    Open angle with borderline findings, low risk, bilateral    History of coronary angioplasty    Prostate cancer    Secondary hyperparathyroidism    Pseudophakia    Intermediate stage nonexudative age-related macular degeneration of both eyes    Aortic atherosclerosis    CLARISSA on CPAP    Duodenal ulcer    History of colon polyps    Dyslipidemia    Abdominal aortic aneurysm (AAA) without rupture    History of pulmonary embolus (PE)    Thrombocytopenia    Obesity (BMI 30.0-34.9)    Stable angina pectoris    Abnormal ECG    Coronary artery disease of native artery of native heart with stable angina pectoris    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 Rescue, Disp: 6.7 g, Rfl: 0    apremilast (OTEZLA ORAL), Take by mouth., Disp: , Rfl:     aspirin (ECOTRIN) 81 MG EC tablet, Take 81 mg by mouth once daily., Disp: , Rfl:     atorvastatin (LIPITOR) 40 MG tablet, Take 1 tablet (40 mg total) by mouth every evening., Disp: 90 tablet, Rfl: 1    azelastine (ASTELIN) 137 mcg (0.1 %) nasal spray, USE 2 SPRAY(S) IN EACH NOSTRIL TWICE DAILY, Disp: 30 mL, Rfl: 3    betamethasone dipropionate 0.05 % cream, Apply topically once daily., Disp: , Rfl:     betamethasone dipropionate 0.05 % cream, Apply topically 2 (two) times daily., Disp: , Rfl:     ferrous sulfate 325 (65 FE) MG EC tablet, Take 325 mg by mouth once daily., Disp: , Rfl:     finasteride (PROSCAR) 5 mg  tablet, Take 1 tablet (5 mg total) by mouth once daily., Disp: 30 tablet, Rfl: 0    gentamicin (GARAMYCIN) 0.1 % ointment, Apply topically 2 (two) times a day. For rash of groin., Disp: 30 g, Rfl: 0    hydrOXYzine HCL (ATARAX) 10 MG Tab, Take 10 mg by mouth 3 (three) times daily as needed., Disp: , Rfl:     ketoconazole (NIZORAL) 2 % cream, Apply topically 2 (two) times daily. For rash of groin., Disp: 60 g, Rfl: 1    levocetirizine (XYZAL) 5 MG tablet, Take 5 mg by mouth every evening., Disp: , Rfl:     lisinopriL (PRINIVIL,ZESTRIL) 40 MG tablet, Take 1 tablet (40 mg total) by mouth once daily., Disp: 90 tablet, Rfl: 0    meloxicam (MOBIC) 15 MG tablet, Take 15 mg by mouth daily as needed., Disp: , Rfl:     miconazole NITRATE 2 % (ZEASORB AF) 2 % top powder, Use two to three times daily to prevent rash, Disp: 85 g, Rfl: 2    NIFEdipine (PROCARDIA-XL) 90 MG (OSM) 24 hr tablet, Take 1 tablet (90 mg total) by mouth once daily., Disp: 90 tablet, Rfl: 1    nitroGLYCERIN (NITROSTAT) 0.4 MG SL tablet, Place 1 tablet (0.4 mg total) under the tongue every 5 (five) minutes as needed for Chest pain., Disp: 30 tablet, Rfl: 0    ondansetron (ZOFRAN-ODT) 4 MG TbDL, Take 1 tablet (4 mg total) by mouth every 8 (eight) hours as needed (Nausea)., Disp: 12 tablet, Rfl: 0    ondansetron (ZOFRAN-ODT) 8 MG TbDL, Take 1 tablet (8 mg total) by mouth 3 (three) times daily as needed (nausea)., Disp: 15 tablet, Rfl: 0    oxybutynin (DITROPAN-XL) 5 MG TR24, Take 1 tablet (5 mg total) by mouth once daily., Disp: 30 tablet, Rfl: 11    pantoprazole (PROTONIX) 40 MG tablet, Take 1 tablet (40 mg total) by mouth 2 (two) times daily., Disp: 180 tablet, Rfl: 1    promethazine (PHENERGAN) 12.5 MG Tab, Take 1 tablet (12.5 mg total) by mouth every 6 (six) hours as needed (Q.h.s. PRN nausea)., Disp: 12 tablet, Rfl: 0    tamsulosin (FLOMAX) 0.4 mg Cap, Take 1 capsule (0.4 mg total) by mouth once daily., Disp: 30 capsule, Rfl: 11    traMADoL (ULTRAM) 50 mg  tablet, Take 50 mg by mouth daily as needed., Disp: , Rfl:     traZODone (DESYREL) 50 MG tablet, Take 1 tablet (50 mg total) by mouth every evening., Disp: 30 tablet, Rfl: 1    triamcinolone acetonide 0.025% (KENALOG) 0.025 % cream, Apply topically 2 (two) times daily. PRN rash of groin., Disp: 80 g, Rfl: 0    carvediloL (COREG) 12.5 MG tablet, Take 1 tablet (12.5 mg total) by mouth 2 (two) times daily with meals., Disp: 180 tablet, Rfl: 1      Current Facility-Administered Medications:     leuprolide acetate (6 month) injection 45 mg, 45 mg, Intramuscular, Q6 Months,     Review of Systems   Constitutional:  Positive for fatigue. Negative for activity change, appetite change, chills, diaphoresis, fever and unexpected weight change.   HENT: Negative.  Negative for congestion, hearing loss, postnasal drip, rhinorrhea, sore throat, trouble swallowing and voice change.    Eyes: Negative.  Negative for visual disturbance.   Respiratory: Negative.  Negative for cough, choking, chest tightness and shortness of breath.    Cardiovascular:  Positive for chest pain. Negative for palpitations and leg swelling.   Gastrointestinal:  Negative for abdominal distention, abdominal pain, blood in stool, constipation, diarrhea, nausea and vomiting.   Endocrine: Negative for cold intolerance, heat intolerance, polydipsia and polyuria.   Genitourinary: Negative.  Negative for difficulty urinating and frequency.   Musculoskeletal:  Positive for arthralgias, myalgias, neck pain and neck stiffness. Negative for back pain, gait problem and joint swelling.   Skin:  Positive for wound. Negative for color change, pallor and rash.   Neurological:  Negative for dizziness, tremors, weakness, light-headedness, numbness and headaches.   Hematological:  Negative for adenopathy.   Psychiatric/Behavioral:  Positive for sleep disturbance. Negative for behavioral problems, confusion, self-injury and suicidal ideas. The patient is nervous/anxious.       Objective:   /80 (BP Location: Right arm, Patient Position: Sitting)   Pulse 79   Temp 96.1 °F (35.6 °C) (Tympanic)   Ht 6' (1.829 m)   Wt 92.7 kg (204 lb 5.9 oz)   SpO2 95%   BMI 27.72 kg/m²     Physical Exam  Vitals and nursing note reviewed.   Constitutional:       General: He is not in acute distress.     Appearance: Normal appearance. He is well-developed. He is not ill-appearing, toxic-appearing or diaphoretic.   HENT:      Head: Normocephalic and atraumatic.   Cardiovascular:      Rate and Rhythm: Normal rate and regular rhythm.      Heart sounds: Normal heart sounds. No murmur heard.     No friction rub. No gallop.   Pulmonary:      Effort: Pulmonary effort is normal. No respiratory distress.      Breath sounds: Normal breath sounds. No wheezing or rales.   Skin:     General: Skin is warm.      Findings: No rash.   Neurological:      Mental Status: He is alert and oriented to person, place, and time.   Psychiatric:         Attention and Perception: Attention and perception normal.         Mood and Affect: Mood normal.         Behavior: Behavior normal.         Thought Content: Thought content normal. Thought content is not paranoid or delusional. Thought content does not include homicidal or suicidal ideation. Thought content does not include homicidal or suicidal plan.         Cognition and Memory: Cognition and memory normal.         Judgment: Judgment normal.       Lab Results   Component Value Date    CREATININE 1.8 (H) 12/21/2024    BUN 17 12/21/2024     12/21/2024    K 4.3 12/21/2024     12/21/2024    CO2 23 12/21/2024     Office Visit on 01/02/2025   Component Date Value Ref Range Status    POC Blood, Urine 01/02/2025 Positive (A)  Negative Final    POC Bilirubin, Urine 01/02/2025 Negative  Negative Final    POC Urobilinogen, Urine 01/02/2025 0.2 (A)  0.3 - 2.2 Final    POC Ketones, Urine 01/02/2025 Negative  Negative Final    POC Protein, Urine 01/02/2025 Positive (A)   Negative Final    POC Nitrates, Urine 01/02/2025 Negative  Negative Final    POC Glucose, Urine 01/02/2025 Negative  Negative Final    pH, UA 01/02/2025 6.0   Final    POC Specific Gravity, Urine 01/02/2025 1.030 (A)  1.003 - 1.029 Final    POC Leukocytes, Urine 01/02/2025 Negative  Negative Final   Admission on 12/21/2024, Discharged on 12/21/2024   Component Date Value Ref Range Status    WBC 12/21/2024 7.92  3.90 - 12.70 K/uL Final    RBC 12/21/2024 4.37 (L)  4.60 - 6.20 M/uL Final    Hemoglobin 12/21/2024 13.2 (L)  14.0 - 18.0 g/dL Final    Hematocrit 12/21/2024 41.8  40.0 - 54.0 % Final    MCV 12/21/2024 96  82 - 98 fL Final    MCH 12/21/2024 30.2  27.0 - 31.0 pg Final    MCHC 12/21/2024 31.6 (L)  32.0 - 36.0 g/dL Final    RDW 12/21/2024 13.2  11.5 - 14.5 % Final    Platelets 12/21/2024 146 (L)  150 - 450 K/uL Final    MPV 12/21/2024 10.4  9.2 - 12.9 fL Final    Immature Granulocytes 12/21/2024 0.5  0.0 - 0.5 % Final    Gran # (ANC) 12/21/2024 5.7  1.8 - 7.7 K/uL Final    Immature Grans (Abs) 12/21/2024 0.04  0.00 - 0.04 K/uL Final    Comment: Mild elevation in immature granulocytes is non specific and   can be seen in a variety of conditions including stress response,   acute inflammation, trauma and pregnancy. Correlation with other   laboratory and clinical findings is essential.      Lymph # 12/21/2024 1.4  1.0 - 4.8 K/uL Final    Mono # 12/21/2024 0.7  0.3 - 1.0 K/uL Final    Eos # 12/21/2024 0.1  0.0 - 0.5 K/uL Final    Baso # 12/21/2024 0.03  0.00 - 0.20 K/uL Final    nRBC 12/21/2024 0  0 /100 WBC Final    Gran % 12/21/2024 71.4  38.0 - 73.0 % Final    Lymph % 12/21/2024 17.6 (L)  18.0 - 48.0 % Final    Mono % 12/21/2024 8.6  4.0 - 15.0 % Final    Eosinophil % 12/21/2024 1.5  0.0 - 8.0 % Final    Basophil % 12/21/2024 0.4  0.0 - 1.9 % Final    Platelet Estimate 12/21/2024 Appears normal   Final    Differential Method 12/21/2024 Automated   Final    Sodium 12/21/2024 140  136 - 145 mmol/L Final     Potassium 12/21/2024 4.3  3.5 - 5.1 mmol/L Final    Chloride 12/21/2024 107  95 - 110 mmol/L Final    CO2 12/21/2024 23  23 - 29 mmol/L Final    Glucose 12/21/2024 113 (H)  70 - 110 mg/dL Final    BUN 12/21/2024 17  8 - 23 mg/dL Final    Creatinine 12/21/2024 1.8 (H)  0.5 - 1.4 mg/dL Final    Calcium 12/21/2024 10.0  8.7 - 10.5 mg/dL Final    Total Protein 12/21/2024 7.2  6.0 - 8.4 g/dL Final    Albumin 12/21/2024 3.3 (L)  3.5 - 5.2 g/dL Final    Total Bilirubin 12/21/2024 0.3  0.1 - 1.0 mg/dL Final    Comment: For infants and newborns, interpretation of results should be based  on gestational age, weight and in agreement with clinical  observations.    Premature Infant recommended reference ranges:  Up to 24 hours.............<8.0 mg/dL  Up to 48 hours............<12.0 mg/dL  3-5 days..................<15.0 mg/dL  6-29 days.................<15.0 mg/dL      Alkaline Phosphatase 12/21/2024 90  40 - 150 U/L Final    AST 12/21/2024 13  10 - 40 U/L Final    ALT 12/21/2024 16  10 - 44 U/L Final    eGFR 12/21/2024 36 (A)  >60 mL/min/1.73 m^2 Final    Anion Gap 12/21/2024 10  8 - 16 mmol/L Final    Magnesium 12/21/2024 1.6  1.6 - 2.6 mg/dL Final    Troponin I 12/21/2024 0.017  0.000 - 0.026 ng/mL Final    Comment: The reference interval for Troponin I represents the 99th percentile   cutoff   for our facility and is consistent with 3rd generation assay   performance.      Specimen UA 12/21/2024 Urine, Clean Catch   Final    Color, UA 12/21/2024 Yellow  Yellow, Straw, Rachel Final    Appearance, UA 12/21/2024 Clear  Clear Final    pH, UA 12/21/2024 7.0  5.0 - 8.0 Final    Specific Gravity, UA 12/21/2024 1.010  1.005 - 1.030 Final    Protein, UA 12/21/2024 1+ (A)  Negative Final    Comment: Recommend a 24 hour urine protein or a urine   protein/creatinine ratio if globulin induced proteinuria is  clinically suspected.      Glucose, UA 12/21/2024 Negative  Negative Final    Ketones, UA 12/21/2024 Negative  Negative Final     Bilirubin (UA) 12/21/2024 Negative  Negative Final    Occult Blood UA 12/21/2024 2+ (A)  Negative Final    Nitrite, UA 12/21/2024 Negative  Negative Final    Urobilinogen, UA 12/21/2024 Negative  <2.0 EU/dL Final    Leukocytes, UA 12/21/2024 Negative  Negative Final    Influenza A, Molecular 12/21/2024 Negative  Negative Final    Influenza B, Molecular 12/21/2024 Negative  Negative Final    Flu A & B Source 12/21/2024 Nasal swab   Final    Lipase 12/21/2024 25  4 - 60 U/L Final    RBC, UA 12/21/2024 40 (H)  0 - 4 /hpf Final    WBC, UA 12/21/2024 7 (H)  0 - 5 /hpf Final    Bacteria 12/21/2024 Occasional  None-Occ /hpf Final    Hyaline Casts, UA 12/21/2024 0  0-1/lpf /lpf Final    Microscopic Comment 12/21/2024 SEE COMMENT   Final    Comment: Other formed elements not mentioned in the report are not   present in the microscopic examination.            Assessment:     1. Generalized anxiety disorder    2. Epidermal cyst    3. Aortic atherosclerosis    4. Coronary artery disease of native artery of native heart with stable angina pectoris    5. Essential hypertension    6. Chronic renal impairment, stage 3a    7. Loss of appetite    8. Psoriasis      Plan:   Assessed prostate MRI results: increased activity noted within prostate, no evidence of disease spread outside prostate  Evaluated skin lesion near anus: determined to be superficial cyst, likely related to psoriasis inflammation  Considered patient's reported chest pain: advised discussion with cardiologist Dr. Mon at upcoming appointment  Reviewed sleep issues: patient falls asleep easily but wakes early, sleeps 9pm to 4am  Addressed patient's anxiety and stress: decided to initiate SSRI therapy    Generalized anxiety disorder  -     EScitalopram oxalate (LEXAPRO) 10 MG tablet; Take 1 tablet (10 mg total) by mouth once daily.  Dispense: 30 tablet; Refill: 11  -diagnosis and treatment options discussed. Agree to try lexapro at 10 mg.   Recheck in 1 month  -  Prescribe Lexapro to be taken daily in the morning.  - Discuss potential side effects: takes time to work, may help reduce anxiety and stress.  - Advise patient to be patient with medication and give it at least a month to work.  - Schedule follow up in 1 month to assess effectiveness of Lexapro.    Epidermal cyst  -warm compresses  -if becomes painful or drains follow up in clinic  -cont seeing derm for psoriasis management  - Examine and identify a superficial cyst under the skin, likely related to psoriasis inflammation.  - Mr. Olmedo reports noticing a painful lump for a few weeks.  - Explain that the cyst may flare up and turn into an abscess, but is currently not infected.  - Recommend warm compresses and topical antibiotic ointment.  - Advise patient about potential for inflammation and self-drainage.    Psoriasis  -seeing derm. Up to date. Starting on otezla  - Note patient is seeing dermatologist Dr. Katelyn Esteban outside [facility].    Aortic atherosclerosis  -stable on lipitor and asa    Coronary artery disease of native artery of native heart with stable angina pectoris  -     carvediloL (COREG) 12.5 MG tablet; Take 1 tablet (12.5 mg total) by mouth 2 (two) times daily with meals.  Dispense: 180 tablet; Refill: 1  -scheduled to see cardiology next month  -stable on nitro  -if symptoms severe go to the ER  - Note patient reports recent episode of chest pain with arm involvement.  - Document emergency room visit for chest pain found no heart issues.  - Explain that chest pain can sometimes be related to stress and anxiety.  - Acknowledge patient's concern and explain importance of checking symptoms.  - Instruct patient to contact the office if chest pain becomes more frequent.    Essential hypertension  -stable and controlled on current meds  - Monitor the patient's blood pressure, which appears to be under control.  - Continue current blood pressure medications, including Carvedilol 12.5 mg twice daily and  Nifedipine 90 mg daily.  - Consider refilling Carvedilol prescription.  - Acknowledge the patient's multiple medical conditions and various doctors involved in their care.  - Schedule follow-up visit with cardiologist, Yaa, next month.    Chronic renal impairment, stage 3a    Loss of appetite  -weight stable    PROSTATE CANCER:  - Review recent MRI results of the prostate, which showed increased activity but no evidence of disease spread outside the prostate.  - Schedule follow-up visit with Dr. Mirza in March.    SLEEP ISSUES:  - Note patient reports waking up early and being unable to return to sleep.  - Evaluate patient's sleep pattern: from around 9 or 10 PM to 4 AM.  - Acknowledge sleep pattern as relatively normal.      NECK PAIN:  - Note patient reports ongoing neck pain from a previous accident.  - Document patient is seeing another doctor for neck pain treatment.      FOLLOW UP:  - Option for virtual or in-person appointment for next visit.       Follow up in about 4 weeks (around 2/11/2025), or if symptoms worsen or fail to improve.

## 2025-01-27 ENCOUNTER — OUTPATIENT CASE MANAGEMENT (OUTPATIENT)
Dept: ADMINISTRATIVE | Facility: OTHER | Age: 89
End: 2025-01-27
Payer: MEDICARE

## 2025-01-27 NOTE — PROGRESS NOTES
Outpatient Care Management  Plan of Care Follow Up Visit    Patient: Jovanny Olmedo  MRN: [id number]  Date of Service: 01/27/2025  Completed by: Jesenia Shaver RN  Referral Date: 11/07/2024    Reason for Visit   Patient presents with    OPCM RN Follow Up Call       Brief Summary: OPCM RN followed up with Mr. Olmedo today for care plan review.    Next Steps:   Mr. Olmedo agreed to OPCM RN follow up on or around 2/10/25.  Follow up regarding UTI.  Follow up regarding falls.  Follow up regarding monitoring his steps.  Consider case closure.  Mr. Olmedo agreed to complete full course of antibiotics as prescribed daily.

## 2025-01-28 ENCOUNTER — OUTPATIENT CASE MANAGEMENT (OUTPATIENT)
Dept: ADMINISTRATIVE | Facility: OTHER | Age: 89
End: 2025-01-28
Payer: MEDICARE

## 2025-01-28 NOTE — PROGRESS NOTES
"Outpatient Care Management   - Care Plan Follow Up    Patient: Jovanny Olmedo  MRN:  [id number]  Date of Service:  1/28/2025  Completed by:  Mary Mi LCSW  Referral Date: 11/07/2024    Reason for Visit   Patient presents with    OPCM JOHN Follow Up Call     1/28/2025    Goals Complete     1/28/2025       Brief Summary: Brief Summary: SW spoke with pt for follow up call.  Pt shares that he was prescribed an antidepressant at his last appt but wasn't sure the name.  SW reviewed chart and informed pt that he was prescribed Lexapro 10mg.  SW asked pt if he has been taking this medication and he states, "sometimes".  Pt states that when he begins reminiscing on the past, he feels down and his "nerves get bad" and this is when he takes the Lexapro.  SW explained to pt that in order for medication to reach full efficacy, he will need to take it daily.  Pt states that he does not want to do that.  Pt feels that he is on too many medications, noting that he has another UTI and is back on antibiotics.  Pt would like to discuss all of his medications with his PCP at his next appt on 2/14.      JOHN informed by OPCM RN that pt is in need of assistance with meals.  SW asked pt about this, noting that he previously told SW that his daughter cooks his meals.  Pt states that it is his daughter-in-law and that she recently injured her leg so she hasn't been able to prepare meals lately.  SW offered pt information on eating at his local senior center.  Pt declines, stating he does not want to do this.  Pt also states he does not want [facility].  SW informed pt that these are the only two options for regularly prepared meals that are free of charge.  Pt asks about prepared meals that he could pay for.  SW offers to order [employer] out of pocket.  Pt declines, stating he does not want meals that are frozen.  Pt declines needing more resources or assistance, stating that he has food and will wait for his daughter-in-law to get " better.    Pt shares that he was given a sample of a medication that he now needs a prescription for.  Pt unsure what the medication is called but he states it is for his skin.  Pt states that sample was given to him by a non-[facility] physician.  SW informed pt to call the office of the MD who provided it to him to request a prescription.  Pt agreed.    Pt asks SW if [facility] offers PT services.  SW informed pt that if he feels he may need PT, he should discuss this with his PCP at his next appt.  Pt agreed.    SW reviewed initial reason for referral with pt (PHQ score of 15) and reviewed resources and interventions discussed.  SW asked pt if there is anything else he needs SW assistance with and he states there is not.  SW offered to close case and pt agreed.    Complex Care Plan     Care plan was discussed and completed today with input from patient and/or caregiver.    Patient Instructions     No follow-ups on file.

## 2025-02-05 ENCOUNTER — OUTPATIENT CASE MANAGEMENT (OUTPATIENT)
Dept: ADMINISTRATIVE | Facility: OTHER | Age: 89
End: 2025-02-05
Payer: MEDICARE

## 2025-02-05 NOTE — PROGRESS NOTES
Outpatient Care Management  Plan of Care Follow Up Visit    Patient: Jovanny Olmedo  MRN: [id number]  Date of Service: 02/05/2025  Completed by: Jesenia Shaver RN  Referral Date: 11/07/2024    Reason for Visit   Patient presents with    OPCM RN Follow Up Call    Case Closure     Graduation.

## 2025-02-14 ENCOUNTER — OFFICE VISIT (OUTPATIENT)
Dept: INTERNAL MEDICINE | Facility: CLINIC | Age: 89
End: 2025-02-14
Payer: MEDICARE

## 2025-02-14 ENCOUNTER — OFFICE VISIT (OUTPATIENT)
Dept: CARDIOLOGY | Facility: CLINIC | Age: 89
End: 2025-02-14
Payer: MEDICARE

## 2025-02-14 VITALS
WEIGHT: 202.38 LBS | BODY MASS INDEX: 27.41 KG/M2 | HEART RATE: 74 BPM | HEIGHT: 72 IN | SYSTOLIC BLOOD PRESSURE: 120 MMHG | OXYGEN SATURATION: 97 % | TEMPERATURE: 97 F | DIASTOLIC BLOOD PRESSURE: 84 MMHG

## 2025-02-14 VITALS
OXYGEN SATURATION: 98 % | SYSTOLIC BLOOD PRESSURE: 120 MMHG | BODY MASS INDEX: 26.31 KG/M2 | HEART RATE: 72 BPM | DIASTOLIC BLOOD PRESSURE: 80 MMHG | WEIGHT: 194 LBS

## 2025-02-14 DIAGNOSIS — Z98.61 HISTORY OF CORONARY ANGIOPLASTY: Chronic | ICD-10-CM

## 2025-02-14 DIAGNOSIS — I25.10 CORONARY ARTERY DISEASE, OCCLUSIVE: Chronic | ICD-10-CM

## 2025-02-14 DIAGNOSIS — N18.31 CHRONIC RENAL IMPAIRMENT, STAGE 3A: ICD-10-CM

## 2025-02-14 DIAGNOSIS — R63.0 LOSS OF APPETITE: ICD-10-CM

## 2025-02-14 DIAGNOSIS — Z86.711 HISTORY OF PULMONARY EMBOLUS (PE): ICD-10-CM

## 2025-02-14 DIAGNOSIS — N25.81 SECONDARY HYPERPARATHYROIDISM: ICD-10-CM

## 2025-02-14 DIAGNOSIS — I10 ESSENTIAL HYPERTENSION: Chronic | ICD-10-CM

## 2025-02-14 DIAGNOSIS — R42 LIGHTHEADEDNESS: ICD-10-CM

## 2025-02-14 DIAGNOSIS — F41.9 ANXIETY AND DEPRESSION: Primary | ICD-10-CM

## 2025-02-14 DIAGNOSIS — I71.23 ANEURYSM OF DESCENDING THORACIC AORTA WITHOUT RUPTURE: ICD-10-CM

## 2025-02-14 DIAGNOSIS — Q61.3 POLYCYSTIC KIDNEY DISEASE: ICD-10-CM

## 2025-02-14 DIAGNOSIS — R11.0 NAUSEA: ICD-10-CM

## 2025-02-14 DIAGNOSIS — D69.6 THROMBOCYTOPENIA: ICD-10-CM

## 2025-02-14 DIAGNOSIS — L40.9 PSORIASIS: ICD-10-CM

## 2025-02-14 DIAGNOSIS — E78.5 DYSLIPIDEMIA: Chronic | ICD-10-CM

## 2025-02-14 DIAGNOSIS — I71.43 INFRARENAL ABDOMINAL AORTIC ANEURYSM (AAA) WITHOUT RUPTURE: ICD-10-CM

## 2025-02-14 DIAGNOSIS — R63.4 WEIGHT LOSS, UNINTENTIONAL: ICD-10-CM

## 2025-02-14 DIAGNOSIS — N30.90 CYSTITIS: ICD-10-CM

## 2025-02-14 DIAGNOSIS — C61 PROSTATE CANCER: ICD-10-CM

## 2025-02-14 DIAGNOSIS — I25.10 CORONARY ARTERY DISEASE, OCCLUSIVE: Primary | Chronic | ICD-10-CM

## 2025-02-14 DIAGNOSIS — G47.33 OSA ON CPAP: ICD-10-CM

## 2025-02-14 DIAGNOSIS — F32.A ANXIETY AND DEPRESSION: Primary | ICD-10-CM

## 2025-02-14 PROCEDURE — 99999 PR PBB SHADOW E&M-EST. PATIENT-LVL III: CPT | Mod: PBBFAC,,, | Performed by: INTERNAL MEDICINE

## 2025-02-14 PROCEDURE — 99215 OFFICE O/P EST HI 40 MIN: CPT | Mod: PBBFAC,27 | Performed by: PHYSICIAN ASSISTANT

## 2025-02-14 PROCEDURE — 99213 OFFICE O/P EST LOW 20 MIN: CPT | Mod: PBBFAC | Performed by: INTERNAL MEDICINE

## 2025-02-14 PROCEDURE — 99999 PR PBB SHADOW E&M-EST. PATIENT-LVL V: CPT | Mod: PBBFAC,,, | Performed by: PHYSICIAN ASSISTANT

## 2025-02-14 RX ORDER — ONDANSETRON 8 MG/1
8 TABLET, ORALLY DISINTEGRATING ORAL 3 TIMES DAILY PRN
Qty: 20 TABLET | Refills: 0 | Status: SHIPPED | OUTPATIENT
Start: 2025-02-14

## 2025-02-14 RX ORDER — NITROGLYCERIN 0.4 MG/1
0.4 TABLET SUBLINGUAL EVERY 5 MIN PRN
Qty: 30 TABLET | Refills: 3 | Status: SHIPPED | OUTPATIENT
Start: 2025-02-14 | End: 2026-02-14

## 2025-02-14 NOTE — PROGRESS NOTES
Subjective:      Patient ID: Jovanny Olmedo is a 89 y.o. male.    Chief Complaint: Follow-up and Dizziness    HPI  History of Present Illness    CHIEF COMPLAINT:  Mr. Olmedo presents today for a follow up after starting on lexapro for anxiety.     CURRENT SYMPTOMS:  He reports persistent nausea with sensation of impending emesis but no actual vomiting. He also experiences dizziness, decreased appetite, and frequent urination without dysuria or other urinary symptoms. His symptoms have slightly improved since discontinuing psoriasis (otezla) medication 4-5 days ago. He is scheduled a follow up with his dermatologist soon.   He had similar symptoms in December which led him to go to the ER.   Pt admits that he has not been taking the lexapro daily as prescribed. He has been taking it prn.     SLEEP:  He reports poor sleep quality. He takes Trazodone as needed which helps with sleep when used.        PAST MEDICAL HISTORY:  He had an Emergency Room visit around [place] time due to feeling unwell.    Medications were reviewed      Patient Active Problem List   Diagnosis    Essential hypertension    GERD (gastroesophageal reflux disease)    Polycystic kidney disease    Coronary artery disease, occlusive    Chronic renal impairment, stage 3a    Refractive error    DDD (degenerative disc disease), lumbar    Open angle with borderline findings, low risk, bilateral    History of coronary angioplasty    Prostate cancer    Secondary hyperparathyroidism    Pseudophakia    Intermediate stage nonexudative age-related macular degeneration of both eyes    Aortic atherosclerosis    CLARISSA on CPAP    Duodenal ulcer    History of colon polyps    Dyslipidemia    Abdominal aortic aneurysm (AAA) without rupture    History of pulmonary embolus (PE)    Thrombocytopenia    Obesity (BMI 30.0-34.9)    Stable angina pectoris    Abnormal ECG    Coronary artery disease of native artery of native heart with stable angina pectoris    Aneurysm of  descending thoracic aorta without rupture    Troponin level elevated    Rash    Precordial pain    Weight loss, unintentional    Loss of appetite    Psoriasis         Current Outpatient Medications:     albuterol (VENTOLIN HFA) 90 mcg/actuation inhaler, Inhale 2 puffs into the lungs every 6 (six) hours as needed for Wheezing. Rescue, Disp: 6.7 g, Rfl: 0    aspirin (ECOTRIN) 81 MG EC tablet, Take 81 mg by mouth once daily., Disp: , Rfl:     atorvastatin (LIPITOR) 40 MG tablet, Take 1 tablet (40 mg total) by mouth every evening., Disp: 90 tablet, Rfl: 1    azelastine (ASTELIN) 137 mcg (0.1 %) nasal spray, USE 2 SPRAY(S) IN EACH NOSTRIL TWICE DAILY, Disp: 30 mL, Rfl: 3    betamethasone dipropionate 0.05 % cream, Apply topically once daily., Disp: , Rfl:     betamethasone dipropionate 0.05 % cream, Apply topically 2 (two) times daily., Disp: , Rfl:     carvediloL (COREG) 12.5 MG tablet, Take 1 tablet (12.5 mg total) by mouth 2 (two) times daily with meals., Disp: 180 tablet, Rfl: 1    EScitalopram oxalate (LEXAPRO) 10 MG tablet, Take 1 tablet (10 mg total) by mouth once daily., Disp: 30 tablet, Rfl: 11    ferrous sulfate 325 (65 FE) MG EC tablet, Take 325 mg by mouth once daily., Disp: , Rfl:     finasteride (PROSCAR) 5 mg tablet, Take 1 tablet (5 mg total) by mouth once daily., Disp: 30 tablet, Rfl: 0    gentamicin (GARAMYCIN) 0.1 % ointment, Apply topically 2 (two) times a day. For rash of groin., Disp: 30 g, Rfl: 0    hydrOXYzine HCL (ATARAX) 10 MG Tab, Take 10 mg by mouth 3 (three) times daily as needed., Disp: , Rfl:     lisinopriL (PRINIVIL,ZESTRIL) 40 MG tablet, Take 1 tablet (40 mg total) by mouth once daily., Disp: 90 tablet, Rfl: 0    meloxicam (MOBIC) 15 MG tablet, Take 15 mg by mouth daily as needed., Disp: , Rfl:     miconazole NITRATE 2 % (ZEASORB AF) 2 % top powder, Use two to three times daily to prevent rash, Disp: 85 g, Rfl: 2    NIFEdipine (PROCARDIA-XL) 90 MG (OSM) 24 hr tablet, Take 1 tablet (90 mg  total) by mouth once daily., Disp: 90 tablet, Rfl: 1    nitroGLYCERIN (NITROSTAT) 0.4 MG SL tablet, Place 1 tablet (0.4 mg total) under the tongue every 5 (five) minutes as needed for Chest pain., Disp: 30 tablet, Rfl: 3    ondansetron (ZOFRAN-ODT) 4 MG TbDL, Take 1 tablet (4 mg total) by mouth every 8 (eight) hours as needed (Nausea)., Disp: 12 tablet, Rfl: 0    oxybutynin (DITROPAN-XL) 5 MG TR24, Take 1 tablet (5 mg total) by mouth once daily., Disp: 30 tablet, Rfl: 11    pantoprazole (PROTONIX) 40 MG tablet, Take 1 tablet (40 mg total) by mouth 2 (two) times daily., Disp: 180 tablet, Rfl: 1    promethazine (PHENERGAN) 12.5 MG Tab, Take 1 tablet (12.5 mg total) by mouth every 6 (six) hours as needed (Q.h.s. PRN nausea)., Disp: 12 tablet, Rfl: 0    tamsulosin (FLOMAX) 0.4 mg Cap, Take 1 capsule (0.4 mg total) by mouth once daily., Disp: 30 capsule, Rfl: 11    traMADoL (ULTRAM) 50 mg tablet, Take 50 mg by mouth daily as needed., Disp: , Rfl:     traZODone (DESYREL) 50 MG tablet, Take 1 tablet (50 mg total) by mouth every evening., Disp: 30 tablet, Rfl: 1    triamcinolone acetonide 0.025% (KENALOG) 0.025 % cream, Apply topically 2 (two) times daily. PRN rash of groin., Disp: 80 g, Rfl: 0    apremilast (OTEZLA ORAL), Take by mouth. (Patient not taking: Reported on 2/14/2025), Disp: , Rfl:     ketoconazole (NIZORAL) 2 % cream, Apply topically 2 (two) times daily. For rash of groin. (Patient not taking: Reported on 2/14/2025), Disp: 60 g, Rfl: 1    levocetirizine (XYZAL) 5 MG tablet, Take 5 mg by mouth every evening. (Patient not taking: Reported on 2/14/2025), Disp: , Rfl:     ondansetron (ZOFRAN-ODT) 8 MG TbDL, Take 1 tablet (8 mg total) by mouth 3 (three) times daily as needed (nausea)., Disp: 20 tablet, Rfl: 0    Current Facility-Administered Medications:     leuprolide acetate (6 month) injection 45 mg, 45 mg, Intramuscular, Q6 Months,     Review of Systems   Constitutional:  Positive for activity change, appetite  change, fatigue and unexpected weight change. Negative for chills, diaphoresis and fever.   HENT: Negative.  Negative for congestion, hearing loss, postnasal drip, rhinorrhea, sore throat, trouble swallowing and voice change.    Eyes: Negative.  Negative for visual disturbance.   Respiratory: Negative.  Negative for cough, choking, chest tightness and shortness of breath.    Cardiovascular:  Negative for chest pain, palpitations and leg swelling.   Gastrointestinal:  Positive for nausea. Negative for abdominal distention, abdominal pain, blood in stool, constipation, diarrhea and vomiting.   Endocrine: Negative for cold intolerance, heat intolerance, polydipsia and polyuria.   Genitourinary: Negative.  Negative for difficulty urinating and frequency.   Musculoskeletal:  Negative for arthralgias, back pain, gait problem, joint swelling and myalgias.   Skin:  Negative for color change, pallor, rash and wound.   Neurological:  Positive for weakness and light-headedness. Negative for dizziness, tremors, numbness and headaches.   Hematological:  Negative for adenopathy.   Psychiatric/Behavioral:  Negative for behavioral problems, confusion, self-injury, sleep disturbance and suicidal ideas. The patient is not nervous/anxious.      Objective:   /84 (BP Location: Left arm, Patient Position: Sitting)   Pulse 74   Temp 97 °F (36.1 °C) (Tympanic)   Ht 6' (1.829 m)   Wt 91.8 kg (202 lb 6.1 oz)   SpO2 97%   BMI 27.45 kg/m²     Physical Exam  Vitals and nursing note reviewed.   Constitutional:       General: He is not in acute distress.     Appearance: Normal appearance. He is well-developed. He is not ill-appearing, toxic-appearing or diaphoretic.   HENT:      Head: Normocephalic and atraumatic.   Cardiovascular:      Rate and Rhythm: Normal rate and regular rhythm.      Heart sounds: Normal heart sounds. No murmur heard.     No friction rub. No gallop.   Pulmonary:      Effort: Pulmonary effort is normal. No  respiratory distress.      Breath sounds: Normal breath sounds. No wheezing or rales.   Skin:     General: Skin is warm.      Findings: No rash.   Neurological:      Mental Status: He is alert and oriented to person, place, and time.   Psychiatric:         Mood and Affect: Mood normal.         Behavior: Behavior normal.         Thought Content: Thought content normal.         Judgment: Judgment normal.         Assessment:     1. Anxiety and depression    2. Lightheadedness    3. Loss of appetite    4. Weight loss, unintentional    5. Nausea    6. Cystitis    7. Psoriasis    8. Chronic renal impairment, stage 3a    9. Essential hypertension    10. Infrarenal abdominal aortic aneurysm (AAA) without rupture    11. Coronary artery disease, occlusive    12. History of coronary angioplasty    13. Prostate cancer      Plan:   Considered Lexapro as potential cause of patient's symptoms (nausea, dizziness, loss of appetite) but pt had not been on the medication in December when he first had these symptoms  Ruled out Lexapro as likely cause due to similar symptoms pre-dating medication start  Suspected psoriasis medication as possible cause of symptoms  Planned to review lab results to rule out other causes (e.g., urinary tract infection)  Considered potential need for CT/ EGD or gastroenterology referral if labs inconclusive    Anxiety and depression  - Noted patient's inconsistent adherence to Lexapro.  - Temporarily discontinued Lexapro to rule out as a cause of symptoms and assess for side effects.  - Emphasized the importance of consistent daily use for optimal effectiveness.  - Advised resuming Lexapro daily after the temporary discontinuation.  - Will consider increasing dose if current dose is ineffective.    Insomnia  - Mr. Olmedo reports not sleeping well.  - Continued Trazodone for sleep as needed, with the option to take up to 2 tablets.  -will consider switching to remeron if no improvement.      Lightheadedness  - Mr. Olmedo reports feeling dizzy.  - Considered as a possible side effect of medication.  - Ordered labs to rule out other causes.    Loss of appetite    Weight loss, unintentional    Nausea  -     CBC Auto Differential; Future; Expected date: 02/14/2025  -     Lipase; Future; Expected date: 02/14/2025  -     Urine culture; Future  -     Urinalysis; Future; Expected date: 02/14/2025  -     ondansetron (ZOFRAN-ODT) 8 MG TbDL; Take 1 tablet (8 mg total) by mouth 3 (three) times daily as needed (nausea).  Dispense: 20 tablet; Refill: 0  - Mr. Olmedo reports persistent nausea and feeling like vomiting all the time.  - Refilled Zofran (ondansetron) for nausea.  - Evaluated timing and duration of symptoms.  - Considered various causes, including medication side effects and possible stomach issues.  - Ordered labs to rule out other causes.    Cystitis  -     Urine culture; Future  -     Urinalysis; Future; Expected date: 02/14/2025  - Mr. Olmedo reports frequent urination but denies burning sensation during urination.  - Considered the possibility of a urinary tract infection.  - Ordered urinalysis.    Psoriasis  -follow up with derm as scheduled  -continue off otezla for now    Chronic renal impairment, stage 3a  -rechecking labs today  - Ordered kidney function tests and instructed patient to complete them.  - Will review lab results to assess kidney function.  - Mr. Olmedo reports frequent urination, which will be evaluated in context of test results.    Essential hypertension  -stable. Seen by cardiology today    Infrarenal abdominal aortic aneurysm (AAA) without rupture  -stable. Seen by cardiology today    Coronary artery disease, occlusive  -cont current meds  -seen by cardiology today    History of coronary angioplasty  -stable. Seen by cardiology today    Prostate cancer  -cont close follow up with urology        FOLLOW UP: 1 month  - Discussed potential side effects of new medications and  importance of follow-up.  - Mr. Olmedo to follow up for review of lab results and further evaluation if symptoms persist.       This note was generated with the assistance of ambient listening technology. Verbal consent was obtained by the patient and accompanying visitor(s) for the recording of patient appointment to facilitate this note. I attest to having reviewed and edited the generated note for accuracy, though some syntax or spelling errors may persist. Please contact the author of this note for any clarification.      Follow up in about 4 weeks (around 3/14/2025), or if symptoms worsen or fail to improve.

## 2025-02-14 NOTE — PROGRESS NOTES
Subjective:   Patient ID:  Jovanny Olmedo is a 89 y.o. male who presents for follow up of Follow-up      HPI  Jan 2024 HPI:   The patient is an 89 yo male with past medical history of aortic aneurysm, CAD, HTN, glaucoma, CKD and GERD who presented to the ED with elevated blood pressure and headache. He reports he was at [facility] last night as he had an episode of chest pain. His chest pain was substernal. It subsided. He had elevated cardiac enzymes and the plan was to transfer him to facility with cardiology. He left A as he wanted to be seen by cardiologist. He is currently chest pain free. He reports he has a pounding headache. Cardiology, CT surgery, hospital medicine and vascular surgery consulted. Patient placed in observation.     Hospital Course:   The patient presented with chest pain, headache and elevated blood pressures. He had elevated troponin. Cardiology was consulted. Initially plan LHC but due to aortic aneurysms LHC deferred. NM stress performed and normal perfusion reported. His medications were adjusted for better blood pressure control. His headache resolved once BP normalized. Patient instructed to call his cardiologist if his BP becomes elevated so medications can be adjusted. He and his family verbalized understanding. Patient seen and examined, stable for discharge.  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 CTA abdomen/pelvis obtain. Plan outpatient follow-up.      4/17/24  Pt follow up from Jan hosp stay for CP - aortic aneurysm noted.      Vascular: There is aneurysmal dilatation seen of the distal descending thoracic aorta with mural plaque along the wall. Aneurysm measures 6.3 x 6.2 cm in diameter. Patient is status post stent graft repair abdominal aortic aneurysm. No extra vascular contrast identified no endoleak. Stent is patent without significant narrowing. There is a patent stent related to the proximal left renal artery as well. There is atherosclerosis  related to the right renal artery and celiac artery and superior mesenteric artery. Inferior mesenteric artery is not identified. Abdominal aortic aneurysm sac appears essentially stable. 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      ECHO and nuclear stress neg 2024.      BP and HR stable. He has occ atypical CP.            24  Wants to switch providers  Will be seeing vascular in August to re-evaluate descending thoracic aortic aneurysm- reports order brother  of abdominal aortic rupture in   Has intermittent chest pain, self-limiting  Fairly active at home, but sits a lot   Applies ankle and feet swelling bilateral     Shortness of breath, chest pain        24  Was admitted for chest pain, diastolic BP elevated  BP meds changed (increased nifedipine and PPI)  BP good today  Has been belching and passing gas a lto   Echo  EF 60%  Lost 15 lbs since last visit     Denies Shortness of breath, chest pain        EKG 24 NSR, PACs, RBBB  EKG 2024 sinus rhythm, PACs        Results for orders placed during the hospital encounter of 24     Echo     Interpretation Summary    Left Ventricle: The left ventricle is normal in size. Normal wall thickness. There is concentric remodeling. There is normal systolic function. Ejection fraction is approximately 60%. There is normal diastolic function.    Right Ventricle: Normal right ventricular cavity size. Wall thickness is normal. Systolic function is normal.    IVC/SVC: Normal venous pressure at 3 mmHg.   Results for orders placed during the hospital encounter of 24     Echo     Interpretation Summary    Left Ventricle: The left ventricle is normal in size. Normal wall thickness. There is concentric remodeling. Normal wall motion. There is normal systolic function with a visually estimated ejection fraction of 60 - 65%. There is normal diastolic function.    Right Ventricle: Normal right ventricular cavity size.  Wall thickness is normal. Right ventricle wall motion  is normal. Systolic function is normal.    Pulmonary Artery: The estimated pulmonary artery systolic pressure is 24 mmHg.    IVC/SVC: Normal venous pressure at 3 mmHg.        Results for orders placed during the hospital encounter of 01/24/24     Nuclear Stress - Cardiology Interpreted     Interpretation Summary    Normal myocardial perfusion scan. There is no evidence of myocardial ischemia or infarction.    There is a  mild to moderate intensity fixed perfusion abnormality in the inferior wall of the left ventricle secondary to diaphragm attenuation.    The gated perfusion images showed an ejection fraction of 64% at rest. The gated perfusion images showed an ejection fraction of 66% post stress. Normal ejection fraction is greater than 59%.    There is normal wall motion at rest and post stress.    LV cavity size is normal at rest and normal at stress.    The ECG portion of the study is negative for ischemia.           EKG NSR, no acute ST - T wave changes     ECHO  Results for orders placed during the hospital encounter of 01/24/24     Echo     Interpretation Summary    Left Ventricle: The left ventricle is normal in size. Normal wall thickness. There is concentric remodeling. Normal wall motion. There is normal systolic function with a visually estimated ejection fraction of 60 - 65%. There is normal diastolic function.    Right Ventricle: Normal right ventricular cavity size. Wall thickness is normal. Right ventricle wall motion  is normal. Systolic function is normal.    Pulmonary Artery: The estimated pulmonary artery systolic pressure is 24 mmHg.    IVC/SVC: Normal venous pressure at 3 mmHg.        STRESS TEST Results for orders placed during the hospital encounter of 01/24/24     Nuclear Stress - Cardiology Interpreted     Interpretation Summary    Normal myocardial perfusion scan. There is no evidence of myocardial ischemia or infarction.    There is  a  mild to moderate intensity fixed perfusion abnormality in the inferior wall of the left ventricle secondary to diaphragm attenuation.    The gated perfusion images showed an ejection fraction of 64% at rest. The gated perfusion images showed an ejection fraction of 66% post stress. Normal ejection fraction is greater than 59%.    There is normal wall motion at rest and post stress.    LV cavity size is normal at rest and normal at stress.    The ECG portion of the study is negative for ischemia.        [facility] No results found for this or any previous visit.     2/14/2025   He continues to have chest pain  left sided vague hurting pain no real triggers self limited no aggravating or alleviating factors mostly at rest. Cardiac w/u negative . His bp will elevated and hr also. He takes nitro s/l that resolves the pain.  Very rare. He does not want to address aaa he is not wanting to take the risk  He uses cpap regularily.      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    Arthritis     Back pain     CAD (coronary artery disease)     Cataract     CKD (chronic kidney disease)     GERD (gastroesophageal reflux disease)     Glaucoma     suspect    HTN (hypertension)     Multiple subsegmental pulmonary emboli without acute cor pulmonale 09/09/2021    Prostate cancer     tx'd with radiation    PVD (peripheral vascular disease)     stent in right renal artery and aorta    Sleep apnea     Stable angina pectoris 05/16/2023       Past Surgical History:   Procedure Laterality Date    ABDOMINAL AORTA STENT      CARDIAC CATHETERIZATION      CATARACT EXTRACTION W/  INTRAOCULAR LENS IMPLANT Right 04/29/2017    COLONOSCOPY N/A 9/24/2020    Procedure: COLONOSCOPY;  Surgeon: Shayy Melvin MD;  Location: [facility];  Service: Endoscopy;  Laterality: N/A;    COLONOSCOPY N/A 1/10/2022     Procedure: COLONOSCOPY;  Surgeon: Vi Lara MD;  Location: [facility];  Service: Endoscopy;  Laterality: N/A;    CORONARY ANGIOPLASTY      CORONARY STENT PLACEMENT      PCIOL Right 2017    DR. LEDEZMA    PCIOL Left 2019    DR. LEDEZMA    PROSTATE BIOPSY      RENAL ARTERY STENT         Social History     Tobacco Use    Smoking status: Former     Current packs/day: 0.00     Average packs/day: 0.5 packs/day for 30.0 years (15.0 ttl pk-yrs)     Types: Cigarettes     Start date: 1965     Quit date: 1995     Years since quittin.4    Smokeless tobacco: Former   Substance Use Topics    Alcohol use: Not Currently     Alcohol/week: 0.0 standard drinks of alcohol    Drug use: Yes     Frequency: 4.0 times per week     Types: Marijuana       Family History   Problem Relation Name Age of Onset    Glaucoma Mother      Diabetes Mother      Kidney disease Mother      Colon cancer Father      Cancer Father          colon cancer    Diabetes Sister      Diabetes Brother      Hypertension Brother      Blindness Neg Hx         Current Outpatient Medications   Medication Sig    albuterol (VENTOLIN HFA) 90 mcg/actuation inhaler Inhale 2 puffs into the lungs every 6 (six) hours as needed for Wheezing. Rescue    aspirin (ECOTRIN) 81 MG EC tablet Take 81 mg by mouth once daily.    atorvastatin (LIPITOR) 40 MG tablet Take 1 tablet (40 mg total) by mouth every evening.    azelastine (ASTELIN) 137 mcg (0.1 %) nasal spray USE 2 SPRAY(S) IN EACH NOSTRIL TWICE DAILY    betamethasone dipropionate 0.05 % cream Apply topically once daily.    betamethasone dipropionate 0.05 % cream Apply topically 2 (two) times daily.    carvediloL (COREG) 12.5 MG tablet Take 1 tablet (12.5 mg total) by mouth 2 (two) times daily with meals.    EScitalopram oxalate (LEXAPRO) 10 MG tablet Take 1 tablet (10 mg total) by mouth once daily.    ferrous sulfate 325 (65 FE) MG EC tablet Take 325 mg by mouth once daily.    hydrOXYzine HCL  (ATARAX) 10 MG Tab Take 10 mg by mouth 3 (three) times daily as needed.    lisinopriL (PRINIVIL,ZESTRIL) 40 MG tablet Take 1 tablet (40 mg total) by mouth once daily.    NIFEdipine (PROCARDIA-XL) 90 MG (OSM) 24 hr tablet Take 1 tablet (90 mg total) by mouth once daily.    nitroGLYCERIN (NITROSTAT) 0.4 MG SL tablet Place 1 tablet (0.4 mg total) under the tongue every 5 (five) minutes as needed for Chest pain.    pantoprazole (PROTONIX) 40 MG tablet Take 1 tablet (40 mg total) by mouth 2 (two) times daily.    tamsulosin (FLOMAX) 0.4 mg Cap Take 1 capsule (0.4 mg total) by mouth once daily.    traMADoL (ULTRAM) 50 mg tablet Take 50 mg by mouth daily as needed.    traZODone (DESYREL) 50 MG tablet Take 1 tablet (50 mg total) by mouth every evening.    apremilast (OTEZLA ORAL) Take by mouth. (Patient not taking: Reported on 2/14/2025)    finasteride (PROSCAR) 5 mg tablet Take 1 tablet (5 mg total) by mouth once daily.    gentamicin (GARAMYCIN) 0.1 % ointment Apply topically 2 (two) times a day. For rash of groin. (Patient not taking: Reported on 2/14/2025)    ketoconazole (NIZORAL) 2 % cream Apply topically 2 (two) times daily. For rash of groin. (Patient not taking: Reported on 2/14/2025)    levocetirizine (XYZAL) 5 MG tablet Take 5 mg by mouth every evening. (Patient not taking: Reported on 2/14/2025)    meloxicam (MOBIC) 15 MG tablet Take 15 mg by mouth daily as needed. (Patient not taking: Reported on 2/14/2025)    miconazole NITRATE 2 % (ZEASORB AF) 2 % top powder Use two to three times daily to prevent rash (Patient not taking: Reported on 2/14/2025)    ondansetron (ZOFRAN-ODT) 4 MG TbDL Take 1 tablet (4 mg total) by mouth every 8 (eight) hours as needed (Nausea).    ondansetron (ZOFRAN-ODT) 8 MG TbDL Take 1 tablet (8 mg total) by mouth 3 (three) times daily as needed (nausea).    oxybutynin (DITROPAN-XL) 5 MG TR24 Take 1 tablet (5 mg total) by mouth once daily.    promethazine (PHENERGAN) 12.5 MG Tab Take 1 tablet  (12.5 mg total) by mouth every 6 (six) hours as needed (Q.h.s. PRN nausea). (Patient not taking: Reported on 2/14/2025)    triamcinolone acetonide 0.025% (KENALOG) 0.025 % cream Apply topically 2 (two) times daily. PRN rash of groin. (Patient not taking: Reported on 2/14/2025)     Current Facility-Administered Medications   Medication    leuprolide acetate (6 month) injection 45 mg     Current Outpatient Medications on File Prior to Visit   Medication Sig    albuterol (VENTOLIN HFA) 90 mcg/actuation inhaler Inhale 2 puffs into the lungs every 6 (six) hours as needed for Wheezing. Rescue    aspirin (ECOTRIN) 81 MG EC tablet Take 81 mg by mouth once daily.    atorvastatin (LIPITOR) 40 MG tablet Take 1 tablet (40 mg total) by mouth every evening.    azelastine (ASTELIN) 137 mcg (0.1 %) nasal spray USE 2 SPRAY(S) IN EACH NOSTRIL TWICE DAILY    betamethasone dipropionate 0.05 % cream Apply topically once daily.    betamethasone dipropionate 0.05 % cream Apply topically 2 (two) times daily.    carvediloL (COREG) 12.5 MG tablet Take 1 tablet (12.5 mg total) by mouth 2 (two) times daily with meals.    EScitalopram oxalate (LEXAPRO) 10 MG tablet Take 1 tablet (10 mg total) by mouth once daily.    ferrous sulfate 325 (65 FE) MG EC tablet Take 325 mg by mouth once daily.    hydrOXYzine HCL (ATARAX) 10 MG Tab Take 10 mg by mouth 3 (three) times daily as needed.    lisinopriL (PRINIVIL,ZESTRIL) 40 MG tablet Take 1 tablet (40 mg total) by mouth once daily.    NIFEdipine (PROCARDIA-XL) 90 MG (OSM) 24 hr tablet Take 1 tablet (90 mg total) by mouth once daily.    nitroGLYCERIN (NITROSTAT) 0.4 MG SL tablet Place 1 tablet (0.4 mg total) under the tongue every 5 (five) minutes as needed for Chest pain.    pantoprazole (PROTONIX) 40 MG tablet Take 1 tablet (40 mg total) by mouth 2 (two) times daily.    tamsulosin (FLOMAX) 0.4 mg Cap Take 1 capsule (0.4 mg total) by mouth once daily.    traMADoL (ULTRAM) 50 mg tablet Take 50 mg by mouth  daily as needed.    traZODone (DESYREL) 50 MG tablet Take 1 tablet (50 mg total) by mouth every evening.    apremilast (OTEZLA ORAL) Take by mouth. (Patient not taking: Reported on 2/14/2025)    finasteride (PROSCAR) 5 mg tablet Take 1 tablet (5 mg total) by mouth once daily.    gentamicin (GARAMYCIN) 0.1 % ointment Apply topically 2 (two) times a day. For rash of groin. (Patient not taking: Reported on 2/14/2025)    ketoconazole (NIZORAL) 2 % cream Apply topically 2 (two) times daily. For rash of groin. (Patient not taking: Reported on 2/14/2025)    levocetirizine (XYZAL) 5 MG tablet Take 5 mg by mouth every evening. (Patient not taking: Reported on 2/14/2025)    meloxicam (MOBIC) 15 MG tablet Take 15 mg by mouth daily as needed. (Patient not taking: Reported on 2/14/2025)    miconazole NITRATE 2 % (ZEASORB AF) 2 % top powder Use two to three times daily to prevent rash (Patient not taking: Reported on 2/14/2025)    ondansetron (ZOFRAN-ODT) 4 MG TbDL Take 1 tablet (4 mg total) by mouth every 8 (eight) hours as needed (Nausea).    ondansetron (ZOFRAN-ODT) 8 MG TbDL Take 1 tablet (8 mg total) by mouth 3 (three) times daily as needed (nausea).    oxybutynin (DITROPAN-XL) 5 MG TR24 Take 1 tablet (5 mg total) by mouth once daily.    promethazine (PHENERGAN) 12.5 MG Tab Take 1 tablet (12.5 mg total) by mouth every 6 (six) hours as needed (Q.h.s. PRN nausea). (Patient not taking: Reported on 2/14/2025)    triamcinolone acetonide 0.025% (KENALOG) 0.025 % cream Apply topically 2 (two) times daily. PRN rash of groin. (Patient not taking: Reported on 2/14/2025)     Current Facility-Administered Medications on File Prior to Visit   Medication    leuprolide acetate (6 month) injection 45 mg       Review of Systems   Constitutional: Negative for diaphoresis, malaise/fatigue and weight gain.   HENT:  Negative for hoarse voice.    Eyes:  Negative for blurred vision, double vision and visual disturbance.   Cardiovascular:  Negative  for chest pain, claudication, cyanosis, dyspnea on exertion, irregular heartbeat, leg swelling, near-syncope, orthopnea, palpitations, paroxysmal nocturnal dyspnea and syncope.   Respiratory:  Negative for cough, hemoptysis, shortness of breath and snoring.    Hematologic/Lymphatic: Negative for bleeding problem. Does not bruise/bleed easily.   Skin:  Negative for color change, dry skin, itching and poor wound healing.   Musculoskeletal:  Positive for stiffness. Negative for falls, muscle cramps, muscle weakness and myalgias.   Gastrointestinal:  Negative for bloating, abdominal pain, change in bowel habit, diarrhea, heartburn, hematemesis, hematochezia, melena and nausea.   Genitourinary:  Negative for flank pain and hematuria.   Neurological:  Negative for excessive daytime sleepiness, dizziness, focal weakness, headaches, light-headedness, loss of balance, numbness, paresthesias, seizures and weakness.   Psychiatric/Behavioral:  Negative for altered mental status and memory loss. The patient does not have insomnia and is not nervous/anxious.    Allergic/Immunologic: Negative for hives.       Objective:   Physical Exam  Vitals and nursing note reviewed.   Constitutional:       General: He is not in acute distress.     Appearance: He is well-developed. He is not diaphoretic.   HENT:      Head: Normocephalic and atraumatic.   Eyes:      General:         Right eye: No discharge.         Left eye: No discharge.      Pupils: Pupils are equal, round, and reactive to light.   Neck:      Thyroid: No thyromegaly.      Vascular: No JVD.   Cardiovascular:      Rate and Rhythm: Normal rate and regular rhythm.      Pulses: Intact distal pulses.      Heart sounds: Normal heart sounds. No murmur heard.     No friction rub. No gallop.   Pulmonary:      Effort: Pulmonary effort is normal. No respiratory distress.      Breath sounds: Normal breath sounds. No wheezing or rales.   Chest:      Chest wall: No tenderness.   Abdominal:  "     General: Bowel sounds are normal. There is no distension.      Palpations: Abdomen is soft.      Tenderness: There is no abdominal tenderness.   Musculoskeletal:         General: Normal range of motion.      Cervical back: Neck supple.   Skin:     General: Skin is warm and dry.      Findings: No erythema or rash.   Neurological:      Mental Status: He is alert and oriented to person, place, and time.      Cranial Nerves: No cranial nerve deficit.   Psychiatric:         Behavior: Behavior normal.       Vitals:    02/14/25 0946 02/14/25 0949   BP: 114/80 120/80   BP Location: Right arm Left arm   Patient Position: Sitting Sitting   Pulse: 72    SpO2: 98%    Weight: 88 kg (194 lb)      Lab Results   Component Value Date    CHOL 219 (H) 02/05/2024    CHOL 178 11/18/2021    CHOL 196 06/23/2021      Body mass index is 26.31 kg/m².   No results found for: "LABA1C", "HGBA1C"   BMP  Lab Results   Component Value Date     12/21/2024    K 4.3 12/21/2024     12/21/2024    CO2 23 12/21/2024    BUN 17 12/21/2024    CREATININE 1.8 (H) 12/21/2024    CALCIUM 10.0 12/21/2024    ANIONGAP 10 12/21/2024    EGFRNORACEVR 36 (A) 12/21/2024      Lab Results   Component Value Date    HDL 46 02/05/2024    HDL 48 11/18/2021    HDL 38 (L) 06/23/2021     Lab Results   Component Value Date    LDLCALC 145.2 02/05/2024    LDLCALC 119.0 11/18/2021    LDLCALC 128.6 06/23/2021     Lab Results   Component Value Date    TRIG 139 02/05/2024    TRIG 55 11/18/2021    TRIG 147 06/23/2021     Lab Results   Component Value Date    CHOLHDL 21.0 02/05/2024    CHOLHDL 27.0 11/18/2021    CHOLHDL 19.4 (L) 06/23/2021       Chemistry        Component Value Date/Time     12/21/2024 0738    K 4.3 12/21/2024 0738     12/21/2024 0738    CO2 23 12/21/2024 0738    BUN 17 12/21/2024 0738    CREATININE 1.8 (H) 12/21/2024 0738     (H) 12/21/2024 0738        Component Value Date/Time    CALCIUM 10.0 12/21/2024 0738    ALKPHOS 90 12/21/2024 " 0738    AST 13 12/21/2024 0738    ALT 16 12/21/2024 0738    BILITOT 0.3 12/21/2024 0738    ESTGFRAFRICA 44.7 (A) 05/16/2022 1108    EGFRNONAA 38.7 (A) 05/16/2022 1108          Lab Results   Component Value Date    TSH 2.923 12/13/2024     Lab Results   Component Value Date    INR 1.1 01/24/2024    INR 1.1 01/01/2014    INR 1.0 01/16/2009     Lab Results   Component Value Date    WBC 7.92 12/21/2024    HGB 13.2 (L) 12/21/2024    HCT 41.8 12/21/2024    MCV 96 12/21/2024     (L) 12/21/2024     BMP  Sodium   Date Value Ref Range Status   12/21/2024 140 136 - 145 mmol/L Final     Potassium   Date Value Ref Range Status   12/21/2024 4.3 3.5 - 5.1 mmol/L Final     Chloride   Date Value Ref Range Status   12/21/2024 107 95 - 110 mmol/L Final     CO2   Date Value Ref Range Status   12/21/2024 23 23 - 29 mmol/L Final     BUN   Date Value Ref Range Status   12/21/2024 17 8 - 23 mg/dL Final     Creatinine   Date Value Ref Range Status   12/21/2024 1.8 (H) 0.5 - 1.4 mg/dL Final     Calcium   Date Value Ref Range Status   12/21/2024 10.0 8.7 - 10.5 mg/dL Final     Anion Gap   Date Value Ref Range Status   12/21/2024 10 8 - 16 mmol/L Final     eGFR if    Date Value Ref Range Status   05/16/2022 44.7 (A) >60 mL/min/1.73 m^2 Final     eGFR if non    Date Value Ref Range Status   05/16/2022 38.7 (A) >60 mL/min/1.73 m^2 Final     Comment:     Calculation used to obtain the estimated glomerular filtration  rate (eGFR) is the CKD-EPI equation.        CrCl cannot be calculated (Patient's most recent lab result is older than the maximum 7 days allowed.).    Assessment:     1. Coronary artery disease, occlusive    2. Essential hypertension    3. History of coronary angioplasty    4. Dyslipidemia    5. Polycystic kidney disease    6. Chronic renal impairment, stage 3a    7. CLARISSA on CPAP    8. Secondary hyperparathyroidism    9. Infrarenal abdominal aortic aneurysm (AAA) without rupture    10. History of  pulmonary embolus (PE)    11. Thrombocytopenia    12. 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  Chaz compliant with cpap continue the same.   Ckd stable counseled about htn control    Aaa 6 cm not interested in repair worried about complications willc ontineu statins antiplatelets and htn control  Hlp on statins will need repeat labs.   Plan:   Cmp lipids   Continue current therapy  Cardiac low salt diet.  Risk factor modification and excercise program.  F/u in 6 months with lipid cmp

## 2025-02-17 ENCOUNTER — RESULTS FOLLOW-UP (OUTPATIENT)
Dept: INTERNAL MEDICINE | Facility: CLINIC | Age: 89
End: 2025-02-17

## 2025-02-19 ENCOUNTER — LAB VISIT (OUTPATIENT)
Dept: LAB | Facility: HOSPITAL | Age: 89
End: 2025-02-19
Attending: INTERNAL MEDICINE
Payer: MEDICARE

## 2025-02-19 DIAGNOSIS — I71.43 INFRARENAL ABDOMINAL AORTIC ANEURYSM (AAA) WITHOUT RUPTURE: ICD-10-CM

## 2025-02-19 DIAGNOSIS — E78.5 DYSLIPIDEMIA: Chronic | ICD-10-CM

## 2025-02-19 LAB
ALBUMIN SERPL BCP-MCNC: 3.6 G/DL (ref 3.5–5.2)
ALP SERPL-CCNC: 79 U/L (ref 40–150)
ALT SERPL W/O P-5'-P-CCNC: 15 U/L (ref 10–44)
ANION GAP SERPL CALC-SCNC: 8 MMOL/L (ref 8–16)
AST SERPL-CCNC: 21 U/L (ref 10–40)
BILIRUB SERPL-MCNC: 0.7 MG/DL (ref 0.1–1)
BUN SERPL-MCNC: 20 MG/DL (ref 8–23)
CALCIUM SERPL-MCNC: 9.8 MG/DL (ref 8.7–10.5)
CHLORIDE SERPL-SCNC: 107 MMOL/L (ref 95–110)
CHOLEST SERPL-MCNC: 179 MG/DL (ref 120–199)
CHOLEST/HDLC SERPL: 4 {RATIO} (ref 2–5)
CO2 SERPL-SCNC: 27 MMOL/L (ref 23–29)
CREAT SERPL-MCNC: 1.7 MG/DL (ref 0.5–1.4)
EST. GFR  (NO RACE VARIABLE): 38.3 ML/MIN/1.73 M^2
GLUCOSE SERPL-MCNC: 98 MG/DL (ref 70–110)
HDLC SERPL-MCNC: 45 MG/DL (ref 40–75)
HDLC SERPL: 25.1 % (ref 20–50)
LDLC SERPL CALC-MCNC: 109.8 MG/DL (ref 63–159)
NONHDLC SERPL-MCNC: 134 MG/DL
POTASSIUM SERPL-SCNC: 4.4 MMOL/L (ref 3.5–5.1)
PROT SERPL-MCNC: 7.4 G/DL (ref 6–8.4)
SODIUM SERPL-SCNC: 142 MMOL/L (ref 136–145)
TRIGL SERPL-MCNC: 121 MG/DL (ref 30–150)

## 2025-02-19 PROCEDURE — 80061 LIPID PANEL: CPT | Performed by: INTERNAL MEDICINE

## 2025-02-19 PROCEDURE — 36415 COLL VENOUS BLD VENIPUNCTURE: CPT | Performed by: INTERNAL MEDICINE

## 2025-02-19 PROCEDURE — 80053 COMPREHEN METABOLIC PANEL: CPT | Performed by: INTERNAL MEDICINE

## 2025-02-23 ENCOUNTER — OCHSNER VIRTUAL EMERGENCY DEPARTMENT (OUTPATIENT)
Facility: CLINIC | Age: 89
End: 2025-02-23
Payer: MEDICARE

## 2025-02-23 ENCOUNTER — ON-DEMAND VIRTUAL (OUTPATIENT)
Dept: URGENT CARE | Facility: CLINIC | Age: 89
End: 2025-02-23
Payer: MEDICARE

## 2025-02-23 DIAGNOSIS — R31.9 HEMATURIA, UNSPECIFIED TYPE: Primary | ICD-10-CM

## 2025-02-23 PROCEDURE — 98006 SYNCH AUDIO-VIDEO EST MOD 30: CPT | Mod: 95,,, | Performed by: NURSE PRACTITIONER

## 2025-02-23 NOTE — PROGRESS NOTES
Subjective:      Patient ID: Jovanny Olmedo is a 89 y.o. male.    Vitals:  vitals were not taken for this visit.     Chief Complaint: Hematuria      Visit Type: TELE AUDIOVISUAL - This visit was conducted virtually based on  subjective information and limited objective exam    Present with the patient at the time of consultation: TELEMED PRESENT WITH PATIENT: family member  LOCATION OF PATIENT [place]  Two patient identifiers used to verify patient- saying out date of birth and full name.       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    Arthritis     Back pain     CAD (coronary artery disease)     Cataract     CKD (chronic kidney disease)     GERD (gastroesophageal reflux disease)     Glaucoma     suspect    HTN (hypertension)     Multiple subsegmental pulmonary emboli without acute cor pulmonale 09/09/2021    Prostate cancer     tx'd with radiation    PVD (peripheral vascular disease)     stent in right renal artery and aorta    Sleep apnea     Stable angina pectoris 05/16/2023     Past Surgical History:   Procedure Laterality Date    ABDOMINAL AORTA STENT      CARDIAC CATHETERIZATION      CATARACT EXTRACTION W/  INTRAOCULAR LENS IMPLANT Right 04/29/2017    COLONOSCOPY N/A 9/24/2020    Procedure: COLONOSCOPY;  Surgeon: Shayy Melvin MD;  Location: [facility] ENDO;  Service: Endoscopy;  Laterality: N/A;    COLONOSCOPY N/A 1/10/2022    Procedure: COLONOSCOPY;  Surgeon: Vi Lara MD;  Location: [facility] ENDO;  Service: Endoscopy;  Laterality: N/A;    CORONARY ANGIOPLASTY      CORONARY STENT PLACEMENT      PCIOL Right 03/29/2017    DR. LEDEZMA    PCIOL Left 07/03/2019    DR. LEDEZMA    PROSTATE BIOPSY      RENAL ARTERY STENT       Review of patient's allergies indicates:   Allergen Reactions    Codeine Other (See Comments)     When taking codeine, pt  becomes very anxious     Medications Ordered Prior to Encounter[1]  Family History   Problem Relation Name Age of Onset    Glaucoma Mother      Diabetes Mother      Kidney disease Mother      Colon cancer Father      Cancer Father          colon cancer    Diabetes Sister      Diabetes Brother      Hypertension Brother      Blindness Neg Hx             Ohs Peq Odvv Intake    2/23/2025  3:29 PM CST - Filed by Patient   What is your current physical address in the event of a medical emergency? [address] [facility] [address]   Are you able to take your vital signs? Yes   Systolic Blood Pressure:    Diastolic Blood Pressure:    Weight:    Height:    Pulse:    Temperature:    Respiration rate:    Pulse Oxygen:    Please attach any relevant images or files    Is your employer contracted with [facility]? No         89 yo male with hx of prostate cancer with c/o hematuria. He states urine was bright red yesterday. He states every time he urinated it was red. He states he had radiation a long time ago. He denies abdominal pain. He has had some constipation. He denies fever. He takes baby asa but does not take mobic. He is on     Hematuria        Genitourinary:  Positive for hematuria.        Objective:   The physical exam was conducted virtually.    AAO x 3 ; no acute distress noted; appearance normal; mood and behavior normal; thought process normal  Head- normocephalic  Nose- appears normal, no discharge or erythema  Eyes- pupils appear normal in size, no drainage, no erythema  Ears- normal appearing; no discharge, no erythema  Mouth- appears normal  Oropharynx- no erythema, lesions  Lungs- breathing at a normal rate, no acute distress noted  Heart- no reports of tachycardia, palpitations, chest pain  Abdomen- non distended, non tender reported by patient  Skin- warm and dry, no erythema or edema noted by patient or visualized  Psych- as above; no si/hi      Assessment:     1. Hematuria, unspecified type         Plan:     Ra consulted.   Urology appt made for tomorrow at 930 with dr urias. Patient has seen him in the past.         Thank you for choosing [facility]!    Our goal in the [facility] is to always provide outstanding medical care. You may receive a survey by mail or e-mail in the next week regarding your experience today. We would greatly appreciate you completing and returning the survey. Your feedback provides us with a way to recognize our staff who provide very good care, and it helps us learn how to improve when your experience was below our aspiration of excellence.         We appreciate you trusting us with your medical care. We hope you feel better soon. We will be happy to take care of you for all of your future medical needs.    You must understand that you've received an Urgent Care treatment only and that you may be released before all your medical problems are known or treated. You, the patient, will arrange for follow up care as instructed.    Follow up with your PCP or specialty clinic as directed in the next 1-2 weeks if not improved or as needed.  You can call [phone] to schedule an appointment with the appropriate provider.    If your condition worsens we recommend that you receive another evaluation in person, with your primary care provider, urgent care or at the emergency room immediately or contact your primary medical clinics after hours call service to discuss your concerns.         Hematuria, unspecified type  -     E-Consult to [facility]                         [1]   Current Outpatient Medications on File Prior to Visit   Medication Sig Dispense Refill    albuterol (VENTOLIN HFA) 90 mcg/actuation inhaler Inhale 2 puffs into the lungs every 6 (six) hours as needed for Wheezing. Rescue 6.7 g 0    apremilast (OTEZLA ORAL) Take by mouth. (Patient not taking: Reported on 2/14/2025)      aspirin (ECOTRIN) 81 MG EC  tablet Take 81 mg by mouth once daily.      atorvastatin (LIPITOR) 40 MG tablet Take 1 tablet (40 mg total) by mouth every evening. 90 tablet 1    azelastine (ASTELIN) 137 mcg (0.1 %) nasal spray USE 2 SPRAY(S) IN EACH NOSTRIL TWICE DAILY 30 mL 3    betamethasone dipropionate 0.05 % cream Apply topically once daily.      betamethasone dipropionate 0.05 % cream Apply topically 2 (two) times daily.      carvediloL (COREG) 12.5 MG tablet Take 1 tablet (12.5 mg total) by mouth 2 (two) times daily with meals. 180 tablet 1    EScitalopram oxalate (LEXAPRO) 10 MG tablet Take 1 tablet (10 mg total) by mouth once daily. 30 tablet 11    ferrous sulfate 325 (65 FE) MG EC tablet Take 325 mg by mouth once daily.      finasteride (PROSCAR) 5 mg tablet Take 1 tablet (5 mg total) by mouth once daily. 30 tablet 0    gentamicin (GARAMYCIN) 0.1 % ointment Apply topically 2 (two) times a day. For rash of groin. 30 g 0    hydrOXYzine HCL (ATARAX) 10 MG Tab Take 10 mg by mouth 3 (three) times daily as needed.      ketoconazole (NIZORAL) 2 % cream Apply topically 2 (two) times daily. For rash of groin. (Patient not taking: Reported on 2/14/2025) 60 g 1    levocetirizine (XYZAL) 5 MG tablet Take 5 mg by mouth every evening. (Patient not taking: Reported on 2/14/2025)      lisinopriL (PRINIVIL,ZESTRIL) 40 MG tablet Take 1 tablet (40 mg total) by mouth once daily. 90 tablet 0    meloxicam (MOBIC) 15 MG tablet Take 15 mg by mouth daily as needed.      miconazole NITRATE 2 % (ZEASORB AF) 2 % top powder Use two to three times daily to prevent rash 85 g 2    NIFEdipine (PROCARDIA-XL) 90 MG (OSM) 24 hr tablet Take 1 tablet (90 mg total) by mouth once daily. 90 tablet 1    nitroGLYCERIN (NITROSTAT) 0.4 MG SL tablet Place 1 tablet (0.4 mg total) under the tongue every 5 (five) minutes as needed for Chest pain. 30 tablet 3    ondansetron (ZOFRAN-ODT) 4 MG TbDL Take 1 tablet (4 mg total) by mouth every 8 (eight) hours as needed (Nausea). 12 tablet 0     ondansetron (ZOFRAN-ODT) 8 MG TbDL Take 1 tablet (8 mg total) by mouth 3 (three) times daily as needed (nausea). 20 tablet 0    oxybutynin (DITROPAN-XL) 5 MG TR24 Take 1 tablet (5 mg total) by mouth once daily. 30 tablet 11    pantoprazole (PROTONIX) 40 MG tablet Take 1 tablet (40 mg total) by mouth 2 (two) times daily. 180 tablet 1    promethazine (PHENERGAN) 12.5 MG Tab Take 1 tablet (12.5 mg total) by mouth every 6 (six) hours as needed (Q.h.s. PRN nausea). 12 tablet 0    tamsulosin (FLOMAX) 0.4 mg Cap Take 1 capsule (0.4 mg total) by mouth once daily. 30 capsule 11    traMADoL (ULTRAM) 50 mg tablet Take 50 mg by mouth daily as needed.      traZODone (DESYREL) 50 MG tablet Take 1 tablet (50 mg total) by mouth every evening. 30 tablet 1    triamcinolone acetonide 0.025% (KENALOG) 0.025 % cream Apply topically 2 (two) times daily. PRN rash of groin. 80 g 0     Current Facility-Administered Medications on File Prior to Visit   Medication Dose Route Frequency Provider Last Rate Last Admin    leuprolide acetate (6 month) injection 45 mg  45 mg Intramuscular Q6 Months

## 2025-02-23 NOTE — PLAN OF CARE-OVED
[facility] Plan of Care Note  Referral Source: Urgent Care                          Referral Comment: Virtual UC    Chief Complaint   Patient presents with    Hematuria     Patient with virtual UC visit complaining of hematuria, no lower abdominal pain, light headed, n/v or back pain. Pt confirms he feels like he is emptying his bladder completely.    Takes baby asprin.     Recommendation: Specialist Referral         Specialty: Urology             Recommendation comment: Scheduled for tomorrow am.    Encounter Diagnosis   Name Primary?    Hematuria, unspecified type Yes        Orders Placed This Encounter    Urinalysis, Reflex to Urine Culture Urine, Clean Catch     Preferred Collection Type:   Urine, Clean Catch     Specimen Source:   Urine     Send normal result to authorizing provider's In Basket if patient is active on MyChart::   Yes     Tatiana Rivera MD   4:47 PM

## 2025-02-24 ENCOUNTER — PATIENT OUTREACH (OUTPATIENT)
Facility: OTHER | Age: 89
End: 2025-02-24
Payer: MEDICARE

## 2025-02-24 ENCOUNTER — OFFICE VISIT (OUTPATIENT)
Dept: UROLOGY | Facility: CLINIC | Age: 89
End: 2025-02-24
Payer: MEDICARE

## 2025-02-24 VITALS
DIASTOLIC BLOOD PRESSURE: 82 MMHG | SYSTOLIC BLOOD PRESSURE: 128 MMHG | HEIGHT: 72 IN | HEART RATE: 77 BPM | WEIGHT: 193.88 LBS | RESPIRATION RATE: 18 BRPM | BODY MASS INDEX: 26.26 KG/M2

## 2025-02-24 DIAGNOSIS — Z00.00 ENCOUNTER FOR MEDICARE ANNUAL WELLNESS EXAM: ICD-10-CM

## 2025-02-24 DIAGNOSIS — R31.0 GROSS HEMATURIA: ICD-10-CM

## 2025-02-24 DIAGNOSIS — R30.0 DYSURIA: Primary | ICD-10-CM

## 2025-02-24 DIAGNOSIS — C61 PROSTATE CANCER: ICD-10-CM

## 2025-02-24 DIAGNOSIS — R97.21 RISING PSA FOLLOWING TREATMENT FOR MALIGNANT NEOPLASM OF PROSTATE: ICD-10-CM

## 2025-02-24 LAB
BILIRUB UR QL STRIP: POSITIVE
GLUCOSE UR QL STRIP: NEGATIVE
KETONES UR QL STRIP: POSITIVE
LEUKOCYTE ESTERASE UR QL STRIP: POSITIVE
PH, POC UA: 5.5
POC BLOOD, URINE: POSITIVE
POC NITRATES, URINE: NEGATIVE
PROT UR QL STRIP: POSITIVE
SP GR UR STRIP: 1.02 (ref 1–1.03)
UROBILINOGEN UR STRIP-ACNC: 2 (ref 0.3–2.2)

## 2025-02-24 PROCEDURE — 99215 OFFICE O/P EST HI 40 MIN: CPT | Mod: PBBFAC | Performed by: UROLOGY

## 2025-02-24 PROCEDURE — 99999PBSHW POCT URINALYSIS, DIPSTICK, AUTOMATED, W/O SCOPE: Mod: PBBFAC,,,

## 2025-02-24 PROCEDURE — 99999 PR PBB SHADOW E&M-EST. PATIENT-LVL V: CPT | Mod: PBBFAC,,, | Performed by: UROLOGY

## 2025-02-24 PROCEDURE — 81003 URINALYSIS AUTO W/O SCOPE: CPT | Mod: PBBFAC | Performed by: UROLOGY

## 2025-02-24 PROCEDURE — 99214 OFFICE O/P EST MOD 30 MIN: CPT | Mod: S$PBB,,, | Performed by: UROLOGY

## 2025-02-24 NOTE — PROGRESS NOTES
Patient called the [facility] RN on 2/23/25 for c/o blood in his urine. [place] Provider Dr Rivera disposition recommendation pt to see Urology. Appointment scheduled for 2/24.   Called patient to follow up, appointment completed, procedure scheduled for patient for 3/7/2025 at 11:15 AM with CYSTO PROCEDURE at [address] [place] [address] [place] [id number]. Patient also requests appointment that was scheduled for the same date and time as his procedure to be rescheduled. Appointment rescheduled from 3/7 at 11AM to 4/4/2025 at 9:00 AM with Nav Mirza MD at [address] [place] [address] [place] [id number].

## 2025-02-24 NOTE — H&P (VIEW-ONLY)
Chief Complaint:   Encounter Diagnoses   Name Primary?    Dysuria Yes    Gross hematuria     Prostate cancer     Rising PSA following treatment for malignant neoplasm of prostate        HPI:  HPI Jovanny Olmedo abbe 89 y.o. male who presents with 2-3 day history of gross hematuria.  He has not had any clots.  He noticed his urine was red.  He is having some slight dysuria.  He did have a E coli infection in January that was treated and confirmed cleared with a repeat culture.  He has a remote history of prostate cancer status post radiation with local recurrence by PSMA PET scan done in January.  There was no evidence of metastatic disease.  He does have large renal cysts but no significant upper tract abnormality otherwise.  He has not had a cystoscopy.  He is here with the son today.    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    Arthritis     Back pain     CAD (coronary artery disease)     Cataract     CKD (chronic kidney disease)     GERD (gastroesophageal reflux disease)     Glaucoma     suspect    HTN (hypertension)     Multiple subsegmental pulmonary emboli without acute cor pulmonale 09/09/2021    Prostate cancer     tx'd with radiation    PVD (peripheral vascular disease)     stent in right renal artery and aorta    Sleep apnea     Stable angina pectoris 05/16/2023     Past Surgical History:   Procedure Laterality Date    ABDOMINAL AORTA STENT      CARDIAC CATHETERIZATION      CATARACT EXTRACTION W/  INTRAOCULAR LENS IMPLANT Right 04/29/2017    COLONOSCOPY N/A 9/24/2020    Procedure: COLONOSCOPY;  Surgeon: Shayy Melvin MD;  Location: [facility] ENDO;  Service: Endoscopy;  Laterality: N/A;    COLONOSCOPY N/A 1/10/2022    Procedure: COLONOSCOPY;  Surgeon: Vi Lara MD;  Location: [facility] ENDO;  Service: Endoscopy;   Laterality: N/A;    CORONARY ANGIOPLASTY      CORONARY STENT PLACEMENT      PCIOL Right 03/29/2017    DR. LEDEZMA    PCIOL Left 07/03/2019    DR. LEDEZMA    PROSTATE BIOPSY      RENAL ARTERY STENT       Family History   Problem Relation Name Age of Onset    Glaucoma Mother      Diabetes Mother      Kidney disease Mother      Colon cancer Father      Cancer Father          colon cancer    Diabetes Sister      Diabetes Brother      Hypertension Brother      Blindness Neg Hx         Medications Ordered Prior to Encounter[2]     Objective:     Vitals:    02/24/25 0940   BP: 128/82   Pulse: 77   Resp: 18   Weight: 87.9 kg (193 lb 14.3 oz)   Height: 6' (1.829 m)      BMI Readings from Last 1 Encounters:   02/24/25 26.30 kg/m²          Physical Exam  No acute distress alert and oriented   Respirations even unlabored   Abdomen is soft nontender    Urinalysis grossly bloody , sent for culture    Lab Results   Component Value Date    PSADIAG 14.9 (H) 09/03/2024    PSADIAG 6.6 (H) 02/12/2024    PSADIAG 10.9 (H) 07/11/2023    PSADIAG 12.2 (H) 01/06/2023    PSADIAG 9.7 (H) 04/11/2022    PSADIAG 7.0 (H) 10/11/2021    PSADIAG 5.4 (H) 06/14/2021    PSADIAG 3.6 01/11/2021    PSADIAG 3.5 12/02/2020    PSADIAG 0.53 12/05/2018    PSADIAG 0.31 11/27/2017    PSADIAG 0.38 05/19/2017    PSADIAG 0.38 11/23/2016    PSADIAG 0.56 04/18/2016    PSADIAG 0.80 10/20/2015    PSADIAG 1.3 07/20/2015    PSADIAG 1.2 04/14/2015    PSADIAG 2.0 01/06/2015    PSADIAG 2.4 10/01/2014    PSADIAG 5.3 (H) 07/01/2014    PSADIAG 7.0 (H) 04/25/2014    PSA 18.7 (H) 12/13/2024    PSA 1.1 08/13/2019    PSA 16.5 (H) 12/16/2013    PSA 6.3 (H) 01/19/2011    PSA 3.0 09/15/2008        Lab Results   Component Value Date    CREATININE 1.7 (H) 02/19/2025      Assessment:       1. Dysuria    2. Gross hematuria    3. Prostate cancer    4. Rising PSA following treatment for malignant neoplasm of prostate        Plan:     1. Dysuria    2. Gross hematuria    3. Prostate cancer     4. Rising PSA following treatment for malignant neoplasm of prostate       Orders Placed This Encounter    Urine Culture High Risk    POCT Urinalysis, Dipstick, Automated, W/O Scope      Discussed proceeding with cystoscopy as an outpatient.  We will increase hydration and schedule for later in the week.  Discussed holding aspirin/mobic.  Patient is having constipation we discussed treatment for this.  He is instructed to presented to the emergency room if he developed clots difficulty voiding.       [1]   Social History  Tobacco Use    Smoking status: Former     Current packs/day: 0.00     Average packs/day: 0.5 packs/day for 30.0 years (15.0 ttl pk-yrs)     Types: Cigarettes     Start date: 1965     Quit date: 1995     Years since quittin.4    Smokeless tobacco: Former   Substance Use Topics    Alcohol use: Not Currently     Alcohol/week: 0.0 standard drinks of alcohol    Drug use: Yes     Frequency: 4.0 times per week     Types: Marijuana   [2]   Current Outpatient Medications on File Prior to Visit   Medication Sig Dispense Refill    albuterol (VENTOLIN HFA) 90 mcg/actuation inhaler Inhale 2 puffs into the lungs every 6 (six) hours as needed for Wheezing. Rescue 6.7 g 0    aspirin (ECOTRIN) 81 MG EC tablet Take 81 mg by mouth once daily.      atorvastatin (LIPITOR) 40 MG tablet Take 1 tablet (40 mg total) by mouth every evening. 90 tablet 1    azelastine (ASTELIN) 137 mcg (0.1 %) nasal spray USE 2 SPRAY(S) IN EACH NOSTRIL TWICE DAILY 30 mL 3    betamethasone dipropionate 0.05 % cream Apply topically once daily.      betamethasone dipropionate 0.05 % cream Apply topically 2 (two) times daily.      carvediloL (COREG) 12.5 MG tablet Take 1 tablet (12.5 mg total) by mouth 2 (two) times daily with meals. 180 tablet 1    EScitalopram oxalate (LEXAPRO) 10 MG tablet Take 1 tablet (10 mg total) by mouth once daily. 30 tablet 11    ferrous sulfate 325 (65 FE) MG EC tablet Take 325 mg by mouth once daily.       gentamicin (GARAMYCIN) 0.1 % ointment Apply topically 2 (two) times a day. For rash of groin. 30 g 0    hydrOXYzine HCL (ATARAX) 10 MG Tab Take 10 mg by mouth 3 (three) times daily as needed.      lisinopriL (PRINIVIL,ZESTRIL) 40 MG tablet Take 1 tablet (40 mg total) by mouth once daily. 90 tablet 0    meloxicam (MOBIC) 15 MG tablet Take 15 mg by mouth daily as needed.      miconazole NITRATE 2 % (ZEASORB AF) 2 % top powder Use two to three times daily to prevent rash 85 g 2    NIFEdipine (PROCARDIA-XL) 90 MG (OSM) 24 hr tablet Take 1 tablet (90 mg total) by mouth once daily. 90 tablet 1    nitroGLYCERIN (NITROSTAT) 0.4 MG SL tablet Place 1 tablet (0.4 mg total) under the tongue every 5 (five) minutes as needed for Chest pain. 30 tablet 3    ondansetron (ZOFRAN-ODT) 4 MG TbDL Take 1 tablet (4 mg total) by mouth every 8 (eight) hours as needed (Nausea). 12 tablet 0    ondansetron (ZOFRAN-ODT) 8 MG TbDL Take 1 tablet (8 mg total) by mouth 3 (three) times daily as needed (nausea). 20 tablet 0    oxybutynin (DITROPAN-XL) 5 MG TR24 Take 1 tablet (5 mg total) by mouth once daily. 30 tablet 11    pantoprazole (PROTONIX) 40 MG tablet Take 1 tablet (40 mg total) by mouth 2 (two) times daily. 180 tablet 1    promethazine (PHENERGAN) 12.5 MG Tab Take 1 tablet (12.5 mg total) by mouth every 6 (six) hours as needed (Q.h.s. PRN nausea). 12 tablet 0    tamsulosin (FLOMAX) 0.4 mg Cap Take 1 capsule (0.4 mg total) by mouth once daily. 30 capsule 11    traMADoL (ULTRAM) 50 mg tablet Take 50 mg by mouth daily as needed.      traZODone (DESYREL) 50 MG tablet Take 1 tablet (50 mg total) by mouth every evening. 30 tablet 1    triamcinolone acetonide 0.025% (KENALOG) 0.025 % cream Apply topically 2 (two) times daily. PRN rash of groin. 80 g 0    apremilast (OTEZLA ORAL) Take by mouth. (Patient not taking: Reported on 2/24/2025)      finasteride (PROSCAR) 5 mg tablet Take 1 tablet (5 mg total) by mouth once daily. 30 tablet 0     ketoconazole (NIZORAL) 2 % cream Apply topically 2 (two) times daily. For rash of groin. (Patient not taking: Reported on 2/24/2025) 60 g 1    levocetirizine (XYZAL) 5 MG tablet Take 5 mg by mouth every evening. (Patient not taking: Reported on 2/24/2025)       Current Facility-Administered Medications on File Prior to Visit   Medication Dose Route Frequency Provider Last Rate Last Admin    leuprolide acetate (6 month) injection 45 mg  45 mg Intramuscular Q6 Months

## 2025-02-24 NOTE — PROGRESS NOTES
Chief Complaint:   Encounter Diagnoses   Name Primary?    Dysuria Yes    Gross hematuria     Prostate cancer     Rising PSA following treatment for malignant neoplasm of prostate        HPI:  HPI Jovanny Olmedo abbe 89 y.o. male who presents with 2-3 day history of gross hematuria.  He has not had any clots.  He noticed his urine was red.  He is having some slight dysuria.  He did have a E coli infection in January that was treated and confirmed cleared with a repeat culture.  He has a remote history of prostate cancer status post radiation with local recurrence by PSMA PET scan done in January.  There was no evidence of metastatic disease.  He does have large renal cysts but no significant upper tract abnormality otherwise.  He has not had a cystoscopy.  He is here with the son today.    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    Arthritis     Back pain     CAD (coronary artery disease)     Cataract     CKD (chronic kidney disease)     GERD (gastroesophageal reflux disease)     Glaucoma     suspect    HTN (hypertension)     Multiple subsegmental pulmonary emboli without acute cor pulmonale 09/09/2021    Prostate cancer     tx'd with radiation    PVD (peripheral vascular disease)     stent in right renal artery and aorta    Sleep apnea     Stable angina pectoris 05/16/2023     Past Surgical History:   Procedure Laterality Date    ABDOMINAL AORTA STENT      CARDIAC CATHETERIZATION      CATARACT EXTRACTION W/  INTRAOCULAR LENS IMPLANT Right 04/29/2017    COLONOSCOPY N/A 9/24/2020    Procedure: COLONOSCOPY;  Surgeon: Shayy Melvin MD;  Location: [facility] ENDO;  Service: Endoscopy;  Laterality: N/A;    COLONOSCOPY N/A 1/10/2022    Procedure: COLONOSCOPY;  Surgeon: Vi Lara MD;  Location: [facility] ENDO;  Service: Endoscopy;   Laterality: N/A;    CORONARY ANGIOPLASTY      CORONARY STENT PLACEMENT      PCIOL Right 03/29/2017    DR. LEDEZMA    PCIOL Left 07/03/2019    DR. LEDEZMA    PROSTATE BIOPSY      RENAL ARTERY STENT       Family History   Problem Relation Name Age of Onset    Glaucoma Mother      Diabetes Mother      Kidney disease Mother      Colon cancer Father      Cancer Father          colon cancer    Diabetes Sister      Diabetes Brother      Hypertension Brother      Blindness Neg Hx         Medications Ordered Prior to Encounter[2]     Objective:     Vitals:    02/24/25 0940   BP: 128/82   Pulse: 77   Resp: 18   Weight: 87.9 kg (193 lb 14.3 oz)   Height: 6' (1.829 m)      BMI Readings from Last 1 Encounters:   02/24/25 26.30 kg/m²          Physical Exam  No acute distress alert and oriented   Respirations even unlabored   Abdomen is soft nontender    Urinalysis grossly bloody , sent for culture    Lab Results   Component Value Date    PSADIAG 14.9 (H) 09/03/2024    PSADIAG 6.6 (H) 02/12/2024    PSADIAG 10.9 (H) 07/11/2023    PSADIAG 12.2 (H) 01/06/2023    PSADIAG 9.7 (H) 04/11/2022    PSADIAG 7.0 (H) 10/11/2021    PSADIAG 5.4 (H) 06/14/2021    PSADIAG 3.6 01/11/2021    PSADIAG 3.5 12/02/2020    PSADIAG 0.53 12/05/2018    PSADIAG 0.31 11/27/2017    PSADIAG 0.38 05/19/2017    PSADIAG 0.38 11/23/2016    PSADIAG 0.56 04/18/2016    PSADIAG 0.80 10/20/2015    PSADIAG 1.3 07/20/2015    PSADIAG 1.2 04/14/2015    PSADIAG 2.0 01/06/2015    PSADIAG 2.4 10/01/2014    PSADIAG 5.3 (H) 07/01/2014    PSADIAG 7.0 (H) 04/25/2014    PSA 18.7 (H) 12/13/2024    PSA 1.1 08/13/2019    PSA 16.5 (H) 12/16/2013    PSA 6.3 (H) 01/19/2011    PSA 3.0 09/15/2008        Lab Results   Component Value Date    CREATININE 1.7 (H) 02/19/2025      Assessment:       1. Dysuria    2. Gross hematuria    3. Prostate cancer    4. Rising PSA following treatment for malignant neoplasm of prostate        Plan:     1. Dysuria    2. Gross hematuria    3. Prostate cancer     4. Rising PSA following treatment for malignant neoplasm of prostate       Orders Placed This Encounter    Urine Culture High Risk    POCT Urinalysis, Dipstick, Automated, W/O Scope      Discussed proceeding with cystoscopy as an outpatient.  We will increase hydration and schedule for later in the week.  Discussed holding aspirin/mobic.  Patient is having constipation we discussed treatment for this.  He is instructed to presented to the emergency room if he developed clots difficulty voiding.       [1]   Social History  Tobacco Use    Smoking status: Former     Current packs/day: 0.00     Average packs/day: 0.5 packs/day for 30.0 years (15.0 ttl pk-yrs)     Types: Cigarettes     Start date: 1965     Quit date: 1995     Years since quittin.4    Smokeless tobacco: Former   Substance Use Topics    Alcohol use: Not Currently     Alcohol/week: 0.0 standard drinks of alcohol    Drug use: Yes     Frequency: 4.0 times per week     Types: Marijuana   [2]   Current Outpatient Medications on File Prior to Visit   Medication Sig Dispense Refill    albuterol (VENTOLIN HFA) 90 mcg/actuation inhaler Inhale 2 puffs into the lungs every 6 (six) hours as needed for Wheezing. Rescue 6.7 g 0    aspirin (ECOTRIN) 81 MG EC tablet Take 81 mg by mouth once daily.      atorvastatin (LIPITOR) 40 MG tablet Take 1 tablet (40 mg total) by mouth every evening. 90 tablet 1    azelastine (ASTELIN) 137 mcg (0.1 %) nasal spray USE 2 SPRAY(S) IN EACH NOSTRIL TWICE DAILY 30 mL 3    betamethasone dipropionate 0.05 % cream Apply topically once daily.      betamethasone dipropionate 0.05 % cream Apply topically 2 (two) times daily.      carvediloL (COREG) 12.5 MG tablet Take 1 tablet (12.5 mg total) by mouth 2 (two) times daily with meals. 180 tablet 1    EScitalopram oxalate (LEXAPRO) 10 MG tablet Take 1 tablet (10 mg total) by mouth once daily. 30 tablet 11    ferrous sulfate 325 (65 FE) MG EC tablet Take 325 mg by mouth once daily.       gentamicin (GARAMYCIN) 0.1 % ointment Apply topically 2 (two) times a day. For rash of groin. 30 g 0    hydrOXYzine HCL (ATARAX) 10 MG Tab Take 10 mg by mouth 3 (three) times daily as needed.      lisinopriL (PRINIVIL,ZESTRIL) 40 MG tablet Take 1 tablet (40 mg total) by mouth once daily. 90 tablet 0    meloxicam (MOBIC) 15 MG tablet Take 15 mg by mouth daily as needed.      miconazole NITRATE 2 % (ZEASORB AF) 2 % top powder Use two to three times daily to prevent rash 85 g 2    NIFEdipine (PROCARDIA-XL) 90 MG (OSM) 24 hr tablet Take 1 tablet (90 mg total) by mouth once daily. 90 tablet 1    nitroGLYCERIN (NITROSTAT) 0.4 MG SL tablet Place 1 tablet (0.4 mg total) under the tongue every 5 (five) minutes as needed for Chest pain. 30 tablet 3    ondansetron (ZOFRAN-ODT) 4 MG TbDL Take 1 tablet (4 mg total) by mouth every 8 (eight) hours as needed (Nausea). 12 tablet 0    ondansetron (ZOFRAN-ODT) 8 MG TbDL Take 1 tablet (8 mg total) by mouth 3 (three) times daily as needed (nausea). 20 tablet 0    oxybutynin (DITROPAN-XL) 5 MG TR24 Take 1 tablet (5 mg total) by mouth once daily. 30 tablet 11    pantoprazole (PROTONIX) 40 MG tablet Take 1 tablet (40 mg total) by mouth 2 (two) times daily. 180 tablet 1    promethazine (PHENERGAN) 12.5 MG Tab Take 1 tablet (12.5 mg total) by mouth every 6 (six) hours as needed (Q.h.s. PRN nausea). 12 tablet 0    tamsulosin (FLOMAX) 0.4 mg Cap Take 1 capsule (0.4 mg total) by mouth once daily. 30 capsule 11    traMADoL (ULTRAM) 50 mg tablet Take 50 mg by mouth daily as needed.      traZODone (DESYREL) 50 MG tablet Take 1 tablet (50 mg total) by mouth every evening. 30 tablet 1    triamcinolone acetonide 0.025% (KENALOG) 0.025 % cream Apply topically 2 (two) times daily. PRN rash of groin. 80 g 0    apremilast (OTEZLA ORAL) Take by mouth. (Patient not taking: Reported on 2/24/2025)      finasteride (PROSCAR) 5 mg tablet Take 1 tablet (5 mg total) by mouth once daily. 30 tablet 0     ketoconazole (NIZORAL) 2 % cream Apply topically 2 (two) times daily. For rash of groin. (Patient not taking: Reported on 2/24/2025) 60 g 1    levocetirizine (XYZAL) 5 MG tablet Take 5 mg by mouth every evening. (Patient not taking: Reported on 2/24/2025)       Current Facility-Administered Medications on File Prior to Visit   Medication Dose Route Frequency Provider Last Rate Last Admin    leuprolide acetate (6 month) injection 45 mg  45 mg Intramuscular Q6 Months

## 2025-02-28 ENCOUNTER — RESULTS FOLLOW-UP (OUTPATIENT)
Dept: CARDIOLOGY | Facility: CLINIC | Age: 89
End: 2025-02-28

## 2025-02-28 ENCOUNTER — E-CONSULT (OUTPATIENT)
Dept: EMERGENCY MEDICINE | Facility: HOSPITAL | Age: 89
End: 2025-02-28
Payer: MEDICARE

## 2025-02-28 DIAGNOSIS — R31.9 HEMATURIA, UNSPECIFIED TYPE: Primary | ICD-10-CM

## 2025-02-28 NOTE — PROGRESS NOTES
Labs reviewed. Creatinine stable. LDL down from prior. Continue same mgmt for now.    No difficulties

## 2025-02-28 NOTE — CONSULTS
[facility]  Response for E-Consult     Patient Name: Jovanny Olmedo  MRN: [id number]  Primary Care Provider: Miryam Jimenez MD   Requesting Provider: Aster Sun FNP  Consults    Recommendation: Urology Referral and Follow Up- patient presented to  with hematuria 2/23, no abd pain, LH, n/v, back pain, does not feel like he is retaining urine, takes baby aspirin but not on other blood thinners.     Patient referred to urology and next day appointment scheduled.     Additional future steps to consider: UA    Total time of Consultation: 5 minute    I did not speak to the requesting provider verbally about this.     *This eConsult is based on the clinical data available to me and is furnished without benefit of a physical examination. The eConsult will need to be interpreted in light of any clinical issues or changes in patient status not available to me at the time of filing this eConsults. Significant changes in patient condition or level of acuity should result in immediate formal consultation and reevaluation. Please alert me if you have further questions.    Thank you for this eConsult referral.     Tatiana Rivera MD  [facility]

## 2025-03-03 ENCOUNTER — LAB VISIT (OUTPATIENT)
Dept: LAB | Facility: HOSPITAL | Age: 89
End: 2025-03-03
Attending: UROLOGY
Payer: MEDICARE

## 2025-03-03 DIAGNOSIS — C61 PROSTATE CANCER: ICD-10-CM

## 2025-03-03 LAB — COMPLEXED PSA SERPL-MCNC: 18.4 NG/ML (ref 0–4)

## 2025-03-03 PROCEDURE — 84153 ASSAY OF PSA TOTAL: CPT | Performed by: UROLOGY

## 2025-03-03 PROCEDURE — 36415 COLL VENOUS BLD VENIPUNCTURE: CPT | Performed by: UROLOGY

## 2025-03-06 ENCOUNTER — TELEPHONE (OUTPATIENT)
Dept: UROLOGY | Facility: CLINIC | Age: 89
End: 2025-03-06
Payer: MEDICARE

## 2025-03-06 NOTE — TELEPHONE ENCOUNTER
"Called and spoke with patient, he stated that he wants to cancel his procedure right now. Patient stated that his body is "old and wore down" and does not feel comfortable with moving forward with his scope. I cancelled patient's procedure per his request.          ----- Message from Tara sent at 3/6/2025  2:32 PM CST -----  Contact: Mimi/jeff  Type:  Patient Call back request Who Called:Mimi/Miguel A for Patient:Nurse What this is regarding?: Upcoming visit Would the patient rather a call back or [facility] Call [facility] Call Back Number: [phone] Information: Pt would like some more information on this procedure tomorrow.  "

## 2025-03-11 ENCOUNTER — PATIENT MESSAGE (OUTPATIENT)
Dept: UROLOGY | Facility: CLINIC | Age: 89
End: 2025-03-11
Payer: MEDICARE

## 2025-03-14 ENCOUNTER — OFFICE VISIT (OUTPATIENT)
Dept: INTERNAL MEDICINE | Facility: CLINIC | Age: 89
End: 2025-03-14
Payer: MEDICARE

## 2025-03-14 ENCOUNTER — TELEPHONE (OUTPATIENT)
Dept: INTERNAL MEDICINE | Facility: CLINIC | Age: 89
End: 2025-03-14
Payer: MEDICARE

## 2025-03-14 VITALS
HEIGHT: 72 IN | SYSTOLIC BLOOD PRESSURE: 128 MMHG | WEIGHT: 196.88 LBS | OXYGEN SATURATION: 100 % | HEART RATE: 86 BPM | BODY MASS INDEX: 26.67 KG/M2 | DIASTOLIC BLOOD PRESSURE: 78 MMHG | TEMPERATURE: 99 F

## 2025-03-14 DIAGNOSIS — R11.0 NAUSEA: Primary | ICD-10-CM

## 2025-03-14 DIAGNOSIS — K59.00 CONSTIPATION, UNSPECIFIED CONSTIPATION TYPE: ICD-10-CM

## 2025-03-14 PROCEDURE — 99214 OFFICE O/P EST MOD 30 MIN: CPT | Mod: PBBFAC | Performed by: PHYSICIAN ASSISTANT

## 2025-03-14 PROCEDURE — 99999 PR PBB SHADOW E&M-EST. PATIENT-LVL IV: CPT | Mod: PBBFAC,,, | Performed by: PHYSICIAN ASSISTANT

## 2025-03-14 RX ORDER — ONDANSETRON 4 MG/1
4 TABLET, ORALLY DISINTEGRATING ORAL EVERY 8 HOURS PRN
Qty: 20 TABLET | Refills: 0 | Status: SHIPPED | OUTPATIENT
Start: 2025-03-14

## 2025-03-14 NOTE — PROGRESS NOTES
Subjective:      Patient ID: Jovanny Olmedo is a 89 y.o. male.    Chief Complaint: Follow-up    HPI  History of Present Illness    CHIEF COMPLAINT:  Mr. Olmedo presents today for follow up of multiple medical conditions including psoriasis and constipation    GASTROINTESTINAL:  He reports constipation with associated intermittent, LLQ abdominal pain. He has been using Fleet enema and taking Miralax daily without improvement and continues to strain with bowel movements. He denies blood in stool, nausea, vomiting, or fever.    GENITOURINARY:  Hematuria and has an upcoming cystoscopy appointment with his urologist on Monday for evaluation.    PSORIASIS:  He reports a rash on his back associated with psoriasis. He previously received treatment from a dermatologist including three different topical medications. He discontinued Otezla due to significant side effects. He expresses uncertainty about the effectiveness of his current steroid cream treatment.    MEDICATIONS:  He takes Zofran as needed for nausea, though only uses it occasionally.     Pt admits he never started the lexapro. He states that he can manage his stress/anxiety without medication.   Medications were reviewed      Weight has stabilized over the past month. Up 3 lbs.           Wt Readings from Last 3 Encounters:   03/14/25 0812 89.3 kg (196 lb 13.9 oz)   02/24/25 0940 87.9 kg (193 lb 14.3 oz)   02/14/25 0946 88 kg (194 lb)      Problem List[1]    Current Medications[2]    Review of Systems   Constitutional:  Negative for activity change, appetite change, chills, diaphoresis, fatigue, fever and unexpected weight change.   HENT: Negative.  Negative for congestion, hearing loss, postnasal drip, rhinorrhea, sore throat, trouble swallowing and voice change.    Eyes: Negative.  Negative for visual disturbance.   Respiratory: Negative.  Negative for cough, choking, chest tightness and shortness of breath.    Cardiovascular:  Negative for chest pain,  palpitations and leg swelling.   Gastrointestinal:  Positive for constipation and nausea. Negative for abdominal distention, abdominal pain, blood in stool, diarrhea and vomiting.   Endocrine: Negative for cold intolerance, heat intolerance, polydipsia and polyuria.   Genitourinary: Negative.  Negative for difficulty urinating and frequency.   Musculoskeletal:  Positive for arthralgias. Negative for back pain, gait problem, joint swelling and myalgias.   Skin:  Positive for rash. Negative for color change, pallor and wound.   Neurological:  Negative for dizziness, tremors, weakness, light-headedness, numbness and headaches.   Hematological:  Negative for adenopathy.   Psychiatric/Behavioral:  Negative for behavioral problems, confusion, self-injury, sleep disturbance and suicidal ideas. The patient is not nervous/anxious.      Objective:   /78 (BP Location: Left arm, Patient Position: Sitting)   Pulse 86   Temp 98.9 °F (37.2 °C) (Tympanic)   Ht 6' (1.829 m)   Wt 89.3 kg (196 lb 13.9 oz)   SpO2 100%   BMI 26.70 kg/m²     Physical Exam  Constitutional:       General: He is not in acute distress.     Appearance: Normal appearance. He is not ill-appearing, toxic-appearing or diaphoretic.   Abdominal:      General: There is no distension.      Palpations: Abdomen is soft.      Tenderness: There is no abdominal tenderness. There is no guarding.   Skin:     Findings: Rash present. Rash is crusting and scaling.             Comments: psoriasis   Neurological:      Mental Status: He is alert and oriented to person, place, and time.   Psychiatric:         Attention and Perception: Attention and perception normal.         Thought Content: Thought content normal. Thought content is not paranoid or delusional. Thought content does not include homicidal or suicidal ideation. Thought content does not include homicidal or suicidal plan.         Cognition and Memory: Cognition and memory normal.          Assessment:      1. Nausea    2. Constipation, unspecified constipation type      Plan:   Nausea  -     ondansetron (ZOFRAN-ODT) 4 MG TbDL; Take 1 tablet (4 mg total) by mouth every 8 (eight) hours as needed (Nausea).  Dispense: 20 tablet; Refill: 0  -     Ambulatory referral/consult to Gastroenterology    Constipation, unspecified constipation type  -     Ambulatory referral/consult to Gastroenterology  -     lactulose (CHRONULAC) 20 gram/30 mL Soln; Take 30 mLs (20 g total) by mouth daily as needed (constipation).  Dispense: 30 mL; Refill: 0    Assessment & Plan    Assessed constipation, considering lactulose as alternative to Miralax.  Evaluated foot swelling, likely due to overuse; recommended conservative management with ice and topical anti-inflammatory.  Reviewed psoriasis treatment options, acknowledging need for immune suppression while balancing side effect profile.  Clarified importance of consistent antidepressant use for effectiveness, explaining stabilizing effect on mood over time.    CHRONIC RENAL IMPAIRMENT, STAGE 3B:  - Monitored patient's report of hematuria and weight loss.  - Scheduled cystoscopy with urologist to investigate hematuria.    PSORIASIS:  - Educated the patient about psoriasis as a chronic autoimmune condition requiring immune system suppression for treatment, noting it can onset at any age and is managed rather than cured.  - Examined the worsened rash on the patient's back since discontinuing medication.  - Acknowledged concerns about medication side effects, particularly severe reactions to Otezla.  - Recommend prompt follow-up with a dermatologist to address psoriasis, explore alternative treatments, and find a suitable medication with fewer side effects.  - Noted previous treatment with three different topical medications.    CHRONIC CONSTIPATION:  - Continued Miralax twice daily for constipation.  - Prescribed lactulose solution 15 mL as needed when Miralax is ineffective.  - Ordered CT  Abdomen to evaluate persistent abdominal pain and constipation.  - Referred the patient to a gastroenterologist for follow-up, with potential for colonoscopy.  - Recommend incorporating probiotics, including Activia yogurt, into daily diet for additional constipation management.  - Noted patient's report of ongoing constipation despite current treatment and straining during bowel movements.    FOOT SWELLING AND PAIN:  - Examined the left foot, noting mild redness and inflammation, but determined it's not infected.  - Determined the swelling is likely due to injury from using the foot for leverage.  - Instructed the patient to apply ice several times daily to reduce inflammation.  - Recommend OTC Voltaren gel for topical application on the swollen foot.    NAUSEA:  - Refilled Zofran 4 mg tablets for nausea, to be taken as needed with option to take 2nd dose if required.  -advised that zofran can worsen constipation    MEDICATION MANAGEMENT:  - Discontinued escitalopram (Lexapro) due to patient's decision to stop taking it.  -pt states mood is stable off the lexapro    FOLLOW-UP:  - Follow up with Dr. Jimenez next week as scheduled for annual exam.       This note was generated with the assistance of ambient listening technology. Verbal consent was obtained by the patient and accompanying visitor(s) for the recording of patient appointment to facilitate this note. I attest to having reviewed and edited the generated note for accuracy, though some syntax or spelling errors may persist. Please contact the author of this note for any clarification.      Follow up if symptoms worsen or fail to improve.           [1]   Patient Active Problem List  Diagnosis    Essential hypertension    GERD (gastroesophageal reflux disease)    Polycystic kidney disease    Coronary artery disease, occlusive    Chronic renal impairment, stage 3a    Refractive error    DDD (degenerative disc disease), lumbar    Open angle with borderline  findings, low risk, bilateral    History of coronary angioplasty    Prostate cancer    Secondary hyperparathyroidism    Pseudophakia    Intermediate stage nonexudative age-related macular degeneration of both eyes    Aortic atherosclerosis    CLARISSA on CPAP    Duodenal ulcer    History of colon polyps    Dyslipidemia    Abdominal aortic aneurysm (AAA) without rupture    History of pulmonary embolus (PE)    Thrombocytopenia    Obesity (BMI 30.0-34.9)    Stable angina pectoris    Abnormal ECG    Coronary artery disease of native artery of native heart with stable angina pectoris    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    Rash    Precordial pain    Weight loss, unintentional    Loss of appetite    Psoriasis   [2]   Current Outpatient Medications:     albuterol (VENTOLIN HFA) 90 mcg/actuation inhaler, Inhale 2 puffs into the lungs every 6 (six) hours as needed for Wheezing. Rescue, Disp: 6.7 g, Rfl: 0    aspirin (ECOTRIN) 81 MG EC tablet, Take 81 mg by mouth once daily., Disp: , Rfl:     atorvastatin (LIPITOR) 40 MG tablet, Take 1 tablet (40 mg total) by mouth every evening., Disp: 90 tablet, Rfl: 1    azelastine (ASTELIN) 137 mcg (0.1 %) nasal spray, USE 2 SPRAY(S) IN EACH NOSTRIL TWICE DAILY, Disp: 30 mL, Rfl: 3    betamethasone dipropionate 0.05 % cream, Apply topically once daily., Disp: , Rfl:     betamethasone dipropionate 0.05 % cream, Apply topically 2 (two) times daily., Disp: , Rfl:     carvediloL (COREG) 12.5 MG tablet, Take 1 tablet (12.5 mg total) by mouth 2 (two) times daily with meals., Disp: 180 tablet, Rfl: 1    ferrous sulfate 325 (65 FE) MG EC tablet, Take 325 mg by mouth once daily., Disp: , Rfl:     gentamicin (GARAMYCIN) 0.1 % ointment, Apply topically 2 (two) times a day. For rash of groin., Disp: 30 g, Rfl: 0    hydrOXYzine HCL (ATARAX) 10 MG Tab, Take 10 mg by mouth 3 (three) times daily as needed., Disp: , Rfl:     lisinopriL (PRINIVIL,ZESTRIL) 40 MG  tablet, Take 1 tablet (40 mg total) by mouth once daily., Disp: 90 tablet, Rfl: 0    meloxicam (MOBIC) 15 MG tablet, Take 15 mg by mouth daily as needed., Disp: , Rfl:     miconazole NITRATE 2 % (ZEASORB AF) 2 % top powder, Use two to three times daily to prevent rash, Disp: 85 g, Rfl: 2    NIFEdipine (PROCARDIA-XL) 90 MG (OSM) 24 hr tablet, Take 1 tablet (90 mg total) by mouth once daily., Disp: 90 tablet, Rfl: 1    nitroGLYCERIN (NITROSTAT) 0.4 MG SL tablet, Place 1 tablet (0.4 mg total) under the tongue every 5 (five) minutes as needed for Chest pain., Disp: 30 tablet, Rfl: 3    oxybutynin (DITROPAN-XL) 5 MG TR24, Take 1 tablet (5 mg total) by mouth once daily., Disp: 30 tablet, Rfl: 11    pantoprazole (PROTONIX) 40 MG tablet, Take 1 tablet (40 mg total) by mouth 2 (two) times daily., Disp: 180 tablet, Rfl: 1    tamsulosin (FLOMAX) 0.4 mg Cap, Take 1 capsule (0.4 mg total) by mouth once daily., Disp: 30 capsule, Rfl: 11    traMADoL (ULTRAM) 50 mg tablet, Take 50 mg by mouth daily as needed., Disp: , Rfl:     traZODone (DESYREL) 50 MG tablet, Take 1 tablet (50 mg total) by mouth every evening., Disp: 30 tablet, Rfl: 1    triamcinolone acetonide 0.025% (KENALOG) 0.025 % cream, Apply topically 2 (two) times daily. PRN rash of groin., Disp: 80 g, Rfl: 0    apremilast (OTEZLA ORAL), Take by mouth. (Patient not taking: Reported on 2/14/2025), Disp: , Rfl:     finasteride (PROSCAR) 5 mg tablet, Take 1 tablet (5 mg total) by mouth once daily., Disp: 30 tablet, Rfl: 0    ketoconazole (NIZORAL) 2 % cream, Apply topically 2 (two) times daily. For rash of groin. (Patient not taking: Reported on 2/14/2025), Disp: 60 g, Rfl: 1    lactulose (CHRONULAC) 20 gram/30 mL Soln, Take 30 mLs (20 g total) by mouth daily as needed (constipation)., Disp: 30 mL, Rfl: 0    levocetirizine (XYZAL) 5 MG tablet, Take 5 mg by mouth every evening. (Patient not taking: Reported on 2/14/2025), Disp: , Rfl:     ondansetron (ZOFRAN-ODT) 4 MG TbDL,  Take 1 tablet (4 mg total) by mouth every 8 (eight) hours as needed (Nausea)., Disp: 20 tablet, Rfl: 0    Current Facility-Administered Medications:     leuprolide acetate (6 month) injection 45 mg, 45 mg, Intramuscular, Q6 Months,

## 2025-03-14 NOTE — TELEPHONE ENCOUNTER
----- Message from Jennifer sent at 3/14/2025  9:27 AM CDT -----  Contact: [phone]  Type:  Pharmacy Calling to Clarify an RXName [facility]:lactulose (CHRONULAC) 20 gram/30 mL SolnWhat do they need to clarify?:quantity Best Call Back Number:[phone] Information:

## 2025-03-16 ENCOUNTER — ON-DEMAND VIRTUAL (OUTPATIENT)
Dept: URGENT CARE | Facility: CLINIC | Age: 89
End: 2025-03-16
Payer: MEDICARE

## 2025-03-16 VITALS — HEART RATE: 105 BPM

## 2025-03-16 DIAGNOSIS — R00.0 TACHYCARDIA: Primary | ICD-10-CM

## 2025-03-16 PROCEDURE — 98005 SYNCH AUDIO-VIDEO EST LOW 20: CPT | Mod: 95,,, | Performed by: NURSE PRACTITIONER

## 2025-03-16 NOTE — PROGRESS NOTES
Subjective:      Patient ID: Jovanny Olmedo is a 89 y.o. male.    Vitals:  pulse is 105.     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    Arthritis     Back pain     CAD (coronary artery disease)     Cataract     CKD (chronic kidney disease)     GERD (gastroesophageal reflux disease)     Glaucoma     suspect    HTN (hypertension)     Multiple subsegmental pulmonary emboli without acute cor pulmonale 09/09/2021    Prostate cancer     tx'd with radiation    PVD (peripheral vascular disease)     stent in right renal artery and aorta    Sleep apnea     Stable angina pectoris 05/16/2023     Past Surgical History:   Procedure Laterality Date    ABDOMINAL AORTA STENT      CARDIAC CATHETERIZATION      CATARACT EXTRACTION W/  INTRAOCULAR LENS IMPLANT Right 04/29/2017    COLONOSCOPY N/A 9/24/2020    Procedure: COLONOSCOPY;  Surgeon: Shayy Melvin MD;  Location: [facility] ENDO;  Service: Endoscopy;  Laterality: N/A;    COLONOSCOPY N/A 1/10/2022    Procedure: COLONOSCOPY;  Surgeon: Vi Lara MD;  Location: [facility] ENDO;  Service: Endoscopy;  Laterality: N/A;    CORONARY ANGIOPLASTY      CORONARY STENT PLACEMENT      PCIOL Right 03/29/2017    DR. LEDEZMA    PCIOL Left 07/03/2019    DR. LEDEZMA    PROSTATE BIOPSY      RENAL ARTERY STENT       Review of patient's allergies indicates:   Allergen Reactions    Codeine Other (See Comments)     When taking codeine, pt becomes very anxious     Medications Ordered Prior to Encounter[1]  Family History   Problem Relation Name Age of Onset    Glaucoma Mother      Diabetes Mother      Kidney disease Mother      Colon cancer Father      Cancer Father          colon cancer    Diabetes Sister      Diabetes Brother      Hypertension Brother      Blindness Neg Hx    "          Ohs Peq Odvv Intake    3/16/2025 12:44 PM CDT - Filed by Patient   What is your current physical address in the event of a medical emergency?    Are you able to take your vital signs? No   Please attach any relevant images or files    Is your employer contracted with [facility]? No         Patient presents for virtual visit accompanied by his son with questions regarding tachycardia.  He states that he had an episode of chest pain last night for which he took nitroglycerin.  Chest pain immediately resolved.  He had no associated shortness of breath, weakness, palpitations, lightheadedness, and pain did not radiate.  Today he noted when he was taking his blood pressure that his heart rate was elevated, around 105.  States that his blood pressure has been "good".  He is scheduled for a urology procedure tomorrow per his report.  States that right now he feels fine he is just concerned because his heart rate is higher than it usually runs.  He does take a beta-blocker twice daily.  Reports compliance with his medications.    Two patient identifiers were used-name was repeated verbally as well as date of birth.  The patient was located in their home in the [facility].          Cardiovascular:  Positive for chest pain (Now resolved).        Objective:   The physical exam was conducted virtually.  Physical Exam   Constitutional: He is oriented to person, place, and time. No distress.   HENT:   Head: Normocephalic and atraumatic.   Neck: Neck supple.   Pulmonary/Chest: Effort normal. No respiratory distress.   Abdominal: Normal appearance.   Musculoskeletal: Normal range of motion.         General: Normal range of motion.   Neurological: no focal deficit. He is alert and oriented to person, place, and time.   Skin: Skin is not pale.   Psychiatric: His behavior is normal. Mood, judgment and thought content normal.       Assessment:     1. Tachycardia        Plan:       Tachycardia    Cardiology " note from 2/14/25 reviewed.  He does have occasional atypical chest pain.  Last NM stress test 1/2024 with no ischemic findings.  EKG from 12/22/24 reviewed by me; SR, some PACs.  Rate at that time 95.      Patient currently is in no acute distress.  He is asymptomatic with no chest pain, no palpitations, no shortness a breath, no dizziness or lightheadedness.  No nausea or vomiting.  I instructed him that if any of these symptoms present he should go immediately to the emergency department.  If his heart rate continues to climb he should also go to the emergency department.  Do not skip the carvedilol.  He should reach out to Cardiology tomorrow regarding his concerns.      You must understand that you've received a virtual Care treatment only and that you may be released before all your medical problems are known or treated. You, the patient, will arrange for follow up care as instructed.  If your condition worsens we recommend that you receive another evaluation at an urgent care in person, the emergency room or contact your primary medical clinics after hours call service to discuss your concerns.              Present with the patient at the time of consultation: TELEMED PRESENT WITH PATIENT: son         [1]   Current Outpatient Medications on File Prior to Visit   Medication Sig Dispense Refill    albuterol (VENTOLIN HFA) 90 mcg/actuation inhaler Inhale 2 puffs into the lungs every 6 (six) hours as needed for Wheezing. Rescue 6.7 g 0    apremilast (OTEZLA ORAL) Take by mouth. (Patient not taking: Reported on 2/14/2025)      aspirin (ECOTRIN) 81 MG EC tablet Take 81 mg by mouth once daily.      atorvastatin (LIPITOR) 40 MG tablet Take 1 tablet (40 mg total) by mouth every evening. 90 tablet 1    azelastine (ASTELIN) 137 mcg (0.1 %) nasal spray USE 2 SPRAY(S) IN EACH NOSTRIL TWICE DAILY 30 mL 3    betamethasone dipropionate 0.05 % cream Apply topically once daily.      betamethasone dipropionate 0.05 % cream  Apply topically 2 (two) times daily.      carvediloL (COREG) 12.5 MG tablet Take 1 tablet (12.5 mg total) by mouth 2 (two) times daily with meals. 180 tablet 1    ferrous sulfate 325 (65 FE) MG EC tablet Take 325 mg by mouth once daily.      finasteride (PROSCAR) 5 mg tablet Take 1 tablet (5 mg total) by mouth once daily. 30 tablet 0    gentamicin (GARAMYCIN) 0.1 % ointment Apply topically 2 (two) times a day. For rash of groin. 30 g 0    hydrOXYzine HCL (ATARAX) 10 MG Tab Take 10 mg by mouth 3 (three) times daily as needed.      ketoconazole (NIZORAL) 2 % cream Apply topically 2 (two) times daily. For rash of groin. (Patient not taking: Reported on 2/14/2025) 60 g 1    lactulose (CHRONULAC) 20 gram/30 mL Soln Take 30 mLs (20 g total) by mouth daily as needed (constipation). 30 mL 0    levocetirizine (XYZAL) 5 MG tablet Take 5 mg by mouth every evening. (Patient not taking: Reported on 2/14/2025)      lisinopriL (PRINIVIL,ZESTRIL) 40 MG tablet Take 1 tablet (40 mg total) by mouth once daily. 90 tablet 0    meloxicam (MOBIC) 15 MG tablet Take 15 mg by mouth daily as needed.      miconazole NITRATE 2 % (ZEASORB AF) 2 % top powder Use two to three times daily to prevent rash 85 g 2    NIFEdipine (PROCARDIA-XL) 90 MG (OSM) 24 hr tablet Take 1 tablet (90 mg total) by mouth once daily. 90 tablet 1    nitroGLYCERIN (NITROSTAT) 0.4 MG SL tablet Place 1 tablet (0.4 mg total) under the tongue every 5 (five) minutes as needed for Chest pain. 30 tablet 3    ondansetron (ZOFRAN-ODT) 4 MG TbDL Take 1 tablet (4 mg total) by mouth every 8 (eight) hours as needed (Nausea). 20 tablet 0    oxybutynin (DITROPAN-XL) 5 MG TR24 Take 1 tablet (5 mg total) by mouth once daily. 30 tablet 11    pantoprazole (PROTONIX) 40 MG tablet Take 1 tablet (40 mg total) by mouth 2 (two) times daily. 180 tablet 1    tamsulosin (FLOMAX) 0.4 mg Cap Take 1 capsule (0.4 mg total) by mouth once daily. 30 capsule 11    traMADoL (ULTRAM) 50 mg tablet Take 50 mg  by mouth daily as needed.      traZODone (DESYREL) 50 MG tablet Take 1 tablet (50 mg total) by mouth every evening. 30 tablet 1    triamcinolone acetonide 0.025% (KENALOG) 0.025 % cream Apply topically 2 (two) times daily. PRN rash of groin. 80 g 0     Current Facility-Administered Medications on File Prior to Visit   Medication Dose Route Frequency Provider Last Rate Last Admin    leuprolide acetate (6 month) injection 45 mg  45 mg Intramuscular Q6 Months

## 2025-03-17 ENCOUNTER — HOSPITAL ENCOUNTER (OUTPATIENT)
Dept: RADIOLOGY | Facility: HOSPITAL | Age: 89
Discharge: HOME | End: 2025-03-17
Attending: UROLOGY
Payer: MEDICARE

## 2025-03-17 ENCOUNTER — E-CONSULT (OUTPATIENT)
Dept: CARDIOLOGY | Facility: HOSPITAL | Age: 89
End: 2025-03-17
Payer: MEDICARE

## 2025-03-17 ENCOUNTER — TELEPHONE (OUTPATIENT)
Dept: UROLOGY | Facility: CLINIC | Age: 89
End: 2025-03-17

## 2025-03-17 ENCOUNTER — PROCEDURE VISIT (OUTPATIENT)
Dept: UROLOGY | Facility: CLINIC | Age: 89
End: 2025-03-17
Payer: MEDICARE

## 2025-03-17 ENCOUNTER — HOSPITAL ENCOUNTER (OUTPATIENT)
Dept: CARDIOLOGY | Facility: HOSPITAL | Age: 89
Discharge: HOME | End: 2025-03-17
Attending: UROLOGY
Payer: MEDICARE

## 2025-03-17 DIAGNOSIS — R31.0 GROSS HEMATURIA: Primary | ICD-10-CM

## 2025-03-17 DIAGNOSIS — Z01.810 PREOP CARDIOVASCULAR EXAM: Primary | ICD-10-CM

## 2025-03-17 DIAGNOSIS — C61 PROSTATE CANCER: ICD-10-CM

## 2025-03-17 LAB
OHS QRS DURATION: 110 MS
OHS QTC CALCULATION: 443 MS

## 2025-03-17 PROCEDURE — 87088 URINE BACTERIA CULTURE: CPT | Performed by: UROLOGY

## 2025-03-17 PROCEDURE — 93005 ELECTROCARDIOGRAM TRACING: CPT

## 2025-03-17 PROCEDURE — 71046 X-RAY EXAM CHEST 2 VIEWS: CPT | Mod: 26,,, | Performed by: RADIOLOGY

## 2025-03-17 PROCEDURE — 93010 ELECTROCARDIOGRAM REPORT: CPT | Mod: ,,, | Performed by: INTERNAL MEDICINE

## 2025-03-17 PROCEDURE — 87186 SC STD MICRODIL/AGAR DIL: CPT | Performed by: UROLOGY

## 2025-03-17 PROCEDURE — 52000 CYSTOURETHROSCOPY: CPT | Mod: PBBFAC | Performed by: UROLOGY

## 2025-03-17 PROCEDURE — 87086 URINE CULTURE/COLONY COUNT: CPT | Performed by: UROLOGY

## 2025-03-17 PROCEDURE — 88112 CYTOPATH CELL ENHANCE TECH: CPT | Mod: 26,,, | Performed by: STUDENT IN AN ORGANIZED HEALTH CARE EDUCATION/TRAINING PROGRAM

## 2025-03-17 PROCEDURE — 71046 X-RAY EXAM CHEST 2 VIEWS: CPT | Mod: TC

## 2025-03-17 PROCEDURE — 88112 CYTOPATH CELL ENHANCE TECH: CPT | Performed by: STUDENT IN AN ORGANIZED HEALTH CARE EDUCATION/TRAINING PROGRAM

## 2025-03-17 RX ORDER — LIDOCAINE HYDROCHLORIDE 20 MG/ML
JELLY TOPICAL
Status: COMPLETED | OUTPATIENT
Start: 2025-03-17 | End: 2025-03-17

## 2025-03-17 RX ORDER — CIPROFLOXACIN 500 MG/1
500 TABLET ORAL
Status: COMPLETED | OUTPATIENT
Start: 2025-03-17 | End: 2025-03-17

## 2025-03-17 RX ADMIN — LIDOCAINE HYDROCHLORIDE 11 ML: 20 JELLY TOPICAL at 08:03

## 2025-03-17 RX ADMIN — CIPROFLOXACIN 500 MG: 500 TABLET ORAL at 08:03

## 2025-03-17 NOTE — PROGRESS NOTES
Subjective:   Patient ID:  Jovanny Olmedo is a 89 y.o. male who presents for evaluation of Chest Pain      HPI    Jovanny Olmedo returns for follow up. His current medical conditions CAD, HTN, HLP, AAA, CRI, PVD, GERD, CLARISSA on CPAP. He returns today and states he is doing ok.     He has planned procedure with urology tomorrow.  Obtained clearance via e-consult.    Patient has stable angina without any worsening or more frequent chest pain symptoms. Denies any regular physical activity recently. Denies any chest pain with exertion. Has palpitations which seem to trigger chest discomfort and relieved by 1 SL NTG dose.     BP stable today in office.     Declines any surgical intervention for AAA due to concern for age and surgical risk.     Denies any chest pain in office today. No shortness of breath, WELLS or palps. Has pedal edema today in office. NO signs of abnormal bleeding.     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    Arthritis     Back pain     CAD (coronary artery disease)     Cataract     CKD (chronic kidney disease)     GERD (gastroesophageal reflux disease)     Glaucoma     suspect    HTN (hypertension)     Multiple subsegmental pulmonary emboli without acute cor pulmonale 09/09/2021    Prostate cancer     tx'd with radiation    PVD (peripheral vascular disease)     stent in right renal artery and aorta    Sleep apnea     wears cpap    Stable angina pectoris 05/16/2023       Past Surgical History:   Procedure Laterality Date    ABDOMINAL AORTA STENT      CARDIAC CATHETERIZATION      CATARACT EXTRACTION W/  INTRAOCULAR LENS IMPLANT Right 04/29/2017    COLONOSCOPY N/A 9/24/2020    Procedure: COLONOSCOPY;  Surgeon: Shayy Melvin MD;  Location: [facility];  Service: Endoscopy;  Laterality: N/A;    COLONOSCOPY N/A 1/10/2022    Procedure: COLONOSCOPY;  Surgeon:  Vi Lara MD;  Location: [facility];  Service: Endoscopy;  Laterality: N/A;    CORONARY ANGIOPLASTY      CORONARY STENT PLACEMENT      PCIOL Right 03/29/2017    DR. LEDEZMA    PCIOL Left 07/03/2019    DR. LEDEZMA    PROSTATE BIOPSY      RENAL ARTERY STENT         Social History[1]    Family History   Problem Relation Name Age of Onset    Glaucoma Mother      Diabetes Mother      Kidney disease Mother      Colon cancer Father      Cancer Father          colon cancer    Diabetes Sister      Diabetes Brother      Hypertension Brother      Blindness Neg Hx         Wt Readings from Last 3 Encounters:   03/18/25 90.1 kg (198 lb 10.2 oz)   03/14/25 89.3 kg (196 lb 13.9 oz)   02/24/25 87.9 kg (193 lb 14.3 oz)     Temp Readings from Last 3 Encounters:   03/14/25 98.9 °F (37.2 °C) (Tympanic)   02/14/25 97 °F (36.1 °C) (Tympanic)   01/14/25 96.1 °F (35.6 °C) (Tympanic)     BP Readings from Last 3 Encounters:   03/18/25 130/80   03/14/25 128/78   02/24/25 128/82     Pulse Readings from Last 3 Encounters:   03/18/25 85   03/16/25 105   03/14/25 86       Medications Ordered Prior to Encounter[2]    No cardiac monitor results found for the past 12 months    Results for orders placed during the hospital encounter of 11/06/24    Echo    Interpretation Summary    Left Ventricle: The left ventricle is normal in size. Normal wall thickness. There is concentric remodeling. There is normal systolic function. Ejection fraction is approximately 60%. There is normal diastolic function.    Right Ventricle: Normal right ventricular cavity size. Wall thickness is normal. Systolic function is normal.    IVC/SVC: Normal venous pressure at 3 mmHg.    Results for orders placed during the hospital encounter of 01/24/24    Nuclear Stress - Cardiology Interpreted    Interpretation Summary    Normal myocardial perfusion scan. There is no evidence of myocardial ischemia or infarction.    There is a  mild to moderate intensity fixed perfusion  abnormality in the inferior wall of the left ventricle secondary to diaphragm attenuation.    The gated perfusion images showed an ejection fraction of 64% at rest. The gated perfusion images showed an ejection fraction of 66% post stress. Normal ejection fraction is greater than 59%.    There is normal wall motion at rest and post stress.    LV cavity size is normal at rest and normal at stress.    The ECG portion of the study is negative for ischemia.        Results for orders placed or performed in visit on 03/17/25   EKG 12-lead    Collection Time: 03/17/25  8:47 AM   Result Value Ref Range    QRS Duration 110 ms    [facility] QTC Calculation 443 ms    Narrative    Test Reason : R31.0,C61,    Vent. Rate :  77 BPM     Atrial Rate :  77 BPM     P-R Int : 176 ms          QRS Dur : 110 ms      QT Int : 392 ms       P-R-T Axes :  11 -79  76 degrees    QTcB Int : 443 ms    Normal sinus rhythm  Left anterior fascicular block  Minimal voltage criteria for LVH, may be normal variant ( Vamsi product )  Cannot rule out Anterior infarct (cited on or before 21-Dec-2024)  Abnormal ECG  When compared with ECG of 21-Dec-2024 06:56,  Premature atrial complexes are no longer Present  Left anterior fascicular block is now Present  Confirmed by Raheem Darling ([id number]) on 3/17/2025 10:03:38 AM    Referred By:            Confirmed By: Raheem Darling         Review of Systems   Constitutional: Positive for malaise/fatigue.   HENT:  Negative for hearing loss and hoarse voice.    Eyes:  Negative for blurred vision and visual disturbance.   Cardiovascular:  Positive for leg swelling. Negative for chest pain, claudication, dyspnea on exertion, irregular heartbeat, near-syncope, orthopnea, palpitations, paroxysmal nocturnal dyspnea and syncope.   Respiratory:  Negative for cough, hemoptysis, shortness of breath, sleep disturbances due to breathing, snoring and wheezing.    Endocrine: Negative for cold intolerance and heat intolerance.    Hematologic/Lymphatic: Does not bruise/bleed easily.   Skin:  Negative for color change, dry skin and nail changes.   Musculoskeletal:  Positive for arthritis and back pain. Negative for joint pain and myalgias.   Gastrointestinal:  Negative for bloating, abdominal pain, constipation, nausea and vomiting.   Genitourinary:  Negative for dysuria, flank pain, hematuria and hesitancy.   Neurological:  Positive for weakness. Negative for headaches, light-headedness, loss of balance, numbness and paresthesias.   Psychiatric/Behavioral:  Negative for altered mental status.    Allergic/Immunologic: Negative for environmental allergies.         Objective:/80 (BP Location: Left arm, Patient Position: Sitting)   Pulse 85   Ht 6' (1.829 m)   Wt 90.1 kg (198 lb 10.2 oz)   SpO2 98%   BMI 26.94 kg/m²      Physical Exam  Vitals and nursing note reviewed.   Constitutional:       General: He is not in acute distress.     Appearance: Normal appearance. He is well-developed. He is not ill-appearing.   HENT:      Head: Normocephalic and atraumatic.      Nose: Nose normal.      Mouth/Throat:      Mouth: Mucous membranes are moist.   Eyes:      Pupils: Pupils are equal, round, and reactive to light.   Neck:      Thyroid: No thyromegaly.      Vascular: No JVD.      Trachea: No tracheal deviation.   Cardiovascular:      Rate and Rhythm: Normal rate and regular rhythm.      Chest Wall: PMI is not displaced.      Pulses: Intact distal pulses.           Radial pulses are 2+ on the right side and 2+ on the left side.        Dorsalis pedis pulses are 2+ on the right side and 2+ on the left side.      Heart sounds: S1 normal and S2 normal. Heart sounds not distant. No murmur heard.  Pulmonary:      Effort: Pulmonary effort is normal. No respiratory distress.      Breath sounds: Normal breath sounds. No wheezing.   Abdominal:      General: Bowel sounds are normal.      Palpations: Abdomen is soft.   Musculoskeletal:         General:  No swelling. Normal range of motion.      Cervical back: Full passive range of motion without pain, normal range of motion and neck supple.      Right ankle: No swelling.      Left ankle: No swelling.      Comments: +pedal edema   Skin:     General: Skin is warm and dry.      Capillary Refill: Capillary refill takes less than 2 seconds.      Nails: There is no clubbing.   Neurological:      General: No focal deficit present.      Mental Status: He is alert and oriented to person, place, and time.   Psychiatric:         Speech: Speech normal.         Behavior: Behavior normal.         Thought Content: Thought content normal.         Judgment: Judgment normal.         Lab Results   Component Value Date    CHOL 179 02/19/2025    CHOL 219 (H) 02/05/2024    CHOL 178 11/18/2021     Lab Results   Component Value Date    HDL 45 02/19/2025    HDL 46 02/05/2024    HDL 48 11/18/2021     Lab Results   Component Value Date    LDLCALC 109.8 02/19/2025    LDLCALC 145.2 02/05/2024    LDLCALC 119.0 11/18/2021     Lab Results   Component Value Date    TRIG 121 02/19/2025    TRIG 139 02/05/2024    TRIG 55 11/18/2021     Lab Results   Component Value Date    CHOLHDL 25.1 02/19/2025    CHOLHDL 21.0 02/05/2024    CHOLHDL 27.0 11/18/2021       Chemistry        Component Value Date/Time     03/17/2025 0837    K 5.0 03/17/2025 0837     03/17/2025 0837    CO2 24 03/17/2025 0837    BUN 28 (H) 03/17/2025 0837    CREATININE 1.7 (H) 03/17/2025 0837    GLU 92 03/17/2025 0837        Component Value Date/Time    CALCIUM 9.3 03/17/2025 0837    ALKPHOS 79 02/19/2025 0825    AST 21 02/19/2025 0825    ALT 15 02/19/2025 0825    BILITOT 0.7 02/19/2025 0825    ESTGFRAFRICA 44.7 (A) 05/16/2022 1108    EGFRNONAA 38.7 (A) 05/16/2022 1108          Lab Results   Component Value Date    TSH 2.923 12/13/2024     Lab Results   Component Value Date    INR 1.1 01/24/2024    INR 1.1 01/01/2014    INR 1.0 01/16/2009     Lab Results   Component Value Date     WBC 7.07 2025    HGB 13.0 (L) 2025    HCT 42.4 2025    MCV 98 2025    PLT 97 (L) 2025          Assessment:      1. Coronary artery disease, occlusive    2. Gross hematuria    3. History of coronary angioplasty    4. Dyslipidemia    5. Preop cardiovascular exam    6. Infrarenal abdominal aortic aneurysm (AAA) without rupture    7. Stable angina pectoris    8. Chronic renal impairment, stage 3a        Plan:     Established patient of Dr Mon- last seen 2025  E consult completed on 3/17 with clearance obtained    Patient has known chronic stable angina with negative stress test  No worsening angina pattern of chest pain  Continue risk factor modification    Continue current medical therapy  Consider Nitro patch or Imdur if worsening angina pattern in future  Encourage daily walking  Call if any worsening symptoms  RTC in 3months    Nicole May, FNP-C [facility]       [1]   Social History  Tobacco Use    Smoking status: Former     Current packs/day: 0.00     Average packs/day: 0.5 packs/day for 30.0 years (15.0 ttl pk-yrs)     Types: Cigarettes     Start date: 1965     Quit date: 1995     Years since quittin.5    Smokeless tobacco: Former    Tobacco comments:     Hold midnight prior to sx   Substance Use Topics    Alcohol use: Not Currently     Comment: hold now    Drug use: Not Currently     Frequency: 4.0 times per week     Types: Marijuana     Comment: hold midnight prior to sx   [2]   Current Outpatient Medications on File Prior to Visit   Medication Sig Dispense Refill    albuterol (VENTOLIN HFA) 90 mcg/actuation inhaler Inhale 2 puffs into the lungs every 6 (six) hours as needed for Wheezing. Rescue 6.7 g 0    apremilast (OTEZLA ORAL) Take by mouth.      aspirin (ECOTRIN) 81 MG EC tablet Take 81 mg by mouth once daily.      atorvastatin (LIPITOR) 40 MG tablet Take 1 tablet (40 mg total) by mouth every evening. 90 tablet 1    azelastine (ASTELIN) 137 mcg  (0.1 %) nasal spray USE 2 SPRAY(S) IN EACH NOSTRIL TWICE DAILY 30 mL 3    betamethasone dipropionate 0.05 % cream Apply topically once daily.      betamethasone dipropionate 0.05 % cream Apply topically 2 (two) times daily.      carvediloL (COREG) 12.5 MG tablet Take 1 tablet (12.5 mg total) by mouth 2 (two) times daily with meals. 180 tablet 1    ferrous sulfate 325 (65 FE) MG EC tablet Take 325 mg by mouth once daily.      gentamicin (GARAMYCIN) 0.1 % ointment Apply topically 2 (two) times a day. For rash of groin. 30 g 0    hydrOXYzine HCL (ATARAX) 10 MG Tab Take 10 mg by mouth 3 (three) times daily as needed.      ketoconazole (NIZORAL) 2 % cream Apply topically 2 (two) times daily. For rash of groin. 60 g 1    lactulose (CHRONULAC) 20 gram/30 mL Soln Take 30 mLs (20 g total) by mouth daily as needed (constipation). 300 mL 0    levocetirizine (XYZAL) 5 MG tablet Take 5 mg by mouth every evening.      lisinopriL (PRINIVIL,ZESTRIL) 40 MG tablet Take 1 tablet (40 mg total) by mouth once daily. 90 tablet 0    meloxicam (MOBIC) 15 MG tablet Take 15 mg by mouth daily as needed.      miconazole NITRATE 2 % (ZEASORB AF) 2 % top powder Use two to three times daily to prevent rash 85 g 2    NIFEdipine (PROCARDIA-XL) 90 MG (OSM) 24 hr tablet Take 1 tablet (90 mg total) by mouth once daily. 90 tablet 1    nitroGLYCERIN (NITROSTAT) 0.4 MG SL tablet Place 1 tablet (0.4 mg total) under the tongue every 5 (five) minutes as needed for Chest pain. 30 tablet 3    ondansetron (ZOFRAN-ODT) 4 MG TbDL Take 1 tablet (4 mg total) by mouth every 8 (eight) hours as needed (Nausea). 20 tablet 0    oxybutynin (DITROPAN-XL) 5 MG TR24 Take 1 tablet (5 mg total) by mouth once daily. 30 tablet 11    pantoprazole (PROTONIX) 40 MG tablet Take 1 tablet (40 mg total) by mouth 2 (two) times daily. 180 tablet 1    tamsulosin (FLOMAX) 0.4 mg Cap Take 1 capsule (0.4 mg total) by mouth once daily. 30 capsule 11    traMADoL (ULTRAM) 50 mg tablet Take 50  mg by mouth daily as needed.      traZODone (DESYREL) 50 MG tablet Take 1 tablet (50 mg total) by mouth every evening. 30 tablet 1    triamcinolone acetonide 0.025% (KENALOG) 0.025 % cream Apply topically 2 (two) times daily. PRN rash of groin. 80 g 0    finasteride (PROSCAR) 5 mg tablet Take 1 tablet (5 mg total) by mouth once daily. 30 tablet 0     Current Facility-Administered Medications on File Prior to Visit   Medication Dose Route Frequency Provider Last Rate Last Admin    [COMPLETED] ciprofloxacin HCl tablet 500 mg  500 mg Oral 1 time in Clinic/HOD    500 mg at 03/17/25 0817    leuprolide acetate (6 month) injection 45 mg  45 mg Intramuscular Q6 Months         [COMPLETED] LIDOcaine HCl 2% urojet   Mucous Membrane 1 time in Clinic/HOD    11 mL at 03/17/25 0818

## 2025-03-17 NOTE — PROCEDURES
Cystoscopy    Date/Time: 3/17/2025 7:30 AM    Performed by: Blayne Jimenez MD  Authorized by: Blayne Jimenez MD    Consent Done?:  Yes (Written)  Timeout: prior to procedure the correct patient, procedure, and site was verified    Prep: patient was prepped and draped in usual sterile fashion    Anesthesia:  Intraurethral instillation  Local anesthetic:  Lidocaine 2% topical gel  Indications: hematuria    Position:  Supine  Anesthesia:  Intraurethral instillation  Patient sedated?: No    Preparation: Patient was prepped and draped in usual sterile fashion    Scope type:  Flexible cystoscope   patient tolerated the procedure well with no immediate complications  Comments:        After informed consent, and preoperative antibiotic positioned in supine position.  Genitalia prepped and draped sterilely.  A 17 Nepali flexible cystoscope was passed through the urethra into the bladder.  Systematic examination revealed somewhat poor visualization due to hematuria.  I was able to visualize a free-floating clot or tissue.  This is a proximally 1 cm in size.  There was active bleeding from what appeared to be the left trigone.  Bilateral ureteral orifices were not readily seen.  No urothelial lesion was seen.  Scope was retroflexed and mild BPH was noted.  Prostate were noted to be moderately enlarged with an approximate length of 4 cm. The scope was removed.  He tolerated procedure well.    Discussed persistent hematuria with evidence of active bleeding and small clot within the bladder.  Recommend anesthesia for cystoscopy clot evacuation biopsy and fulguration.  Discussed risks and benefits.  Patient will get cleared by Cardiology.  Patient has been off his aspirin.  It is unclear whether he stopped his Mobic.  We will send repeat urine culture and cytology today.  Consider bilateral retrogrades at the time of cystoscopy.

## 2025-03-17 NOTE — CONSULTS
[facility] CARDIOLOGY  Response for E-Consult     Patient Name: Jovanny Olmedo  MRN: [id number]  Primary Care Provider: Miryam Jimenez MD   Requesting Provider: Blayne Jimenez MD  Consults    Recommendation: ok to proceed with surgery he is moderate to high risk.  No absolute contraindication  Can stop antiplatelets as needed.    Additional future steps to consider: none    Total time of Consultation: 10 minute    I did not speak to the requesting provider verbally about this.     *This eConsult is based on the clinical data available to me and is furnished without benefit of a physical examination. The eConsult will need to be interpreted in light of any clinical issues or changes in patient status not available to me at the time of filing this eConsults. Significant changes in patient condition or level of acuity should result in immediate formal consultation and reevaluation. Please alert me if you have further questions.    Thank you for this eConsult referral.     Demetrio Mon MD  [facility] CARDIOLOGY

## 2025-03-17 NOTE — TELEPHONE ENCOUNTER
This pt is scheduled to have a cystourethroscopy with thrombus removal, and biopsy with fulguration on 3/1925 by Dr. Jimenez; pt mentioned that he was having some chest pain and we wanted to know if there was an available provider that could see him this afternoon or tomorrow.  Thanks

## 2025-03-17 NOTE — TELEPHONE ENCOUNTER
This pt is scheduled to have a cystourethroscopy with thrombus removal, and biopsy with fulguration on 3/1925 by Dr. Jimenez; pt mentioned that he was having some chest pain and we wanted to know if there was an available provider that could see him this afternoon or tomorrow.  Thanks         Called pt - appt made 3/19- pt aware

## 2025-03-18 ENCOUNTER — OFFICE VISIT (OUTPATIENT)
Dept: CARDIOLOGY | Facility: CLINIC | Age: 89
End: 2025-03-18
Payer: MEDICARE

## 2025-03-18 ENCOUNTER — TELEPHONE (OUTPATIENT)
Dept: UROLOGY | Facility: CLINIC | Age: 89
End: 2025-03-18
Payer: MEDICARE

## 2025-03-18 ENCOUNTER — TELEPHONE (OUTPATIENT)
Dept: PREADMISSION TESTING | Facility: HOSPITAL | Age: 89
End: 2025-03-18
Payer: MEDICARE

## 2025-03-18 VITALS
SYSTOLIC BLOOD PRESSURE: 130 MMHG | WEIGHT: 198.63 LBS | DIASTOLIC BLOOD PRESSURE: 80 MMHG | HEIGHT: 72 IN | BODY MASS INDEX: 26.9 KG/M2 | HEART RATE: 85 BPM | OXYGEN SATURATION: 98 %

## 2025-03-18 DIAGNOSIS — Z01.810 PREOP CARDIOVASCULAR EXAM: ICD-10-CM

## 2025-03-18 DIAGNOSIS — R31.0 GROSS HEMATURIA: ICD-10-CM

## 2025-03-18 DIAGNOSIS — I20.89 STABLE ANGINA PECTORIS: ICD-10-CM

## 2025-03-18 DIAGNOSIS — I71.43 INFRARENAL ABDOMINAL AORTIC ANEURYSM (AAA) WITHOUT RUPTURE: ICD-10-CM

## 2025-03-18 DIAGNOSIS — Z98.61 HISTORY OF CORONARY ANGIOPLASTY: Chronic | ICD-10-CM

## 2025-03-18 DIAGNOSIS — N18.31 CHRONIC RENAL IMPAIRMENT, STAGE 3A: ICD-10-CM

## 2025-03-18 DIAGNOSIS — I25.10 CORONARY ARTERY DISEASE, OCCLUSIVE: Primary | Chronic | ICD-10-CM

## 2025-03-18 DIAGNOSIS — E78.5 DYSLIPIDEMIA: Chronic | ICD-10-CM

## 2025-03-18 PROCEDURE — 99215 OFFICE O/P EST HI 40 MIN: CPT | Mod: S$PBB,,, | Performed by: NURSE PRACTITIONER

## 2025-03-18 PROCEDURE — 99215 OFFICE O/P EST HI 40 MIN: CPT | Mod: PBBFAC | Performed by: NURSE PRACTITIONER

## 2025-03-18 PROCEDURE — 99999 PR PBB SHADOW E&M-EST. PATIENT-LVL V: CPT | Mod: PBBFAC,,, | Performed by: NURSE PRACTITIONER

## 2025-03-18 NOTE — PRE-PROCEDURE INSTRUCTIONS
Called and spoke with SON about the following:     Please arrive to [facility] at 9:30AM on 3/19/25 for your scheduled procedure.  Address: [address] [place] [address] [place] [address] (2nd Building on left, 1st Floor Lobby)    !!!NO FOOD after midnight! You may have clear liquids up to 3 hrs before your arrival to the Hospital!!!  Clear liquids include Gatorade, water, soda, black coffee or tea (no milk or creamer), and clear juices.  Clear liquids do NOT include anything with pulp or food particles (Chicken broth, ice cream, yogurt, Jello, etc.)    Thank you,  -[facility] Pre [facility]mit [facility].  Mon-Fri 7:30 am - 4 pm [phone]

## 2025-03-18 NOTE — TELEPHONE ENCOUNTER
Called patient and let him know that the pre-admissions department would call between 2:30 PM - 4:00 PM to let him know what time to be there tomorrow. Patient's daughter stated that he is getting a cardiac clearance today at the [facility] location.        ----- Message from [employer] sent at 3/18/2025  8:10 AM CDT -----  .Type: Patient Call BackWho called:patientWhat is the request in detail:  calling concerning time of procedure Can the clinic reply by [facility]?   Would the patient rather a call back or a response via [facility] call back number:  [phone]

## 2025-03-18 NOTE — PRE-PROCEDURE INSTRUCTIONS
Called and spoke with SON about the following:     Please arrive to [facility] at 9:30AM on 3/19/25 for your scheduled procedure.  Address: [address] [place] [address] [place] [address] (2nd Building on left, 1st Floor Lobby)    !!!NO FOOD after midnight! You may have clear liquids up to 3 hrs before your arrival to the Hospital!!!  Clear liquids include Gatorade, water, soda, black coffee or tea (no milk or creamer), and clear juices.  Clear liquids do NOT include anything with pulp or food particles (Chicken broth, ice cream, yogurt, Jello, etc.)    Thank you,  -[facility]  Mon-Fri 7:30 am - 4 pm [phone]

## 2025-03-19 ENCOUNTER — HOSPITAL ENCOUNTER (OUTPATIENT)
Facility: HOSPITAL | Age: 89
Discharge: HOME | End: 2025-03-19
Attending: UROLOGY | Admitting: UROLOGY
Payer: MEDICARE

## 2025-03-19 ENCOUNTER — ANESTHESIA EVENT (OUTPATIENT)
Dept: SURGERY | Facility: HOSPITAL | Age: 89
End: 2025-03-19
Payer: MEDICARE

## 2025-03-19 ENCOUNTER — ANESTHESIA (OUTPATIENT)
Dept: SURGERY | Facility: HOSPITAL | Age: 89
End: 2025-03-19
Payer: MEDICARE

## 2025-03-19 VITALS
HEART RATE: 84 BPM | DIASTOLIC BLOOD PRESSURE: 74 MMHG | TEMPERATURE: 101 F | SYSTOLIC BLOOD PRESSURE: 139 MMHG | WEIGHT: 197.56 LBS | BODY MASS INDEX: 26.76 KG/M2 | OXYGEN SATURATION: 96 % | HEIGHT: 72 IN | RESPIRATION RATE: 17 BRPM

## 2025-03-19 DIAGNOSIS — R31.0 GROSS HEMATURIA: ICD-10-CM

## 2025-03-19 DIAGNOSIS — C61 PROSTATE CANCER: ICD-10-CM

## 2025-03-19 PROCEDURE — 71000015 HC POSTOP RECOV 1ST HR: Performed by: UROLOGY

## 2025-03-19 PROCEDURE — 88305 TISSUE EXAM BY PATHOLOGIST: CPT | Mod: 26,,, | Performed by: STUDENT IN AN ORGANIZED HEALTH CARE EDUCATION/TRAINING PROGRAM

## 2025-03-19 PROCEDURE — 37000008 HC ANESTHESIA 1ST 15 MINUTES: Performed by: UROLOGY

## 2025-03-19 PROCEDURE — C1769 GUIDE WIRE: HCPCS | Performed by: UROLOGY

## 2025-03-19 PROCEDURE — 52224 CYSTOSCOPY AND TREATMENT: CPT | Mod: ,,, | Performed by: UROLOGY

## 2025-03-19 PROCEDURE — 71000033 HC RECOVERY, INTIAL HOUR: Performed by: UROLOGY

## 2025-03-19 PROCEDURE — 36000706: Performed by: UROLOGY

## 2025-03-19 PROCEDURE — 36000707: Performed by: UROLOGY

## 2025-03-19 PROCEDURE — 88305 TISSUE EXAM BY PATHOLOGIST: CPT | Performed by: STUDENT IN AN ORGANIZED HEALTH CARE EDUCATION/TRAINING PROGRAM

## 2025-03-19 PROCEDURE — 63600175 PHARM REV CODE 636 W HCPCS: Performed by: CLINIC/CENTER

## 2025-03-19 PROCEDURE — 37000009 HC ANESTHESIA EA ADD 15 MINS: Performed by: UROLOGY

## 2025-03-19 RX ORDER — ONDANSETRON HYDROCHLORIDE 2 MG/ML
INJECTION, SOLUTION INTRAVENOUS
Status: DISCONTINUED | OUTPATIENT
Start: 2025-03-19 | End: 2025-03-19

## 2025-03-19 RX ORDER — CIPROFLOXACIN 500 MG/1
500 TABLET ORAL EVERY 12 HOURS
Qty: 6 TABLET | Refills: 0 | Status: ON HOLD | OUTPATIENT
Start: 2025-03-19 | End: 2025-03-24 | Stop reason: HOSPADM

## 2025-03-19 RX ORDER — ONDANSETRON HYDROCHLORIDE 2 MG/ML
4 INJECTION, SOLUTION INTRAVENOUS DAILY PRN
Status: DISCONTINUED | OUTPATIENT
Start: 2025-03-19 | End: 2025-03-19 | Stop reason: HOSPADM

## 2025-03-19 RX ORDER — DEXAMETHASONE SODIUM PHOSPHATE 4 MG/ML
INJECTION, SOLUTION INTRA-ARTICULAR; INTRALESIONAL; INTRAMUSCULAR; INTRAVENOUS; SOFT TISSUE
Status: DISCONTINUED | OUTPATIENT
Start: 2025-03-19 | End: 2025-03-19

## 2025-03-19 RX ORDER — PROPOFOL 10 MG/ML
VIAL (ML) INTRAVENOUS
Status: DISCONTINUED | OUTPATIENT
Start: 2025-03-19 | End: 2025-03-19

## 2025-03-19 RX ORDER — FENTANYL CITRATE 50 UG/ML
INJECTION, SOLUTION INTRAMUSCULAR; INTRAVENOUS
Status: DISCONTINUED | OUTPATIENT
Start: 2025-03-19 | End: 2025-03-19

## 2025-03-19 RX ORDER — TRAMADOL HYDROCHLORIDE 50 MG/1
50 TABLET ORAL EVERY 6 HOURS PRN
Qty: 8 TABLET | Refills: 0 | Status: SHIPPED | OUTPATIENT
Start: 2025-03-19

## 2025-03-19 RX ORDER — LIDOCAINE HYDROCHLORIDE 20 MG/ML
INJECTION INTRAVENOUS
Status: DISCONTINUED | OUTPATIENT
Start: 2025-03-19 | End: 2025-03-19

## 2025-03-19 RX ORDER — CEFTRIAXONE 1 G/1
1 INJECTION, POWDER, FOR SOLUTION INTRAMUSCULAR; INTRAVENOUS
Status: DISCONTINUED | OUTPATIENT
Start: 2025-03-19 | End: 2025-03-19 | Stop reason: HOSPADM

## 2025-03-19 RX ADMIN — PROPOFOL 130 MG: 10 INJECTION, EMULSION INTRAVENOUS at 12:03

## 2025-03-19 RX ADMIN — LIDOCAINE HYDROCHLORIDE 100 MG: 20 INJECTION INTRAVENOUS at 12:03

## 2025-03-19 RX ADMIN — ONDANSETRON 4 MG: 2 INJECTION INTRAMUSCULAR; INTRAVENOUS at 01:03

## 2025-03-19 RX ADMIN — SODIUM CHLORIDE, SODIUM LACTATE, POTASSIUM CHLORIDE, AND CALCIUM CHLORIDE: .6; .31; .03; .02 INJECTION, SOLUTION INTRAVENOUS at 12:03

## 2025-03-19 RX ADMIN — PROPOFOL 50 MG: 10 INJECTION, EMULSION INTRAVENOUS at 12:03

## 2025-03-19 RX ADMIN — FENTANYL CITRATE 25 MCG: 50 INJECTION, SOLUTION INTRAMUSCULAR; INTRAVENOUS at 12:03

## 2025-03-19 RX ADMIN — PROPOFOL 70 MG: 10 INJECTION, EMULSION INTRAVENOUS at 12:03

## 2025-03-19 RX ADMIN — DEXAMETHASONE SODIUM PHOSPHATE 4 MG: 4 INJECTION, SOLUTION INTRA-ARTICULAR; INTRALESIONAL; INTRAMUSCULAR; INTRAVENOUS; SOFT TISSUE at 01:03

## 2025-03-19 NOTE — ANESTHESIA POSTPROCEDURE EVALUATION
Anesthesia Post Evaluation    Patient: Jovanny Olmedo    Procedure(s) Performed: Procedure(s) (LRB):  CYSTOURETHROSCOPY, WITH THROMBUS REMOVAL, biopsy and fulguration (N/A)    Final Anesthesia Type: general      Patient location during evaluation: PACU  Patient participation: Yes- Able to Participate  Level of consciousness: awake and alert  Post-procedure vital signs: reviewed and stable  Pain management: adequate  Airway patency: patent  CLARISSA mitigation strategies: Extubation while patient is awake  PONV status at discharge: No PONV  Anesthetic complications: no      Cardiovascular status: hemodynamically stable  Respiratory status: spontaneous ventilation  Hydration status: euvolemic  Follow-up not needed.              Vitals Value Taken Time   /66 03/19/25 14:18   Temp 38.1 °C (100.5 °F) 03/19/25 14:17   Pulse 79 03/19/25 14:19   Resp 22 03/19/25 14:19   SpO2 95 % 03/19/25 14:19   Vitals shown include unfiled device data.      Event Time   Out of Recovery 14:23:00         Pain/Adna Score: Dana Score: 10 (3/19/2025  2:15 PM)

## 2025-03-19 NOTE — ANESTHESIA PROCEDURE NOTES
Intubation    Date/Time: 3/19/2025 12:49 PM    Performed by: Tj Reis CRNA  Authorized by: Ghassan Montes MD    Intubation:     Induction:  Intravenous    Intubated:  Postinduction    Mask Ventilation:  Not attempted    Attempts:  1    Attempted By:  CRNA    Difficult Airway Encountered?: No      Complications:  None    Airway Device:  Supraglottic airway/LMA    Airway Device Size:  4.0    Secured at:  The lips    Placement Verified By:  Capnometry    Complicating Factors:  None    Findings Post-Intubation:  BS equal bilateral and atraumatic/condition of teeth unchanged

## 2025-03-19 NOTE — ANESTHESIA PREPROCEDURE EVALUATION
"                                                                                                             03/19/2025  Jovanny Olmedo is a 89 y.o., male.    Past Surgical History:   Procedure Laterality Date    ABDOMINAL AORTA STENT      CARDIAC CATHETERIZATION      CATARACT EXTRACTION W/  INTRAOCULAR LENS IMPLANT Right 04/29/2017    COLONOSCOPY N/A 9/24/2020    Procedure: COLONOSCOPY;  Surgeon: Shayy Melvin MD;  Location: [facility] ENDO;  Service: Endoscopy;  Laterality: N/A;    COLONOSCOPY N/A 1/10/2022    Procedure: COLONOSCOPY;  Surgeon: Vi Lara MD;  Location: [facility] ENDO;  Service: Endoscopy;  Laterality: N/A;    CORONARY ANGIOPLASTY      CORONARY STENT PLACEMENT      PCIOL Right 03/29/2017    DR. LEDEZMA    PCIOL Left 07/03/2019    DR. LEDEZMA    PROSTATE BIOPSY      RENAL ARTERY STENT         Pre-op Assessment    I have reviewed the Patient Summary Reports.    I have reviewed the NPO Status.   I have reviewed the Medications.     Review of Systems  Anesthesia Hx:  No problems with previous Anesthesia                Social:  Non-Smoker       Hematology/Oncology:  Hematology Normal                  Hematology Comments: Hx pulmonary emboli     Current/Recent Cancer.                Cardiovascular:     Hypertension   CAD   CABG/stent       PVD    ECG has been reviewed. Abdominal aortic aneurysm (AAA) without rupture    Cards:  "Assessment:     1. Coronary artery disease, occlusive   2. Gross hematuria   3. History of coronary angioplasty   4. Dyslipidemia   5. Preop cardiovascular exam   6. Infrarenal abdominal aortic aneurysm (AAA) without rupture   7. Stable angina pectoris   8. Chronic renal impairment, stage 3a         Plan:     Established patient of Dr Mon- last seen Feb 2025  E consult completed on 3/17 with clearance obtained     Patient has known chronic stable angina with negative stress test  No worsening angina pattern of chest pain  Continue risk factor modification     Continue current " "medical therapy  Consider Nitro patch or Imdur if worsening angina pattern in future  Encourage daily walking  Call if any worsening symptoms  RTC in 3months     Nicole May, FNP-C [facility]"                             Pulmonary:        Sleep Apnea, CPAP                Renal/:  Renal/ Normal    Gross hematuria  Prostate cancer             Hepatic/GI:   PUD,  GERD, well controlled                Neurological:  Neurology Normal                                      Endocrine:  Endocrine Normal                Physical Exam  General: Well nourished    Airway:  Mallampati: II   Mouth Opening: Normal  TM Distance: Normal  Neck ROM: Normal ROM    Dental:  Intact        Anesthesia Plan  Type of Anesthesia, risks & benefits discussed:    Anesthesia Type: Gen ETT, Gen Supraglottic Airway  Intra-op Monitoring Plan: Standard ASA Monitors  Post Op Pain Control Plan: multimodal analgesia  Induction:  IV  Airway Plan: , Post-Induction  Informed Consent: Informed consent signed with the Patient and all parties understand the risks and agree with anesthesia plan.  All questions answered.   ASA Score: 3    Ready For Surgery From Anesthesia Perspective.     .      Chemistry        Component Value Date/Time     03/17/2025 0837    K 5.0 03/17/2025 0837     03/17/2025 0837    CO2 24 03/17/2025 0837    BUN 28 (H) 03/17/2025 0837    CREATININE 1.7 (H) 03/17/2025 0837    GLU 92 03/17/2025 0837        Component Value Date/Time    CALCIUM 9.3 03/17/2025 0837    ALKPHOS 79 02/19/2025 0825    AST 21 02/19/2025 0825    ALT 15 02/19/2025 0825    BILITOT 0.7 02/19/2025 0825    ESTGFRAFRICA 44.7 (A) 05/16/2022 1108    EGFRNONAA 38.7 (A) 05/16/2022 1108        Lab Results   Component Value Date    WBC 7.07 03/17/2025    HGB 13.0 (L) 03/17/2025    HCT 42.4 03/17/2025    MCV 98 03/17/2025    PLT 97 (L) 03/17/2025       Normal sinus rhythm   Left anterior fascicular block   Minimal voltage criteria for LVH, may be normal " variant ( [place] product )   Cannot rule out Anterior infarct (cited on or before 21-Dec-2024)   Abnormal ECG   When compared with ECG of 21-Dec-2024 06:56,   Premature atrial complexes are no longer Present   Left anterior fascicular block is now Present   Confirmed by Raheem Darling ([id number]) on 3/17/2025 10:03:38 AM     Echo 11/6/24:    Left Ventricle: The left ventricle is normal in size. Normal wall thickness. There is concentric remodeling. There is normal systolic function. Ejection fraction is approximately 60%. There is normal diastolic function.    Right Ventricle: Normal right ventricular cavity size. Wall thickness is normal. Systolic function is normal.    IVC/SVC: Normal venous pressure at 3 mmHg.     Nuc stress 1/25/24:    Normal myocardial perfusion scan. There is no evidence of myocardial ischemia or infarction.    There is a  mild to moderate intensity fixed perfusion abnormality in the inferior wall of the left ventricle secondary to diaphragm attenuation.    The gated perfusion images showed an ejection fraction of 64% at rest. The gated perfusion images showed an ejection fraction of 66% post stress. Normal ejection fraction is greater than 59%.    There is normal wall motion at rest and post stress.    LV cavity size is normal at rest and normal at stress.    The ECG portion of the study is negative for ischemia.

## 2025-03-19 NOTE — OP NOTE
Date of Procedure: 03/19/2025    Pre-operative Diagnosis: Gross hematuria    Post-operative Diagnosis: radiation cystitis, gross hematuria    Surgeon: Blayne Jimenez M.D.    Specimen: bladder biopsies    Anesthesia: General    Procedures:  Cystoscopy bladder biopsy and fulguration    Procedure in Detail:  After informed consent and preoperative antibiotics, patient brought to the operative suite placed in supine position.  Once anesthesia was achieved patient is repositioned into dorsal lithotomy with all pressure points padded.  Genitalia prepped and draped sterilely.  Twenty-one Vietnamese rigid cystoscope was passed through the urethra into the bladder.  Patient was noted to have a long prostatic urethra with a fairly high bladder neck.  There was some mild contracture of the bladder neck.  Upon entering the bladder there was heavy trabeculations and glomerular I along the lower half of the bladder.  There was no active bleeding.  A few small clots were evacuated.  This point the abnormalities on the urothelium were identified as likely being radiation related.  I took 2 small (0.2 cm) bladder biopsies and fulgurated these areas (total 0.5cm)  The bladder was drained and Uro jet was injected per urethra.  Findings discussed with the patient's family.    Blood Loss: none    Fluids: Per anesthesia.    Drains: none    Complications: None.

## 2025-03-19 NOTE — TRANSFER OF CARE
Anesthesia Transfer of Care Note    Patient: Jovanny Olmedo    Procedure(s) Performed: Procedure(s) (LRB):  CYSTOURETHROSCOPY, WITH THROMBUS REMOVAL, biopsy and fulguration (N/A)    Anesthesia Type: general    Transport from OR: Transported from OR on room air with adequate spontaneous ventilation    Post pain: adequate analgesia    Post assessment: no apparent anesthetic complications and tolerated procedure well    Post vital signs: stable    Level of consciousness: sedated    Nausea/Vomiting: no nausea/vomiting    Complications: none    Transfer of care protocol was followed      Last vitals: Visit Vitals  /79 (BP Location: Right arm, Patient Position: Sitting)   Pulse (!) 113   Temp 36.8 °C (98.2 °F)   Resp 18   Ht 6' (1.829 m)   Wt 89.6 kg (197 lb 8.5 oz)   SpO2 97%   BMI 26.79 kg/m²

## 2025-03-19 NOTE — INTERVAL H&P NOTE
The patient has been examined and the H&P has been reviewed:    I concur with the findings and no changes have occurred since H&P was written.    Procedure risks, benefits and alternative options discussed and understood by patient/family.          Active Hospital Problems    Diagnosis  POA    Gross hematuria [R31.0]  Yes      Resolved Hospital Problems   No resolved problems to display.

## 2025-03-20 LAB — BACTERIA UR CULT: ABNORMAL

## 2025-03-21 ENCOUNTER — OFFICE VISIT (OUTPATIENT)
Dept: INTERNAL MEDICINE | Facility: CLINIC | Age: 89
End: 2025-03-21
Payer: MEDICARE

## 2025-03-21 VITALS
TEMPERATURE: 96 F | DIASTOLIC BLOOD PRESSURE: 68 MMHG | HEIGHT: 72 IN | BODY MASS INDEX: 27.83 KG/M2 | WEIGHT: 205.5 LBS | OXYGEN SATURATION: 97 % | SYSTOLIC BLOOD PRESSURE: 100 MMHG | HEART RATE: 107 BPM | RESPIRATION RATE: 18 BRPM

## 2025-03-21 DIAGNOSIS — L40.9 PSORIASIS: ICD-10-CM

## 2025-03-21 DIAGNOSIS — I10 ESSENTIAL HYPERTENSION: Primary | Chronic | ICD-10-CM

## 2025-03-21 DIAGNOSIS — Z79.899 DRUG-INDUCED IMMUNODEFICIENCY: ICD-10-CM

## 2025-03-21 DIAGNOSIS — M51.362 DEGENERATION OF INTERVERTEBRAL DISC OF LUMBAR REGION WITH DISCOGENIC BACK PAIN AND LOWER EXTREMITY PAIN: ICD-10-CM

## 2025-03-21 DIAGNOSIS — Z98.61 HISTORY OF CORONARY ANGIOPLASTY: Chronic | ICD-10-CM

## 2025-03-21 DIAGNOSIS — D69.6 THROMBOCYTOPENIA: ICD-10-CM

## 2025-03-21 DIAGNOSIS — E78.5 DYSLIPIDEMIA: Chronic | ICD-10-CM

## 2025-03-21 DIAGNOSIS — D84.821 DRUG-INDUCED IMMUNODEFICIENCY: ICD-10-CM

## 2025-03-21 DIAGNOSIS — I20.89 STABLE ANGINA PECTORIS: ICD-10-CM

## 2025-03-21 DIAGNOSIS — I71.23 ANEURYSM OF DESCENDING THORACIC AORTA WITHOUT RUPTURE: ICD-10-CM

## 2025-03-21 DIAGNOSIS — N25.81 SECONDARY HYPERPARATHYROIDISM: ICD-10-CM

## 2025-03-21 DIAGNOSIS — D63.1 ANEMIA IN STAGE 3B CHRONIC KIDNEY DISEASE: ICD-10-CM

## 2025-03-21 DIAGNOSIS — C61 PROSTATE CANCER: ICD-10-CM

## 2025-03-21 DIAGNOSIS — Z86.711 HISTORY OF PULMONARY EMBOLUS (PE): ICD-10-CM

## 2025-03-21 DIAGNOSIS — Q61.3 POLYCYSTIC KIDNEY DISEASE: ICD-10-CM

## 2025-03-21 DIAGNOSIS — I71.43 INFRARENAL ABDOMINAL AORTIC ANEURYSM (AAA) WITHOUT RUPTURE: ICD-10-CM

## 2025-03-21 DIAGNOSIS — Z86.0100 HISTORY OF COLON POLYPS: ICD-10-CM

## 2025-03-21 DIAGNOSIS — I70.0 AORTIC ATHEROSCLEROSIS: ICD-10-CM

## 2025-03-21 DIAGNOSIS — K21.9 GASTROESOPHAGEAL REFLUX DISEASE, UNSPECIFIED WHETHER ESOPHAGITIS PRESENT: ICD-10-CM

## 2025-03-21 DIAGNOSIS — I25.10 CORONARY ARTERY DISEASE, OCCLUSIVE: Chronic | ICD-10-CM

## 2025-03-21 DIAGNOSIS — N18.32 ANEMIA IN STAGE 3B CHRONIC KIDNEY DISEASE: ICD-10-CM

## 2025-03-21 DIAGNOSIS — G47.33 OSA ON CPAP: ICD-10-CM

## 2025-03-21 PROBLEM — R63.4 WEIGHT LOSS, UNINTENTIONAL: Status: RESOLVED | Noted: 2024-12-13 | Resolved: 2025-03-21

## 2025-03-21 PROBLEM — R94.31 ABNORMAL ECG: Status: RESOLVED | Noted: 2023-08-06 | Resolved: 2025-03-21

## 2025-03-21 PROBLEM — Z01.810 PREOP CARDIOVASCULAR EXAM: Status: RESOLVED | Noted: 2020-09-24 | Resolved: 2025-03-21

## 2025-03-21 PROBLEM — R21 RASH: Status: RESOLVED | Noted: 2024-11-06 | Resolved: 2025-03-21

## 2025-03-21 PROBLEM — R79.89 TROPONIN LEVEL ELEVATED: Status: RESOLVED | Noted: 2024-01-24 | Resolved: 2025-03-21

## 2025-03-21 PROBLEM — E66.811 OBESITY (BMI 30.0-34.9): Status: RESOLVED | Noted: 2022-05-16 | Resolved: 2025-03-21

## 2025-03-21 PROBLEM — R63.0 LOSS OF APPETITE: Status: RESOLVED | Noted: 2024-12-13 | Resolved: 2025-03-21

## 2025-03-21 PROBLEM — R07.2 PRECORDIAL PAIN: Status: RESOLVED | Noted: 2024-11-06 | Resolved: 2025-03-21

## 2025-03-21 PROCEDURE — 99999 PR PBB SHADOW E&M-EST. PATIENT-LVL V: CPT | Mod: PBBFAC,,, | Performed by: FAMILY MEDICINE

## 2025-03-21 PROCEDURE — 99215 OFFICE O/P EST HI 40 MIN: CPT | Mod: PBBFAC | Performed by: FAMILY MEDICINE

## 2025-03-21 NOTE — PROGRESS NOTES
Subjective:       Patient ID: Jovanny Olmedo is a 89 y.o. male presents with Problem List[1]     Chief Complaint: Annual Exam and Chest Pain    89-year-old  male patient accompanied by his son with Patient Active Problem List:     Essential hypertension     GERD (gastroesophageal reflux disease)     Polycystic kidney disease     Coronary artery disease, occlusive     Anemia in stage 3b chronic kidney disease     Refractive error     DDD (degenerative disc disease), lumbar     Open angle with borderline findings, low risk, bilateral     History of coronary angioplasty     Prostate cancer     Secondary hyperparathyroidism     Pseudophakia     Intermediate stage nonexudative age-related macular degeneration of both eyes     Aortic atherosclerosis     CLARISSA on CPAP     Duodenal ulcer     History of colon polyps     Dyslipidemia     Abdominal aortic aneurysm (AAA) without rupture     History of pulmonary embolus (PE)     Thrombocytopenia     Stable angina pectoris     Coronary artery disease of native artery of native heart with stable angina pectoris     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  Denies of any fever with chills and has been drinking adequate fluids.  Patient has appointment coming up with Urology, encouraged to discuss about couple of medications to make sure that he needs all these medications.   Patient has been having chest tightness with increased heart rate off and on lately, for which cardiologist has placed on nitroglycerin tablet, which patient has been taking it, but has been having frequent episodes    Patient's secondary to psoriasis has  been followed by Dr. Gill, but reports that the medication has not worked and has not been using it regularly, noticed worsening of the rash to the lower back.         Review of Systems   Constitutional:  Negative for appetite change and fatigue.   Eyes:  Negative for visual disturbance.   Respiratory:  Positive for chest tightness. Negative for shortness of breath.    Cardiovascular:  Positive for palpitations. Negative for chest pain and leg swelling.   Gastrointestinal:  Negative for abdominal pain, nausea and vomiting.   Musculoskeletal:  Negative for myalgias.   Skin:  Positive for rash.   Neurological:  Negative for headaches.   Psychiatric/Behavioral:  Negative for sleep disturbance.          /68 (BP Location: Left arm, Patient Position: Sitting)   Pulse 107   Temp 96.4 °F (35.8 °C) (Tympanic)   Resp 18   Ht 6' (1.829 m)   Wt 93.2 kg (205 lb 7.5 oz)   SpO2 97%   BMI 27.87 kg/m²   Objective:      Physical Exam  Constitutional:       Appearance: He is well-developed.   HENT:      Head: Normocephalic and atraumatic.   Cardiovascular:      Rate and Rhythm: Normal rate and regular rhythm.      Heart sounds: Normal heart sounds. No murmur heard.  Pulmonary:      Effort: Pulmonary effort is normal. No respiratory distress.      Breath sounds: Normal breath sounds. No wheezing.   Chest:      Chest wall: No tenderness.   Abdominal:      General: Bowel sounds are normal.      Palpations: Abdomen is soft.      Tenderness: There is no abdominal tenderness.   Skin:     General: Skin is warm and dry.      Findings: Erythema and rash present.      Comments: Positive for extensive psoriasis to the lower back   Neurological:      Mental Status: He is alert and oriented to person, place, and time.   Psychiatric:         Mood and Affect: Mood normal.         Lab Visit on 03/17/2025   Component Date Value Ref Range Status    Sodium 03/17/2025 145  136 - 145 mmol/L Final    Potassium 03/17/2025 5.0  3.5 - 5.1  mmol/L Final    Chloride 03/17/2025 108  95 - 110 mmol/L Final    CO2 03/17/2025 24  23 - 29 mmol/L Final    Glucose 03/17/2025 92  70 - 110 mg/dL Final    BUN 03/17/2025 28 (H)  8 - 23 mg/dL Final    Creatinine 03/17/2025 1.7 (H)  0.5 - 1.4 mg/dL Final    Calcium 03/17/2025 9.3  8.7 - 10.5 mg/dL Final    Anion Gap 03/17/2025 13  8 - 16 mmol/L Final    eGFR 03/17/2025 38.3 (A)  >60 mL/min/1.73 m^2 Final    WBC 03/17/2025 7.07  3.90 - 12.70 K/uL Final    RBC 03/17/2025 4.33 (L)  4.60 - 6.20 M/uL Final    Hemoglobin 03/17/2025 13.0 (L)  14.0 - 18.0 g/dL Final    Hematocrit 03/17/2025 42.4  40.0 - 54.0 % Final    MCV 03/17/2025 98  82 - 98 fL Final    MCH 03/17/2025 30.0  27.0 - 31.0 pg Final    MCHC 03/17/2025 30.7 (L)  32.0 - 36.0 g/dL Final    RDW 03/17/2025 13.5  11.5 - 14.5 % Final    Platelets 03/17/2025 97 (L)  150 - 450 K/uL Final    MPV 03/17/2025 12.7  9.2 - 12.9 fL Final    Immature Granulocytes 03/17/2025 0.3  0.0 - 0.5 % Final    Gran # (ANC) 03/17/2025 4.7  1.8 - 7.7 K/uL Final    Immature Grans (Abs) 03/17/2025 0.02  0.00 - 0.04 K/uL Final    Comment: Mild elevation in immature granulocytes is non specific and   can be seen in a variety of conditions including stress response,   acute inflammation, trauma and pregnancy. Correlation with other   laboratory and clinical findings is essential.      Lymph # 03/17/2025 1.5  1.0 - 4.8 K/uL Final    Mono # 03/17/2025 0.6  0.3 - 1.0 K/uL Final    Eos # 03/17/2025 0.3  0.0 - 0.5 K/uL Final    Baso # 03/17/2025 0.03  0.00 - 0.20 K/uL Final    nRBC 03/17/2025 0  0 /100 WBC Final    Gran % 03/17/2025 65.9  38.0 - 73.0 % Final    Lymph % 03/17/2025 20.7  18.0 - 48.0 % Final    Mono % 03/17/2025 8.9  4.0 - 15.0 % Final    Eosinophil % 03/17/2025 3.8  0.0 - 8.0 % Final    Basophil % 03/17/2025 0.4  0.0 - 1.9 % Final    Differential Method 03/17/2025 Automated   Final   Procedure visit on 03/17/2025   Component Date Value Ref Range Status    Urine Culture, Routine  03/17/2025  (A)   Final                    Value:ESCHERICHIA COLI  10,000 - 49,999 cfu/ml      QRS Duration 03/17/2025 110  ms Final    [facility] QTC Calculation 03/17/2025 443  ms Final       Assessment/Plan:   1. Essential hypertension  -     Hypertension Digital Medicine (HDMP) Enrollment Order  Blood pressure is stable today currently taking carvedilol 12.5 mg twice daily lisinopril 40 mg daily and Procardia 90 mg daily  Encouraged to drink adequate fluids and will enroll in hypertension digital program for monitoring  Secondary to fluctuations in heart rate patient was advised to discuss further with Cardiology    2. Dyslipidemia  Currently taking Lipitor 40 mg daily    3. Anemia in stage 3b chronic kidney disease  7. Secondary hyperparathyroidism  9. Polycystic kidney disease  Stable, to be followed by Nephrology    4. Coronary artery disease, occlusive  5. History of coronary angioplasty  Encouraged to discuss further with Cardiology    6. Prostate cancer  Overview:  Dr stokes  Currently being followed by urology, finishing up Cipro after the procedure done recently  Patient not sure if he is taking Proscar but reports that he is on Flomax and also advised to check to see if he needs oxybutynin as well in upcoming appointment with Urology    8. Gastroesophageal reflux disease, unspecified whether esophagitis present  Stable on pantoprazole 40 mg daily      10. Degeneration of intervertebral disc of lumbar region with discogenic back pain and lower extremity pain  Overview:  Xray in 2011 showed narrowing  Followed by pain management currently on tramadol    11. Aortic atherosclerosis  Stable on aspirin and statins    12. CLARISSA on CPAP  Overview:  Compliant and benefits from use  On Auto CPAP 5-20 cm  Nasal wisp mask  HME: APRIA   Stable    13. History of colon polyps    14. History of pulmonary embolus (PE)  Overview:  Last Assessment & Plan:   Formatting of this note might be different from the original.  History &  Physical   Patient was on heparin but after discussions with vascular surgery transitioned patient to [facility]  Discharge Summary  86 y/o M with PMH CAD, CKD (baseline Cr 1.7-1.8), CLARISSA on CPAP, htn, and known AAA who presented to [facility] with dyspnea and chest pain (described as dull in the left upper chest with radiation down the arm) that started the night prior and admits to progressive dyspnea on exertion that had been ongoing for the month prior. Work up revealed segmental PEs and descending thoracic aortic dissection, prompting transfer to [facility] for further care.  Patient was admitted to the ICU on a heparin drip with CCMS and vascular consult.  He otherwise remained hemodynamically stable.  It was found that his aneurysm/dissection was actually chronic and that he is followed outpatient by Dr. Berry.  Vascular surgery did not recommend intervention and echocardiogram read: EF 55 to 65% with mildly dilated right ventricle. Patient was considered stable for transfer out of ICU on 9/10 and was able to be weaned off supplemental O2 and transitioned to eliquis for discharge.  Follow-up  Currently taking aspirin      15. Infrarenal abdominal aortic aneurysm (AAA) without rupture  17. 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 Psoriasis  19. Drug-induced immunodeficiency  -     Ambulatory referral/consult to Dermatology; Future; Expected date: 03/28/2025  Patient not sure which medications he has tried and which 1 he could not get it but wants to establish care with dermatology here  Secondary to worsening rash will refer to Dermatology    18. Thrombocytopenia  Stable and asymptomatic    20. Stable angina pectoris  Currently taking nitroglycerin tablet as needed for chest pain  Reviewed recent labs which looks stable  But as chest pain continues to persist off and on lately advised to discuss further with Cardiology    Visit today included  increased complexity associated with the care of the episodic problem  addressed and managing the longitudinal care of the patient due to the serious and/or complex managed problem(s) .                 [1]   Patient Active Problem List  Diagnosis    Essential hypertension    GERD (gastroesophageal reflux disease)    Polycystic kidney disease    Coronary artery disease, occlusive    Anemia in stage 3b chronic kidney disease    Refractive error    DDD (degenerative disc disease), lumbar    Open angle with borderline findings, low risk, bilateral    History of coronary angioplasty    Prostate cancer    Secondary hyperparathyroidism    Pseudophakia    Intermediate stage nonexudative age-related macular degeneration of both eyes    Aortic atherosclerosis    CLARISSA on CPAP    Duodenal ulcer    History of colon polyps    Dyslipidemia    Abdominal aortic aneurysm (AAA) without rupture    History of pulmonary embolus (PE)    Thrombocytopenia    Stable angina pectoris    Coronary artery disease of native artery of native heart with stable angina pectoris    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

## 2025-03-23 ENCOUNTER — HOSPITAL ENCOUNTER (INPATIENT)
Facility: HOSPITAL | Age: 89
LOS: 1 days | Discharge: HOME | DRG: 301 | End: 2025-03-24
Attending: EMERGENCY MEDICINE | Admitting: HOSPITALIST
Payer: MEDICARE

## 2025-03-23 DIAGNOSIS — R07.9 CHEST PAIN: ICD-10-CM

## 2025-03-23 DIAGNOSIS — I71.40 ABDOMINAL AORTIC ANEURYSM (AAA) WITHOUT RUPTURE, UNSPECIFIED PART: ICD-10-CM

## 2025-03-23 DIAGNOSIS — I71.20 THORACIC AORTIC ANEURYSM WITHOUT RUPTURE, UNSPECIFIED PART: Primary | ICD-10-CM

## 2025-03-23 LAB
ABSOLUTE EOSINOPHIL (OHS): 0.16 K/UL
ABSOLUTE MONOCYTE (OHS): 0.45 K/UL (ref 0.3–1)
ABSOLUTE NEUTROPHIL COUNT (OHS): 4.41 K/UL (ref 1.8–7.7)
ALBUMIN SERPL BCP-MCNC: 3.7 G/DL (ref 3.5–5.2)
ALP SERPL-CCNC: 71 UNIT/L (ref 40–150)
ALT SERPL W/O P-5'-P-CCNC: 21 UNIT/L (ref 10–44)
ANION GAP (OHS): 10 MMOL/L (ref 8–16)
APTT PPP: 26 SECONDS (ref 21–32)
AST SERPL-CCNC: 20 UNIT/L (ref 11–45)
BASOPHILS # BLD AUTO: 0.01 K/UL
BASOPHILS NFR BLD AUTO: 0.2 %
BILIRUB SERPL-MCNC: 0.6 MG/DL (ref 0.1–1)
BNP SERPL-MCNC: 82 PG/ML (ref 0–99)
BUN SERPL-MCNC: 23 MG/DL (ref 8–23)
CALCIUM SERPL-MCNC: 9 MG/DL (ref 8.7–10.5)
CHLORIDE SERPL-SCNC: 106 MMOL/L (ref 95–110)
CO2 SERPL-SCNC: 25 MMOL/L (ref 23–29)
CREAT SERPL-MCNC: 1.6 MG/DL (ref 0.5–1.4)
ERYTHROCYTE [DISTWIDTH] IN BLOOD BY AUTOMATED COUNT: 12.6 % (ref 11.5–14.5)
GFR SERPLBLD CREATININE-BSD FMLA CKD-EPI: 41 ML/MIN/1.73/M2
GLUCOSE SERPL-MCNC: 126 MG/DL (ref 70–110)
HCT VFR BLD AUTO: 39.4 % (ref 40–54)
HCV AB SERPL QL IA: NEGATIVE
HGB BLD-MCNC: 12.9 GM/DL (ref 14–18)
HIV 1+2 AB+HIV1 P24 AG SERPL QL IA: NEGATIVE
IMM GRANULOCYTES # BLD AUTO: 0.03 K/UL (ref 0–0.04)
IMM GRANULOCYTES NFR BLD AUTO: 0.5 % (ref 0–0.5)
INR PPP: 1.1 (ref 0.8–1.2)
LYMPHOCYTES # BLD AUTO: 1.04 K/UL (ref 1–4.8)
MAGNESIUM SERPL-MCNC: 1.3 MG/DL (ref 1.6–2.6)
MCH RBC QN AUTO: 30.9 PG (ref 27–50)
MCHC RBC AUTO-ENTMCNC: 32.7 G/DL (ref 32–36)
MCV RBC AUTO: 94 FL (ref 82–98)
NUCLEATED RBC (/100WBC) (OHS): 0 /100 WBC
PLATELET # BLD AUTO: 132 K/UL (ref 150–450)
PMV BLD AUTO: 10.7 FL (ref 9.2–12.9)
POTASSIUM SERPL-SCNC: 4 MMOL/L (ref 3.5–5.1)
PROT SERPL-MCNC: 7.4 GM/DL (ref 6–8.4)
PROTHROMBIN TIME: 12.5 SECONDS (ref 9–12.5)
RBC # BLD AUTO: 4.18 M/UL (ref 4.6–6.2)
RELATIVE EOSINOPHIL (OHS): 2.6 %
RELATIVE LYMPHOCYTE (OHS): 17 % (ref 18–48)
RELATIVE MONOCYTE (OHS): 7.4 % (ref 4–15)
RELATIVE NEUTROPHIL (OHS): 72.3 % (ref 38–73)
SODIUM SERPL-SCNC: 141 MMOL/L (ref 136–145)
TROPONIN I SERPL DL<=0.01 NG/ML-MCNC: 0.01 NG/ML
WBC # BLD AUTO: 6.1 K/UL (ref 3.9–12.7)

## 2025-03-23 PROCEDURE — 83880 ASSAY OF NATRIURETIC PEPTIDE: CPT | Performed by: EMERGENCY MEDICINE

## 2025-03-23 PROCEDURE — 83735 ASSAY OF MAGNESIUM: CPT | Performed by: EMERGENCY MEDICINE

## 2025-03-23 PROCEDURE — 11000001 HC ACUTE MED/SURG PRIVATE ROOM

## 2025-03-23 PROCEDURE — 80053 COMPREHEN METABOLIC PANEL: CPT | Performed by: EMERGENCY MEDICINE

## 2025-03-23 PROCEDURE — 87389 HIV-1 AG W/HIV-1&-2 AB AG IA: CPT | Performed by: EMERGENCY MEDICINE

## 2025-03-23 PROCEDURE — 93010 ELECTROCARDIOGRAM REPORT: CPT | Mod: ,,, | Performed by: INTERNAL MEDICINE

## 2025-03-23 PROCEDURE — 99285 EMERGENCY DEPT VISIT HI MDM: CPT | Mod: 25

## 2025-03-23 PROCEDURE — 85730 THROMBOPLASTIN TIME PARTIAL: CPT | Performed by: EMERGENCY MEDICINE

## 2025-03-23 PROCEDURE — 84484 ASSAY OF TROPONIN QUANT: CPT | Performed by: EMERGENCY MEDICINE

## 2025-03-23 PROCEDURE — 85610 PROTHROMBIN TIME: CPT | Performed by: EMERGENCY MEDICINE

## 2025-03-23 PROCEDURE — 85025 COMPLETE CBC W/AUTO DIFF WBC: CPT | Performed by: EMERGENCY MEDICINE

## 2025-03-23 PROCEDURE — 86803 HEPATITIS C AB TEST: CPT | Performed by: EMERGENCY MEDICINE

## 2025-03-23 PROCEDURE — 93005 ELECTROCARDIOGRAM TRACING: CPT

## 2025-03-23 RX ORDER — TALC
6 POWDER (GRAM) TOPICAL NIGHTLY PRN
Status: DISCONTINUED | OUTPATIENT
Start: 2025-03-24 | End: 2025-03-24 | Stop reason: HOSPADM

## 2025-03-23 RX ORDER — ACETAMINOPHEN 650 MG/1
650 SUPPOSITORY RECTAL EVERY 6 HOURS PRN
Status: DISCONTINUED | OUTPATIENT
Start: 2025-03-24 | End: 2025-03-24 | Stop reason: HOSPADM

## 2025-03-23 RX ORDER — ACETAMINOPHEN 325 MG/1
650 TABLET ORAL EVERY 8 HOURS PRN
Status: DISCONTINUED | OUTPATIENT
Start: 2025-03-24 | End: 2025-03-24 | Stop reason: HOSPADM

## 2025-03-23 RX ORDER — HYDROCODONE BITARTRATE AND ACETAMINOPHEN 5; 325 MG/1; MG/1
1 TABLET ORAL EVERY 6 HOURS PRN
Refills: 0 | Status: DISCONTINUED | OUTPATIENT
Start: 2025-03-24 | End: 2025-03-24 | Stop reason: HOSPADM

## 2025-03-23 RX ORDER — AMOXICILLIN 250 MG
1 CAPSULE ORAL 2 TIMES DAILY PRN
Status: DISCONTINUED | OUTPATIENT
Start: 2025-03-24 | End: 2025-03-24 | Stop reason: HOSPADM

## 2025-03-23 RX ORDER — SODIUM CHLORIDE 0.9 % (FLUSH) 0.9 %
10 SYRINGE (ML) INJECTION EVERY 12 HOURS PRN
Status: DISCONTINUED | OUTPATIENT
Start: 2025-03-24 | End: 2025-03-24 | Stop reason: HOSPADM

## 2025-03-23 RX ORDER — GLUCAGON 1 MG
1 KIT INJECTION
Status: DISCONTINUED | OUTPATIENT
Start: 2025-03-24 | End: 2025-03-24 | Stop reason: HOSPADM

## 2025-03-23 RX ORDER — MORPHINE SULFATE 4 MG/ML
2 INJECTION, SOLUTION INTRAMUSCULAR; INTRAVENOUS EVERY 4 HOURS PRN
Refills: 0 | Status: DISCONTINUED | OUTPATIENT
Start: 2025-03-24 | End: 2025-03-24 | Stop reason: HOSPADM

## 2025-03-23 RX ORDER — IBUPROFEN 200 MG
16 TABLET ORAL
Status: DISCONTINUED | OUTPATIENT
Start: 2025-03-24 | End: 2025-03-24 | Stop reason: HOSPADM

## 2025-03-23 RX ORDER — IPRATROPIUM BROMIDE AND ALBUTEROL SULFATE 2.5; .5 MG/3ML; MG/3ML
3 SOLUTION RESPIRATORY (INHALATION) EVERY 6 HOURS PRN
Status: DISCONTINUED | OUTPATIENT
Start: 2025-03-24 | End: 2025-03-24 | Stop reason: HOSPADM

## 2025-03-23 RX ORDER — ALUMINUM HYDROXIDE, MAGNESIUM HYDROXIDE, AND SIMETHICONE 1200; 120; 1200 MG/30ML; MG/30ML; MG/30ML
30 SUSPENSION ORAL 4 TIMES DAILY PRN
Status: DISCONTINUED | OUTPATIENT
Start: 2025-03-24 | End: 2025-03-24 | Stop reason: HOSPADM

## 2025-03-23 RX ORDER — IBUPROFEN 200 MG
24 TABLET ORAL
Status: DISCONTINUED | OUTPATIENT
Start: 2025-03-24 | End: 2025-03-24 | Stop reason: HOSPADM

## 2025-03-23 RX ORDER — PROMETHAZINE HYDROCHLORIDE 25 MG/1
25 TABLET ORAL EVERY 6 HOURS PRN
Status: DISCONTINUED | OUTPATIENT
Start: 2025-03-24 | End: 2025-03-24 | Stop reason: HOSPADM

## 2025-03-23 RX ORDER — ONDANSETRON HYDROCHLORIDE 2 MG/ML
4 INJECTION, SOLUTION INTRAVENOUS EVERY 8 HOURS PRN
Status: DISCONTINUED | OUTPATIENT
Start: 2025-03-24 | End: 2025-03-24 | Stop reason: HOSPADM

## 2025-03-23 RX ORDER — NALOXONE HCL 0.4 MG/ML
0.02 VIAL (ML) INJECTION
Status: DISCONTINUED | OUTPATIENT
Start: 2025-03-24 | End: 2025-03-24 | Stop reason: HOSPADM

## 2025-03-23 NOTE — Clinical Note
Diagnosis: Chest pain [585942]   Reason for IP Medical Treatment  (Clinical interventions that can only be accomplished in the IP setting? ) :: chest pain, enlarging thoracic aortic aneurysm   Plans for Post-Acute care--if anticipated (pick the single best option):: A. No post acute care anticipated at this time

## 2025-03-24 VITALS
HEART RATE: 107 BPM | OXYGEN SATURATION: 98 % | BODY MASS INDEX: 24.42 KG/M2 | RESPIRATION RATE: 18 BRPM | HEIGHT: 72 IN | DIASTOLIC BLOOD PRESSURE: 86 MMHG | SYSTOLIC BLOOD PRESSURE: 120 MMHG | WEIGHT: 180.31 LBS | TEMPERATURE: 98 F

## 2025-03-24 PROBLEM — N18.32 STAGE 3B CHRONIC KIDNEY DISEASE: Status: ACTIVE | Noted: 2025-03-24

## 2025-03-24 PROBLEM — N18.32 STAGE 3B CHRONIC KIDNEY DISEASE: Chronic | Status: ACTIVE | Noted: 2025-03-24

## 2025-03-24 LAB
ABSOLUTE EOSINOPHIL (OHS): 0.23 K/UL
ABSOLUTE MONOCYTE (OHS): 0.55 K/UL (ref 0.3–1)
ABSOLUTE NEUTROPHIL COUNT (OHS): 3.59 K/UL (ref 1.8–7.7)
ALBUMIN SERPL BCP-MCNC: 3.1 G/DL (ref 3.5–5.2)
ALP SERPL-CCNC: 62 UNIT/L (ref 40–150)
ALT SERPL W/O P-5'-P-CCNC: 17 UNIT/L (ref 10–44)
ANION GAP (OHS): 10 MMOL/L (ref 8–16)
AST SERPL-CCNC: 21 UNIT/L (ref 11–45)
BASOPHILS # BLD AUTO: 0.02 K/UL
BASOPHILS NFR BLD AUTO: 0.4 %
BILIRUB SERPL-MCNC: 0.5 MG/DL (ref 0.1–1)
BUN SERPL-MCNC: 19 MG/DL (ref 8–23)
CALCIUM SERPL-MCNC: 8.6 MG/DL (ref 8.7–10.5)
CHLORIDE SERPL-SCNC: 108 MMOL/L (ref 95–110)
CO2 SERPL-SCNC: 20 MMOL/L (ref 23–29)
CREAT SERPL-MCNC: 1.3 MG/DL (ref 0.5–1.4)
ERYTHROCYTE [DISTWIDTH] IN BLOOD BY AUTOMATED COUNT: 12.5 % (ref 11.5–14.5)
GFR SERPLBLD CREATININE-BSD FMLA CKD-EPI: 53 ML/MIN/1.73/M2
GLUCOSE SERPL-MCNC: 90 MG/DL (ref 70–110)
HCT VFR BLD AUTO: 35.4 % (ref 40–54)
HGB BLD-MCNC: 11.7 GM/DL (ref 14–18)
IMM GRANULOCYTES # BLD AUTO: 0.02 K/UL (ref 0–0.04)
IMM GRANULOCYTES NFR BLD AUTO: 0.4 % (ref 0–0.5)
LYMPHOCYTES # BLD AUTO: 1.19 K/UL (ref 1–4.8)
MCH RBC QN AUTO: 30.7 PG (ref 27–50)
MCHC RBC AUTO-ENTMCNC: 33.1 G/DL (ref 32–36)
MCV RBC AUTO: 93 FL (ref 82–98)
NUCLEATED RBC (/100WBC) (OHS): 0 /100 WBC
OHS QRS DURATION: 116 MS
OHS QTC CALCULATION: 482 MS
PLATELET # BLD AUTO: 114 K/UL (ref 150–450)
PMV BLD AUTO: 10.8 FL (ref 9.2–12.9)
POTASSIUM SERPL-SCNC: 4.2 MMOL/L (ref 3.5–5.1)
PROT SERPL-MCNC: 6.5 GM/DL (ref 6–8.4)
RBC # BLD AUTO: 3.81 M/UL (ref 4.6–6.2)
RELATIVE EOSINOPHIL (OHS): 4.1 %
RELATIVE LYMPHOCYTE (OHS): 21.3 % (ref 18–48)
RELATIVE MONOCYTE (OHS): 9.8 % (ref 4–15)
RELATIVE NEUTROPHIL (OHS): 64 % (ref 38–73)
SODIUM SERPL-SCNC: 138 MMOL/L (ref 136–145)
TROPONIN I SERPL DL<=0.01 NG/ML-MCNC: 0.01 NG/ML
WBC # BLD AUTO: 5.6 K/UL (ref 3.9–12.7)

## 2025-03-24 PROCEDURE — 82040 ASSAY OF SERUM ALBUMIN: CPT | Performed by: HOSPITALIST

## 2025-03-24 PROCEDURE — 85025 COMPLETE CBC W/AUTO DIFF WBC: CPT | Performed by: HOSPITALIST

## 2025-03-24 PROCEDURE — 99222 1ST HOSP IP/OBS MODERATE 55: CPT | Mod: ,,,

## 2025-03-24 PROCEDURE — 99232 SBSQ HOSP IP/OBS MODERATE 35: CPT | Mod: 95,,, | Performed by: STUDENT IN AN ORGANIZED HEALTH CARE EDUCATION/TRAINING PROGRAM

## 2025-03-24 PROCEDURE — 63600175 PHARM REV CODE 636 W HCPCS: Performed by: FAMILY MEDICINE

## 2025-03-24 RX ORDER — HYDROCODONE BITARTRATE AND ACETAMINOPHEN 5; 325 MG/1; MG/1
1 TABLET ORAL EVERY 6 HOURS PRN
Qty: 15 TABLET | Refills: 0 | Status: SHIPPED | OUTPATIENT
Start: 2025-03-24 | End: 2025-03-29

## 2025-03-24 RX ORDER — HYDRALAZINE HYDROCHLORIDE 20 MG/ML
10 INJECTION INTRAMUSCULAR; INTRAVENOUS EVERY 6 HOURS PRN
Status: DISCONTINUED | OUTPATIENT
Start: 2025-03-24 | End: 2025-03-24

## 2025-03-24 RX ORDER — HYDRALAZINE HYDROCHLORIDE 20 MG/ML
10 INJECTION INTRAMUSCULAR; INTRAVENOUS EVERY 6 HOURS PRN
Status: DISCONTINUED | OUTPATIENT
Start: 2025-03-24 | End: 2025-03-24 | Stop reason: HOSPADM

## 2025-03-24 RX ADMIN — HYDRALAZINE HYDROCHLORIDE 10 MG: 20 INJECTION, SOLUTION INTRAMUSCULAR; INTRAVENOUS at 01:03

## 2025-03-24 NOTE — ASSESSMENT & PLAN NOTE
Per chart review, patient s/p previous abdominal stent graft but declined additional repair due to his age, risk of procedure, and family member dying from an aneurysm and was currently being monitored closely via surveillance by CVT.  Initial workup at outside facility per records is as follows: [CTA chest and CT abd where CT abd measured thoracic descending aortic aneurism increased in size to 6.9 x 7.0cm (previously 5.4 x 5.7cm on 8/1/23), no radiographic evidence of rupture or dissection]. Vascular Surgery consulted by ED staff and will evaluate patient in the morning.  Patient remains hemodynamically stable.    CTA Abdomen/Pelvis 1/25/2024    FINDINGS:    Vascular:   -There is aneurysmal dilatation seen of the distal descending thoracic aorta with mural plaque along the wall.  Aneurysm measures 6.3 x 6.2 cm in diameter.  Patient is status post stent graft repair abdominal aortic aneurysm.  No extra vascular contrast identified no endoleak.  Stent is patent without significant narrowing.  There is a patent stent related to the proximal left renal artery as well.  There is atherosclerosis related to the right renal artery and celiac artery and superior mesenteric artery.  Inferior mesenteric artery is not identified.  Abdominal aortic aneurysm sac appears essentially stable.  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     Nonvascular:  -Multiple hepatic cysts are present.  No suspicious masses.  Spleen is unremarkable.  The pancreas is unremarkable.  Adrenal glands appear unremarkable.  There are multiple large renal cysts as well.  Largest cyst is seen in the lower pole of the right kidney measuring 9.5 cm.  Cysts appear to be simple.  No suspicious renal lesions.  Visualized bowel is unremarkable.  No abnormal pelvic masses or fluid collections.  -Skeleton is intact.     Impression:  -Stable appearing descending thoracic aortic aneurysm.  Stable appearing abdominal aortic aneurysm.  Status  post stent graft repair.  No evidence of endovascular or extravascular leak.  -Stable benign hepatic cysts and renal cysts.  No follow-up necessary for the cysts.    Plan:  -NPO  -telemetry  -optimize pain control and BP  -f/u Vascular Surgery

## 2025-03-24 NOTE — ASSESSMENT & PLAN NOTE
Patient is chronically on statin.will continue for now. Last Lipid Panel:   Lab Results   Component Value Date    CHOL 179 02/19/2025    HDL 45 02/19/2025    LDLCALC 109.8 02/19/2025    TRIG 121 02/19/2025    CHOLHDL 25.1 02/19/2025   Plan:  -Continue home medication  -low fat/low calorie diet

## 2025-03-24 NOTE — ASSESSMENT & PLAN NOTE
Anemia is likely due to chronic disease due to Chronic Kidney Disease. Most recent hemoglobin and hematocrit are listed below.  Recent Labs     03/23/25 2034   HGB 12.9*   HCT 39.4*   Plan  -Monitor serial CBC: Daily  -Transfuse PRBC if patient becomes hemodynamically unstable, symptomatic or H/H drops below 7/21.  -Patient has not received any PRBC transfusions to date  -Patient's anemia is currently stable

## 2025-03-24 NOTE — ED PROVIDER NOTES
SCRIBE #1 NOTE: I, Diego Berry, am scribing for, and in the presence of, Yunier Tyler Jr., MD. I have scribed the entire note.       History     Chief Complaint   Patient presents with    Chest Pain     Pt c/o chest pain, intermittent x 7-8 months but worse last night.  He was seen at [facility] but left due to there being no cardiologist available.     Review of patient's allergies indicates:   Allergen Reactions    Codeine Other (See Comments)     When taking codeine, pt becomes very anxious         History of Present Illness     HPI    3/23/2025, 8:38 PM  History obtained from the patient      History of Present Illness: Jovanny Olmedo is a 89 y.o. male patient with a PMHx of AAA without rupture (s/p abdominal aorta stent placement) , CAD, HTN who presents to the Emergency Department for evaluation of progressively worsening CP which onset gradually 1 week ago. Patient describes his CP to be an intermittent aching pain that radiates to his LUE and not currently present. He states he has had chronic CP that started more than a year ago but this episode started 1 week ago while he was at rest. He states he was seen at [facility] and was planned to be transferred to [facility] for Cardiology services but left AMA to transport himself. His Vascular Surgeon is Dr. Berry. Patient denies any SOB, cough, N/V/D, abdominal pain, weakness, and all other sxs at this time. Prior Tx includes morphine which patient received while at [facility]. No further complaints or concerns at this time.       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    Arthritis     Back pain     CAD (coronary artery disease)     Cataract     CKD (chronic kidney disease)     GERD  (gastroesophageal reflux disease)     Glaucoma     suspect    HTN (hypertension)     Multiple subsegmental pulmonary emboli without acute cor pulmonale 09/09/2021    Prostate cancer     tx'd with radiation    PVD (peripheral vascular disease)     stent in right renal artery and aorta    Sleep apnea     wears cpap    Stable angina pectoris 05/16/2023       Past Surgical History:  Past Surgical History:   Procedure Laterality Date    ABDOMINAL AORTA STENT      BLADDER FULGURATION N/A 3/19/2025    Procedure: FULGURATION, BLADDER;  Surgeon: Blayne Jimenez MD;  Location: [facility] OR;  Service: Urology;  Laterality: N/A;    CARDIAC CATHETERIZATION      CATARACT EXTRACTION W/  INTRAOCULAR LENS IMPLANT Right 04/29/2017    COLONOSCOPY N/A 9/24/2020    Procedure: COLONOSCOPY;  Surgeon: Shayy Melvin MD;  Location: [facility] ENDO;  Service: Endoscopy;  Laterality: N/A;    COLONOSCOPY N/A 1/10/2022    Procedure: COLONOSCOPY;  Surgeon: Vi Lara MD;  Location: [facility] ENDO;  Service: Endoscopy;  Laterality: N/A;    CORONARY ANGIOPLASTY      CORONARY STENT PLACEMENT      CYSTOSCOPY WITH BIOPSY OF BLADDER N/A 3/19/2025    Procedure: CYSTOSCOPY, WITH BLADDER BIOPSY;  Surgeon: Blayne Jimenez MD;  Location: [facility] OR;  Service: Urology;  Laterality: N/A;    PCIOL Right 03/29/2017    DR. LEDEZMA    PCIOL Left 07/03/2019    DR. LEDEZMA    PROSTATE BIOPSY      RENAL ARTERY STENT           Family History:  Family History   Problem Relation Name Age of Onset    Glaucoma Mother      Diabetes Mother      Kidney disease Mother      Colon cancer Father      Cancer Father          colon cancer    Diabetes Sister      Diabetes Brother      Hypertension Brother      Blindness Neg Hx         Social History:  Social History     Tobacco Use    Smoking status: Former     Current packs/day: 0.00     Average packs/day: 0.5 packs/day for 30.0 years (15.0 ttl pk-yrs)     Types: Cigarettes     Start date: 9/17/1965     Quit date: 9/17/1995     Years  since quittin.5    Smokeless tobacco: Former    Tobacco comments:     Hold midnight prior to sx   Substance and Sexual Activity    Alcohol use: Not Currently     Comment: hold now    Drug use: Not Currently     Frequency: 4.0 times per week     Types: Marijuana     Comment: hold midnight prior to sx    Sexual activity: Yes     Partners: Female        Review of Systems     Review of Systems   Constitutional:  Negative for fever.   HENT:  Negative for sore throat.    Respiratory:  Negative for shortness of breath.    Cardiovascular:  Positive for chest pain.   Gastrointestinal:  Negative for nausea.   Genitourinary:  Negative for dysuria.   Musculoskeletal:  Negative for back pain.   Skin:  Negative for rash.   Neurological:  Negative for weakness.   Hematological:  Does not bruise/bleed easily.   All other systems reviewed and are negative.       Physical Exam     Initial Vitals [25 1824]   BP Pulse Resp Temp SpO2   128/81 107 18 97.6 °F (36.4 °C) 96 %      MAP       --          Physical Exam   Nursing Notes and Vital Signs Reviewed.  Constitutional: Patient is in NAD. Well-developed and well-nourished.  Head: Atraumatic. Normocephalic.  Eyes: PERRL. EOM intact. Conjunctivae are not pale. No scleral icterus.  ENT: Mucous membranes are moist. Oropharynx is clear and symmetric.    Neck: Supple. Full ROM. No lymphadenopathy.  Cardiovascular: Tachycardiac. Regular rhythm. No murmurs, rubs, or gallops. Distal pulses are 2+ and symmetric.  Pulmonary/Chest: No respiratory distress. Clear to auscultation bilaterally. No wheezing or rales.  Abdominal: Soft and non-distended.  There is no tenderness.  No rebound, guarding, or rigidity. Good bowel sounds.  Genitourinary: No CVA tenderness  Musculoskeletal: Moves all extremities. No obvious deformities. No edema. No calf tenderness.  Skin: Warm and dry.  Neurological:  Alert, awake, and appropriate.  Normal speech.  No acute focal neurological deficits are  appreciated.  Psychiatric: Normal affect. Good eye contact. Appropriate in content.     ED Course   Procedures  ED Vital Signs:  Vitals:    03/24/25 0602 03/24/25 0632 03/24/25 0634 03/24/25 0647   BP: 105/76 111/73  119/83   Pulse: 100 84  81   Resp:   18 20   Temp:       TempSrc:       SpO2: 96% 98%  97%   Weight:       Height:        03/24/25 0710 03/24/25 0715 03/24/25 0815 03/24/25 0945   BP: 119/83 126/87 (!) 137/95 125/86   Pulse: 79 101 101 82   Resp: 20 20 20 20   Temp:       TempSrc:       SpO2: 98% 97% 97% 98%   Weight:       Height:        03/24/25 1045 03/24/25 1134 03/24/25 1214 03/24/25 1216   BP: (!) 131/91 (!) 131/91 (!) 137/106    Pulse: 82 107 91 88   Resp: 19 20 18    Temp:   97.9 °F (36.6 °C)    TempSrc:   Oral    SpO2: 97% 97% 98%    Weight:       Height:        03/24/25 1217 03/24/25 1332 03/24/25 1418   BP:  (!) 137/100 120/86   Pulse:  107    Resp:      Temp:      TempSrc:      SpO2:      Weight: 81.8 kg (180 lb 5.4 oz)     Height: 6' (1.829 m)         Abnormal Lab Results:  Labs Reviewed   COMPREHENSIVE METABOLIC PANEL - Abnormal       Result Value    Sodium 141      Potassium 4.0      Chloride 106      CO2 25      Glucose 126 (*)     BUN 23      Creatinine 1.6 (*)     Calcium 9.0      Protein Total 7.4      Albumin 3.7      Bilirubin Total 0.6      ALP 71      AST 20      ALT 21      Anion Gap 10      eGFR 41 (*)    MAGNESIUM - Abnormal    Magnesium  1.3 (*)    CBC WITH DIFFERENTIAL - Abnormal    WBC 6.10      RBC 4.18 (*)     HGB 12.9 (*)     HCT 39.4 (*)     MCV 94      MCH 30.9      MCHC 32.7      RDW 12.6      Platelet Count 132 (*)     MPV 10.7      Nucleated RBC 0      Neut % 72.3      Lymph % 17.0 (*)     Mono % 7.4      Eos % 2.6      Basophil % 0.2      Imm Grans % 0.5      Neut # 4.41      Lymph # 1.04      Mono # 0.45      Eos # 0.16      Baso # 0.01      Imm Grans # 0.03     COMPREHENSIVE METABOLIC PANEL - Abnormal    Sodium 138      Potassium 4.2      Chloride 108      CO2 20  (*)     Glucose 90      BUN 19      Creatinine 1.3      Calcium 8.6 (*)     Protein Total 6.5      Albumin 3.1 (*)     Bilirubin Total 0.5      ALP 62      AST 21      ALT 17      Anion Gap 10      eGFR 53 (*)    CBC WITH DIFFERENTIAL - Abnormal    WBC 5.60      RBC 3.81 (*)     HGB 11.7 (*)     HCT 35.4 (*)     MCV 93      MCH 30.7      MCHC 33.1      RDW 12.5      Platelet Count 114 (*)     MPV 10.8      Nucleated RBC 0      Neut % 64.0      Lymph % 21.3      Mono % 9.8      Eos % 4.1      Basophil % 0.4      Imm Grans % 0.4      Neut # 3.59      Lymph # 1.19      Mono # 0.55      Eos # 0.23      Baso # 0.02      Imm Grans # 0.02     HEPATITIS C ANTIBODY - Normal    Hep C Ab Interp Negative     HIV 1 / 2 ANTIBODY - Normal    HIV 1/2 Ag/Ab Negative     TROPONIN I - Normal    Troponin-I 0.011     B-TYPE NATRIURETIC PEPTIDE - Normal    BNP 82     PROTIME-INR - Normal    PT 12.5      INR 1.1     APTT - Normal    PTT 26.0     TROPONIN I - Normal    Troponin-I 0.007     CBC W/ AUTO DIFFERENTIAL    Narrative:     The following orders were created for panel order CBC auto differential.  Procedure                               Abnormality         Status                     ---------                               -----------         ------                     CBC with Differential[[id number]]       Abnormal            Final result                 Please view results for these tests on the individual orders.   CBC W/ AUTO DIFFERENTIAL    Narrative:     The following orders were created for panel order CBC with Automated Differential.  Procedure                               Abnormality         Status                     ---------                               -----------         ------                     CBC with Differential[[id number]]       Abnormal            Final result                 Please view results for these tests on the individual orders.   HEP C VIRUS HOLD SPECIMEN   PLATELET REVIEW        All Lab  Results:  Results for orders placed or performed during the hospital encounter of 03/23/25   EKG 12-lead    Collection Time: 03/23/25  6:21 PM   Result Value Ref Range    QRS Duration 116 ms    [facility] QTC Calculation 482 ms   Hepatitis C Antibody    Collection Time: 03/23/25  8:34 PM   Result Value Ref Range    Hep C Ab Interp Negative Negative   HIV 1/2 Ag/Ab (4th Gen)    Collection Time: 03/23/25  8:34 PM   Result Value Ref Range    HIV 1/2 Ag/Ab Negative Negative   Comprehensive metabolic panel    Collection Time: 03/23/25  8:34 PM   Result Value Ref Range    Sodium 141 136 - 145 mmol/L    Potassium 4.0 3.5 - 5.1 mmol/L    Chloride 106 95 - 110 mmol/L    CO2 25 23 - 29 mmol/L    Glucose 126 (H) 70 - 110 mg/dL    BUN 23 8 - 23 mg/dL    Creatinine 1.6 (H) 0.5 - 1.4 mg/dL    Calcium 9.0 8.7 - 10.5 mg/dL    Protein Total 7.4 6.0 - 8.4 gm/dL    Albumin 3.7 3.5 - 5.2 g/dL    Bilirubin Total 0.6 0.1 - 1.0 mg/dL    ALP 71 40 - 150 unit/L    AST 20 11 - 45 unit/L    ALT 21 10 - 44 unit/L    Anion Gap 10 8 - 16 mmol/L    eGFR 41 (L) >60 mL/min/1.73/m2   Troponin I #1    Collection Time: 03/23/25  8:34 PM   Result Value Ref Range    Troponin-I 0.011 <=0.026 ng/mL   BNP    Collection Time: 03/23/25  8:34 PM   Result Value Ref Range    BNP 82 0 - 99 pg/mL   Protime-INR    Collection Time: 03/23/25  8:34 PM   Result Value Ref Range    PT 12.5 9.0 - 12.5 seconds    INR 1.1 0.8 - 1.2   APTT    Collection Time: 03/23/25  8:34 PM   Result Value Ref Range    PTT 26.0 21.0 - 32.0 seconds   Magnesium    Collection Time: 03/23/25  8:34 PM   Result Value Ref Range    Magnesium  1.3 (L) 1.6 - 2.6 mg/dL   CBC with Differential    Collection Time: 03/23/25  8:34 PM   Result Value Ref Range    WBC 6.10 3.90 - 12.70 K/uL    RBC 4.18 (L) 4.60 - 6.20 M/uL    HGB 12.9 (L) 14.0 - 18.0 gm/dL    HCT 39.4 (L) 40.0 - 54.0 %    MCV 94 82 - 98 fL    MCH 30.9 27.0 - 50.0 pg    MCHC 32.7 32.0 - 36.0 g/dL    RDW 12.6 11.5 - 14.5 %    Platelet Count 132 (L)  150 - 450 K/uL    MPV 10.7 9.2 - 12.9 fL    Nucleated RBC 0 <=0 /100 WBC    Neut % 72.3 38 - 73 %    Lymph % 17.0 (L) 18 - 48 %    Mono % 7.4 4 - 15 %    Eos % 2.6 <=8 %    Basophil % 0.2 <=1.9 %    Imm Grans % 0.5 0.0 - 0.5 %    Neut # 4.41 1.8 - 7.7 K/uL    Lymph # 1.04 1 - 4.8 K/uL    Mono # 0.45 0.3 - 1 K/uL    Eos # 0.16 <=0.5 K/uL    Baso # 0.01 <=0.2 K/uL    Imm Grans # 0.03 0.00 - 0.04 K/uL   Troponin I #2    Collection Time: 03/23/25 11:41 PM   Result Value Ref Range    Troponin-I 0.007 <=0.026 ng/mL   Comprehensive Metabolic Panel (CMP)    Collection Time: 03/24/25  6:28 AM   Result Value Ref Range    Sodium 138 136 - 145 mmol/L    Potassium 4.2 3.5 - 5.1 mmol/L    Chloride 108 95 - 110 mmol/L    CO2 20 (L) 23 - 29 mmol/L    Glucose 90 70 - 110 mg/dL    BUN 19 8 - 23 mg/dL    Creatinine 1.3 0.5 - 1.4 mg/dL    Calcium 8.6 (L) 8.7 - 10.5 mg/dL    Protein Total 6.5 6.0 - 8.4 gm/dL    Albumin 3.1 (L) 3.5 - 5.2 g/dL    Bilirubin Total 0.5 0.1 - 1.0 mg/dL    ALP 62 40 - 150 unit/L    AST 21 11 - 45 unit/L    ALT 17 10 - 44 unit/L    Anion Gap 10 8 - 16 mmol/L    eGFR 53 (L) >60 mL/min/1.73/m2   CBC with Differential    Collection Time: 03/24/25  6:28 AM   Result Value Ref Range    WBC 5.60 3.90 - 12.70 K/uL    RBC 3.81 (L) 4.60 - 6.20 M/uL    HGB 11.7 (L) 14.0 - 18.0 gm/dL    HCT 35.4 (L) 40.0 - 54.0 %    MCV 93 82 - 98 fL    MCH 30.7 27.0 - 50.0 pg    MCHC 33.1 32.0 - 36.0 g/dL    RDW 12.5 11.5 - 14.5 %    Platelet Count 114 (L) 150 - 450 K/uL    MPV 10.8 9.2 - 12.9 fL    Nucleated RBC 0 <=0 /100 WBC    Neut % 64.0 38 - 73 %    Lymph % 21.3 18 - 48 %    Mono % 9.8 4 - 15 %    Eos % 4.1 <=8 %    Basophil % 0.4 <=1.9 %    Imm Grans % 0.4 0.0 - 0.5 %    Neut # 3.59 1.8 - 7.7 K/uL    Lymph # 1.19 1 - 4.8 K/uL    Mono # 0.55 0.3 - 1 K/uL    Eos # 0.23 <=0.5 K/uL    Baso # 0.02 <=0.2 K/uL    Imm Grans # 0.02 0.00 - 0.04 K/uL         Imaging Results:  Imaging Results    None          The EKG was ordered, reviewed, and  independently interpreted by the ED provider.  Interpretation time: 18:21  Rate: 109 BPM  Rhythm: sinus tachycardia with PACs  Interpretation: Left anterior fascicular block. Minimal voltage criteria for LVH, may be normal variant ([place] product). Possible anterior infarct. No STEMI.    The Emergency Provider reviewed the vital signs and test results, which are outlined above.     ED Discussion       ED Course as of 03/25/25 0438   Sun Mar 23, 2025   2125 Rhythm strip reviewed. Sinus tachycardia, no stemi. 109bpm [LV]      ED Course User Index  [LV] Yunier Tyler Jr., MD     Medical Decision Making  89yo M c/o intermittent chest pain for the past several months. this episode began about a week ago, pain worsened last night and pt went to [facility] earlier today where he was worked up for chest pain. pt has hx of thoracic and abd aortic aneurysm, seen by Vascular Surgery: Dr. Berry, Dr. Lehman and Dr. Montilla in the past. last seen by Dr. Montilla who had a discussion c pt on 2/12/24 where his plan was to monitor aneurism and if it reaches 6cm, will discuss options c pt...... today at [facility], pt had CTA chest and CT abd where CT abd measured thoracic descending aortic aneurism increased in size to 6.9 x 7.0cm (previously 5.4 x 5.7cm on 8/1/23), no radiographic evidence of rupture or dissection, high sensitivity troponin: 22.5, which is wnl, ddimer elevated at >20, cr: 1.65 pt left [facility] today AMA and presents to ER here with chest pain radiating to left arm. repeating chest pain workup here shows EKG: sinus tachycardia at 109bpm, no stemi, trop: 0.011, bnp: 82, cr: 1.6 (chronic).  CT's from [facility] are uploaded and disc is with pt's paperwork from [facility].  I discussed c Dr. Lehman (vascular surgery), no additional recs and will see pt in consult in the morning. Will admit to [facility].     Amount and/or Complexity of Data Reviewed  External Data Reviewed: labs and radiology.      "Details: Reviewed CTA chest from patient's visit at [facility] on 03/23/2025 which shows, "No evidence of embolism. Aneurysm of the ascending aorta and descending thoracic aorta. No evidence of mediastinal hematoma. No lung infiltrates." Also reviewed CTA abdomen and pelvis from 03/23/2025 which shows, "The visualized heart is Borderline enlarged. Small pericardial effusion. Tortuous aorta with descending thoracic aortic aneurysm measuring 6.9 x 7.0 cm in the AP and transverse dimension. This is increased in size since prior exam 08/01/2023, measuring 5.4 x 5.7 cm at that time..."     Also reviewed associated labs from 03/23/2025:  Result    Value                Reference range  WBC    5.6 K/uL            L = 4.1  H = 10.9  Hemoglobin    11.4 g/dl (L)       L = 13.2  H = 16.2  Hematocrit   35.3 g/dl (L)      L = 40.0  H = 52.0  Platelet   113 K/uL (L)      L = 140.0  H = 450  Glucose   101 mg/dl          L = 74   H = 106  Creatinine   1.65 mg/dl (H)   L = 0.70 H = 1.30  Potassium   3.8 mEq/L         L = 3.5  H = 5.1  ProBNP   525 pg/ml (H)   L = 10   H = 450  D-dimer   >20.00 ug/ml (H)  L = 0.0  H = 0.50  High sensitivity troponin I 22.5 pg/ml  L = 3.0  H = 58.9  CK     63 u/l   L = 39   H = 308  CKMB    1.0 ng/ml  L = 1.0  H = 3.6  Labs: ordered. Decision-making details documented in ED Course.  Radiology: ordered. Decision-making details documented in ED Course.  ECG/medicine tests: ordered and independent interpretation performed. Decision-making details documented in ED Course.  Discussion of management or test interpretation with external provider(s):     9:21 PM: Discussed pt's case with Dr. Lehman (Vascular Surgery) who recommends admission to  and will see at consult in AM.     11:20 PM: Discussed case with Jose E Angulo MD ([facility]). Dr. Angulo agrees with current care and management of pt and accepts admission.   Admitting Service: [facility]  Admitting " Physician: Dr. Angulo  Admit to: inpatient med tele    11:20 PM: Re-evaluated pt. I have discussed test results, shared treatment plan, and the need for admission with patient and family at bedside. Pt and family express understanding at this time and agree with all information. All questions answered. Pt and family have no further questions or concerns at this time. Pt is ready for admit.    Risk  OTC drugs.  Prescription drug management.  Parenteral controlled substances.  Decision regarding hospitalization.  Emergency major surgery.  Risk Details: OTC drugs, prescription drugs and controlled substances considered.  Due to patient's symptoms improving and pain controlled pain medications ordered appropriately.  Pt will be evaluated for possible surgery by Vascular in am.    DDX: MI, CAD, PUlmonary disease, PE, AAA, Pneumonia, Costochondritis, PTX, Liver disease, Pancreatitis among others.       89yo M c/o intermittent chest pain for the past several months. this episode began about a week ago, pain worsened last night and pt went to [facility] earlier today where he was worked up for chest pain. pt has hx of thoracic and abd aortic aneurysm, seen by Vascular Surgery: Dr. Berry, Dr. Lehman and Dr. Montilla in the past. last seen by Dr. Montilla who had a discussion c pt on 2/12/24 where his plan was to monitor aneurism and if it reaches 6cm, will discuss options c pt...... today at [facility], pt had CTA chest and CT abd where CT abd measured thoracic descending aortic aneurism increased in size to 6.9 x 7.0cm (previously 5.4 x 5.7cm on 8/1/23), no radiographic evidence of rupture or dissection, high sensitivity troponin: 22.5, which is wnl, ddimer elevated at >20, cr: 1.65 pt left [facility] today AMA and presents to ER here with chest pain radiating to left arm. repeating chest pain workup here shows EKG: sinus tachycardia at 109bpm, no stemi, trop: 0.011, bnp: 82, cr: 1.6 (chronic). CT's from  [facility] are uploaded and disc is with pt's paperwork from [facility]. I discussed c Dr. Lehman (vascular surgery), no additional recs and will see pt in consult in the morning. Admission for chest pain r/o MI, enlarging thoracic aortic aneurysm and mural thrombus.             ED Medication(s):  Medications - No data to display    Discharge Medication List as of 3/24/2025  2:35 PM        START taking these medications    Details   HYDROcodone-acetaminophen (NORCO) 5-325 mg per tablet Take 1 tablet by mouth every 6 (six) hours as needed for Pain., Starting Mon 3/24/2025, Until Sat 3/29/2025 at 2359, Normal              Follow-up Information       Miryam Jimenez MD Follow up in 1 week(s).    Specialty: Family Medicine  Contact information:  [address]  [place] [address] [phone]               Michelle Martinez MD Follow up in 6 week(s).    Specialty: Vascular Surgery  Contact information:  [address]  [facility] [address] [phone]               Dimas Mon MD Follow up in 2 week(s).    Specialties: Interventional Cardiology, Cardiology  Contact information:  [address]  [facility] [address] [phone]                                 Scribe Attestation:   Scribe #1: I performed the above scribed service and the documentation accurately describes the services I performed. I attest to the accuracy of the note.     Attending:   Physician Attestation Statement for Scribe #1: I, Yunier Tyler Jr., MD, personally performed the services described in this documentation, as scribed by Diego Berry, in my presence, and it is both accurate and complete.           Clinical Impression       ICD-10-CM ICD-9-CM   1. Thoracic aortic aneurysm without rupture, unspecified part  I71.20 441.2   2. Chest pain  R07.9 786.50   3. Abdominal aortic aneurysm (AAA) without rupture, unspecified part  I71.40 441.4       Disposition:   Disposition: Admitted  Condition: Fair        Yunier Tyler Jr., MD  03/25/25 8643

## 2025-03-24 NOTE — CONSULTS
Consult    Consulting Physician:  Valentina Rader MD  Reason for Consultation: thoracic aneurysm    CC: Chest Pain (Pt c/o chest pain, intermittent x 7-8 months but worse last night.  He was seen at [facility] but left due to there being no cardiologist available.)      HPI: Jovanny Olmedo is a 89 y.o. 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 Has been see by dr robles about 6-7 months back at that time it was decided we would wait  and do surveillance. The discussion held was that he's had evar done and has covered some of his lumbar branches from aorta and if we were to do another intervention stent graft it could result in paralysis that is about a 5-10% risk. 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    Arthritis     Back pain     CAD (coronary artery disease)     Cataract     CKD (chronic kidney disease)     GERD (gastroesophageal reflux disease)     Glaucoma     suspect    HTN (hypertension)     Multiple subsegmental pulmonary emboli without acute cor pulmonale 09/09/2021    Prostate cancer     tx'd with radiation    PVD (peripheral vascular disease)     stent in right renal artery and aorta    Sleep apnea     wears cpap    Stable angina pectoris 05/16/2023       Past Surgical History:   Procedure Laterality Date    ABDOMINAL AORTA STENT      BLADDER FULGURATION N/A 3/19/2025    Procedure: FULGURATION, BLADDER;  Surgeon: Blayne Jimenez MD;  Location: [facility];  Service: Urology;  Laterality: N/A;    CARDIAC CATHETERIZATION      CATARACT EXTRACTION W/  INTRAOCULAR LENS IMPLANT Right 04/29/2017     COLONOSCOPY N/A 9/24/2020    Procedure: COLONOSCOPY;  Surgeon: Shayy Melvin MD;  Location: [facility] ENDO;  Service: Endoscopy;  Laterality: N/A;    COLONOSCOPY N/A 1/10/2022    Procedure: COLONOSCOPY;  Surgeon: Vi Lara MD;  Location: [facility] ENDO;  Service: Endoscopy;  Laterality: N/A;    CORONARY ANGIOPLASTY      CORONARY STENT PLACEMENT      CYSTOSCOPY WITH BIOPSY OF BLADDER N/A 3/19/2025    Procedure: CYSTOSCOPY, WITH BLADDER BIOPSY;  Surgeon: Blayne Jimenez MD;  Location: [facility] OR;  Service: Urology;  Laterality: N/A;    PCIOL Right 03/29/2017    DR. LEDEZMA    PCIOL Left 07/03/2019    DR. LEDEZMA    PROSTATE BIOPSY      RENAL ARTERY STENT         Social History[1]    Family History   Problem Relation Name Age of Onset    Glaucoma Mother      Diabetes Mother      Kidney disease Mother      Colon cancer Father      Cancer Father          colon cancer    Diabetes Sister      Diabetes Brother      Hypertension Brother      Blindness Neg Hx         Current Medications[2]    Review of patient's allergies indicates:   Allergen Reactions    Codeine Other (See Comments)     When taking codeine, pt becomes very anxious       ROS:  10 point review of systems is negative except as mentioned in HPI.    Vitals:    03/24/25 1418   BP: 120/86   Pulse:    Resp:    Temp:        Objective:  Vital signs: (most recent): Blood pressure 120/86, pulse 107, temperature 97.9 °F (36.6 °C), temperature source Oral, resp. rate 18, height 6' (1.829 m), weight 81.8 kg (180 lb 5.4 oz), SpO2 98%.    Vss  Gen nad alert oriented  Cv rrr  Lung ctab  Abd soft nt nd    LABS:    IMAGING:  I have personally reviewed the imaging.    CT Previous   Final Result      CT Previous   Final Result      Xray Previous   Final Result          MDM      Assessment & Plan  Jovanny Olmedo is a 89 y.o. male 89 yo M with thoracoabdominal aneurysm hx s/p evar now with thoracic aneurysm measuring  7cm. He would rather this rupture than deal with paralysis  with intervention. He is enjoying his quality of life and would like to enjoy whatever time he has left with his family. He understands that if he were to rupture it would be a terminal event. His family is in the room and understands this conversation. He would like to see me on outpatient basis to keep in touch. I do not plan on obtaining any further studies. In terms of his chest pain there is a possibility this is related to the aneurysm but could be from msk issues, costochondritis etc recommend referral to interventional pain management to evaluate for thoracic spine injection vs chronic pain management.      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            [1]   Social History  Socioeconomic History    Marital status:    Tobacco Use    Smoking status: Former     Current packs/day: 0.00     Average packs/day: 0.5 packs/day for 30.0 years (15.0 ttl pk-yrs)     Types: Cigarettes     Start date: 1965     Quit date: 1995     Years since quittin.5    Smokeless tobacco: Former    Tobacco comments:     Hold midnight prior to sx   Substance and Sexual Activity    Alcohol use: Not Currently     Comment: hold now    Drug use: Not Currently     Frequency: 4.0 times per week     Types: Marijuana     Comment: hold midnight prior to sx    Sexual  activity: Yes     Partners: Female   Social History Narrative         Social Drivers of Health     Financial Resource Strain: Low Risk  (3/24/2025)    Overall Financial Resource Strain (CARDIA)     Difficulty of Paying Living Expenses: Not very hard   Food Insecurity: No Food Insecurity (3/24/2025)    Hunger Vital Sign     Worried About Running Out of Food in the Last Year: Never true     Ran Out of Food in the Last Year: Never true   Transportation Needs: No Transportation Needs (3/24/2025)    PRAPARE - Transportation     Lack of Transportation (Medical): No     Lack of Transportation (Non-Medical): No   Physical Activity: Inactive (3/14/2025)    Exercise Vital Sign     Days of Exercise per Week: 0 days     Minutes of Exercise per Session: 0 min   Stress: No Stress Concern Present (3/24/2025)    [employer] - Occupational Stress Questionnaire     Feeling of Stress : Not at all   Recent Concern: Stress - Stress Concern Present (3/14/2025)    [employer] - Occupational Stress Questionnaire     Feeling of Stress : Rather much   Housing Stability: Low Risk  (3/24/2025)    Housing Stability Vital Sign     Unable to Pay for Housing in the Last Year: No     Number of Times Moved in the Last Year: 0     Homeless in the Last Year: No   [2]   Current Facility-Administered Medications:     acetaminophen suppository 650 mg, 650 mg, Rectal, Q6H PRN, Jose E Angulo MD    acetaminophen tablet 650 mg, 650 mg, Oral, Q8H PRN, Jose E Angulo MD    albuterol-ipratropium 2.5 mg-0.5 mg/3 mL nebulizer solution 3 mL, 3 mL, Nebulization, Q6H PRN, Jose E Angulo MD    aluminum-magnesium hydroxide-simethicone 200-200-20 mg/5 mL suspension 30 mL, 30 mL, Oral, QID PRN, Jose E Angulo MD    dextrose 50% injection 12.5 g, 12.5 g, Intravenous, PRN, Jose E Angulo MD    dextrose 50% injection 25 g, 25 g, Intravenous, PRN, Jose E Angulo MD     glucagon (human recombinant) injection 1 mg, 1 mg, Intramuscular, PRN, Jose E Angulo MD    glucose chewable tablet 16 g, 16 g, Oral, PRN, Jose E Angulo MD    glucose chewable tablet 24 g, 24 g, Oral, PRN, Jose E Angulo MD    hydrALAZINE injection 10 mg, 10 mg, Intravenous, Q6H PRN, Valentina Rader MD, 10 mg at 03/24/25 1335    HYDROcodone-acetaminophen 5-325 mg per tablet 1 tablet, 1 tablet, Oral, Q6H PRN, Jose E Angulo MD    melatonin tablet 6 mg, 6 mg, Oral, Nightly PRN, Jose E Angulo MD    morphine injection 2 mg, 2 mg, Intravenous, Q4H PRN, Jose E Angulo MD    naloxone 0.4 mg/mL injection 0.02 mg, 0.02 mg, Intravenous, PRN, Jose E Angulo MD    ondansetron injection 4 mg, 4 mg, Intravenous, Q8H PRN, Jose E Angulo MD    promethazine tablet 25 mg, 25 mg, Oral, Q6H PRN, Jose E Angulo MD    senna-docusate 8.6-50 mg per tablet 1 tablet, 1 tablet, Oral, BID PRN, Jose E Angulo MD    sodium chloride 0.9% flush 10 mL, 10 mL, Intravenous, Q12H PRN, Jose E Angulo MD

## 2025-03-24 NOTE — ASSESSMENT & PLAN NOTE
Chronic, controlled.  Latest blood pressure and vitals reviewed-   Temp:  [97.6 °F (36.4 °C)-97.9 °F (36.6 °C)]   Pulse:  []   Resp:  [15-20]   BP: (102-137)/()   SpO2:  [95 %-99 %] .   Home meds for hypertension were reviewed and noted below.   Hypertension Medications              carvediloL (COREG) 12.5 MG tablet Take 1 tablet (12.5 mg total) by mouth 2 (two) times daily with meals.    lisinopriL (PRINIVIL,ZESTRIL) 40 MG tablet Take 1 tablet (40 mg total) by mouth once daily.    NIFEdipine (PROCARDIA-XL) 90 MG (OSM) 24 hr tablet Take 1 tablet (90 mg total) by mouth once daily.    nitroGLYCERIN (NITROSTAT) 0.4 MG SL tablet Place 1 tablet (0.4 mg total) under the tongue every 5 (five) minutes as needed for Chest pain.     While in the hospital, will manage blood pressure as follows; Continue home antihypertensive regimen    Will utilize p.r.n. blood pressure medication only if patient's blood pressure greater than  180/110 and he develops symptoms such as worsening chest pain or shortness of breath.

## 2025-03-24 NOTE — ASSESSMENT & PLAN NOTE
Chronic, controlled.  Latest blood pressure and vitals reviewed-   Temp:  [97.6 °F (36.4 °C)-99.2 °F (37.3 °C)]   Pulse:  []   Resp:  [15-20]   BP: (102-130)/(71-93)   SpO2:  [96 %-99 %] .   Home meds for hypertension were reviewed and noted below.   Hypertension Medications              carvediloL (COREG) 12.5 MG tablet Take 1 tablet (12.5 mg total) by mouth 2 (two) times daily with meals.    lisinopriL (PRINIVIL,ZESTRIL) 40 MG tablet Take 1 tablet (40 mg total) by mouth once daily.    NIFEdipine (PROCARDIA-XL) 90 MG (OSM) 24 hr tablet Take 1 tablet (90 mg total) by mouth once daily.    nitroGLYCERIN (NITROSTAT) 0.4 MG SL tablet Place 1 tablet (0.4 mg total) under the tongue every 5 (five) minutes as needed for Chest pain.     While in the hospital, will manage blood pressure as follows; Continue home antihypertensive regimen    Will utilize p.r.n. blood pressure medication only if patient's blood pressure greater than  180/110 and he develops symptoms such as worsening chest pain or shortness of breath.

## 2025-03-24 NOTE — ASSESSMENT & PLAN NOTE
Jan 24' CTA Aneurysm measures 6.3 x 6.2 cm in diameter   Sees Dr. Berry, missed his scan in Aug last year with CVT as well as did not schedule f/u with Dr. Berry  Needs repeat scan

## 2025-03-24 NOTE — ED NOTES
LPN Initial ER Focused Assessment Approval    As the Registered Nurse (RN), I have thoroughly reviewed the Licensed Practical Nurse (LPN) initial emergency room focused assessment for Jovanny Olmedo.     After careful evaluation, I confirm that the documented assessment is an accurate reflection of the patient's condition.

## 2025-03-24 NOTE — HPI
Jovanny Olmedo is a 89 y.o. 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 who presented to the ED for further evaluation of intermittent chest pain x8 months duration which acutely worsened last night.  Patient has significant history of AAA without rupture s/p abdominal aortic stent placement in 3/2021.  Patient was seen at [facility] for further evaluation of chest pain and was found to have evidence of enlarging aneurysm noted on CT measuring 6.9 x 7.0 cm (previously measuring 5.4 x 5.7 cm on 8/1/23) patient was in process of being transferred however, left AMA to drive himself to the hospital.  At time of bedside assessment, patient complaining of acute on chronic substernal chest pain described as achy in nature, rated 5/10 in severity, and was stated to radiate to his left upper extremity.  He reported no known alleviating or aggravating factors noted and reported all other review of systems negative except as noted above.  Patient last seen by Kari Wilson on 03/18/2025 following hospitalization in February and has known chronic angina.  Patient was scheduled to undergo left heart catheterization during that admission however, was deferred due to AAA and instead underwent nuclear stress test which was negative.  Per chart review, patient declined repair due to his age, risk of procedure, and family member dying from an aneurysm and was currently being monitored closely via surveillance by CVT.  Initial workup  at outside facility per records is as follows: [CTA chest and CT abd where CT abd measured thoracic descending aortic aneurism increased in size to 6.9 x 7.0cm (previously 5.4 x 5.7cm on 8/1/23), no radiographic evidence of rupture or dissection, high sensitivity troponin: 22.5, which is wnl, ddimer elevated at >20, cr: 1.65].  Repeat workup in the ED here revealed patient to be afebrile without leukocytosis, hemodynamically stable, H/H 12.9/39.4, creatinine/GFR 1.6/41, blood glucose 126, magnesium 1.3, BNP and troponin within normal limits.  EKG negative for evidence of acute ischemia.  Patient admitted to Hospital Medicine inpatient for continued medical management and awaiting further evaluation/recommendations from Cardiology and Vascular Surgery.     PCP: Miryam Jimenez

## 2025-03-24 NOTE — ASSESSMENT & PLAN NOTE
CP relieved with morphine at remote ED, no CP on exam  Troponin neg  Nuc stress 1/24' neg for ischemia  EKG ST, no acute dynamic changes noted  Cont ASA, plavix  OP monitor  F/u with Dr. Mon

## 2025-03-24 NOTE — H&P
[facility] - Emergency Dept.  [facility]  History & Physical    Patient Name: Jovanny Olmedo  MRN: [id number]  Patient Class: IP- Inpatient  Admission Date: 3/23/2025  Attending Physician: Yunier Tyler Jr., MD   Primary Care Provider: Miryam Jimenez MD         Patient information was obtained from patient, past medical records, and ER records.     Subjective:     Principal Problem:Chest pain    Chief Complaint:   Chief Complaint   Patient presents with    Chest Pain     Pt c/o chest pain, intermittent x 7-8 months but worse last night.  He was seen at [facility] but left due to there being no cardiologist available.        HPI: Jovanny Olmedo is a 89 y.o. 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 who presented to the ED for further evaluation of intermittent chest pain x8 months duration which acutely worsened last night.  Patient has significant history of AAA without rupture s/p abdominal aortic stent placement in 3/2021.  Patient was seen at [facility] for further evaluation of chest pain and was found to have evidence of enlarging aneurysm noted on CT measuring 6.9 x 7.0 cm (previously measuring 5.4 x 5.7 cm on 8/1/23) patient was in process of being transferred however, left AMA to drive himself to the hospital.  At time of bedside assessment, patient complaining of acute on chronic substernal chest pain described as achy in nature, rated 5/10 in severity, and was stated to radiate to his left  upper extremity.  He reported no known alleviating or aggravating factors noted and reported all other review of systems negative except as noted above.  Patient last seen by Kari Wilson on 03/18/2025 following hospitalization in February and has known chronic angina.  Patient was scheduled to undergo left heart catheterization during that admission however, was deferred due to AAA and instead underwent nuclear stress test which was negative.  Per chart review, patient declined repair due to his age, risk of procedure, and family member dying from an aneurysm and was currently being monitored closely via surveillance by CVT.  Initial workup at outside facility per records is as follows: [CTA chest and CT abd where CT abd measured thoracic descending aortic aneurism increased in size to 6.9 x 7.0cm (previously 5.4 x 5.7cm on 8/1/23), no radiographic evidence of rupture or dissection, high sensitivity troponin: 22.5, which is wnl, ddimer elevated at >20, cr: 1.65].  Repeat workup in the ED here revealed patient to be afebrile without leukocytosis, hemodynamically stable, H/H 12.9/39.4, creatinine/GFR 1.6/41, blood glucose 126, magnesium 1.3, BNP and troponin within normal limits.  EKG negative for evidence of acute ischemia.  Patient admitted to Hospital Medicine inpatient for continued medical management and awaiting further evaluation/recommendations from Cardiology and Vascular Surgery.     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    Arthritis     Back pain     CAD (coronary artery disease)     Cataract     CKD (chronic kidney disease)     GERD (gastroesophageal reflux disease)     Glaucoma     suspect    HTN (hypertension)     Multiple subsegmental pulmonary emboli without acute cor pulmonale 09/09/2021    Prostate cancer     tx'd with  radiation    PVD (peripheral vascular disease)     stent in right renal artery and aorta    Sleep apnea     wears cpap    Stable angina pectoris 05/16/2023       Past Surgical History:   Procedure Laterality Date    ABDOMINAL AORTA STENT      BLADDER FULGURATION N/A 3/19/2025    Procedure: FULGURATION, BLADDER;  Surgeon: Blayne Jimenez MD;  Location: [facility] OR;  Service: Urology;  Laterality: N/A;    CARDIAC CATHETERIZATION      CATARACT EXTRACTION W/  INTRAOCULAR LENS IMPLANT Right 04/29/2017    COLONOSCOPY N/A 9/24/2020    Procedure: COLONOSCOPY;  Surgeon: Shayy Melvin MD;  Location: [facility] ENDO;  Service: Endoscopy;  Laterality: N/A;    COLONOSCOPY N/A 1/10/2022    Procedure: COLONOSCOPY;  Surgeon: Vi Lara MD;  Location: [facility] ENDO;  Service: Endoscopy;  Laterality: N/A;    CORONARY ANGIOPLASTY      CORONARY STENT PLACEMENT      CYSTOSCOPY WITH BIOPSY OF BLADDER N/A 3/19/2025    Procedure: CYSTOSCOPY, WITH BLADDER BIOPSY;  Surgeon: Blayne Jimenez MD;  Location: [facility] OR;  Service: Urology;  Laterality: N/A;    PCIOL Right 03/29/2017    DR. LEDEZMA    PCIOL Left 07/03/2019    DR. LEDEZMA    PROSTATE BIOPSY      RENAL ARTERY STENT         Review of patient's allergies indicates:   Allergen Reactions    Codeine Other (See Comments)     When taking codeine, pt becomes very anxious       Current Facility-Administered Medications on File Prior to Encounter   Medication    leuprolide acetate (6 month) injection 45 mg     Current Outpatient Medications on File Prior to Encounter   Medication Sig    albuterol (VENTOLIN HFA) 90 mcg/actuation inhaler Inhale 2 puffs into the lungs every 6 (six) hours as needed for Wheezing. Rescue    apremilast (OTEZLA ORAL) Take by mouth.    aspirin (ECOTRIN) 81 MG EC tablet Take 81 mg by mouth once daily.    atorvastatin (LIPITOR) 40 MG tablet Take 1 tablet (40 mg total) by mouth every evening.    azelastine (ASTELIN) 137 mcg (0.1 %) nasal spray USE 2 SPRAY(S) IN EACH NOSTRIL  TWICE DAILY    betamethasone dipropionate 0.05 % cream Apply topically once daily.    betamethasone dipropionate 0.05 % cream Apply topically 2 (two) times daily.    carvediloL (COREG) 12.5 MG tablet Take 1 tablet (12.5 mg total) by mouth 2 (two) times daily with meals.    ciprofloxacin HCl (CIPRO) 500 MG tablet Take 1 tablet (500 mg total) by mouth every 12 (twelve) hours.    ferrous sulfate 325 (65 FE) MG EC tablet Take 325 mg by mouth once daily.    gentamicin (GARAMYCIN) 0.1 % ointment Apply topically 2 (two) times a day. For rash of groin.    hydrOXYzine HCL (ATARAX) 10 MG Tab Take 10 mg by mouth 3 (three) times daily as needed.    ketoconazole (NIZORAL) 2 % cream Apply topically 2 (two) times daily. For rash of groin.    lactulose (CHRONULAC) 20 gram/30 mL Soln Take 30 mLs (20 g total) by mouth daily as needed (constipation).    levocetirizine (XYZAL) 5 MG tablet Take 5 mg by mouth every evening.    lisinopriL (PRINIVIL,ZESTRIL) 40 MG tablet Take 1 tablet (40 mg total) by mouth once daily.    meloxicam (MOBIC) 15 MG tablet Take 15 mg by mouth daily as needed.    miconazole NITRATE 2 % (ZEASORB AF) 2 % top powder Use two to three times daily to prevent rash    NIFEdipine (PROCARDIA-XL) 90 MG (OSM) 24 hr tablet Take 1 tablet (90 mg total) by mouth once daily.    nitroGLYCERIN (NITROSTAT) 0.4 MG SL tablet Place 1 tablet (0.4 mg total) under the tongue every 5 (five) minutes as needed for Chest pain.    ondansetron (ZOFRAN-ODT) 4 MG TbDL Take 1 tablet (4 mg total) by mouth every 8 (eight) hours as needed (Nausea).    oxybutynin (DITROPAN-XL) 5 MG TR24 Take 1 tablet (5 mg total) by mouth once daily.    pantoprazole (PROTONIX) 40 MG tablet Take 1 tablet (40 mg total) by mouth 2 (two) times daily.    tamsulosin (FLOMAX) 0.4 mg Cap Take 1 capsule (0.4 mg total) by mouth once daily.    traMADoL (ULTRAM) 50 mg tablet Take 50 mg by mouth daily as needed.    traMADoL (ULTRAM) 50 mg tablet Take 1 tablet (50 mg total) by  mouth every 6 (six) hours as needed for Pain.    traZODone (DESYREL) 50 MG tablet Take 1 tablet (50 mg total) by mouth every evening.    finasteride (PROSCAR) 5 mg tablet Take 1 tablet (5 mg total) by mouth once daily.    triamcinolone acetonide 0.025% (KENALOG) 0.025 % cream Apply topically 2 (two) times daily. PRN rash of groin.     Family History       Problem Relation (Age of Onset)    Cancer Father    Colon cancer Father    Diabetes Mother, Sister, Brother    Glaucoma Mother    Hypertension Brother    Kidney disease Mother          Tobacco Use    Smoking status: Former     Current packs/day: 0.00     Average packs/day: 0.5 packs/day for 30.0 years (15.0 ttl pk-yrs)     Types: Cigarettes     Start date: 1965     Quit date: 1995     Years since quittin.5    Smokeless tobacco: Former    Tobacco comments:     Hold midnight prior to sx   Substance and Sexual Activity    Alcohol use: Not Currently     Comment: hold now    Drug use: Not Currently     Frequency: 4.0 times per week     Types: Marijuana     Comment: hold midnight prior to sx    Sexual activity: Yes     Partners: Female     Review of Systems   All other systems reviewed and are negative.    Objective:     Vital Signs (Most Recent):  Temp: 97.6 °F (36.4 °C) (25)  Pulse: 99 (25)  Resp: 18 (25)  BP: 117/87 (25)  SpO2: 96 % (25) Vital Signs (24h Range):  Temp:  [97.6 °F (36.4 °C)-99.2 °F (37.3 °C)] 97.6 °F (36.4 °C)  Pulse:  [] 99  Resp:  [15-20] 18  SpO2:  [96 %-98 %] 96 %  BP: (113-130)/(75-93) 117/87     Weight: 87.1 kg (192 lb)  Body mass index is 26.04 kg/m².     Physical Exam  Vitals reviewed.   Constitutional:       General: He is not in acute distress.     Appearance: Normal appearance. He is normal weight. He is not ill-appearing, toxic-appearing or diaphoretic.   HENT:      Head: Normocephalic and atraumatic.      Right Ear: External ear normal.      Left Ear: External ear  normal.      Nose: Nose normal. No congestion or rhinorrhea.      Mouth/Throat:      Mouth: Mucous membranes are moist.      Pharynx: Oropharynx is clear. No oropharyngeal exudate or posterior oropharyngeal erythema.   Eyes:      General: No scleral icterus.     Extraocular Movements: Extraocular movements intact.      Conjunctiva/sclera: Conjunctivae normal.      Pupils: Pupils are equal, round, and reactive to light.   Neck:      Vascular: No carotid bruit.   Cardiovascular:      Rate and Rhythm: Normal rate and regular rhythm.      Pulses: Normal pulses.      Heart sounds: Normal heart sounds. No murmur heard.     No friction rub. No gallop.   Pulmonary:      Effort: Pulmonary effort is normal. No respiratory distress.      Breath sounds: Normal breath sounds. No stridor. No wheezing, rhonchi or rales.   Chest:      Chest wall: No tenderness.   Abdominal:      General: Abdomen is flat. Bowel sounds are normal. There is no distension.      Palpations: Abdomen is soft. There is no mass.      Tenderness: There is no abdominal tenderness. There is no right CVA tenderness, left CVA tenderness, guarding or rebound.      Hernia: No hernia is present.   Musculoskeletal:         General: No swelling, tenderness, deformity or signs of injury. Normal range of motion.      Cervical back: Normal range of motion and neck supple. No rigidity or tenderness.   Lymphadenopathy:      Cervical: No cervical adenopathy.   Skin:     General: Skin is warm and dry.      Capillary Refill: Capillary refill takes less than 2 seconds.      Coloration: Skin is not jaundiced or pale.      Findings: No bruising, erythema, lesion or rash.   Neurological:      General: No focal deficit present.      Mental Status: He is alert and oriented to person, place, and time. Mental status is at baseline.      Cranial Nerves: No cranial nerve deficit.      Sensory: No sensory deficit.      Motor: No weakness.      Coordination: Coordination normal.    Psychiatric:         Mood and Affect: Mood normal.         Behavior: Behavior normal.         Thought Content: Thought content normal.         Judgment: Judgment normal.              CRANIAL NERVES     CN III, IV, VI   Pupils are equal, round, and reactive to light.       Significant Labs: All pertinent labs within the past 24 hours have been reviewed.    Significant Imaging: I have reviewed all pertinent imaging results/findings within the past 24 hours.    LABS:  Recent Results (from the past 24 hours)   EKG 12-lead    Collection Time: 03/23/25  6:21 PM   Result Value Ref Range    QRS Duration 116 ms    [facility] QTC Calculation 482 ms   Hepatitis C Antibody    Collection Time: 03/23/25  8:34 PM   Result Value Ref Range    Hep C Ab Interp Negative Negative   HIV 1/2 Ag/Ab (4th Gen)    Collection Time: 03/23/25  8:34 PM   Result Value Ref Range    HIV 1/2 Ag/Ab Negative Negative   Comprehensive metabolic panel    Collection Time: 03/23/25  8:34 PM   Result Value Ref Range    Sodium 141 136 - 145 mmol/L    Potassium 4.0 3.5 - 5.1 mmol/L    Chloride 106 95 - 110 mmol/L    CO2 25 23 - 29 mmol/L    Glucose 126 (H) 70 - 110 mg/dL    BUN 23 8 - 23 mg/dL    Creatinine 1.6 (H) 0.5 - 1.4 mg/dL    Calcium 9.0 8.7 - 10.5 mg/dL    Protein Total 7.4 6.0 - 8.4 gm/dL    Albumin 3.7 3.5 - 5.2 g/dL    Bilirubin Total 0.6 0.1 - 1.0 mg/dL    ALP 71 40 - 150 unit/L    AST 20 11 - 45 unit/L    ALT 21 10 - 44 unit/L    Anion Gap 10 8 - 16 mmol/L    eGFR 41 (L) >60 mL/min/1.73/m2   Troponin I #1    Collection Time: 03/23/25  8:34 PM   Result Value Ref Range    Troponin-I 0.011 <=0.026 ng/mL   BNP    Collection Time: 03/23/25  8:34 PM   Result Value Ref Range    BNP 82 0 - 99 pg/mL   Protime-INR    Collection Time: 03/23/25  8:34 PM   Result Value Ref Range    PT 12.5 9.0 - 12.5 seconds    INR 1.1 0.8 - 1.2   APTT    Collection Time: 03/23/25  8:34 PM   Result Value Ref Range    PTT 26.0 21.0 - 32.0 seconds   Magnesium    Collection Time:  03/23/25  8:34 PM   Result Value Ref Range    Magnesium  1.3 (L) 1.6 - 2.6 mg/dL   CBC with Differential    Collection Time: 03/23/25  8:34 PM   Result Value Ref Range    WBC 6.10 3.90 - 12.70 K/uL    RBC 4.18 (L) 4.60 - 6.20 M/uL    HGB 12.9 (L) 14.0 - 18.0 gm/dL    HCT 39.4 (L) 40.0 - 54.0 %    MCV 94 82 - 98 fL    MCH 30.9 27.0 - 50.0 pg    MCHC 32.7 32.0 - 36.0 g/dL    RDW 12.6 11.5 - 14.5 %    Platelet Count 132 (L) 150 - 450 K/uL    MPV 10.7 9.2 - 12.9 fL    Nucleated RBC 0 <=0 /100 WBC    Neut % 72.3 38 - 73 %    Lymph % 17.0 (L) 18 - 48 %    Mono % 7.4 4 - 15 %    Eos % 2.6 <=8 %    Basophil % 0.2 <=1.9 %    Imm Grans % 0.5 0.0 - 0.5 %    Neut # 4.41 1.8 - 7.7 K/uL    Lymph # 1.04 1 - 4.8 K/uL    Mono # 0.45 0.3 - 1 K/uL    Eos # 0.16 <=0.5 K/uL    Baso # 0.01 <=0.2 K/uL    Imm Grans # 0.03 0.00 - 0.04 K/uL       RADIOLOGY  X-Ray Chest PA And Lateral  Result Date: 3/17/2025  EXAMINATION: XR CHEST PA AND LATERAL CLINICAL HISTORY: preop; Gross hematuria TECHNIQUE: PA and lateral views of the chest were performed. COMPARISON: 11/06/2024 FINDINGS: The lungs are clear and free of infiltrate.  No pleural effusion or pneumothorax. The heart is not enlarged. Known tortuosity and descending thoracic aortic aneurysm with calcification of the aortic knob.     1.  No acute cardiopulmonary process. Electronically signed by: Maximus Jimenez DO Date:    03/17/2025 Time:    08:54      EKG    MICROBIOLOGY    MDM    Assessment/Plan:     * Chest pain    Coronary artery disease of native artery of native heart with stable angina pectoris  Patient with known CAD s/p stent placement, which is uncontrolled Will continue  home medications  and monitor for S/Sx of angina/ACS. Continue to monitor on telemetry. Last seen by Kari Wilson on 3/18/25 and continued on current medical therapy as patient has known chronic chest pain with recent stress test negative as noted below.    ECHO 11/5/2024    Left Ventricle: The left ventricle is  normal in size. Normal wall thickness. There is concentric remodeling. There is normal systolic function. Ejection fraction is approximately 60%. There is normal diastolic function.    Right Ventricle: Normal right ventricular cavity size. Wall thickness is normal. Systolic function is normal.    IVC/SVC: Normal venous pressure at 3 mmHg.       NM Stress 1/26/2024       Normal myocardial perfusion scan. There is no evidence of myocardial ischemia or infarction.    There is a  mild to moderate intensity fixed perfusion abnormality in the inferior wall of the left ventricle secondary to diaphragm attenuation.    The gated perfusion images showed an ejection fraction of 64% at rest. The gated perfusion images showed an ejection fraction of 66% post stress. Normal ejection fraction is greater than 59%.    There is normal wall motion at rest and post stress.    LV cavity size is normal at rest and normal at stress.    The ECG portion of the study is negative for ischemia.     Plan:  -Telemetry  -Trend troponins  -Serial EKGs at onset/worsening chest pain  -TRIPP therapy prn  -f/u cards       AAA (abdominal aortic aneurysm) without rupture  Per chart review, patient s/p previous abdominal stent graft but declined additional repair due to his age, risk of procedure, and family member dying from an aneurysm and was currently being monitored closely via surveillance by CVT.  Initial workup at outside facility per records is as follows: [CTA chest and CT abd where CT abd measured thoracic descending aortic aneurism increased in size to 6.9 x 7.0cm (previously 5.4 x 5.7cm on 8/1/23), no radiographic evidence of rupture or dissection]. Vascular Surgery consulted by ED staff and will evaluate patient in the morning.  Patient remains hemodynamically stable.    CTA Abdomen/Pelvis 1/25/2024    FINDINGS:    Vascular:   -There is aneurysmal dilatation seen of the distal descending thoracic aorta with mural plaque along the wall.   Aneurysm measures 6.3 x 6.2 cm in diameter.  Patient is status post stent graft repair abdominal aortic aneurysm.  No extra vascular contrast identified no endoleak.  Stent is patent without significant narrowing.  There is a patent stent related to the proximal left renal artery as well.  There is atherosclerosis related to the right renal artery and celiac artery and superior mesenteric artery.  Inferior mesenteric artery is not identified.  Abdominal aortic aneurysm sac appears essentially stable.  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     Nonvascular:  -Multiple hepatic cysts are present.  No suspicious masses.  Spleen is unremarkable.  The pancreas is unremarkable.  Adrenal glands appear unremarkable.  There are multiple large renal cysts as well.  Largest cyst is seen in the lower pole of the right kidney measuring 9.5 cm.  Cysts appear to be simple.  No suspicious renal lesions.  Visualized bowel is unremarkable.  No abnormal pelvic masses or fluid collections.  -Skeleton is intact.     Impression:  -Stable appearing descending thoracic aortic aneurysm.  Stable appearing abdominal aortic aneurysm.  Status post stent graft repair.  No evidence of endovascular or extravascular leak.  -Stable benign hepatic cysts and renal cysts.  No follow-up necessary for the cysts.    Plan:  -NPO  -telemetry  -optimize pain control and BP  -f/u Vascular Surgery       Essential hypertension  Chronic, controlled.  Latest blood pressure and vitals reviewed-   Temp:  [97.6 °F (36.4 °C)-99.2 °F (37.3 °C)]   Pulse:  []   Resp:  [15-20]   BP: (102-130)/(71-93)   SpO2:  [96 %-99 %] .   Home meds for hypertension were reviewed and noted below.   Hypertension Medications              carvediloL (COREG) 12.5 MG tablet Take 1 tablet (12.5 mg total) by mouth 2 (two) times daily with meals.    lisinopriL (PRINIVIL,ZESTRIL) 40 MG tablet Take 1 tablet (40 mg total) by mouth once daily.    NIFEdipine  (PROCARDIA-XL) 90 MG (OSM) 24 hr tablet Take 1 tablet (90 mg total) by mouth once daily.    nitroGLYCERIN (NITROSTAT) 0.4 MG SL tablet Place 1 tablet (0.4 mg total) under the tongue every 5 (five) minutes as needed for Chest pain.     While in the hospital, will manage blood pressure as follows; Continue home antihypertensive regimen    Will utilize p.r.n. blood pressure medication only if patient's blood pressure greater than  180/110 and he develops symptoms such as worsening chest pain or shortness of breath.      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 Most recent hemoglobin and hematocrit are listed below.  Recent Labs     03/23/25 2034   HGB 12.9*   HCT 39.4*   Plan  -Monitor serial CBC: Daily  -Transfuse PRBC if patient becomes hemodynamically unstable, symptomatic or H/H drops below 7/21.  -Patient has not received any PRBC transfusions to date  -Patient's anemia is currently stable      Stage 3b chronic kidney disease  Creatine stable for now. BMP reviewed- noted Estimated Creatinine Clearance: 35 mL/min (A) (based on SCr of 1.6 mg/dL (H)). according to latest data. Based on current GFR, CKD stage is stage 3 - GFR 30-59.  Monitor UOP and serial BMP and adjust therapy as needed. Renally dose meds. Avoid nephrotoxic medications and procedures.      Dyslipidemia  Patient is chronically on statin.will continue for now. Last Lipid Panel:   Lab Results   Component Value Date    CHOL 179 02/19/2025    HDL 45 02/19/2025    LDLCALC 109.8 02/19/2025    TRIG 121 02/19/2025    CHOLHDL 25.1 02/19/2025   Plan:  -Continue home medication  -low fat/low calorie diet      GERD (gastroesophageal reflux disease)  Chronic. Stable. Currently asymptomatic. Home medications include PPI/Antacids as needed.  Plan:  -Continue PPI/Antacids as needed         VTE Risk Mitigation (From admission, onward)           Ordered     IP VTE HIGH RISK PATIENT  Once          03/23/25 2339     Place sequential compression device  Until discontinued         03/23/25 2339     Reason for No Pharmacological VTE Prophylaxis  Once        Question:  Reasons:  Answer:  Physician Provided (leave comment)  Comment:  pending potential surgical intervention    03/23/25 2339                  //Core Measures   -DVT proph: SCDs, withholding anticoagulation pending surgical intervention   -Code status: Full    -Surrogate: none provided       Components of this note were documented using a voice recognition system and are subject to errors not corrected at the time the document was proof read. Please contact the author for any clarifications.        Jose E Angulo MD  Department of Hospital Medicine  [facility] - Emergency Dept.

## 2025-03-24 NOTE — CONSULTS
[facility]  [facility]  Consult Note    Patient Name: Jovanny Olmedo  MRN: [id number]  Admission Date: 3/23/2025  Hospital Length of Stay: 1 days  Code Status: Full Code   Attending Provider: Valentina Rader MD   Consulting Provider: Marivel Naranjo NP  Primary Care Physician: Miryam Jimenez MD  Principal Problem:Chest pain    Patient information was obtained from patient and ER records.     Inpatient consult to Cardiology  Consult performed by: Marivel Naranjo NP  Consult ordered by: Jose E Angulo MD        Subjective:     Chief Complaint:  chest pain     HPI:   Jovanny Olmedo is a 89 y.o. 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 who presented to the ED for further evaluation of intermittent chest pain x8 months duration which acutely worsened last night. Patient has significant history of AAA without rupture s/p abdominal aortic stent placement in 3/2021. Patient was seen at [facility] for further evaluation of chest pain and was found to have evidence of enlarging aneurysm noted on CT measuring 6.9 x 7.0 cm (previously measuring 5.4 x 5.7 cm on 8/1/23) patient was in process of being transferred however, left AMA to drive himself to the hospital. At time of bedside assessment, patient complaining of acute on chronic substernal chest pain described as achy in nature, rated 5/10 in severity, and was stated to radiate to his left upper extremity. He reported no known alleviating or  aggravating factors noted and reported all other review of systems negative except as noted above. Patient last seen by Kari Wilson on 03/18/2025 following hospitalization in February and has known chronic angina. Patient was scheduled to undergo left heart catheterization during that admission however, was deferred due to AAA and instead underwent nuclear stress test which was negative. Per chart review, patient declined repair due to his age, risk of procedure, and family member dying from an aneurysm and was currently being monitored closely via surveillance by CVT. Initial workup at outside facility per records is as follows: [CTA chest and CT abd where CT abd measured thoracic descending aortic aneurism increased in size to 6.9 x 7.0cm (previously 5.4 x 5.7cm on 8/1/23), no radiographic evidence of rupture or dissection, high sensitivity troponin: 22.5, which is wnl, ddimer elevated at >20, cr: 1.65]. Repeat workup in the ED here revealed patient to be afebrile without leukocytosis, hemodynamically stable, H/H 12.9/39.4, creatinine/GFR 1.6/41, blood glucose 126, magnesium 1.3, BNP and troponin within normal limits. EKG negative for evidence of acute ischemia. Patient admitted to Hospital Medicine inpatient for continued medical management and awaiting further evaluation/recommendations from Cardiology and Vascular Surgery     Cardiology consulted to assist with management patient seen and examined today resting in bed in ED reports he has been having chest pains on and off for months this episode started Saturday night/early Sunday morning that woke him up that radiated to his left arm denies any shortness of breath.  He is unsure if related to reflux pain. CP relieved while at remote ED with morphine. Was following with Dr. Aquino in Cardiology Clinic, recently Dr. Mon.  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 08/06/2023    Arthritis     Back pain     CAD (coronary artery disease)     Cataract     CKD (chronic kidney disease)     GERD (gastroesophageal reflux disease)     Glaucoma     suspect    HTN (hypertension)     Multiple subsegmental pulmonary emboli without acute cor pulmonale 09/09/2021    Prostate cancer     tx'd with radiation    PVD (peripheral vascular disease)     stent in right renal artery and aorta    Sleep apnea     wears cpap    Stable angina pectoris 05/16/2023       Past Surgical History:   Procedure Laterality Date    ABDOMINAL AORTA STENT      BLADDER FULGURATION N/A 3/19/2025    Procedure: FULGURATION, BLADDER;  Surgeon: Blayne Jimenez MD;  Location: [facility] OR;  Service: Urology;  Laterality: N/A;    CARDIAC CATHETERIZATION      CATARACT EXTRACTION W/  INTRAOCULAR LENS IMPLANT Right 04/29/2017    COLONOSCOPY N/A 9/24/2020    Procedure: COLONOSCOPY;  Surgeon: Shayy Melvin MD;  Location: [facility] ENDO;  Service: Endoscopy;  Laterality: N/A;    COLONOSCOPY N/A 1/10/2022    Procedure: COLONOSCOPY;  Surgeon: Vi Lara MD;  Location: [facility] ENDO;  Service: Endoscopy;  Laterality: N/A;    CORONARY ANGIOPLASTY      CORONARY STENT PLACEMENT      CYSTOSCOPY WITH BIOPSY OF BLADDER N/A 3/19/2025    Procedure: CYSTOSCOPY, WITH BLADDER BIOPSY;  Surgeon: Blayne Jimenez MD;  Location: [facility] OR;  Service: Urology;  Laterality: N/A;    PCIOL Right 03/29/2017    DR. LEDEZMA    PCIOL Left 07/03/2019    DR. LEDEZMA    PROSTATE BIOPSY      RENAL ARTERY STENT         Review of patient's allergies indicates:   Allergen Reactions    Codeine Other (See Comments)     When taking codeine, pt becomes very anxious       Current Facility-Administered Medications on File Prior to Encounter   Medication    leuprolide acetate (6 month) injection 45 mg     Current Outpatient Medications on File  Prior to Encounter   Medication Sig    albuterol (VENTOLIN HFA) 90 mcg/actuation inhaler Inhale 2 puffs into the lungs every 6 (six) hours as needed for Wheezing. Rescue    apremilast (OTEZLA ORAL) Take by mouth.    aspirin (ECOTRIN) 81 MG EC tablet Take 81 mg by mouth once daily.    atorvastatin (LIPITOR) 40 MG tablet Take 1 tablet (40 mg total) by mouth every evening.    azelastine (ASTELIN) 137 mcg (0.1 %) nasal spray USE 2 SPRAY(S) IN EACH NOSTRIL TWICE DAILY    betamethasone dipropionate 0.05 % cream Apply topically once daily.    betamethasone dipropionate 0.05 % cream Apply topically 2 (two) times daily.    carvediloL (COREG) 12.5 MG tablet Take 1 tablet (12.5 mg total) by mouth 2 (two) times daily with meals.    ciprofloxacin HCl (CIPRO) 500 MG tablet Take 1 tablet (500 mg total) by mouth every 12 (twelve) hours.    ferrous sulfate 325 (65 FE) MG EC tablet Take 325 mg by mouth once daily.    gentamicin (GARAMYCIN) 0.1 % ointment Apply topically 2 (two) times a day. For rash of groin.    hydrOXYzine HCL (ATARAX) 10 MG Tab Take 10 mg by mouth 3 (three) times daily as needed.    ketoconazole (NIZORAL) 2 % cream Apply topically 2 (two) times daily. For rash of groin.    lactulose (CHRONULAC) 20 gram/30 mL Soln Take 30 mLs (20 g total) by mouth daily as needed (constipation).    levocetirizine (XYZAL) 5 MG tablet Take 5 mg by mouth every evening.    lisinopriL (PRINIVIL,ZESTRIL) 40 MG tablet Take 1 tablet (40 mg total) by mouth once daily.    meloxicam (MOBIC) 15 MG tablet Take 15 mg by mouth daily as needed.    miconazole NITRATE 2 % (ZEASORB AF) 2 % top powder Use two to three times daily to prevent rash    NIFEdipine (PROCARDIA-XL) 90 MG (OSM) 24 hr tablet Take 1 tablet (90 mg total) by mouth once daily.    nitroGLYCERIN (NITROSTAT) 0.4 MG SL tablet Place 1 tablet (0.4 mg total) under the tongue every 5 (five) minutes as needed for Chest pain.    ondansetron (ZOFRAN-ODT) 4 MG TbDL Take 1 tablet (4 mg total) by  mouth every 8 (eight) hours as needed (Nausea).    oxybutynin (DITROPAN-XL) 5 MG TR24 Take 1 tablet (5 mg total) by mouth once daily.    pantoprazole (PROTONIX) 40 MG tablet Take 1 tablet (40 mg total) by mouth 2 (two) times daily.    tamsulosin (FLOMAX) 0.4 mg Cap Take 1 capsule (0.4 mg total) by mouth once daily.    traMADoL (ULTRAM) 50 mg tablet Take 50 mg by mouth daily as needed.    traMADoL (ULTRAM) 50 mg tablet Take 1 tablet (50 mg total) by mouth every 6 (six) hours as needed for Pain.    traZODone (DESYREL) 50 MG tablet Take 1 tablet (50 mg total) by mouth every evening.    finasteride (PROSCAR) 5 mg tablet Take 1 tablet (5 mg total) by mouth once daily.    triamcinolone acetonide 0.025% (KENALOG) 0.025 % cream Apply topically 2 (two) times daily. PRN rash of groin.     Family History       Problem Relation (Age of Onset)    Cancer Father    Colon cancer Father    Diabetes Mother, Sister, Brother    Glaucoma Mother    Hypertension Brother    Kidney disease Mother          Tobacco Use    Smoking status: Former     Current packs/day: 0.00     Average packs/day: 0.5 packs/day for 30.0 years (15.0 ttl pk-yrs)     Types: Cigarettes     Start date: 1965     Quit date: 1995     Years since quittin.5    Smokeless tobacco: Former    Tobacco comments:     Hold midnight prior to sx   Substance and Sexual Activity    Alcohol use: Not Currently     Comment: hold now    Drug use: Not Currently     Frequency: 4.0 times per week     Types: Marijuana     Comment: hold midnight prior to sx    Sexual activity: Yes     Partners: Female     Review of Systems   Constitutional: Positive for malaise/fatigue.   HENT: Negative.     Eyes: Negative.    Cardiovascular:  Positive for chest pain and palpitations.   Respiratory: Negative.     Skin: Negative.    Musculoskeletal:  Positive for joint pain.   Gastrointestinal: Negative.    Genitourinary: Negative.    Neurological: Negative.    Psychiatric/Behavioral: Negative.        Objective:     Vital Signs (Most Recent):  Temp: 97.6 °F (36.4 °C) (03/23/25 1824)  Pulse: 82 (03/24/25 0945)  Resp: 20 (03/24/25 0945)  BP: 125/86 (03/24/25 0945)  SpO2: 98 % (03/24/25 0945) Vital Signs (24h Range):  Temp:  [97.6 °F (36.4 °C)-99.2 °F (37.3 °C)] 97.6 °F (36.4 °C)  Pulse:  [] 82  Resp:  [15-20] 20  SpO2:  [95 %-99 %] 98 %  BP: (102-137)/(71-95) 125/86     Weight: 87.1 kg (192 lb)  Body mass index is 26.04 kg/m².    SpO2: 98 %         Intake/Output Summary (Last 24 hours) at 3/24/2025 1126  Last data filed at 3/24/2025 0630  Gross per 24 hour   Intake --   Output 600 ml   Net -600 ml       Lines/Drains/Airways       Peripheral Intravenous Line  Duration                  Peripheral IV - Single Lumen 03/23/25 2015 20 G Right Antecubital <1 day                     Physical Exam  Vitals and nursing note reviewed.   Constitutional:       Appearance: Normal appearance.   HENT:      Head: Normocephalic and atraumatic.   Eyes:      General:         Right eye: No discharge.         Left eye: No discharge.      Pupils: Pupils are equal, round, and reactive to light.   Cardiovascular:      Rate and Rhythm: Regular rhythm. Tachycardia present.      Heart sounds: S1 normal and S2 normal. No murmur heard.     No friction rub.   Pulmonary:      Effort: Pulmonary effort is normal. No respiratory distress.      Breath sounds: Normal breath sounds. No rales.   Abdominal:      Palpations: Abdomen is soft.      Tenderness: There is no abdominal tenderness.   Musculoskeletal:      Cervical back: Neck supple.      Right lower leg: No edema.      Left lower leg: No edema.   Skin:     General: Skin is warm and dry.   Neurological:      General: No focal deficit present.      Mental Status: He is alert and oriented to person, place, and time.   Psychiatric:         Mood and Affect: Mood normal.         Behavior: Behavior normal.         Thought Content: Thought content normal.          Significant Labs: BMP:  "  Recent Labs   Lab 03/23/25 2034 03/24/25  0628    138   K 4.0 4.2    108   CO2 25 20*   BUN 23 19   CREATININE 1.6* 1.3   CALCIUM 9.0 8.6*   MG 1.3*  --    , CMP   Recent Labs   Lab 03/23/25 2034 03/24/25  0628    138   K 4.0 4.2    108   CO2 25 20*   BUN 23 19   CREATININE 1.6* 1.3   CALCIUM 9.0 8.6*   ALBUMIN 3.7 3.1*   BILITOT 0.6 0.5   ALKPHOS 71 62   AST 20 21   ALT 21 17   ANIONGAP 10 10   , CBC   Recent Labs   Lab 03/23/25 2034 03/24/25  0628   WBC 6.10 5.60   HGB 12.9* 11.7*   HCT 39.4* 35.4*   * 114*   , INR   Recent Labs   Lab 03/23/25 2034   INR 1.1   , Lipid Panel No results for input(s): "CHOL", "HDL", "LDLCALC", "TRIG", "CHOLHDL" in the last 48 hours., Troponin No results for input(s): "TROPONINIHS" in the last 48 hours., and All pertinent lab results from the last 24 hours have been reviewed.    Significant Imaging: Cardiac Cath: reviewed, Echocardiogram: Transthoracic echo (TTE) complete (Cupid Only):   Results for orders placed or performed during the hospital encounter of 11/06/24   Echo   Result Value Ref Range    BSA 2.19 m2    LVOT stroke volume 85.9 cm3    LVIDd 3.9 3.5 - 6.0 cm    LV Systolic Volume 25.96 mL    LV Systolic Volume Index 12.0 mL/m2    LVIDs 2.7 2.1 - 4.0 cm    LV Diastolic Volume 64.57 mL    LV Diastolic Volume Index 29.76 mL/m2    Left Ventricular End Systolic Volume by Teichholz Method 25.96 mL    Left Ventricular End Diastolic Volume by Teichholz Method 64.57 mL    IVS 1.3 (A) 0.6 - 1.1 cm    LVOT diameter 2.4 cm    LVOT area 4.5 cm2    FS 30.8 28 - 44 %    Left Ventricle Relative Wall Thickness 0.67 cm    PW 1.3 (A) 0.6 - 1.1 cm    LV mass 179.7 g    LV Mass Index 82.8 g/m2    MV Peak E Medrado 0.50 m/s    TDI LATERAL 0.07 m/s    TDI SEPTAL 0.09 m/s    E/E' ratio 6.25 m/s    MV Peak A Medardo 0.97 m/s    E/A ratio 0.52     IVRT 88.49 msec    E wave deceleration time 344.71 msec    LV SEPTAL E/E' RATIO 5.56 m/s    LV LATERAL E/E' RATIO 7.14 m/s    " LVOT peak carol 0.8 m/s    Left Ventricular Outflow Tract Mean Velocity 0.69 cm/s    Left Ventricular Outflow Tract Mean Gradient 1.95 mmHg    RV-fallon mid d 2.9 cm    RV mid diameter 2.91 cm    RVOT peak VTI 21.4 cm    LA size 3.77 cm    Left Atrium Minor Axis 4.49 cm    Left Atrium Major Axis 4.75 cm    RA Major Axis 4.38 cm    AV mean gradient 4.4 mmHg    AV peak gradient 5.8 mmHg    Ao peak carol 1.2 m/s    Ao VTI 29.2 cm    LVOT peak VTI 19.0 cm    AV valve area 2.9 cm²    AV Velocity Ratio 0.67     AV index (prosthetic) 0.65     KILLIAN by Velocity Ratio 3.0 cm²    MV stenosis pressure 1/2 time 99.97 ms    MV valve area p 1/2 method 2.20 cm2    PV mean gradient 2 mmHg    RVOT peak carol 0.75 m/s    Ao root annulus 4.29 cm    STJ 3.78 cm    Ascending aorta 3.69 cm    IVC diameter 1.46 cm    Mean e' 0.08 m/s    ZLVIDS -3.81     ZLVIDD -6.10     SHAINA 25.2 mL/m2    LA Vol 54.73 cm3    LA WIDTH 3.7 cm    RA Width 3.5 cm    EF 60 %    Est. RA pres 3 mmHg    Narrative      Left Ventricle: The left ventricle is normal in size. Normal wall   thickness. There is concentric remodeling. There is normal systolic   function. Ejection fraction is approximately 60%. There is normal   diastolic function.    Right Ventricle: Normal right ventricular cavity size. Wall thickness   is normal. Systolic function is normal.    IVC/SVC: Normal venous pressure at 3 mmHg.     , EKG: reviewed, Stress Test: reviewed, and X-Ray: CXR: X-Ray Chest 1 View (CXR): No results found for this visit on 03/23/25.  Assessment and Plan:     * Chest pain  CP relieved with morphine at remote ED, no CP on exam  Troponin neg  Nuc stress 1/24' neg for ischemia  EKG ST, no acute dynamic changes noted  Cont ASA, plavix  OP monitor  F/u with Dr. Mon    AAA (abdominal aortic aneurysm) without rupture  Jan 24' CTA Aneurysm measures 6.3 x 6.2 cm in diameter   Sees Dr. Berry, missed his scan in Aug last year with CVT as well as did not schedule f/u with Dr. Berry  Needs  repeat scan, vascular following this admission    Coronary artery disease of native artery of native heart with stable angina pectoris  Asa, statin, plavix    Dyslipidemia  statin    GERD (gastroesophageal reflux disease)  Per primary team    Essential hypertension  Titrate medications        VTE Risk Mitigation (From admission, onward)           Ordered     IP VTE HIGH RISK PATIENT  Once         03/23/25 2339     Place sequential compression device  Until discontinued         03/23/25 2339     Reason for No Pharmacological VTE Prophylaxis  Once        Question:  Reasons:  Answer:  Physician Provided (leave comment)  Comment:  pending potential surgical intervention    03/23/25 2339                    Thank you for your consult. I will follow-up with patient. Please contact us if you have any additional questions.    Marivel Naranjo NP  Cardiology   [facility] - Emergency Dept.

## 2025-03-24 NOTE — SUBJECTIVE & OBJECTIVE
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    Arthritis     Back pain     CAD (coronary artery disease)     Cataract     CKD (chronic kidney disease)     GERD (gastroesophageal reflux disease)     Glaucoma     suspect    HTN (hypertension)     Multiple subsegmental pulmonary emboli without acute cor pulmonale 09/09/2021    Prostate cancer     tx'd with radiation    PVD (peripheral vascular disease)     stent in right renal artery and aorta    Sleep apnea     wears cpap    Stable angina pectoris 05/16/2023       Past Surgical History:   Procedure Laterality Date    ABDOMINAL AORTA STENT      BLADDER FULGURATION N/A 3/19/2025    Procedure: FULGURATION, BLADDER;  Surgeon: Blayne Jimenez MD;  Location: [facility] OR;  Service: Urology;  Laterality: N/A;    CARDIAC CATHETERIZATION      CATARACT EXTRACTION W/  INTRAOCULAR LENS IMPLANT Right 04/29/2017    COLONOSCOPY N/A 9/24/2020    Procedure: COLONOSCOPY;  Surgeon: Shayy Melvin MD;  Location: [facility] ENDO;  Service: Endoscopy;  Laterality: N/A;    COLONOSCOPY N/A 1/10/2022    Procedure: COLONOSCOPY;  Surgeon: Vi Lara MD;  Location: [facility] ENDO;  Service: Endoscopy;  Laterality: N/A;    CORONARY ANGIOPLASTY      CORONARY STENT PLACEMENT      CYSTOSCOPY WITH BIOPSY OF BLADDER N/A 3/19/2025    Procedure: CYSTOSCOPY, WITH BLADDER BIOPSY;  Surgeon: Blayne Jimenez MD;  Location: [facility] OR;  Service: Urology;  Laterality: N/A;    PCIOL Right 03/29/2017    DR. LEDEZMA    PCIOL Left 07/03/2019    DR. LEDEZMA    PROSTATE BIOPSY      RENAL ARTERY STENT         Review of patient's allergies indicates:   Allergen Reactions    Codeine Other (See Comments)     When taking codeine, pt becomes very anxious       Current Facility-Administered Medications on File Prior to Encounter   Medication    leuprolide acetate (6 month)  injection 45 mg     Current Outpatient Medications on File Prior to Encounter   Medication Sig    albuterol (VENTOLIN HFA) 90 mcg/actuation inhaler Inhale 2 puffs into the lungs every 6 (six) hours as needed for Wheezing. Rescue    apremilast (OTEZLA ORAL) Take by mouth.    aspirin (ECOTRIN) 81 MG EC tablet Take 81 mg by mouth once daily.    atorvastatin (LIPITOR) 40 MG tablet Take 1 tablet (40 mg total) by mouth every evening.    azelastine (ASTELIN) 137 mcg (0.1 %) nasal spray USE 2 SPRAY(S) IN EACH NOSTRIL TWICE DAILY    betamethasone dipropionate 0.05 % cream Apply topically once daily.    betamethasone dipropionate 0.05 % cream Apply topically 2 (two) times daily.    carvediloL (COREG) 12.5 MG tablet Take 1 tablet (12.5 mg total) by mouth 2 (two) times daily with meals.    ciprofloxacin HCl (CIPRO) 500 MG tablet Take 1 tablet (500 mg total) by mouth every 12 (twelve) hours.    ferrous sulfate 325 (65 FE) MG EC tablet Take 325 mg by mouth once daily.    gentamicin (GARAMYCIN) 0.1 % ointment Apply topically 2 (two) times a day. For rash of groin.    hydrOXYzine HCL (ATARAX) 10 MG Tab Take 10 mg by mouth 3 (three) times daily as needed.    ketoconazole (NIZORAL) 2 % cream Apply topically 2 (two) times daily. For rash of groin.    lactulose (CHRONULAC) 20 gram/30 mL Soln Take 30 mLs (20 g total) by mouth daily as needed (constipation).    levocetirizine (XYZAL) 5 MG tablet Take 5 mg by mouth every evening.    lisinopriL (PRINIVIL,ZESTRIL) 40 MG tablet Take 1 tablet (40 mg total) by mouth once daily.    meloxicam (MOBIC) 15 MG tablet Take 15 mg by mouth daily as needed.    miconazole NITRATE 2 % (ZEASORB AF) 2 % top powder Use two to three times daily to prevent rash    NIFEdipine (PROCARDIA-XL) 90 MG (OSM) 24 hr tablet Take 1 tablet (90 mg total) by mouth once daily.    nitroGLYCERIN (NITROSTAT) 0.4 MG SL tablet Place 1 tablet (0.4 mg total) under the tongue every 5 (five) minutes as needed for Chest pain.     ondansetron (ZOFRAN-ODT) 4 MG TbDL Take 1 tablet (4 mg total) by mouth every 8 (eight) hours as needed (Nausea).    oxybutynin (DITROPAN-XL) 5 MG TR24 Take 1 tablet (5 mg total) by mouth once daily.    pantoprazole (PROTONIX) 40 MG tablet Take 1 tablet (40 mg total) by mouth 2 (two) times daily.    tamsulosin (FLOMAX) 0.4 mg Cap Take 1 capsule (0.4 mg total) by mouth once daily.    traMADoL (ULTRAM) 50 mg tablet Take 50 mg by mouth daily as needed.    traMADoL (ULTRAM) 50 mg tablet Take 1 tablet (50 mg total) by mouth every 6 (six) hours as needed for Pain.    traZODone (DESYREL) 50 MG tablet Take 1 tablet (50 mg total) by mouth every evening.    finasteride (PROSCAR) 5 mg tablet Take 1 tablet (5 mg total) by mouth once daily.    triamcinolone acetonide 0.025% (KENALOG) 0.025 % cream Apply topically 2 (two) times daily. PRN rash of groin.     Family History       Problem Relation (Age of Onset)    Cancer Father    Colon cancer Father    Diabetes Mother, Sister, Brother    Glaucoma Mother    Hypertension Brother    Kidney disease Mother          Tobacco Use    Smoking status: Former     Current packs/day: 0.00     Average packs/day: 0.5 packs/day for 30.0 years (15.0 ttl pk-yrs)     Types: Cigarettes     Start date: 1965     Quit date: 1995     Years since quittin.5    Smokeless tobacco: Former    Tobacco comments:     Hold midnight prior to sx   Substance and Sexual Activity    Alcohol use: Not Currently     Comment: hold now    Drug use: Not Currently     Frequency: 4.0 times per week     Types: Marijuana     Comment: hold midnight prior to sx    Sexual activity: Yes     Partners: Female     Review of Systems   All other systems reviewed and are negative.    Objective:     Vital Signs (Most Recent):  Temp: 97.6 °F (36.4 °C) (25)  Pulse: 99 (25)  Resp: 18 (25)  BP: 117/87 (25)  SpO2: 96 % (25) Vital Signs (24h Range):  Temp:  [97.6 °F (36.4  °C)-99.2 °F (37.3 °C)] 97.6 °F (36.4 °C)  Pulse:  [] 99  Resp:  [15-20] 18  SpO2:  [96 %-98 %] 96 %  BP: (113-130)/(75-93) 117/87     Weight: 87.1 kg (192 lb)  Body mass index is 26.04 kg/m².     Physical Exam  Vitals reviewed.   Constitutional:       General: He is not in acute distress.     Appearance: Normal appearance. He is normal weight. He is not ill-appearing, toxic-appearing or diaphoretic.   HENT:      Head: Normocephalic and atraumatic.      Right Ear: External ear normal.      Left Ear: External ear normal.      Nose: Nose normal. No congestion or rhinorrhea.      Mouth/Throat:      Mouth: Mucous membranes are moist.      Pharynx: Oropharynx is clear. No oropharyngeal exudate or posterior oropharyngeal erythema.   Eyes:      General: No scleral icterus.     Extraocular Movements: Extraocular movements intact.      Conjunctiva/sclera: Conjunctivae normal.      Pupils: Pupils are equal, round, and reactive to light.   Neck:      Vascular: No carotid bruit.   Cardiovascular:      Rate and Rhythm: Normal rate and regular rhythm.      Pulses: Normal pulses.      Heart sounds: Normal heart sounds. No murmur heard.     No friction rub. No gallop.   Pulmonary:      Effort: Pulmonary effort is normal. No respiratory distress.      Breath sounds: Normal breath sounds. No stridor. No wheezing, rhonchi or rales.   Chest:      Chest wall: No tenderness.   Abdominal:      General: Abdomen is flat. Bowel sounds are normal. There is no distension.      Palpations: Abdomen is soft. There is no mass.      Tenderness: There is no abdominal tenderness. There is no right CVA tenderness, left CVA tenderness, guarding or rebound.      Hernia: No hernia is present.   Musculoskeletal:         General: No swelling, tenderness, deformity or signs of injury. Normal range of motion.      Cervical back: Normal range of motion and neck supple. No rigidity or tenderness.   Lymphadenopathy:      Cervical: No cervical adenopathy.    Skin:     General: Skin is warm and dry.      Capillary Refill: Capillary refill takes less than 2 seconds.      Coloration: Skin is not jaundiced or pale.      Findings: No bruising, erythema, lesion or rash.   Neurological:      General: No focal deficit present.      Mental Status: He is alert and oriented to person, place, and time. Mental status is at baseline.      Cranial Nerves: No cranial nerve deficit.      Sensory: No sensory deficit.      Motor: No weakness.      Coordination: Coordination normal.   Psychiatric:         Mood and Affect: Mood normal.         Behavior: Behavior normal.         Thought Content: Thought content normal.         Judgment: Judgment normal.              CRANIAL NERVES     CN III, IV, VI   Pupils are equal, round, and reactive to light.       Significant Labs: All pertinent labs within the past 24 hours have been reviewed.    Significant Imaging: I have reviewed all pertinent imaging results/findings within the past 24 hours.    LABS:  Recent Results (from the past 24 hours)   EKG 12-lead    Collection Time: 03/23/25  6:21 PM   Result Value Ref Range    QRS Duration 116 ms    [facility] QTC Calculation 482 ms   Hepatitis C Antibody    Collection Time: 03/23/25  8:34 PM   Result Value Ref Range    Hep C Ab Interp Negative Negative   HIV 1/2 Ag/Ab (4th Gen)    Collection Time: 03/23/25  8:34 PM   Result Value Ref Range    HIV 1/2 Ag/Ab Negative Negative   Comprehensive metabolic panel    Collection Time: 03/23/25  8:34 PM   Result Value Ref Range    Sodium 141 136 - 145 mmol/L    Potassium 4.0 3.5 - 5.1 mmol/L    Chloride 106 95 - 110 mmol/L    CO2 25 23 - 29 mmol/L    Glucose 126 (H) 70 - 110 mg/dL    BUN 23 8 - 23 mg/dL    Creatinine 1.6 (H) 0.5 - 1.4 mg/dL    Calcium 9.0 8.7 - 10.5 mg/dL    Protein Total 7.4 6.0 - 8.4 gm/dL    Albumin 3.7 3.5 - 5.2 g/dL    Bilirubin Total 0.6 0.1 - 1.0 mg/dL    ALP 71 40 - 150 unit/L    AST 20 11 - 45 unit/L    ALT 21 10 - 44 unit/L    Anion Gap 10 8  - 16 mmol/L    eGFR 41 (L) >60 mL/min/1.73/m2   Troponin I #1    Collection Time: 03/23/25  8:34 PM   Result Value Ref Range    Troponin-I 0.011 <=0.026 ng/mL   BNP    Collection Time: 03/23/25  8:34 PM   Result Value Ref Range    BNP 82 0 - 99 pg/mL   Protime-INR    Collection Time: 03/23/25  8:34 PM   Result Value Ref Range    PT 12.5 9.0 - 12.5 seconds    INR 1.1 0.8 - 1.2   APTT    Collection Time: 03/23/25  8:34 PM   Result Value Ref Range    PTT 26.0 21.0 - 32.0 seconds   Magnesium    Collection Time: 03/23/25  8:34 PM   Result Value Ref Range    Magnesium  1.3 (L) 1.6 - 2.6 mg/dL   CBC with Differential    Collection Time: 03/23/25  8:34 PM   Result Value Ref Range    WBC 6.10 3.90 - 12.70 K/uL    RBC 4.18 (L) 4.60 - 6.20 M/uL    HGB 12.9 (L) 14.0 - 18.0 gm/dL    HCT 39.4 (L) 40.0 - 54.0 %    MCV 94 82 - 98 fL    MCH 30.9 27.0 - 50.0 pg    MCHC 32.7 32.0 - 36.0 g/dL    RDW 12.6 11.5 - 14.5 %    Platelet Count 132 (L) 150 - 450 K/uL    MPV 10.7 9.2 - 12.9 fL    Nucleated RBC 0 <=0 /100 WBC    Neut % 72.3 38 - 73 %    Lymph % 17.0 (L) 18 - 48 %    Mono % 7.4 4 - 15 %    Eos % 2.6 <=8 %    Basophil % 0.2 <=1.9 %    Imm Grans % 0.5 0.0 - 0.5 %    Neut # 4.41 1.8 - 7.7 K/uL    Lymph # 1.04 1 - 4.8 K/uL    Mono # 0.45 0.3 - 1 K/uL    Eos # 0.16 <=0.5 K/uL    Baso # 0.01 <=0.2 K/uL    Imm Grans # 0.03 0.00 - 0.04 K/uL       RADIOLOGY  X-Ray Chest PA And Lateral  Result Date: 3/17/2025  EXAMINATION: XR CHEST PA AND LATERAL CLINICAL HISTORY: preop; Gross hematuria TECHNIQUE: PA and lateral views of the chest were performed. COMPARISON: 11/06/2024 FINDINGS: The lungs are clear and free of infiltrate.  No pleural effusion or pneumothorax. The heart is not enlarged. Known tortuosity and descending thoracic aortic aneurysm with calcification of the aortic knob.     1.  No acute cardiopulmonary process. Electronically signed by: Maximus Jimenez DO Date:    03/17/2025 Time:    08:54      EKG    MICROBIOLOGY    MDM

## 2025-03-24 NOTE — DISCHARGE SUMMARY
[facility] Medicine  Discharge Summary      Patient Name: Jovanny Olmedo  MRN: [id number]  POOJA: [id number]  Patient Class: IP- Inpatient  Admission Date: 3/23/2025  Hospital Length of Stay: 1 days  Discharge Date and Time: 3/24/2025  3:11 PM  Attending Physician: No att. providers found   Discharging Provider: Valentina Rader MD  Primary Care Provider: Miryam Jimenez MD    Primary Care Team: Networked reference to record PCT     HPI:   Jovanny Olmedo is a 89 y.o. 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 who presented to the ED for further evaluation of intermittent chest pain x8 months duration which acutely worsened last night.  Patient has significant history of AAA without rupture s/p abdominal aortic stent placement in 3/2021.  Patient was seen at [facility] for further evaluation of chest pain and was found to have evidence of enlarging aneurysm noted on CT measuring 6.9 x 7.0 cm (previously measuring 5.4 x 5.7 cm on 8/1/23) patient was in process of being transferred however, left AMA to drive himself to the hospital.  At time of bedside assessment, patient complaining of acute on chronic substernal chest pain described as achy in nature, rated 5/10 in severity, and was stated to radiate to his left upper extremity.  He reported no known alleviating or aggravating factors noted and reported all other review of systems negative except as noted above.  Patient last seen by Kari Wilson on  03/18/2025 following hospitalization in February and has known chronic angina.  Patient was scheduled to undergo left heart catheterization during that admission however, was deferred due to AAA and instead underwent nuclear stress test which was negative.  Per chart review, patient declined repair due to his age, risk of procedure, and family member dying from an aneurysm and was currently being monitored closely via surveillance by CVT.  Initial workup at outside facility per records is as follows: [CTA chest and CT abd where CT abd measured thoracic descending aortic aneurism increased in size to 6.9 x 7.0cm (previously 5.4 x 5.7cm on 8/1/23), no radiographic evidence of rupture or dissection, high sensitivity troponin: 22.5, which is wnl, ddimer elevated at >20, cr: 1.65].  Repeat workup in the ED here revealed patient to be afebrile without leukocytosis, hemodynamically stable, H/H 12.9/39.4, creatinine/GFR 1.6/41, blood glucose 126, magnesium 1.3, BNP and troponin within normal limits.  EKG negative for evidence of acute ischemia.  Patient admitted to Hospital Medicine inpatient for continued medical management and awaiting further evaluation/recommendations from Cardiology and Vascular Surgery.     PCP: Miryam Jimenez      * No surgery found *      Hospital Course:   Mr. Olmedo is a 89-year-old male that presented with chest pain and was seen by Cardiology due to excessive cardiac history but felt that the pain was likely due to his aortic aneurysm that has been a known history and was followed by vascular surgery on outpatient basis.  Patient has been missing appointments from 2 different vascular surgery group that has been trying to have him come in for reimaging.  Patient was seen by Dr. Martinez inpatient and recommended a 6 week follow up as well as a referral to interventional pain management and pain medicines.  Of note, patient has an allergy to codeine and this was discussed with him as he is  requesting pain medications.  He states that the allergy is agitation but states that he can tolerate it and therefore is okay with receiving Norco.  Patient denies any signs of respiratory distress or trouble with swallowing with codeine.  Patient was cleared for discharge by Cardiology and vascular surgery and discharge instructions were both explained to the patient as well as included in his AVS.  All questions answered and plan of care discussed with patient and son at bedside.     Goals of Care Treatment Preferences:  Code Status: Full Code      SDOH Screening:  The patient was screened for utility difficulties, food insecurity, transport difficulties, housing insecurity, and interpersonal safety and there were no concerns identified this admission.     Consults:   Consults (From admission, onward)          Status Ordering Provider     Inpatient consult to Cardiology  Once        Provider:  Omid Cook MD    Completed AMIRA TORIBIO     Inpatient consult to Vascular Surgery  Once        Provider:  Mack Lehman III, MD    Completed SHERRON HOLDER JR            Assessment & Plan  Chest pain      Essential hypertension  Chronic, controlled.  Latest blood pressure and vitals reviewed-   Temp:  [97.6 °F (36.4 °C)-97.9 °F (36.6 °C)]   Pulse:  []   Resp:  [15-20]   BP: (102-137)/()   SpO2:  [95 %-99 %] .   Home meds for hypertension were reviewed and noted below.   Hypertension Medications              carvediloL (COREG) 12.5 MG tablet Take 1 tablet (12.5 mg total) by mouth 2 (two) times daily with meals.    lisinopriL (PRINIVIL,ZESTRIL) 40 MG tablet Take 1 tablet (40 mg total) by mouth once daily.    NIFEdipine (PROCARDIA-XL) 90 MG (OSM) 24 hr tablet Take 1 tablet (90 mg total) by mouth once daily.    nitroGLYCERIN (NITROSTAT) 0.4 MG SL tablet Place 1 tablet (0.4 mg total) under the tongue every 5 (five) minutes as needed for Chest pain.     While in the hospital, will manage blood  pressure as follows; Continue home antihypertensive regimen    Will utilize p.r.n. blood pressure medication only if patient's blood pressure greater than  180/110 and he develops symptoms such as worsening chest pain or shortness of breath.    GERD (gastroesophageal reflux disease)  Chronic. Stable. Currently asymptomatic. Home medications include PPI/Antacids as needed.  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 Most recent hemoglobin and hematocrit are listed below.  Recent Labs     03/23/25 2034 03/24/25  0628   HGB 12.9* 11.7*   HCT 39.4* 35.4*   Plan  -Monitor serial CBC: Daily  -Transfuse PRBC if patient becomes hemodynamically unstable, symptomatic or H/H drops below 7/21.  -Patient has not received any PRBC transfusions to date  -Patient's anemia is currently stable    Dyslipidemia  Patient is chronically on statin.will continue for now. Last Lipid Panel:   Lab Results   Component Value Date    CHOL 179 02/19/2025    HDL 45 02/19/2025    LDLCALC 109.8 02/19/2025    TRIG 121 02/19/2025    CHOLHDL 25.1 02/19/2025   Plan:  -Continue home medication  -low fat/low calorie diet    Coronary artery disease of native artery of native heart with stable angina pectoris  Patient with known CAD s/p stent placement, which is uncontrolled Will continue  home medications  and monitor for S/Sx of angina/ACS. Continue to monitor on telemetry. Last seen by Kari Wilson on 3/18/25 and continued on current medical therapy as patient has known chronic chest pain with recent stress test negative as noted below.    ECHO 11/5/2024    Left Ventricle: The left ventricle is normal in size. Normal wall thickness. There is concentric remodeling. There is normal systolic function. Ejection fraction is approximately 60%. There is normal diastolic function.    Right Ventricle: Normal right ventricular cavity size. Wall  thickness is normal. Systolic function is normal.    IVC/SVC: Normal venous pressure at 3 mmHg.       NM Stress 1/26/2024       Normal myocardial perfusion scan. There is no evidence of myocardial ischemia or infarction.    There is a  mild to moderate intensity fixed perfusion abnormality in the inferior wall of the left ventricle secondary to diaphragm attenuation.    The gated perfusion images showed an ejection fraction of 64% at rest. The gated perfusion images showed an ejection fraction of 66% post stress. Normal ejection fraction is greater than 59%.    There is normal wall motion at rest and post stress.    LV cavity size is normal at rest and normal at stress.    The ECG portion of the study is negative for ischemia.     Plan:  -Telemetry  -Trend troponins  -Serial EKGs at onset/worsening chest pain  -TRIPP therapy prn  -f/u cards     AAA (abdominal aortic aneurysm) without rupture  Per chart review, patient s/p previous abdominal stent graft but declined additional repair due to his age, risk of procedure, and family member dying from an aneurysm and was currently being monitored closely via surveillance by CVT.  Initial workup at outside facility per records is as follows: [CTA chest and CT abd where CT abd measured thoracic descending aortic aneurism increased in size to 6.9 x 7.0cm (previously 5.4 x 5.7cm on 8/1/23), no radiographic evidence of rupture or dissection]. Vascular Surgery consulted by ED staff and will evaluate patient in the morning.  Patient remains hemodynamically stable.    CTA Abdomen/Pelvis 1/25/2024    FINDINGS:    Vascular:   -There is aneurysmal dilatation seen of the distal descending thoracic aorta with mural plaque along the wall.  Aneurysm measures 6.3 x 6.2 cm in diameter.  Patient is status post stent graft repair abdominal aortic aneurysm.  No extra vascular contrast identified no endoleak.  Stent is patent without significant narrowing.  There is a patent stent related to  the proximal left renal artery as well.  There is atherosclerosis related to the right renal artery and celiac artery and superior mesenteric artery.  Inferior mesenteric artery is not identified.  Abdominal aortic aneurysm sac appears essentially stable.  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     Nonvascular:  -Multiple hepatic cysts are present.  No suspicious masses.  Spleen is unremarkable.  The pancreas is unremarkable.  Adrenal glands appear unremarkable.  There are multiple large renal cysts as well.  Largest cyst is seen in the lower pole of the right kidney measuring 9.5 cm.  Cysts appear to be simple.  No suspicious renal lesions.  Visualized bowel is unremarkable.  No abnormal pelvic masses or fluid collections.  -Skeleton is intact.     Impression:  -Stable appearing descending thoracic aortic aneurysm.  Stable appearing abdominal aortic aneurysm.  Status post stent graft repair.  No evidence of endovascular or extravascular leak.  -Stable benign hepatic cysts and renal cysts.  No follow-up necessary for the cysts.    Plan:  -NPO  -telemetry  -optimize pain control and BP  -f/u Vascular Surgery     Stage 3b chronic kidney disease  Creatine stable for now. BMP reviewed- noted Estimated Creatinine Clearance: 43.1 mL/min (based on SCr of 1.3 mg/dL). according to latest data. Based on current GFR, CKD stage is stage 3 - GFR 30-59.  Monitor UOP and serial BMP and adjust therapy as needed. Renally dose meds. Avoid nephrotoxic medications and procedures.    Final Active Diagnoses:    Diagnosis Date Noted POA    PRINCIPAL PROBLEM:  Chest pain [R07.9] 03/23/2025 Yes    Stage 3b chronic kidney disease [N18.32] 03/24/2025 Yes     Chronic    AAA (abdominal aortic aneurysm) without rupture [I71.40] 03/23/2025 Yes    Coronary artery disease of native artery of native heart with stable angina pectoris [I25.118] 08/06/2023 Yes    Dyslipidemia [E78.5] 01/13/2020 Yes     Chronic    Anemia in stage  3b chronic kidney disease [N18.32, D63.1] 04/27/2016 Yes     Chronic    GERD (gastroesophageal reflux disease) [K21.9] 12/03/2014 Yes     Chronic    Essential hypertension [I10] 07/03/2014 Yes     Chronic      Problems Resolved During this Admission:       Discharged Condition: stable    Disposition: Home or Self Care    Follow Up:   Follow-up Information       Miryam Jimenez MD Follow up in 1 week(s).    Specialty: Family Medicine  Contact information:  [address]  [place] [address] [phone]               Michelle Martinez MD Follow up in 6 week(s).    Specialty: Vascular Surgery  Contact information:  [address]  [facility] [address] [phone]               Dimas Mon MD Follow up in 2 week(s).    Specialties: Interventional Cardiology, Cardiology  Contact information:  [address] [facility] [address] [phone]                           Patient Instructions:      Diet Cardiac     Activity as tolerated       Significant Diagnostic Studies: Labs: BMP:   Recent Labs   Lab 03/23/25 2034 03/24/25  0628    138   K 4.0 4.2    108   CO2 25 20*   BUN 23 19   CREATININE 1.6* 1.3   CALCIUM 9.0 8.6*   MG 1.3*  --     and CBC   Recent Labs   Lab 03/23/25 2034 03/24/25  0628   WBC 6.10 5.60   HGB 12.9* 11.7*   HCT 39.4* 35.4*   * 114*     Radiology:   CT Previous   Final Result      CT Previous   Final Result      Xray Previous   Final Result           Pending Diagnostic Studies:       Procedure Component Value Units Date/Time    HCV Virus Hold Specimen [[id number]] Collected: 03/23/25 2034    Order Status: Sent Lab Status: In process Updated: 03/23/25 2038    Specimen: Blood            Medications:  Reconciled Home Medications:      Medication List        START taking these medications      HYDROcodone-acetaminophen 5-325 mg per tablet  Commonly known as: NORCO  Take 1 tablet by mouth every 6 (six) hours as needed for Pain.            CHANGE  how you take these medications      betamethasone dipropionate 0.05 % cream  Apply topically once daily.  What changed: Another medication with the same name was removed. Continue taking this medication, and follow the directions you see here.     traMADoL 50 mg tablet  Commonly known as: ULTRAM  Take 1 tablet (50 mg total) by mouth every 6 (six) hours as needed for Pain.  What changed: Another medication with the same name was removed. Continue taking this medication, and follow the directions you see here.            CONTINUE taking these medications      albuterol 90 mcg/actuation inhaler  Commonly known as: VENTOLIN HFA  Inhale 2 puffs into the lungs every 6 (six) hours as needed for Wheezing. Rescue     aspirin 81 MG EC tablet  Commonly known as: ECOTRIN  Take 81 mg by mouth once daily.     atorvastatin 40 MG tablet  Commonly known as: LIPITOR  Take 1 tablet (40 mg total) by mouth every evening.     carvediloL 12.5 MG tablet  Commonly known as: COREG  Take 1 tablet (12.5 mg total) by mouth 2 (two) times daily with meals.     ferrous sulfate 325 (65 FE) MG EC tablet  Take 325 mg by mouth once daily.     ketoconazole 2 % cream  Commonly known as: NIZORAL  Apply topically 2 (two) times daily. For rash of groin.     lactulose 20 gram/30 mL Soln  Commonly known as: CHRONULAC  Take 30 mLs (20 g total) by mouth daily as needed (constipation).     levocetirizine 5 MG tablet  Commonly known as: XYZAL  Take 5 mg by mouth every evening.     lisinopriL 40 MG tablet  Commonly known as: PRINIVIL,ZESTRIL  Take 1 tablet (40 mg total) by mouth once daily.     NIFEdipine 90 MG (OSM) 24 hr tablet  Commonly known as: PROCARDIA-XL  Take 1 tablet (90 mg total) by mouth once daily.     nitroGLYCERIN 0.4 MG SL tablet  Commonly known as: NITROSTAT  Place 1 tablet (0.4 mg total) under the tongue every 5 (five) minutes as needed for Chest pain.     ondansetron 4 MG Tbdl  Commonly known as: ZOFRAN-ODT  Take 1 tablet (4 mg total) by mouth  every 8 (eight) hours as needed (Nausea).     oxybutynin 5 MG Tr24  Commonly known as: DITROPAN-XL  Take 1 tablet (5 mg total) by mouth once daily.     pantoprazole 40 MG tablet  Commonly known as: PROTONIX  Take 1 tablet (40 mg total) by mouth 2 (two) times daily.     tamsulosin 0.4 mg Cap  Commonly known as: FLOMAX  Take 1 capsule (0.4 mg total) by mouth once daily.     traZODone 50 MG tablet  Commonly known as: DESYREL  Take 1 tablet (50 mg total) by mouth every evening.            STOP taking these medications      azelastine 137 mcg (0.1 %) nasal spray  Commonly known as: ASTELIN     ciprofloxacin HCl 500 MG tablet  Commonly known as: CIPRO     finasteride 5 mg tablet  Commonly known as: PROSCAR     gentamicin 0.1 % ointment  Commonly known as: GARAMYCIN     hydrOXYzine HCL 10 MG Tab  Commonly known as: ATARAX     meloxicam 15 MG tablet  Commonly known as: MOBIC     miconazole NITRATE 2 % 2 % top powder  Commonly known as: ZEASORB AF     OTEZLA ORAL     triamcinolone acetonide 0.025% 0.025 % cream  Commonly known as: KENALOG              Indwelling Lines/Drains at time of discharge:   Lines/Drains/Airways       None                   Time spent on the discharge of patient: 40 minutes         Valentina Rader MD  Department of Hospital Medicine  [facility]

## 2025-03-24 NOTE — ASSESSMENT & PLAN NOTE
Creatine stable for now. BMP reviewed- noted Estimated Creatinine Clearance: 43.1 mL/min (based on SCr of 1.3 mg/dL). according to latest data. Based on current GFR, CKD stage is stage 3 - GFR 30-59.  Monitor UOP and serial BMP and adjust therapy as needed. Renally dose meds. Avoid nephrotoxic medications and procedures.

## 2025-03-24 NOTE — HOSPITAL COURSE
Mr. Olmedo is a 89-year-old male that presented with chest pain and was seen by Cardiology due to excessive cardiac history but felt that the pain was likely due to his aortic aneurysm that has been a known history and was followed by vascular surgery on outpatient basis.  Patient has been missing appointments from 2 different vascular surgery group that has been trying to have him come in for reimaging.  Patient was seen by Dr. Martinez inpatient and recommended a 6 week follow up as well as a referral to interventional pain management and pain medicines.  Of note, patient has an allergy to codeine and this was discussed with him as he is requesting pain medications.  He states that the allergy is agitation but states that he can tolerate it and therefore is okay with receiving Norco.  Patient denies any signs of respiratory distress or trouble with swallowing with codeine.  Patient was cleared for discharge by Cardiology and vascular surgery and discharge instructions were both explained to the patient as well as included in his AVS.  All questions answered and plan of care discussed with patient and son at bedside.

## 2025-03-24 NOTE — ASSESSMENT & PLAN NOTE
Creatine stable for now. BMP reviewed- noted Estimated Creatinine Clearance: 35 mL/min (A) (based on SCr of 1.6 mg/dL (H)). according to latest data. Based on current GFR, CKD stage is stage 3 - GFR 30-59.  Monitor UOP and serial BMP and adjust therapy as needed. Renally dose meds. Avoid nephrotoxic medications and procedures.

## 2025-03-24 NOTE — HPI
Jovanny Olmedo is a 89 y.o. 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 who presented to the ED for further evaluation of intermittent chest pain x8 months duration which acutely worsened last night. Patient has significant history of AAA without rupture s/p abdominal aortic stent placement in 3/2021. Patient was seen at [facility] for further evaluation of chest pain and was found to have evidence of enlarging aneurysm noted on CT measuring 6.9 x 7.0 cm (previously measuring 5.4 x 5.7 cm on 8/1/23) patient was in process of being transferred however, left AMA to drive himself to the hospital. At time of bedside assessment, patient complaining of acute on chronic substernal chest pain described as achy in nature, rated 5/10 in severity, and was stated to radiate to his left upper extremity. He reported no known alleviating or aggravating factors noted and reported all other review of systems negative except as noted above. Patient last seen by Kari Wilson on 03/18/2025 following hospitalization in February and has known chronic angina. Patient was scheduled to undergo left heart catheterization during that admission however, was deferred due to AAA and instead underwent nuclear stress test which was negative. Per chart review, patient declined repair due to his age, risk of procedure, and family member dying from an aneurysm and was currently being monitored closely via surveillance by CVT. Initial workup at outside  facility per records is as follows: [CTA chest and CT abd where CT abd measured thoracic descending aortic aneurism increased in size to 6.9 x 7.0cm (previously 5.4 x 5.7cm on 8/1/23), no radiographic evidence of rupture or dissection, high sensitivity troponin: 22.5, which is wnl, ddimer elevated at >20, cr: 1.65]. Repeat workup in the ED here revealed patient to be afebrile without leukocytosis, hemodynamically stable, H/H 12.9/39.4, creatinine/GFR 1.6/41, blood glucose 126, magnesium 1.3, BNP and troponin within normal limits. EKG negative for evidence of acute ischemia. Patient admitted to Hospital Medicine inpatient for continued medical management and awaiting further evaluation/recommendations from Cardiology and Vascular Surgery     Cardiology consulted to assist with management patient seen and examined today resting in bed in ED reports he has been having chest pains on and off for months this episode started Saturday night/early Sunday morning that woke him up that radiated to his left arm denies any shortness of breath.  He is unsure if related to reflux pain. CP relieved while at remote ED with morphine. Was following with Dr. Aquino in Cardiology Clinic, recently Dr. Mon.  Labs reviewed troponin and BNP neg, Mg 1.3, Crt 1.6 EKG ST, EF nml on recent echo

## 2025-03-24 NOTE — PLAN OF CARE
Discharge education given. Patient verbalized an understanding. IV removed, catheter intact. Pt to be discharged with Rx meds and personal belongings. Patient son present to bring him home.

## 2025-03-24 NOTE — PLAN OF CARE
[facility] - Med Surg  Discharge Final Note    Primary Care Provider: Miryam Jimenez MD    Expected Discharge Date: 3/24/2025    Final Discharge Note (most recent)       Final Note - 03/24/25 1351          Final Note    Assessment Type Final Discharge Note     Anticipated Discharge Disposition Home or Self Care     Hospital Resources/Appts/Education Provided Appointments scheduled and added to AVS        Post-Acute Status    Discharge Delays None known at this time                     Contact Info       Miryam Jimenez MD   Specialty: Family Medicine   Relationship: PCP - General    [address]  [place] [address]   Phone: [phone]       Next Steps: Follow up in 1 week(s)    Michelle Martinez MD   Specialty: Vascular Surgery    [address]  [facility] [address]   Phone: [phone]       Next Steps: Follow up in 6 week(s)          Patient has no d/c needs at this time. Sw to follow up, as needed, for d/c planning purposes.

## 2025-03-24 NOTE — ASSESSMENT & PLAN NOTE
Anemia is likely due to chronic disease due to Chronic Kidney Disease. Most recent hemoglobin and hematocrit are listed below.  Recent Labs     03/23/25 2034 03/24/25  0628   HGB 12.9* 11.7*   HCT 39.4* 35.4*   Plan  -Monitor serial CBC: Daily  -Transfuse PRBC if patient becomes hemodynamically unstable, symptomatic or H/H drops below 7/21.  -Patient has not received any PRBC transfusions to date  -Patient's anemia is currently stable

## 2025-03-24 NOTE — ASSESSMENT & PLAN NOTE
Patient with known CAD s/p stent placement, which is uncontrolled Will continue home medications and monitor for S/Sx of angina/ACS. Continue to monitor on telemetry. Last seen by Kari Wilson on 3/18/25 and continued on current medical therapy as patient has known chronic chest pain with recent stress test negative as noted below.    ECHO 11/5/2024    Left Ventricle: The left ventricle is normal in size. Normal wall thickness. There is concentric remodeling. There is normal systolic function. Ejection fraction is approximately 60%. There is normal diastolic function.    Right Ventricle: Normal right ventricular cavity size. Wall thickness is normal. Systolic function is normal.    IVC/SVC: Normal venous pressure at 3 mmHg.       NM Stress 1/26/2024       Normal myocardial perfusion scan. There is no evidence of myocardial ischemia or infarction.    There is a  mild to moderate intensity fixed perfusion abnormality in the inferior wall of the left ventricle secondary to diaphragm attenuation.    The gated perfusion images showed an ejection fraction of 64% at rest. The gated perfusion images showed an ejection fraction of 66% post stress. Normal ejection fraction is greater than 59%.    There is normal wall motion at rest and post stress.    LV cavity size is normal at rest and normal at stress.    The ECG portion of the study is negative for ischemia.     Plan:  -Telemetry  -Trend troponins  -Serial EKGs at onset/worsening chest pain  -TRIPP therapy prn  -f/u cards

## 2025-03-24 NOTE — SUBJECTIVE & OBJECTIVE
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    Arthritis     Back pain     CAD (coronary artery disease)     Cataract     CKD (chronic kidney disease)     GERD (gastroesophageal reflux disease)     Glaucoma     suspect    HTN (hypertension)     Multiple subsegmental pulmonary emboli without acute cor pulmonale 09/09/2021    Prostate cancer     tx'd with radiation    PVD (peripheral vascular disease)     stent in right renal artery and aorta    Sleep apnea     wears cpap    Stable angina pectoris 05/16/2023       Past Surgical History:   Procedure Laterality Date    ABDOMINAL AORTA STENT      BLADDER FULGURATION N/A 3/19/2025    Procedure: FULGURATION, BLADDER;  Surgeon: Blayne Jimenez MD;  Location: [facility], [place] OR;  Service: Urology;  Laterality: N/A;    CARDIAC CATHETERIZATION      CATARACT EXTRACTION W/  INTRAOCULAR LENS IMPLANT Right 04/29/2017    COLONOSCOPY N/A 9/24/2020    Procedure: COLONOSCOPY;  Surgeon: Shayy Melvin MD;  Location: [facility], [place] ENDO;  Service: Endoscopy;  Laterality: N/A;    COLONOSCOPY N/A 1/10/2022    Procedure: COLONOSCOPY;  Surgeon: Vi Lara MD;  Location: [facility], [place] ENDO;  Service: Endoscopy;  Laterality: N/A;    CORONARY ANGIOPLASTY      CORONARY STENT PLACEMENT      CYSTOSCOPY WITH BIOPSY OF BLADDER N/A 3/19/2025    Procedure: CYSTOSCOPY, WITH BLADDER BIOPSY;  Surgeon: Blayne Jimenez MD;  Location: [facility], [place] OR;  Service: Urology;  Laterality: N/A;    PCIOL Right 03/29/2017    DR. LEDEZMA    PCIOL Left 07/03/2019    DR. LEDEZMA    PROSTATE BIOPSY      RENAL ARTERY STENT         Review of patient's allergies indicates:   Allergen Reactions    Codeine Other (See Comments)     When taking codeine, pt becomes very anxious       Current Facility-Administered Medications on File Prior to Encounter   Medication    leuprolide acetate (6 month)  injection 45 mg     Current Outpatient Medications on File Prior to Encounter   Medication Sig    albuterol (VENTOLIN HFA) 90 mcg/actuation inhaler Inhale 2 puffs into the lungs every 6 (six) hours as needed for Wheezing. Rescue    apremilast (OTEZLA ORAL) Take by mouth.    aspirin (ECOTRIN) 81 MG EC tablet Take 81 mg by mouth once daily.    atorvastatin (LIPITOR) 40 MG tablet Take 1 tablet (40 mg total) by mouth every evening.    azelastine (ASTELIN) 137 mcg (0.1 %) nasal spray USE 2 SPRAY(S) IN EACH NOSTRIL TWICE DAILY    betamethasone dipropionate 0.05 % cream Apply topically once daily.    betamethasone dipropionate 0.05 % cream Apply topically 2 (two) times daily.    carvediloL (COREG) 12.5 MG tablet Take 1 tablet (12.5 mg total) by mouth 2 (two) times daily with meals.    ciprofloxacin HCl (CIPRO) 500 MG tablet Take 1 tablet (500 mg total) by mouth every 12 (twelve) hours.    ferrous sulfate 325 (65 FE) MG EC tablet Take 325 mg by mouth once daily.    gentamicin (GARAMYCIN) 0.1 % ointment Apply topically 2 (two) times a day. For rash of groin.    hydrOXYzine HCL (ATARAX) 10 MG Tab Take 10 mg by mouth 3 (three) times daily as needed.    ketoconazole (NIZORAL) 2 % cream Apply topically 2 (two) times daily. For rash of groin.    lactulose (CHRONULAC) 20 gram/30 mL Soln Take 30 mLs (20 g total) by mouth daily as needed (constipation).    levocetirizine (XYZAL) 5 MG tablet Take 5 mg by mouth every evening.    lisinopriL (PRINIVIL,ZESTRIL) 40 MG tablet Take 1 tablet (40 mg total) by mouth once daily.    meloxicam (MOBIC) 15 MG tablet Take 15 mg by mouth daily as needed.    miconazole NITRATE 2 % (ZEASORB AF) 2 % top powder Use two to three times daily to prevent rash    NIFEdipine (PROCARDIA-XL) 90 MG (OSM) 24 hr tablet Take 1 tablet (90 mg total) by mouth once daily.    nitroGLYCERIN (NITROSTAT) 0.4 MG SL tablet Place 1 tablet (0.4 mg total) under the tongue every 5 (five) minutes as needed for Chest pain.     ondansetron (ZOFRAN-ODT) 4 MG TbDL Take 1 tablet (4 mg total) by mouth every 8 (eight) hours as needed (Nausea).    oxybutynin (DITROPAN-XL) 5 MG TR24 Take 1 tablet (5 mg total) by mouth once daily.    pantoprazole (PROTONIX) 40 MG tablet Take 1 tablet (40 mg total) by mouth 2 (two) times daily.    tamsulosin (FLOMAX) 0.4 mg Cap Take 1 capsule (0.4 mg total) by mouth once daily.    traMADoL (ULTRAM) 50 mg tablet Take 50 mg by mouth daily as needed.    traMADoL (ULTRAM) 50 mg tablet Take 1 tablet (50 mg total) by mouth every 6 (six) hours as needed for Pain.    traZODone (DESYREL) 50 MG tablet Take 1 tablet (50 mg total) by mouth every evening.    finasteride (PROSCAR) 5 mg tablet Take 1 tablet (5 mg total) by mouth once daily.    triamcinolone acetonide 0.025% (KENALOG) 0.025 % cream Apply topically 2 (two) times daily. PRN rash of groin.     Family History       Problem Relation (Age of Onset)    Cancer Father    Colon cancer Father    Diabetes Mother, Sister, Brother    Glaucoma Mother    Hypertension Brother    Kidney disease Mother          Tobacco Use    Smoking status: Former     Current packs/day: 0.00     Average packs/day: 0.5 packs/day for 30.0 years (15.0 ttl pk-yrs)     Types: Cigarettes     Start date: 1965     Quit date: 1995     Years since quittin.5    Smokeless tobacco: Former    Tobacco comments:     Hold midnight prior to sx   Substance and Sexual Activity    Alcohol use: Not Currently     Comment: hold now    Drug use: Not Currently     Frequency: 4.0 times per week     Types: Marijuana     Comment: hold midnight prior to sx    Sexual activity: Yes     Partners: Female     Review of Systems   Constitutional: Positive for malaise/fatigue.   HENT: Negative.     Eyes: Negative.    Cardiovascular:  Positive for chest pain and palpitations.   Respiratory: Negative.     Skin: Negative.    Musculoskeletal:  Positive for joint pain.   Gastrointestinal: Negative.    Genitourinary: Negative.     Neurological: Negative.    Psychiatric/Behavioral: Negative.       Objective:     Vital Signs (Most Recent):  Temp: 97.6 °F (36.4 °C) (03/23/25 1824)  Pulse: 82 (03/24/25 0945)  Resp: 20 (03/24/25 0945)  BP: 125/86 (03/24/25 0945)  SpO2: 98 % (03/24/25 0945) Vital Signs (24h Range):  Temp:  [97.6 °F (36.4 °C)-99.2 °F (37.3 °C)] 97.6 °F (36.4 °C)  Pulse:  [] 82  Resp:  [15-20] 20  SpO2:  [95 %-99 %] 98 %  BP: (102-137)/(71-95) 125/86     Weight: 87.1 kg (192 lb)  Body mass index is 26.04 kg/m².    SpO2: 98 %         Intake/Output Summary (Last 24 hours) at 3/24/2025 1126  Last data filed at 3/24/2025 0630  Gross per 24 hour   Intake --   Output 600 ml   Net -600 ml       Lines/Drains/Airways       Peripheral Intravenous Line  Duration                  Peripheral IV - Single Lumen 03/23/25 2015 20 G Right Antecubital <1 day                     Physical Exam  Vitals and nursing note reviewed.   Constitutional:       Appearance: Normal appearance.   HENT:      Head: Normocephalic and atraumatic.   Eyes:      General:         Right eye: No discharge.         Left eye: No discharge.      Pupils: Pupils are equal, round, and reactive to light.   Cardiovascular:      Rate and Rhythm: Regular rhythm. Tachycardia present.      Heart sounds: S1 normal and S2 normal. No murmur heard.     No friction rub.   Pulmonary:      Effort: Pulmonary effort is normal. No respiratory distress.      Breath sounds: Normal breath sounds. No rales.   Abdominal:      Palpations: Abdomen is soft.      Tenderness: There is no abdominal tenderness.   Musculoskeletal:      Cervical back: Neck supple.      Right lower leg: No edema.      Left lower leg: No edema.   Skin:     General: Skin is warm and dry.   Neurological:      General: No focal deficit present.      Mental Status: He is alert and oriented to person, place, and time.   Psychiatric:         Mood and Affect: Mood normal.         Behavior: Behavior normal.         Thought  "Content: Thought content normal.          Significant Labs: BMP:   Recent Labs   Lab 03/23/25 2034 03/24/25  0628    138   K 4.0 4.2    108   CO2 25 20*   BUN 23 19   CREATININE 1.6* 1.3   CALCIUM 9.0 8.6*   MG 1.3*  --    , CMP   Recent Labs   Lab 03/23/25 2034 03/24/25  0628    138   K 4.0 4.2    108   CO2 25 20*   BUN 23 19   CREATININE 1.6* 1.3   CALCIUM 9.0 8.6*   ALBUMIN 3.7 3.1*   BILITOT 0.6 0.5   ALKPHOS 71 62   AST 20 21   ALT 21 17   ANIONGAP 10 10   , CBC   Recent Labs   Lab 03/23/25 2034 03/24/25  0628   WBC 6.10 5.60   HGB 12.9* 11.7*   HCT 39.4* 35.4*   * 114*   , INR   Recent Labs   Lab 03/23/25 2034   INR 1.1   , Lipid Panel No results for input(s): "CHOL", "HDL", "LDLCALC", "TRIG", "CHOLHDL" in the last 48 hours., Troponin No results for input(s): "TROPONINIHS" in the last 48 hours., and All pertinent lab results from the last 24 hours have been reviewed.    Significant Imaging: Cardiac Cath: reviewed, Echocardiogram: Transthoracic echo (TTE) complete (Cupid Only):   Results for orders placed or performed during the hospital encounter of 11/06/24   Echo   Result Value Ref Range    BSA 2.19 m2    LVOT stroke volume 85.9 cm3    LVIDd 3.9 3.5 - 6.0 cm    LV Systolic Volume 25.96 mL    LV Systolic Volume Index 12.0 mL/m2    LVIDs 2.7 2.1 - 4.0 cm    LV Diastolic Volume 64.57 mL    LV Diastolic Volume Index 29.76 mL/m2    Left Ventricular End Systolic Volume by Teichholz Method 25.96 mL    Left Ventricular End Diastolic Volume by Teichholz Method 64.57 mL    IVS 1.3 (A) 0.6 - 1.1 cm    LVOT diameter 2.4 cm    LVOT area 4.5 cm2    FS 30.8 28 - 44 %    Left Ventricle Relative Wall Thickness 0.67 cm    PW 1.3 (A) 0.6 - 1.1 cm    LV mass 179.7 g    LV Mass Index 82.8 g/m2    MV Peak E Medardo 0.50 m/s    TDI LATERAL 0.07 m/s    TDI SEPTAL 0.09 m/s    E/E' ratio 6.25 m/s    MV Peak A Medardo 0.97 m/s    E/A ratio 0.52     IVRT 88.49 msec    E wave deceleration time 344.71 msec    " LV SEPTAL E/E' RATIO 5.56 m/s    LV LATERAL E/E' RATIO 7.14 m/s    LVOT peak carol 0.8 m/s    Left Ventricular Outflow Tract Mean Velocity 0.69 cm/s    Left Ventricular Outflow Tract Mean Gradient 1.95 mmHg    RV-fallon mid d 2.9 cm    RV mid diameter 2.91 cm    RVOT peak VTI 21.4 cm    LA size 3.77 cm    Left Atrium Minor Axis 4.49 cm    Left Atrium Major Axis 4.75 cm    RA Major Axis 4.38 cm    AV mean gradient 4.4 mmHg    AV peak gradient 5.8 mmHg    Ao peak carol 1.2 m/s    Ao VTI 29.2 cm    LVOT peak VTI 19.0 cm    AV valve area 2.9 cm²    AV Velocity Ratio 0.67     AV index (prosthetic) 0.65     KILLIAN by Velocity Ratio 3.0 cm²    MV stenosis pressure 1/2 time 99.97 ms    MV valve area p 1/2 method 2.20 cm2    PV mean gradient 2 mmHg    RVOT peak carol 0.75 m/s    Ao root annulus 4.29 cm    STJ 3.78 cm    Ascending aorta 3.69 cm    IVC diameter 1.46 cm    Mean e' 0.08 m/s    ZLVIDS -3.81     ZLVIDD -6.10     SHAINA 25.2 mL/m2    LA Vol 54.73 cm3    LA WIDTH 3.7 cm    RA Width 3.5 cm    EF 60 %    Est. RA pres 3 mmHg    Narrative      Left Ventricle: The left ventricle is normal in size. Normal wall   thickness. There is concentric remodeling. There is normal systolic   function. Ejection fraction is approximately 60%. There is normal   diastolic function.    Right Ventricle: Normal right ventricular cavity size. Wall thickness   is normal. Systolic function is normal.    IVC/SVC: Normal venous pressure at 3 mmHg.     , EKG: reviewed, Stress Test: reviewed, and X-Ray: CXR: X-Ray Chest 1 View (CXR): No results found for this visit on 03/23/25.

## 2025-03-25 LAB
FINAL PATHOLOGIC DIAGNOSIS: NORMAL
GROSS: NORMAL
Lab: NORMAL

## 2025-03-26 ENCOUNTER — PATIENT OUTREACH (OUTPATIENT)
Dept: ADMINISTRATIVE | Facility: CLINIC | Age: 89
End: 2025-03-26
Payer: MEDICARE

## 2025-03-26 NOTE — PROGRESS NOTES
C3 nurse spoke with Jovanny Olmedo for a TCC post hospital discharge follow up call. The patient has a scheduled [facility] appointment with Taylor Pantoja NP  on 04/02/25 @ 2916.

## 2025-03-26 NOTE — PROGRESS NOTES
C3 nurse attempted to contact Jovanny Olmedo for a TCC post hospital discharge follow up call. No answer. Left voicemail with callback information. The patient has a scheduled [facility] appointment with Taylor Pantoja NP on 04/02/25 @ 2214.

## 2025-04-02 ENCOUNTER — OFFICE VISIT (OUTPATIENT)
Dept: INTERNAL MEDICINE | Facility: CLINIC | Age: 89
End: 2025-04-02
Payer: MEDICARE

## 2025-04-02 VITALS
TEMPERATURE: 97 F | DIASTOLIC BLOOD PRESSURE: 68 MMHG | OXYGEN SATURATION: 97 % | BODY MASS INDEX: 26.73 KG/M2 | HEIGHT: 72 IN | WEIGHT: 197.31 LBS | SYSTOLIC BLOOD PRESSURE: 112 MMHG | HEART RATE: 79 BPM

## 2025-04-02 DIAGNOSIS — I10 ESSENTIAL HYPERTENSION: Chronic | ICD-10-CM

## 2025-04-02 DIAGNOSIS — I71.23 ANEURYSM OF DESCENDING THORACIC AORTA WITHOUT RUPTURE: Primary | ICD-10-CM

## 2025-04-02 DIAGNOSIS — G47.33 OSA ON CPAP: ICD-10-CM

## 2025-04-02 DIAGNOSIS — I71.40 ABDOMINAL AORTIC ANEURYSM (AAA) WITHOUT RUPTURE, UNSPECIFIED PART: ICD-10-CM

## 2025-04-02 DIAGNOSIS — E78.5 DYSLIPIDEMIA: Chronic | ICD-10-CM

## 2025-04-02 PROCEDURE — 99214 OFFICE O/P EST MOD 30 MIN: CPT | Mod: S$PBB,,, | Performed by: NURSE PRACTITIONER

## 2025-04-02 PROCEDURE — 99214 OFFICE O/P EST MOD 30 MIN: CPT | Mod: PBBFAC | Performed by: NURSE PRACTITIONER

## 2025-04-02 PROCEDURE — 99999 PR PBB SHADOW E&M-EST. PATIENT-LVL IV: CPT | Mod: PBBFAC,,, | Performed by: NURSE PRACTITIONER

## 2025-04-02 RX ORDER — HYDROCODONE BITARTRATE AND ACETAMINOPHEN 5; 325 MG/1; MG/1
1 TABLET ORAL EVERY 6 HOURS PRN
COMMUNITY

## 2025-04-02 NOTE — PROGRESS NOTES
Subjective:       Patient ID: Jovanny Olmedo is a 89 y.o. male.    Chief Complaint: Follow-up    Follow-up  Pertinent negatives include no abdominal pain, arthralgias, chest pain, chills, congestion, coughing, diaphoresis, fatigue, fever, headaches, joint swelling, nausea, neck pain, numbness, sore throat, vomiting or weakness.       Pt here for hosp follow up  No acute complaints    Hospital Course:   Mr. Olmedo is a 89-year-old male that presented with chest pain and was seen by Cardiology due to excessive cardiac history but felt that the pain was likely due to his aortic aneurysm that has been a known history and was followed by vascular surgery on outpatient basis.  Patient has been missing appointments from 2 different vascular surgery group that has been trying to have him come in for reimaging.  Patient was seen by Dr. Martinez inpatient and recommended a 6 week follow up as well as a referral to interventional pain management and pain medicines.  Of note, patient has an allergy to codeine and this was discussed with him as he is requesting pain medications.  He states that the allergy is agitation but states that he can tolerate it and therefore is okay with receiving Norco.  Patient denies any signs of respiratory distress or trouble with swallowing with codeine.  Patient was cleared for discharge by Cardiology and vascular surgery and discharge instructions were both explained to the patient as well as included in his AVS.  All questions answered and plan of care discussed with patient and son at bedside.       Pt reports there is currently no cp  Constipation improved. No n/v  Eating/drinking normally  BP stable. Compliant with meds for chronic ailments. Using cpap  Has outpt follow up with cards/pain mgt/vasc  Pt requested refill on norco/ informed him I am unable to refill. 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 08/06/2023    Arthritis     Back pain     CAD (coronary artery disease)     Cataract     CKD (chronic kidney disease)     GERD (gastroesophageal reflux disease)     Glaucoma     suspect    HTN (hypertension)     Multiple subsegmental pulmonary emboli without acute cor pulmonale 09/09/2021    Prostate cancer     tx'd with radiation    PVD (peripheral vascular disease)     stent in right renal artery and aorta    Sleep apnea     wears cpap    Stable angina pectoris 05/16/2023     Past Surgical History:   Procedure Laterality Date    ABDOMINAL AORTA STENT      BLADDER FULGURATION N/A 3/19/2025    Procedure: FULGURATION, BLADDER;  Surgeon: Blayne Jimenez MD;  Location: [facility] OR;  Service: Urology;  Laterality: N/A;    CARDIAC CATHETERIZATION      CATARACT EXTRACTION W/  INTRAOCULAR LENS IMPLANT Right 04/29/2017    COLONOSCOPY N/A 9/24/2020    Procedure: COLONOSCOPY;  Surgeon: Shayy eMlvin MD;  Location: [facility] ENDO;  Service: Endoscopy;  Laterality: N/A;    COLONOSCOPY N/A 1/10/2022    Procedure: COLONOSCOPY;  Surgeon: Vi Lara MD;  Location: [facility] ENDO;  Service: Endoscopy;  Laterality: N/A;    CORONARY ANGIOPLASTY      CORONARY STENT PLACEMENT      CYSTOSCOPY WITH BIOPSY OF BLADDER N/A 3/19/2025    Procedure: CYSTOSCOPY, WITH BLADDER BIOPSY;  Surgeon: Blayne Jimenez MD;  Location: [facility] OR;  Service: Urology;  Laterality: N/A;    PCIOL Right 03/29/2017    DR. LEDEZMA    PCIOL Left 07/03/2019    DR. LEDEZMA    PROSTATE BIOPSY      RENAL ARTERY STENT       Social History[1]  Review of patient's allergies indicates:   Allergen Reactions    Codeine Other (See Comments)     When taking codeine, pt becomes very anxious     Current Outpatient Medications   Medication Sig    albuterol (VENTOLIN HFA) 90 mcg/actuation inhaler Inhale 2 puffs into the lungs every 6 (six) hours as  needed for Wheezing. Rescue    aspirin (ECOTRIN) 81 MG EC tablet Take 81 mg by mouth once daily.    atorvastatin (LIPITOR) 40 MG tablet Take 1 tablet (40 mg total) by mouth every evening.    betamethasone dipropionate 0.05 % cream Apply topically once daily.    carvediloL (COREG) 12.5 MG tablet Take 1 tablet (12.5 mg total) by mouth 2 (two) times daily with meals.    ferrous sulfate 325 (65 FE) MG EC tablet Take 325 mg by mouth once daily.    HYDROcodone-acetaminophen (NORCO) 5-325 mg per tablet Take 1 tablet by mouth every 6 (six) hours as needed for Pain.    ketoconazole (NIZORAL) 2 % cream Apply topically 2 (two) times daily. For rash of groin.    lactulose (CHRONULAC) 20 gram/30 mL Soln Take 30 mLs (20 g total) by mouth daily as needed (constipation).    levocetirizine (XYZAL) 5 MG tablet Take 5 mg by mouth every evening.    lisinopriL (PRINIVIL,ZESTRIL) 40 MG tablet Take 1 tablet (40 mg total) by mouth once daily.    NIFEdipine (PROCARDIA-XL) 90 MG (OSM) 24 hr tablet Take 1 tablet (90 mg total) by mouth once daily.    nitroGLYCERIN (NITROSTAT) 0.4 MG SL tablet Place 1 tablet (0.4 mg total) under the tongue every 5 (five) minutes as needed for Chest pain.    ondansetron (ZOFRAN-ODT) 4 MG TbDL Take 1 tablet (4 mg total) by mouth every 8 (eight) hours as needed (Nausea).    oxybutynin (DITROPAN-XL) 5 MG TR24 Take 1 tablet (5 mg total) by mouth once daily.    pantoprazole (PROTONIX) 40 MG tablet Take 1 tablet (40 mg total) by mouth 2 (two) times daily.    tamsulosin (FLOMAX) 0.4 mg Cap Take 1 capsule (0.4 mg total) by mouth once daily.    traMADoL (ULTRAM) 50 mg tablet Take 1 tablet (50 mg total) by mouth every 6 (six) hours as needed for Pain.    traZODone (DESYREL) 50 MG tablet Take 1 tablet (50 mg total) by mouth every evening.     Current Facility-Administered Medications   Medication    leuprolide acetate (6 month) injection 45 mg           Review of Systems   Constitutional:  Negative for activity change,  appetite change, chills, diaphoresis, fatigue, fever and unexpected weight change.   HENT:  Negative for congestion, ear pain, postnasal drip, rhinorrhea, sinus pressure, sinus pain, sneezing, sore throat, tinnitus, trouble swallowing and voice change.    Eyes:  Negative for photophobia, pain and visual disturbance.   Respiratory:  Negative for cough, chest tightness, shortness of breath and wheezing.    Cardiovascular:  Negative for chest pain, palpitations and leg swelling.   Gastrointestinal:  Negative for abdominal distention, abdominal pain, constipation, diarrhea, nausea and vomiting.   Genitourinary:  Negative for decreased urine volume, difficulty urinating, dysuria, flank pain, frequency, hematuria and urgency.   Musculoskeletal:  Negative for arthralgias, back pain, joint swelling, neck pain and neck stiffness.   Allergic/Immunologic: Negative for immunocompromised state.   Neurological:  Negative for dizziness, tremors, seizures, syncope, facial asymmetry, speech difficulty, weakness, light-headedness, numbness and headaches.   Hematological:  Negative for adenopathy. Does not bruise/bleed easily.   Psychiatric/Behavioral:  Negative for confusion and sleep disturbance.        Objective:      Physical Exam  Vitals reviewed.   Cardiovascular:      Rate and Rhythm: Normal rate and regular rhythm.      Heart sounds: Normal heart sounds.   Pulmonary:      Effort: Pulmonary effort is normal.      Breath sounds: Normal breath sounds.   Skin:     General: Skin is warm and dry.   Neurological:      Mental Status: He is alert and oriented to person, place, and time.         Assessment:     Vitals:    04/02/25 0825   BP: 112/68   Pulse: 79   Temp: 96.9 °F (36.1 °C)         1. 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 Essential hypertension    3. Dyslipidemia    4. Abdominal aortic aneurysm (AAA) without rupture, unspecified part    5. CLARISSA on CPAP        Plan:   Aneurysm of descending thoracic aorta  without rupture    Essential hypertension    Dyslipidemia    Abdominal aortic aneurysm (AAA) without rupture, unspecified part    CLARISSA on CPAP    Chronic conditions stable  No acute complaints  Maintain meds  Keep follow ups in place    Transitional Care Note    Family and/or Caretaker present at visit?  Yes.  Diagnostic tests reviewed/disposition: No diagnosic tests pending after this hospitalization.  Disease/illness education yes  Home health/community services discussion/referrals: Patient does not have home health established from hospital visit.  They do not need home health.  If needed, we will set up home health for the patient.   Establishment or re-establishment of referral orders for community resources: No other necessary community resources.   Discussion with other health care providers:  follow ups in place .               [1]   Social History  Socioeconomic History    Marital status:    Tobacco Use    Smoking status: Former     Current packs/day: 0.00     Average packs/day: 0.5 packs/day for 30.0 years (15.0 ttl pk-yrs)     Types: Cigarettes     Start date: 1965     Quit date: 1995     Years since quittin.5    Smokeless tobacco: Former    Tobacco comments:     Hold midnight prior to sx   Substance and Sexual Activity    Alcohol use: Not Currently     Comment: hold now    Drug use: Not Currently     Frequency: 4.0 times per week     Types: Marijuana     Comment: hold midnight prior to sx    Sexual activity: Yes     Partners: Female   Social History Narrative         Social Drivers of Health     Financial Resource Strain: Low Risk  (3/24/2025)    Overall Financial Resource Strain (CARDIA)     Difficulty of Paying Living Expenses: Not very hard   Food Insecurity: No Food Insecurity (3/24/2025)    Hunger Vital Sign     Worried About Running Out of Food in the Last Year: Never true     Ran Out of Food in the Last Year: Never true   Transportation Needs: No Transportation Needs  (3/24/2025)    PRAPARE - Transportation     Lack of Transportation (Medical): No     Lack of Transportation (Non-Medical): No   Physical Activity: Inactive (3/14/2025)    Exercise Vital Sign     Days of Exercise per Week: 0 days     Minutes of Exercise per Session: 0 min   Stress: No Stress Concern Present (3/24/2025)    [employer] - Occupational Stress Questionnaire     Feeling of Stress : Not at all   Recent Concern: Stress - Stress Concern Present (3/14/2025)    [employer] - Occupational Stress Questionnaire     Feeling of Stress : Rather much   Housing Stability: Low Risk  (3/24/2025)    Housing Stability Vital Sign     Unable to Pay for Housing in the Last Year: No     Number of Times Moved in the Last Year: 0     Homeless in the Last Year: No

## 2025-04-03 ENCOUNTER — EXTERNAL HOME HEALTH (OUTPATIENT)
Dept: HOME HEALTH SERVICES | Facility: HOSPITAL | Age: 89
End: 2025-04-03
Payer: MEDICARE

## 2025-04-03 NOTE — PROGRESS NOTES
Clinic Note  4/3/2025      Subjective:         Chief Complaint:   HPI  Jovanny Olmedo is a 89 y.o. male with history of prostate cancer.. Here with his son.  He established with Dr. Gurrola in 2013 for prostate cancer. Initially had PSA 11.3, Breanne 4+3(GG3). He then completed EBRT with Dr. Deng in 2014 at [facility]. Had PE 1 month ago, now on blood thinners. Still SOB with walking.  PSA had dropped to 0. 7 shortly after completion.  PSA then began to rise in 2019.   PSA  3.6 on 1/11/21.  Axumin 2/3/21 showed 2+ cm focus of uptake in right lobe of prostate.Negative SVI, bladder, nodes.  MRI 7/27/2021- 2.3 cm PI-RADS 4 lesion right apex , negative extra prostatic extension, NVBI (neurovascular bundle involvement), SVI (seminal vesicle involvement), nodes.     Path 12/30/2013- Breanne 4+3 right base 2/2 70%, right middle 2/2 80%, right apex 2/2 20%.  Bone scan 4//2022- no metastasis     1/6/2023- last visit 10/15/2021!  Had Covid 2 months ago, still has cough. Stable LUTS.     7/21/2023- PSA 10.9. Patient with BCR (biochemical recurrence) after EBRT for unfavorable intermediate risk prostate cancer in 2014. Has elected observation.  Bone scan 7/11/2023- no metastasis.  LUTS- nocturia 3-4x/noc, significant bother.  Discussed ADT, patient refused. Discussed Flomax. He is interested. Discussed usage and side effects.  Cialis not working, asked for BALTAZAR prescription.     2/16/2024- Patient with BCR (biochemical recurrence) after EBRT who has elected observation.PSA 6.6 (corrected 13.2).  Taking Flomax and Proscar for LUTS. He is not sure which meds he is taking.Nephew just traded from [facility] [employer] to [email or web address].  LUTS- double voids periodically. Hospitalized 2 weeks ago for hypertensive crisis.     9/6/2024- PSA 14.9 (corrected 29.8).  Bone scan- no bony metastatic disease.  LUTS-nocturia 3-4x/noc, urge incontinence, no pads.    4/4/2025- PSA 18.5. Discussed ADT. He does not want to begin hormonal therapy. Stopped finasteride.  PSMA  scan-1/9/2025- no avid metastasis.    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    Arthritis     Back pain     CAD (coronary artery disease)     Cataract     CKD (chronic kidney disease)     GERD (gastroesophageal reflux disease)     Glaucoma     suspect    HTN (hypertension)     Multiple subsegmental pulmonary emboli without acute cor pulmonale 09/09/2021    Prostate cancer     tx'd with radiation    PVD (peripheral vascular disease)     stent in right renal artery and aorta    Sleep apnea     wears cpap    Stable angina pectoris 05/16/2023     Family History   Problem Relation Name Age of Onset    Glaucoma Mother      Diabetes Mother      Kidney disease Mother      Colon cancer Father      Cancer Father          colon cancer    Diabetes Sister      Diabetes Brother      Hypertension Brother      Blindness Neg Hx       Social History[1]  Past Surgical History:   Procedure Laterality Date    ABDOMINAL AORTA STENT      BLADDER FULGURATION N/A 3/19/2025    Procedure: FULGURATION, BLADDER;  Surgeon: Blayne Jimenez MD;  Location: [facility] [place] OR;  Service: Urology;  Laterality: N/A;    CARDIAC CATHETERIZATION      CATARACT EXTRACTION W/  INTRAOCULAR LENS IMPLANT Right 04/29/2017    COLONOSCOPY N/A 9/24/2020    Procedure: COLONOSCOPY;  Surgeon: Shayy Melvin MD;  Location: [facility] [place] ENDO;  Service: Endoscopy;  Laterality: N/A;     COLONOSCOPY N/A 1/10/2022    Procedure: COLONOSCOPY;  Surgeon: Vi Lara MD;  Location: [facility] ENDO;  Service: Endoscopy;  Laterality: N/A;    CORONARY ANGIOPLASTY      CORONARY STENT PLACEMENT      CYSTOSCOPY WITH BIOPSY OF BLADDER N/A 3/19/2025    Procedure: CYSTOSCOPY, WITH BLADDER BIOPSY;  Surgeon: Blayne Jimenez MD;  Location: [facility] OR;  Service: Urology;  Laterality: N/A;    PCIOL Right 03/29/2017    DR. LEDEZMA    PCIOL Left 07/03/2019    DR. LEDEZMA    PROSTATE BIOPSY      RENAL ARTERY STENT       Problem List[2]  Review of Systems   Constitutional:  Positive for appetite change and unexpected weight change. Negative for chills, fatigue and fever.   HENT:  Negative for nosebleeds.    Respiratory:  Negative for chest tightness, shortness of breath and wheezing.    Cardiovascular:  Negative for chest pain, palpitations and leg swelling.   Gastrointestinal:  Negative for abdominal distention, abdominal pain, constipation, diarrhea, nausea and vomiting.   Genitourinary:  Negative for dysuria.   Musculoskeletal:  Negative for arthralgias and back pain.   Skin:  Negative for pallor.   Neurological:  Negative for dizziness, seizures and syncope.   Hematological:  Negative for adenopathy.   Psychiatric/Behavioral:  Negative for dysphoric mood.          Objective:      There were no vitals taken for this visit.  Estimated body mass index is 26.76 kg/m² as calculated from the following:    Height as of 4/2/25: 6' (1.829 m).    Weight as of 4/2/25: 89.5 kg (197 lb 5 oz).  Physical Exam      Assessment and Plan:   Will pursue observation. Patient understands risk of progression. F/U 1 year with PSA.        Problem List Items Addressed This Visit       Prostate cancer - Primary    Overview   Dr mirza            Follow up:   1 year with PSA.    Nav Mirza               [1]   Social History  Socioeconomic History    Marital status:    Tobacco Use    Smoking status: Former     Current packs/day:  0.00     Average packs/day: 0.5 packs/day for 30.0 years (15.0 ttl pk-yrs)     Types: Cigarettes     Start date: 1965     Quit date: 1995     Years since quittin.5    Smokeless tobacco: Former    Tobacco comments:     Hold midnight prior to sx   Substance and Sexual Activity    Alcohol use: Not Currently     Comment: hold now    Drug use: Not Currently     Frequency: 4.0 times per week     Types: Marijuana     Comment: hold midnight prior to sx    Sexual activity: Yes     Partners: Female   Social History Narrative         Social Drivers of Health     Financial Resource Strain: Low Risk  (3/24/2025)    Overall Financial Resource Strain (CARDIA)     Difficulty of Paying Living Expenses: Not very hard   Food Insecurity: No Food Insecurity (3/24/2025)    Hunger Vital Sign     Worried About Running Out of Food in the Last Year: Never true     Ran Out of Food in the Last Year: Never true   Transportation Needs: No Transportation Needs (3/24/2025)    PRAPARE - Transportation     Lack of Transportation (Medical): No     Lack of Transportation (Non-Medical): No   Physical Activity: Inactive (3/14/2025)    Exercise Vital Sign     Days of Exercise per Week: 0 days     Minutes of Exercise per Session: 0 min   Stress: No Stress Concern Present (3/24/2025)    [employer] - Occupational Stress Questionnaire     Feeling of Stress : Not at all   Recent Concern: Stress - Stress Concern Present (3/14/2025)    [employer] - Occupational Stress Questionnaire     Feeling of Stress : Rather much   Housing Stability: Low Risk  (3/24/2025)    Housing Stability Vital Sign     Unable to Pay for Housing in the Last Year: No     Number of Times Moved in the Last Year: 0     Homeless in the Last Year: No   [2]   Patient Active Problem List  Diagnosis    Essential hypertension    GERD (gastroesophageal reflux disease)    Polycystic kidney disease    Coronary artery  disease, occlusive    Anemia in stage 3b chronic kidney disease    Refractive error    DDD (degenerative disc disease), lumbar    Open angle with borderline findings, low risk, bilateral    History of coronary angioplasty    Prostate cancer    Secondary hyperparathyroidism    Pseudophakia    Intermediate stage nonexudative age-related macular degeneration of both eyes    Aortic atherosclerosis    CLARISSA on CPAP    Duodenal ulcer    History of colon polyps    Dyslipidemia    Abdominal aortic aneurysm (AAA) without rupture    History of pulmonary embolus (PE)    Thrombocytopenia    Stable angina pectoris    Coronary artery disease of native artery of native heart with stable angina pectoris    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

## 2025-04-04 ENCOUNTER — OFFICE VISIT (OUTPATIENT)
Dept: UROLOGY | Facility: CLINIC | Age: 89
End: 2025-04-04
Payer: MEDICARE

## 2025-04-04 ENCOUNTER — TELEPHONE (OUTPATIENT)
Dept: UROLOGY | Facility: CLINIC | Age: 89
End: 2025-04-04
Payer: MEDICARE

## 2025-04-04 VITALS
WEIGHT: 197.31 LBS | HEART RATE: 70 BPM | HEIGHT: 72 IN | BODY MASS INDEX: 26.73 KG/M2 | RESPIRATION RATE: 14 BRPM | SYSTOLIC BLOOD PRESSURE: 113 MMHG | DIASTOLIC BLOOD PRESSURE: 66 MMHG

## 2025-04-04 DIAGNOSIS — C61 PROSTATE CANCER: Primary | ICD-10-CM

## 2025-04-04 PROCEDURE — 99999 PR PBB SHADOW E&M-EST. PATIENT-LVL IV: CPT | Mod: PBBFAC,,, | Performed by: UROLOGY

## 2025-04-04 PROCEDURE — 99214 OFFICE O/P EST MOD 30 MIN: CPT | Mod: PBBFAC | Performed by: UROLOGY

## 2025-04-10 ENCOUNTER — OFFICE VISIT (OUTPATIENT)
Dept: INTERNAL MEDICINE | Facility: CLINIC | Age: 89
End: 2025-04-10
Payer: MEDICARE

## 2025-04-10 ENCOUNTER — LAB VISIT (OUTPATIENT)
Dept: LAB | Facility: HOSPITAL | Age: 89
End: 2025-04-10
Attending: INTERNAL MEDICINE
Payer: MEDICARE

## 2025-04-10 ENCOUNTER — TELEPHONE (OUTPATIENT)
Dept: SURGERY | Facility: CLINIC | Age: 89
End: 2025-04-10
Payer: MEDICARE

## 2025-04-10 VITALS
HEART RATE: 83 BPM | SYSTOLIC BLOOD PRESSURE: 128 MMHG | HEIGHT: 72 IN | OXYGEN SATURATION: 97 % | BODY MASS INDEX: 26.73 KG/M2 | TEMPERATURE: 98 F | DIASTOLIC BLOOD PRESSURE: 86 MMHG | WEIGHT: 197.31 LBS

## 2025-04-10 DIAGNOSIS — K61.1 PERIRECTAL ABSCESS: ICD-10-CM

## 2025-04-10 DIAGNOSIS — K61.1 PERIRECTAL ABSCESS: Primary | ICD-10-CM

## 2025-04-10 PROCEDURE — 99999 PR PBB SHADOW E&M-EST. PATIENT-LVL V: CPT | Mod: PBBFAC,,, | Performed by: INTERNAL MEDICINE

## 2025-04-10 PROCEDURE — 85025 COMPLETE CBC W/AUTO DIFF WBC: CPT

## 2025-04-10 PROCEDURE — 36415 COLL VENOUS BLD VENIPUNCTURE: CPT

## 2025-04-10 PROCEDURE — 99214 OFFICE O/P EST MOD 30 MIN: CPT | Mod: S$PBB,,, | Performed by: INTERNAL MEDICINE

## 2025-04-10 PROCEDURE — 99215 OFFICE O/P EST HI 40 MIN: CPT | Mod: PBBFAC | Performed by: INTERNAL MEDICINE

## 2025-04-10 RX ORDER — SULFAMETHOXAZOLE AND TRIMETHOPRIM 800; 160 MG/1; MG/1
1 TABLET ORAL 2 TIMES DAILY
Qty: 20 TABLET | Refills: 0 | Status: SHIPPED | OUTPATIENT
Start: 2025-04-10 | End: 2025-04-20

## 2025-04-10 NOTE — PATIENT INSTRUCTIONS
Go to urgent care if pain significantly worsens.  Go to emergency room if develop any fever, chills,  difficulty staying,  awake or difficulty thinking.

## 2025-04-10 NOTE — TELEPHONE ENCOUNTER
Called and spoke with patients daughter regarding appointment. Informed daughter patient is now scheduled on 04/17 with Dr. Morrison. Patient's daughter verbalized understanding.    ----- Message from Laura sent at 4/10/2025  1:48 PM CDT -----  ..Type:  Sooner Apoointment RequestCaller is requesting a sooner appointment.  Caller declined first available appointment listed below.  Caller will not accept being placed on the waitlist and is requesting a message be sent to doctor.Name of Caller:Patient's daughterGian is the first available appointment?05/08/2025Symptoms:big abscess the size of a golf ballWould the patient rather a call back or a response via [facility]? Call back or [facility] Call Back Number:.[phone] Information:

## 2025-04-10 NOTE — PROGRESS NOTES
Subjective:      Patient ID: Jovanny Olmedo is a 89 y.o. male.    Chief Complaint: Cyst    Cyst  This is a new problem. The current episode started 1 to 4 weeks ago. Episode frequency: daily. The problem has been unchanged. Pertinent negatives include no abdominal pain, change in bowel habit, chest pain, chills, coughing, diaphoresis, fatigue, fever, nausea or vomiting. Nothing aggravates the symptoms. He has tried nothing for the symptoms. The treatment provided no relief.     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 O/w feeling in usual state of health.     Review of Systems   Constitutional:  Negative for chills, diaphoresis, fatigue and fever.   Respiratory:  Negative for cough and shortness of breath.    Cardiovascular:  Negative for chest pain and palpitations.   Gastrointestinal:  Negative for abdominal pain, blood in stool, change in bowel habit, constipation, diarrhea, nausea and vomiting.     Objective:   /86 (BP Location: Right arm, Patient Position: Sitting)   Pulse 83   Temp 98 °F (36.7 °C)   Ht 6' (1.829 m)   Wt 89.5 kg (197 lb 5 oz)   SpO2 97%   BMI 26.76 kg/m²     Physical Exam  Constitutional:       General: He is awake.      Appearance: Normal appearance.   HENT:      Head:  Normocephalic and atraumatic.   Eyes:      Conjunctiva/sclera: Conjunctivae normal.   Pulmonary:      Effort: Pulmonary effort is normal.   Genitourinary:         Comments: Tender cystic structure. Suspect abscess. No drainage.   Musculoskeletal:      Cervical back: Normal range of motion.   Neurological:      Mental Status: He is alert. Mental status is at baseline.   Psychiatric:         Mood and Affect: Mood normal.         Behavior: Behavior normal. Behavior is cooperative.         Thought Content: Thought content normal.         Judgment: Judgment normal.         Lab Results   Component Value Date    WBC 5.60 03/24/2025    HGB 11.7 (L) 03/24/2025    HGB 12.9 (L) 03/23/2025    HGB 13.0 (L) 03/17/2025    HCT 35.4 (L) 03/24/2025    MCV 93 03/24/2025    MCV 94 03/23/2025    MCV 98 03/17/2025     (L) 03/24/2025    CHOL 179 02/19/2025    TRIG 121 02/19/2025    HDL 45 02/19/2025    LDLCALC 109.8 02/19/2025    LDLCALC 145.2 02/05/2024    LDLCALC 119.0 11/18/2021    ALT 17 03/24/2025    AST 21 03/24/2025     03/24/2025    K 4.2 03/24/2025    CALCIUM 8.6 (L) 03/24/2025     03/24/2025    CO2 20 (L) 03/24/2025    BUN 19 03/24/2025    CREATININE 1.3 03/24/2025    CREATININE 1.6 (H) 03/23/2025    CREATININE 1.7 (H) 03/17/2025    EGFRNORACEVR 53 (L) 03/24/2025    EGFRNORACEVR 41 (L) 03/23/2025    EGFRNORACEVR 38.3 (A) 03/17/2025    TSH 2.923 12/13/2024    TSH 3.077 03/21/2024    TSH 6.153 (H) 02/05/2024    PSA 18.7 (H) 12/13/2024    PSA 1.1 08/13/2019    PSA 16.5 (H) 12/16/2013    PSADIAG 18.4 (H) 03/03/2025    PSADIAG 14.9 (H) 09/03/2024    PSADIAG 6.6 (H) 02/12/2024    GLU 92 03/17/2025    BNP 82 03/23/2025          The ASCVD Risk score (Yu DK, et al., 2019) failed to calculate for the following reasons:    The 2019 ASCVD risk score is only valid for ages 40 to 79     Assessment:     1. Perirectal abscess      Plan:   1. Perirectal abscess  -     sulfamethoxazole-trimethoprim 800-160mg (BACTRIM DS)  800-160 mg Tab; Take 1 tablet by mouth 2 (two) times daily. for 10 days  Dispense: 20 tablet; Refill: 0  -     Ambulatory referral/consult to Colorectal Surgery; Future; Expected date: 04/17/2025        There are no Patient Instructions on file for this visit.    Future Appointments   Date Time Provider Department Center   4/24/2025  1:30 PM Ulices Graves MD [facility] INPN [place]   5/5/2025  9:30 AM Michelle Martinez MD [facility]   6/24/2025  9:20 AM Indira Black PA-C [facility] DERM [facility]   8/11/2025  7:45 AM LABORATORY, [facility] LAB [facility]   8/18/2025  9:00 AM Ignacio Castillo PA-C [facility] CARDIO [facility]   4/13/2026  9:30 AM LABORATORY, [facility]   4/17/2026  9:00 AM Nav Mirza MD [facility] URO ONC [facility]       Lab Frequency Next Occurrence   FL Upper GI Once 11/27/2024   Urinalysis, Reflex to Urine Culture Once 12/12/2024   Lipid Panel Once 08/16/2025   Comprehensive Metabolic Panel Once 08/16/2025   Referral to Enhanced Annual Wellness Visit (eAWV) W+1 Once 03/03/2025   Ambulatory referral/consult to Dermatology Once 03/28/2025   Ambulatory referral/consult to Pain Clinic Once 03/31/2025   Prostate Specific Antigen, Diagnostic Once 04/02/2026   Ambulatory referral/consult to Colorectal Surgery Once 04/17/2025       Follow up if symptoms worsen or fail to improve.                  [1]   Patient Active Problem List  Diagnosis    Essential hypertension    GERD (gastroesophageal reflux disease)    Polycystic kidney disease    Coronary artery disease, occlusive    Anemia in stage 3b chronic kidney disease    Refractive error    DDD (degenerative disc disease), lumbar    Open angle with borderline findings, low risk, bilateral    History of coronary angioplasty    Prostate cancer    Secondary hyperparathyroidism    Pseudophakia    Intermediate stage nonexudative age-related macular degeneration of both eyes    Aortic atherosclerosis    CLARISSA on CPAP    Duodenal ulcer     History of colon polyps    Dyslipidemia    Abdominal aortic aneurysm (AAA) without rupture    History of pulmonary embolus (PE)    Thrombocytopenia    Stable angina pectoris    Coronary artery disease of native artery of native heart with stable angina pectoris    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

## 2025-04-11 LAB
ABSOLUTE EOSINOPHIL (OHS): 0.2 K/UL
ABSOLUTE MONOCYTE (OHS): 0.96 K/UL (ref 0.3–1)
ABSOLUTE NEUTROPHIL COUNT (OHS): 7.43 K/UL (ref 1.8–7.7)
BASOPHILS # BLD AUTO: 0.03 K/UL
BASOPHILS NFR BLD AUTO: 0.3 %
ERYTHROCYTE [DISTWIDTH] IN BLOOD BY AUTOMATED COUNT: 13.2 % (ref 11.5–14.5)
HCT VFR BLD AUTO: 37.3 % (ref 40–54)
HGB BLD-MCNC: 11.9 GM/DL (ref 14–18)
IMM GRANULOCYTES # BLD AUTO: 0.04 K/UL (ref 0–0.04)
IMM GRANULOCYTES NFR BLD AUTO: 0.4 % (ref 0–0.5)
LYMPHOCYTES # BLD AUTO: 1.51 K/UL (ref 1–4.8)
MCH RBC QN AUTO: 31 PG (ref 27–31)
MCHC RBC AUTO-ENTMCNC: 31.9 G/DL (ref 32–36)
MCV RBC AUTO: 97 FL (ref 82–98)
NUCLEATED RBC (/100WBC) (OHS): 0 /100 WBC
PLATELET # BLD AUTO: 120 K/UL (ref 150–450)
PMV BLD AUTO: 12.3 FL (ref 9.2–12.9)
RBC # BLD AUTO: 3.84 M/UL (ref 4.6–6.2)
RELATIVE EOSINOPHIL (OHS): 2 %
RELATIVE LYMPHOCYTE (OHS): 14.8 % (ref 18–48)
RELATIVE MONOCYTE (OHS): 9.4 % (ref 4–15)
RELATIVE NEUTROPHIL (OHS): 73.1 % (ref 38–73)
WBC # BLD AUTO: 10.17 K/UL (ref 3.9–12.7)

## 2025-04-16 ENCOUNTER — RESULTS FOLLOW-UP (OUTPATIENT)
Dept: INTERNAL MEDICINE | Facility: CLINIC | Age: 89
End: 2025-04-16

## 2025-04-17 ENCOUNTER — OFFICE VISIT (OUTPATIENT)
Dept: SURGERY | Facility: CLINIC | Age: 89
End: 2025-04-17
Payer: MEDICARE

## 2025-04-17 VITALS
SYSTOLIC BLOOD PRESSURE: 122 MMHG | WEIGHT: 202.38 LBS | BODY MASS INDEX: 27.41 KG/M2 | HEIGHT: 72 IN | HEART RATE: 78 BPM | OXYGEN SATURATION: 95 % | DIASTOLIC BLOOD PRESSURE: 72 MMHG

## 2025-04-17 DIAGNOSIS — K61.1 PERIRECTAL ABSCESS: ICD-10-CM

## 2025-04-17 PROCEDURE — 99999 PR PBB SHADOW E&M-EST. PATIENT-LVL IV: CPT | Mod: PBBFAC,,, | Performed by: STUDENT IN AN ORGANIZED HEALTH CARE EDUCATION/TRAINING PROGRAM

## 2025-04-17 PROCEDURE — 99214 OFFICE O/P EST MOD 30 MIN: CPT | Mod: PBBFAC | Performed by: STUDENT IN AN ORGANIZED HEALTH CARE EDUCATION/TRAINING PROGRAM

## 2025-04-17 PROCEDURE — 99203 OFFICE O/P NEW LOW 30 MIN: CPT | Mod: S$PBB,,, | Performed by: STUDENT IN AN ORGANIZED HEALTH CARE EDUCATION/TRAINING PROGRAM

## 2025-04-21 NOTE — PROGRESS NOTES
CRS Office Visit    SUBJECTIVE:     Chief Complaint:  Perirectal abscess    History of Present Illness:  Patient is a 89 y.o. male presents with complaints of a perirectal abscess.  He reports approximately 1 week prior he developed a perirectal abscess which she described a painful lesion in his right perianal area.  He was seen by primary care and was started on antibiotics.  He reports that later that night he had a rupture of his abscess that expressed blood and purulence.  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    Arthritis     Back pain     CAD (coronary artery disease)     Cataract     CKD (chronic kidney disease)     GERD (gastroesophageal reflux disease)     Glaucoma     suspect    HTN (hypertension)     Multiple subsegmental pulmonary emboli without acute cor pulmonale 09/09/2021    Prostate cancer     tx'd with radiation    PVD (peripheral vascular disease)     stent in right renal artery and aorta    Sleep apnea     wears cpap    Stable angina pectoris 05/16/2023     Past Surgical History:   Procedure Laterality Date    ABDOMINAL AORTA STENT      BLADDER FULGURATION N/A 3/19/2025    Procedure: FULGURATION, BLADDER;  Surgeon: Blayne Jimenez MD;  Location: [facility] OR;  Service: Urology;  Laterality: N/A;    CARDIAC CATHETERIZATION      CATARACT EXTRACTION W/  INTRAOCULAR LENS IMPLANT Right 04/29/2017    COLONOSCOPY N/A 9/24/2020    Procedure: COLONOSCOPY;  Surgeon: Shayy Melvin MD;  Location: [facility] ENDO;  Service: Endoscopy;  Laterality: N/A;    COLONOSCOPY N/A 1/10/2022    Procedure: COLONOSCOPY;  Surgeon:  Vi Lara MD;  Location: [facility] ENDO;  Service: Endoscopy;  Laterality: N/A;    CORONARY ANGIOPLASTY      CORONARY STENT PLACEMENT      CYSTOSCOPY WITH BIOPSY OF BLADDER N/A 3/19/2025    Procedure: CYSTOSCOPY, WITH BLADDER BIOPSY;  Surgeon: Blayne Jimenez MD;  Location: [facility] OR;  Service: Urology;  Laterality: N/A;    PCIOL Right 03/29/2017    DR. LEDEZMA    PCIOL Left 07/03/2019    DR. LEDEZMA    PROSTATE BIOPSY      RENAL ARTERY STENT       Family History   Problem Relation Name Age of Onset    Glaucoma Mother      Diabetes Mother      Kidney disease Mother      Colon cancer Father      Cancer Father          colon cancer    Diabetes Sister      Diabetes Brother      Hypertension Brother      Blindness Neg Hx       Social History[1]       OBJECTIVE:     Vital Signs (Most Recent)  Pulse: 78 (04/17/25 1527)  BP: 122/72 (04/17/25 1527)  SpO2: 95 % (04/17/25 1527)    Physical Exam:  Physical Exam  Constitutional:       Appearance: He is well-developed.   HENT:      Head: Normocephalic and atraumatic.   Eyes:      Conjunctiva/sclera: Conjunctivae normal.      Pupils: Pupils are equal, round, and reactive to light.   Neck:      Thyroid: No thyromegaly.   Cardiovascular:      Rate and Rhythm: Normal rate and regular rhythm.   Pulmonary:      Effort: Pulmonary effort is normal. No respiratory distress.   Abdominal:      General: There is no distension.      Palpations: Abdomen is soft. There is no mass.      Tenderness: There is no abdominal tenderness.   Genitourinary:     Comments: External anal exam with residual defect in the right superior location consistent with drained perirectal abscess.  Surrounding skin non erythematous, edematous or indurated  Musculoskeletal:         General: No tenderness. Normal range of motion.      Cervical back: Normal range of motion.   Skin:     General: Skin is warm and dry.      Capillary Refill: Capillary refill takes less than 2 seconds.   Neurological:      General: No  focal deficit present.      Mental Status: He is alert and oriented to person, place, and time.           ASSESSMENT/PLAN:     Jovanny was seen today for abscess.    Diagnoses and all orders for this visit:    Perirectal abscess  -     Ambulatory referral/consult to Colorectal Surgery    - reviewed causes a perirectal abscess.  Also discussed potential for development of fistula in ANO.  Residual skin defect that he feels is secondary to the spontaneous rupture and will heal with time  - RTC p.r.n.           [1]   Social History  Tobacco Use    Smoking status: Former     Current packs/day: 0.00     Average packs/day: 0.5 packs/day for 30.0 years (15.0 ttl pk-yrs)     Types: Cigarettes     Start date: 1965     Quit date: 1995     Years since quittin.6    Smokeless tobacco: Former    Tobacco comments:     Hold midnight prior to sx   Substance Use Topics    Alcohol use: Not Currently     Comment: hold now    Drug use: Not Currently     Frequency: 4.0 times per week     Types: Marijuana     Comment: hold midnight prior to sx

## 2025-05-05 ENCOUNTER — OFFICE VISIT (OUTPATIENT)
Dept: VASCULAR SURGERY | Facility: CLINIC | Age: 89
End: 2025-05-05
Payer: MEDICARE

## 2025-05-05 VITALS — DIASTOLIC BLOOD PRESSURE: 81 MMHG | HEART RATE: 77 BPM | SYSTOLIC BLOOD PRESSURE: 118 MMHG

## 2025-05-05 DIAGNOSIS — I10 ESSENTIAL HYPERTENSION: Chronic | ICD-10-CM

## 2025-05-05 DIAGNOSIS — I71.23 ANEURYSM OF DESCENDING THORACIC AORTA WITHOUT RUPTURE: Primary | ICD-10-CM

## 2025-05-05 PROCEDURE — 99999 PR PBB SHADOW E&M-EST. PATIENT-LVL III: CPT | Mod: PBBFAC,,, | Performed by: STUDENT IN AN ORGANIZED HEALTH CARE EDUCATION/TRAINING PROGRAM

## 2025-05-05 PROCEDURE — 99214 OFFICE O/P EST MOD 30 MIN: CPT | Mod: S$PBB,,, | Performed by: STUDENT IN AN ORGANIZED HEALTH CARE EDUCATION/TRAINING PROGRAM

## 2025-05-05 PROCEDURE — 99213 OFFICE O/P EST LOW 20 MIN: CPT | Mod: PBBFAC | Performed by: STUDENT IN AN ORGANIZED HEALTH CARE EDUCATION/TRAINING PROGRAM

## 2025-05-05 NOTE — PROGRESS NOTES
Michelle Martinez  [facility]    OFFICE NOTE    DATE OF VISIT: 2025  PATIENT NAME: Jovanny Olmedo  : 1935  MRN: [id number]  PRIMARY CARE PHYSICIAN: Miryam Jimenez MD  CARDIOLOGIST:   REFERRING PROVIDER: No ref. provider found    CHIEF COMPLAINT   Chief Complaint   Patient presents with    Follow-up       HISTORY OF PRESENT ILLNESS:  Jovanny Olmedo is a 89 y.o. male who presents to clinic today known thoracic aneurysm which we are treating non operatively. Patient is high risk of paralysis and cardiopulmonary complication with repair. He accepts this plan and wishes to pursue non op management. Chest pain that brought him to the hospital few months ago has mostly resolved.    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 2023    Arthritis     Back pain     CAD (coronary artery disease)     Cataract     CKD (chronic kidney disease)     GERD (gastroesophageal reflux disease)     Glaucoma     suspect    HTN (hypertension)     Multiple subsegmental pulmonary emboli without acute cor pulmonale 2021    Prostate cancer     tx'd with radiation    PVD (peripheral vascular disease)     stent in right renal artery and aorta    Sleep apnea     wears cpap    Stable angina pectoris 2023       PAST SURGICAL HISTORY:  Past Surgical History:   Procedure Laterality Date    ABDOMINAL AORTA STENT      BLADDER FULGURATION N/A 3/19/2025    Procedure: FULGURATION, BLADDER;  Surgeon: Blayne Jimenez MD;  Location: [facility];  Service: Urology;  Laterality: N/A;    CARDIAC CATHETERIZATION      CATARACT EXTRACTION W/  INTRAOCULAR LENS IMPLANT Right 2017    COLONOSCOPY N/A  9/24/2020    Procedure: COLONOSCOPY;  Surgeon: Shayy Melvin MD;  Location: [facility] ENDO;  Service: Endoscopy;  Laterality: N/A;    COLONOSCOPY N/A 1/10/2022    Procedure: COLONOSCOPY;  Surgeon: Vi Lara MD;  Location: [facility] ENDO;  Service: Endoscopy;  Laterality: N/A;    CORONARY ANGIOPLASTY      CORONARY STENT PLACEMENT      CYSTOSCOPY WITH BIOPSY OF BLADDER N/A 3/19/2025    Procedure: CYSTOSCOPY, WITH BLADDER BIOPSY;  Surgeon: Blayne Jimenez MD;  Location: [facility] OR;  Service: Urology;  Laterality: N/A;    PCIOL Right 03/29/2017    DR. LEDEZMA    PCIOL Left 07/03/2019    DR. LEDEZMA    PROSTATE BIOPSY      RENAL ARTERY STENT         SOCIAL HISTORY:   Social History[1]    FAMILY HISTORY:  Family History   Problem Relation Name Age of Onset    Glaucoma Mother      Diabetes Mother      Kidney disease Mother      Colon cancer Father      Cancer Father          colon cancer    Diabetes Sister      Diabetes Brother      Hypertension Brother      Blindness Neg Hx         REVIEW OF SYSTEMS:  ROS  10 pt ros otherwise negative    PHYSICAL EXAM:  Vitals:    05/05/25 0935   BP: 118/81   Pulse: 77      Physical Exam      Vss  Gen nad alert oriented  Cv rrr  Lung ctab  Palpable radial pulses  Mild pitting edema b/l ankles/calf areas    VASCULAR LAB STUDIES:  none    ASSESSMENT AND PLAN:  Essential hypertension  Medical management    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 Treating non operatively. He can see me back on as needed basis.      Jovanny was seen today for follow-up.    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        Michelle Martinez  [facility]  Vascular Surgery  [phone] (Clinic Number)         [1]   Social History  Tobacco Use    Smoking status: Former     Current packs/day: 0.00     Average packs/day: 0.5  packs/day for 30.0 years (15.0 ttl pk-yrs)     Types: Cigarettes     Start date: 1965     Quit date: 1995     Years since quittin.6    Smokeless tobacco: Former    Tobacco comments:     Hold midnight prior to sx   Substance Use Topics    Alcohol use: Not Currently     Comment: hold now    Drug use: Not Currently     Frequency: 4.0 times per week     Types: Marijuana     Comment: hold midnight prior to sx

## 2025-05-08 ENCOUNTER — EXTERNAL HOME HEALTH (OUTPATIENT)
Dept: HOME HEALTH SERVICES | Facility: HOSPITAL | Age: 89
End: 2025-05-08
Payer: MEDICARE

## 2025-05-08 ENCOUNTER — TELEPHONE (OUTPATIENT)
Dept: SURGERY | Facility: CLINIC | Age: 89
End: 2025-05-08
Payer: MEDICARE

## 2025-05-08 NOTE — TELEPHONE ENCOUNTER
MARILYN with patient to reschedule appointment with Dr. Morrison. [facility] nurse station call back number [phone].    ----- Message from Shaan sent at 5/8/2025  8:34 AM CDT -----  Contact: kaylah  Type:  Appointment RequestName of Caller:Puma is the first available appointment?Symptoms:reschedule appt Would the patient rather a call back or a response via [facility]? Call [facility] Call Back Number:[phone] Information:

## 2025-05-26 DIAGNOSIS — L30.9 DERMATITIS: ICD-10-CM

## 2025-05-26 NOTE — TELEPHONE ENCOUNTER
"LV: 10/23/2024  NV: 06/24/2025    Juliane Westfall" Mandy, [facility]  Dermatology Department   "

## 2025-05-29 RX ORDER — KETOCONAZOLE 20 MG/G
CREAM TOPICAL 2 TIMES DAILY
Qty: 60 G | Refills: 1 | Status: SHIPPED | OUTPATIENT
Start: 2025-05-29

## (undated) DEVICE — TRAY SKIN SCRUB WET 4 COMPART

## (undated) DEVICE — WIRE GUID STAND STR .038X150CM

## (undated) DEVICE — SYR ONLY LUER LOCK 20CC

## (undated) DEVICE — SOL IRRI STRL WATER 1000ML

## (undated) DEVICE — GOWN POLY REINF X-LONG XL

## (undated) DEVICE — BOWL STERILE LARGE 32OZ

## (undated) DEVICE — GOWN POLY REINF BRTH SLV XL

## (undated) DEVICE — CONTAINER SPECIMEN OR STER 4OZ

## (undated) DEVICE — TOWEL OR DISP STRL BLUE 4/PK

## (undated) DEVICE — DRAPE T CYSTOSCOPY STERILE

## (undated) DEVICE — SPONGE COTTON TRAY 4X4IN

## (undated) DEVICE — UROVIEW 2600/2800

## (undated) DEVICE — IRRIGATION SET Y-TYPE TUR/BLAD

## (undated) DEVICE — SOL IRRIGATION WATER 3000ML

## (undated) DEVICE — GLOVE SIGNATURE ESSNTL LTX 7.5

## (undated) DEVICE — CANISTER SUCTION JUMBO 12L